# Patient Record
Sex: FEMALE | Race: WHITE | NOT HISPANIC OR LATINO | Employment: OTHER | URBAN - METROPOLITAN AREA
[De-identification: names, ages, dates, MRNs, and addresses within clinical notes are randomized per-mention and may not be internally consistent; named-entity substitution may affect disease eponyms.]

---

## 2021-05-11 ENCOUNTER — OFFICE VISIT (OUTPATIENT)
Dept: INTERNAL MEDICINE CLINIC | Facility: CLINIC | Age: 76
End: 2021-05-11
Payer: MEDICARE

## 2021-05-11 VITALS
DIASTOLIC BLOOD PRESSURE: 84 MMHG | SYSTOLIC BLOOD PRESSURE: 134 MMHG | HEIGHT: 64 IN | WEIGHT: 175 LBS | HEART RATE: 92 BPM | OXYGEN SATURATION: 93 % | BODY MASS INDEX: 29.88 KG/M2 | TEMPERATURE: 97.3 F

## 2021-05-11 DIAGNOSIS — M79.89 LEG SWELLING: Primary | ICD-10-CM

## 2021-05-11 DIAGNOSIS — E11.65 UNCONTROLLED TYPE 2 DIABETES MELLITUS WITH HYPERGLYCEMIA (HCC): ICD-10-CM

## 2021-05-11 DIAGNOSIS — R06.02 SOB (SHORTNESS OF BREATH): ICD-10-CM

## 2021-05-11 DIAGNOSIS — R19.07 ABDOMINAL SWELLING, GENERALIZED: ICD-10-CM

## 2021-05-11 DIAGNOSIS — F41.9 ANXIETY: ICD-10-CM

## 2021-05-11 DIAGNOSIS — R35.0 URINARY FREQUENCY: ICD-10-CM

## 2021-05-11 PROCEDURE — 93000 ELECTROCARDIOGRAM COMPLETE: CPT | Performed by: INTERNAL MEDICINE

## 2021-05-11 PROCEDURE — 99214 OFFICE O/P EST MOD 30 MIN: CPT | Performed by: INTERNAL MEDICINE

## 2021-05-11 RX ORDER — LANOLIN ALCOHOL/MO/W.PET/CERES
1 CREAM (GRAM) TOPICAL 2 TIMES DAILY
COMMUNITY
End: 2021-07-07 | Stop reason: ALTCHOICE

## 2021-05-11 RX ORDER — FUROSEMIDE 20 MG/1
20 TABLET ORAL DAILY
Qty: 30 TABLET | Refills: 0 | Status: SHIPPED | OUTPATIENT
Start: 2021-05-11 | End: 2021-05-17

## 2021-05-11 NOTE — PROGRESS NOTES
Assessment/Plan:  EKG-sinus tachycardia, sugar in the office 135  BMI Counseling: Body mass index is 30 04 kg/m²  The BMI is above normal  Nutrition recommendations include decreasing portion sizes, encouraging healthy choices of fruits and vegetables and decreasing fast food intake  Exercise recommendations include moderate physical activity 150 minutes/week  Tobacco Cessation Counseling: Tobacco cessation counseling was provided  The patient is sincerely urged to quit consumption of tobacco  She is ready to quit tobacco            1  Leg swelling  -     Urinalysis with microscopic  -     Urine culture; Future  -     US abdomen and pelvis with transvaginal; Future; Expected date: 05/11/2021  -     furosemide (LASIX) 20 mg tablet; Take 1 tablet (20 mg total) by mouth daily  -     NT-BNP PRO; Future  -     POCT ECG    2  SOB (shortness of breath)  -     XR chest pa & lateral; Future; Expected date: 05/11/2021  -     US abdomen and pelvis with transvaginal; Future; Expected date: 05/11/2021  -     furosemide (LASIX) 20 mg tablet; Take 1 tablet (20 mg total) by mouth daily  -     NT-BNP PRO; Future  -     POCT ECG    3  Anxiety    4  Abdominal swelling, generalized  -     US abdomen and pelvis with transvaginal; Future; Expected date: 05/11/2021    5  Uncontrolled type 2 diabetes mellitus with hyperglycemia (HCC)  -     CBC and differential; Future  -     Comprehensive metabolic panel; Future  -     Hemoglobin A1C; Future  -     Lipid Panel with Direct LDL reflex; Future  -     Urinalysis with microscopic  -     Urine culture; Future  -     TSH, 3rd generation; Future  -     Microalbumin / creatinine urine ratio    6  Urinary frequency  -     Urine culture; Future           Subjective:      Patient ID: Julio Gregorio is a 66 y o  female      Bilateral leg swelling for 1 week, as per her she feels short of breath when she gets anxious, no chest pain no chest tightness, she feels her abdomen is bloated, swelling up, denies any change in the bowel movements, denies any urinary frequency or change in the color of the urine, denies any burning in the urine, no fever no chills      The following portions of the patient's history were reviewed and updated as appropriate: She  has a past medical history of Allergic rhinitis, Anxiety, and Diabetes mellitus ([facility] )  She   Patient Active Problem List    Diagnosis Date Noted    Leg swelling 05/11/2021    SOB (shortness of breath) 05/11/2021    Abdominal swelling, generalized 05/11/2021    Uncontrolled type 2 diabetes mellitus with hyperglycemia ([facility] ) 05/11/2021    Urinary frequency 05/11/2021    Allergic rhinitis     Diabetes mellitus ([facility] )     Anxiety      She  has a past surgical history that includes Breast surgery (Right); Other surgical history; and Other surgical history  Her family history includes Diabetes in her brother  She  reports that she has been smoking  She has been smoking about 0 25 packs per day  She has never used smokeless tobacco  She reports current alcohol use of about 1 0 standard drinks of alcohol per week  No history on file for drug  Current Outpatient Medications   Medication Sig Dispense Refill    calcium citrate-vitamin D (CITRACAL+D) 315-200 MG-UNIT per tablet Take 1 tablet by mouth 2 (two) times a day      Fexofenadine-Pseudoephedrine (ALLEGRA-D 24 HOUR PO) Take by mouth      furosemide (LASIX) 20 mg tablet Take 1 tablet (20 mg total) by mouth daily 30 tablet 0    IBUPROFEN PO Take by mouth       No current facility-administered medications for this visit  Current Outpatient Medications on File Prior to Visit   Medication Sig    calcium citrate-vitamin D (CITRACAL+D) 315-200 MG-UNIT per tablet Take 1 tablet by mouth 2 (two) times a day    Fexofenadine-Pseudoephedrine (ALLEGRA-D 24 HOUR PO) Take by mouth    IBUPROFEN PO Take by mouth     No current facility-administered medications on file prior to visit        She has No Known Allergies       Review of Systems   Constitutional: Negative for chills and fever  HENT: Negative for congestion, ear pain and sore throat  Eyes: Negative for pain  Respiratory: Positive for shortness of breath  Negative for cough  Cardiovascular: Positive for leg swelling  Negative for chest pain  Gastrointestinal: Positive for abdominal distention  Negative for abdominal pain, nausea and vomiting  Endocrine: Negative for polyuria  Genitourinary: Negative for difficulty urinating, frequency and urgency  Musculoskeletal: Negative for arthralgias and back pain  Skin: Negative for rash  Neurological: Negative for weakness and headaches  Psychiatric/Behavioral: Negative for sleep disturbance  The patient is not nervous/anxious  Objective:      /84 (BP Location: Left arm, Patient Position: Sitting, Cuff Size: Standard)   Pulse 92   Temp (!) 97 3 °F (36 3 °C) (Temporal)   Ht 5' 4" (1 626 m)   Wt 79 4 kg (175 lb)   SpO2 93%   BMI 30 04 kg/m²     No results found for this or any previous visit (from the past 1344 hour(s))  Physical Exam  Constitutional:       Appearance: Normal appearance  HENT:      Head: Normocephalic  Right Ear: Tympanic membrane, ear canal and external ear normal       Left Ear: Tympanic membrane, ear canal and external ear normal       Nose: Nose normal  No congestion  Mouth/Throat:      Mouth: Mucous membranes are moist       Pharynx: Oropharynx is clear  No oropharyngeal exudate or posterior oropharyngeal erythema  Eyes:      Extraocular Movements: Extraocular movements intact  Conjunctiva/sclera: Conjunctivae normal       Pupils: Pupils are equal, round, and reactive to light  Neck:      Musculoskeletal: Normal range of motion and neck supple  Cardiovascular:      Rate and Rhythm: Normal rate and regular rhythm  Heart sounds: Normal heart sounds  No murmur     Pulmonary:      Effort: Pulmonary effort is normal       Breath sounds: Normal breath sounds  No wheezing or rales  Abdominal:      General: Bowel sounds are normal  There is distension  Tenderness: There is no abdominal tenderness  There is no right CVA tenderness or left CVA tenderness  Comments: Abdominal wall swelling present, skin looks normal color   Musculoskeletal: Normal range of motion  Right lower leg: Edema present  Left lower leg: Edema present  Lymphadenopathy:      Cervical: No cervical adenopathy  Skin:     General: Skin is warm  Neurological:      General: No focal deficit present  Mental Status: She is alert and oriented to person, place, and time

## 2021-05-12 ENCOUNTER — TELEPHONE (OUTPATIENT)
Dept: INTERNAL MEDICINE CLINIC | Facility: CLINIC | Age: 76
End: 2021-05-12

## 2021-05-12 ENCOUNTER — HOSPITAL ENCOUNTER (OUTPATIENT)
Dept: RADIOLOGY | Age: 76
Discharge: HOME | End: 2021-05-12
Payer: MEDICARE

## 2021-05-12 ENCOUNTER — APPOINTMENT (OUTPATIENT)
Dept: LAB | Age: 76
End: 2021-05-12
Payer: MEDICARE

## 2021-05-12 ENCOUNTER — APPOINTMENT (OUTPATIENT)
Dept: RADIOLOGY | Age: 76
End: 2021-05-12
Payer: MEDICARE

## 2021-05-12 DIAGNOSIS — E11.65 UNCONTROLLED TYPE 2 DIABETES MELLITUS WITH HYPERGLYCEMIA (HCC): ICD-10-CM

## 2021-05-12 DIAGNOSIS — R06.02 SOB (SHORTNESS OF BREATH): ICD-10-CM

## 2021-05-12 DIAGNOSIS — R19.07 ABDOMINAL SWELLING, GENERALIZED: ICD-10-CM

## 2021-05-12 DIAGNOSIS — M79.89 LEG SWELLING: ICD-10-CM

## 2021-05-12 DIAGNOSIS — R35.0 URINARY FREQUENCY: ICD-10-CM

## 2021-05-12 LAB
ALBUMIN SERPL BCP-MCNC: 3.7 G/DL (ref 3.5–5)
ALP SERPL-CCNC: 124 U/L (ref 46–116)
ALT SERPL W P-5'-P-CCNC: 34 U/L (ref 12–78)
ANION GAP SERPL CALCULATED.3IONS-SCNC: 0 MMOL/L (ref 4–13)
AST SERPL W P-5'-P-CCNC: 24 U/L (ref 5–45)
BACTERIA UR QL AUTO: ABNORMAL /HPF
BASOPHILS # BLD AUTO: 0.05 THOUSANDS/ΜL (ref 0–0.1)
BASOPHILS NFR BLD AUTO: 1 % (ref 0–1)
BILIRUB SERPL-MCNC: 1.95 MG/DL (ref 0.2–1)
BILIRUB UR QL STRIP: NEGATIVE
BUN SERPL-MCNC: 10 MG/DL (ref 5–25)
CALCIUM SERPL-MCNC: 9.1 MG/DL (ref 8.3–10.1)
CHLORIDE SERPL-SCNC: 101 MMOL/L (ref 100–108)
CHOLEST SERPL-MCNC: 136 MG/DL (ref 50–200)
CLARITY UR: CLEAR
CO2 SERPL-SCNC: 36 MMOL/L (ref 21–32)
COLOR UR: ABNORMAL
CREAT SERPL-MCNC: 0.63 MG/DL (ref 0.6–1.3)
CREAT UR-MCNC: 101 MG/DL
EOSINOPHIL # BLD AUTO: 0.04 THOUSAND/ΜL (ref 0–0.61)
EOSINOPHIL NFR BLD AUTO: 1 % (ref 0–6)
ERYTHROCYTE [DISTWIDTH] IN BLOOD BY AUTOMATED COUNT: 14.6 % (ref 11.6–15.1)
EST. AVERAGE GLUCOSE BLD GHB EST-MCNC: 143 MG/DL
GFR SERPL CREATININE-BSD FRML MDRD: 87 ML/MIN/1.73SQ M
GLUCOSE P FAST SERPL-MCNC: 152 MG/DL (ref 65–99)
GLUCOSE UR STRIP-MCNC: NEGATIVE MG/DL
HBA1C MFR BLD: 6.6 %
HCT VFR BLD AUTO: 56.1 % (ref 34.8–46.1)
HDLC SERPL-MCNC: 71 MG/DL
HGB BLD-MCNC: 18 G/DL (ref 11.5–15.4)
HGB UR QL STRIP.AUTO: ABNORMAL
HYALINE CASTS #/AREA URNS LPF: ABNORMAL /LPF
IMM GRANULOCYTES # BLD AUTO: 0.02 THOUSAND/UL (ref 0–0.2)
IMM GRANULOCYTES NFR BLD AUTO: 0 % (ref 0–2)
KETONES UR STRIP-MCNC: ABNORMAL MG/DL
LDLC SERPL CALC-MCNC: 55 MG/DL (ref 0–100)
LEUKOCYTE ESTERASE UR QL STRIP: NEGATIVE
LYMPHOCYTES # BLD AUTO: 0.79 THOUSANDS/ΜL (ref 0.6–4.47)
LYMPHOCYTES NFR BLD AUTO: 11 % (ref 14–44)
MCH RBC QN AUTO: 32.4 PG (ref 26.8–34.3)
MCHC RBC AUTO-ENTMCNC: 32.1 G/DL (ref 31.4–37.4)
MCV RBC AUTO: 101 FL (ref 82–98)
MICROALBUMIN UR-MCNC: 2500 MG/L (ref 0–20)
MICROALBUMIN/CREAT 24H UR: 2475 MG/G CREATININE (ref 0–30)
MONOCYTES # BLD AUTO: 0.46 THOUSAND/ΜL (ref 0.17–1.22)
MONOCYTES NFR BLD AUTO: 7 % (ref 4–12)
NEUTROPHILS # BLD AUTO: 5.61 THOUSANDS/ΜL (ref 1.85–7.62)
NEUTS SEG NFR BLD AUTO: 80 % (ref 43–75)
NITRITE UR QL STRIP: NEGATIVE
NON-SQ EPI CELLS URNS QL MICRO: ABNORMAL /HPF
NRBC BLD AUTO-RTO: 0 /100 WBCS
NT-PROBNP SERPL-MCNC: 2138 PG/ML
PH UR STRIP.AUTO: 6 [PH]
PLATELET # BLD AUTO: 177 THOUSANDS/UL (ref 149–390)
PMV BLD AUTO: 12.2 FL (ref 8.9–12.7)
POTASSIUM SERPL-SCNC: 4.5 MMOL/L (ref 3.5–5.3)
PROT SERPL-MCNC: 7 G/DL (ref 6.4–8.2)
PROT UR STRIP-MCNC: ABNORMAL MG/DL
RBC # BLD AUTO: 5.56 MILLION/UL (ref 3.81–5.12)
RBC #/AREA URNS AUTO: ABNORMAL /HPF
SODIUM SERPL-SCNC: 137 MMOL/L (ref 136–145)
SP GR UR STRIP.AUTO: 1.02 (ref 1–1.03)
TRIGL SERPL-MCNC: 49 MG/DL
TSH SERPL DL<=0.05 MIU/L-ACNC: 1.8 UIU/ML (ref 0.36–3.74)
UROBILINOGEN UR QL STRIP.AUTO: 1 E.U./DL
WBC # BLD AUTO: 6.97 THOUSAND/UL (ref 4.31–10.16)
WBC #/AREA URNS AUTO: ABNORMAL /HPF

## 2021-05-12 PROCEDURE — 76856 US EXAM PELVIC COMPLETE: CPT

## 2021-05-12 PROCEDURE — 83880 ASSAY OF NATRIURETIC PEPTIDE: CPT

## 2021-05-12 PROCEDURE — 76830 TRANSVAGINAL US NON-OB: CPT

## 2021-05-12 PROCEDURE — 85025 COMPLETE CBC W/AUTO DIFF WBC: CPT

## 2021-05-12 PROCEDURE — 82570 ASSAY OF URINE CREATININE: CPT | Performed by: INTERNAL MEDICINE

## 2021-05-12 PROCEDURE — 83036 HEMOGLOBIN GLYCOSYLATED A1C: CPT

## 2021-05-12 PROCEDURE — 82043 UR ALBUMIN QUANTITATIVE: CPT | Performed by: INTERNAL MEDICINE

## 2021-05-12 PROCEDURE — 76700 US EXAM ABDOM COMPLETE: CPT

## 2021-05-12 PROCEDURE — 36415 COLL VENOUS BLD VENIPUNCTURE: CPT

## 2021-05-12 PROCEDURE — 80061 LIPID PANEL: CPT

## 2021-05-12 PROCEDURE — 87086 URINE CULTURE/COLONY COUNT: CPT

## 2021-05-12 PROCEDURE — 81001 URINALYSIS AUTO W/SCOPE: CPT | Performed by: INTERNAL MEDICINE

## 2021-05-12 PROCEDURE — 71046 X-RAY EXAM CHEST 2 VIEWS: CPT

## 2021-05-12 PROCEDURE — 80053 COMPREHEN METABOLIC PANEL: CPT

## 2021-05-12 PROCEDURE — 84443 ASSAY THYROID STIM HORMONE: CPT

## 2021-05-12 NOTE — TELEPHONE ENCOUNTER
Rojas Lux called from [facility] radiology also to let you know there was significant findings on the [address] of abdomen as well as the pelvis

## 2021-05-13 ENCOUNTER — TELEPHONE (OUTPATIENT)
Dept: INTERNAL MEDICINE CLINIC | Facility: CLINIC | Age: 76
End: 2021-05-13

## 2021-05-13 DIAGNOSIS — R18.0 MALIGNANT ASCITES: ICD-10-CM

## 2021-05-13 DIAGNOSIS — F41.9 ANXIETY: Primary | ICD-10-CM

## 2021-05-13 DIAGNOSIS — R91.8 ABNORMAL RADIOLOGIC FINDING OF LUNG FIELD: ICD-10-CM

## 2021-05-13 DIAGNOSIS — R10.84 GENERALIZED ABDOMINAL PAIN: ICD-10-CM

## 2021-05-13 DIAGNOSIS — R06.02 SOB (SHORTNESS OF BREATH): Primary | ICD-10-CM

## 2021-05-13 DIAGNOSIS — R18.0 MALIGNANT ASCITES: Primary | ICD-10-CM

## 2021-05-13 LAB — BACTERIA UR CULT: NORMAL

## 2021-05-13 RX ORDER — LORAZEPAM 0.5 MG/1
0.5 TABLET ORAL AS NEEDED
Qty: 2 TABLET | Refills: 0 | Status: SHIPPED | OUTPATIENT
Start: 2021-05-13 | End: 2021-07-07 | Stop reason: ALTCHOICE

## 2021-05-13 NOTE — TELEPHONE ENCOUNTER
I scheduled patient for her CT chest and CT abd/pelvis for tomorrow at [facility] and she is asking if you can prescirbe something ot help keep her cqalm during the test  She uses [facility] on [address]

## 2021-05-13 NOTE — TELEPHONE ENCOUNTER
Thank you, lorazepam 0 5 mg, have her take 1 hour before the CT scan tomorrow, I faxed script to the pharmacy

## 2021-05-14 ENCOUNTER — HOSPITAL ENCOUNTER (OUTPATIENT)
Dept: RADIOLOGY | Age: 76
Discharge: HOME | End: 2021-05-14
Payer: MEDICARE

## 2021-05-14 DIAGNOSIS — R10.84 GENERALIZED ABDOMINAL PAIN: ICD-10-CM

## 2021-05-14 DIAGNOSIS — R06.02 SOB (SHORTNESS OF BREATH): ICD-10-CM

## 2021-05-14 DIAGNOSIS — R91.8 ABNORMAL RADIOLOGIC FINDING OF LUNG FIELD: ICD-10-CM

## 2021-05-14 DIAGNOSIS — R18.0 MALIGNANT ASCITES: ICD-10-CM

## 2021-05-14 PROCEDURE — 74177 CT ABD & PELVIS W/CONTRAST: CPT

## 2021-05-14 PROCEDURE — G1004 CDSM NDSC: HCPCS

## 2021-05-14 PROCEDURE — 71260 CT THORAX DX C+: CPT

## 2021-05-14 RX ADMIN — IOHEXOL 100 ML: 350 INJECTION, SOLUTION INTRAVENOUS at 12:30

## 2021-05-17 ENCOUNTER — OFFICE VISIT (OUTPATIENT)
Dept: INTERNAL MEDICINE CLINIC | Facility: CLINIC | Age: 76
End: 2021-05-17

## 2021-05-17 VITALS
BODY MASS INDEX: 29.88 KG/M2 | HEART RATE: 92 BPM | TEMPERATURE: 98.1 F | WEIGHT: 175 LBS | DIASTOLIC BLOOD PRESSURE: 84 MMHG | OXYGEN SATURATION: 92 % | SYSTOLIC BLOOD PRESSURE: 128 MMHG | HEIGHT: 64 IN

## 2021-05-17 DIAGNOSIS — M79.89 LEG SWELLING: Primary | ICD-10-CM

## 2021-05-17 DIAGNOSIS — R19.07 ABDOMINAL SWELLING, GENERALIZED: ICD-10-CM

## 2021-05-17 DIAGNOSIS — E11.65 UNCONTROLLED TYPE 2 DIABETES MELLITUS WITH HYPERGLYCEMIA (HCC): ICD-10-CM

## 2021-05-17 DIAGNOSIS — R80.8 OTHER PROTEINURIA: ICD-10-CM

## 2021-05-17 DIAGNOSIS — F41.9 ANXIETY: ICD-10-CM

## 2021-05-17 DIAGNOSIS — R06.02 SOB (SHORTNESS OF BREATH): ICD-10-CM

## 2021-05-17 PROCEDURE — RECHECK: Performed by: INTERNAL MEDICINE

## 2021-05-17 RX ORDER — FUROSEMIDE 40 MG/1
40 TABLET ORAL DAILY
Qty: 30 TABLET | Refills: 0 | Status: SHIPPED | OUTPATIENT
Start: 2021-05-17 | End: 2021-05-26 | Stop reason: HOSPADM

## 2021-05-17 RX ORDER — HYDROXYZINE HYDROCHLORIDE 10 MG/1
10 TABLET, FILM COATED ORAL 3 TIMES DAILY PRN
Qty: 60 TABLET | Refills: 0 | Status: SHIPPED | OUTPATIENT
Start: 2021-05-17 | End: 2021-06-02

## 2021-05-17 RX ORDER — FUROSEMIDE 40 MG/1
40 TABLET ORAL DAILY
Qty: 30 TABLET | Refills: 0 | Status: SHIPPED | OUTPATIENT
Start: 2021-05-17 | End: 2021-05-17

## 2021-05-17 RX ORDER — HYDROXYZINE HYDROCHLORIDE 10 MG/1
10 TABLET, FILM COATED ORAL EVERY 6 HOURS PRN
Qty: 30 TABLET | Refills: 0 | Status: SHIPPED | OUTPATIENT
Start: 2021-05-17 | End: 2021-05-17

## 2021-05-17 NOTE — PROGRESS NOTES
Assessment/Plan:      advised her to go to ER if she gets worse  1  Leg swelling  -     Basic metabolic panel; Future  -     furosemide (LASIX) 40 mg tablet; Take 1 tablet (40 mg total) by mouth daily    2  SOB (shortness of breath)  -     Echo complete with contrast if indicated; Future; Expected date: 05/17/2021  -     Ambulatory referral to Cardiology; Future    3  Abdominal swelling, generalized    4  Other proteinuria  -     Ambulatory referral to Nephrology; Future    5  Anxiety  -     hydrOXYzine HCL (ATARAX) 10 mg tablet; Take 1 tablet (10 mg total) by mouth 3 (three) times a day as needed for itching or anxiety    6  Uncontrolled type 2 diabetes mellitus with hyperglycemia ([facility])           Subjective:      Patient ID: Mercy Scott is a 66 y o  female  Follow-up on blood tests, urine tests ultrasound and CT scan of the abdomen pelvis and chest tests discussed with her      The following portions of the patient's history were reviewed and updated as appropriate: She  has a past medical history of Allergic rhinitis, Anxiety, and Diabetes mellitus ([facility] )  She   Patient Active Problem List    Diagnosis Date Noted    Other proteinuria 05/17/2021    Leg swelling 05/11/2021    SOB (shortness of breath) 05/11/2021    Abdominal swelling, generalized 05/11/2021    Uncontrolled type 2 diabetes mellitus with hyperglycemia ([facility] ) 05/11/2021    Urinary frequency 05/11/2021    Allergic rhinitis     Diabetes mellitus ([facility] )     Anxiety      She  has a past surgical history that includes Breast surgery (Right); Other surgical history; and Other surgical history  Her family history includes Diabetes in her brother  She  reports that she has been smoking  She has been smoking about 0 25 packs per day  She has never used smokeless tobacco  She reports current alcohol use of about 1 0 standard drinks of alcohol per week  No history on file for drug    Current Outpatient Medications   Medication Sig Dispense Refill    calcium citrate-vitamin D (CITRACAL+D) 315-200 MG-UNIT per tablet Take 1 tablet by mouth 2 (two) times a day      Fexofenadine-Pseudoephedrine (ALLEGRA-D 24 HOUR PO) Take by mouth      IBUPROFEN PO Take by mouth      LORazepam (ATIVAN) 0 5 mg tablet Take 1 tablet (0 5 mg total) by mouth as needed for anxiety 1 hour before CT scan 2 tablet 0    furosemide (LASIX) 40 mg tablet Take 1 tablet (40 mg total) by mouth daily 30 tablet 0    hydrOXYzine HCL (ATARAX) 10 mg tablet Take 1 tablet (10 mg total) by mouth 3 (three) times a day as needed for itching or anxiety 60 tablet 0     No current facility-administered medications for this visit  Current Outpatient Medications on File Prior to Visit   Medication Sig    calcium citrate-vitamin D (CITRACAL+D) 315-200 MG-UNIT per tablet Take 1 tablet by mouth 2 (two) times a day    Fexofenadine-Pseudoephedrine (ALLEGRA-D 24 HOUR PO) Take by mouth    IBUPROFEN PO Take by mouth    LORazepam (ATIVAN) 0 5 mg tablet Take 1 tablet (0 5 mg total) by mouth as needed for anxiety 1 hour before CT scan    [DISCONTINUED] furosemide (LASIX) 20 mg tablet Take 1 tablet (20 mg total) by mouth daily     No current facility-administered medications on file prior to visit  She has No Known Allergies       Review of Systems   Constitutional: Negative for chills and fever  HENT: Negative for congestion, ear pain and sore throat  Eyes: Negative for pain  Respiratory: Positive for shortness of breath  Negative for cough  Cardiovascular: Positive for leg swelling  Negative for chest pain  Gastrointestinal: Positive for abdominal distention  Negative for abdominal pain, nausea and vomiting  Endocrine: Negative for polyuria  Genitourinary: Negative for difficulty urinating, frequency and urgency  Musculoskeletal: Negative for arthralgias and back pain  Skin: Negative for rash  Neurological: Negative for weakness and headaches     Psychiatric/Behavioral: Negative for sleep disturbance  The patient is not nervous/anxious  Objective:      /84 (BP Location: Left arm, Patient Position: Sitting, Cuff Size: Standard)   Pulse 92   Temp 98 1 °F (36 7 °C) (Temporal)   Ht 5' 4" (1 626 m)   Wt 79 4 kg (175 lb)   SpO2 92%   BMI 30 04 kg/m²     Recent Results (from the past 1344 hour(s))   Urinalysis with microscopic    Collection Time: 05/12/21 11:58 AM   Result Value Ref Range    Clarity, UA Clear     Color, UA Dk Yellow     [place], UA 1 016 1 003 - 1 030    pH, UA 6 0 4 5, 5 0, 5 5, 6 0, 6 5, 7 0, 7 5, 8 0    Glucose, UA Negative Negative mg/dl    Ketones, UA Trace (A) Negative mg/dl    Blood, UA Trace (A) Negative    Protein,  (3+) (A) Negative mg/dl    Nitrite, UA Negative Negative    Bilirubin, UA Negative Negative    Urobilinogen, UA 1 0 0 2, 1 0 E U /dl E U /dl    Leukocytes, UA Negative Negative    WBC, UA 2-4 None Seen, 2-4 /hpf    RBC, UA 2-4 None Seen, 2-4 /hpf    Hyaline Casts, UA None Seen None Seen /lpf    Bacteria, UA Occasional None Seen, Occasional /hpf    Epithelial Cells None Seen None Seen, Occasional /hpf   Microalbumin / creatinine urine ratio    Collection Time: 05/12/21 11:58 AM   Result Value Ref Range    Creatinine, Ur 101 0 mg/dL    Microalbum  ,U,Random 2,500 0 (H) 0 0 - 20 0 mg/L    Microalb Creat Ratio 2,475 (H) 0 - 30 mg/g creatinine   CBC and differential    Collection Time: 05/12/21 11:58 AM   Result Value Ref Range    WBC 6 97 4 31 - 10 16 Thousand/uL    RBC 5 56 (H) 3 81 - 5 12 Million/uL    Hemoglobin 18 0 (H) 11 5 - 15 4 g/dL    Hematocrit 56 1 (H) 34 8 - 46 1 %     (H) 82 - 98 fL    MCH 32 4 26 8 - 34 3 pg    MCHC 32 1 31 4 - 37 4 g/dL    RDW 14 6 11 6 - 15 1 %    MPV 12 2 8 9 - 12 7 fL    Platelets 616 906 - 024 Thousands/uL    nRBC 0 /100 WBCs    Neutrophils Relative 80 (H) 43 - 75 %    Immat GRANS % 0 0 - 2 %    Lymphocytes Relative 11 (L) 14 - 44 %    Monocytes Relative 7 4 - 12 % Eosinophils Relative 1 0 - 6 %    Basophils Relative 1 0 - 1 %    Neutrophils Absolute 5 61 1 85 - 7 62 Thousands/µL    Immature Grans Absolute 0 02 0 00 - 0 20 Thousand/uL    Lymphocytes Absolute 0 79 0 60 - 4 47 Thousands/µL    Monocytes Absolute 0 46 0 17 - 1 22 Thousand/µL    Eosinophils Absolute 0 04 0 00 - 0 61 Thousand/µL    Basophils Absolute 0 05 0 00 - 0 10 Thousands/µL   Comprehensive metabolic panel    Collection Time: 05/12/21 11:58 AM   Result Value Ref Range    Sodium 137 136 - 145 mmol/L    Potassium 4 5 3 5 - 5 3 mmol/L    Chloride 101 100 - 108 mmol/L    CO2 36 (H) 21 - 32 mmol/L    ANION GAP 0 (L) 4 - 13 mmol/L    BUN 10 5 - 25 mg/dL    Creatinine 0 63 0 60 - 1 30 mg/dL    Glucose, Fasting 152 (H) 65 - 99 mg/dL    Calcium 9 1 8 3 - 10 1 mg/dL    AST 24 5 - 45 U/L    ALT 34 12 - 78 U/L    Alkaline Phosphatase 124 (H) 46 - 116 U/L    Total Protein 7 0 6 4 - 8 2 g/dL    Albumin 3 7 3 5 - 5 0 g/dL    Total Bilirubin 1 95 (H) 0 20 - 1 00 mg/dL    eGFR 87 ml/min/1 73sq m   Hemoglobin A1C    Collection Time: 05/12/21 11:58 AM   Result Value Ref Range    Hemoglobin A1C 6 6 (H) Normal 3 8-5 6%; PreDiabetic 5 7-6 4%; Diabetic >=6 5%; Glycemic control for adults with diabetes <7 0% %     mg/dl   Lipid Panel with Direct LDL reflex    Collection Time: 05/12/21 11:58 AM   Result Value Ref Range    Cholesterol 136 50 - 200 mg/dL    Triglycerides 49 <=150 mg/dL    HDL, Direct 71 >=40 mg/dL    LDL Calculated 55 0 - 100 mg/dL   Urine culture    Collection Time: 05/12/21 11:58 AM    Specimen: Urine, Clean Catch   Result Value Ref Range    Urine Culture No Growth <1000 cfu/mL    TSH, 3rd generation    Collection Time: 05/12/21 11:58 AM   Result Value Ref Range    TSH 3RD GENERATON 1 800 0 358 - 3 740 uIU/mL   NT-BNP PRO    Collection Time: 05/12/21 11:58 AM   Result Value Ref Range    NT-proBNP 2,138 (H) <450 pg/mL        Physical Exam  Constitutional:       Appearance: Normal appearance     HENT:      Head: Normocephalic  Right Ear: Tympanic membrane, ear canal and external ear normal       Left Ear: Tympanic membrane, ear canal and external ear normal       Nose: Nose normal  No congestion  Mouth/Throat:      Mouth: Mucous membranes are moist       Pharynx: Oropharynx is clear  No oropharyngeal exudate or posterior oropharyngeal erythema  Eyes:      Extraocular Movements: Extraocular movements intact  Conjunctiva/sclera: Conjunctivae normal       Pupils: Pupils are equal, round, and reactive to light  Neck:      Musculoskeletal: Normal range of motion and neck supple  Cardiovascular:      Rate and Rhythm: Normal rate and regular rhythm  Heart sounds: Normal heart sounds  No murmur  Pulmonary:      Effort: Pulmonary effort is normal       Breath sounds: Rales present  No wheezing  Comments: Left-sided basal crackles present  Abdominal:      General: Bowel sounds are normal  There is distension  Tenderness: There is no abdominal tenderness  There is no right CVA tenderness or left CVA tenderness  Comments: Abdominal wall swelling present, skin looks normal color   Musculoskeletal: Normal range of motion  Right lower leg: Edema present  Left lower leg: Edema present  Lymphadenopathy:      Cervical: No cervical adenopathy  Skin:     General: Skin is warm  Neurological:      General: No focal deficit present  Mental Status: She is alert and oriented to person, place, and time

## 2021-05-18 ENCOUNTER — APPOINTMENT (EMERGENCY)
Dept: RADIOLOGY | Facility: HOSPITAL | Age: 76
DRG: 291 | End: 2021-05-18
Payer: MEDICARE

## 2021-05-18 ENCOUNTER — TELEPHONE (OUTPATIENT)
Dept: INTERNAL MEDICINE CLINIC | Facility: CLINIC | Age: 76
End: 2021-05-18

## 2021-05-18 ENCOUNTER — HOSPITAL ENCOUNTER (INPATIENT)
Facility: HOSPITAL | Age: 76
LOS: 8 days | Discharge: HOME HEALTH | DRG: 291 | End: 2021-05-26
Attending: EMERGENCY MEDICINE | Admitting: INTERNAL MEDICINE
Payer: MEDICARE

## 2021-05-18 DIAGNOSIS — J44.9 COPD (CHRONIC OBSTRUCTIVE PULMONARY DISEASE) (HCC): ICD-10-CM

## 2021-05-18 DIAGNOSIS — R80.8 OTHER PROTEINURIA: ICD-10-CM

## 2021-05-18 DIAGNOSIS — R76.8 RHEUMATOID FACTOR POSITIVE: ICD-10-CM

## 2021-05-18 DIAGNOSIS — M79.89 LEG SWELLING: Primary | ICD-10-CM

## 2021-05-18 DIAGNOSIS — R80.9 MICROALBUMINURIA: ICD-10-CM

## 2021-05-18 DIAGNOSIS — I50.9 CHF EXACERBATION (HCC): Primary | ICD-10-CM

## 2021-05-18 DIAGNOSIS — I50.9 ACUTE DECOMPENSATED HEART FAILURE (HCC): ICD-10-CM

## 2021-05-18 DIAGNOSIS — R19.07 ABDOMINAL SWELLING, GENERALIZED: ICD-10-CM

## 2021-05-18 DIAGNOSIS — R80.9 PROTEINURIA: ICD-10-CM

## 2021-05-18 DIAGNOSIS — E11.9 DIABETES MELLITUS (HCC): ICD-10-CM

## 2021-05-18 DIAGNOSIS — R09.02 HYPOXIA: ICD-10-CM

## 2021-05-18 DIAGNOSIS — R06.02 SOB (SHORTNESS OF BREATH): ICD-10-CM

## 2021-05-18 DIAGNOSIS — E87.70 VOLUME OVERLOAD: ICD-10-CM

## 2021-05-18 DIAGNOSIS — J81.1 PULMONARY EDEMA: ICD-10-CM

## 2021-05-18 DIAGNOSIS — R10.84 GENERALIZED ABDOMINAL PAIN: ICD-10-CM

## 2021-05-18 PROBLEM — I10 HYPERTENSION: Status: ACTIVE | Noted: 2021-05-18

## 2021-05-18 PROBLEM — F17.200 NICOTINE DEPENDENCE: Status: ACTIVE | Noted: 2021-05-18

## 2021-05-18 PROBLEM — R91.8 LUNG NODULES: Status: ACTIVE | Noted: 2021-05-18

## 2021-05-18 PROBLEM — J96.01 ACUTE RESPIRATORY FAILURE WITH HYPOXIA (HCC): Status: ACTIVE | Noted: 2021-05-18

## 2021-05-18 LAB
ANION GAP SERPL CALCULATED.3IONS-SCNC: 6 MMOL/L (ref 4–13)
BASOPHILS # BLD AUTO: 0.07 THOUSANDS/ΜL (ref 0–0.1)
BASOPHILS NFR BLD AUTO: 1 % (ref 0–1)
BUN SERPL-MCNC: 16 MG/DL (ref 5–25)
CALCIUM SERPL-MCNC: 9.2 MG/DL (ref 8.3–10.1)
CHLORIDE SERPL-SCNC: 99 MMOL/L (ref 100–108)
CO2 SERPL-SCNC: 32 MMOL/L (ref 21–32)
CREAT SERPL-MCNC: 0.8 MG/DL (ref 0.6–1.3)
EOSINOPHIL # BLD AUTO: 0.06 THOUSAND/ΜL (ref 0–0.61)
EOSINOPHIL NFR BLD AUTO: 1 % (ref 0–6)
ERYTHROCYTE [DISTWIDTH] IN BLOOD BY AUTOMATED COUNT: 14.3 % (ref 11.6–15.1)
GFR SERPL CREATININE-BSD FRML MDRD: 71 ML/MIN/1.73SQ M
GLUCOSE SERPL-MCNC: 108 MG/DL (ref 65–140)
GLUCOSE SERPL-MCNC: 141 MG/DL (ref 65–140)
HCT VFR BLD AUTO: 56.1 % (ref 34.8–46.1)
HGB BLD-MCNC: 18.2 G/DL (ref 11.5–15.4)
IMM GRANULOCYTES # BLD AUTO: 0.03 THOUSAND/UL (ref 0–0.2)
IMM GRANULOCYTES NFR BLD AUTO: 0 % (ref 0–2)
LYMPHOCYTES # BLD AUTO: 0.94 THOUSANDS/ΜL (ref 0.6–4.47)
LYMPHOCYTES NFR BLD AUTO: 10 % (ref 14–44)
MCH RBC QN AUTO: 32.1 PG (ref 26.8–34.3)
MCHC RBC AUTO-ENTMCNC: 32.4 G/DL (ref 31.4–37.4)
MCV RBC AUTO: 99 FL (ref 82–98)
MONOCYTES # BLD AUTO: 0.82 THOUSAND/ΜL (ref 0.17–1.22)
MONOCYTES NFR BLD AUTO: 9 % (ref 4–12)
NEUTROPHILS # BLD AUTO: 7.21 THOUSANDS/ΜL (ref 1.85–7.62)
NEUTS SEG NFR BLD AUTO: 79 % (ref 43–75)
NRBC BLD AUTO-RTO: 0 /100 WBCS
NT-PROBNP SERPL-MCNC: 3223 PG/ML
PLATELET # BLD AUTO: 221 THOUSANDS/UL (ref 149–390)
PMV BLD AUTO: 11.7 FL (ref 8.9–12.7)
POTASSIUM SERPL-SCNC: 3.6 MMOL/L (ref 3.5–5.3)
RBC # BLD AUTO: 5.67 MILLION/UL (ref 3.81–5.12)
SARS-COV-2 RNA RESP QL NAA+PROBE: NEGATIVE
SODIUM SERPL-SCNC: 137 MMOL/L (ref 136–145)
TROPONIN I SERPL-MCNC: 0.04 NG/ML
WBC # BLD AUTO: 9.13 THOUSAND/UL (ref 4.31–10.16)

## 2021-05-18 PROCEDURE — U0005 INFEC AGEN DETEC AMPLI PROBE: HCPCS | Performed by: EMERGENCY MEDICINE

## 2021-05-18 PROCEDURE — 93005 ELECTROCARDIOGRAM TRACING: CPT

## 2021-05-18 PROCEDURE — 83880 ASSAY OF NATRIURETIC PEPTIDE: CPT | Performed by: EMERGENCY MEDICINE

## 2021-05-18 PROCEDURE — 1124F ACP DISCUSS-NO DSCNMKR DOCD: CPT | Performed by: EMERGENCY MEDICINE

## 2021-05-18 PROCEDURE — 94762 N-INVAS EAR/PLS OXIMTRY CONT: CPT

## 2021-05-18 PROCEDURE — 84484 ASSAY OF TROPONIN QUANT: CPT | Performed by: EMERGENCY MEDICINE

## 2021-05-18 PROCEDURE — 99285 EMERGENCY DEPT VISIT HI MDM: CPT

## 2021-05-18 PROCEDURE — 96374 THER/PROPH/DIAG INJ IV PUSH: CPT

## 2021-05-18 PROCEDURE — 82948 REAGENT STRIP/BLOOD GLUCOSE: CPT

## 2021-05-18 PROCEDURE — 71045 X-RAY EXAM CHEST 1 VIEW: CPT

## 2021-05-18 PROCEDURE — 36415 COLL VENOUS BLD VENIPUNCTURE: CPT | Performed by: EMERGENCY MEDICINE

## 2021-05-18 PROCEDURE — U0003 INFECTIOUS AGENT DETECTION BY NUCLEIC ACID (DNA OR RNA); SEVERE ACUTE RESPIRATORY SYNDROME CORONAVIRUS 2 (SARS-COV-2) (CORONAVIRUS DISEASE [COVID-19]), AMPLIFIED PROBE TECHNIQUE, MAKING USE OF HIGH THROUGHPUT TECHNOLOGIES AS DESCRIBED BY CMS-2020-01-R: HCPCS | Performed by: EMERGENCY MEDICINE

## 2021-05-18 PROCEDURE — 99291 CRITICAL CARE FIRST HOUR: CPT | Performed by: EMERGENCY MEDICINE

## 2021-05-18 PROCEDURE — 99223 1ST HOSP IP/OBS HIGH 75: CPT | Performed by: INTERNAL MEDICINE

## 2021-05-18 PROCEDURE — 85025 COMPLETE CBC W/AUTO DIFF WBC: CPT | Performed by: EMERGENCY MEDICINE

## 2021-05-18 PROCEDURE — 80048 BASIC METABOLIC PNL TOTAL CA: CPT | Performed by: EMERGENCY MEDICINE

## 2021-05-18 RX ORDER — LISINOPRIL 10 MG/1
10 TABLET ORAL DAILY
Status: DISCONTINUED | OUTPATIENT
Start: 2021-05-19 | End: 2021-05-22

## 2021-05-18 RX ORDER — SODIUM CHLORIDE 9 MG/ML
3 INJECTION INTRAVENOUS
Status: DISCONTINUED | OUTPATIENT
Start: 2021-05-18 | End: 2021-05-26 | Stop reason: HOSPADM

## 2021-05-18 RX ORDER — FUROSEMIDE 10 MG/ML
40 INJECTION INTRAMUSCULAR; INTRAVENOUS 2 TIMES DAILY
Status: DISCONTINUED | OUTPATIENT
Start: 2021-05-19 | End: 2021-05-19

## 2021-05-18 RX ORDER — FUROSEMIDE 10 MG/ML
40 INJECTION INTRAMUSCULAR; INTRAVENOUS ONCE
Status: COMPLETED | OUTPATIENT
Start: 2021-05-18 | End: 2021-05-18

## 2021-05-18 RX ORDER — CALCIUM CARBONATE 500(1250)
1 TABLET ORAL
Status: DISCONTINUED | OUTPATIENT
Start: 2021-05-19 | End: 2021-05-26 | Stop reason: HOSPADM

## 2021-05-18 RX ADMIN — FUROSEMIDE 40 MG: 10 INJECTION, SOLUTION INTRAVENOUS at 17:03

## 2021-05-18 NOTE — TELEPHONE ENCOUNTER
Patient is not any better, legs and stomach are very swollen today  Feels like she is getting worse every day  Please Call her

## 2021-05-18 NOTE — H&P
INTERNAL MEDICINE RESIDENCY ADMISSION H&P     Name: Stephy Seth   Age & Sex: 66 y o  female   MRN: [id number]  Unit/Bed#: [facility] 525-01   Encounter: [id number]  Primary Care Provider: Alexander Aiken MD    Code Status: Level 1 - Full Code  Admission Status: INPATIENT   Disposition: Patient requires Med/Surg with Telemetry    Admit to team: SOD Team B     ASSESSMENT/PLAN     Principal Problem:    Volume overload likely 2/2 CHF   Active Problems:    Diabetes mellitus ([facility] [address] )    Lung nodules    COPD (chronic obstructive pulmonary disease) ([facility] [address] )    Acute respiratory failure with hypoxia (HCC)    Hypertension    Nicotine dependence      * Volume overload likely 2/2 CHF   Assessment & Plan  Patient presenting with worsening b/l LE edema and abdominal distension that has been worsening over the last two weeks despite initiation or oral lasix  Swelling associated with decreased functional status 2/2 SOB with exertion and orthopnea  On arrival, patient hypoxic to 81% requiring 6L nasal canala  Patient significantly volume overloaded with evidence of right heart failure given +3 pitting b/l LE edema extending up to thighs, significant abdominal distension, and JVD  Only fine bibasilar crackles  Labs significant for proNT-BNP of 3,223 and imaging findings demonstrating small b/l pleural effusions and abdominopelvic ascites  Patient symptoms and clinical exam findings likely secondary to new onset decompensated CHF  Will admit for HF workup  Plan:  · Lasix 40 mg IV BID  · Strict standing weights; monitor intake and output  · 2g Na restricted diet and 1500 mL fluid restricted diet  · Echo cardiogram ordered and pending  · Monitor and maintain SpO2>88%; wean O2 as patient tolerates  · Consider cardiology consultation; will require outpatient cardiology workup    Nicotine dependence  Assessment & Plan  Longstanding history of tobacco abuse for over 40 years  Currently half to full pack per day smoker   Denies nicotine patch at this time  Plan:  · Continue to encourage tobacco cessation    Hypertension  Assessment & Plan  BP on arrival 193/104 which improved with lasix administration  No history of HTN and on no anti-hypertensives at home  Given evidence of proteinuria in setting of DMII, will begin on Lisinopril 10 mg      Plan:  · Lisinopril 10 mg daily  · Monitor with routine vital signs    Acute respiratory failure with hypoxia [facility])  Assessment & Plan  Patient hypoxic on day of admission with SpO2 of 81% on room air requiring 6L nasal cannula on arrival  SOB likely secondary to underlying COPD and significant volume overload  Plan:  · Maintain SpO2>88%; wean O2 as patient tolerates  · Diuresis as above   · Out of bed with assistance   · Out patient follow up with PCP; will require PFTs for likely underlying COPD    COPD (chronic obstructive pulmonary disease) ([facility] [address] )  Assessment & Plan  No prior documented history of COPD  High likelihood of undiagnosed COPD given recent evidence of COPD on CT C/A/P and long-standing smoking history  Patient does have diffuse end-expiratory wheezing on exam with prolonged expiratory phase  Requiring 6L NC but major contributor being volume overload  No concern for COPD exacerbation at this time  Plan:  · Maintain SpO2>88%; wean O2 as patient tolerates  · Continue to encourage smoking cessation  · Outpatient follow up; will require PFTs    Lung nodules  Assessment & Plan  CT Chest/Abdomen completed on 5/14/2021 revealed: "Few pulmonary nodules in the left lower lobe, the largest of which measures 6 mm with unknown long-term stability   Based on current Fleischner Society 2017 Guidelines on incidental pulmonary nodule, followup non-contrast CT is recommended at 6-12 months from the initial examination and, if stable at that time, an additional followup is recommended for 18-24 months from the initial examination "    Plan:  · Repeat outpatient non-contrast CT in 6-12 months    Diabetes mellitus [facility])  Assessment & Plan  Lab Results   Component Value Date    HGBA1C 6 6 (H) 05/12/2021     History of DMII with most recent A1c of 6 6  Not currently on any outpatient diabetic regimen  Plan:  · Continue POC glucose checks; 4x daily with meals and at bedtime  · Insulin regimen:  · Mealtime: SSI Algorithm 3  · Bedtime: SSI Algorithm 2      VTE Pharmacologic Prophylaxis: Enoxaparin (Lovenox)  VTE Mechanical Prophylaxis: sequential compression device    CHIEF COMPLAINT     Chief Complaint   Patient presents with    Leg Swelling     pt was told to comw to the ED by her Family doctor due to increased swelling in her legs and abd       Melanie Silva     Ms Hesham Morgan is a 77-year-old female with past medical history of diabetes mellitus type 2 (hemoglobin A1c 6 6), tobacco abuse, and anxiety who presents [facility] ED on 05/18/2020 after being sent in by her PCP due to increased swelling in her lower extremities and abdomen  Patient states that approx 2 weeks ago she noticed isolated left sided ankle swelling  10 days ago, swelling then began to increase to involve her bilateral LE and then she also noted increasing abdominal distension  Swelling associated with SOB and lightheadedness/dizzines  Patient denies CP or palpitation  Patient was seen an evaluated by Dr Modesta Arriaza on 5/11/2021 and at that time imaging was ordered (completed 5/12/2021 and 5/14/2021) and patient was started on Lasix 20 mg for which she admits compliance to  CXR showed bandlike opacity in the RML possible 2/2 atelectasis and vs PNA  Pelvis and abdominal US revealed moderate pelvic and mild abdominal ascites  CT C/A/P pulmonary nodules, small volume abdominopelvis ascites and moderate diffuse body wall edema  Patient was once again evaluated by Dr Modesta Arriaza on 5/17/2021 and was increased to Lasix 40 mg daily  Due to worsening b/l LE edema and abdominal distension the next day, patient was advised to go to the ED for further evaluation  Patient denies past medical history of cardiac disease including HTN or CHF  She has never been seen by a cardiologist herself and denies significant family history  She does admit that at night she sleeps with 2-4 pillows  At her baseline prior to swelling onset, patient unable to walk up a flight of stairs without becoming SOB  She tries to avoid salt and processed meals but does drink large amounts of water daily  No recent URI symptoms or celebratory events/increased salt intake  She is a current half to one pack per day smoker and has been for over 40 years  She does admit to daily alcohol use of 1-3 glasses of wine while cooking/eating dinner  On arrival in the emergency department, patient was hypoxic at 81% requiring 6 L nasal cannula  Additional vital signs as follows:  Temperature 97 9°   with /107 which later improved to 156/96  Labs largely unremarkable was electrolytes within normal limits  However, NT proBNP 3,223 and troponin elevated 0 04  EKG demonstrated sinus tachycardia with premature PVCs which is also reflected on telemetry monitoring  On chest x-ray imaging revealed b/l bibasilar opacifications  While in the ED and prior to admission, patient received Lasix 40 mg IV and remained on 6L NC  Patient will be admitted to the general medicine service for volume overload likely secondary to new onset CHF  Per discussion with the patient, patient will remain a LEVEL 1 - FULL CODE  REVIEW OF SYSTEMSB     Review of Systems   Constitutional: Negative for activity change, chills, diaphoresis, fatigue and fever  HENT: Negative for congestion, rhinorrhea, sneezing and sore throat  Eyes: Negative for visual disturbance  Respiratory: Positive for shortness of breath  Negative for cough, chest tightness and wheezing  Cardiovascular: Positive for leg swelling  Negative for chest pain and palpitations  Gastrointestinal: Positive for abdominal distention   Negative for abdominal pain, constipation, diarrhea, nausea and vomiting  Endocrine: Negative for polydipsia, polyphagia and polyuria  Genitourinary: Negative for difficulty urinating  Musculoskeletal: Negative for back pain and neck pain  Skin: Negative for color change and pallor  Neurological: Positive for light-headedness  Negative for dizziness and weakness  Psychiatric/Behavioral: Negative for agitation and behavioral problems  OBJECTIVE     Vitals:    21 1930 21 2058 21 2100 21 2105   BP: 147/88 143/94 143/94    BP Location:   Right arm    Pulse: (!) 112  (!) 112    Resp: 21      Temp:  98 8 °F (37 1 °C) 98 8 °F (37 1 °C) 98 8 °F (37 1 °C)   TempSrc:    Oral   SpO2: 95%  96%       Temperature:   Temp (24hrs), Av 6 °F (37 °C), Min:97 9 °F (36 6 °C), Max:98 8 °F (37 1 °C)    Temperature: 98 8 °F (37 1 °C)  Intake & Output:  I/O     None        Weights: There is no height or weight on file to calculate BMI  Weight (last 2 days)     None        Physical Exam  Constitutional:       General: She is not in acute distress  Appearance: Normal appearance  She is normal weight  She is not ill-appearing, toxic-appearing or diaphoretic  HENT:      Head: Normocephalic and atraumatic  Nose: Nose normal  No congestion  Mouth/Throat:      Mouth: Mucous membranes are moist       Pharynx: Oropharynx is clear  No oropharyngeal exudate  Eyes:      General: No scleral icterus  Conjunctiva/sclera: Conjunctivae normal    Neck:      Musculoskeletal: Normal range of motion  No neck rigidity  Comments: Positive JVD  Cardiovascular:      Rate and Rhythm: Regular rhythm  Tachycardia present  Pulses: Normal pulses  Heart sounds: Normal heart sounds  Comments: Unable to appreciate murmur 2/2 tachycardia  Pulmonary:      Effort: Pulmonary effort is normal  No respiratory distress  Breath sounds: Wheezing present        Comments: Saturating appropriately on 6L NC  No conversational dysnea noted  Fine bibasilar crackles  End-expiratory wheezing noted diffusely with prolonged expiratory phase  Abdominal:      General: Bowel sounds are normal  There is distension  Palpations: There is no mass  Tenderness: There is no abdominal tenderness  There is no guarding or rebound  Comments: Abdomen hard and moderately distended  Dull to percussion  Normoactive bowel sounds with no tenderness to palpation  Musculoskeletal:      Right lower leg: Edema present  Left lower leg: Edema present  Comments: +3 b/l pitting edema extending from the b/l ankles to the b/l thighs   Lymphadenopathy:      Cervical: No cervical adenopathy  Skin:     General: Skin is dry  Capillary Refill: Capillary refill takes less than 2 seconds  Coloration: Skin is not pale  Neurological:      Mental Status: She is alert and oriented to person, place, and time  Motor: No weakness     Psychiatric:         Mood and Affect: Mood normal          Behavior: Behavior normal        PAST MEDICAL HISTORY     Past Medical History:   Diagnosis Date    Allergic rhinitis     Anxiety     Diabetes mellitus ([address] )      PAST SURGICAL HISTORY     Past Surgical History:   Procedure Laterality Date    BREAST SURGERY Right     Cyst    OTHER SURGICAL HISTORY      Cataract implant    OTHER SURGICAL HISTORY      Fertilization     SOCIAL & FAMILY HISTORY     Social History     Substance and Sexual Activity   Alcohol Use Yes    Alcohol/week: 1 0 standard drinks    Types: 1 Glasses of wine per week    Frequency: Monthly or less    Drinks per session: 1 or 2    Binge frequency: Never     Substance and Sexual Activity   Alcohol Use Yes    Alcohol/week: 1 0 standard drinks    Types: 1 Glasses of wine per week    Frequency: Monthly or less    Drinks per session: 1 or 2    Binge frequency: Never        Substance and Sexual Activity   Drug Use Not on file     Social History Tobacco Use   Smoking Status Former Smoker    Packs/day: 0 25   Smokeless Tobacco Never Used   Tobacco Comment    Yes - as per [employer]     Family History   Problem Relation Age of Onset    Diabetes Brother      LABORATORY DATA     Labs: I have personally reviewed pertinent reports  Results from last 7 days   Lab Units 05/18/21  1704 05/12/21  1158   WBC Thousand/uL 9 13 6 97   HEMOGLOBIN g/dL 18 2* 18 0*   HEMATOCRIT % 56 1* 56 1*   PLATELETS Thousands/uL 221 177   NEUTROS PCT % 79* 80*   MONOS PCT % 9 7      Results from last 7 days   Lab Units 05/18/21  1704 05/12/21  1158   POTASSIUM mmol/L 3 6 4 5   CHLORIDE mmol/L 99* 101   CO2 mmol/L 32 36*   BUN mg/dL 16 10   CREATININE mg/dL 0 80 0 63   CALCIUM mg/dL 9 2 9 1   ALK PHOS U/L  --  124*   ALT U/L  --  34   AST U/L  --  24                      Results from last 7 days   Lab Units 05/18/21  1704   TROPONIN I ng/mL 0 04     Micro:  Lab Results   Component Value Date    URINECX No Growth <1000 cfu/mL 05/12/2021     IMAGING & DIAGNOSTIC TESTS     Imaging: I have personally reviewed pertinent reports  No results found  EKG, Pathology, and Other Studies: I have personally reviewed pertinent reports  ALLERGIES   No Known Allergies  MEDICATIONS PRIOR TO ARRIVAL     Prior to Admission medications    Medication Sig Start Date End Date Taking?  Authorizing Provider   calcium citrate-vitamin D (CITRACAL+D) 315-200 MG-UNIT per tablet Take 1 tablet by mouth 2 (two) times a day   Yes Historical Provider, MD   Fexofenadine-Pseudoephedrine (ALLEGRA-D 24 HOUR PO) Take by mouth   Yes Historical Provider, MD   furosemide (LASIX) 40 mg tablet Take 1 tablet (40 mg total) by mouth daily 5/17/21  Yes Mundo Hubbard MD   hydrOXYzine HCL (ATARAX) 10 mg tablet Take 1 tablet (10 mg total) by mouth 3 (three) times a day as needed for itching or anxiety 5/17/21  Yes Delta Bartholomew MD   LORazepam (ATIVAN) 0 5 mg tablet Take 1 tablet (0 5 mg total) by mouth as needed for anxiety 1 hour before CT scan 5/13/21  Yes Mundo Hubbard MD   IBUPROFEN PO Take by mouth    Historical Provider, MD     MEDICATIONS ADMINISTERED IN LAST 24 HOURS     Medication Administration - last 24 hours from 05/17/2021 2146 to 05/18/2021 2146       Date/Time Order Dose Route Action Action by     05/18/2021 1703 furosemide (LASIX) injection 40 mg 40 mg Intravenous Given Anthony Devlin RN     05/18/2021 1838 insulin lispro (HumaLOG) 100 units/mL subcutaneous injection 1-6 Units 1 Units Subcutaneous Not Given Anthony Devlin RN     05/18/2021 2119 insulin lispro (HumaLOG) 100 units/mL subcutaneous injection 1-5 Units 1 Units Subcutaneous Not Given Ole Del Real RN        CURRENT MEDICATIONS     Current Facility-Administered Medications   Medication Dose Route Frequency Provider Last Rate    [START ON 5/19/2021] enoxaparin  40 mg Subcutaneous Daily Chrissy Shupp, DO      [START ON 5/19/2021] furosemide  40 mg Intravenous BID Chrissy Flanaganupp, DO      insulin lispro  1-5 Units Subcutaneous HS Chrissy Shupp, DO      insulin lispro  1-6 Units Subcutaneous TID AC Chrissy Shupp, DO      [START ON 5/19/2021] lisinopril  10 mg Oral Daily Chrissy Shupp, DO      sodium chloride (PF)  3 mL Intravenous Q1H PRN Chrissy Shupp, DO          sodium chloride (PF), 3 mL, Q1H PRN        Admission Time  I spent 30 minutes admitting the patient  This involved direct patient contact where I performed a full history and physical, reviewing previous records, and reviewing laboratory and other diagnostic studies  Portions of the record may have been created with voice recognition software  Occasional wrong word or "sound a like" substitutions may have occurred due to the inherent limitations of voice recognition software    Read the chart carefully and recognize, using context, where substitutions have occurred     ==  Chrissy Carcamo, [address] [place] [address]  Internal Medicine Residency PGY-1

## 2021-05-18 NOTE — PROGRESS NOTES
INTERNAL MEDICINE RESIDENCY SENIOR ADMISSION NOTE     Name: Irving Glynn   Age & Sex: 66 y o  female   MRN: [id number]  Unit/Bed#: ED 25   Encounter: [id number]  Primary Care Provider: Jazmine Smith MD    Admit to team: SOD Team B     Patient seen and examined  Reviewed H&P per Dr Paddy Nelson  Agree with the assessment and plan with any exception/addition as noted below:    Principal Problem:    SOB (shortness of breath)  Active Problems:    Diabetes mellitus (HCC)    Leg swelling    Lung nodules    COPD (chronic obstructive pulmonary disease) ([facility])    Acute respiratory failure with hypoxia ([facility] [address] )      66years old patient with past medical history of type 2 diabetes mellitus, current tobacco abuse and likely undiagnosed COPD was sent to the ED by PCP due to increased shortness of breath, abdominal distention and lower extremity swelling  Seen recently by PCP and was initiated on 40 mg p o  Lasix which did not help  On arrival to the ED was hypoxic at 81% on room air requiring 6 L via nasal cannula  Other vitals significant for tachypnea and elevated blood pressure  Examines volume overloaded  Labs significant for elevated NT proBNP and minimal troponin leak  COVID negative  Likely new diagnosis of CHF  Formal echocardiogram ordered  Patient status post 40 mg IV Lasix in the ED which she is responding to  Will continue with 40 IV b i d  Daily standing weights, accurate ins and outs, fluid and salt restriction    Further workup and recommendations pending echocardiogram       Code Status: Level 1 - Full Code  Admission Status: INPATIENT   Disposition: Patient requires Med/Surg  Expected Length of Stay: > 2 midnights

## 2021-05-18 NOTE — ED PROVIDER NOTES
History  Chief Complaint   Patient presents with    Leg Swelling     pt was told to comw to the ED by her Family doctor due to increased swelling in her legs and abd      Patient is a 23-year-old female with history of diabetes mellitus who presents for evaluation of dyspnea and leg swelling  Patient was in her normal state health about a week ago when she began to have progressive lower extremity swelling  She has also had increasing his original dyspnea orthopnea  She denies chest pain, nausea vomiting, diaphoresis  No known history of CHF and patient has never had an echo in the past   Patient denies abdominal pain, urinary or bowel symptoms  She does report weight gain of unknown amount  Patient was seen by PCP yesterday and started on Lasix  She does not report increased urination  She denies recent infectious symptoms including cough rhinorrhea, congestion, fevers, chills, rigors  Patient denies PE or DVT risk factors  Prior to Admission Medications   Prescriptions Last Dose Informant Patient Reported? Taking?    Fexofenadine-Pseudoephedrine (ALLEGRA-D 24 HOUR PO) Past Week at Unknown time Self Yes Yes   Sig: Take by mouth   IBUPROFEN PO Unknown at Unknown time Self Yes No   Sig: Take by mouth   LORazepam (ATIVAN) 0 5 mg tablet Past Week at Unknown time  No Yes   Sig: Take 1 tablet (0 5 mg total) by mouth as needed for anxiety 1 hour before CT scan   calcium citrate-vitamin D (CITRACAL+D) 315-200 MG-UNIT per tablet 5/18/2021 at Unknown time  Yes Yes   Sig: Take 1 tablet by mouth 2 (two) times a day   furosemide (LASIX) 40 mg tablet 5/18/2021 at Unknown time  No Yes   Sig: Take 1 tablet (40 mg total) by mouth daily   hydrOXYzine HCL (ATARAX) 10 mg tablet 5/17/2021 at Unknown time  No Yes   Sig: Take 1 tablet (10 mg total) by mouth 3 (three) times a day as needed for itching or anxiety      Facility-Administered Medications: None       Past Medical History:   Diagnosis Date    Allergic rhinitis     Anxiety     Diabetes mellitus ([facility] [address] )        Past Surgical History:   Procedure Laterality Date    BREAST SURGERY Right     Cyst    OTHER SURGICAL HISTORY      Cataract implant    OTHER SURGICAL HISTORY      Fertilization       Family History   Problem Relation Age of Onset    Diabetes Brother      I have reviewed and agree with the history as documented  E-Cigarette/Vaping     E-Cigarette/Vaping Substances     Social History     Tobacco Use    Smoking status: Former Smoker     Packs/day: 0 25    Smokeless tobacco: Never Used    Tobacco comment: Yes - as per [employer]   Substance Use Topics    Alcohol use: Yes     Alcohol/week: 1 0 standard drinks     Types: 1 Glasses of wine per week     Frequency: Monthly or less     Drinks per session: 1 or 2     Binge frequency: Never    Drug use: Not on file        Review of Systems   Constitutional: Negative for fever  HENT: Negative for sore throat  Respiratory: Positive for shortness of breath  Cardiovascular: Positive for leg swelling  Negative for chest pain  Gastrointestinal: Negative for abdominal pain  Genitourinary: Negative for dysuria  Musculoskeletal: Negative for back pain  Skin: Negative for rash  Neurological: Negative for light-headedness  Psychiatric/Behavioral: Negative for agitation  All other systems reviewed and are negative        Physical Exam  ED Triage Vitals   Temperature Pulse Respirations Blood Pressure SpO2   05/18/21 1647 05/18/21 1647 05/18/21 1702 05/18/21 1647 05/18/21 1647   97 9 °F (36 6 °C) (!) 113 (!) 24 (!) 193/104 (!) 81 %      Temp Source Heart Rate Source Patient Position - Orthostatic VS BP Location FiO2 (%)   05/18/21 1647 05/18/21 1647 05/18/21 1647 05/18/21 1647 --   Oral Monitor Lying Right arm       Pain Score       05/18/21 1647       8             Orthostatic Vital Signs  Vitals:    05/18/21 1930 05/18/21 2058 05/18/21 2100 05/18/21 2242   BP: 147/88 143/94 143/94 122/84   Pulse: (!) 112  (!) 112    Patient Position - Orthostatic VS:   Lying        Physical Exam  Vitals signs reviewed  Constitutional:       General: She is not in acute distress  Appearance: She is well-developed  HENT:      Head: Normocephalic  Eyes:      Pupils: Pupils are equal, round, and reactive to light  Neck:      Musculoskeletal: Normal range of motion and neck supple  Cardiovascular:      Rate and Rhythm: Normal rate and regular rhythm  Heart sounds: Normal heart sounds  Pulmonary:      Comments: Tachypnea, slightly increased work of breathing  Crackles heard throughout  Abdominal:      General: Bowel sounds are normal  There is no distension  Palpations: Abdomen is soft  Tenderness: There is no abdominal tenderness  There is no guarding  Musculoskeletal: Normal range of motion  General: No tenderness or deformity  Right lower leg: Edema present  Left lower leg: Edema present  Comments: 2+ pitting edema to the mid knee bilaterally   Skin:     General: Skin is warm and dry  Capillary Refill: Capillary refill takes less than 2 seconds  Neurological:      Mental Status: She is alert and oriented to person, place, and time  Cranial Nerves: No cranial nerve deficit  Sensory: No sensory deficit  Psychiatric:         Behavior: Behavior normal          Thought Content:  Thought content normal          Judgment: Judgment normal          ED Medications  Medications   sodium chloride (PF) 0 9 % injection 3 mL (has no administration in time range)   furosemide (LASIX) injection 40 mg (has no administration in time range)   enoxaparin (LOVENOX) subcutaneous injection 40 mg (has no administration in time range)   insulin lispro (HumaLOG) 100 units/mL subcutaneous injection 1-6 Units (1 Units Subcutaneous Not Given 5/18/21 1838)   insulin lispro (HumaLOG) 100 units/mL subcutaneous injection 1-5 Units (1 Units Subcutaneous Not Given 5/18/21 2119) lisinopril (ZESTRIL) tablet 10 mg (has no administration in time range)   furosemide (LASIX) injection 40 mg (40 mg Intravenous Given 5/18/21 1703)       Diagnostic Studies  Results Reviewed     Procedure Component Value Units Date/Time    Comprehensive metabolic panel [[id number]]     Lab Status: No result Specimen: Blood     Magnesium [[id number]]     Lab Status: No result Specimen: Blood     Novel Coronavirus (Covid-19),PCR [facility] [[id number]]  (Normal) Collected: 05/18/21 1704    Lab Status: Final result Specimen: Nares from Nasopharyngeal Swab Updated: 05/18/21 1903     SARS-CoV-2 Negative    Narrative:           Basic metabolic panel [[id number]]  (Abnormal) Collected: 05/18/21 1704    Lab Status: Final result Specimen: Blood from Arm, Left Updated: 05/18/21 1738     Sodium 137 mmol/L      Potassium 3 6 mmol/L      Chloride 99 mmol/L      CO2 32 mmol/L      ANION GAP 6 mmol/L      BUN 16 mg/dL      Creatinine 0 80 mg/dL      Glucose 141 mg/dL      Calcium 9 2 mg/dL      eGFR 71 ml/min/1 73sq m     Narrative:      [place] guidelines for Chronic Kidney Disease (CKD):     Stage 1 with normal or high GFR (GFR > 90 mL/min/1 73 square meters)    Stage 2 Mild CKD (GFR = 60-89 mL/min/1 73 square meters)    Stage 3A Moderate CKD (GFR = 45-59 mL/min/1 73 square meters)    Stage 3B Moderate CKD (GFR = 30-44 mL/min/1 73 square meters)    Stage 4 Severe CKD (GFR = 15-29 mL/min/1 73 square meters)    Stage 5 End Stage CKD (GFR <15 mL/min/1 73 square meters)  Note: GFR calculation is accurate only with a steady state creatinine    NT-BNP PRO [[id number]]  (Abnormal) Collected: 05/18/21 1704    Lab Status: Final result Specimen: Blood from Arm, Left Updated: 05/18/21 1738     NT-proBNP 3,223 pg/mL     Troponin I [[id number]]  (Normal) Collected: 05/18/21 1704    Lab Status: Final result Specimen: Blood from Arm, Left Updated: 05/18/21 1737     Troponin I 0 04 ng/mL     CBC and differential [[id number]]  (Abnormal) Collected: 05/18/21 1704    Lab Status: Final result Specimen: Blood from Arm, Left Updated: 05/18/21 1725     WBC 9 13 Thousand/uL      RBC 5 67 Million/uL      Hemoglobin 18 2 g/dL      Hematocrit 56 1 %      MCV 99 fL      MCH 32 1 pg      MCHC 32 4 g/dL      RDW 14 3 %      MPV 11 7 fL      Platelets 798 Thousands/uL      nRBC 0 /100 WBCs      Neutrophils Relative 79 %      Immat GRANS % 0 %      Lymphocytes Relative 10 %      Monocytes Relative 9 %      Eosinophils Relative 1 %      Basophils Relative 1 %      Neutrophils Absolute 7 21 Thousands/µL      Immature Grans Absolute 0 03 Thousand/uL      Lymphocytes Absolute 0 94 Thousands/µL      Monocytes Absolute 0 82 Thousand/µL      Eosinophils Absolute 0 06 Thousand/µL      Basophils Absolute 0 07 Thousands/µL                  X-ray chest 1 view portable    (Results Pending)         Procedures  Procedures      ED Course               Identification of Seniors at Risk      Most Recent Value   (ISAR) Identification of Seniors at Risk   Before the illness or injury that brought you to the Emergency, did you need someone to help you on a regular basis? 0 Filed at: 05/18/2021 1700   In the last 24 hours, have you needed more help than usual?  0 Filed at: 05/18/2021 1700   Have you been hospitalized for one or more nights during the past 6 months? 0 Filed at: 05/18/2021 1700   In general, do you see well?  0 Filed at: 05/18/2021 1700   In general, do you have serious problems with your memory?   0 Filed at: 05/18/2021 1700   Do you take more than three different medications every day?  0 Filed at: 05/18/2021 1700   ISAR Score  0 Filed at: 05/18/2021 1700                              MDM  Number of Diagnoses or Management Options  CHF exacerbation ([facility] [address] ): new and requires workup  Hypoxia: new and requires workup  Pulmonary edema: new and requires workup  Diagnosis management comments: Patient is a 24-year-old female who presents for evaluation of lower extremity swelling and dyspnea likely secondary to new onset exacerbation of CHF  Patient was treated with Lasix  Patient was initially hypoxic and improved with 6 L nasal cannula  Patient will be admitted to the hospital for further workup and management  Disposition  Final diagnoses:   CHF exacerbation ([facility] )   Pulmonary edema   Hypoxia     Time reflects when diagnosis was documented in both MDM as applicable and the Disposition within this note     Time User Action Codes Description Comment    5/18/2021  6:04 PM [place] Fix Add [I50 9] [facility] exacerbation ([facility] )     5/18/2021  6:04 PM [place] Fix Add [J81 1] Pulmonary edema     5/18/2021  6:04 PM [place] Fix Add [R09 02] Hypoxia       ED Disposition     ED Disposition Condition Date/Time Comment    Admit Stable Tue May 18, 2021  6:04 PM Case was discussed with SOD and the patient's admission status was agreed to be Admission Status: inpatient status to the service of Dr Melanie Elias   Follow-up Information    None         Current Discharge Medication List      CONTINUE these medications which have NOT CHANGED    Details   calcium citrate-vitamin D (CITRACAL+D) 315-200 MG-UNIT per tablet Take 1 tablet by mouth 2 (two) times a day      Fexofenadine-Pseudoephedrine (ALLEGRA-D 24 HOUR PO) Take by mouth      furosemide (LASIX) 40 mg tablet Take 1 tablet (40 mg total) by mouth daily  Qty: 30 tablet, Refills: 0    Associated Diagnoses: Leg swelling      hydrOXYzine HCL (ATARAX) 10 mg tablet Take 1 tablet (10 mg total) by mouth 3 (three) times a day as needed for itching or anxiety  Qty: 60 tablet, Refills: 0    Associated Diagnoses: Anxiety      LORazepam (ATIVAN) 0 5 mg tablet Take 1 tablet (0 5 mg total) by mouth as needed for anxiety 1 hour before CT scan  Qty: 2 tablet, Refills: 0    Associated Diagnoses: Anxiety      IBUPROFEN PO Take by mouth           No discharge procedures on file      PDMP Review       Value Time User    PDMP Reviewed  Yes 5/13/2021 10:20 AM Ketan Barnes MD           ED Provider  Attending physically available and evaluated Jones Mcmanus I managed the patient along with the ED Attending      Electronically Signed by         Red Muhammad MD  05/18/21 2586

## 2021-05-18 NOTE — ED ATTENDING ATTESTATION
Final Diagnosis:  1  CHF exacerbation (HCC)    2  Pulmonary edema    3  Hypoxia    4  Volume overload likely 2/2 CHF     5  Proteinuria           I, Surinder Vanessa MD, saw and evaluated the patient  All available labs and X-rays were ordered by me or the resident and have been reviewed by myself  I discussed the patient with the resident / non-physician and agree with the resident's / non-physician practitioner's findings and plan as documented in the resident's / non-physician practicitioner's note, except where noted  At this point, I agree with the current assessment done in the ED  I was present during key portions of all procedures performed unless otherwise stated  Chief Complaint   Patient presents with    Leg Swelling     pt was told to comw to the ED by her Family doctor due to increased swelling in her legs and abd      This is a 66 y o  female presenting for evaluation of dyspnea  The patient has been having dyspnea for a few days with increased lower extremity edema getting worse  PCP wrote for lasix but it didn't help  She feels her face and legs are very puffy  No f/ch/n/v/cp  +orthopnea  No falls/trauma  Sent in for further evaluation    Seen immediately on arrival due to dyspnea + hypoxia + orthopnea    A: intact  B: wet sounds bilaterally  C: 2+ pulses, tachycardic  D: awake, alert, providing hx, moving all 4 extremities  E: afebrile  2/2: hypoxic, O2 81% on RA; 94% on 2L  2-3+ pitting LE edema  A/P:  - CHF ? lasix after K+; cardiac workup  - COVID test due to pandemic  - admit    PMH:   has a past medical history of Allergic rhinitis, Anxiety, and Diabetes mellitus ([facility] [address] )  PSH:   has a past surgical history that includes Breast surgery (Right); Other surgical history; and Other surgical history      Social:  Social History     Substance and Sexual Activity   Alcohol Use Yes    Alcohol/week: 1 0 - 2 0 standard drinks    Types: 1 - 2 Glasses of wine per week    Frequency: 4 or more times a week    Drinks per session: 1 or 2    Binge frequency: Never    Comment: On average, will drink 1-2 glasses of white wine daily with dinner  (Updated 05/21/2021),      Social History     Tobacco Use   Smoking Status Former Smoker    Packs/day: 0 50    Types: Cigarettes   Smokeless Tobacco Never Used   Tobacco Comment    Began smoking in her late teens  States she quit smoking a few weeks ago (Updated 05/21/2021)  Social History     Substance and Sexual Activity   Drug Use Never    Comment: Last assessed 05/21/2021  PE:  Vitals:    05/22/21 1505 05/22/21 2317 05/23/21 0531 05/23/21 0659   BP: 114/59 116/62  117/64   Pulse: 96 95     Resp: 18 18  17   Temp: 97 9 °F (36 6 °C) 97 6 °F (36 4 °C)  97 8 °F (36 6 °C)   TempSrc: Oral Oral     SpO2: 97% 96%  (!) 88%   Weight:   70 3 kg (154 lb 15 7 oz)      - 13 point ROS was performed and all are normal unless stated in the history above  - Nursing note reviewed  Vitals reviewed  - Orders placed by myself and/or advanced practitioner / resident     - Previous chart was reviewed  - No language barrier    - History obtained from patient  - There are no limitations to the history obtained  - Critical care time: 38 minutes  - Critical care time was exclusive of seperately bilable procedures and treating other patients as well as teaching time     - Critical care was necessary to treat or prevent imminent or life-threatening deterioration of the following condition: hypoxia  - Critical care time was spent personally by me on the following activities as well as the above as per the ED course and rest of chart: blood draw for specimens, obtaining history from patient / surrogate, developement of a treatment plan, discussions with consultants, evaluation of patient's response to the treatment, examination of the patient, ordering/performing treatements and interventions, re-evaluation of the patient's condition, review of old charts, ordering/reviewing laboratory studies, ordering/reviewing of radiographic studies    Code Status: Level 1 - Full Code  Advance Directive and Living Will:      Power of :    POLST:      Medications   sodium chloride (PF) 0 9 % injection 3 mL (has no administration in time range)   enoxaparin (LOVENOX) subcutaneous injection 40 mg (40 mg Subcutaneous Given 5/22/21 1057)   insulin lispro (HumaLOG) 100 units/mL subcutaneous injection 1-6 Units (1 Units Subcutaneous Not Given 5/22/21 1728)   insulin lispro (HumaLOG) 100 units/mL subcutaneous injection 1-5 Units (1 Units Subcutaneous Given 5/22/21 2126)   calcium carbonate (OYSTER SHELL,OSCAL) 500 mg tablet 1 tablet (1 tablet Oral Given 5/22/21 1056)   furosemide (LASIX) injection 80 mg (80 mg Intravenous Given 5/22/21 1830)   nicotine (NICODERM CQ) 14 mg/24hr TD 24 hr patch 14 mg (14 mg Transdermal Medication Applied 5/22/21 1948)   acetaminophen (TYLENOL) tablet 650 mg (650 mg Oral Given 5/22/21 2006)   melatonin tablet 3 mg (has no administration in time range)   lisinopril (ZESTRIL) tablet 10 mg (has no administration in time range)   furosemide (LASIX) injection 40 mg (40 mg Intravenous Given 5/18/21 1703)   potassium chloride (K-DUR,KLOR-CON) CR tablet 40 mEq (40 mEq Oral Given 5/19/21 0743)   furosemide (LASIX) injection 40 mg (40 mg Intravenous Given 5/19/21 1154)   acetaZOLAMIDE (DIAMOX) injection 500 mg (500 mg Intravenous Given 5/20/21 1440)   LORazepam (ATIVAN) injection 0 5 mg (0 5 mg Intravenous Given 5/20/21 2344)   Gadobutrol injection (SINGLE-DOSE) SOLN 14 mL (14 mL Intravenous Given 5/21/21 0134)     X-ray chest 1 view portable   Final Result      No acute cardiopulmonary disease                 Workstation performed: [id number]         MRI cardiac  w wo contrast    (Results Pending)     Orders Placed This Encounter   Procedures    Novel Coronavirus (Covid-19),PCR [facility]    X-ray chest 1 view portable    MRI cardiac  w wo contrast    CBC and differential    Basic metabolic panel    Troponin I    NT-BNP PRO    CBC and differential    Comprehensive metabolic panel    Magnesium    Basic metabolic panel    Basic metabolic panel    FARIDEH Screen w/ Reflex to Titer/Pattern    C3 complement    C4 complement    Anti-neutrophilic cytoplasmic antibody    Protein electrophoresis, serum    Protein electrophoresis, urine    Immunoglobulin free LT chains blood    Chronic Hepatitis Panel    Protein / creatinine ratio, urine    Immunofixation, Serum(Reflex Only-Do Not Order)    Basic metabolic panel    RF Screen w/ Reflex to Titer    Comprehensive metabolic panel    Diet Cardiovascular; Cardiac; Fluid Restriction 1500 ML, Sodium 2 GM    Continuous cardiac monitoring    Continuous pulse oximetry    Insert peripheral IV    Nursing communication Continue IV as ordered  Genny Pavon Notify admitting physician    Notify admitting physician on arrival    Vital Signs per unit routine    Daily weights- Use standing scale to weigh patient    I/O    Provide Patient with Heart Failure Education    Nursing communication ECG 12 lead with chest pain or ST segment change and notify MD  Place an ECG order if performed      Up with assistance    Insert peripheral IV    Maintain IV access    Nasal cannula oxygen    Apply SCD or Foot pumps    Insulin Subcutaneous Instruction    Fingerstick Glucose (POCT)    Continuous Pulse Oximetry    Level 1-Full Code: all life saving measures are indicated    Inpatient Consult to Nutrition Services    Inpatient consult to Cardiology    Inpatient consult to Nephrology    Inpatient consult to Heart Failure Service    OT eval and treat    PT eval and treat    EKG RESULTS    ECG 12 lead    ECG 12 lead    Echo complete with contrast if indicated    Inpatient Admission     Labs Reviewed   CBC AND DIFFERENTIAL - Abnormal       Result Value Ref Range Status    WBC 9 13  4 31 - 10 16 Thousand/uL Final    RBC 5 67 (*) 3 81 - 5 12 Million/uL Final    Hemoglobin 18 2 (*) 11 5 - 15 4 g/dL Final    Hematocrit 56 1 (*) 34 8 - 46 1 % Final    MCV 99 (*) 82 - 98 fL Final    MCH 32 1  26 8 - 34 3 pg Final    MCHC 32 4  31 4 - 37 4 g/dL Final    RDW 14 3  11 6 - 15 1 % Final    MPV 11 7  8 9 - 12 7 fL Final    Platelets 400  504 - 390 Thousands/uL Final    nRBC 0  /100 WBCs Final    Neutrophils Relative 79 (*) 43 - 75 % Final    Immat GRANS % 0  0 - 2 % Final    Lymphocytes Relative 10 (*) 14 - 44 % Final    Monocytes Relative 9  4 - 12 % Final    Eosinophils Relative 1  0 - 6 % Final    Basophils Relative 1  0 - 1 % Final    Neutrophils Absolute 7 21  1 85 - 7 62 Thousands/µL Final    Immature Grans Absolute 0 03  0 00 - 0 20 Thousand/uL Final    Lymphocytes Absolute 0 94  0 60 - 4 47 Thousands/µL Final    Monocytes Absolute 0 82  0 17 - 1 22 Thousand/µL Final    Eosinophils Absolute 0 06  0 00 - 0 61 Thousand/µL Final    Basophils Absolute 0 07  0 00 - 0 10 Thousands/µL Final   BASIC METABOLIC PANEL - Abnormal    Sodium 137  136 - 145 mmol/L Final    Potassium 3 6  3 5 - 5 3 mmol/L Final    Comment: Slightly Hemolyzed; Results May be Affected    Chloride 99 (*) 100 - 108 mmol/L Final    CO2 32  21 - 32 mmol/L Final    ANION GAP 6  4 - 13 mmol/L Final    BUN 16  5 - 25 mg/dL Final    Creatinine 0 80  0 60 - 1 30 mg/dL Final    Comment: Standardized to IDMS reference method    Glucose 141 (*) 65 - 140 mg/dL Final    Comment: If the patient is fasting, the ADA then defines impaired fasting glucose as > 100 mg/dL and diabetes as > or equal to 123 mg/dL  Specimen collection should occur prior to Sulfasalazine administration due to the potential for falsely depressed results  Specimen collection should occur prior to Sulfapyridine administration due to the potential for falsely elevated results      Calcium 9 2  8 3 - 10 1 mg/dL Final    eGFR 71  ml/min/1 73sq m Final    Narrative:     [place] guidelines for Chronic Kidney Disease (CKD):     Stage 1 with normal or high GFR (GFR > 90 mL/min/1 73 square meters)    Stage 2 Mild CKD (GFR = 60-89 mL/min/1 73 square meters)    Stage 3A Moderate CKD (GFR = 45-59 mL/min/1 73 square meters)    Stage 3B Moderate CKD (GFR = 30-44 mL/min/1 73 square meters)    Stage 4 Severe CKD (GFR = 15-29 mL/min/1 73 square meters)    Stage 5 End Stage CKD (GFR <15 mL/min/1 73 square meters)  Note: GFR calculation is accurate only with a steady state creatinine   NT-BNP PRO (BRAIN NATRIURETIC PEPTIDE) - Abnormal    NT-proBNP 3,223 (*) <450 pg/mL Final   NOVEL CORONAVIRUS (COVID-19), PCR [facility] - Normal    SARS-CoV-2 Negative  Negative Final    Comment:      Narrative:         TROPONIN I - Normal    Troponin I 0 04  <=0 04 ng/mL Final    Comment: Siemens Chemistry analyzer 99% cutoff is > 0 04 ng/mL in network labs     o cTnI 99% cutoff is useful only when applied to patients in the clinical setting of myocardial ischemia   o cTnI 99% cutoff should be interpreted in the context of clinical history, ECG findings and possibly cardiac imaging to establish correct diagnosis  o cTnI 99% cutoff may be suggestive but clearly not indicative of a coronary event without the clinical setting of myocardial ischemia         Time reflects when diagnosis was documented in both MDM as applicable and the Disposition within this note     Time User Action Codes Description Comment    5/18/2021  6:04 PM Leata Home Add [I50 9] CHF exacerbation ([facility] [address] )     5/18/2021  6:04 PM Leata Home Add [J81 1] Pulmonary edema     5/18/2021  6:04 PM Wilman Garcia Add [R09 02] Hypoxia     5/19/2021  7:22 AM Kelley Cameron, Israaly Add [E87 70] Volume overload likely 2/2 CHF      5/20/2021 12:17 PM Wally Erm Add [R80 9] Proteinuria       ED Disposition     ED Disposition Condition Date/Time Comment    Admit Stable Tue May 18, 2021  6:04 PM Case was discussed with SOD and the patient's admission status was agreed to be Admission Status: inpatient status to the service of Dr Christiano Gonzalez   Follow-up Information    None       Current Discharge Medication List      CONTINUE these medications which have NOT CHANGED    Details   calcium citrate-vitamin D (CITRACAL+D) 315-200 MG-UNIT per tablet Take 1 tablet by mouth 2 (two) times a day      Fexofenadine-Pseudoephedrine (ALLEGRA-D 24 HOUR PO) Take by mouth      furosemide (LASIX) 40 mg tablet Take 1 tablet (40 mg total) by mouth daily  Qty: 30 tablet, Refills: 0    Associated Diagnoses: Leg swelling      hydrOXYzine HCL (ATARAX) 10 mg tablet Take 1 tablet (10 mg total) by mouth 3 (three) times a day as needed for itching or anxiety  Qty: 60 tablet, Refills: 0    Associated Diagnoses: Anxiety      LORazepam (ATIVAN) 0 5 mg tablet Take 1 tablet (0 5 mg total) by mouth as needed for anxiety 1 hour before CT scan  Qty: 2 tablet, Refills: 0    Associated Diagnoses: Anxiety      IBUPROFEN PO Take by mouth           No discharge procedures on file  Prior to Admission Medications   Prescriptions Last Dose Informant Patient Reported? Taking?    Fexofenadine-Pseudoephedrine (ALLEGRA-D 24 HOUR PO) Past Week at Unknown time Self Yes Yes   Sig: Take by mouth   IBUPROFEN PO Unknown at Unknown time Self Yes No   Sig: Take by mouth   LORazepam (ATIVAN) 0 5 mg tablet Past Week at Unknown time  No Yes   Sig: Take 1 tablet (0 5 mg total) by mouth as needed for anxiety 1 hour before CT scan   calcium citrate-vitamin D (CITRACAL+D) 315-200 MG-UNIT per tablet 5/18/2021 at Unknown time  Yes Yes   Sig: Take 1 tablet by mouth 2 (two) times a day   furosemide (LASIX) 40 mg tablet 5/18/2021 at Unknown time  No Yes   Sig: Take 1 tablet (40 mg total) by mouth daily   hydrOXYzine HCL (ATARAX) 10 mg tablet 5/17/2021 at Unknown time  No Yes   Sig: Take 1 tablet (10 mg total) by mouth 3 (three) times a day as needed for itching or anxiety      Facility-Administered Medications: None       Portions of the record may have been created with voice recognition software  Occasional wrong word or "sound a like" substitutions may have occurred due to the inherent limitations of voice recognition software  Read the chart carefully and recognize, using context, where substitutions have occurred      Electronically signed by:  Jonatan Garcia

## 2021-05-19 ENCOUNTER — APPOINTMENT (INPATIENT)
Dept: NON INVASIVE DIAGNOSTICS | Facility: HOSPITAL | Age: 76
DRG: 291 | End: 2021-05-19
Payer: MEDICARE

## 2021-05-19 PROBLEM — I50.9 ACUTE DECOMPENSATED HEART FAILURE (HCC): Status: ACTIVE | Noted: 2021-05-11

## 2021-05-19 LAB
ALBUMIN SERPL BCP-MCNC: 2.9 G/DL (ref 3.5–5)
ALP SERPL-CCNC: 135 U/L (ref 46–116)
ALT SERPL W P-5'-P-CCNC: 30 U/L (ref 12–78)
ANION GAP SERPL CALCULATED.3IONS-SCNC: 6 MMOL/L (ref 4–13)
AST SERPL W P-5'-P-CCNC: 25 U/L (ref 5–45)
BASOPHILS # BLD AUTO: 0.05 THOUSANDS/ΜL (ref 0–0.1)
BASOPHILS NFR BLD AUTO: 1 % (ref 0–1)
BILIRUB SERPL-MCNC: 1.7 MG/DL (ref 0.2–1)
BUN SERPL-MCNC: 16 MG/DL (ref 5–25)
CALCIUM ALBUM COR SERPL-MCNC: 9.7 MG/DL (ref 8.3–10.1)
CALCIUM SERPL-MCNC: 8.8 MG/DL (ref 8.3–10.1)
CHLORIDE SERPL-SCNC: 98 MMOL/L (ref 100–108)
CO2 SERPL-SCNC: 34 MMOL/L (ref 21–32)
CREAT SERPL-MCNC: 0.81 MG/DL (ref 0.6–1.3)
EOSINOPHIL # BLD AUTO: 0.12 THOUSAND/ΜL (ref 0–0.61)
EOSINOPHIL NFR BLD AUTO: 2 % (ref 0–6)
ERYTHROCYTE [DISTWIDTH] IN BLOOD BY AUTOMATED COUNT: 14.1 % (ref 11.6–15.1)
GFR SERPL CREATININE-BSD FRML MDRD: 70 ML/MIN/1.73SQ M
GLUCOSE SERPL-MCNC: 103 MG/DL (ref 65–140)
GLUCOSE SERPL-MCNC: 121 MG/DL (ref 65–140)
GLUCOSE SERPL-MCNC: 156 MG/DL (ref 65–140)
GLUCOSE SERPL-MCNC: 172 MG/DL (ref 65–140)
GLUCOSE SERPL-MCNC: 186 MG/DL (ref 65–140)
HCT VFR BLD AUTO: 57.7 % (ref 34.8–46.1)
HGB BLD-MCNC: 17.8 G/DL (ref 11.5–15.4)
IMM GRANULOCYTES # BLD AUTO: 0.03 THOUSAND/UL (ref 0–0.2)
IMM GRANULOCYTES NFR BLD AUTO: 0 % (ref 0–2)
LYMPHOCYTES # BLD AUTO: 1.06 THOUSANDS/ΜL (ref 0.6–4.47)
LYMPHOCYTES NFR BLD AUTO: 15 % (ref 14–44)
MAGNESIUM SERPL-MCNC: 2.1 MG/DL (ref 1.6–2.6)
MCH RBC QN AUTO: 31.8 PG (ref 26.8–34.3)
MCHC RBC AUTO-ENTMCNC: 30.8 G/DL (ref 31.4–37.4)
MCV RBC AUTO: 103 FL (ref 82–98)
MONOCYTES # BLD AUTO: 0.74 THOUSAND/ΜL (ref 0.17–1.22)
MONOCYTES NFR BLD AUTO: 10 % (ref 4–12)
NEUTROPHILS # BLD AUTO: 5.24 THOUSANDS/ΜL (ref 1.85–7.62)
NEUTS SEG NFR BLD AUTO: 72 % (ref 43–75)
NRBC BLD AUTO-RTO: 0 /100 WBCS
PLATELET # BLD AUTO: 197 THOUSANDS/UL (ref 149–390)
PMV BLD AUTO: 11.6 FL (ref 8.9–12.7)
POTASSIUM SERPL-SCNC: 3.4 MMOL/L (ref 3.5–5.3)
PROT SERPL-MCNC: 6.9 G/DL (ref 6.4–8.2)
RBC # BLD AUTO: 5.6 MILLION/UL (ref 3.81–5.12)
SODIUM SERPL-SCNC: 138 MMOL/L (ref 136–145)
WBC # BLD AUTO: 7.24 THOUSAND/UL (ref 4.31–10.16)

## 2021-05-19 PROCEDURE — 80053 COMPREHEN METABOLIC PANEL: CPT | Performed by: INTERNAL MEDICINE

## 2021-05-19 PROCEDURE — 93306 TTE W/DOPPLER COMPLETE: CPT | Performed by: INTERNAL MEDICINE

## 2021-05-19 PROCEDURE — 97163 PT EVAL HIGH COMPLEX 45 MIN: CPT

## 2021-05-19 PROCEDURE — 97166 OT EVAL MOD COMPLEX 45 MIN: CPT

## 2021-05-19 PROCEDURE — 83735 ASSAY OF MAGNESIUM: CPT | Performed by: INTERNAL MEDICINE

## 2021-05-19 PROCEDURE — 99222 1ST HOSP IP/OBS MODERATE 55: CPT | Performed by: INTERNAL MEDICINE

## 2021-05-19 PROCEDURE — 85025 COMPLETE CBC W/AUTO DIFF WBC: CPT | Performed by: INTERNAL MEDICINE

## 2021-05-19 PROCEDURE — 82948 REAGENT STRIP/BLOOD GLUCOSE: CPT

## 2021-05-19 PROCEDURE — 93306 TTE W/DOPPLER COMPLETE: CPT

## 2021-05-19 RX ORDER — FUROSEMIDE 10 MG/ML
40 INJECTION INTRAMUSCULAR; INTRAVENOUS ONCE
Status: COMPLETED | OUTPATIENT
Start: 2021-05-19 | End: 2021-05-19

## 2021-05-19 RX ORDER — FUROSEMIDE 10 MG/ML
80 INJECTION INTRAMUSCULAR; INTRAVENOUS 2 TIMES DAILY
Status: DISCONTINUED | OUTPATIENT
Start: 2021-05-19 | End: 2021-05-23

## 2021-05-19 RX ORDER — POTASSIUM CHLORIDE 20 MEQ/1
40 TABLET, EXTENDED RELEASE ORAL ONCE
Status: COMPLETED | OUTPATIENT
Start: 2021-05-19 | End: 2021-05-19

## 2021-05-19 RX ORDER — NICOTINE 21 MG/24HR
14 PATCH, TRANSDERMAL 24 HOURS TRANSDERMAL DAILY
Status: DISCONTINUED | OUTPATIENT
Start: 2021-05-19 | End: 2021-05-26 | Stop reason: HOSPADM

## 2021-05-19 RX ADMIN — INSULIN LISPRO 1 UNITS: 100 INJECTION, SOLUTION INTRAVENOUS; SUBCUTANEOUS at 21:27

## 2021-05-19 RX ADMIN — LISINOPRIL 10 MG: 10 TABLET ORAL at 10:01

## 2021-05-19 RX ADMIN — POTASSIUM CHLORIDE 40 MEQ: 1500 TABLET, EXTENDED RELEASE ORAL at 07:43

## 2021-05-19 RX ADMIN — CALCIUM 1 TABLET: 500 TABLET ORAL at 07:43

## 2021-05-19 RX ADMIN — FUROSEMIDE 40 MG: 10 INJECTION, SOLUTION INTRAVENOUS at 11:54

## 2021-05-19 RX ADMIN — ENOXAPARIN SODIUM 40 MG: 40 INJECTION SUBCUTANEOUS at 10:01

## 2021-05-19 RX ADMIN — INSULIN LISPRO 1 UNITS: 100 INJECTION, SOLUTION INTRAVENOUS; SUBCUTANEOUS at 11:54

## 2021-05-19 RX ADMIN — FUROSEMIDE 80 MG: 10 INJECTION, SOLUTION INTRAMUSCULAR; INTRAVENOUS at 18:00

## 2021-05-19 RX ADMIN — FUROSEMIDE 40 MG: 10 INJECTION, SOLUTION INTRAVENOUS at 10:01

## 2021-05-19 RX ADMIN — Medication 14 MG: at 20:59

## 2021-05-19 NOTE — CONSULTS
Consultation - Cardiology Team One  Ival Salvage 66 y o  female MRN: [id number]  Unit/Bed#: [facility] 525-01 Encounter: [id number]    Inpatient consult to Cardiology  Consult performed by: Ang Platt PA-C  Consult ordered by: Abdullahi Pinto MD          Physician Requesting Consult: Dennis Ruiz MD  Reason for Consult / Principal Problem:  Volume overload    Assessment:    1  Acute decompensated heart failure:  Presented with progressive lower extremity edema, abdominal bloating, weight gain, orthopnea  NT proBNP  3223 (was 2138 on 05/12)  CXR no acute cardiopulmonary disease  Given 40 mg IV Lasix and subsequently started on 40 IV BID  · Echocardiogram pending  · Dry weight:  143-145 lb  · Weight on admission:  162 lb per bed scale  · Net output -131 mL  · Patient continues to appear volume overloaded with lower extremity edema, abdominal bloating, JVD bibasilar rales  2  Acute hypoxic respiratory failure: In the setting of volume overload and its underlying COPD  Currently on 6 L NC   3  Essential hypertension:  New diagnosis this admission with /104 on arrival   Ordered for lisinopril 10 mg daily to begin today  Average /91 on IV Lasix  Last /83  4  Type 2 DM:  Hemoglobin A1c 6 6  Not on any medications at home  Management per primary team   5  COPD:  Noted on recent CT 05/14/2021   6  Tobacco abuse:  Complete smoking cessation advised   7  Hypokalemia:  Potassium 3 4    Repletion ordered by primary team     Plan/Recommendations:  · Follow-up on echocardiogram to evaluate heart function and valvular status  · Increase IV Lasix to 80 mg BID  · Monitor I&Os, renal function, electrolytes and standing weight  · Maintain potassium >4 and magnesium >2  · Low-sodium (<2 g), fluid-restricted (<1800 mL) diet  · Continue lisinopril    ____________________________________________________________    CC:  Leg swelling      History of Present Illness   HPI: Ival Salvage is a 66y o  year old female who has diabetes mellitus (not on any medications), anxiety, tobacco abuse presented to the emergency room at [facility] on 05/18/2021 due to increased lower extremity and abdominal edema  Patient reported she was in her normal state of health up until about 1 week ago when she started having progressive lower extremity edema, weight gain with associated dyspnea and orthopnea  She was seen by her PCP 1 day prior and started on Lasix  On arrival to the ED her blood pressure was 193/104 with oxygen saturation 81% on room air with heart rate of 113 bpm   5 L NC was applied  In the ED EKG revealed sinus tachycardia with PACs  CXR revealed no acute cardiopulmonary disease  Labs revealed stable BMP, hemoglobin 18, hematocrit 56 which was centrally unchanged from 05/12, negative troponin x1, NT proBNP 3223 (was 2138 on 05/12)  Patient was given 40 mg IV Lasix in the ED and subsequently started on 40 mg IV Lasix BID  She was also ordered to start lisinopril 10 mg daily for new diagnosis of hypertension  An echocardiogram was ordered and cardiology has been consulted further evaluation management of CHF  Her weight this morning per bed scale is 162 lb  Net output -131 mL  Patient resting in bed during consultation and states that a few days after mother's Day she noted that she has some left foot swelling that slowly improved on its own  Three days later she developed bilateral lower extremity edema that progressively worsened to abdominal bloating and dyspnea as described above  She denies any chest pain, palpitations, lightheadedness or dizziness  She currently feels that her breathing is mildly improved  She also has noted mild improvement in her edema and abdominal bloating  Patient does walk for exercise as was ambulating stairs and denies any chest pain without level of exertion  Patient tries to eat very healthy with a low-sodium diet    She does cook with salt but does not had any to her food     Social history:  30 pack year history  Drinks 1-2 glasses of wine daily  Denies any illicit drug use  Family history:  Brother has a history of heart disease (details unknown) in his late 62s  Family history of premature CAD or SCD  EKG reviewed personally: 5/18/2021-sinus tachycardia at 117 beats per minute with PACs, left axis deviation  No prior EKG available for comparison  Telemetry reviewed personally:  Sinus tachycardia with PACs      Review of Systems   Constitution: Negative  Negative for chills  Cardiovascular: Positive for dyspnea on exertion, leg swelling and orthopnea  Negative for chest pain, near-syncope, palpitations, paroxysmal nocturnal dyspnea and syncope  Respiratory: Negative  Negative for cough, shortness of breath and wheezing  Endocrine: Negative  Hematologic/Lymphatic: Negative  Skin: Negative  Musculoskeletal: Negative  Gastrointestinal: Positive for bloating  Negative for diarrhea, nausea and vomiting  Neurological: Negative for dizziness, light-headedness and weakness  Psychiatric/Behavioral: Negative  Negative for altered mental status  All other systems reviewed and are negative      Historical Information   Past Medical History:   Diagnosis Date    Allergic rhinitis     Anxiety     Diabetes mellitus ([facility] [address] )      Past Surgical History:   Procedure Laterality Date    BREAST SURGERY Right     Cyst    OTHER SURGICAL HISTORY      Cataract implant    OTHER SURGICAL HISTORY      Fertilization     Social History     Substance and Sexual Activity   Alcohol Use Yes    Alcohol/week: 1 0 standard drinks    Types: 1 Glasses of wine per week    Frequency: Monthly or less    Drinks per session: 1 or 2    Binge frequency: Never     Social History     Substance and Sexual Activity   Drug Use Not on file     Social History     Tobacco Use   Smoking Status Former Smoker    Packs/day: 0 25   Smokeless Tobacco Never Used   Tobacco Comment    Yes - as per [employer]     Family History:   Family History   Problem Relation Age of Onset    Diabetes Brother        Meds/Allergies   all current active meds have been reviewed, current meds:   Current Facility-Administered Medications   Medication Dose Route Frequency    calcium carbonate (OYSTER SHELL,OSCAL) 500 mg tablet 1 tablet  1 tablet Oral Daily With Breakfast    enoxaparin (LOVENOX) subcutaneous injection 40 mg  40 mg Subcutaneous Daily    furosemide (LASIX) injection 40 mg  40 mg Intravenous BID    insulin lispro (HumaLOG) 100 units/mL subcutaneous injection 1-5 Units  1-5 Units Subcutaneous HS    insulin lispro (HumaLOG) 100 units/mL subcutaneous injection 1-6 Units  1-6 Units Subcutaneous TID AC    lisinopril (ZESTRIL) tablet 10 mg  10 mg Oral Daily    sodium chloride (PF) 0 9 % injection 3 mL  3 mL Intravenous Q1H PRN    and PTA meds:   Prior to Admission Medications   Prescriptions Last Dose Informant Patient Reported? Taking? Fexofenadine-Pseudoephedrine (ALLEGRA-D 24 HOUR PO) Past Week at Unknown time Self Yes Yes   Sig: Take by mouth   IBUPROFEN PO Unknown at Unknown time Self Yes No   Sig: Take by mouth   LORazepam (ATIVAN) 0 5 mg tablet Past Week at Unknown time  No Yes   Sig: Take 1 tablet (0 5 mg total) by mouth as needed for anxiety 1 hour before CT scan   calcium citrate-vitamin D (CITRACAL+D) 315-200 MG-UNIT per tablet 5/18/2021 at Unknown time  Yes Yes   Sig: Take 1 tablet by mouth 2 (two) times a day   furosemide (LASIX) 40 mg tablet 5/18/2021 at Unknown time  No Yes   Sig: Take 1 tablet (40 mg total) by mouth daily   hydrOXYzine HCL (ATARAX) 10 mg tablet 5/17/2021 at Unknown time  No Yes   Sig: Take 1 tablet (10 mg total) by mouth 3 (three) times a day as needed for itching or anxiety      Facility-Administered Medications: None          No Known Allergies    Objective   Vitals: Blood pressure 132/83, pulse (!) 110, temperature 98 7 °F (37 1 °C), temperature source Oral, resp  rate 16, weight 73 5 kg (162 lb 0 6 oz), SpO2 95 %  ,     Body mass index is 27 81 kg/m²  ,     Systolic (96ABY), CKT:357 , Min:122 , PQZ:861     Diastolic (37ZVY), SEJ:10, Min:83, Max:104    Wt Readings from Last 3 Encounters:   05/19/21 73 5 kg (162 lb 0 6 oz)   05/17/21 79 4 kg (175 lb)   05/11/21 79 4 kg (175 lb)      Lab Results   Component Value Date    CREATININE 0 81 05/19/2021    CREATININE 0 80 05/18/2021    CREATININE 0 63 05/12/2021         Intake/Output Summary (Last 24 hours) at 5/19/2021 0900  Last data filed at 5/19/2021 0800  Gross per 24 hour   Intake 118 ml   Output 249 ml   Net -131 ml     Weight (last 2 days)     Date/Time   Weight    05/19/21 0244   73 5 (162 04)            Invasive Devices     Peripheral Intravenous Line            Peripheral IV 05/18/21 Left Forearm less than 1 day          Drain            External Urinary Catheter less than 1 day                  Physical Exam  Vitals signs and nursing note reviewed  Constitutional:       General: She is not in acute distress  Appearance: She is well-developed  Comments: On 6 L NC in NAD   HENT:      Head: Normocephalic and atraumatic  Neck:      Musculoskeletal: Normal range of motion and neck supple  Vascular: JVD present  Cardiovascular:      Rate and Rhythm: Normal rate and regular rhythm  Heart sounds: Normal heart sounds  No murmur  No friction rub  Pulmonary:      Effort: Pulmonary effort is normal  No respiratory distress  Breath sounds: Rales present  No wheezing  Abdominal:      General: Bowel sounds are normal  There is distension  Palpations: Abdomen is soft  Tenderness: There is no abdominal tenderness  Musculoskeletal: Normal range of motion  General: No tenderness  Right lower leg: Edema present  Left lower leg: Edema present  Skin:     General: Skin is warm and dry  Findings: No erythema     Neurological:      Mental Status: She is alert and oriented to person, place, and time  Psychiatric:         Mood and Affect: Mood normal          Behavior: Behavior normal          Thought Content:  Thought content normal          Judgment: Judgment normal            LABORATORY RESULTS:  Results from last 7 days   Lab Units 05/18/21  1704   TROPONIN I ng/mL 0 04     CBC with diff:   Results from last 7 days   Lab Units 05/19/21  0242 05/18/21  1704 05/12/21  1158   WBC Thousand/uL 7 24 9 13 6 97   HEMOGLOBIN g/dL 17 8* 18 2* 18 0*   HEMATOCRIT % 57 7* 56 1* 56 1*   MCV fL 103* 99* 101*   PLATELETS Thousands/uL 197 221 177   MCH pg 31 8 32 1 32 4   MCHC g/dL 30 8* 32 4 32 1   RDW % 14 1 14 3 14 6   MPV fL 11 6 11 7 12 2   NRBC AUTO /100 WBCs 0 0 0       CMP:  Results from last 7 days   Lab Units 05/19/21  0243 05/18/21  1704 05/12/21  1158   POTASSIUM mmol/L 3 4* 3 6 4 5   CHLORIDE mmol/L 98* 99* 101   CO2 mmol/L 34* 32 36*   BUN mg/dL 16 16 10   CREATININE mg/dL 0 81 0 80 0 63   CALCIUM mg/dL 8 8 9 2 9 1   AST U/L 25  --  24   ALT U/L 30  --  34   ALK PHOS U/L 135*  --  124*   EGFR ml/min/1 73sq m 70 71 87       BMP:  Results from last 7 days   Lab Units 05/19/21  0243 05/18/21  1704 05/12/21  1158   POTASSIUM mmol/L 3 4* 3 6 4 5   CHLORIDE mmol/L 98* 99* 101   CO2 mmol/L 34* 32 36*   BUN mg/dL 16 16 10   CREATININE mg/dL 0 81 0 80 0 63   CALCIUM mg/dL 8 8 9 2 9 1          Lab Results   Component Value Date    NTBNP 3,223 (H) 05/18/2021    NTBNP 2,138 (H) 05/12/2021            Results from last 7 days   Lab Units 05/19/21  0243   MAGNESIUM mg/dL 2 1          Results from last 7 days   Lab Units 05/12/21  1158   HEMOGLOBIN A1C % 6 6*          Results from last 7 days   Lab Units 05/12/21  1158   TSH 3RD GENERATON uIU/mL 1 800           Lipid Profile:   No results found for: CHOL  Lab Results   Component Value Date    HDL 71 05/12/2021     Lab Results   Component Value Date    LDLCALC 55 05/12/2021     Lab Results   Component Value Date    TRIG 49 05/12/2021         Cardiac testing:   No results found for this or any previous visit  No results found for this or any previous visit  No procedure found  No results found for this or any previous visit  Imaging: I have personally reviewed pertinent reports  X-ray Chest 1 View Portable    Result Date: 5/19/2021  Narrative: CHEST INDICATION:   chest pain  Increased swelling in lower legs  COMPARISON:  Chest radiograph from 5/12/2021 and chest CT from 5/14/2021  EXAM PERFORMED/VIEWS:  XR CHEST PORTABLE  FINDINGS: Mild cardiomegaly  No acute disease  Benign atelectasis or scar in the right midlung  No effusion or pneumothorax  Osseous structures normal for age  Impression: No acute cardiopulmonary disease  Workstation performed: [id number]     Xr Chest Pa & Lateral    Result Date: 5/12/2021  Narrative: CHEST INDICATION:   R06 02: Shortness of breath  COMPARISON:  None EXAM PERFORMED/VIEWS:  XR CHEST PA & LATERAL FINDINGS: Cardiomediastinal silhouette appears unremarkable  Bandlike opacity is noted in the right middle lobe probably related to atelectasis or scar  This is somewhat pronounced  No infiltrate is seen elsewhere  No pneumothorax or pleural effusion  Osseous structures appear within normal limits for patient age  Impression: Bandlike opacity right middle lobe may be related atelectasis  Developing or resolving pneumonia is not excluded  Follow-up is suggested in 4-6 weeks time to ensure resolution and to exclude an obstructing endobronchial process     Alternatively CT chest could be utilized  The study was marked in EPIC for significant notification  Workstation performed: [id number]     Us Abdomen Complete    Result Date: 5/12/2021  Narrative: ABDOMEN ULTRASOUND, COMPLETE INDICATION:   M79 89: Other specified soft tissue disorders R06 02: Shortness of breath R19 07: Generalized intra-abdominal and pelvic swelling, mass and lump   COMPARISON: None TECHNIQUE:   Real-time ultrasound of the abdomen was performed with a curvilinear transducer with both volumetric sweeps and still imaging techniques  FINDINGS: PANCREAS:  Visualized portions of the pancreas are within normal limits  AORTA AND IVC:  Visualized portions are normal for patient age  LIVER: Size:  Within normal range  The liver measures 14 9 cm in the midclavicular line  Contour:  Surface contour is smooth  Parenchyma:  Echogenicity and echotexture are within normal limits  No evidence of suspicious mass  Limited imaging of the main portal vein shows it to be patent and hepatopetal  BILIARY: The gallbladder is normal in caliber  No wall thickening or pericholecystic fluid  No stones or sludge identified  No sonographic Oshea's sign  No intrahepatic biliary dilatation  CBD measures 4 mm  No choledocholithiasis  KIDNEY: Right kidney measures 11 4 cm  Within normal limits  Left kidney measures 11 2 cm  Within normal limits  SPLEEN: Measures 9 6 cm  Within normal limits  ASCITES:  Mild perihepatic and perisplenic ascites  Right lower quadrant mild ascites  Impression: 1  Mild abdominal ascites of uncertain etiology  The study was marked in EPIC for significant notification  Workstation performed: [id number]     Ct Chest Abdomen Pelvis W Contrast    Result Date: 5/14/2021  Narrative: CT CHEST, ABDOMEN AND PELVIS WITH IV CONTRAST INDICATION:   R06 02: Shortness of breath R18 0: Malignant ascites R91 8: Other nonspecific abnormal finding of lung field R10 84: Generalized abdominal pain  COMPARISON:  Abdomen and pelvic ultrasound 5/12/2021 and 2 view chest 5/12/2021 TECHNIQUE: CT examination of the chest, abdomen and pelvis was performed  Axial, sagittal, and coronal 2D reformatted images were created from the source data and submitted for interpretation  Radiation dose length product (DLP) for this visit:  877 mGy-cm     This examination, like all CT scans performed in the [facility], was performed utilizing techniques to minimize radiation dose exposure, including the use of iterative reconstruction and automated exposure control  IV Contrast:  100 mL of iohexol (OMNIPAQUE)   350 Enteric Contrast: Enteric contrast was administered  FINDINGS: CHEST LUNGS:  Few pulmonary nodules will be measured on series 3: Image 61, left lower lobe, 3 mm  Image 69, left lower lobe, 3 mm  Image 77, left lower lobe, 6 mm  Linear scar or subsegmental atelectasis in the right upper lobe along the minor fissure, lingula, and lung bases  No infiltrate  COPD  Minimal biapical pleural smooth septal thickening  Central airways are clear  PLEURA:  Trace right pleural effusion  HEART/GREAT VESSELS:  Heart is enlarged  No pericardial effusion  Aortic and coronary artery calcification  MEDIASTINUM AND JULIAN:  1 3 cm short axis precarinal lymph node image 22 series 2  Enteric contrast in the esophagus suggestive of gastroesophageal reflux  CHEST WALL AND LOWER NECK:   Punctate right breast calcification or clip  Patient has a history of right breast surgery  Incidental discovery of one or more thyroid nodule(s) measuring less than 1 5 cm and without suspicious features is noted in this patient who is above 28years old; according to guidelines published in the February 2015 white paper on incidental thyroid nodules in the Journal of the [employer] of radiology Luz Maria Jimenez), no further evaluation is recommended  ABDOMEN LIVER/BILIARY TREE:  Unremarkable  GALLBLADDER:  No calcified gallstones  No pericholecystic inflammatory change  SPLEEN:  Unremarkable  PANCREAS:  Unremarkable  ADRENAL GLANDS:  Unremarkable  KIDNEYS/URETERS: One or more sharply circumscribed subcentimeter renal hypodensities are noted  These lesions are too small to accurately characterize, but are statistically most likely to represent benign cortical renal cyst(s)    According to the guidelines published in the [facility] Paper of the ACR Incidental Findings Committee (Radiology 2010), no further workup of these lesions is recommended    Kidneys otherwise unremarkable  No perinephric collection  STOMACH AND BOWEL:  Colonic diverticulosis without immediate adjacent fat stranding  No bowel obstruction  APPENDIX:  A normal appendix was visualized  ABDOMINOPELVIC CAVITY:  Small-volume abdominopelvic ascites  No enlarged lymph nodes or free gas  VESSELS:  Aortoiliac calcification  No aneurysm  Prominent bilateral adnexal and ovarian veins may be sequela of chronic pelvic vascular congestion syndrome in the appropriate clinical context  PELVIS REPRODUCTIVE ORGANS:  Unremarkable for patient's age  URINARY BLADDER:  Unremarkable  ABDOMINAL WALL/INGUINAL REGIONS:  Mild to moderate diffuse body wall edema  Subcentimeter ventral umbilical abdominal wall diastases containing fat  No bowel herniation  No inguinal hernia or mass  OSSEOUS STRUCTURES:  No acute fracture or osseous destructive lesion identified  Degenerative changes of the spine, pubic symphysis, and multiple joints  Minimal grade 1 degenerative anterolisthesis of L4 on L5  Impression: Few pulmonary nodules in the left lower lobe, the largest of which measures 6 mm with unknown long-term stability  Based on current Fleischner Society 2017 Guidelines on incidental pulmonary nodule, followup non-contrast CT is recommended at 6-12 months from the initial examination and, if stable at that time, an additional followup is recommended for 18-24 months from the initial examination  COPD  Cardiomegaly  No pericardial effusion  Trace right pleural effusion  Minimal multilobar subsegmental atelectasis or scar  Mildly enlarged 1 3 cm short axis precarinal lymph node  This could be followed up with noncontrast chest CT or CT PET if clinically warranted  CT abdomen and pelvis: No evidence of significant abnormal abdominopelvic mass  Small volume abdominopelvic ascites and mild to moderate diffuse body wall edema   Findings may be sequela of fluid overload, third spacing, vascular congestion, or sepsis in the appropriate clinical context  Colonic diverticulosis  This study demonstrates a significant  finding and was documented as such in [facility] for liaison and referring practitioner notification  This study demonstrates a finding requiring imaging follow-up and was documented as such in Epic  Workstation performed: [id number]     Us Pelvis Complete W Transvaginal    Result Date: 5/12/2021  Narrative: PELVIC ULTRASOUND, COMPLETE INDICATION:  66years old  M79 89: Other specified soft tissue disorders R06 02: Shortness of breath R19 07: Generalized intra-abdominal and pelvic swelling, mass and lump  COMPARISON: None TECHNIQUE:   Transabdominal pelvic ultrasound was performed in sagittal and transverse planes with a curvilinear transducer  Additional transvaginal imaging was performed to better evaluate the endometrium and ovaries  Imaging included volumetric sweeps as well as traditional still imaging technique  FINDINGS: Exam is somewhat suboptimal due to patient body habitus  UTERUS: The uterus is anteverted in position, measuring 4 9 x 2 1 x 3 2 cm  Contour and echotexture appear normal  The cervix demonstrates nabothian cysts  ENDOMETRIUM:  Normal caliber of 3 6 mm  Trace fluid in the endometrial cavity  OVARIES/ADNEXA: Ovaries not visualized  No suspicious adnexal mass or loculated collections  Moderate pelvic ascites  Impression: 1  Trace fluid in the endometrial cavity, nonspecific  Uterus is otherwise unremarkable  2   Ovaries not visualized  3   Moderate pelvic ascites of uncertain etiology  Mild ascites noted in the abdomen on ultrasound of the same day as well  The study was marked in EPIC for significant notification  Workstation performed: [id number]       Counseling / Coordination of Care  Total floor / unit time spent today 45 minutes  Greater than 50% of total time was spent with the patient and / or family counseling and / or coordination of care    A description of the counseling / coordination of care: Review of history, current assessment, development of a plan  Code Status: Level 1 - Full Code    ** Please Note: Dragon 360 Dictation voice to text software may have been used in the creation of this document   **

## 2021-05-19 NOTE — ASSESSMENT & PLAN NOTE
Lab Results   Component Value Date    HGBA1C 6 6 (H) 05/12/2021     History of DM II with most recent A1c of 6 6  Not currently on any outpatient diabetic regimen  Plan:  · Continue POC glucose checks; 4x daily with meals and at bedtime  · Insulin regimen:  · Mealtime: SSI Algorithm 3  · Bedtime: SSI Algorithm 2    Will discharge on SGLT-2 inhibitor

## 2021-05-19 NOTE — ASSESSMENT & PLAN NOTE
No prior documented history of COPD  High likelihood of undiagnosed COPD given recent evidence of COPD on CT C/A/P and long-standing smoking history  Patient does have diffuse end-expiratory wheezing on exam with prolonged expiratory phase  Requiring 6L NC but major contributor being volume overload  No concern for COPD exacerbation at this time      Plan:  · Maintain SpO2>88%; wean O2 as patient tolerates  · Continue to encourage smoking cessation  · Outpatient follow up; will require PFTs

## 2021-05-19 NOTE — ASSESSMENT & PLAN NOTE
BP on arrival 193/104 which improved with lasix administration  No history of HTN and on no anti-hypertensives at home       Plan:  · With evidence of proteinuria in the setting of type 2 diabetes mellitus started on Lisinopril 10 mg daily  · Monitor with routine vital signs

## 2021-05-19 NOTE — ASSESSMENT & PLAN NOTE
CT Chest/Abdomen completed on 5/14/2021 revealed: "Few pulmonary nodules in the left lower lobe, the largest of which measures 6 mm with unknown long-term stability   Based on current Fleischner Society 2017 Guidelines on incidental pulmonary nodule, followup non-contrast CT is recommended at 6-12 months from the initial examination and, if stable at that time, an additional followup is recommended for 18-24 months from the initial examination "    Plan:  · Findings discussed with patient, repeat outpatient non-contrast CT in 6-12 months

## 2021-05-19 NOTE — PHYSICAL THERAPY NOTE
Physical Therapy Evaluation     Patient's Name: Ival Salvage    Admitting Diagnosis  Pulmonary edema [J81 1]  Leg swelling [M79 89]  Hypoxia [R09 02]  CHF exacerbation (HCC) [I50 9]    Problem List  Patient Active Problem List   Diagnosis    Allergic rhinitis    [facility] mellitus ([facility]    Anxiety    Acute decompensated heart failure (HCC)    SOB (shortness of breath)    Abdominal swelling, generalized    Uncontrolled type 2 diabetes mellitus with hyperglycemia (HCC)    Urinary frequency    Other proteinuria    Lung nodules    COPD (chronic obstructive pulmonary disease) ([facility] )    Acute respiratory failure with hypoxia ([facility] )    Hypertension    Nicotine dependence       Past Medical History  Past Medical History:   Diagnosis Date    Allergic rhinitis     Anxiety     [facility]        Past Surgical History  Past Surgical History:   Procedure Laterality Date    BREAST SURGERY Right     Cyst    OTHER SURGICAL HISTORY      Cataract implant    OTHER SURGICAL HISTORY      Fertilization        05/19/21 0856   PT Last Visit   PT Visit Date 05/19/21   Note Type   Note type Evaluation   Pain Assessment   Pain Assessment Tool 0-10   Pain Score No Pain   Home Living   Type of Home Apartment   Home Layout One level;Performs ADLs on one level   Bathroom Shower/Tub Walk-in shower   Bathroom Toilet Raised   Bathroom Equipment Grab bars in shower   Bathroom Accessibility Accessible   Additional Comments PRIOR TO THIS ADMISSION PATIENT RESIDED WITH HER SPOUSE (WORKS PART TIME) AT [facility] (ONE LEVEL)  AT HER BASELINE SHE IS I WITH MOBILITY (NO AD), ADLS, AND IADLS  DENIES FALLS  +   REPORTS THAT SPOUSE CAN ASSIST WITH ADLS AND IADLS PRN      Prior Function   Level of [place] Independent with ADLs and functional mobility   Lives With Spouse   Receives Help From Family   ADL Assistance Needs assistance   IADLs Needs assistance   Falls in the last 6 months 0   Vocational Retired Restrictions/Precautions   Weight Bearing Precautions Per Order No   Braces or Orthoses   (NONE)   Other Precautions Chair Alarm;O2;Fall Risk;Multiple lines  (6-8 L O2 )   General   Family/Caregiver Present No   Cognition   Overall Cognitive Status WFL   Attention Within functional limits   Orientation Level Oriented X4   Memory Decreased recall of precautions   Following Commands Follows one step commands without difficulty   RUE Assessment   RUE Assessment WFL   LUE Assessment   LUE Assessment WFL   RLE Assessment   RLE Assessment WFL   LLE Assessment   LLE Assessment WFL   Bed Mobility   Supine to Sit 5  Supervision   Additional items HOB elevated   Additional Comments IN CHAIR POST PT EVAL    Transfers   Sit to Stand 5  Supervision   Additional items Increased time required   Stand to Sit 5  Supervision   Additional items Increased time required;Armrests   Additional Comments  DURING PT EVALUATION PATIENT REQUIRED S (FOR LINE MANAGEMENT) FOR ALL ASPECTS OF MOBILITY  SHE PERFORMED SUPINE-->SIT TRANSFER, SIT<-->STAND TRANSFER, AND AMBULATION S LEVEL  SHE AMBULATED 80 FEET X2 W/O USE OF AD  1 LOB TOWARD L SIDE CORRECTED WITH LATERAL SIDE STEP  OVERALL REDUCED GAIT SPEED  Ambulation/Elevation   Gait pattern Excessively slow   Gait Assistance 5  Supervision   Assistive Device None   Distance 80 FEET X 2   Balance   Static Sitting Good   Static Standing Fair   Ambulatory Fair   Endurance Deficit   Endurance Deficit Yes   Endurance Deficit Description RECEIVED ON 6 L O2 AND AT REST 90-91%  SEATED EOB DESATURATED TO 86-89%  PLACED ON 8 L O2  WHILE AMBULATING 94% THEN O2 L DECREASED TO 6 AND SATS 90-91%  Activity Tolerance   Activity Tolerance Patient limited by fatigue  (SOB)   Medical Staff Made Aware THIS HIGH COMPLEXITY EVALUATION WAS PERFORMED WITH AN OCCUPATIONAL THERAPIST DUE TO THE PATIENT'S CO-MORBIDITIES, CLINICALLY UNSTABLE PRESENTATION, AND PRESENT IMPAIRMENTS     Nurse Made Aware WU TO SEE PER  RN FELICITY    Assessment   Prognosis Good   Problem List Decreased endurance   Assessment PT COMPLETED EVALUATION OF 66YEAR OLD FEMALE ADMITTED TO -B ON 5/19/2021 WITH ABDOMINAL DISTENSION, LE EDEMA, AND SOB  DIAGNOSIS THIS ADMISSION PER PHYSICIAN ARE "HIGH LIKELIHOOD OF COPD"  CURRENT STATUS INSTABILITIES INCLUDE USE OF SUPPLEMENTAL O2 (6-8 L) AND CONTINUOUS O2/HR MONITORING  PMH IS SIGNIFICANT FOR NICOTINE DEPENDENCE, HTN, AND DM  PRIOR TO THIS ADMISSION PATIENT RESIDED WITH HER SPOUSE (WORKS PART TIME) AT [facility] (ONE LEVEL)  AT HER BASELINE SHE IS I WITH MOBILITY (NO AD), ADLS, AND IADLS  DENIES FALLS  +   REPORTS THAT SPOUSE CAN ASSIST WITH ADLS AND IADLS PRN  CURRENT IMPAIRMENTS INCLUDE SOB, DECREASED ACTIVITY AND BALANCE AND GAIT DEVIATION  DURING PT EVALUATION PATIENT REQUIRED S (FOR LINE MANAGEMENT) FOR ALL ASPECTS OF MOBILITY  SHE PERFORMED SUPINE-->SIT TRANSFER, SIT<-->STAND TRANSFER, AND AMBULATION S LEVEL  SHE AMBULATED 80 FEET X2 W/O USE OF AD  1 LOB TOWARD L SIDE CORRECTED WITH LATERAL SIDE STEP  OVERALL REDUCED GAIT SPEED  THIS PATIENT PRESENTS WITHOUT ACUTE SKILLED INPT PT NEEDS  [facility]  PT WILL SIGN OFF  RECOMMEND CONTINUED MOBILITY WITH RN STAFF  Goals   Patient Goals TO GO HOME    PT Treatment Day 0   Plan   PT Frequency Other (Comment)  (D/C INPT PT )   Recommendation   PT Discharge Recommendation No rehabilitation needs   Equipment Recommended   (NONE)   PT - OK to Discharge Yes  (HOME WHEN MED WU )   Jennifer Sams 435   Turning in Bed Without Bedrails 4   Lying on Back to Sitting on Edge of Flat Bed 4   Moving Bed to Chair 4   Standing Up From Chair 4   Walk in Room 4   Climb 3-5 Stairs 4   Basic Mobility Inpatient Raw Score 24   Basic Mobility Standardized Score 57 68     The patient's AM-PAC Basic Mobility Inpatient Short Form Raw Score is 24, Standardized Score is 57 68   A standardized score greater than 42 9 suggests the patient may benefit from discharge to home  Please also refer to the recommendation of the Physical Therapist for safe discharge planning          Lola Estrella, PT, DPT

## 2021-05-19 NOTE — OCCUPATIONAL THERAPY NOTE
Occupational Therapy Evaluation     Patient Name: Dayday Fairbanks  HJPUS'Q Date: 5/19/2021  Problem List  Principal Problem:    Acute decompensated heart failure ([facility] )  Active Problems:    Diabetes mellitus ([facility] )    Lung nodules    COPD (chronic obstructive pulmonary disease) ([facility] )    Acute respiratory failure with hypoxia (HCC)    Hypertension    Nicotine dependence    Past Medical History  Past Medical History:   Diagnosis Date    Allergic rhinitis     Anxiety     [facility]      Past Surgical History  Past Surgical History:   Procedure Laterality Date    BREAST SURGERY Right     Cyst    OTHER SURGICAL HISTORY      Cataract implant    OTHER SURGICAL HISTORY      Fertilization           05/19/21 0855   OT Last Visit   OT Visit Date 05/19/21   Note Type   Note type Evaluation   Restrictions/Precautions   Weight Bearing Precautions Per Order No   Other Precautions Chair Alarm;O2;Fall Risk;Multiple lines  (6-8L NC O2;)   Pain Assessment   Pain Assessment Tool 0-10   Pain Score No Pain   Home Living   Type of Home Apartment  ([facility]   Home Layout One level;Performs ADLs on one level  (0 [place]OY;)   Bathroom Shower/Tub Walk-in shower   Bathroom Toilet Raised   Bathroom Equipment Grab bars in shower;Built-in shower seat;Grab bars around toilet   [address]   (pt denies)   Additional Comments Pt resides in a nursing home community in a [facility] at [facility]  Pt reports use of bathroom equipment and no AD PTA  Prior Function   Level of [place] Independent with ADLs and functional mobility   Lives With Spouse   Receives Help From Family   ADL Assistance Needs assistance   IADLs Needs assistance   Falls in the last 6 months 0   Vocational Retired   Lifestyle   Autonomy Pt reports recieving assistance from spouse w/ LB dressing/bathing PTA as LE swelling increased  Pt completes other ADL's w/ IND   Pt completes IADL's in collaboration w/ spouse  Pt manages own medication  Reciprocal Relationships Pt resides w/ spouse who is home w/ her and able/willing to help as needed  Pt reports having children and grandchildren that they are with frequently and check in daily  Questionable if spouse is currently working, will confirm w/ CM  Service to Others Retired  Intrinsic Gratification Pt enjoys being at home w/ spouse   Psychosocial   Psychosocial (WDL) WDL   Subjective   Subjective "I feel weak from laying in bed"   ADL   Where Assessed Chair   Eating Assistance 5  Supervision/Setup   Grooming Assistance 5  Supervision/Setup   UB Bathing Assistance 5  Supervision/Setup   LB Bathing Assistance 3  Moderate Assistance   [address] 5  Supervision/Setup   LB Dressing Assistance 3  Moderate [address]  5  Supervision/Setup   Functional Assistance 5  Supervision/Setup   Bed Mobility   Supine to Sit 5  Supervision   Additional items HOB elevated; Increased time required   Additional Comments Pt supine in bed upon OT arrival  Pt O2 sat while laying in bed on 6L NC O2 was 91%  While seated at EOB, pt received S and O2 sat was 86-89%  Pt was left in bedside chair w/ alarm activated and all needed items within reach  Transfers   Sit to Stand 5  Supervision   Additional items Increased time required   Stand to Sit 5  Supervision   Additional items Armrests; Increased time required   Additional Comments Pt completes functional transfers w/o AD w/ 6L NC O2  Pt recieved S and benefits from increased time to complete tasks and education was provided on breathing techniques  Pt demonstrated good carryover of education provided  Functional Mobility   Functional Mobility 5  Supervision   Additional Comments Pt completed short distance functional mobility w/o AD w/ S  Pt NC O2 increased to 8L and O2sat increased to 94% during functional mobility   Pt presented w/ a LOB that she IND was able to recover and demonstrated good insight indicating she felt weak from lying in bed  Balance   Static Sitting Good   Dynamic Sitting Fair +   Static Standing Fair   Dynamic Standing Fair   Ambulatory Fair   Activity Tolerance   Activity Tolerance Patient limited by fatigue; Other (Comment)  (SOB;)   Medical Staff Made Aware OT Sahra; PT Marianna   Nurse Made Aware RN cleared pt for OT evaluation   RUE Assessment   RUE Assessment WFL   LUE Assessment   LUE Assessment WFL   Hand Function   Gross Motor Coordination Functional   Fine Motor Coordination Functional   Cognition   Overall Cognitive Status WFL   Arousal/Participation Cooperative   Attention Within functional limits   Orientation Level Oriented X4   Memory Decreased recall of precautions   Following Commands Follows one step commands without difficulty   Comments Pt cooperative and agreeable to OT evaluation  Pt demonstrated good safety awareness and insight into current functional deficits  Pt followed one step directions w/o difficulty and attends to tasks [facility]  Pt pleasant and conversed w/ therapists appropriately  Assessment   Limitation Decreased ADL status; Decreased endurance;Decreased self-care trans;Decreased high-level ADLs   Prognosis Fair   Assessment Pt is a 66 y o  female who participated in an OT evaluation on 5/19/2021 after being admitted to [facility] on 5/18/2021 w/ dyspnea and leg swelling  Pt has a past medical history of Allergic rhinitis, Anxiety, and Diabetes mellitus ([facility] )  Pt with active OT orders and activity as tolerated orders  Pt resides in a single story cottage at [facility] w/ spouse  Pt reports children and grandchildren live nearby and visit frequently t/o the week  As of recently when LE swelling increased, pt receives assistance w/ LB ADL's, completes all other ADL's IND, completes IADL's in collaboration w/ spouse, drives, & required no use of DME PTA   Currently pt completes UB ADL's w/ S, LB ADL's w/ Mod A, and functional transfers and mobility w/ S for line management  Pt demonstrated good safety awareness and insight into current functional deficits  Pt is limited at this time 2' endurance  The patient's raw score on the AM-PAC Daily Activity inpatient short form is 20, standardized score is 42 03, greater than 39 4  Patients at this level are likely to benefit from discharge to home w/ increased social supports as needed  Please refer to the recommendation of the Occupational Therapist for safe discharge planning ) Based on the aforementioned OT evaluation, functional performance deficits, and assessments; pt has been identified as moderate complexity evaluation  From OT standpoint, anticipate d/c home w/ increased social supports as needed  Pt functioning close to baseline and no further OT services are indicated at this time, d/c OT  Pt will benefit from continued participation in meaningful daily occupations t/o hospital stay w/ staff     Goals   Patient Goals "To go home"   Recommendation   OT Discharge Recommendation No rehabilitation needs  (home w/ increased social supports)   OT - OK to Discharge Yes  (home w/ increased social support when cleared)   AM-PAC Daily Activity Inpatient   Lower Body Dressing 3   Bathing 3   Toileting 3   Upper Body Dressing 3   Grooming 4   Eating 4   Daily Activity Raw Score 20   Daily Activity Standardized Score (Calc for Raw Score >=11) 42 03   AM-PAC Applied Cognition Inpatient   Following a Speech/Presentation 4   Understanding Ordinary Conversation 4   Taking Medications 4   Remembering Where Things Are Placed or Put Away 3   Remembering List of 4-5 Errands 2   Taking Care of Complicated Tasks 2   Applied Cognition Raw Score 19   Applied Cognition Standardized Score 39 77   Modified Yael Scale   Modified [place] Scale 3          ARIANA Blel

## 2021-05-19 NOTE — PLAN OF CARE
Problem: PAIN - ADULT  Goal: Verbalizes/displays adequate comfort level or baseline comfort level  Description: Interventions:  - Encourage patient to monitor pain and request assistance  - Assess pain using appropriate pain scale  - Administer analgesics based on type and severity of pain and evaluate response  - Implement non-pharmacological measures as appropriate and evaluate response  - Consider cultural and social influences on pain and pain management  - Notify physician/advanced practitioner if interventions unsuccessful or patient reports new pain  5/19/2021 0312 by Richa Haider RN  Outcome: Progressing  5/18/2021 2047 by Richa Haider RN  Outcome: Progressing     Problem: INFECTION - ADULT  Goal: Absence or prevention of progression during hospitalization  Description: INTERVENTIONS:  - Assess and monitor for signs and symptoms of infection  - Monitor lab/diagnostic results  - Monitor all insertion sites, i e  indwelling lines, tubes, and drains  - Monitor endotracheal if appropriate and nasal secretions for changes in amount and color  - [place] appropriate cooling/warming therapies per order  - Administer medications as ordered  - Instruct and encourage patient and family to use good hand hygiene technique  - Identify and instruct in appropriate isolation precautions for identified infection/condition  5/19/2021 0312 by Richa Haider RN  Outcome: Progressing  5/18/2021 2047 by Richa Haider RN  Outcome: Progressing  Goal: Absence of fever/infection during neutropenic period  Description: INTERVENTIONS:  - Monitor WBC    5/19/2021 0312 by Richa Haider RN  Outcome: Progressing  5/18/2021 2047 by Richa Haider RN  Outcome: Progressing     Problem: SAFETY ADULT  Goal: Patient will remain free of falls  Description: INTERVENTIONS:  - Assess patient frequently for physical needs  -  Identify cognitive and physical deficits and behaviors that affect risk of falls    - [place] fall precautions as indicated by assessment   - Educate patient/family on patient safety including physical limitations  - Instruct patient to call for assistance with activity based on assessment  - Modify environment to reduce risk of injury  - Consider OT/PT consult to assist with strengthening/mobility  5/19/2021 0312 by Elizabeth Soni RN  Outcome: Progressing  5/18/2021 2047 by Elizabeth Soni RN  Outcome: Progressing  Goal: Maintain or return to baseline ADL function  Description: INTERVENTIONS:  -  Assess patient's ability to carry out ADLs; assess patient's baseline for ADL function and identify physical deficits which impact ability to perform ADLs (bathing, care of mouth/teeth, toileting, grooming, dressing, etc )  - Assess/evaluate cause of self-care deficits   - Assess range of motion  - Assess patient's mobility; develop plan if impaired  - Assess patient's need for assistive devices and provide as appropriate  - Encourage maximum independence but intervene and supervise when necessary  - Involve family in performance of ADLs  - Assess for home care needs following discharge   - Consider OT consult to assist with ADL evaluation and planning for discharge  - Provide patient education as appropriate  5/19/2021 0312 by Elizabeth Soni RN  Outcome: Progressing  5/18/2021 2047 by Elizabeth Soni RN  Outcome: Progressing  Goal: Maintain or return mobility status to optimal level  Description: INTERVENTIONS:  - Assess patient's baseline mobility status (ambulation, transfers, stairs, etc )    - Identify cognitive and physical deficits and behaviors that affect mobility  - Identify mobility aids required to assist with transfers and/or ambulation (gait belt, sit-to-stand, lift, walker, cane, etc )  - [place] fall precautions as indicated by assessment  - Record patient progress and toleration of activity level on Mobility SBAR; progress patient to next Phase/Stage  - Instruct patient to call for assistance with activity based on assessment  - Consider rehabilitation consult to assist with strengthening/weightbearing, etc   5/19/2021 0312 by Claryce Jeans, RN  Outcome: Progressing  5/18/2021 2047 by Claryce Jeans, RN  Outcome: Progressing     Problem: DISCHARGE PLANNING  Goal: Discharge to home or other facility with appropriate resources  Description: INTERVENTIONS:  - Identify barriers to discharge w/patient and caregiver  - Arrange for needed discharge resources and transportation as appropriate  - Identify discharge learning needs (meds, wound care, etc )  - Arrange for interpretive services to assist at discharge as needed  - Refer to Case Management Department for coordinating discharge planning if the patient needs post-hospital services based on physician/advanced practitioner order or complex needs related to functional status, cognitive ability, or social support system  5/19/2021 0312 by Claryce Jeans, RN  Outcome: Progressing  5/18/2021 2047 by Claryce Jeans, RN  Outcome: Progressing     Problem: Knowledge Deficit  Goal: Patient/family/caregiver demonstrates understanding of disease process, treatment plan, medications, and discharge instructions  Description: Complete learning assessment and assess knowledge base    Interventions:  - Provide teaching at level of understanding  - Provide teaching via preferred learning methods  5/19/2021 0312 by Claryce Jeans, RN  Outcome: Progressing  5/18/2021 2047 by Claryce Jeans, RN  Outcome: Progressing

## 2021-05-19 NOTE — ASSESSMENT & PLAN NOTE
Patient presenting with worsening b/l LE edema and abdominal distension, worsening over the last two weeks despite initiation or oral lasix  On arrival, patient hypoxic to 81% requiring 6L nasal canala  Patient significantly volume overloaded with evidence of right heart failure given +3 pitting b/l LE edema extending up to thighs, significant abdominal distension, and JVD  Only fine bibasilar crackles  Labs significant for proNT-BNP of 3,223 and imaging findings demonstrating small b/l pleural effusions and abdominopelvic ascites  Patient symptoms and clinical exam findings likely secondary to new onset decompensated CHF  Echocardiogram: Systolic function was normal  Ejection fraction was estimated in the range of 50 % to 55 %  Mild concentric hypertrophy  RV moderately dilated, RV function moderately reduced  Mild pulmonary hypertension  Cardiac MRI: Normal left ventricle size  Mildly reduced left ventricle systolic function with ejection fraction measures 45%  No evidence of regional wall motion abnormality  No evidence of delayed myocardial enhancement  Normal right ventricle size  Moderately reduced right ventricle systolic function with ejection fraction measuring 39%  Mild left atrial enlargement  No valvular abnormality     No evidence of PE on CTA  Was started on Lasix 80 mg IV bid that was subsequently reduced to 40 IV b i d  Responding well, net negative and weight decreasing  Creatinine stable  Intake/Output Summary (Last 24 hours) at 5/25/2021 1210  Last data filed at 5/25/2021 0900  Gross per 24 hour   Intake 940 ml   Output 800 ml   Net 140 ml         Weight (last 2 days)     Date/Time   Weight    05/25/21 0600   66 (145 5)    05/24/21 0531   67 7 (149 25)    05/23/21 0531   70 3 (154 98)              Plan:  · Lasix 80 mg po BID  · Heart failure consulted  No need for RHC at this time, will need outpatient PSG, PFT      · Strict standing weights; monitor intake and output  · 2g Na restricted diet and 1500 mL fluid restricted diet  · Monitor and maintain SpO2>88%; wean O2 as patient tolerates

## 2021-05-19 NOTE — ASSESSMENT & PLAN NOTE
Patient hypoxic on day of admission with SpO2 of 81% on room air requiring 6L nasal cannula on arrival  SOB likely secondary to underlying COPD and significant volume overload       Plan:  · Maintain SpO2>88%; wean O2 as patient tolerates  · Diuresis as above   · Out of bed with assistance   · Out patient follow up with PCP; will require PFTs for likely underlying COPD

## 2021-05-19 NOTE — PROGRESS NOTES
INTERNAL MEDICINE RESIDENCY PROGRESS NOTE     Name: Randy Henning   Age & Sex: 66 y o  female   MRN: [id number]  Unit/Bed#: [facility] 525-01   Encounter: [id number]  Team: SOD Team B     PATIENT INFORMATION     Name: Randy Henning   Age & Sex: 66 y o  female   MRN: [id number]  Hospital Stay Days: 1    ASSESSMENT/PLAN     Principal Problem:    Acute decompensated heart failure ([facility] )  Active Problems:    Acute respiratory failure with hypoxia ([place] [address] )    Hypertension    Diabetes mellitus ([facility] )    Lung nodules    COPD (chronic obstructive pulmonary disease) ([place] [address] )    Nicotine dependence      * Acute decompensated heart failure ([facility] )  Assessment & Plan  Patient presenting with worsening b/l LE edema and abdominal distension that has been worsening over the last two weeks despite initiation or oral lasix  Swelling associated with decreased functional status 2/2 SOB with exertion and orthopnea  On arrival, patient hypoxic to 81% requiring 6L nasal canala  Patient significantly volume overloaded with evidence of right heart failure given +3 pitting b/l LE edema extending up to thighs, significant abdominal distension, and JVD  Only fine bibasilar crackles  Labs significant for proNT-BNP of 3,223 and imaging findings demonstrating small b/l pleural effusions and abdominopelvic ascites  Patient symptoms and clinical exam findings likely secondary to new onset decompensated CHF  Will admit for HF workup  Plan:  · Lasix 40 mg IV BID  · Strict standing weights; monitor intake and output  · 2g Na restricted diet and 1500 mL fluid restricted diet  · Echo cardiogram ordered and pending  · Monitor and maintain SpO2>88%; wean O2 as patient tolerates  · Cardiology consulted  Acute respiratory failure with hypoxia [facility])  Assessment & Plan  Patient hypoxic on day of admission with SpO2 of 81% on room air requiring 6L nasal cannula on arrival  SOB likely secondary to underlying COPD and significant volume overload       Plan:  · Maintain SpO2>88%; wean O2 as patient tolerates  · Diuresis as above   · Out of bed with assistance   · Out patient follow up with PCP; will require PFTs for likely underlying COPD    Hypertension  Assessment & Plan  BP on arrival 193/104 which improved with lasix administration  No history of HTN and on no anti-hypertensives at home  Plan:  · With evidence of proteinuria in the setting of type 2 diabetes mellitus will start Lisinopril 10 mg daily  · Monitor with routine vital signs    Nicotine dependence  Assessment & Plan  Longstanding history of tobacco abuse for over 40 years  Currently half to full pack per day smoker  Denies nicotine patch at this time  Plan:  · Continue to encourage tobacco cessation    COPD (chronic obstructive pulmonary disease) ([facility] )  Assessment & Plan  No prior documented history of COPD  High likelihood of undiagnosed COPD given recent evidence of COPD on CT C/A/P and long-standing smoking history  Patient does have diffuse end-expiratory wheezing on exam with prolonged expiratory phase  Requiring 6L NC but major contributor being volume overload  No concern for COPD exacerbation at this time  Plan:  · Maintain SpO2>88%; wean O2 as patient tolerates  · Continue to encourage smoking cessation  · Outpatient follow up; will require PFTs    Lung nodules  Assessment & Plan  CT Chest/Abdomen completed on 5/14/2021 revealed: "Few pulmonary nodules in the left lower lobe, the largest of which measures 6 mm with unknown long-term stability   Based on current Fleischner Society 2017 Guidelines on incidental pulmonary nodule, followup non-contrast CT is recommended at 6-12 months from the initial examination and, if stable at that time, an additional followup is recommended for 18-24 months from the initial examination "    Plan:  · Findings discussed with patient, repeat outpatient non-contrast CT in 6-12 months    Diabetes mellitus [facility])  Assessment & Plan  Lab Results   Component Value Date HGBA1C 6 6 (H) 2021     History of DM II with most recent A1c of 6 6  Not currently on any outpatient diabetic regimen  Plan:  · Continue POC glucose checks; 4x daily with meals and at bedtime  · Insulin regimen:  · Mealtime: SSI Algorithm 3  · Bedtime: SSI Algorithm 2        Disposition:  Continue inpatient care  Diuresing with IV Lasix  SUBJECTIVE     Patient seen and examined  No acute events overnight  Feels that breathing is mildly improved as well as mild improvement in lower extremity swelling  Good urine out put  Denies any chest pain, dizziness, cough, hemoptysis, fevers or chills  OBJECTIVE     Vitals:    21 2157 21 2242 21 0233 21 0244   BP:  122/84 132/83    BP Location:  Right arm Right arm    Pulse:  102 (!) 110    Resp:  16 16    Temp:  98 9 °F (37 2 °C) 98 7 °F (37 1 °C)    TempSrc:  Oral Oral    SpO2: 94% 95% 95%    Weight:    73 5 kg (162 lb 0 6 oz)      Temperature:   Temp (24hrs), Av 7 °F (37 1 °C), Min:97 9 °F (36 6 °C), Max:98 9 °F (37 2 °C)    Temperature: 98 7 °F (37 1 °C)  Intake & Output:  I/O        07 -  0700  07 -  0700  07 -  0700    P  O   0     Total Intake(mL/kg)  0 (0)     Urine (mL/kg/hr)  249     Total Output  249     Net  -249            Unmeasured Urine Occurrence  1 x         Weights:        Body mass index is 27 81 kg/m²  Weight (last 2 days)     Date/Time   Weight    21 0244   73 5 (162 04)            Physical Exam  Vitals signs and nursing note reviewed  Constitutional:       General: She is in acute distress (Mild respiratory distress)  Appearance: She is well-developed  HENT:      Head: Normocephalic and atraumatic  Eyes:      Conjunctiva/sclera: Conjunctivae normal    Neck:      Musculoskeletal: Neck supple  Cardiovascular:      Rate and Rhythm: Regular rhythm  Tachycardia present  Heart sounds: Normal heart sounds  No murmur        Comments: JVD noted  Pulmonary: Effort: Pulmonary effort is normal  No respiratory distress  Breath sounds: Rales (Bibasilar rales) present  Comments: 6 L nasal cannula saturating low to mid 90s  Abdominal:      Palpations: Abdomen is soft  Tenderness: There is no abdominal tenderness  Musculoskeletal:      Right lower leg: Edema present  Left lower leg: Edema present  Skin:     General: Skin is warm and dry  Neurological:      General: No focal deficit present  Mental Status: She is alert  LABORATORY DATA     Labs: I have personally reviewed pertinent reports  Results from last 7 days   Lab Units 05/19/21  0242 05/18/21  1704 05/12/21  1158   WBC Thousand/uL 7 24 9 13 6 97   HEMOGLOBIN g/dL 17 8* 18 2* 18 0*   HEMATOCRIT % 57 7* 56 1* 56 1*   PLATELETS Thousands/uL 197 221 177   NEUTROS PCT % 72 79* 80*   MONOS PCT % 10 9 7      Results from last 7 days   Lab Units 05/19/21  0243 05/18/21  1704 05/12/21  1158   POTASSIUM mmol/L 3 4* 3 6 4 5   CHLORIDE mmol/L 98* 99* 101   CO2 mmol/L 34* 32 36*   BUN mg/dL 16 16 10   CREATININE mg/dL 0 81 0 80 0 63   CALCIUM mg/dL 8 8 9 2 9 1   ALK PHOS U/L 135*  --  124*   ALT U/L 30  --  34   AST U/L 25  --  24     Results from last 7 days   Lab Units 05/19/21  0243   MAGNESIUM mg/dL 2 1                  Results from last 7 days   Lab Units 05/18/21  1704   TROPONIN I ng/mL 0 04       IMAGING & DIAGNOSTIC TESTING     Radiology Results: I have personally reviewed pertinent reports  No results found  Other Diagnostic Testing: I have personally reviewed pertinent reports      ACTIVE MEDICATIONS     Current Facility-Administered Medications   Medication Dose Route Frequency    calcium carbonate (OYSTER SHELL,OSCAL) 500 mg tablet 1 tablet  1 tablet Oral Daily With Breakfast    enoxaparin (LOVENOX) subcutaneous injection 40 mg  40 mg Subcutaneous Daily    furosemide (LASIX) injection 40 mg  40 mg Intravenous Once    furosemide (LASIX) injection 80 mg  80 mg Intravenous BID  insulin lispro (HumaLOG) 100 units/mL subcutaneous injection 1-5 Units  1-5 Units Subcutaneous HS    insulin lispro (HumaLOG) 100 units/mL subcutaneous injection 1-6 Units  1-6 Units Subcutaneous TID AC    lisinopril (ZESTRIL) tablet 10 mg  10 mg Oral Daily    sodium chloride (PF) 0 9 % injection 3 mL  3 mL Intravenous Q1H PRN       VTE Pharmacologic Prophylaxis: Enoxaparin (Lovenox)  VTE Mechanical Prophylaxis: sequential compression device    Portions of the record may have been created with voice recognition software  Occasional wrong word or "sound a like" substitutions may have occurred due to the inherent limitations of voice recognition software    Read the chart carefully and recognize, using context, where substitutions have occurred   ==  Joshua Waite MD  [address] [facility]  Internal Medicine Residency PGY-2

## 2021-05-19 NOTE — INCIDENTAL FINDINGS
The following findings require follow up:  Radiographic finding   Finding: Few pulmonary nodules in the left lower lobe, the largest of which measures 6 mm with unknown long-term stability  Follow up required: yes    Follow up should be done within 6-12 month(s) from the initial examination and, if stable at that time, an additional followup is recommended for 18-24 months from the initial examination      Please notify the following clinician to assist with the follow up: your PCP   Dr Evans Michel

## 2021-05-19 NOTE — ASSESSMENT & PLAN NOTE
Longstanding history of tobacco abuse for over 40 years  Currently half to full pack per day smoker  Denies nicotine patch at this time      Plan:  · Continue to encourage tobacco cessation

## 2021-05-20 PROBLEM — E87.6 HYPOKALEMIA: Status: ACTIVE | Noted: 2021-05-20

## 2021-05-20 PROBLEM — R80.9 PROTEINURIA: Status: ACTIVE | Noted: 2021-05-17

## 2021-05-20 LAB
ANION GAP SERPL CALCULATED.3IONS-SCNC: 7 MMOL/L (ref 4–13)
ATRIAL RATE: 117 BPM
BUN SERPL-MCNC: 18 MG/DL (ref 5–25)
C3 SERPL-MCNC: 94.3 MG/DL (ref 90–180)
C4 SERPL-MCNC: 15 MG/DL (ref 10–40)
CALCIUM SERPL-MCNC: 8.7 MG/DL (ref 8.3–10.1)
CHLORIDE SERPL-SCNC: 94 MMOL/L (ref 100–108)
CO2 SERPL-SCNC: 41 MMOL/L (ref 21–32)
CREAT SERPL-MCNC: 0.72 MG/DL (ref 0.6–1.3)
CREAT UR-MCNC: 17.2 MG/DL
GFR SERPL CREATININE-BSD FRML MDRD: 81 ML/MIN/1.73SQ M
GLUCOSE SERPL-MCNC: 149 MG/DL (ref 65–140)
GLUCOSE SERPL-MCNC: 155 MG/DL (ref 65–140)
GLUCOSE SERPL-MCNC: 159 MG/DL (ref 65–140)
GLUCOSE SERPL-MCNC: 162 MG/DL (ref 65–140)
GLUCOSE SERPL-MCNC: 201 MG/DL (ref 65–140)
P AXIS: 83 DEGREES
POTASSIUM SERPL-SCNC: 3.3 MMOL/L (ref 3.5–5.3)
PR INTERVAL: 208 MS
PROT UR-MCNC: 16 MG/DL
PROT/CREAT UR: 0.93 MG/G{CREAT} (ref 0–0.1)
QRS AXIS: -69 DEGREES
QRSD INTERVAL: 64 MS
QT INTERVAL: 292 MS
QTC INTERVAL: 407 MS
SODIUM SERPL-SCNC: 142 MMOL/L (ref 136–145)
T WAVE AXIS: 94 DEGREES
VENTRICULAR RATE: 117 BPM

## 2021-05-20 PROCEDURE — 86704 HEP B CORE ANTIBODY TOTAL: CPT | Performed by: PHYSICIAN ASSISTANT

## 2021-05-20 PROCEDURE — 83883 ASSAY NEPHELOMETRY NOT SPEC: CPT | Performed by: PHYSICIAN ASSISTANT

## 2021-05-20 PROCEDURE — 86431 RHEUMATOID FACTOR QUANT: CPT | Performed by: PHYSICIAN ASSISTANT

## 2021-05-20 PROCEDURE — 86334 IMMUNOFIX E-PHORESIS SERUM: CPT | Performed by: PHYSICIAN ASSISTANT

## 2021-05-20 PROCEDURE — 93010 ELECTROCARDIOGRAM REPORT: CPT | Performed by: INTERNAL MEDICINE

## 2021-05-20 PROCEDURE — 86803 HEPATITIS C AB TEST: CPT | Performed by: PHYSICIAN ASSISTANT

## 2021-05-20 PROCEDURE — 84165 PROTEIN E-PHORESIS SERUM: CPT | Performed by: PATHOLOGY

## 2021-05-20 PROCEDURE — 86705 HEP B CORE ANTIBODY IGM: CPT | Performed by: PHYSICIAN ASSISTANT

## 2021-05-20 PROCEDURE — 86334 IMMUNOFIX E-PHORESIS SERUM: CPT | Performed by: PATHOLOGY

## 2021-05-20 PROCEDURE — 82948 REAGENT STRIP/BLOOD GLUCOSE: CPT

## 2021-05-20 PROCEDURE — 86255 FLUORESCENT ANTIBODY SCREEN: CPT | Performed by: PHYSICIAN ASSISTANT

## 2021-05-20 PROCEDURE — 99232 SBSQ HOSP IP/OBS MODERATE 35: CPT | Performed by: INTERNAL MEDICINE

## 2021-05-20 PROCEDURE — 84165 PROTEIN E-PHORESIS SERUM: CPT | Performed by: PHYSICIAN ASSISTANT

## 2021-05-20 PROCEDURE — 80048 BASIC METABOLIC PNL TOTAL CA: CPT | Performed by: STUDENT IN AN ORGANIZED HEALTH CARE EDUCATION/TRAINING PROGRAM

## 2021-05-20 PROCEDURE — 86038 ANTINUCLEAR ANTIBODIES: CPT | Performed by: PHYSICIAN ASSISTANT

## 2021-05-20 PROCEDURE — 84166 PROTEIN E-PHORESIS/URINE/CSF: CPT | Performed by: PATHOLOGY

## 2021-05-20 PROCEDURE — 84156 ASSAY OF PROTEIN URINE: CPT | Performed by: PHYSICIAN ASSISTANT

## 2021-05-20 PROCEDURE — 86430 RHEUMATOID FACTOR TEST QUAL: CPT | Performed by: PHYSICIAN ASSISTANT

## 2021-05-20 PROCEDURE — 99223 1ST HOSP IP/OBS HIGH 75: CPT | Performed by: INTERNAL MEDICINE

## 2021-05-20 PROCEDURE — 87340 HEPATITIS B SURFACE AG IA: CPT | Performed by: PHYSICIAN ASSISTANT

## 2021-05-20 PROCEDURE — 86160 COMPLEMENT ANTIGEN: CPT | Performed by: PHYSICIAN ASSISTANT

## 2021-05-20 PROCEDURE — 82570 ASSAY OF URINE CREATININE: CPT | Performed by: PHYSICIAN ASSISTANT

## 2021-05-20 PROCEDURE — 84166 PROTEIN E-PHORESIS/URINE/CSF: CPT | Performed by: PHYSICIAN ASSISTANT

## 2021-05-20 RX ORDER — POTASSIUM CHLORIDE 20 MEQ/1
40 TABLET, EXTENDED RELEASE ORAL 2 TIMES DAILY
Status: DISCONTINUED | OUTPATIENT
Start: 2021-05-20 | End: 2021-05-22

## 2021-05-20 RX ORDER — LORAZEPAM 2 MG/ML
0.5 INJECTION INTRAMUSCULAR ONCE
Status: COMPLETED | OUTPATIENT
Start: 2021-05-20 | End: 2021-05-20

## 2021-05-20 RX ORDER — ACETAMINOPHEN 325 MG/1
650 TABLET ORAL EVERY 6 HOURS PRN
Status: DISCONTINUED | OUTPATIENT
Start: 2021-05-20 | End: 2021-05-26 | Stop reason: HOSPADM

## 2021-05-20 RX ORDER — LANOLIN ALCOHOL/MO/W.PET/CERES
3 CREAM (GRAM) TOPICAL
Status: DISCONTINUED | OUTPATIENT
Start: 2021-05-20 | End: 2021-05-26 | Stop reason: HOSPADM

## 2021-05-20 RX ORDER — POTASSIUM CHLORIDE 20 MEQ/1
40 TABLET, EXTENDED RELEASE ORAL DAILY
Status: DISCONTINUED | OUTPATIENT
Start: 2021-05-20 | End: 2021-05-20

## 2021-05-20 RX ORDER — ACETAZOLAMIDE 500 MG/5ML
500 INJECTION, POWDER, LYOPHILIZED, FOR SOLUTION INTRAVENOUS ONCE
Status: COMPLETED | OUTPATIENT
Start: 2021-05-20 | End: 2021-05-20

## 2021-05-20 RX ADMIN — POTASSIUM CHLORIDE 40 MEQ: 1500 TABLET, EXTENDED RELEASE ORAL at 17:43

## 2021-05-20 RX ADMIN — FUROSEMIDE 80 MG: 10 INJECTION, SOLUTION INTRAMUSCULAR; INTRAVENOUS at 17:43

## 2021-05-20 RX ADMIN — LISINOPRIL 10 MG: 10 TABLET ORAL at 09:59

## 2021-05-20 RX ADMIN — ACETAZOLAMIDE 500 MG: 500 INJECTION, POWDER, LYOPHILIZED, FOR SOLUTION INTRAVENOUS at 14:40

## 2021-05-20 RX ADMIN — INSULIN LISPRO 1 UNITS: 100 INJECTION, SOLUTION INTRAVENOUS; SUBCUTANEOUS at 21:24

## 2021-05-20 RX ADMIN — CALCIUM 1 TABLET: 500 TABLET ORAL at 09:59

## 2021-05-20 RX ADMIN — Medication 14 MG: at 21:18

## 2021-05-20 RX ADMIN — INSULIN LISPRO 1 UNITS: 100 INJECTION, SOLUTION INTRAVENOUS; SUBCUTANEOUS at 10:55

## 2021-05-20 RX ADMIN — FUROSEMIDE 80 MG: 10 INJECTION, SOLUTION INTRAMUSCULAR; INTRAVENOUS at 09:58

## 2021-05-20 RX ADMIN — ACETAMINOPHEN 650 MG: 325 TABLET, FILM COATED ORAL at 01:59

## 2021-05-20 RX ADMIN — INSULIN LISPRO 1 UNITS: 100 INJECTION, SOLUTION INTRAVENOUS; SUBCUTANEOUS at 06:12

## 2021-05-20 RX ADMIN — ENOXAPARIN SODIUM 40 MG: 40 INJECTION SUBCUTANEOUS at 09:58

## 2021-05-20 RX ADMIN — LORAZEPAM 0.5 MG: 2 INJECTION INTRAMUSCULAR; INTRAVENOUS at 23:44

## 2021-05-20 RX ADMIN — POTASSIUM CHLORIDE 40 MEQ: 1500 TABLET, EXTENDED RELEASE ORAL at 09:59

## 2021-05-20 NOTE — CONSULTS
Consultation - Nephrology   Elenita Lunsford 66 y o  female MRN: [id number]  Unit/Bed#: [facility] 525-01 Encounter: [id number]    ASSESSMENT/PLAN:   1  Proteinuria: recent outpatient labs with microalbumin to creatinine ratio 2 5 g  · UA:  Trace ketones, trace blood, 3+ protein, 2-4 rbc's, 2-4 wbc's, occasional bacteria  · Creatinine remains at baseline at less than 1  · Per patient she has "borderline" diet controlled DM for about 10 years   · Started on lisinopril 10mg daily   · Will d/w attending for serologies   2  Acute right sided CHF: on lasix 80mg IV bid   3  Elevated BP: normal in the past including at her PCP recently it was 120-130s/80s  /104 on admission and now down to 115/67 today  4  Hypokalemia: due to diuretics  Continue k-dur 40meq bid and lisinopril   5  Elevated bicarb: in the setting of COPD and diuretics/hypokalemia  · K replacement was increased today per primary team   · S/p diamox x 1 per cardiology   6  Tobacco abuse with COPD  7  DM II: diet controlled  A1c 6 6%     HISTORY OF PRESENT ILLNESS:  Requesting Physician: Maty Rodriguez MD  Reason for Consult:  Microalbuminuria    Elenita Lunsford is a 66y o  year old female who was admitted to [facility] with edema and shortness of breath  Patient states she was feeling very well until about a week or 2 ago when she developed swelling in her legs, weight gain, abdominal bloating and worsening shortness of breath  She was seen by her PCP and started on oral Lasix  Her symptoms continued to worsen and she came to the emergency room was found have a blood pressure of 193/104, saturating 81% on room air and tachycardic with heart rate 113  She was started on IV Lasix and symptoms started to improve  Recently her outpatient labs showed 2 5 g microalbuminuria so Nephrology was consulted  Patient denies any known history of proteinuria or other renal diseases  She denies seeing any foaming of her urine    She reports a history of borderline diabetes for about 10 years but has never been on any medications  She was very surprised her blood pressure was elevated in the hospital as she states that her blood pressure was typically low but had not had it checked in about 2-3 years  Currently feels that her edema, shortness of breath and abdominal bloating are improving with diuretics but definitely not back to baseline yet  She does use NSAIDs at home at night when her arthritis is bothering her which can range from 0-3 pills a week  Drinks about 2 glasses of wine a day and +smoking history  No recent antibiotic use and has not been sick recently         PAST MEDICAL HISTORY:  Past Medical History:   Diagnosis Date    Allergic rhinitis     Anxiety     Diabetes mellitus ([facility] )        PAST SURGICAL HISTORY:  Past Surgical History:   Procedure Laterality Date    BREAST SURGERY Right     Cyst    OTHER SURGICAL HISTORY      Cataract implant    OTHER SURGICAL HISTORY      Fertilization       ALLERGIES:  No Known Allergies    SOCIAL HISTORY:  Social History     Substance and Sexual Activity   Alcohol Use Yes    Alcohol/week: 1 0 standard drinks    Types: 1 Glasses of wine per week    Frequency: Monthly or less    Drinks per session: 1 or 2    Binge frequency: Never     Social History     Substance and Sexual Activity   Drug Use Not on file     Social History     Tobacco Use   Smoking Status Former Smoker    Packs/day: 0 25   Smokeless Tobacco Never Used   Tobacco Comment    Yes - as per [employer]       FAMILY HISTORY:  Family History   Problem Relation Age of Onset    Diabetes Brother        MEDICATIONS:  Scheduled Meds:  Current Facility-Administered Medications   Medication Dose Route Frequency Provider Last Rate    acetaminophen  650 mg Oral Q6H PRN Shahnaz Lama MD      acetaZOLAMIDE  500 mg Intravenous Once [employer], PA-C      calcium carbonate  1 tablet Oral Daily With Breakfast Chrissy Carcamo DO      enoxaparin  40 mg Subcutaneous Daily Chrissy Shupp, DO      furosemide  80 mg Intravenous BID Vincent Camarillo PA-C      insulin lispro  1-5 Units Subcutaneous HS Chrissy Shupp, DO      insulin lispro  1-6 Units Subcutaneous TID AC Chrissy Shupp, DO      lisinopril  10 mg Oral Daily Chrissy Shupp, DO      melatonin  3 mg Oral HS PRN Armani Claire MD      nicotine  14 mg Transdermal Daily Mariano Jackson, DO      potassium chloride  40 mEq Oral BID Denny Phan MD      sodium chloride (PF)  3 mL Intravenous Q1H PRN Chrissy Shupp, DO         PRN Meds:   acetaminophen    melatonin    Insert peripheral IV **AND** sodium chloride (PF)    Continuous Infusions:     REVIEW OF SYSTEMS:  A complete review of systems was done  Pertinent positives and negatives noted in the HPI but otherwise the review of systems is negative      PHYSICAL EXAM:  Current Weight: Weight - Scale: 75 4 kg (166 lb 3 6 oz)  First Weight: Weight - Scale: 73 5 kg (162 lb 0 6 oz)  Vitals:    05/20/21 1036   BP: 115/67   Pulse:    Resp:    Temp:    SpO2:        Intake/Output Summary (Last 24 hours) at 5/20/2021 1325  Last data filed at 5/20/2021 1319  Gross per 24 hour   Intake 360 ml   Output 3809 ml   Net -3449 ml     General:  No acute distress  Skin:  No rash  Eyes:  Sclerae anicteric  ENT:  Moist mucous membranes  Neck:  Trachea midline   Chest:  Few crackles  bilaterally  CVS:  Regular rate and rhythm  Abdomen:  Soft, nontender, nondistended  Extremities:  Bilateral LE edema  Neuro:  Awake and alert  Psych:  Appropriate affect    Lab Results:   Results from last 7 days   Lab Units 05/20/21  0515 05/19/21  0243 05/19/21  0242 05/18/21  1704   WBC Thousand/uL  --   --  7 24 9 13   HEMOGLOBIN g/dL  --   --  17 8* 18 2*   HEMATOCRIT %  --   --  57 7* 56 1*   PLATELETS Thousands/uL  --   --  197 221   SODIUM mmol/L 142 138  --  137   POTASSIUM mmol/L 3 3* 3 4*  --  3 6   CHLORIDE mmol/L 94* 98*  --  99*   CO2 mmol/L 41* 34*  --  32   BUN mg/dL 18 16  -- 16   CREATININE mg/dL 0 72 0 81  --  0 80   CALCIUM mg/dL 8 7 8 8  --  9 2   MAGNESIUM mg/dL  --  2 1  --   --        Radiology Results:   X-ray chest 1 view portable   Final Result by Luis Enrique Feliciano MD (05/19 5436)      No acute cardiopulmonary disease                 Workstation performed: [id number]

## 2021-05-20 NOTE — ASSESSMENT & PLAN NOTE
Microlbumin creatinine ratio 2,475  Seen by nephrology, differential include idiopathic nodular glomerular sclerosis in the setting of chronic smoking, possible analgesic nephropathy with chronic NSAID use, diabetic kidney disease  · Initiated on ACE-I    · Outpatient nephrology follow up

## 2021-05-20 NOTE — CASE MANAGEMENT
LOS:  1day  Bundle: no  Readmission: no    Explained role of CM to pt  CM obtained the following information from pt  Home: lives at [facility] in [address] [place] [address] with her   The house has 2 levels and a flight of stairs in between and 1 TR[address]     DME: denies  Ambulation: independent  Drives: yes  Pharmacy: [facility] [place]  PCP: perry  Mental Health History: denies and denies inpatient psychiatric stay ever  Anxiety on medical record  Substance Use/Abuse History: denies  [facility] History: denies  Work/Retired: retired  Rehab History: denies  POA/LW: no, information on LW and POA given to pt at her request    Transport at [employer]:      CM asked pt if they were unable to make decisions for themselves who would they want to make decisions for them, she said her  Erin Malin and her daughter Anatoly Romero  CM reviewed d/c planning process including the following: identifying help at home, patient preference for d/c planning needs,  [facility] Meds to Bed program      CM asked pt if they would like CM to call anyone for them, they declined  CM team will continue to follow through discharge

## 2021-05-20 NOTE — PROGRESS NOTES
General Cardiology   Progress Note -  Team One   Milton Carcamo 66 y o  female MRN: [id number]    Unit/Bed#: [facility] 525-01 Encounter: [id number]    Assessment:    1  Acute biventricular CHF:  Presented with progressive lower extremity edema, abdominal bloating, weight gain, orthopnea  NT proBNP  3223 (was 2138 on 05/12)  CXR no acute cardiopulmonary disease  Currently on 80 mg IV Lasix BID  · Echocardiogram:  EF 50-55%, no WMA, mild concentric LVH  Dilated RV with moderately reduced function  Mild MR, mild TR  · Dry weight:  143-145 lb  · Weight on admission:  162 lb per bed scale  · Output 24 hours -2 6 L  Net output - 2 8 L  · Weight today:  166 lb per standing scale  · Patient continues to appear volume overloaded with lower extremity edema, abdominal bloating, JVD bibasilar rales  2  Acute hypoxic respiratory failure: In the setting of volume overload and its underlying COPD  Currently on 6 L NC   3  Essential hypertension:  New diagnosis this admission with /104 on arrival   Ordered for lisinopril 10 mg daily to begin today  Average /77 on IV Lasix  4  Type 2 DM:  Hemoglobin A1c 6 6  Not on any medications at home  Management per primary team   5  COPD:  Noted on recent CT 05/14/2021   6  Tobacco abuse:  Complete smoking cessation advised   7  Hypokalemia:  Potassium 3 3  Repletion ordered by primary team      Plan/Recommendations:  · Continue IV Lasix to 80 mg BID  · Will give a dose of IV Diamox for contraction alkalosis  · Monitor I&Os, renal function, electrolytes and standing weight  · Maintain potassium >4 and magnesium >2  · Low-sodium (<2 g), fluid-restricted (<1800 mL) diet  · Continue lisinopril    ____________________________________________________________    Subjective    Patient seen and examined  No acute events overnight  Review of Systems   Constitution: Negative  Negative for chills  Cardiovascular: Positive for leg swelling   Negative for chest pain, dyspnea on exertion, near-syncope, orthopnea, palpitations, paroxysmal nocturnal dyspnea and syncope  Respiratory: Positive for shortness of breath  Negative for cough and wheezing  Endocrine: Negative  Hematologic/Lymphatic: Negative  Skin: Negative  Musculoskeletal: Negative  Gastrointestinal: Positive for bloating  Negative for diarrhea, nausea and vomiting  Neurological: Negative for dizziness, light-headedness and weakness  Psychiatric/Behavioral: Negative  Negative for altered mental status  All other systems reviewed and are negative  Objective:   Vitals: Blood pressure 114/62, pulse (!) 108, temperature 97 6 °F (36 4 °C), temperature source Oral, resp  rate 18, weight 75 4 kg (166 lb 3 6 oz), SpO2 94 %  ,     Wt Readings from Last 3 Encounters:   05/20/21 75 4 kg (166 lb 3 6 oz)   05/17/21 79 4 kg (175 lb)   05/11/21 79 4 kg (175 lb)        Lab Results   Component Value Date    CREATININE 0 72 05/20/2021    CREATININE 0 81 05/19/2021    CREATININE 0 80 05/18/2021         Body mass index is 28 53 kg/m²  ,     Systolic (28MSR), SAW:732 , Min:114 , KEE:736     Diastolic (40FAA), FDV:56, Min:62, Max:91          Intake/Output Summary (Last 24 hours) at 5/20/2021 1017  Last data filed at 5/20/2021 0630  Gross per 24 hour   Intake 580 ml   Output 2784 ml   Net -2204 ml     Weight (last 2 days)     Date/Time   Weight    05/20/21 0600   75 4 (166 23)    05/19/21 0244   73 5 (162 04)            Telemetry Review:  Sinus tachycardia with multiple brief atrial runs and frequent PACs      Physical Exam  Vitals signs and nursing note reviewed  Constitutional:       General: She is not in acute distress  Appearance: She is well-developed  Comments: On 5 L NC in NAD   HENT:      Head: Normocephalic and atraumatic  Neck:      Musculoskeletal: Normal range of motion and neck supple  Vascular: JVD present  Cardiovascular:      Rate and Rhythm: Normal rate and regular rhythm        Heart sounds: Normal heart sounds  No murmur  No friction rub  Pulmonary:      Effort: Pulmonary effort is normal  No respiratory distress  Breath sounds: Rales present  No wheezing  Comments: Fine bibasilar rales  Abdominal:      General: Bowel sounds are normal  There is no distension  Palpations: Abdomen is soft  Tenderness: There is no abdominal tenderness  Musculoskeletal: Normal range of motion  General: No tenderness  Right lower leg: No edema  Left lower leg: No edema  Skin:     General: Skin is warm and dry  Findings: No erythema  Neurological:      Mental Status: She is alert and oriented to person, place, and time  Psychiatric:         Mood and Affect: Mood normal          Behavior: Behavior normal          Thought Content:  Thought content normal          Judgment: Judgment normal          LABORATORY RESULTS  Results from last 7 days   Lab Units 05/18/21  1704   TROPONIN I ng/mL 0 04     CBC with diff:   Results from last 7 days   Lab Units 05/19/21  0242 05/18/21  1704   WBC Thousand/uL 7 24 9 13   HEMOGLOBIN g/dL 17 8* 18 2*   HEMATOCRIT % 57 7* 56 1*   MCV fL 103* 99*   PLATELETS Thousands/uL 197 221   MCH pg 31 8 32 1   MCHC g/dL 30 8* 32 4   RDW % 14 1 14 3   MPV fL 11 6 11 7   NRBC AUTO /100 WBCs 0 0       CMP:  Results from last 7 days   Lab Units 05/20/21  0515 05/19/21  0243 05/18/21  1704   POTASSIUM mmol/L 3 3* 3 4* 3 6   CHLORIDE mmol/L 94* 98* 99*   CO2 mmol/L 41* 34* 32   BUN mg/dL 18 16 16   CREATININE mg/dL 0 72 0 81 0 80   CALCIUM mg/dL 8 7 8 8 9 2   AST U/L  --  25  --    ALT U/L  --  30  --    ALK PHOS U/L  --  135*  --    EGFR ml/min/1 73sq m 81 70 71       BMP:  Results from last 7 days   Lab Units 05/20/21  0515 05/19/21  0243 05/18/21  1704   POTASSIUM mmol/L 3 3* 3 4* 3 6   CHLORIDE mmol/L 94* 98* 99*   CO2 mmol/L 41* 34* 32   BUN mg/dL 18 16 16   CREATININE mg/dL 0 72 0 81 0 80   CALCIUM mg/dL 8 7 8 8 9 2       Lab Results   Component Value Date    NTBNP 3,223 (H) 2021    NTBNP 2,138 (H) 2021             Results from last 7 days   Lab Units 21  0243   MAGNESIUM mg/dL 2 1                           Lipid Profile:   No results found for: CHOL  Lab Results   Component Value Date    HDL 71 2021     Lab Results   Component Value Date    LDLCALC 55 2021     Lab Results   Component Value Date    TRIG 49 2021       Cardiac testing:   Results for orders placed during the hospital encounter of 21   Echo complete with contrast if indicated    Narrative [address]  [address] [place] [address]  [phone]    Transthoracic Echocardiogram  2D, M-mode, Doppler, and Color Doppler    Study date:  19-May-2021    Patient: Radha Weiss  MR number: [id number]  Account number: [de-identified]  : 1945  Age: 66 years  Gender: Female  Status: Inpatient  Location: Bedside  Height: 64 in  Weight: 161 7 lb  BP: 126/ 75 mmHg    Indications: heart failure  Diagnoses: I50 9 - Heart failure, unspecified    Sonographer:  Everton Goss RDCS  Referring Physician:  Rosanna Stoll DO  Group:  [id number]  Cardiology Fellow:  Lisa Parkinson MD  Interpreting Physician:  Angela Ortega MD    SUMMARY    LEFT VENTRICLE:  Systolic function was normal  Ejection fraction was estimated in the range of 50 % to 55 %  There were no regional wall motion abnormalities  Wall thickness was mildly increased  There was mild concentric hypertrophy  RIGHT VENTRICLE:  The ventricle was moderately dilated  Systolic function was moderately reduced  RIGHT ATRIUM:  The atrium was mildly dilated  MITRAL VALVE:  There was mild regurgitation  TRICUSPID VALVE:  There was mild regurgitation  The findings suggest mild pulmonary hypertension  IVC, HEPATIC VEINS:  The inferior vena cava was mildly dilated    Respirophasic changes were blunted (less than 50% variation)  HISTORY: PRIOR HISTORY: diabetes mellitus, heart failure, COPD, acute respiratory failure, hypertension, nicotine dependence, shortness of breath, anxiety  PROCEDURE: The procedure was performed at the bedside  This was a routine study  The transthoracic approach was used  The study included complete 2D imaging, M-mode, complete spectral Doppler, and color Doppler  The heart rate was 101 bpm,  at the start of the study  Images were obtained from the parasternal, apical, subcostal, and suprasternal notch acoustic windows  Echocardiographic views were limited due to restricted patient mobility  Image quality was adequate  LEFT VENTRICLE: Size was normal  Systolic function was normal  Ejection fraction was estimated in the range of 50 % to 55 %  There were no regional wall motion abnormalities  Wall thickness was mildly increased  There was mild concentric  hypertrophy  DOPPLER: There was fusion of early and atrial contributions to ventricular filling  VENTRICULAR SEPTUM: There was systolic and diastolic flattening  These changes are consistent with RV volume and pressure overload  RIGHT VENTRICLE: The ventricle was moderately dilated  Systolic function was moderately reduced  Wall thickness was increased  LEFT ATRIUM: Size was normal     RIGHT ATRIUM: The atrium was mildly dilated  MITRAL VALVE: Valve structure was normal  There was normal leaflet separation  DOPPLER: The transmitral velocity was within the normal range  There was no evidence for stenosis  There was mild regurgitation  AORTIC VALVE: The valve was trileaflet  Leaflets exhibited mildly increased thickness, calcification, and sclerosis  DOPPLER: Transaortic velocity was within the normal range  There was no evidence for stenosis  There was no significant  regurgitation  TRICUSPID VALVE: The valve structure was normal  There was normal leaflet separation   DOPPLER: The transtricuspid velocity was within the normal range  There was no evidence for stenosis  There was mild regurgitation  Estimated peak PA  pressure was 48 mmHg  The findings suggest mild pulmonary hypertension  PULMONIC VALVE: DOPPLER: The transpulmonic velocity was within the normal range  There was mild regurgitation  PERICARDIUM: There was no pericardial effusion  AORTA: The root exhibited normal size  SYSTEMIC VEINS: IVC: The inferior vena cava was mildly dilated  Respirophasic changes were blunted (less than 50% variation)  SYSTEM MEASUREMENT TABLES    2D  %FS: 15 28 %  Ao Diam: 3 01 cm  EDV(Teich): 31 52 ml  EF(Teich): 33 66 %  ESV(Teich): 20 91 ml  HR_2Ch_Q: 97 83 BPM  HR_4Ch_Q: 100 23 BPM  IVSd: 1 27 cm  LA Diam: 2 96 cm  LAAs A2C: 20 42 cm2  LAAs A4C: 18 86 cm2  LAESV A-L A2C: 61 82 ml  LAESV A-L A4C: 48 35 ml  LAESV Index (A-L): 31 79 ml/m2  LAESV MOD A2C: 58 07 ml  LAESV MOD A4C: 44 72 ml  LAESV(A-L): 56 91 ml  LAESV(MOD BP): 58 77 ml  LALs A2C: 5 74 cm  LALs A4C: 6 24 cm  LVCO_2Ch_Q: 3 99 L/min  LVCO_4Ch_Q: 2 25 L/min  LVEF_2Ch_Q: 63 98 %  LVEF_4Ch_Q: 44 62 %  LVIDd: 2 87 cm  LVIDs: 2 44 cm  LVLd_2Ch_Q: 7 35 cm  LVLd_4Ch_Q: 7 06 cm  LVLs_2Ch_Q: 6 2 cm  LVLs_4Ch_Q: 6 36 cm  LVPWd: 1 27 cm  LVSV_2Ch_Q: 40 8 ml  LVSV_4Ch_Q: 22 46 ml  LVVED_2Ch_Q: 63 77 ml  LVVED_4Ch_Q: 50 33 ml  LVVES_2Ch_Q: 22 97 ml  LVVES_4Ch_Q: 27 87 ml  RVIDd: 3 14 cm  SV(Teich): 10 61 ml    CW  TR Vmax: 2 9 m/s  TR maxP 56 mmHg    PW  E' Av 09 m/s  E' Lat: 0 1 m/s  E' Sept: 0 08 m/s  E/E' Av 06  E/E' Lat: 8 14  E/E' Sept: 10 21  MV A Paul: 0 58 m/s  MV Dec [place]: 5 32 m/s2  MV DecT: 150 79 ms  MV E Paul: 0 8 m/s  MV E/A Ratio: 1 38  MV PHT: 43 73 ms  MVA By PHT: 5 03 cm2    [facility] Accredited Echocardiography Laboratory    Prepared and electronically signed by    Conner Polo MD  Signed [id number] 17:26:04       No results found for this or any previous visit  No results found for this or any previous visit  No procedure found    No results found for this or any previous visit  Meds/Allergies   all current active meds have been reviewed, current meds:   Current Facility-Administered Medications   Medication Dose Route Frequency    acetaminophen (TYLENOL) tablet 650 mg  650 mg Oral Q6H PRN    calcium carbonate (OYSTER SHELL,OSCAL) 500 mg tablet 1 tablet  1 tablet Oral Daily With Breakfast    enoxaparin (LOVENOX) subcutaneous injection 40 mg  40 mg Subcutaneous Daily    furosemide (LASIX) injection 80 mg  80 mg Intravenous BID    insulin lispro (HumaLOG) 100 units/mL subcutaneous injection 1-5 Units  1-5 Units Subcutaneous HS    insulin lispro (HumaLOG) 100 units/mL subcutaneous injection 1-6 Units  1-6 Units Subcutaneous TID AC    lisinopril (ZESTRIL) tablet 10 mg  10 mg Oral Daily    nicotine (NICODERM CQ) 14 mg/24hr TD 24 hr patch 14 mg  14 mg Transdermal Daily    potassium chloride (K-DUR,KLOR-CON) CR tablet 40 mEq  40 mEq Oral Daily    sodium chloride (PF) 0 9 % injection 3 mL  3 mL Intravenous Q1H PRN    and PTA meds:   Prior to Admission Medications   Prescriptions Last Dose Informant Patient Reported? Taking?    Fexofenadine-Pseudoephedrine (ALLEGRA-D 24 HOUR PO) Past Week at Unknown time Self Yes Yes   Sig: Take by mouth   IBUPROFEN PO Unknown at Unknown time Self Yes No   Sig: Take by mouth   LORazepam (ATIVAN) 0 5 mg tablet Past Week at Unknown time  No Yes   Sig: Take 1 tablet (0 5 mg total) by mouth as needed for anxiety 1 hour before CT scan   calcium citrate-vitamin D (CITRACAL+D) 315-200 MG-UNIT per tablet 5/18/2021 at Unknown time  Yes Yes   Sig: Take 1 tablet by mouth 2 (two) times a day   furosemide (LASIX) 40 mg tablet 5/18/2021 at Unknown time  No Yes   Sig: Take 1 tablet (40 mg total) by mouth daily   hydrOXYzine HCL (ATARAX) 10 mg tablet 5/17/2021 at Unknown time  No Yes   Sig: Take 1 tablet (10 mg total) by mouth 3 (three) times a day as needed for itching or anxiety      Facility-Administered Medications: None     Medications Prior to Admission   Medication    calcium citrate-vitamin D (CITRACAL+D) 315-200 MG-UNIT per tablet    Fexofenadine-Pseudoephedrine (ALLEGRA-D 24 HOUR PO)    furosemide (LASIX) 40 mg tablet    hydrOXYzine HCL (ATARAX) 10 mg tablet    LORazepam (ATIVAN) 0 5 mg tablet    IBUPROFEN PO            Counseling / Coordination of Care  Total floor / unit time spent today 20 minutes  Greater than 50% of total time was spent with the patient and / or family counseling and / or coordination of care  ** Please Note: Dragon 360 Dictation voice to text software may have been used in the creation of this document   **

## 2021-05-20 NOTE — PROGRESS NOTES
INTERNAL MEDICINE RESIDENCY PROGRESS NOTE     Name: Rolando Perez   Age & Sex: 66 y o  female   MRN: [id number]  Unit/Bed#: [facility] 525-01   Encounter: [id number]  Team: SOD Team B     PATIENT INFORMATION     Name: Rolando Perez   Age & Sex: 66 y o  female   MRN: [id number]  Hospital Stay Days: 2    ASSESSMENT/PLAN     Principal Problem:    Acute decompensated heart failure ([facility] )  Active Problems:    Acute respiratory failure with hypoxia ([facility] )    Hypertension    Diabetes mellitus ([facility] )    Proteinuria    Lung nodules    COPD (chronic obstructive pulmonary disease) ([facility] )    Nicotine dependence    Hypokalemia      * Acute decompensated heart failure ([facility] )  Assessment & Plan  Patient presenting with worsening b/l LE edema and abdominal distension that has been worsening over the last two weeks despite initiation or oral lasix  On arrival, patient hypoxic to 81% requiring 6L nasal canala  Patient significantly volume overloaded with evidence of right heart failure given +3 pitting b/l LE edema extending up to thighs, significant abdominal distension, and JVD  Only fine bibasilar crackles  Labs significant for proNT-BNP of 3,223 and imaging findings demonstrating small b/l pleural effusions and abdominopelvic ascites  Patient symptoms and clinical exam findings likely secondary to new onset decompensated CHF  Echocardiogram: Systolic function was normal  Ejection fraction was estimated in the range of 50 % to 55 %  Mild concentric hypertrophy  RV moderately dilated, RV function moderately reduced  Mild pulmonary hypertension  Initiated on IV Lasix 80 mg IV b i d  responding well, net negative  Creatinine stable however increasing bicarb developing contraction alkalosis        Intake/Output Summary (Last 24 hours) at 5/20/2021 0852  Last data filed at 5/20/2021 0630  Gross per 24 hour   Intake 580 ml   Output 3334 ml   Net -2754 ml     Weight (last 2 days)     Date/Time   Weight    05/20/21 0600   75 4 (010 63) 05/19/21 0244   73 5 (321 04)              Plan:  · Lasix 80 mg IV BID  May need Diamox  Will await Cardiology recommendations  · Strict standing weights; monitor intake and output  · 2g Na restricted diet and 1500 mL fluid restricted diet  · Echo cardiogram ordered and pending  · Monitor and maintain SpO2>88%; wean O2 as patient tolerates  · Cardiology consulted  Acute respiratory failure with hypoxia [facility])  Assessment & Plan  Patient hypoxic on day of admission with SpO2 of 81% on room air requiring 6L nasal cannula on arrival  SOB likely secondary to underlying COPD and significant volume overload  Plan:  · Maintain SpO2>88%; wean O2 as patient tolerates  · Diuresis as above   · Out of bed with assistance   · Out patient follow up with PCP; will require PFTs for likely underlying COPD    Hypertension  Assessment & Plan  BP on arrival 193/104 which improved with lasix administration  No history of HTN and on no anti-hypertensives at home  Plan:  · With evidence of proteinuria in the setting of type 2 diabetes mellitus will start Lisinopril 10 mg daily  · Monitor with routine vital signs    Hypokalemia  Assessment & Plan  · In the setting of diuresis  · Monitor and replete as needed  Nicotine dependence  Assessment & Plan  Longstanding history of tobacco abuse for over 40 years  Currently half to full pack per day smoker  Denies nicotine patch at this time  Plan:  · Continue to encourage tobacco cessation    COPD (chronic obstructive pulmonary disease) ([facility] )  Assessment & Plan  No prior documented history of COPD  High likelihood of undiagnosed COPD given recent evidence of COPD on CT C/A/P and long-standing smoking history  Patient does have diffuse end-expiratory wheezing on exam with prolonged expiratory phase  Requiring 6L NC but major contributor being volume overload  No concern for COPD exacerbation at this time      Plan:  · Maintain SpO2>88%; wean O2 as patient tolerates  · Continue to encourage smoking cessation  · Outpatient follow up; will require PFTs    Lung nodules  Assessment & Plan  CT Chest/Abdomen completed on 2021 revealed: "Few pulmonary nodules in the left lower lobe, the largest of which measures 6 mm with unknown long-term stability  Based on current Fleischner Society 2017 Guidelines on incidental pulmonary nodule, followup non-contrast CT is recommended at 6-12 months from the initial examination and, if stable at that time, an additional followup is recommended for 18-24 months from the initial examination "    Plan:  · Findings discussed with patient, repeat outpatient non-contrast CT in 6-12 months    Proteinuria  Assessment & Plan  · Microlbumin creatinine ratio 2,475  Serum Cr 2 9, Normal Cr and normal ormal TGs/cholesterol  · Initiated on ACE-I    · Nephrology consult  Diabetes mellitus ([facility] )  Assessment & Plan  Lab Results   Component Value Date    HGBA1C 6 6 (H) 2021     History of DM II with most recent A1c of 6 6  Not currently on any outpatient diabetic regimen  Plan:  · Continue POC glucose checks; 4x daily with meals and at bedtime  · Insulin regimen:  · Mealtime: SSI Algorithm 3  · Bedtime: SSI Algorithm 2        Disposition:  Continue inpatient care  Requiring supplemental oxygen and on IV Lasix  SUBJECTIVE     Patient seen and examined  No acute events overnight  Did not sleep well  Breathing is better and lower extremities swelling is improving    No other acute complaints     OBJECTIVE     Vitals:    21 0220 21 0600 21 0719 21 1036   BP: 145/88  114/62 115/67   BP Location:       Pulse:   (!) 108    Resp: 20  18    Temp: 98 4 °F (36 9 °C)  97 6 °F (36 4 °C)    TempSrc:   Oral    SpO2:   94%    Weight:  75 4 kg (166 lb 3 6 oz)        Temperature:   Temp (24hrs), Av °F (36 7 °C), Min:97 6 °F (36 4 °C), Max:98 4 °F (36 9 °C)    Temperature: 97 6 °F (36 4 °C)  Intake & Output:  I/O        07 -  0700 05/19 0701 - 05/20 0700 05/20 0701 - 05/21 0700    P  O  0 698     Total Intake(mL/kg) 0 (0) 698 (9 3)     Urine (mL/kg/hr) 249 3334 (1 8)     Total Output 249 3334     Net -249 -2636            Unmeasured Urine Occurrence 1 x          Weights:        Body mass index is 28 53 kg/m²  Weight (last 2 days)     Date/Time   Weight    05/20/21 0600   75 4 (166 23)    05/19/21 0244   73 5 (162 04)            Physical Exam  Vitals signs and nursing note reviewed  Constitutional:       General: She is not in acute distress  Appearance: She is well-developed  Comments: Conversational dyspnea   HENT:      Head: Normocephalic and atraumatic  Eyes:      Conjunctiva/sclera: Conjunctivae normal    Neck:      Musculoskeletal: Neck supple  Comments: JVD present  Cardiovascular:      Rate and Rhythm: Normal rate and regular rhythm  Heart sounds: No murmur  Pulmonary:      Effort: Pulmonary effort is normal  No respiratory distress  Breath sounds: Normal breath sounds  Abdominal:      Palpations: Abdomen is soft  Tenderness: There is no abdominal tenderness  Musculoskeletal:      Right lower leg: Edema present  Left lower leg: Edema present  Skin:     General: Skin is warm and dry  Neurological:      Mental Status: She is alert  LABORATORY DATA     Labs: I have personally reviewed pertinent reports    Results from last 7 days   Lab Units 05/19/21  0242 05/18/21  1704   WBC Thousand/uL 7 24 9 13   HEMOGLOBIN g/dL 17 8* 18 2*   HEMATOCRIT % 57 7* 56 1*   PLATELETS Thousands/uL 197 221   NEUTROS PCT % 72 79*   MONOS PCT % 10 9      Results from last 7 days   Lab Units 05/20/21  0515 05/19/21  0243 05/18/21  1704   POTASSIUM mmol/L 3 3* 3 4* 3 6   CHLORIDE mmol/L 94* 98* 99*   CO2 mmol/L 41* 34* 32   BUN mg/dL 18 16 16   CREATININE mg/dL 0 72 0 81 0 80   CALCIUM mg/dL 8 7 8 8 9 2   ALK PHOS U/L  --  135*  --    ALT U/L  --  30  --    AST U/L  --  25  --      Results from last 7 days   Lab Units 05/19/21  0243   MAGNESIUM mg/dL 2 1                  Results from last 7 days   Lab Units 05/18/21  1704   TROPONIN I ng/mL 0 04       IMAGING & DIAGNOSTIC TESTING     Radiology Results: I have personally reviewed pertinent reports  X-ray Chest 1 View Portable    Result Date: 5/19/2021  Impression: No acute cardiopulmonary disease  Workstation performed: [id number]     Other Diagnostic Testing: I have personally reviewed pertinent reports  ACTIVE MEDICATIONS     Current Facility-Administered Medications   Medication Dose Route Frequency    acetaminophen (TYLENOL) tablet 650 mg  650 mg Oral Q6H PRN    acetaZOLAMIDE (DIAMOX) injection 500 mg  500 mg Intravenous Once    calcium carbonate (OYSTER SHELL,OSCAL) 500 mg tablet 1 tablet  1 tablet Oral Daily With Breakfast    enoxaparin (LOVENOX) subcutaneous injection 40 mg  40 mg Subcutaneous Daily    furosemide (LASIX) injection 80 mg  80 mg Intravenous BID    insulin lispro (HumaLOG) 100 units/mL subcutaneous injection 1-5 Units  1-5 Units Subcutaneous HS    insulin lispro (HumaLOG) 100 units/mL subcutaneous injection 1-6 Units  1-6 Units Subcutaneous TID AC    lisinopril (ZESTRIL) tablet 10 mg  10 mg Oral Daily    melatonin tablet 3 mg  3 mg Oral HS PRN    nicotine (NICODERM CQ) 14 mg/24hr TD 24 hr patch 14 mg  14 mg Transdermal Daily    potassium chloride (K-DUR,KLOR-CON) CR tablet 40 mEq  40 mEq Oral BID    sodium chloride (PF) 0 9 % injection 3 mL  3 mL Intravenous Q1H PRN       VTE Pharmacologic Prophylaxis: Enoxaparin (Lovenox)  VTE Mechanical Prophylaxis: sequential compression device    Portions of the record may have been created with voice recognition software  Occasional wrong word or "sound a like" substitutions may have occurred due to the inherent limitations of voice recognition software    Read the chart carefully and recognize, using context, where substitutions have occurred   ==  MD Fer [facility]  Internal Medicine Residency PGY-2

## 2021-05-20 NOTE — PROGRESS NOTES
Pastoral Care Progress Note    2021  Patient: Tres Infante : 1945  Admission Date & Time: 2021 1641  MRN: [id number] CSN: [id number]                     Chaplaincy Interventions Utilized:   Empowerment: Encouraged self-care and Provided chaplaincy education    Exploration: Explored emotional needs & resources, Explored relational needs & resources and Explored spiritual needs & resources    Collaboration: Facilitated respect for spiritual/cultural practice during hospitalization    Relationship Building: Cultivated a relationship of care and support    Chaplaincy Outcomes Achieved:  Expressed peace, Identified meaningful connections and Improved communication    Spiritual Coping Strategies Utilized:   No spiritual coping       21 1100   Clinical Encounter Type   Visited With Patient   Routine Visit Introduction

## 2021-05-21 ENCOUNTER — APPOINTMENT (INPATIENT)
Dept: RADIOLOGY | Facility: HOSPITAL | Age: 76
DRG: 291 | End: 2021-05-21
Payer: MEDICARE

## 2021-05-21 LAB
ANION GAP SERPL CALCULATED.3IONS-SCNC: 2 MMOL/L (ref 4–13)
BUN SERPL-MCNC: 22 MG/DL (ref 5–25)
CALCIUM SERPL-MCNC: 9 MG/DL (ref 8.3–10.1)
CHLORIDE SERPL-SCNC: 97 MMOL/L (ref 100–108)
CO2 SERPL-SCNC: 40 MMOL/L (ref 21–32)
CREAT SERPL-MCNC: 0.7 MG/DL (ref 0.6–1.3)
GFR SERPL CREATININE-BSD FRML MDRD: 84 ML/MIN/1.73SQ M
GLUCOSE SERPL-MCNC: 160 MG/DL (ref 65–140)
GLUCOSE SERPL-MCNC: 166 MG/DL (ref 65–140)
GLUCOSE SERPL-MCNC: 178 MG/DL (ref 65–140)
GLUCOSE SERPL-MCNC: 190 MG/DL (ref 65–140)
GLUCOSE SERPL-MCNC: 190 MG/DL (ref 65–140)
HBV CORE AB SER QL: REACTIVE
HBV CORE IGM SER QL: ABNORMAL
HBV SURFACE AG SER QL: ABNORMAL
HCV AB SER QL: ABNORMAL
KAPPA LC FREE SER-MCNC: 26.4 MG/L (ref 3.3–19.4)
KAPPA LC FREE/LAMBDA FREE SER: 1.66 {RATIO} (ref 0.26–1.65)
LAMBDA LC FREE SERPL-MCNC: 15.9 MG/L (ref 5.7–26.3)
POTASSIUM SERPL-SCNC: 3.6 MMOL/L (ref 3.5–5.3)
RYE IGE QN: NEGATIVE
SODIUM SERPL-SCNC: 139 MMOL/L (ref 136–145)

## 2021-05-21 PROCEDURE — 82948 REAGENT STRIP/BLOOD GLUCOSE: CPT

## 2021-05-21 PROCEDURE — 99232 SBSQ HOSP IP/OBS MODERATE 35: CPT | Performed by: INTERNAL MEDICINE

## 2021-05-21 PROCEDURE — 80048 BASIC METABOLIC PNL TOTAL CA: CPT | Performed by: STUDENT IN AN ORGANIZED HEALTH CARE EDUCATION/TRAINING PROGRAM

## 2021-05-21 PROCEDURE — A9585 GADOBUTROL INJECTION: HCPCS | Performed by: INTERNAL MEDICINE

## 2021-05-21 PROCEDURE — G1004 CDSM NDSC: HCPCS

## 2021-05-21 PROCEDURE — 99222 1ST HOSP IP/OBS MODERATE 55: CPT | Performed by: INTERNAL MEDICINE

## 2021-05-21 PROCEDURE — 75561 CARDIAC MRI FOR MORPH W/DYE: CPT

## 2021-05-21 RX ADMIN — CALCIUM 1 TABLET: 500 TABLET ORAL at 08:44

## 2021-05-21 RX ADMIN — INSULIN LISPRO 1 UNITS: 100 INJECTION, SOLUTION INTRAVENOUS; SUBCUTANEOUS at 17:19

## 2021-05-21 RX ADMIN — INSULIN LISPRO 1 UNITS: 100 INJECTION, SOLUTION INTRAVENOUS; SUBCUTANEOUS at 08:45

## 2021-05-21 RX ADMIN — Medication 14 MG: at 19:31

## 2021-05-21 RX ADMIN — ENOXAPARIN SODIUM 40 MG: 40 INJECTION SUBCUTANEOUS at 08:44

## 2021-05-21 RX ADMIN — INSULIN LISPRO 1 UNITS: 100 INJECTION, SOLUTION INTRAVENOUS; SUBCUTANEOUS at 22:21

## 2021-05-21 RX ADMIN — LISINOPRIL 10 MG: 10 TABLET ORAL at 08:44

## 2021-05-21 RX ADMIN — ACETAMINOPHEN 650 MG: 325 TABLET, FILM COATED ORAL at 02:10

## 2021-05-21 RX ADMIN — FUROSEMIDE 80 MG: 10 INJECTION, SOLUTION INTRAMUSCULAR; INTRAVENOUS at 08:44

## 2021-05-21 RX ADMIN — INSULIN LISPRO 1 UNITS: 100 INJECTION, SOLUTION INTRAVENOUS; SUBCUTANEOUS at 11:36

## 2021-05-21 RX ADMIN — GADOBUTROL 14 ML: 604.72 INJECTION INTRAVENOUS at 01:34

## 2021-05-21 RX ADMIN — FUROSEMIDE 80 MG: 10 INJECTION, SOLUTION INTRAMUSCULAR; INTRAVENOUS at 17:16

## 2021-05-21 RX ADMIN — POTASSIUM CHLORIDE 40 MEQ: 1500 TABLET, EXTENDED RELEASE ORAL at 17:19

## 2021-05-21 RX ADMIN — POTASSIUM CHLORIDE 40 MEQ: 1500 TABLET, EXTENDED RELEASE ORAL at 08:44

## 2021-05-21 RX ADMIN — ACETAMINOPHEN 650 MG: 325 TABLET, FILM COATED ORAL at 19:31

## 2021-05-21 NOTE — PROGRESS NOTES
INTERNAL MEDICINE RESIDENCY PROGRESS NOTE     Name: William Chnag   Age & Sex: 66 y o  female   MRN: [id number]  Unit/Bed#: [facility] 525-01   Encounter: [id number]  Team: SOD Team B     PATIENT INFORMATION     Name: William Chang   Age & Sex: 66 y o  female   MRN: [id number]  Hospital Stay Days: 3    ASSESSMENT/PLAN     Principal Problem:    Acute decompensated heart failure ([facility] )  Active Problems:    Acute respiratory failure with hypoxia ([facility] )    Hypertension    Diabetes mellitus ([facility] )    Proteinuria    Lung nodules    COPD (chronic obstructive pulmonary disease) ([facility] )    Nicotine dependence    Hypokalemia      * Acute decompensated heart failure ([facility] )  Assessment & Plan  Patient presenting with worsening b/l LE edema and abdominal distension that has been worsening over the last two weeks despite initiation or oral lasix  On arrival, patient hypoxic to 81% requiring 6L nasal canala  Patient significantly volume overloaded with evidence of right heart failure given +3 pitting b/l LE edema extending up to thighs, significant abdominal distension, and JVD  Only fine bibasilar crackles  Labs significant for proNT-BNP of 3,223 and imaging findings demonstrating small b/l pleural effusions and abdominopelvic ascites  Patient symptoms and clinical exam findings likely secondary to new onset decompensated CHF  Echocardiogram: Systolic function was normal  Ejection fraction was estimated in the range of 50 % to 55 %  Mild concentric hypertrophy  RV moderately dilated, RV function moderately reduced  Mild pulmonary hypertension  Cardiac MRI to rule out infiltrative disease completed and pending official read  Initiated on IV Lasix 80 mg IV b i d  responding well, net negative  Creatinine stable however increasing bicarb developing contraction alkalosis        Intake/Output Summary (Last 24 hours) at 5/21/2021 1222  Last data filed at 5/21/2021 0149  Gross per 24 hour   Intake 480 ml   Output 2175 ml   Net -1695 ml Weight (last 2 days)     Date/Time   Weight    05/21/21 0600   70 3 (155)    05/20/21 0600   75 4 (166 23)    05/19/21 0244   73 5 (162 04)              Plan:  · Lasix 80 mg IV BID  May need additional Diamox  Will await Cardiology recommendations  · Cardiac MRI completed, pending facial read  · Heart failure consulted  · Strict standing weights; monitor intake and output  · 2g Na restricted diet and 1500 mL fluid restricted diet  · Echo cardiogram ordered and pending  · Monitor and maintain SpO2>88%; wean O2 as patient tolerates    Acute respiratory failure with hypoxia ([facility] )  Assessment & Plan  Patient hypoxic on day of admission with SpO2 of 81% on room air requiring 6L nasal cannula on arrival  SOB likely secondary to underlying COPD and significant volume overload  Plan:  · Maintain SpO2>88%; wean O2 as patient tolerates  · Diuresis as above   · Out of bed with assistance   · Out patient follow up with PCP; will require PFTs for likely underlying COPD    Hypertension  Assessment & Plan  BP on arrival 193/104 which improved with lasix administration  No history of HTN and on no anti-hypertensives at home  Plan:  · With evidence of proteinuria in the setting of type 2 diabetes mellitus started on Lisinopril 10 mg daily  · Monitor with routine vital signs    Hypokalemia  Assessment & Plan  · In the setting of diuresis  · Monitor and replete as needed  Nicotine dependence  Assessment & Plan  Longstanding history of tobacco abuse for over 40 years  Currently half to full pack per day smoker  Denies nicotine patch at this time  Plan:  · Continue to encourage tobacco cessation    COPD (chronic obstructive pulmonary disease) ([facility] )  Assessment & Plan  No prior documented history of COPD  High likelihood of undiagnosed COPD given recent evidence of COPD on CT C/A/P and long-standing smoking history  Patient does have diffuse end-expiratory wheezing on exam with prolonged expiratory phase  Requiring 6L NC but major contributor being volume overload  No concern for COPD exacerbation at this time  Plan:  · Maintain SpO2>88%; wean O2 as patient tolerates  · Continue to encourage smoking cessation  · Outpatient follow up; will require PFTs    Lung nodules  Assessment & Plan  CT Chest/Abdomen completed on 2021 revealed: "Few pulmonary nodules in the left lower lobe, the largest of which measures 6 mm with unknown long-term stability  Based on current Fleischner Society 2017 Guidelines on incidental pulmonary nodule, followup non-contrast CT is recommended at 6-12 months from the initial examination and, if stable at that time, an additional followup is recommended for 18-24 months from the initial examination "    Plan:  · Findings discussed with patient, repeat outpatient non-contrast CT in 6-12 months    Proteinuria  Assessment & Plan  Microlbumin creatinine ratio 2,475  · Initiated on ACE-I    · Nephrology following, awaiting serologies  Diabetes mellitus ([facility] )  Assessment & Plan  Lab Results   Component Value Date    HGBA1C 6 6 (H) 2021     History of DM II with most recent A1c of 6 6  Not currently on any outpatient diabetic regimen  Plan:  · Continue POC glucose checks; 4x daily with meals and at bedtime  · Insulin regimen:  · Mealtime: SSI Algorithm 3  · Bedtime: SSI Algorithm 2        Disposition:  Continue inpatient care  SUBJECTIVE     Patient seen and examined  No acute events overnight  Received cardiac MRI overnight requiring Ativan for sedation  Sleepy this a m   No acute complaints  Breathing improved so did lower extremity swelling      OBJECTIVE     Vitals:    21 0600 21 0730 21 0900 21 1000   BP:  113/62     Pulse:    98   Resp:  16     Temp:  97 5 °F (36 4 °C)     TempSrc:       SpO2:  96% 91%    Weight: 70 3 kg (155 lb)         Temperature:   Temp (24hrs), Av 9 °F (36 6 °C), Min:97 5 °F (36 4 °C), Max:98 6 °F (37 °C)    Temperature: 97 5 °F (36 4 °C)  Intake & Output:  I/O       05/19 0701 - 05/20 0700 05/20 0701 - 05/21 0700 05/21 0701 - 05/22 0700    P  O  698 480     Total Intake(mL/kg) 698 (9 3) 480 (6 8)     Urine (mL/kg/hr) 3334 (1 8) 2900 (1 7)     Total Output 3334 2900     Net -2636 -2420            Unmeasured Urine Occurrence  1 x         Weights:        Body mass index is 26 61 kg/m²  Weight (last 2 days)     Date/Time   Weight    05/21/21 0600   70 3 (155)    05/20/21 0600   75 4 (166 23)    05/19/21 0244   73 5 (162 04)            Physical Exam  Vitals signs and nursing note reviewed  Constitutional:       General: She is not in acute distress  Appearance: She is well-developed  HENT:      Head: Normocephalic and atraumatic  Eyes:      Conjunctiva/sclera: Conjunctivae normal    Neck:      Musculoskeletal: Neck supple  Cardiovascular:      Rate and Rhythm: Normal rate and regular rhythm  Heart sounds: No murmur  Pulmonary:      Effort: Pulmonary effort is normal  No respiratory distress  Breath sounds: Normal breath sounds  Abdominal:      Palpations: Abdomen is soft  Tenderness: There is no abdominal tenderness  Musculoskeletal:      Right lower leg: Edema present  Left lower leg: Edema present  Skin:     General: Skin is warm and dry  Neurological:      General: No focal deficit present  Mental Status: She is alert and oriented to person, place, and time  LABORATORY DATA     Labs: I have personally reviewed pertinent reports    Results from last 7 days   Lab Units 05/19/21  0242 05/18/21  1704   WBC Thousand/uL 7 24 9 13   HEMOGLOBIN g/dL 17 8* 18 2*   HEMATOCRIT % 57 7* 56 1*   PLATELETS Thousands/uL 197 221   NEUTROS PCT % 72 79*   MONOS PCT % 10 9      Results from last 7 days   Lab Units 05/21/21  0533 05/20/21  0515 05/19/21  0243   POTASSIUM mmol/L 3 6 3 3* 3 4*   CHLORIDE mmol/L 97* 94* 98*   CO2 mmol/L 40* 41* 34*   BUN mg/dL 22 18 16   CREATININE mg/dL 0 70 0 72 0 81   CALCIUM mg/dL 9 0 8 7 8 8   ALK PHOS U/L  --   --  135*   ALT U/L  --   --  30   AST U/L  --   --  25     Results from last 7 days   Lab Units 05/19/21  0243   MAGNESIUM mg/dL 2 1                  Results from last 7 days   Lab Units 05/18/21  1704   TROPONIN I ng/mL 0 04       IMAGING & DIAGNOSTIC TESTING     Radiology Results: I have personally reviewed pertinent reports  X-ray Chest 1 View Portable    Result Date: 5/19/2021  Impression: No acute cardiopulmonary disease  Workstation performed: [id number]     Other Diagnostic Testing: I have personally reviewed pertinent reports  ACTIVE MEDICATIONS     Current Facility-Administered Medications   Medication Dose Route Frequency    acetaminophen (TYLENOL) tablet 650 mg  650 mg Oral Q6H PRN    calcium carbonate (OYSTER SHELL,OSCAL) 500 mg tablet 1 tablet  1 tablet Oral Daily With Breakfast    enoxaparin (LOVENOX) subcutaneous injection 40 mg  40 mg Subcutaneous Daily    furosemide (LASIX) injection 80 mg  80 mg Intravenous BID    insulin lispro (HumaLOG) 100 units/mL subcutaneous injection 1-5 Units  1-5 Units Subcutaneous HS    insulin lispro (HumaLOG) 100 units/mL subcutaneous injection 1-6 Units  1-6 Units Subcutaneous TID AC    lisinopril (ZESTRIL) tablet 10 mg  10 mg Oral Daily    melatonin tablet 3 mg  3 mg Oral HS PRN    nicotine (NICODERM CQ) 14 mg/24hr TD 24 hr patch 14 mg  14 mg Transdermal Daily    potassium chloride (K-DUR,KLOR-CON) CR tablet 40 mEq  40 mEq Oral BID    sodium chloride (PF) 0 9 % injection 3 mL  3 mL Intravenous Q1H PRN       VTE Pharmacologic Prophylaxis: Enoxaparin (Lovenox)  VTE Mechanical Prophylaxis: sequential compression device    Portions of the record may have been created with voice recognition software  Occasional wrong word or "sound a like" substitutions may have occurred due to the inherent limitations of voice recognition software    Read the chart carefully and recognize, using context, where substitutions have occurred   ==  Stephanie Mckeon MD  [address]  Internal Medicine Residency PGY-2

## 2021-05-21 NOTE — PROGRESS NOTES
NEPHROLOGY PROGRESS NOTE   Priyank Colindres 66 y o  female MRN: [id number]  Unit/Bed#: [facility] 525-01 Encounter: [id number]  Reason for Consult: Proteinuria      SUMMARY:    17-year-old female with a history of diabetes, chronic smoker who presented for shortness of breath and lower extremity edema  Currently being managed for acute right-sided congestive heart failure  Nephrology consulted for proteinuria  ASSESSMENT and PLAN:    Macroalbuminuria  --microalbumin/creatinine ratio 2 5 on May 12  --urine protein creatinine ratio done on May 20th was 0 9  --urinalysis shows 2-4 RBC with 2-4 WBC   --FARIDEH pending, ANCA pending, complement C3 and C4 within normal limits, hepatitis-B surface antigen, hepatitis-C antibody and hepatitis-B core IgM antibody were nonreactive, hepatitis-B core total antibody reactive, follow-up serum protein electrophoresis and kappa lambda ratio  --urine protein creatinine ratio is not very impressive and suggestive an underlying nephrotic syndrome  --cardiac MRI plan to rule out amyloidosis  --no urgent indication for renal biopsy at this time  --differential includes possible idiopathic nodular glomerular sclerosis in the setting of chronic smoking, possible analgesic nephropathy with chronic NSAID use, diabetic kidney disease  --stable renal function with a creatinine of less than 1 mg/dL  --albumin 2 9, and was 3 7 a week ago  --CT scan with contrast from a few weeks ago reviewed  No evidence of hydronephrosis  --continue IV diuresis     Blood pressure/volume status  --evidence of right-sided congestive heart failure  --continue IV diuresis  --maintain net negative fluid balance  --continue lisinopril     Hypokalemia  --resolved    Acute congestive heart failure  --RV dysfunction  --preserved ejection fraction  --history of smoking  --recommended a cardiac MRI which has been ordered    SUBJECTIVE / INTERVAL HISTORY:    No overnight events    Clinically feeling better    OBJECTIVE:  Current Weight: Weight - Scale: 70 3 kg (155 lb)  Vitals:    05/20/21 2209 05/21/21 0600 05/21/21 0730 05/21/21 0900   BP: 128/72  113/62    Pulse:       Resp: 18  16    Temp: 98 6 °F (37 °C)  97 5 °F (36 4 °C)    TempSrc:       SpO2:   96% 91%   Weight:  70 3 kg (155 lb)         Intake/Output Summary (Last 24 hours) at 5/21/2021 0950  Last data filed at 5/21/2021 0149  Gross per 24 hour   Intake 480 ml   Output 2900 ml   Net -2420 ml       Review of Systems:    12 point ROS has been reviewed  Physical Exam  Vitals signs and nursing note reviewed  Exam conducted with a chaperone present  Constitutional:       General: She is not in acute distress  Appearance: She is well-developed  HENT:      Head: Normocephalic and atraumatic  Eyes:      General: No scleral icterus  Conjunctiva/sclera: Conjunctivae normal       Pupils: Pupils are equal, round, and reactive to light  Neck:      Musculoskeletal: Normal range of motion and neck supple  Cardiovascular:      Rate and Rhythm: Normal rate and regular rhythm  Heart sounds: S1 normal and S2 normal  No murmur  No friction rub  No gallop  Pulmonary:      Effort: Pulmonary effort is normal  No respiratory distress  Breath sounds: Normal breath sounds  No wheezing or rales  Abdominal:      General: Bowel sounds are normal       Palpations: Abdomen is soft  Tenderness: There is no abdominal tenderness  There is no rebound  Musculoskeletal: Normal range of motion  Right lower leg: Edema present  Left lower leg: Edema present  Skin:     Findings: No rash  Neurological:      Mental Status: She is alert and oriented to person, place, and time     Psychiatric:         Behavior: Behavior normal          Medications:    Current Facility-Administered Medications:     acetaminophen (TYLENOL) tablet 650 mg, 650 mg, Oral, Q6H PRN, Naomi Lama MD, 650 mg at 05/21/21 0210    calcium carbonate (OYSTER SHELL,OSCAL) 500 mg tablet 1 tablet, 1 tablet, Oral, Daily With Breakfast, Chrissy Shupp, DO, 1 tablet at 05/21/21 0844    enoxaparin (LOVENOX) subcutaneous injection 40 mg, 40 mg, Subcutaneous, Daily, Chrissy Shupp, DO, 40 mg at 05/21/21 0844    furosemide (LASIX) injection 80 mg, 80 mg, Intravenous, BID, Leslie Done, PA-C, 80 mg at 05/21/21 0844    insulin lispro (HumaLOG) 100 units/mL subcutaneous injection 1-5 Units, 1-5 Units, Subcutaneous, HS, Chrissy Shupp, DO, 1 Units at 05/20/21 2124    insulin lispro (HumaLOG) 100 units/mL subcutaneous injection 1-6 Units, 1-6 Units, Subcutaneous, TID AC, 1 Units at 05/21/21 0845 **AND** Fingerstick Glucose (POCT), , , 4x Daily AC and at bedtime, Chrissy Shupp, DO    lisinopril (ZESTRIL) tablet 10 mg, 10 mg, Oral, Daily, Chrissy Shupp, DO, 10 mg at 05/21/21 0844    melatonin tablet 3 mg, 3 mg, Oral, HS PRN, Gil Madden MD    nicotine (NICODERM CQ) 14 mg/24hr TD 24 hr patch 14 mg, 14 mg, Transdermal, Daily, Mariano Jackson, DO, 14 mg at 05/20/21 2118    potassium chloride (K-DUR,KLOR-CON) CR tablet 40 mEq, 40 mEq, Oral, BID, Esperanza Huynh MD, 40 mEq at 05/21/21 0844    Insert peripheral IV, , , Once **AND** sodium chloride (PF) 0 9 % injection 3 mL, 3 mL, Intravenous, Q1H PRN, Chrissy Shupp, DO    Laboratory Results:  Results from last 7 days   Lab Units 05/21/21  0533 05/20/21  0515 05/19/21  0243 05/19/21  0242 05/18/21  1704   WBC Thousand/uL  --   --   --  7 24 9 13   HEMOGLOBIN g/dL  --   --   --  17 8* 18 2*   HEMATOCRIT %  --   --   --  57 7* 56 1*   PLATELETS Thousands/uL  --   --   --  197 221   POTASSIUM mmol/L 3 6 3 3* 3 4*  --  3 6   CHLORIDE mmol/L 97* 94* 98*  --  99*   CO2 mmol/L 40* 41* 34*  --  32   BUN mg/dL 22 18 16  --  16   CREATININE mg/dL 0 70 0 72 0 81  --  0 80   CALCIUM mg/dL 9 0 8 7 8 8  --  9 2   MAGNESIUM mg/dL  --   --  2 1  --   --

## 2021-05-21 NOTE — CONSULTS
Advanced Heart Failure / Pulmonary Hypertension Service Consultation    Teri Putnam 66 y o  female  MRN: [id number]  Unit/Bed#: [facility] 525-01; Encounter: [id number]    Assessment:  Principal Problem:    Acute decompensated heart failure ([facility] [address] )  Active Problems:    Diabetes mellitus ([facility] [address] )    Proteinuria    Lung nodules    COPD (chronic obstructive pulmonary disease) (HCC)    Acute respiratory failure with hypoxia (HCC)    Hypertension    Nicotine dependence    Hypokalemia      HPI:   Teri Putnam is a 24-year-old woman with PMH as below who presented to [facility] on 05/18/2021 with new onset lower extremity edema with associated SOB, weight gain, and orthopnea  Seen by PCP on 05/11 for new onset lower extremity swelling for 1 week with associated SOB  Patient was then started on PO Lasix 20 mg daily and CXR, blood work and urine test were ordered  Return to PCP office on 05/17 to review these results; Lasix was then increased to 40 mg daily  Patient advised to proceed to ED if symptoms worsen  Presented to ED on 05/18  Workup revealed new onset hypertension with elevated NT proBNP of 3223  (Note: was normotensive during both PCP visits in mid May)  Was started on IV Lasix 40 mg BID as well as lisinopril 10 mg daily  TTE was ordered, and cardiology was consulted  Following cardiology assessment, IV Lasix was increased 80 mg BID  TTE revealed preserved LVF dilated RV and moderately reduced RV systolic function  Due to proteinuria as well as LVH on TTE, cardiac MRI was ordered to rule out cardiac amyloidosis  Patient seen and examined  Patient resting in bed and difficult to arouse, frequently dozing off during conversation  States that she has been experiencing new onset LLE swelling for the past few weeks  Does feel that her breathing status has somewhat improved since arriving to hospital  Reviewed patient's medical, family, and social history; EMR updated as appropriate   Denies any family history of cardiac disease or autoimmune diseases  States she quit smoking cigarettes a few weeks ago  On average will drink 1 glass of white wine nightly at dinner  Denies any history of substance abuse  Heart failure service was consulted for "CHF exacerbation and proteinuria"  Prior to admission, patient did not follow with outpatient cardiologist      Today's Plan:   Continue IV Lasix 80 mg BID   Can consider giving additional dose of Diamox due to elevated CO2 with associated lethargy   Will consider V/Q scan (vs  CTA chest) to assess for acute and/or chronic PE once diuresed and more alert   Inpatient cardiac MRI completed, awaiting final read   SPEP, UPEP, and serum free light chains in process  Will add on RF screen   Wean oxygen as appropriate   Will also consider RHC once appropriately diuresed   Plan for outpatient PSG and PFTs +/- 6MWT  Plan:  Acute on chronic HFpEF with RV dysfunction; LVEF 50/55%; LVIDd 2 87 cm; NYHA III; ACC/AHA Stage C   Etiology: Unclear  Negative FARIDEH  Reactive Hepatitis B core total antibody (indicating past or current infection)  SPEP, UPEP, and serum free light chains in process  No prior medical history of PE/DVT, autoimmune diseases, or connective tissue diseases  Amyloid-associated symptoms: denies carpal tunnel syndrome or spinal stenosis diagnosis  Does endorse 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  Denies any history of glossal changes   EKG to mass mismatch: borderline  NT-proBNP 05/18/2021: 3223  TTE 05/19/2021: LVEF 50-55%  LVIDd 2 87 cm  IVSd 1 27 cm  Moderately dilated RV with moderately reduced RVSF  RVIDd 3 14 cm  Mild MR and TR  PASP 48 mmHg  Mildly dilated IVC  Reviewed importance of low sodium diet and fluid restriction  Strict I&Os with daily weights  CV diet with fluid restriction  Neurohormonal Blockade:  --Beta Blocker: No    --ARNi / ACEi / ARB: lisinopril 10 mg daily     --Aldosterone Antagonist: No  --SGLT2 Inhibitor: No    --Home Diuretic: Lasix 40 mg daily  --Inpatient Diuretic: IV Lasix 80 mg BID with potassium 40 mEq BID  Sudden Cardiac Death Risk Reduction:  --LVEF >35%  Electrical Resynchronization:  --Candidacy for BiV device: narrow QRS  Advanced Therapies (if appropriate): Will continue to monitor  Acute respiratory failure with hypoxia    Etiology: underlying COPD with acute volume overload  Wean oxygen as appropriate  Further management as above  Proteinuria   Microalbumin to creatinine ratio of 2 5 (05/12/2021)   Urine protein to creatinine ratio of 0 9 (05/20/2021)  SPEP, UPEP, and serum free light chains in process  Nephrology on board  Chronic obstructive pulmonary disease   CT chest/abd/pelvis 05/14/2021: 3 pulmonary nodules in LLL (measuring 3-6 mm)  "Linear scar or subsegmental atelectasis in the right upper lobe along the minor fissure, lingula, and lung bases  No infiltrate  COPD  Minimal biapical pleural smooth septal thickening  Central airways are clear "    Hypertension: diagnosed this admission  Diabetes mellitus, type II  Alcohol use: drinks on average, 1 glass of white wine daily with dinner  Tobacco abuse: began smoking in her late teens; states that she quit smoking a few weeks ago  Anxiety    Past Medical History:   Diagnosis Date    Allergic rhinitis     Anxiety     Diabetes mellitus ([facility] [address] )        Review of Systems   Constitutional: Positive for fatigue  Negative for activity change, appetite change, fever and unexpected weight change  HENT: Negative for congestion, postnasal drip, rhinorrhea, sneezing, sore throat and trouble swallowing  Eyes: Negative  Respiratory: Positive for shortness of breath  Negative for cough and chest tightness  Cardiovascular: Positive for leg swelling  Negative for chest pain and palpitations  Gastrointestinal: Negative for abdominal distention, abdominal pain, diarrhea, nausea and vomiting  Endocrine: Negative  Genitourinary: Negative for decreased urine volume, difficulty urinating, dysuria, frequency, hematuria and urgency  Musculoskeletal: Positive for arthralgias (bilateral wrists)  Skin: Negative  Allergic/Immunologic: Negative  Neurological: Negative for dizziness, tremors, syncope, weakness, light-headedness and headaches  Hematological: Negative  Psychiatric/Behavioral: Negative for agitation, confusion and sleep disturbance  The patient is not nervous/anxious  14-point ROS completed and negative except as stated above and/or in the HPI      Current Facility-Administered Medications:     acetaminophen (TYLENOL) tablet 650 mg, 650 mg, Oral, Q6H PRN, Johny Nicole MD, 650 mg at 05/21/21 0210    calcium carbonate (OYSTER SHELL,OSCAL) 500 mg tablet 1 tablet, 1 tablet, Oral, Daily With Breakfast, Chrissy Shupp, DO, 1 tablet at 05/21/21 0844    enoxaparin (LOVENOX) subcutaneous injection 40 mg, 40 mg, Subcutaneous, Daily, Chrissy Shupp, DO, 40 mg at 05/21/21 0844    furosemide (LASIX) injection 80 mg, 80 mg, Intravenous, BID, Jean Carlos Jennings PA-C, 80 mg at 05/21/21 0844    insulin lispro (HumaLOG) 100 units/mL subcutaneous injection 1-5 Units, 1-5 Units, Subcutaneous, HS, Chrissy Shupp, DO, 1 Units at 05/20/21 2124    insulin lispro (HumaLOG) 100 units/mL subcutaneous injection 1-6 Units, 1-6 Units, Subcutaneous, TID AC, 1 Units at 05/21/21 1136 **AND** Fingerstick Glucose (POCT), , , 4x Daily AC and at bedtime, Chrissy Shupp, DO    lisinopril (ZESTRIL) tablet 10 mg, 10 mg, Oral, Daily, Chrissy Shupp, DO, 10 mg at 05/21/21 0844    melatonin tablet 3 mg, 3 mg, Oral, HS PRN, Chaya Littlejohn MD    nicotine (NICODERM CQ) 14 mg/24hr TD 24 hr patch 14 mg, 14 mg, Transdermal, Daily, Mariano Jackson DO, 14 mg at 05/20/21 2118    potassium chloride (K-DUR,KLOR-CON) CR tablet 40 mEq, 40 mEq, Oral, BID, Leticia Macias MD, 40 mEq at 05/21/21 0844    Insert peripheral IV, , , Once **AND** sodium chloride (PF) 0 9 % injection 3 mL, 3 mL, Intravenous, Q1H PRN, Chrissy Shupp, DO    No Known Allergies  Social History     Socioeconomic History    Marital status: /Civil Union     Spouse name: Not on file    Number of children: Not on file    Years of education: Not on file    Highest education level: Not on file   Occupational History    Not on file   Social Needs    Financial resource strain: Not on file    Food insecurity     Worry: Not on file     Inability: Not on file   [employer] needs     Medical: Not on file     Non-medical: Not on file   Tobacco Use    Smoking status: Former Smoker     Packs/day: 0 50     Types: Cigarettes    Smokeless tobacco: Never Used    Tobacco comment: Began smoking in her late teens  States she quit smoking a few weeks ago (Updated 05/21/2021)  Substance and Sexual Activity    Alcohol use: Yes     Alcohol/week: 1 0 - 2 0 standard drinks     Types: 1 - 2 Glasses of wine per week     Frequency: 4 or more times a week     Drinks per session: 1 or 2     Binge frequency: Never     Comment: On average, will drink 1-2 glasses of white wine daily with dinner  (Updated 05/21/2021),     Drug use: Never     Comment: Last assessed 05/21/2021      Sexual activity: Not on file   Lifestyle    Physical activity     Days per week: Not on file     Minutes per session: Not on file    Stress: Not on file   Relationships    Social connections     Talks on phone: Not on file     Gets together: Not on file     Attends Hindu service: Not on file     Active member of club or organization: Not on file     Attends meetings of clubs or organizations: Not on file     Relationship status: Not on file    Intimate partner violence     Fear of current or ex partner: Not on file     Emotionally abused: Not on file     Physically abused: Not on file     Forced sexual activity: Not on file   Other Topics Concern    Not on file   Social History Narrative    Not on file     Family History   Problem Relation Age of Onset    Diabetes Brother     Heart disease Brother        Vitals:  Blood pressure 113/62, pulse 98, temperature 97 5 °F (36 4 °C), resp  rate 16, weight 70 3 kg (155 lb), SpO2 91 %  Body mass index is 26 61 kg/m²  I/O last 3 completed shifts: In: 480 [P O :480]  Out: 4425 [Urine:4425]    Wt Readings from Last 3 Encounters:   05/21/21 70 3 kg (155 lb)   05/20/21 75 4 kg (166 lb 3 6 oz)   05/17/21 79 4 kg (175 lb)       Vitals:    05/21/21 0600 05/21/21 0730 05/21/21 0900 05/21/21 1000   BP:  113/62     Pulse:    98   Resp:  16     Temp:  97 5 °F (36 4 °C)     TempSrc:       SpO2:  96% 91%    Weight: 70 3 kg (155 lb)          Physical Exam  Vitals signs reviewed  Constitutional:       General: She is not in acute distress  Appearance: Normal appearance  She is well-developed and normal weight  Interventions: Nasal cannula in place  HENT:      Head: Normocephalic  Nose: Nose normal       Mouth/Throat:      Mouth: Mucous membranes are moist    Eyes:      General: No scleral icterus  Conjunctiva/sclera: Conjunctivae normal    Neck:      Musculoskeletal: Neck supple  Vascular: JVD present  Trachea: No tracheal deviation  Cardiovascular:      Rate and Rhythm: Normal rate and regular rhythm  No extrasystoles are present  Pulses: Normal pulses  Heart sounds: Murmur present  Comments: Trace sacral edema  Pulmonary:      Effort: Pulmonary effort is normal  No tachypnea, bradypnea, accessory muscle usage or respiratory distress  Breath sounds: Normal air entry  No decreased air movement  Examination of the right-lower field reveals decreased breath sounds  Examination of the left-lower field reveals decreased breath sounds  Decreased breath sounds present  No wheezing or rhonchi  Abdominal:      General: Bowel sounds are normal       Palpations: Abdomen is soft  Tenderness:  There is no abdominal tenderness  Musculoskeletal:      Right lower leg: Edema (trace) present  Left lower leg: Edema (trace) present  Skin:     General: Skin is warm and dry  Coloration: Skin is not jaundiced or pale  Neurological:      General: No focal deficit present  Mental Status: She is oriented to person, place, and time  She is lethargic  Psychiatric:         Attention and Perception: She is inattentive (at times due to dozing off)  Speech: Speech is delayed  Behavior: Behavior is not aggressive or hyperactive  Behavior is cooperative  Judgment: Judgment is not impulsive       Central Line (day, reason): No   Rivas Catheter (day, reason): No     Labs & Results:  Results from last 7 days   Lab Units 05/18/21  1704   TROPONIN I ng/mL 0 04     Results from last 7 days   Lab Units 05/19/21  0242 05/18/21  1704   WBC Thousand/uL 7 24 9 13   HEMOGLOBIN g/dL 17 8* 18 2*   HEMATOCRIT % 57 7* 56 1*   PLATELETS Thousands/uL 197 221         Results from last 7 days   Lab Units 05/21/21  0533 05/20/21  0515 05/19/21  0243   POTASSIUM mmol/L 3 6 3 3* 3 4*   CHLORIDE mmol/L 97* 94* 98*   CO2 mmol/L 40* 41* 34*   BUN mg/dL 22 18 16   CREATININE mg/dL 0 70 0 72 0 81   CALCIUM mg/dL 9 0 8 7 8 8   ALK PHOS U/L  --   --  135*   ALT U/L  --   --  30   AST U/L  --   --  25         Dinah Sierra PA-C

## 2021-05-22 PROBLEM — R03.0 ELEVATED BP WITHOUT DIAGNOSIS OF HYPERTENSION: Status: ACTIVE | Noted: 2021-05-22

## 2021-05-22 PROBLEM — E87.3 ALKALOSIS: Status: ACTIVE | Noted: 2021-05-22

## 2021-05-22 LAB
ALBUMIN SERPL ELPH-MCNC: 3.67 G/DL (ref 3.5–5)
ALBUMIN SERPL ELPH-MCNC: 56.4 % (ref 52–65)
ALBUMIN UR ELPH-MCNC: 100 %
ALPHA1 GLOB MFR UR ELPH: 0 %
ALPHA1 GLOB SERPL ELPH-MCNC: 0.41 G/DL (ref 0.1–0.4)
ALPHA1 GLOB SERPL ELPH-MCNC: 6.3 % (ref 2.5–5)
ALPHA2 GLOB MFR UR ELPH: 0 %
ALPHA2 GLOB SERPL ELPH-MCNC: 0.66 G/DL (ref 0.4–1.2)
ALPHA2 GLOB SERPL ELPH-MCNC: 10.2 % (ref 7–13)
ANION GAP SERPL CALCULATED.3IONS-SCNC: 1 MMOL/L (ref 4–13)
B-GLOBULIN MFR UR ELPH: 0 %
BETA GLOB ABNORMAL SERPL ELPH-MCNC: 0.43 G/DL (ref 0.4–0.8)
BETA1 GLOB SERPL ELPH-MCNC: 6.6 % (ref 5–13)
BETA2 GLOB SERPL ELPH-MCNC: 4.8 % (ref 2–8)
BETA2+GAMMA GLOB SERPL ELPH-MCNC: 0.31 G/DL (ref 0.2–0.5)
BUN SERPL-MCNC: 20 MG/DL (ref 5–25)
CALCIUM SERPL-MCNC: 9.6 MG/DL (ref 8.3–10.1)
CHLORIDE SERPL-SCNC: 99 MMOL/L (ref 100–108)
CO2 SERPL-SCNC: 40 MMOL/L (ref 21–32)
CREAT SERPL-MCNC: 0.7 MG/DL (ref 0.6–1.3)
GAMMA GLOB ABNORMAL SERPL ELPH-MCNC: 1.02 G/DL (ref 0.5–1.6)
GAMMA GLOB MFR UR ELPH: 0 %
GAMMA GLOB SERPL ELPH-MCNC: 15.7 % (ref 12–22)
GFR SERPL CREATININE-BSD FRML MDRD: 84 ML/MIN/1.73SQ M
GLUCOSE SERPL-MCNC: 134 MG/DL (ref 65–140)
GLUCOSE SERPL-MCNC: 135 MG/DL (ref 65–140)
GLUCOSE SERPL-MCNC: 142 MG/DL (ref 65–140)
GLUCOSE SERPL-MCNC: 152 MG/DL (ref 65–140)
GLUCOSE SERPL-MCNC: 203 MG/DL (ref 65–140)
IGG/ALB SER: 1.29 {RATIO} (ref 1.1–1.8)
INTERPRETATION UR IFE-IMP: NORMAL
POTASSIUM SERPL-SCNC: 5 MMOL/L (ref 3.5–5.3)
PROT PATTERN SERPL ELPH-IMP: ABNORMAL
PROT PATTERN UR ELPH-IMP: NORMAL
PROT SERPL-MCNC: 6.5 G/DL (ref 6.4–8.2)
PROT UR-MCNC: 16 MG/DL
SODIUM SERPL-SCNC: 140 MMOL/L (ref 136–145)

## 2021-05-22 PROCEDURE — 80048 BASIC METABOLIC PNL TOTAL CA: CPT | Performed by: STUDENT IN AN ORGANIZED HEALTH CARE EDUCATION/TRAINING PROGRAM

## 2021-05-22 PROCEDURE — 99232 SBSQ HOSP IP/OBS MODERATE 35: CPT | Performed by: INTERNAL MEDICINE

## 2021-05-22 PROCEDURE — 82948 REAGENT STRIP/BLOOD GLUCOSE: CPT

## 2021-05-22 RX ORDER — LISINOPRIL 10 MG/1
10 TABLET ORAL DAILY
Status: DISCONTINUED | OUTPATIENT
Start: 2021-05-23 | End: 2021-05-26 | Stop reason: HOSPADM

## 2021-05-22 RX ADMIN — ACETAMINOPHEN 650 MG: 325 TABLET, FILM COATED ORAL at 01:55

## 2021-05-22 RX ADMIN — INSULIN LISPRO 2 UNITS: 100 INJECTION, SOLUTION INTRAVENOUS; SUBCUTANEOUS at 12:17

## 2021-05-22 RX ADMIN — ACETAMINOPHEN 650 MG: 325 TABLET, FILM COATED ORAL at 20:06

## 2021-05-22 RX ADMIN — INSULIN LISPRO 1 UNITS: 100 INJECTION, SOLUTION INTRAVENOUS; SUBCUTANEOUS at 21:26

## 2021-05-22 RX ADMIN — Medication 14 MG: at 19:48

## 2021-05-22 RX ADMIN — ENOXAPARIN SODIUM 40 MG: 40 INJECTION SUBCUTANEOUS at 10:57

## 2021-05-22 RX ADMIN — FUROSEMIDE 80 MG: 10 INJECTION, SOLUTION INTRAMUSCULAR; INTRAVENOUS at 10:57

## 2021-05-22 RX ADMIN — LISINOPRIL 10 MG: 10 TABLET ORAL at 10:58

## 2021-05-22 RX ADMIN — CALCIUM 1 TABLET: 500 TABLET ORAL at 10:56

## 2021-05-22 RX ADMIN — FUROSEMIDE 80 MG: 10 INJECTION, SOLUTION INTRAMUSCULAR; INTRAVENOUS at 18:30

## 2021-05-22 NOTE — PROGRESS NOTES
Progress Note - Nephrology   Trino Buckner 66 y o  female MRN: [id number]  Unit/Bed#: [facility] 525-01 Encounter: [id number]      Assessment / Plan:  1  Microalbuminuria-microalbumin to creatinine ratio 2 5, U PCR 0 93 g  -normal renal function noted  -low anion gap of 1 present  -SPEP/UPEP negative for monoclonality  -hep B core total antibody reactive with negative hep B core IgM or hep B surface antigen  -complements normal, FARIDEH normal  -Anca, RF pending  -UA with microscopy shows 2-4 RBCs in 2-4 WBCs  -do not believe patient has nephrotic syndrome  -follow-up cardiac MRI to rule out amyloidosis  -no urgency to renal biopsy at this time  -differential includes idiopathic nodular glomerulosclerosis in the setting of chronic smoking plus or minus diabetic kidney disease plus or minus analgesic nephropathy  -baseline serum creatinine less than 1, at baseline  -f/u am BMP  -consider repeat UpCr as an outpatient  -continue ACEi    2  Elevated total CO2 -likely due to contraction alkalosis in the setting of Lasix 80mg IV BID per Cardiology, monitor, if pH rising consider SCDs all my use    3  Low serum anion gap-workup as above    4  Elevated hemoglobin-possibly due to COPD    5  Elevated BP without diagnosis of HTN - blood pressure elevated on admission but improved with Lasix, no history of antihypertensives or hypertension in the past, on lisinopril 10 mg daily for proteinuria type 2 diabetes  -hold parameters adjusted on ACEi    Subjective:   She is on 5 L oxygen via nasal cannula  She did get the shakes at night  She denies shortness of breath or chest pain now she  She is urinating well  Denies nausea, vomiting or diarrhea  Objective:     Vitals: Blood pressure 114/59, pulse 96, temperature 97 9 °F (36 6 °C), temperature source Oral, resp  rate 18, weight 72 5 kg (159 lb 13 3 oz), SpO2 97 %  ,Body mass index is 27 44 kg/m²  Temp (24hrs), Av °F (36 7 °C), Min:97 6 °F (36 4 °C), Max:98 5 °F (36 9 °C)      Weight (last 2 days)     Date/Time   Weight    05/22/21 0541   72 5 (159 83)    05/21/21 0600   70 3 (155)    05/20/21 0600   75 4 (166 23)                Intake/Output Summary (Last 24 hours) at 5/22/2021 1544  Last data filed at 5/22/2021 1300  Gross per 24 hour   Intake 596 ml   Output 700 ml   Net -104 ml     I/O last 24 hours: In: 18 [P O :836]  Out: 1550 [Urine:1550]        Physical Exam:   Physical Exam  Vitals signs and nursing note reviewed  Constitutional:       General: She is not in acute distress  Appearance: Normal appearance  She is well-developed  She is not diaphoretic  Comments: Sitting up in chair   HENT:      Head: Normocephalic and atraumatic  Nose: Nose normal       Mouth/Throat:      Mouth: Mucous membranes are moist       Pharynx: No oropharyngeal exudate  Eyes:      General: No scleral icterus  Right eye: No discharge  Left eye: No discharge  Neck:      Musculoskeletal: Normal range of motion and neck supple  Thyroid: No thyromegaly  Cardiovascular:      Rate and Rhythm: Normal rate and regular rhythm  Heart sounds: No murmur  Pulmonary:      Effort: Pulmonary effort is normal  No respiratory distress  Breath sounds: Normal breath sounds  No wheezing  Abdominal:      General: Bowel sounds are normal  There is no distension  Palpations: Abdomen is soft  Musculoskeletal:         General: Swelling (LLE > RLE) present  Skin:     General: Skin is warm and dry  Findings: No rash  Neurological:      General: No focal deficit present  Mental Status: She is alert  Motor: No abnormal muscle tone        Comments: awake   Psychiatric:         Mood and Affect: Mood normal          Behavior: Behavior normal          Invasive Devices     Peripheral Intravenous Line            Peripheral IV 05/22/21 Proximal;Right;Ventral (anterior) Forearm less than 1 day                Medications:    Scheduled Meds:  Current Facility-Administered Medications   Medication Dose Route Frequency Provider Last Rate    acetaminophen  650 mg Oral Q6H PRN Kris Nolen MD      calcium carbonate  1 tablet Oral Daily With Breakfast Chrissy Shessiep, DO      enoxaparin  40 mg Subcutaneous Daily Chrissy Shupp, DO      furosemide  80 mg Intravenous BID Benedetto Litten, PA-C      insulin lispro  1-5 Units Subcutaneous HS Chrissy Shupp, DO      insulin lispro  1-6 Units Subcutaneous TID AC Chrissy Shupp, DO      [START ON 5/23/2021] lisinopril  10 mg Oral Daily Tammy Trevino, DO      melatonin  3 mg Oral HS PRN Roosevelt Naidu MD      nicotine  14 mg Transdermal Daily Mariano Jackson, DO      sodium chloride (PF)  3 mL Intravenous Q1H PRN Chrissy Shupp, DO         PRN Meds:   acetaminophen    melatonin    Insert peripheral IV **AND** sodium chloride (PF)    Continuous Infusions:         LAB RESULTS:      Results from last 7 days   Lab Units 05/22/21  0525 05/21/21  0533 05/20/21  0515 05/19/21  0243 05/19/21  0242 05/18/21  1704   WBC Thousand/uL  --   --   --   --  7 24 9 13   HEMOGLOBIN g/dL  --   --   --   --  17 8* 18 2*   HEMATOCRIT %  --   --   --   --  57 7* 56 1*   PLATELETS Thousands/uL  --   --   --   --  197 221   NEUTROS PCT %  --   --   --   --  72 79*   LYMPHS PCT %  --   --   --   --  15 10*   MONOS PCT %  --   --   --   --  10 9   EOS PCT %  --   --   --   --  2 1   POTASSIUM mmol/L 5 0 3 6 3 3* 3 4*  --  3 6   CHLORIDE mmol/L 99* 97* 94* 98*  --  99*   CO2 mmol/L 40* 40* 41* 34*  --  32   BUN mg/dL 20 22 18 16  --  16   CREATININE mg/dL 0 70 0 70 0 72 0 81  --  0 80   CALCIUM mg/dL 9 6 9 0 8 7 8 8  --  9 2   ALK PHOS U/L  --   --   --  135*  --   --    ALT U/L  --   --   --  30  --   --    AST U/L  --   --   --  25  --   --    MAGNESIUM mg/dL  --   --   --  2 1  --   --        CUTURES:  Lab Results   Component Value Date    URINECX No Growth <1000 cfu/mL 05/12/2021                 Portions of the record may have been created with voice recognition software  Occasional wrong word or "sound a like" substitutions may have occurred due to the inherent limitations of voice recognition software  Read the chart carefully and recognize, using context, where substitutions have occurred  If you have any questions, please contact the dictating provider

## 2021-05-22 NOTE — PLAN OF CARE
Problem: PAIN - ADULT  Goal: Verbalizes/displays adequate comfort level or baseline comfort level  Description: Interventions:  - Encourage patient to monitor pain and request assistance  - Assess pain using appropriate pain scale  - Administer analgesics based on type and severity of pain and evaluate response  - Implement non-pharmacological measures as appropriate and evaluate response  - Consider cultural and social influences on pain and pain management  - Notify physician/advanced practitioner if interventions unsuccessful or patient reports new pain  Outcome: Progressing     Problem: INFECTION - ADULT  Goal: Absence or prevention of progression during hospitalization  Description: INTERVENTIONS:  - Assess and monitor for signs and symptoms of infection  - Monitor lab/diagnostic results  - Monitor all insertion sites, i e  indwelling lines, tubes, and drains  - Monitor endotracheal if appropriate and nasal secretions for changes in amount and color  - [place] appropriate cooling/warming therapies per order  - Administer medications as ordered  - Instruct and encourage patient and family to use good hand hygiene technique  - Identify and instruct in appropriate isolation precautions for identified infection/condition  Outcome: Progressing  Goal: Absence of fever/infection during neutropenic period  Description: INTERVENTIONS:  - Monitor WBC    Outcome: Progressing     Problem: SAFETY ADULT  Goal: Patient will remain free of falls  Description: INTERVENTIONS:  - Assess patient frequently for physical needs  -  Identify cognitive and physical deficits and behaviors that affect risk of falls    -  [place] fall precautions as indicated by assessment   - Educate patient/family on patient safety including physical limitations  - Instruct patient to call for assistance with activity based on assessment  - Modify environment to reduce risk of injury  - Consider OT/PT consult to assist with strengthening/mobility  Outcome: Progressing  Goal: Maintain or return to baseline ADL function  Description: INTERVENTIONS:  -  Assess patient's ability to carry out ADLs; assess patient's baseline for ADL function and identify physical deficits which impact ability to perform ADLs (bathing, care of mouth/teeth, toileting, grooming, dressing, etc )  - Assess/evaluate cause of self-care deficits   - Assess range of motion  - Assess patient's mobility; develop plan if impaired  - Assess patient's need for assistive devices and provide as appropriate  - Encourage maximum independence but intervene and supervise when necessary  - Involve family in performance of ADLs  - Assess for home care needs following discharge   - Consider OT consult to assist with ADL evaluation and planning for discharge  - Provide patient education as appropriate  Outcome: Progressing  Goal: Maintain or return mobility status to optimal level  Description: INTERVENTIONS:  - Assess patient's baseline mobility status (ambulation, transfers, stairs, etc )    - Identify cognitive and physical deficits and behaviors that affect mobility  - Identify mobility aids required to assist with transfers and/or ambulation (gait belt, sit-to-stand, lift, walker, cane, etc )  - [place] fall precautions as indicated by assessment  - Record patient progress and toleration of activity level on Mobility SBAR; progress patient to next Phase/Stage  - Instruct patient to call for assistance with activity based on assessment  - Consider rehabilitation consult to assist with strengthening/weightbearing, etc   Outcome: Progressing     Problem: DISCHARGE PLANNING  Goal: Discharge to home or other facility with appropriate resources  Description: INTERVENTIONS:  - Identify barriers to discharge w/patient and caregiver  - Arrange for needed discharge resources and transportation as appropriate  - Identify discharge learning needs (meds, wound care, etc )  - Arrange for interpretive services to assist at discharge as needed  - Refer to Case Management Department for coordinating discharge planning if the patient needs post-hospital services based on physician/advanced practitioner order or complex needs related to functional status, cognitive ability, or social support system  Outcome: Progressing     Problem: Knowledge Deficit  Goal: Patient/family/caregiver demonstrates understanding of disease process, treatment plan, medications, and discharge instructions  Description: Complete learning assessment and assess knowledge base    Interventions:  - Provide teaching at level of understanding  - Provide teaching via preferred learning methods  Outcome: Progressing

## 2021-05-22 NOTE — PROGRESS NOTES
INTERNAL MEDICINE RESIDENCY PROGRESS NOTE     Name: Randy Henning   Age & Sex: 66 y o  female   MRN: [id number]  Unit/Bed#: [facility] 525-01   Encounter: [id number]  Team: SOD Team B     PATIENT INFORMATION     Name: Randy Henning   Age & Sex: 66 y o  female   MRN: [id number]  Hospital Stay Days: 4    ASSESSMENT/PLAN     Principal Problem:    Acute decompensated heart failure ([place] [address] )  Active Problems:    Diabetes mellitus ([place] [address] )    Proteinuria    Lung nodules    COPD (chronic obstructive pulmonary disease) ([place] [address] )    Acute respiratory failure with hypoxia ([facility])    Hypertension    Nicotine dependence    Hypokalemia      Hypokalemia  Assessment & Plan  · In the setting of diuresis  · Monitor and replete as needed  Nicotine dependence  Assessment & Plan  Longstanding history of tobacco abuse for over 40 years  Currently half to full pack per day smoker  Denies nicotine patch at this time  Plan:  · Continue to encourage tobacco cessation    Hypertension  Assessment & Plan  BP on arrival 193/104 which improved with lasix administration  No history of HTN and on no anti-hypertensives at home  Plan:  · With evidence of proteinuria in the setting of type 2 diabetes mellitus started on Lisinopril 10 mg daily  · Monitor with routine vital signs    Acute respiratory failure with hypoxia [facility])  Assessment & Plan  Patient hypoxic on day of admission with SpO2 of 81% on room air requiring 6L nasal cannula on arrival  SOB likely secondary to underlying COPD and significant volume overload  Plan:  · Maintain SpO2>88%; wean O2 as patient tolerates  · Diuresis as above   · Out of bed with assistance   · Out patient follow up with PCP; will require PFTs for likely underlying COPD    COPD (chronic obstructive pulmonary disease) ([place] [address] )  Assessment & Plan  No prior documented history of COPD  High likelihood of undiagnosed COPD given recent evidence of COPD on CT C/A/P and long-standing smoking history   Patient does have diffuse end-expiratory wheezing on exam with prolonged expiratory phase  Requiring 6L NC but major contributor being volume overload  No concern for COPD exacerbation at this time  Plan:  · Maintain SpO2>88%; wean O2 as patient tolerates  · Continue to encourage smoking cessation  · Outpatient follow up; will require PFTs    Lung nodules  Assessment & Plan  CT Chest/Abdomen completed on 5/14/2021 revealed: "Few pulmonary nodules in the left lower lobe, the largest of which measures 6 mm with unknown long-term stability  Based on current Fleischner Society 2017 Guidelines on incidental pulmonary nodule, followup non-contrast CT is recommended at 6-12 months from the initial examination and, if stable at that time, an additional followup is recommended for 18-24 months from the initial examination "    Plan:  · Findings discussed with patient, repeat outpatient non-contrast CT in 6-12 months    Proteinuria  Assessment & Plan  Microlbumin creatinine ratio 2,475  · Initiated on ACE-I    · Nephrology following, awaiting serologies  Diabetes mellitus ([facility] )  Assessment & Plan  Lab Results   Component Value Date    HGBA1C 6 6 (H) 05/12/2021     History of DM II with most recent A1c of 6 6  Not currently on any outpatient diabetic regimen  Plan:  · Continue POC glucose checks; 4x daily with meals and at bedtime  · Insulin regimen:  · Mealtime: SSI Algorithm 3  · Bedtime: SSI Algorithm 2      * Acute decompensated heart failure ([facility] )  Assessment & Plan  Patient presenting with worsening b/l LE edema and abdominal distension that has been worsening over the last two weeks despite initiation or oral lasix  On arrival, patient hypoxic to 81% requiring 6L nasal canala  Patient significantly volume overloaded with evidence of right heart failure given +3 pitting b/l LE edema extending up to thighs, significant abdominal distension, and JVD  Only fine bibasilar crackles   Labs significant for proNT-BNP of 3,223 and imaging findings demonstrating small b/l pleural effusions and abdominopelvic ascites  Patient symptoms and clinical exam findings likely secondary to new onset decompensated CHF  Echocardiogram: Systolic function was normal  Ejection fraction was estimated in the range of 50 % to 55 %  Mild concentric hypertrophy  RV moderately dilated, RV function moderately reduced  Mild pulmonary hypertension  Cardiac MRI to rule out infiltrative disease completed and pending official read  Initiated on IV Lasix 80 mg IV b i d  responding well, net negative  Creatinine stable however increasing bicarb developing contraction alkalosis  Intake/Output Summary (Last 24 hours) at 2021 1222  Last data filed at 2021 0149  Gross per 24 hour   Intake 480 ml   Output 2175 ml   Net -1695 ml     Weight (last 2 days)     Date/Time   Weight    21 0600   70 3 (155)    21 0600   75 4 (166 23)    21 0244   73 5 (162 04)              Plan:  · Lasix 80 mg IV BID  May need additional Diamox  Will await Cardiology recommendations  · Cardiac MRI completed, pending facial read  · Heart failure consulted  · Strict standing weights; monitor intake and output  · 2g Na restricted diet and 1500 mL fluid restricted diet  · Echo cardiogram ordered and pending  · Monitor and maintain SpO2>88%; wean O2 as patient tolerates        Disposition:  Continue inpatient treatment     SUBJECTIVE     Patient seen and examined  No acute events overnight      OBJECTIVE     Vitals:    21 1513 21 1700 21 2213 21 0541   BP: 111/62 126/80 101/53    Pulse:  103 104    Resp: 18 20 20    Temp: 98 9 °F (37 2 °C)  98 5 °F (36 9 °C)    TempSrc:   Oral    SpO2:  90% 92%    Weight:    72 5 kg (159 lb 13 3 oz)      Temperature:   Temp (24hrs), Av 3 °F (36 8 °C), Min:97 5 °F (36 4 °C), Max:98 9 °F (37 2 °C)    Temperature: 98 5 °F (36 9 °C)  Intake & Output:  I/O        07 -  0700  07 -  0700 P O  480 600    Total Intake(mL/kg) 480 (6 8) 600 (8 3)    Urine (mL/kg/hr) 2900 (1 7) 1550 (0 9)    Stool  0    Total Output 2900 1550    Net -2420 -950          Unmeasured Urine Occurrence 1 x 2 x    Unmeasured Stool Occurrence  0 x        Weights:        Body mass index is 27 44 kg/m²  Weight (last 2 days)     Date/Time   Weight    05/22/21 0541   72 5 (159 83)    05/21/21 0600   70 3 (155)    05/20/21 0600   75 4 (166 23)            Physical Exam  Vitals signs and nursing note reviewed  Constitutional:       General: She is not in acute distress  Appearance: She is well-developed  HENT:      Head: Normocephalic and atraumatic  Eyes:      Conjunctiva/sclera: Conjunctivae normal    Neck:      Musculoskeletal: Neck supple  Cardiovascular:      Rate and Rhythm: Normal rate and regular rhythm  Heart sounds: No murmur  Pulmonary:      Effort: Pulmonary effort is normal  No respiratory distress  Breath sounds: Normal breath sounds  Abdominal:      Palpations: Abdomen is soft  Tenderness: There is no abdominal tenderness  Musculoskeletal: Normal range of motion  General: Swelling present  Skin:     General: Skin is warm and dry  Capillary Refill: Capillary refill takes less than 2 seconds  Neurological:      General: No focal deficit present  Mental Status: She is alert  Mental status is at baseline  Psychiatric:         Mood and Affect: Mood normal          Behavior: Behavior normal        LABORATORY DATA     Labs: I have personally reviewed pertinent reports    Results from last 7 days   Lab Units 05/19/21  0242 05/18/21  1704   WBC Thousand/uL 7 24 9 13   HEMOGLOBIN g/dL 17 8* 18 2*   HEMATOCRIT % 57 7* 56 1*   PLATELETS Thousands/uL 197 221   NEUTROS PCT % 72 79*   MONOS PCT % 10 9      Results from last 7 days   Lab Units 05/22/21  0525 05/21/21  0533 05/20/21  0515 05/19/21  0243   POTASSIUM mmol/L 5 0 3 6 3 3* 3 4*   CHLORIDE mmol/L 99* 97* 94* 98*   CO2 mmol/L 40* 40* 41* 34*   BUN mg/dL 20 22 18 16   CREATININE mg/dL 0 70 0 70 0 72 0 81   CALCIUM mg/dL 9 6 9 0 8 7 8 8   ALK PHOS U/L  --   --   --  135*   ALT U/L  --   --   --  30   AST U/L  --   --   --  25     Results from last 7 days   Lab Units 05/19/21  0243   MAGNESIUM mg/dL 2 1                  Results from last 7 days   Lab Units 05/18/21  1704   TROPONIN I ng/mL 0 04       IMAGING & DIAGNOSTIC TESTING     Radiology Results: I have personally reviewed pertinent reports  X-ray Chest 1 View Portable    Result Date: 5/19/2021  Impression: No acute cardiopulmonary disease  Workstation performed: [id number]     Other Diagnostic Testing: I have personally reviewed pertinent reports  ACTIVE MEDICATIONS     Current Facility-Administered Medications   Medication Dose Route Frequency    acetaminophen (TYLENOL) tablet 650 mg  650 mg Oral Q6H PRN    calcium carbonate (OYSTER SHELL,OSCAL) 500 mg tablet 1 tablet  1 tablet Oral Daily With Breakfast    enoxaparin (LOVENOX) subcutaneous injection 40 mg  40 mg Subcutaneous Daily    furosemide (LASIX) injection 80 mg  80 mg Intravenous BID    insulin lispro (HumaLOG) 100 units/mL subcutaneous injection 1-5 Units  1-5 Units Subcutaneous HS    insulin lispro (HumaLOG) 100 units/mL subcutaneous injection 1-6 Units  1-6 Units Subcutaneous TID AC    lisinopril (ZESTRIL) tablet 10 mg  10 mg Oral Daily    melatonin tablet 3 mg  3 mg Oral HS PRN    nicotine (NICODERM CQ) 14 mg/24hr TD 24 hr patch 14 mg  14 mg Transdermal Daily    potassium chloride (K-DUR,KLOR-CON) CR tablet 40 mEq  40 mEq Oral BID    sodium chloride (PF) 0 9 % injection 3 mL  3 mL Intravenous Q1H PRN       VTE Pharmacologic Prophylaxis: Enoxaparin (Lovenox)  VTE Mechanical Prophylaxis: sequential compression device    Portions of the record may have been created with voice recognition software    Occasional wrong word or "sound a like" substitutions may have occurred due to the inherent limitations of voice recognition software    Read the chart carefully and recognize, using context, where substitutions have occurred   ==  Makenzie Cevallos, [address] [facility]  Internal Medicine Residency PGY-2

## 2021-05-22 NOTE — PROGRESS NOTES
Progress Note - Cardiology   Milton Carcamo 66 y o  female MRN: [id number]  Unit/Bed#: [facility] 525-01 Encounter: [id number]  05/22/21  1:09 PM    Impression and Plan:    75-year-old with diabetes-A1c of 6 6, chronic and ongoing tobacco use, admitted with acute right more than left-biventricular congestive heart failure  , symptoms of 1 week in duration  Right ventricular dysfunction-more likely to be from COPD and diastolic dysfunction but eventual right heart catheterization to assess pulmonary vascular resistance    Remains volume overloaded, slowly improving  Negative by a L since yesterday     Plan:     Acute congestive heart failure:  Echo with RV dysfunction, RV dilatation, preserved  ejection fraction  Still has biventricular failure-right more than left  -right is more rapidly improving  Continue Lasix at the same dose-so far diuresing well, potassium of 5 0 this morning  Renal function is stable   Replace electrolytes as necessary  No notching on RV OT outflow Doppler to suggest increased pulmonary vascular resistance  Likely contribution from diastolic dysfunction and COPD-group 2 and group 3 pulmonary hypertension  Eventual right heart catheterization-hopefully Tuesday     Elevated blood pressures:  Controlled at her recent visit to her primary care physician, elevated in the ER   Continue to watch  "LVH on echo":   In the absence of hypertension, completed cardiac MRI-awaiting results     Diabetes:  A1c of 6 6, defer to the primary team      Tobacco cessation would be helpful       ===================================================================    Chief Complaint:   Chief Complaint   Patient presents with    Leg Swelling     pt was told to comw to the ED by her Family doctor due to increased swelling in her legs and abd          Subjective/Objective     Subjective:  Denies any complaints, improving    Objective:  No distress at the time of exam    Patient Active Problem List   Diagnosis    Allergic rhinitis    Diabetes mellitus ([facility] )    Anxiety    Acute decompensated heart failure (HCC)    SOB (shortness of breath)    Abdominal swelling, generalized    Uncontrolled type 2 diabetes mellitus with hyperglycemia (HCC)    Urinary frequency    Proteinuria    Lung nodules    COPD (chronic obstructive pulmonary disease) (HCC)    Acute respiratory failure with hypoxia (HCC)    Hypertension    Nicotine dependence    Hypokalemia       Vitals: /58   Pulse 94   Temp 97 6 °F (36 4 °C)   Resp 18   Wt 72 5 kg (159 lb 13 3 oz)   SpO2 96%   BMI 27 44 kg/m²     I/O this shift:  In: 236 [P O :236]  Out: -   Wt Readings from Last 3 Encounters:   05/22/21 72 5 kg (159 lb 13 3 oz)   05/20/21 75 4 kg (166 lb 3 6 oz)   05/17/21 79 4 kg (175 lb)       Intake/Output Summary (Last 24 hours) at 5/22/2021 1309  Last data filed at 5/22/2021 1300  Gross per 24 hour   Intake 596 ml   Output 700 ml   Net -104 ml     I/O last 3 completed shifts:   In: 600 [P O :600]  Out: 2850 [Urine:2850]    Invasive Devices     Peripheral Intravenous Line            Peripheral IV 05/22/21 Proximal;Right;Ventral (anterior) Forearm less than 1 day                  Physical Exam:  GEN: Trino Buckner appears well, alert and oriented x 3, pleasant and cooperative   HEENT: pupils equal, round, and reactive to light; extraocular muscles intact  NECK: supple, no carotid bruits or JVD  HEART: regular rhythm, normal S1 and S2, no murmur, no clicks, gallops or rubs   LUNGS: clear to auscultation bilaterally; no wheezes or rhonchi, no rales  ABDOMEN/GI: normal bowel sounds, soft, no tenderness, mild abdominal distension  EXTREMITIES/Musculoskeltal: peripheral pulses normal; no clubbing, cyanosis, mild lower extremity edema-improved  NEURO: no focal motor findings   SKIN: normal without suspicious lesions on exposed skin              Lab Results:   Results from last 7 days   Lab Units 05/18/21  1704   TROPONIN I ng/mL 0 04     Results from last 7 days   Lab Units 05/19/21  0242 05/18/21  1704   WBC Thousand/uL 7 24 9 13   HEMOGLOBIN g/dL 17 8* 18 2*   HEMATOCRIT % 57 7* 56 1*   PLATELETS Thousands/uL 197 221         Results from last 7 days   Lab Units 05/22/21  0525 05/21/21  0533 05/20/21  0515 05/19/21  0243   POTASSIUM mmol/L 5 0 3 6 3 3* 3 4*   CHLORIDE mmol/L 99* 97* 94* 98*   CO2 mmol/L 40* 40* 41* 34*   BUN mg/dL 20 22 18 16   CREATININE mg/dL 0 70 0 70 0 72 0 81   CALCIUM mg/dL 9 6 9 0 8 7 8 8   ALK PHOS U/L  --   --   --  135*   ALT U/L  --   --   --  30   AST U/L  --   --   --  25         Imaging: I have personally reviewed pertinent reports  EKG/Telemtry:  No events    Scheduled Meds:  Current Facility-Administered Medications   Medication Dose Route Frequency Provider Last Rate    acetaminophen  650 mg Oral Q6H PRN Eleni Mendez MD      calcium carbonate  1 tablet Oral Daily With Breakfast Chrissy Shupp, DO      enoxaparin  40 mg Subcutaneous Daily Chrissy Shupp, DO      furosemide  80 mg Intravenous BID Karla Murguia PA-C      insulin lispro  1-5 Units Subcutaneous HS Chrissy Shupp, DO      insulin lispro  1-6 Units Subcutaneous TID AC Chrissy Shupp, DO      lisinopril  10 mg Oral Daily Chrissy Shupp, DO      melatonin  3 mg Oral HS PRN Tonya Fong MD      nicotine  14 mg Transdermal Daily Mariano Jackson, DO      sodium chloride (PF)  3 mL Intravenous Q1H PRN Chrissy Shupp, DO       Continuous Infusions:       VTE Pharmacologic Prophylaxis: Enoxaparin (Lovenox)  VTE Mechanical Prophylaxis: sequential compression device    This note was completed in part utilizing [employer] fluency direct voice recognition software  Grammatical errors, random word insertion, spelling mistakes, occasional wrong word or "sound-alike" substitutions and incomplete sentences may be an occasional consequence of the system secondary to software limitations, ambient noise and hardware issues   At the time of dictation, efforts were made to edit, clarify and /or correct errors  Please read the chart carefully and recognize, using context, where substitutions have occurred  If you have any questions or concerns about the context, text or information contained within the body of this dictation, please contact myself, the provider, for further clarification

## 2021-05-23 LAB
ALBUMIN SERPL BCP-MCNC: 3.2 G/DL (ref 3.5–5)
ALP SERPL-CCNC: 120 U/L (ref 46–116)
ALT SERPL W P-5'-P-CCNC: 25 U/L (ref 12–78)
ANION GAP SERPL CALCULATED.3IONS-SCNC: 1 MMOL/L (ref 4–13)
AST SERPL W P-5'-P-CCNC: 15 U/L (ref 5–45)
BILIRUB SERPL-MCNC: 1.11 MG/DL (ref 0.2–1)
BUN SERPL-MCNC: 23 MG/DL (ref 5–25)
CALCIUM ALBUM COR SERPL-MCNC: 10 MG/DL (ref 8.3–10.1)
CALCIUM SERPL-MCNC: 9.4 MG/DL (ref 8.3–10.1)
CHLORIDE SERPL-SCNC: 96 MMOL/L (ref 100–108)
CO2 SERPL-SCNC: 42 MMOL/L (ref 21–32)
CREAT SERPL-MCNC: 0.66 MG/DL (ref 0.6–1.3)
GFR SERPL CREATININE-BSD FRML MDRD: 85 ML/MIN/1.73SQ M
GLUCOSE SERPL-MCNC: 125 MG/DL (ref 65–140)
GLUCOSE SERPL-MCNC: 136 MG/DL (ref 65–140)
GLUCOSE SERPL-MCNC: 153 MG/DL (ref 65–140)
GLUCOSE SERPL-MCNC: 161 MG/DL (ref 65–140)
GLUCOSE SERPL-MCNC: 204 MG/DL (ref 65–140)
POTASSIUM SERPL-SCNC: 3.7 MMOL/L (ref 3.5–5.3)
PROT SERPL-MCNC: 6.9 G/DL (ref 6.4–8.2)
SODIUM SERPL-SCNC: 139 MMOL/L (ref 136–145)

## 2021-05-23 PROCEDURE — 99232 SBSQ HOSP IP/OBS MODERATE 35: CPT | Performed by: INTERNAL MEDICINE

## 2021-05-23 PROCEDURE — 82948 REAGENT STRIP/BLOOD GLUCOSE: CPT

## 2021-05-23 PROCEDURE — 80053 COMPREHEN METABOLIC PANEL: CPT | Performed by: STUDENT IN AN ORGANIZED HEALTH CARE EDUCATION/TRAINING PROGRAM

## 2021-05-23 RX ORDER — FLUTICASONE PROPIONATE 50 MCG
1 SPRAY, SUSPENSION (ML) NASAL DAILY
Status: DISCONTINUED | OUTPATIENT
Start: 2021-05-23 | End: 2021-05-26 | Stop reason: HOSPADM

## 2021-05-23 RX ORDER — ECHINACEA PURPUREA EXTRACT 125 MG
1 TABLET ORAL 3 TIMES DAILY PRN
Status: DISCONTINUED | OUTPATIENT
Start: 2021-05-23 | End: 2021-05-26 | Stop reason: HOSPADM

## 2021-05-23 RX ORDER — FUROSEMIDE 10 MG/ML
40 INJECTION INTRAMUSCULAR; INTRAVENOUS 2 TIMES DAILY
Status: DISCONTINUED | OUTPATIENT
Start: 2021-05-23 | End: 2021-05-24

## 2021-05-23 RX ORDER — LANOLIN ALCOHOL/MO/W.PET/CERES
CREAM (GRAM) TOPICAL 3 TIMES DAILY PRN
Status: DISCONTINUED | OUTPATIENT
Start: 2021-05-23 | End: 2021-05-26 | Stop reason: HOSPADM

## 2021-05-23 RX ADMIN — INSULIN LISPRO 1 UNITS: 100 INJECTION, SOLUTION INTRAVENOUS; SUBCUTANEOUS at 12:33

## 2021-05-23 RX ADMIN — ENOXAPARIN SODIUM 40 MG: 40 INJECTION SUBCUTANEOUS at 08:09

## 2021-05-23 RX ADMIN — FUROSEMIDE 80 MG: 10 INJECTION, SOLUTION INTRAMUSCULAR; INTRAVENOUS at 08:10

## 2021-05-23 RX ADMIN — Medication 1 SPRAY: at 12:33

## 2021-05-23 RX ADMIN — Medication 14 MG: at 19:31

## 2021-05-23 RX ADMIN — INSULIN LISPRO 1 UNITS: 100 INJECTION, SOLUTION INTRAVENOUS; SUBCUTANEOUS at 21:15

## 2021-05-23 RX ADMIN — FLUTICASONE PROPIONATE 1 SPRAY: 50 SPRAY, METERED NASAL at 12:33

## 2021-05-23 RX ADMIN — FUROSEMIDE 40 MG: 10 INJECTION, SOLUTION INTRAMUSCULAR; INTRAVENOUS at 18:38

## 2021-05-23 RX ADMIN — CALCIUM 1 TABLET: 500 TABLET ORAL at 08:10

## 2021-05-23 RX ADMIN — INSULIN LISPRO 2 UNITS: 100 INJECTION, SOLUTION INTRAVENOUS; SUBCUTANEOUS at 08:09

## 2021-05-23 NOTE — PROGRESS NOTES
Progress Note - Cardiology   Radha Lino 66 y o  female MRN: [id number]  Unit/Bed#: [facility] 525-01 Encounter: [id number]  05/23/21  11:23 AM    Impression and Plan:    15-year-old with diabetes-A1c of 6 6, chronic and ongoing tobacco use, admitted with acute right more than left-biventricular congestive heart failure  , symptoms of 1 week in duration  Right ventricular dysfunction-more likely to be from COPD and diastolic dysfunction but eventual right heart catheterization to assess pulmonary vascular resistance    Remains volume overloaded, slowly improving  Negative by 0 5 L by input output charting, 2 L weight since yesterday     Plan:     Acute congestive heart failure:  Echo with RV dysfunction, RV dilatation, preserved  ejection fraction  Still has biventricular failure-right more than left-both improving  Decrease Lasix to 40 IV b i d  Renal function is stable   Replace electrolytes as necessary  No notching on RV OT outflow Doppler to suggest increased pulmonary vascular resistance  Likely contribution from diastolic dysfunction and COPD-group 2 and group 3 pulmonary hypertension  Eventual right heart catheterization-hopefully Tuesday     Elevated blood pressures:  Controlled at her recent visit to her primary care physician, elevated in the ER   Continue to watch  Also with significant proteinuria  "LVH on echo":   In the absence of hypertension, completed cardiac MRI-awaiting results     Diabetes:  A1c of 6 6, defer to the primary team      Tobacco cessation would be helpful       ===================================================================    Chief Complaint:   Chief Complaint   Patient presents with    Leg Swelling     pt was told to comw to the ED by her Family doctor due to increased swelling in her legs and abd          Subjective/Objective     Subjective:  Denies any complaints, improving    Objective:  No distress at the time of exam    Patient Active Problem List   Diagnosis    Allergic rhinitis    Diabetes mellitus (HCC)    Anxiety    Acute decompensated heart failure (HCC)    SOB (shortness of breath)    Abdominal swelling, generalized    Uncontrolled type 2 diabetes mellitus with hyperglycemia (HCC)    Urinary frequency    Proteinuria    Lung nodules    COPD (chronic obstructive pulmonary disease) (HCC)    Acute respiratory failure with hypoxia (HCC)    Nicotine dependence    Hypokalemia    Elevated BP without diagnosis of hypertension    Alkalosis       Vitals: /64   Pulse 97   Temp 97 8 °F (36 6 °C)   Resp 17   Wt 70 3 kg (154 lb 15 7 oz)   SpO2 96%   BMI 26 60 kg/m²     I/O this shift:  In: 240 [P O :240]  Out: 1950 [Urine:1950]  Wt Readings from Last 3 Encounters:   05/23/21 70 3 kg (154 lb 15 7 oz)   05/20/21 75 4 kg (166 lb 3 6 oz)   05/17/21 79 4 kg (175 lb)       Intake/Output Summary (Last 24 hours) at 5/23/2021 1123  Last data filed at 5/23/2021 1046  Gross per 24 hour   Intake 716 ml   Output 2800 ml   Net -2084 ml     I/O last 3 completed shifts:   In: 292 [P O :716]  Out: 1350 [Urine:1350]    Invasive Devices     Peripheral Intravenous Line            Peripheral IV 05/22/21 Proximal;Right;Ventral (anterior) Forearm 1 day                  Physical Exam:  GEN: Radha Arriaga appears well, alert and oriented x 3, pleasant and cooperative   HEENT: pupils equal, round, and reactive to light; extraocular muscles intact  NECK: supple, no carotid bruits , JVD about 5 cm above the clavicle  HEART: regular rhythm, normal S1 and S2, no murmur, no clicks, gallops or rubs   LUNGS: clear to auscultation bilaterally; no wheezes or rhonchi, no rales, decreased breath sounds at the infrascapular and bases bilaterally  ABDOMEN/GI: normal bowel sounds, soft, no tenderness, mild abdominal distension  EXTREMITIES/Musculoskeltal: peripheral pulses normal; no clubbing, cyanosis, mild lower extremity edema-improved  NEURO: no focal motor findings   SKIN: normal without suspicious lesions on exposed skin              Lab Results:   Results from last 7 days   Lab Units 05/18/21  1704   TROPONIN I ng/mL 0 04     Results from last 7 days   Lab Units 05/19/21  0242 05/18/21  1704   WBC Thousand/uL 7 24 9 13   HEMOGLOBIN g/dL 17 8* 18 2*   HEMATOCRIT % 57 7* 56 1*   PLATELETS Thousands/uL 197 221         Results from last 7 days   Lab Units 05/23/21  0509 05/22/21  0525 05/21/21  0533  05/19/21  0243   POTASSIUM mmol/L 3 7 5 0 3 6   < > 3 4*   CHLORIDE mmol/L 96* 99* 97*   < > 98*   CO2 mmol/L 42* 40* 40*   < > 34*   BUN mg/dL 23 20 22   < > 16   CREATININE mg/dL 0 66 0 70 0 70   < > 0 81   CALCIUM mg/dL 9 4 9 6 9 0   < > 8 8   ALK PHOS U/L 120*  --   --   --  135*   ALT U/L 25  --   --   --  30   AST U/L 15  --   --   --  25    < > = values in this interval not displayed  Imaging: I have personally reviewed pertinent reports      EKG/Telemtry:  No events    Scheduled Meds:  Current Facility-Administered Medications   Medication Dose Route Frequency Provider Last Rate    acetaminophen  650 mg Oral Q6H PRN Wendi Eason MD      calcium carbonate  1 tablet Oral Daily With Breakfast Chrissy Shupp, DO      enoxaparin  40 mg Subcutaneous Daily Chrissy Shupp, DO      fluticasone  1 spray Each Nare Daily Dannie Hi MD      furosemide  80 mg Intravenous BID Jessica Collazo PA-C      insulin lispro  1-5 Units Subcutaneous HS Chrissy Shupp, DO      insulin lispro  1-6 Units Subcutaneous TID AC Chrissy Shupp, DO      lisinopril  10 mg Oral Daily Tammy K Uriel, DO      melatonin  3 mg Oral HS PRN Dannie Hi MD      nicotine  14 mg Transdermal Daily Mariano Jackson, DO      sodium chloride  1 spray Each Nare TID PRN Dannie Hi MD      sodium chloride (PF)  3 mL Intravenous Q1H PRN Chrissy Flanaganupp, DO      white petrolatum-mineral oil   Topical TID PRN Dannie Hi MD       Continuous Infusions:       VTE Pharmacologic Prophylaxis: Enoxaparin (Lovenox)  VTE Mechanical Prophylaxis: sequential compression device    This note was completed in part utilizing m-modal fluency direct voice recognition software  Grammatical errors, random word insertion, spelling mistakes, occasional wrong word or "sound-alike" substitutions and incomplete sentences may be an occasional consequence of the system secondary to software limitations, ambient noise and hardware issues  At the time of dictation, efforts were made to edit, clarify and /or correct errors  Please read the chart carefully and recognize, using context, where substitutions have occurred  If you have any questions or concerns about the context, text or information contained within the body of this dictation, please contact myself, the provider, for further clarification

## 2021-05-23 NOTE — PROGRESS NOTES
INTERNAL MEDICINE RESIDENCY PROGRESS NOTE     Name: Rancho Torrez   Age & Sex: 66 y o  female   MRN: [id number]  Unit/Bed#: [facility] 525-01   Encounter: [id number]  Team: SOD Team B     PATIENT INFORMATION     Name: Rancho Torrez   Age & Sex: 66 y o  female   MRN: [id number]  Hospital Stay Days: 5    ASSESSMENT/PLAN     Principal Problem:    Acute decompensated heart failure ([facility] [address] )  Active Problems:    Acute respiratory failure with hypoxia ([facility] [address] )    Diabetes mellitus ([facility] [address] )    Proteinuria    Lung nodules    COPD (chronic obstructive pulmonary disease) ([facility])    Nicotine dependence    Hypokalemia    Elevated BP without diagnosis of hypertension    Alkalosis      * Acute decompensated heart failure ([facility] [address] )  Assessment & Plan  Patient presenting with worsening b/l LE edema and abdominal distension that has been worsening over the last two weeks despite initiation or oral lasix  On arrival, patient hypoxic to 81% requiring 6L nasal canala  Patient significantly volume overloaded with evidence of right heart failure given +3 pitting b/l LE edema extending up to thighs, significant abdominal distension, and JVD  Only fine bibasilar crackles  Labs significant for proNT-BNP of 3,223 and imaging findings demonstrating small b/l pleural effusions and abdominopelvic ascites  Patient symptoms and clinical exam findings likely secondary to new onset decompensated CHF  Echocardiogram: Systolic function was normal  Ejection fraction was estimated in the range of 50 % to 55 %  Mild concentric hypertrophy  RV moderately dilated, RV function moderately reduced  Mild pulmonary hypertension  Cardiac MRI to rule out infiltrative disease completed and pending official read  Initiated on IV Lasix 80 mg IV b i d  responding well, net negative and weight decreasing  Creatinine stable however increasing bicarb and developing contraction alkalosis        Intake/Output Summary (Last 24 hours) at 5/23/2021 0265  Last data filed at 5/23/2021 0831  Gross per 24 hour   Intake 476 ml   Output 1100 ml   Net -624 ml         Weight (last 2 days)     Date/Time   Weight    05/23/21 0531   70 3 (154 98)    05/22/21 0541   72 5 (159 83)    05/21/21 0600   70 3 (155)              Plan:  · Lasix 80 mg IV BID  May need additional Diamox  Will await Cardiology recommendations  · Cardiac MRI completed, pending offacial read  · Heart failure consulted  Workup for pulmonary hypertension  Planned for right heart catheterization when volume status is more optimized, tentatively on Tuesday  · Strict standing weights; monitor intake and output  · 2g Na restricted diet and 1500 mL fluid restricted diet  · Monitor and maintain SpO2>88%; wean O2 as patient tolerates  Acute respiratory failure with hypoxia [facility])  Assessment & Plan  Patient hypoxic on day of admission with SpO2 of 81% on room air requiring 6L nasal cannula on arrival  SOB likely secondary to underlying COPD and significant volume overload  Plan:  · Maintain SpO2>88%; wean O2 as patient tolerates  · Diuresis as above   · Out of bed with assistance   · Out patient follow up with PCP; will require PFTs for likely underlying COPD    Hypokalemia  Assessment & Plan  · In the setting of diuresis  · Monitor and replete as needed  Nicotine dependence  Assessment & Plan  Longstanding history of tobacco abuse for over 40 years  Currently half to full pack per day smoker  Denies nicotine patch at this time  Plan:  · Continue to encourage tobacco cessation    COPD (chronic obstructive pulmonary disease) ([facility] [address] )  Assessment & Plan  No prior documented history of COPD  High likelihood of undiagnosed COPD given recent evidence of COPD on CT C/A/P and long-standing smoking history  Patient does have diffuse end-expiratory wheezing on exam with prolonged expiratory phase  Requiring 6L NC but major contributor being volume overload  No concern for COPD exacerbation at this time      Plan:  · Maintain SpO2>88%; wean O2 as patient tolerates  · Continue to encourage smoking cessation  · Outpatient follow up; will require PFTs    Lung nodules  Assessment & Plan  CT Chest/Abdomen completed on 2021 revealed: "Few pulmonary nodules in the left lower lobe, the largest of which measures 6 mm with unknown long-term stability  Based on current Fleischner Society 2017 Guidelines on incidental pulmonary nodule, followup non-contrast CT is recommended at 6-12 months from the initial examination and, if stable at that time, an additional followup is recommended for 18-24 months from the initial examination "    Plan:  · Findings discussed with patient, repeat outpatient non-contrast CT in 6-12 months    Proteinuria  Assessment & Plan  Microlbumin creatinine ratio 2,475  · Initiated on ACE-I    · Nephrology following ongoing workup  Diabetes mellitus ([facility] [address] )  Assessment & Plan  Lab Results   Component Value Date    HGBA1C 6 6 (H) 2021     History of DM II with most recent A1c of 6 6  Not currently on any outpatient diabetic regimen  Plan:  · Continue POC glucose checks; 4x daily with meals and at bedtime  · Insulin regimen:  · Mealtime: SSI Algorithm 3  · Bedtime: SSI Algorithm 2        Disposition:  Continue inpatient care  On IV diuretics, RHC tentatively Tuesday  Discussed with hemoglobin RN  Questions and concerns addressed  SUBJECTIVE     Patient seen and examined  No acute events overnight  Breathing improved  Lower extremity edema significantly better  Complains of abdominal itching and nasal congestion  No other acute complaints      OBJECTIVE     Vitals:    21 1505 21 2317 21 0531 21 0659   BP: 114/59 116/62  117/64   Pulse: 96 95  97   Resp: 18 18  17   Temp: 97 9 °F (36 6 °C) 97 6 °F (36 4 °C)  97 8 °F (36 6 °C)   TempSrc: Oral Oral     SpO2: 97% 96%  (!) 88%   Weight:   70 3 kg (154 lb 15 7 oz)       Temperature:   Temp (24hrs), Av 8 °F (36 6 °C), Min:97 6 °F (36 4 °C), Max:97 9 °F (36 6 °C)    Temperature: 97 8 °F (36 6 °C)  Intake & Output:  I/O       05/21 0701 - 05/22 0700 05/22 0701 - 05/23 0700    P  O  600 476    Total Intake(mL/kg) 600 (8 3) 476 (6 8)    Urine (mL/kg/hr) 1550 (0 9) 950 (0 6)    Stool 0     Total Output 1550 950    Net -950 -474          Unmeasured Urine Occurrence 2 x 1 x    Unmeasured Stool Occurrence 0 x         Weights:        Body mass index is 26 6 kg/m²  Weight (last 2 days)     Date/Time   Weight    05/23/21 0531   70 3 (154 98)    05/22/21 0541   72 5 (159 83)    05/21/21 0600   70 3 (155)            Physical Exam  Vitals signs and nursing note reviewed  Constitutional:       General: She is not in acute distress  Appearance: She is well-developed  HENT:      Head: Normocephalic and atraumatic  Eyes:      Conjunctiva/sclera: Conjunctivae normal    Neck:      Musculoskeletal: Neck supple  Cardiovascular:      Rate and Rhythm: Normal rate and regular rhythm  Heart sounds: No murmur  Pulmonary:      Effort: Pulmonary effort is normal  No respiratory distress  Breath sounds: Normal breath sounds  Comments: 4 L nasal  Abdominal:      Palpations: Abdomen is soft  Tenderness: There is no abdominal tenderness  Musculoskeletal:      Right lower leg: Edema present  Left lower leg: Edema present  Skin:     General: Skin is warm and dry  Neurological:      Mental Status: She is alert  LABORATORY DATA     Labs: I have personally reviewed pertinent reports    Results from last 7 days   Lab Units 05/19/21  0242 05/18/21  1704   WBC Thousand/uL 7 24 9 13   HEMOGLOBIN g/dL 17 8* 18 2*   HEMATOCRIT % 57 7* 56 1*   PLATELETS Thousands/uL 197 221   NEUTROS PCT % 72 79*   MONOS PCT % 10 9      Results from last 7 days   Lab Units 05/23/21  0509 05/22/21  0525 05/21/21  0533  05/19/21  0243   POTASSIUM mmol/L 3 7 5 0 3 6   < > 3 4*   CHLORIDE mmol/L 96* 99* 97*   < > 98*   CO2 mmol/L 42* 40* 40*   < > 34*   BUN mg/dL 23 20 22   < > 16   CREATININE mg/dL 0 66 0 70 0 70   < > 0 81   CALCIUM mg/dL 9 4 9 6 9 0   < > 8 8   ALK PHOS U/L 120*  --   --   --  135*   ALT U/L 25  --   --   --  30   AST U/L 15  --   --   --  25    < > = values in this interval not displayed  Results from last 7 days   Lab Units 05/19/21  0243   MAGNESIUM mg/dL 2 1                  Results from last 7 days   Lab Units 05/18/21  1704   TROPONIN I ng/mL 0 04       IMAGING & DIAGNOSTIC TESTING     Radiology Results: I have personally reviewed pertinent reports  X-ray Chest 1 View Portable    Result Date: 5/19/2021  Impression: No acute cardiopulmonary disease  Workstation performed: [id number]     Other Diagnostic Testing: I have personally reviewed pertinent reports      ACTIVE MEDICATIONS     Current Facility-Administered Medications   Medication Dose Route Frequency    acetaminophen (TYLENOL) tablet 650 mg  650 mg Oral Q6H PRN    calcium carbonate (OYSTER SHELL,OSCAL) 500 mg tablet 1 tablet  1 tablet Oral Daily With Breakfast    enoxaparin (LOVENOX) subcutaneous injection 40 mg  40 mg Subcutaneous Daily    fluticasone (FLONASE) 50 mcg/act nasal spray 1 spray  1 spray Each Nare Daily    furosemide (LASIX) injection 80 mg  80 mg Intravenous BID    insulin lispro (HumaLOG) 100 units/mL subcutaneous injection 1-5 Units  1-5 Units Subcutaneous HS    insulin lispro (HumaLOG) 100 units/mL subcutaneous injection 1-6 Units  1-6 Units Subcutaneous TID AC    lisinopril (ZESTRIL) tablet 10 mg  10 mg Oral Daily    melatonin tablet 3 mg  3 mg Oral HS PRN    nicotine (NICODERM CQ) 14 mg/24hr TD 24 hr patch 14 mg  14 mg Transdermal Daily    sodium chloride (OCEAN) 0 65 % nasal spray 1 spray  1 spray Each Nare TID PRN    sodium chloride (PF) 0 9 % injection 3 mL  3 mL Intravenous Q1H PRN    white petrolatum-mineral oil (EUCERIN,HYDROCERIN) cream   Topical TID PRN       VTE Pharmacologic Prophylaxis: Enoxaparin (Lovenox)  VTE Mechanical Prophylaxis: sequential compression device    Portions of the record may have been created with voice recognition software  Occasional wrong word or "sound a like" substitutions may have occurred due to the inherent limitations of voice recognition software    Read the chart carefully and recognize, using context, where substitutions have occurred   ==  Armani Claire MD  [address]  Internal Medicine Residency PGY-2

## 2021-05-23 NOTE — PROGRESS NOTES
Progress Note - Nephrology   Kenneth Spring 66 y o  female MRN: [id number]  Unit/Bed#: [facility] 525-01 Encounter: [id number]      Assessment / Plan:  1  Microalbuminuria-microalbumin to creatinine ratio 2 5, U PCR 0 93 g  -normal renal function noted  -low anion gap of 1 present  -SPEP/UPEP negative for monoclonality  -hep B core total antibody reactive with negative hep B core IgM or hep B surface antigen  -complements normal, FARIDEH normal  -Anca, RF pending  -UA with microscopy shows 2-4 RBCs in 2-4 WBCs  -do not believe patient has nephrotic syndrome  -follow-up cardiac MRI to rule out amyloidosis  -no urgency to renal biopsy at this time  -differential includes idiopathic nodular glomerulosclerosis in the setting of chronic smoking plus or minus diabetic kidney disease plus or minus analgesic nephropathy  -baseline serum creatinine less than 1, at baseline  -f/u am BMP  -consider repeat UpCr as an outpatient  -continue ACEi     2  Elevated total CO2 -likely due to contraction alkalosis in the setting of Lasix 80mg IV BID per Cardiology, monitor, if pH rising consider acetazolamide use     3  Low serum anion gap-workup as above     4  Elevated hemoglobin-likely due to COPD     5  Elevated BP without diagnosis of HTN - blood pressure elevated on admission but improved with Lasix 40mg IV BID, no history of antihypertensives or hypertension in the past, on lisinopril 10 mg daily for proteinuria type 2 diabetes  -hold parameters adjusted on ACEi    Nephrology will sign off  Should have outpatient f/u with renal upon d/c  Message sent to office  Subjective:   She is on 5 L oxygen via nasal cannula  She denies chest pain or shortness breath or leg edema  No complaints  Objective:     Vitals: Blood pressure 142/87, pulse 93, temperature 97 8 °F (36 6 °C), temperature source Oral, resp  rate 16, weight 70 3 kg (154 lb 15 7 oz), SpO2 93 %  ,Body mass index is 26 6 kg/m²  Temp (24hrs), Av 7 °F (36 5 °C), Min:97 6 °F (36 4 °C), Max:97 8 °F (36 6 °C)      Weight (last 2 days)     Date/Time   Weight    05/23/21 0531   70 3 (154 98)    05/22/21 0541   72 5 (159 83)    05/21/21 0600   70 3 (155)                Intake/Output Summary (Last 24 hours) at 5/23/2021 1644  Last data filed at 5/23/2021 1300  Gross per 24 hour   Intake 720 ml   Output 3100 ml   Net -2380 ml     I/O last 24 hours: In: 65 [P O :956]  Out: 3500 [Urine:3500]        Physical Exam:   Physical Exam  Vitals signs and nursing note reviewed  Constitutional:       General: She is not in acute distress  Appearance: Normal appearance  She is well-developed  She is not diaphoretic  Comments: disheveled   HENT:      Head: Normocephalic and atraumatic  Mouth/Throat:      Pharynx: No oropharyngeal exudate  Eyes:      General: No scleral icterus  Right eye: No discharge  Left eye: No discharge  Neck:      Musculoskeletal: Normal range of motion and neck supple  Thyroid: No thyromegaly  Cardiovascular:      Rate and Rhythm: Normal rate and regular rhythm  Heart sounds: No murmur  Pulmonary:      Effort: Pulmonary effort is normal  No respiratory distress  Breath sounds: Normal breath sounds  No wheezing  Abdominal:      General: Bowel sounds are normal  There is no distension  Palpations: Abdomen is soft  Musculoskeletal: Normal range of motion  General: No swelling  Skin:     General: Skin is warm and dry  Findings: No rash  Neurological:      General: No focal deficit present  Mental Status: She is alert  Motor: No abnormal muscle tone        Comments: awake   Psychiatric:         Mood and Affect: Mood normal          Behavior: Behavior normal          Invasive Devices     Peripheral Intravenous Line            Peripheral IV 05/22/21 Proximal;Right;Ventral (anterior) Forearm 1 day                Medications:    Scheduled Meds:  Current Facility-Administered Medications   Medication Dose Route Frequency Provider Last Rate    acetaminophen  650 mg Oral Q6H PRN Palma Lantigua MD      calcium carbonate  1 tablet Oral Daily With Breakfast Chrissy Carcamo, DO      enoxaparin  40 mg Subcutaneous Daily Chrissy Carcamo, DO      fluticasone  1 spray Each Nare Daily Madeline Cobb MD      furosemide  40 mg Intravenous BID Félix Barbosa MD      insulin lispro  1-5 Units Subcutaneous HS Chrissy Carcamo, DO      insulin lispro  1-6 Units Subcutaneous TID AC Chrissy Carcamo, DO      lisinopril  10 mg Oral Daily Tammy Trevino, DO      melatonin  3 mg Oral HS PRN Madeline Cobb MD      nicotine  14 mg Transdermal Daily Mariano Jackson, DO      sodium chloride  1 spray Each Nare TID PRN Madeline Cobb MD      sodium chloride (PF)  3 mL Intravenous Q1H PRN Chrissy Carcamo, DO      white petrolatum-mineral oil   Topical TID PRN Madeline Cobb MD         PRN Meds:   acetaminophen    melatonin    sodium chloride    Insert peripheral IV **AND** sodium chloride (PF)    white petrolatum-mineral oil    Continuous Infusions:         LAB RESULTS:      Results from last 7 days   Lab Units 05/23/21  0509 05/22/21  0525 05/21/21  0533 05/20/21  0515 05/19/21  0243 05/19/21  0242 05/18/21  1704   WBC Thousand/uL  --   --   --   --   --  7 24 9 13   HEMOGLOBIN g/dL  --   --   --   --   --  17 8* 18 2*   HEMATOCRIT %  --   --   --   --   --  57 7* 56 1*   PLATELETS Thousands/uL  --   --   --   --   --  197 221   NEUTROS PCT %  --   --   --   --   --  72 79*   LYMPHS PCT %  --   --   --   --   --  15 10*   MONOS PCT %  --   --   --   --   --  10 9   EOS PCT %  --   --   --   --   --  2 1   POTASSIUM mmol/L 3 7 5 0 3 6 3 3* 3 4*  --  3 6   CHLORIDE mmol/L 96* 99* 97* 94* 98*  --  99*   CO2 mmol/L 42* 40* 40* 41* 34*  --  32   BUN mg/dL 23 20 22 18 16  --  16   CREATININE mg/dL 0 66 0 70 0 70 0 72 0 81  --  0 80   CALCIUM mg/dL 9 4 9 6 9 0 8 7 8 8  --  9 2   ALK PHOS U/L 120*  --   --   --  135*  --   --    ALT U/L 25  -- --   --  30  --   --    AST U/L 15  --   --   --  25  --   --    MAGNESIUM mg/dL  --   --   --   --  2 1  --   --        CUTURES:  Lab Results   Component Value Date    URINECX No Growth <1000 cfu/mL 05/12/2021                 Portions of the record may have been created with voice recognition software  Occasional wrong word or "sound a like" substitutions may have occurred due to the inherent limitations of voice recognition software  Read the chart carefully and recognize, using context, where substitutions have occurred  If you have any questions, please contact the dictating provider

## 2021-05-24 ENCOUNTER — APPOINTMENT (INPATIENT)
Dept: RADIOLOGY | Facility: HOSPITAL | Age: 76
DRG: 291 | End: 2021-05-24
Payer: MEDICARE

## 2021-05-24 LAB
ALBUMIN SERPL BCP-MCNC: 3.1 G/DL (ref 3.5–5)
ALP SERPL-CCNC: 119 U/L (ref 46–116)
ALT SERPL W P-5'-P-CCNC: 28 U/L (ref 12–78)
ANION GAP SERPL CALCULATED.3IONS-SCNC: 2 MMOL/L (ref 4–13)
AST SERPL W P-5'-P-CCNC: 18 U/L (ref 5–45)
BILIRUB SERPL-MCNC: 1.99 MG/DL (ref 0.2–1)
BUN SERPL-MCNC: 19 MG/DL (ref 5–25)
C-ANCA TITR SER IF: NORMAL TITER
CALCIUM ALBUM COR SERPL-MCNC: 10.1 MG/DL (ref 8.3–10.1)
CALCIUM SERPL-MCNC: 9.4 MG/DL (ref 8.3–10.1)
CHLORIDE SERPL-SCNC: 95 MMOL/L (ref 100–108)
CO2 SERPL-SCNC: 42 MMOL/L (ref 21–32)
CREAT SERPL-MCNC: 0.55 MG/DL (ref 0.6–1.3)
CRYOGLOB RF SER-ACNC: ABNORMAL [IU]/ML
D DIMER PPP FEU-MCNC: 1.23 UG/ML FEU
GFR SERPL CREATININE-BSD FRML MDRD: 91 ML/MIN/1.73SQ M
GLUCOSE SERPL-MCNC: 100 MG/DL (ref 65–140)
GLUCOSE SERPL-MCNC: 134 MG/DL (ref 65–140)
GLUCOSE SERPL-MCNC: 135 MG/DL (ref 65–140)
GLUCOSE SERPL-MCNC: 152 MG/DL (ref 65–140)
GLUCOSE SERPL-MCNC: 194 MG/DL (ref 65–140)
MAGNESIUM SERPL-MCNC: 2.1 MG/DL (ref 1.6–2.6)
MYELOPEROXIDASE AB SER IA-ACNC: <9 U/ML (ref 0–9)
P-ANCA ATYPICAL TITR SER IF: NORMAL TITER
P-ANCA TITR SER IF: NORMAL TITER
POTASSIUM SERPL-SCNC: 3.2 MMOL/L (ref 3.5–5.3)
PROT SERPL-MCNC: 6.7 G/DL (ref 6.4–8.2)
PROTEINASE3 AB SER IA-ACNC: <3.5 U/ML (ref 0–3.5)
RHEUMATOID FACT SER QL LA: POSITIVE
SODIUM SERPL-SCNC: 139 MMOL/L (ref 136–145)

## 2021-05-24 PROCEDURE — 99232 SBSQ HOSP IP/OBS MODERATE 35: CPT | Performed by: INTERNAL MEDICINE

## 2021-05-24 PROCEDURE — 83735 ASSAY OF MAGNESIUM: CPT | Performed by: STUDENT IN AN ORGANIZED HEALTH CARE EDUCATION/TRAINING PROGRAM

## 2021-05-24 PROCEDURE — 85379 FIBRIN DEGRADATION QUANT: CPT | Performed by: PHYSICIAN ASSISTANT

## 2021-05-24 PROCEDURE — G1004 CDSM NDSC: HCPCS

## 2021-05-24 PROCEDURE — 82948 REAGENT STRIP/BLOOD GLUCOSE: CPT

## 2021-05-24 PROCEDURE — 71275 CT ANGIOGRAPHY CHEST: CPT

## 2021-05-24 PROCEDURE — 80053 COMPREHEN METABOLIC PANEL: CPT | Performed by: STUDENT IN AN ORGANIZED HEALTH CARE EDUCATION/TRAINING PROGRAM

## 2021-05-24 RX ORDER — POTASSIUM CHLORIDE 20 MEQ/1
20 TABLET, EXTENDED RELEASE ORAL 2 TIMES DAILY
Status: DISCONTINUED | OUTPATIENT
Start: 2021-05-25 | End: 2021-05-26 | Stop reason: HOSPADM

## 2021-05-24 RX ORDER — ACETAZOLAMIDE 500 MG/5ML
500 INJECTION, POWDER, LYOPHILIZED, FOR SOLUTION INTRAVENOUS ONCE
Status: COMPLETED | OUTPATIENT
Start: 2021-05-24 | End: 2021-05-24

## 2021-05-24 RX ORDER — POTASSIUM CHLORIDE 20 MEQ/1
40 TABLET, EXTENDED RELEASE ORAL 2 TIMES DAILY
Status: COMPLETED | OUTPATIENT
Start: 2021-05-24 | End: 2021-05-24

## 2021-05-24 RX ORDER — FUROSEMIDE 80 MG
80 TABLET ORAL
Status: DISCONTINUED | OUTPATIENT
Start: 2021-05-24 | End: 2021-05-26 | Stop reason: HOSPADM

## 2021-05-24 RX ADMIN — CALCIUM 1 TABLET: 500 TABLET ORAL at 08:41

## 2021-05-24 RX ADMIN — Medication 14 MG: at 21:00

## 2021-05-24 RX ADMIN — ACETAZOLAMIDE SODIUM 500 MG: 500 INJECTION, POWDER, LYOPHILIZED, FOR SOLUTION INTRAVENOUS at 13:25

## 2021-05-24 RX ADMIN — POTASSIUM CHLORIDE 40 MEQ: 1500 TABLET, EXTENDED RELEASE ORAL at 08:41

## 2021-05-24 RX ADMIN — LISINOPRIL 10 MG: 10 TABLET ORAL at 08:41

## 2021-05-24 RX ADMIN — IOHEXOL 85 ML: 350 INJECTION, SOLUTION INTRAVENOUS at 18:42

## 2021-05-24 RX ADMIN — FLUTICASONE PROPIONATE 1 SPRAY: 50 SPRAY, METERED NASAL at 08:41

## 2021-05-24 RX ADMIN — FUROSEMIDE 80 MG: 80 TABLET ORAL at 17:43

## 2021-05-24 RX ADMIN — ENOXAPARIN SODIUM 40 MG: 40 INJECTION SUBCUTANEOUS at 08:41

## 2021-05-24 RX ADMIN — INSULIN LISPRO 2 UNITS: 100 INJECTION, SOLUTION INTRAVENOUS; SUBCUTANEOUS at 11:39

## 2021-05-24 RX ADMIN — POTASSIUM CHLORIDE 40 MEQ: 1500 TABLET, EXTENDED RELEASE ORAL at 17:43

## 2021-05-24 RX ADMIN — ACETAMINOPHEN 650 MG: 325 TABLET, FILM COATED ORAL at 01:45

## 2021-05-24 RX ADMIN — INSULIN LISPRO 1 UNITS: 100 INJECTION, SOLUTION INTRAVENOUS; SUBCUTANEOUS at 22:10

## 2021-05-24 RX ADMIN — FUROSEMIDE 40 MG: 10 INJECTION, SOLUTION INTRAMUSCULAR; INTRAVENOUS at 08:41

## 2021-05-24 NOTE — PROGRESS NOTES
INTERNAL MEDICINE RESIDENCY PROGRESS NOTE     Name: Rolando Perez   Age & Sex: 66 y o  female   MRN: [id number]  Unit/Bed#: [facility] 525-01   Encounter: [id number]  Team: SOD Team B     PATIENT INFORMATION     Name: Rolando Perez   Age & Sex: 66 y o  female   MRN: [id number]  Hospital Stay Days: 6    ASSESSMENT/PLAN     Principal Problem:    Acute decompensated heart failure ([facility] [address] )  Active Problems:    Acute respiratory failure with hypoxia ([facility] [address] )    Diabetes mellitus ([facility] [address] )    Microalbuminuria    Lung nodules    COPD (chronic obstructive pulmonary disease) ([facility])    Nicotine dependence    Hypokalemia    Elevated BP without diagnosis of hypertension    Alkalosis      * Acute decompensated heart failure ([facility] [address] )  Assessment & Plan  Patient presenting with worsening b/l LE edema and abdominal distension, worsening over the last two weeks despite initiation or oral lasix  On arrival, patient hypoxic to 81% requiring 6L nasal canala  Patient significantly volume overloaded with evidence of right heart failure given +3 pitting b/l LE edema extending up to thighs, significant abdominal distension, and JVD  Only fine bibasilar crackles  Labs significant for proNT-BNP of 3,223 and imaging findings demonstrating small b/l pleural effusions and abdominopelvic ascites  Patient symptoms and clinical exam findings likely secondary to new onset decompensated CHF  Echocardiogram: Systolic function was normal  Ejection fraction was estimated in the range of 50 % to 55 %  Mild concentric hypertrophy  RV moderately dilated, RV function moderately reduced  Mild pulmonary hypertension  Cardiac MRI: Normal left ventricle size  Mildly reduced left ventricle systolic function with ejection fraction measures 45%  No evidence of regional wall motion abnormality  No evidence of delayed myocardial enhancement  Normal right ventricle size  Moderately reduced right ventricle systolic function with ejection fraction measuring 39%   Mild left atrial enlargement  No valvular abnormality     Was started on Lasix 80 mg IV bid that was subsequently reduced to 40 IV b i d  Responding well, net negative and weight decreasing  Creatinine stable however increasing bicarb and developing contraction alkalosis  Intake/Output Summary (Last 24 hours) at 5/24/2021 0904  Last data filed at 5/23/2021 2101  Gross per 24 hour   Intake 480 ml   Output 2125 ml   Net -1645 ml         Weight (last 2 days)     Date/Time   Weight    05/24/21 0531   67 7 (149 25)    05/23/21 0531   70 3 (154 98)    05/22/21 0541   72 5 (159 83)              Plan:  · Lasix 40 mg IV BID  · Heart failure consulted  Workup for pulmonary hypertension  Planned for right heart catheterization when volume status is more optimized, tentatively on Tuesday  · Strict standing weights; monitor intake and output  · 2g Na restricted diet and 1500 mL fluid restricted diet  · Monitor and maintain SpO2>88%; wean O2 as patient tolerates  Acute respiratory failure with hypoxia [facility])  Assessment & Plan  Patient hypoxic on day of admission with SpO2 of 81% on room air requiring 6L nasal cannula on arrival  SOB likely secondary to underlying COPD and significant volume overload  Plan:  · Maintain SpO2>88%; wean O2 as patient tolerates  · Diuresis as above   · Out of bed with assistance   · Out patient follow up with PCP; will require PFTs for likely underlying COPD    Hypokalemia  Assessment & Plan  · In the setting of diuresis  · Monitor and replete as needed  Nicotine dependence  Assessment & Plan  Longstanding history of tobacco abuse for over 40 years  Currently half to full pack per day smoker  Denies nicotine patch at this time  Plan:  · Continue to encourage tobacco cessation    COPD (chronic obstructive pulmonary disease) ([facility] [address] )  Assessment & Plan  No prior documented history of COPD   High likelihood of undiagnosed COPD given recent evidence of COPD on CT C/A/P and long-standing smoking history  Patient does have diffuse end-expiratory wheezing on exam with prolonged expiratory phase  Requiring 6L NC but major contributor being volume overload  No concern for COPD exacerbation at this time  Plan:  · Maintain SpO2>88%; wean O2 as patient tolerates  · Continue to encourage smoking cessation  · Outpatient follow up; will require PFTs    Lung nodules  Assessment & Plan  CT Chest/Abdomen completed on 5/14/2021 revealed: "Few pulmonary nodules in the left lower lobe, the largest of which measures 6 mm with unknown long-term stability  Based on current Fleischner Society 2017 Guidelines on incidental pulmonary nodule, followup non-contrast CT is recommended at 6-12 months from the initial examination and, if stable at that time, an additional followup is recommended for 18-24 months from the initial examination "    Plan:  · Findings discussed with patient, repeat outpatient non-contrast CT in 6-12 months    Microalbuminuria  Assessment & Plan  Microlbumin creatinine ratio 2,475  Seen by nephrology, differential include idiopathic nodular glomerular sclerosis in the setting of chronic smoking, possible analgesic nephropathy with chronic NSAID use, diabetic kidney disease  · Initiated on ACE-I    · Outpatient nephrology follow up  Diabetes mellitus ([facility] [address] )  Assessment & Plan  Lab Results   Component Value Date    HGBA1C 6 6 (H) 05/12/2021     History of DM II with most recent A1c of 6 6  Not currently on any outpatient diabetic regimen  Plan:  · Continue POC glucose checks; 4x daily with meals and at bedtime  · Insulin regimen:  · Mealtime: SSI Algorithm 3  · Bedtime: SSI Algorithm 2        Disposition:  Continue inpatient care  RHC when volume status is more optimized tentatively tomorrow  Discussed with running RN, all questions and concerns addressed  SUBJECTIVE     Patient seen and examined  No acute events overnight  Developed abdominal itching that is improved with emollients    Did also complain of itching on b/l soles of feet, resolved with emollients and ice packs  Breathing improved and lower extremity swell on most resolved    OBJECTIVE     Vitals:    21 1549 21 2226 21 0531 21 0659   BP: 142/87 141/84  134/77   Pulse: 93   98   Resp: 16 20     Temp: 97 8 °F (36 6 °C) 98 1 °F (36 7 °C)  97 5 °F (36 4 °C)   TempSrc: Oral      SpO2: 93%   95%   Weight:   67 7 kg (149 lb 4 oz)       Temperature:   Temp (24hrs), Av 8 °F (36 6 °C), Min:97 5 °F (36 4 °C), Max:98 1 °F (36 7 °C)    Temperature: 97 5 °F (36 4 °C)  Intake & Output:  I/O        07 -  0700  07 -  0700  07 -  0700    P  O  476 720     Total Intake(mL/kg) 476 (6 8) 720 (10 6)     Urine (mL/kg/hr) 950 (0 6) 3175 (2)     Stool  0     Total Output 950 3175     Net -474 -2455            Unmeasured Urine Occurrence 1 x 1 x     Unmeasured Stool Occurrence  1 x         Weights:        Body mass index is 25 62 kg/m²  Weight (last 2 days)     Date/Time   Weight    21 0531   67 7 (149 25)    21 0531   70 3 (154 98)    21 0541   72 5 (159 83)            Physical Exam  Vitals signs and nursing note reviewed  Constitutional:       General: She is not in acute distress  Appearance: She is well-developed  HENT:      Head: Normocephalic and atraumatic  Eyes:      Conjunctiva/sclera: Conjunctivae normal    Neck:      Musculoskeletal: Neck supple  Cardiovascular:      Rate and Rhythm: Normal rate and regular rhythm  Heart sounds: No murmur  Pulmonary:      Effort: Pulmonary effort is normal  No respiratory distress  Breath sounds: Normal breath sounds  Comments: 2 L NC  Abdominal:      Palpations: Abdomen is soft  Tenderness: There is no abdominal tenderness  Skin:     General: Skin is warm and dry  Neurological:      General: No focal deficit present  Mental Status: She is alert and oriented to person, place, and time  LABORATORY DATA     Labs: I have personally reviewed pertinent reports  Results from last 7 days   Lab Units 05/19/21  0242 05/18/21  1704   WBC Thousand/uL 7 24 9 13   HEMOGLOBIN g/dL 17 8* 18 2*   HEMATOCRIT % 57 7* 56 1*   PLATELETS Thousands/uL 197 221   NEUTROS PCT % 72 79*   MONOS PCT % 10 9      Results from last 7 days   Lab Units 05/24/21  0459 05/23/21  0509 05/22/21  0525  05/19/21  0243   POTASSIUM mmol/L 3 2* 3 7 5 0   < > 3 4*   CHLORIDE mmol/L 95* 96* 99*   < > 98*   CO2 mmol/L 42* 42* 40*   < > 34*   BUN mg/dL 19 23 20   < > 16   CREATININE mg/dL 0 55* 0 66 0 70   < > 0 81   CALCIUM mg/dL 9 4 9 4 9 6   < > 8 8   ALK PHOS U/L 119* 120*  --   --  135*   ALT U/L 28 25  --   --  30   AST U/L 18 15  --   --  25    < > = values in this interval not displayed  Results from last 7 days   Lab Units 05/24/21  0459 05/19/21  0243   MAGNESIUM mg/dL 2 1 2 1                  Results from last 7 days   Lab Units 05/18/21  1704   TROPONIN I ng/mL 0 04       IMAGING & DIAGNOSTIC TESTING     Radiology Results: I have personally reviewed pertinent reports  X-ray Chest 1 View Portable    Result Date: 5/19/2021  Impression: No acute cardiopulmonary disease  Workstation performed: [id number]     Mri Cardiac  W Wo Contrast    Result Date: 5/24/2021  Impression: Impression: 1  Normal left ventricle size  Mildly reduced left ventricle systolic function with ejection fraction measures 45%  No evidence of regional wall motion abnormality  No evidence of delayed myocardial enhancement  2  Normal right ventricle size  Moderately reduced right ventricle systolic function with ejection fraction measuring 39%  3  Mild left atrial enlargement 4  No valvular abnormality Workstation performed: [id number]     Other Diagnostic Testing: I have personally reviewed pertinent reports      ACTIVE MEDICATIONS     Current Facility-Administered Medications   Medication Dose Route Frequency    acetaminophen (TYLENOL) tablet 650 mg  650 mg Oral Q6H PRN    calcium carbonate (OYSTER SHELL,OSCAL) 500 mg tablet 1 tablet  1 tablet Oral Daily With Breakfast    enoxaparin (LOVENOX) subcutaneous injection 40 mg  40 mg Subcutaneous Daily    fluticasone (FLONASE) 50 mcg/act nasal spray 1 spray  1 spray Each Nare Daily    furosemide (LASIX) injection 40 mg  40 mg Intravenous BID    insulin lispro (HumaLOG) 100 units/mL subcutaneous injection 1-5 Units  1-5 Units Subcutaneous HS    insulin lispro (HumaLOG) 100 units/mL subcutaneous injection 1-6 Units  1-6 Units Subcutaneous TID AC    lisinopril (ZESTRIL) tablet 10 mg  10 mg Oral Daily    melatonin tablet 3 mg  3 mg Oral HS PRN    nicotine (NICODERM CQ) 14 mg/24hr TD 24 hr patch 14 mg  14 mg Transdermal Daily    potassium chloride (K-DUR,KLOR-CON) CR tablet 40 mEq  40 mEq Oral BID    sodium chloride (OCEAN) 0 65 % nasal spray 1 spray  1 spray Each Nare TID PRN    sodium chloride (PF) 0 9 % injection 3 mL  3 mL Intravenous Q1H PRN    white petrolatum-mineral oil (EUCERIN,HYDROCERIN) cream   Topical TID PRN       VTE Pharmacologic Prophylaxis: Enoxaparin (Lovenox)  VTE Mechanical Prophylaxis: sequential compression device    Portions of the record may have been created with voice recognition software  Occasional wrong word or "sound a like" substitutions may have occurred due to the inherent limitations of voice recognition software    Read the chart carefully and recognize, using context, where substitutions have occurred   ==  Chiquita Parada MD  [address], [place]  Internal Medicine Residency PGY-2

## 2021-05-24 NOTE — PROGRESS NOTES
Advanced Heart Failure / Pulmonary Hypertension Service Progress Note    Jennifer Flores 66 y o  female   MRN: [id number]  [facility]; Encounter: [id number]    Assessment:  Principal Problem:    Acute decompensated heart failure ([facility] [address] )  Active Problems:    Diabetes mellitus ([facility] [address] )    Microalbuminuria    Lung nodules    COPD (chronic obstructive pulmonary disease) ([facility])    Acute respiratory failure with hypoxia ([facility])    Nicotine dependence    Hypokalemia    Elevated BP without diagnosis of hypertension    Alkalosis      Subjective:   Jennifer Flores is a 64-year-old woman with PMH as below who presented to [facility] on 05/18/2021 with new onset lower extremity edema with associated SOB, weight gain, and orthopnea  Seen by PCP on 05/11 for new onset lower extremity swelling for 1 week with associated SOB  Patient was then started on PO Lasix 20 mg daily and CXR, blood work and urine test were ordered  Return to PCP office on 05/17 to review these results; Lasix was then increased to 40 mg daily  Patient advised to proceed to ED if symptoms worsen  Presented to ED on 05/18  Workup revealed new onset hypertension with elevated NT proBNP of 3223  (Note: was normotensive during both PCP visits in mid May)  Was started on IV Lasix 40 mg BID as well as lisinopril 10 mg daily  TTE was ordered, and cardiology was consulted  Following cardiology assessment, IV Lasix was increased 80 mg BID  TTE revealed preserved LVEF dilated RV and moderately reduced RV systolic function  Due to proteinuria as well as LVH on TTE, cardiac MRI was ordered to rule out cardiac amyloidosis  Patient seen and examined  No significant events overnight  Over the weekend, SBP hold parameter of ACEi increased to 130 mmHg, and IV Lasix dose was decreased to 40 mg BID  Reviewed patient's medical, family, and social history again (now that patient less lethargic); EMR updated as appropriate   Feels that her legs are now back to her baseline  Does continue to feel winded with mild exertion; has yet to ambulate in halls  Objective: Intake/ Output: 720 mL / 3175 mL (net negative 2455 mL)  Weight: 149 lbs (down from 154 lbs on 05/23)  Telemetry: Not on telemetry  Today's Plan:   Switch from IV to PO Lasix 80 mg BID   Will give additional dose of IV Diamox today given persistently elevated CO2   Check D-dimer; if positive will likely proceed with CTA chest/PE study   Recommend checking overnight pulse oximetry as well as home oxygen evaluation if unable to wean oxygen   Potassium 3 2 this AM; PO supplementation ordered   No indication for RHC at this time   Negative SPEP and UPEP, Kappa/lambda ratio 1 66     Cardiac MRI with LVEF 45%; RVEF 39%  No infiltrative cardiac disease   Can consider addition of SGLT2 inhibitor to regimen   Plan for outpatient PSG and PFTs +/- 6MWT  Plan:  Acute on chronic HFpEF with RV dysfunction; LVEF 50-55%; LVIDd 2 87 cm; NYHA III; ACC/AHA Stage C              Etiology: unclear  Reactive Hepatitis B core total antibody (indicating past or current infection)  No prior medical history of PE/DVT, autoimmune diseases, or connective tissue diseases  FARIDEH negative, RF positive (20 IU/mL)  Known COPD diagnosis  No history of stimulant or weight loss medication use  NT-proBNP 05/18/2021: 3223  TTE 05/19/2021: LVEF 50-55%  LVIDd 2 87 cm  IVSd 1 27 cm  Moderately dilated RV with moderately reduced RVSF  RVIDd 3 14 cm  Mild MR and TR  PASP 48 mmHg  Mildly dilated IVC  cMRI 05/21/2021: LVEF 45%  LV CO/CI 5 2 / 2 8  RVEF 39%  RV CO/CI 7 8 / 4 2  "Delayed post-gadolinium imaging demonstrates no region of hyperenhancement "              Reviewed importance of low sodium diet and fluid restriction  Strict I&Os with daily weights                 CV diet with fluid restriction       Neurohormonal Blockade:  --Beta Blocker: No    --ARNi / ACEi / ARB: lisinopril 10 mg daily  --Aldosterone Antagonist: No    --SGLT2 Inhibitor: No    --Home Diuretic: Lasix 40 mg daily  --Inpatient Diuretic: PO Lasix 80 mg BID with potassium 20 mEq BID        Sudden Cardiac Death Risk Reduction:  --LVEF >35%     Electrical Resynchronization:  --Candidacy for BiV device: narrow QRS     Advanced Therapies (if appropriate): Will continue to monitor      Acute respiratory failure with hypoxia               Etiology: underlying COPD with acute volume overload  Wean oxygen as appropriate  Further management as above       Proteinuria              Microalbumin to creatinine ratio of 2 5 (05/12/2021)              Urine protein to creatinine ratio of 0 9 (05/20/2021)  Negative SPEP and UPEP, Kappa/lambda ratio 1 66  Nephrology on board       Chronic obstructive pulmonary disease              CT chest/abd/pelvis 05/14/2021: 3 pulmonary nodules in LLL (measuring 3-6 mm)  "Linear scar or subsegmental atelectasis in the right upper lobe along the minor fissure, lingula, and lung bases  No infiltrate  COPD  Minimal biapical pleural smooth septal thickening  Central airways are clear "     Hypertension: diagnosed this admission  Diabetes mellitus, type II  Alcohol use: drinks on average, 1 glass of white wine daily with dinner  Tobacco abuse: began smoking in her late teens; states that she quit smoking a few weeks ago  Anxiety    Vitals:   Blood pressure 134/77, pulse 98, temperature 97 5 °F (36 4 °C), resp  rate 20, weight 67 7 kg (149 lb 4 oz), SpO2 95 %  Body mass index is 25 62 kg/m²  I/O last 3 completed shifts:   In: 720 [P O :720]  Out: 3725 [[id number]]    Wt Readings from Last 3 Encounters:   05/24/21 67 7 kg (149 lb 4 oz)   05/20/21 75 4 kg (166 lb 3 6 oz)   05/17/21 79 4 kg (175 lb)     Vitals:    05/23/21 1549 05/23/21 2226 05/24/21 0531 05/24/21 0659   BP: 142/87 141/84  134/77   Pulse: 93   98   Resp: 16 20 Temp: 97 8 °F (36 6 °C) 98 1 °F (36 7 °C)  97 5 °F (36 4 °C)   TempSrc: Oral      SpO2: 93%   95%   Weight:   67 7 kg (149 lb 4 oz)        Physical Exam  Vitals signs reviewed  Constitutional:       General: She is awake  She is not in acute distress  Appearance: Normal appearance  She is well-developed and normal weight  Interventions: Nasal cannula in place  HENT:      Head: Normocephalic  Nose: Nose normal       Mouth/Throat:      Mouth: Mucous membranes are moist    Eyes:      General: No scleral icterus  Conjunctiva/sclera: Conjunctivae normal    Neck:      Musculoskeletal: Neck supple  Vascular: JVD present  Trachea: No tracheal deviation  Cardiovascular:      Rate and Rhythm: Regular rhythm  Tachycardia present  No extrasystoles are present  Pulses: Normal pulses  Heart sounds: Murmur present  Pulmonary:      Effort: Pulmonary effort is normal  No tachypnea, bradypnea, accessory muscle usage or respiratory distress  Breath sounds: Normal air entry  Examination of the right-lower field reveals decreased breath sounds  Decreased breath sounds present  No wheezing or rales  Abdominal:      General: Bowel sounds are normal  There is distension (mild)  Palpations: Abdomen is soft  Tenderness: There is no abdominal tenderness  Musculoskeletal:      Right lower leg: No edema  Left lower leg: No edema  Skin:     General: Skin is warm and dry  Coloration: Skin is not jaundiced or pale  Neurological:      General: No focal deficit present  Mental Status: She is alert and oriented to person, place, and time  Psychiatric:         Attention and Perception: Attention normal          Mood and Affect: Mood and affect normal          Speech: Speech normal          Behavior: Behavior normal  Behavior is cooperative  Thought Content:  Thought content normal      Central Line (day, reason): No   Rivas Catheter (day, reason): No     Current Facility-Administered Medications:     acetaminophen (TYLENOL) tablet 650 mg, 650 mg, Oral, Q6H PRN, Kailash Weiss MD, 650 mg at 05/24/21 0145    acetaZOLAMIDE (DIAMOX) injection 500 mg, 500 mg, Intravenous, Once, Maylin Villegas PA-C    calcium carbonate (OYSTER SHELL,OSCAL) 500 mg tablet 1 tablet, 1 tablet, Oral, Daily With Breakfast, Chrissy Carcamo DO, 1 tablet at 05/24/21 0841    enoxaparin (LOVENOX) subcutaneous injection 40 mg, 40 mg, Subcutaneous, Daily, Chirssy Carcamo DO, 40 mg at 05/24/21 0841    fluticasone (FLONASE) 50 mcg/act nasal spray 1 spray, 1 spray, Each Nare, Daily, Purvi Chiang MD, 1 spray at 05/24/21 0841    furosemide (LASIX) tablet 80 mg, 80 mg, Oral, BID (diuretic), Maylin Villegas PA-C    insulin lispro (HumaLOG) 100 units/mL subcutaneous injection 1-5 Units, 1-5 Units, Subcutaneous, HS, Chrissy Carcamo, DO, 1 Units at 05/23/21 2115    insulin lispro (HumaLOG) 100 units/mL subcutaneous injection 1-6 Units, 1-6 Units, Subcutaneous, TID AC, 2 Units at 05/24/21 1139 **AND** Fingerstick Glucose (POCT), , , 4x Daily AC and at bedtime, Chrissy Carcamo DO    lisinopril (ZESTRIL) tablet 10 mg, 10 mg, Oral, Daily, Tammy Wanvedi, DO, 10 mg at 05/24/21 0841    melatonin tablet 3 mg, 3 mg, Oral, HS PRN, Purvi Chiang MD    nicotine (NICODERM CQ) 14 mg/24hr TD 24 hr patch 14 mg, 14 mg, Transdermal, Daily, Mariano Jackson DO, 14 mg at 05/23/21 1931    [START ON 5/25/2021] potassium chloride (K-DUR,KLOR-CON) CR tablet 20 mEq, 20 mEq, Oral, BID, Maylin Villegas PA-C    potassium chloride (K-DUR,KLOR-CON) CR tablet 40 mEq, 40 mEq, Oral, BID, Purvi Chiang MD, 40 mEq at 05/24/21 0841    sodium chloride (OCEAN) 0 65 % nasal spray 1 spray, 1 spray, Each Nare, TID PRN, Purvi Chiang MD, 1 spray at 05/23/21 1233    Insert peripheral IV, , , Once **AND** sodium chloride (PF) 0 9 % injection 3 mL, 3 mL, Intravenous, Q1H PRN, Chrissy Carcamo DO    white petrolatum-mineral oil (EUCERIN,HYDROCERIN) cream, , Topical, TID PRN, Madeline Cobb MD    Labs & Results:  Results from last 7 days   Lab Units 05/18/21  1704   TROPONIN I ng/mL 0 04     Results from last 7 days   Lab Units 05/19/21  0242 05/18/21  1704   WBC Thousand/uL 7 24 9 13   HEMOGLOBIN g/dL 17 8* 18 2*   HEMATOCRIT % 57 7* 56 1*   PLATELETS Thousands/uL 197 221         Results from last 7 days   Lab Units 05/24/21  0459 05/23/21  0509 05/22/21  0525  05/19/21  0243   POTASSIUM mmol/L 3 2* 3 7 5 0   < > 3 4*   CHLORIDE mmol/L 95* 96* 99*   < > 98*   CO2 mmol/L 42* 42* 40*   < > 34*   BUN mg/dL 19 23 20   < > 16   CREATININE mg/dL 0 55* 0 66 0 70   < > 0 81   CALCIUM mg/dL 9 4 9 4 9 6   < > 8 8   ALK PHOS U/L 119* 120*  --   --  135*   ALT U/L 28 25  --   --  30   AST U/L 18 15  --   --  25    < > = values in this interval not displayed           Alycia Marks PA-C

## 2021-05-25 ENCOUNTER — TELEPHONE (OUTPATIENT)
Dept: NEPHROLOGY | Facility: HOSPITAL | Age: 76
End: 2021-05-25

## 2021-05-25 PROBLEM — E87.6 HYPOKALEMIA: Status: RESOLVED | Noted: 2021-05-20 | Resolved: 2021-05-25

## 2021-05-25 LAB
ANION GAP SERPL CALCULATED.3IONS-SCNC: 1 MMOL/L (ref 4–13)
BUN SERPL-MCNC: 22 MG/DL (ref 5–25)
CALCIUM SERPL-MCNC: 9.7 MG/DL (ref 8.3–10.1)
CHLORIDE SERPL-SCNC: 100 MMOL/L (ref 100–108)
CO2 SERPL-SCNC: 38 MMOL/L (ref 21–32)
CREAT SERPL-MCNC: 0.65 MG/DL (ref 0.6–1.3)
GFR SERPL CREATININE-BSD FRML MDRD: 86 ML/MIN/1.73SQ M
GLUCOSE SERPL-MCNC: 123 MG/DL (ref 65–140)
GLUCOSE SERPL-MCNC: 131 MG/DL (ref 65–140)
GLUCOSE SERPL-MCNC: 146 MG/DL (ref 65–140)
GLUCOSE SERPL-MCNC: 150 MG/DL (ref 65–140)
GLUCOSE SERPL-MCNC: 157 MG/DL (ref 65–140)
POTASSIUM SERPL-SCNC: 3.8 MMOL/L (ref 3.5–5.3)
SODIUM SERPL-SCNC: 139 MMOL/L (ref 136–145)

## 2021-05-25 PROCEDURE — 82948 REAGENT STRIP/BLOOD GLUCOSE: CPT

## 2021-05-25 PROCEDURE — 80048 BASIC METABOLIC PNL TOTAL CA: CPT | Performed by: STUDENT IN AN ORGANIZED HEALTH CARE EDUCATION/TRAINING PROGRAM

## 2021-05-25 PROCEDURE — 99232 SBSQ HOSP IP/OBS MODERATE 35: CPT | Performed by: INTERNAL MEDICINE

## 2021-05-25 PROCEDURE — 94762 N-INVAS EAR/PLS OXIMTRY CONT: CPT

## 2021-05-25 RX ORDER — ACETAZOLAMIDE 500 MG/5ML
500 INJECTION, POWDER, LYOPHILIZED, FOR SOLUTION INTRAVENOUS ONCE
Status: COMPLETED | OUTPATIENT
Start: 2021-05-25 | End: 2021-05-25

## 2021-05-25 RX ADMIN — ENOXAPARIN SODIUM 40 MG: 40 INJECTION SUBCUTANEOUS at 08:11

## 2021-05-25 RX ADMIN — POTASSIUM CHLORIDE 20 MEQ: 1500 TABLET, EXTENDED RELEASE ORAL at 08:11

## 2021-05-25 RX ADMIN — FLUTICASONE PROPIONATE 1 SPRAY: 50 SPRAY, METERED NASAL at 08:11

## 2021-05-25 RX ADMIN — ACETAZOLAMIDE SODIUM 500 MG: 500 INJECTION, POWDER, LYOPHILIZED, FOR SOLUTION INTRAVENOUS at 16:22

## 2021-05-25 RX ADMIN — FUROSEMIDE 80 MG: 80 TABLET ORAL at 18:03

## 2021-05-25 RX ADMIN — FUROSEMIDE 80 MG: 80 TABLET ORAL at 08:10

## 2021-05-25 RX ADMIN — POTASSIUM CHLORIDE 20 MEQ: 1500 TABLET, EXTENDED RELEASE ORAL at 18:03

## 2021-05-25 RX ADMIN — Medication 14 MG: at 19:40

## 2021-05-25 RX ADMIN — CALCIUM 1 TABLET: 500 TABLET ORAL at 08:11

## 2021-05-25 RX ADMIN — INSULIN LISPRO 1 UNITS: 100 INJECTION, SOLUTION INTRAVENOUS; SUBCUTANEOUS at 22:27

## 2021-05-25 RX ADMIN — WATER: 1 INJECTION INTRAMUSCULAR; INTRAVENOUS; SUBCUTANEOUS at 18:07

## 2021-05-25 RX ADMIN — INSULIN LISPRO 1 UNITS: 100 INJECTION, SOLUTION INTRAVENOUS; SUBCUTANEOUS at 12:13

## 2021-05-25 NOTE — PROGRESS NOTES
Patient visited and anointed by Shayla Armstrong       05/25/21 1300   Clinical Encounter Type   Visited With Patient   Restoration Encounters   Restoration Needs Prayer   Sacramental Encounters   Sacrament of Sick-Anointing Anointed

## 2021-05-25 NOTE — PROGRESS NOTES
Advanced Heart Failure / Pulmonary Hypertension Service Progress Note    Maximiliano Johnson 66 y o  female   MRN: [id number]  Unit/Bed#: [facility] 525-01; Encounter: [id number]    Assessment:  Principal Problem:    Acute decompensated heart failure ([facility] 75 )  Active Problems:    Diabetes mellitus ([facility] )    Microalbuminuria    Lung nodules    COPD (chronic obstructive pulmonary disease) (HCC)    Acute respiratory failure with hypoxia ([facility])    Nicotine dependence    Hypokalemia    Elevated BP without diagnosis of hypertension    Alkalosis      Subjective:   Maximiliano Johnson is a 33-year-old woman with PMH as below who presented to [facility] on 05/18/2021 with new onset lower extremity edema with associated SOB, weight gain, and orthopnea  Seen by PCP on 05/11 for new onset lower extremity swelling for 1 week with associated SOB  Patient was then started on PO Lasix 20 mg daily and CXR, blood work and urine test were ordered  Return to PCP office on 05/17 to review these results; Lasix was then increased to 40 mg daily  Patient advised to proceed to ED if symptoms worsen  Presented to ED on 05/18  Workup revealed new onset hypertension with elevated NT proBNP of 3223  (Note: was normotensive during both PCP visits in mid May)  Was started on IV Lasix 40 mg BID as well as lisinopril 10 mg daily  TTE was ordered, and cardiology was consulted  Following cardiology assessment, IV Lasix was increased 80 mg BID  TTE revealed preserved LVEF dilated RV and moderately reduced RV systolic function  Due to proteinuria as well as LVH on TTE, cardiac MRI was ordered to rule out cardiac amyloidosis  Patient seen and examined  No significant events overnight; pulse oximetry completed overnight  Primary complaint this AM is of new wet, non-productive cough  Denies any associated CP or SOB with cough  Does endorse mild nasal congestion and post nasal drip  Has yet to ambulate halls; encouraged patient to do this today       Objective: Intake/ Output: 520 mL / 1100 mL (net negative 580 mL)  Weight: 145 lbs (down from 149 lbs on 05/24)  Telemetry: Not on telemetry  Today's Plan:   Continue PO Lasix 80 mg BID   Tachycardic on exam; consider addition of low dose carvedilol to regimen   Did not receive lisinopril this AM due to SBP parameters   Overnight pulse oximetry 05/25/2021: 6 desaturation events with total duration time of >43 minutes   CTA chest completed yesterday due to elevated D-dimer; no PE noted   Can consider addition of SGLT2 inhibitor to regimen   Plan for outpatient PSG and PFTs +/- 6MWT  Plan:  Acute on chronic HFpEF with RV dysfunction; LVEF 50-55%; LVIDd 2 87 cm; NYHA III; ACC/AHA Stage C              Etiology: unclear  Reactive Hepatitis B core total antibody (indicating past or current infection)  No prior medical history of PE/DVT, autoimmune diseases, or connective tissue diseases  FARIDEH negative, RF positive (20 IU/mL)  Known COPD diagnosis  No history of stimulant or weight loss medication use  NT-proBNP 05/18/2021: 3223  TTE 05/19/2021: LVEF 50-55%  LVIDd 2 87 cm  IVSd 1 27 cm  Moderately dilated RV with moderately reduced RVSF  RVIDd 3 14 cm  Mild MR and TR  PASP 48 mmHg  Mildly dilated IVC  cMRI 05/21/2021: LVEF 45%  LV CO/CI 5 2 / 2 8  RVEF 39%  RV CO/CI 7 8 / 4 2  "Delayed post-gadolinium imaging demonstrates no region of hyperenhancement "   CTA chest/PE study 05/24/2021: "No evidence of pulmonary embolus  Stable subcentimeter pulmonary nodules  "              Reviewed importance of low sodium diet and fluid restriction  Strict I&Os with daily weights  CV diet with fluid restriction       Neurohormonal Blockade:  --Beta Blocker: No    --ARNi / ACEi / ARB: lisinopril 10 mg daily  --Aldosterone Antagonist: No    --SGLT2 Inhibitor: No    --Home Diuretic: Lasix 40 mg daily     --Inpatient Diuretic: PO Lasix 80 mg BID with potassium 20 mEq BID        Sudden Cardiac Death Risk Reduction:  --LVEF >35%     Electrical Resynchronization:  --Candidacy for BiV device: narrow QRS     Advanced Therapies (if appropriate): Will continue to monitor      Acute respiratory failure with hypoxia, improving              Etiology: underlying COPD with acute volume overload  Wean oxygen as appropriate  Overnight pulse oximetry 05/25/2021: 6 desaturation events with total duration time of >43 minutes  Further management as above       Proteinuria              Microalbumin to creatinine ratio of 2 5 (05/12/2021)              Urine protein to creatinine ratio of 0 9 (05/20/2021)  Negative SPEP and UPEP, Kappa/lambda ratio 1 66       Chronic obstructive pulmonary disease              CT chest/abd/pelvis 05/14/2021: 3 pulmonary nodules in LLL (measuring 3-6 mm)  "Linear scar or subsegmental atelectasis in the right upper lobe along the minor fissure, lingula, and lung bases  No infiltrate  COPD  Minimal biapical pleural smooth septal thickening  Central airways are clear "   CTA chest/PE study 05/24/2021: "No evidence of pulmonary embolus  Stable subcentimeter pulmonary nodules  "     Hypertension: diagnosed this admission  Diabetes mellitus, type II  Alcohol use: drinks on average, 1 glass of white wine daily with dinner  Tobacco abuse: began smoking in her late teens; states that she quit smoking a few weeks ago  Anxiety    Vitals:   Blood pressure 125/73, pulse 93, temperature 97 7 °F (36 5 °C), temperature source Oral, resp  rate 20, weight 66 kg (145 lb 8 1 oz), SpO2 91 %  Body mass index is 24 98 kg/m²  I/O last 3 completed shifts:   In: 520 [P O :520]  Out: 1725 [Urine:1725]    Wt Readings from Last 3 Encounters:   05/25/21 66 kg (145 lb 8 1 oz)   05/20/21 75 4 kg (166 lb 3 6 oz)   05/17/21 79 4 kg (175 lb)     Vitals:    05/24/21 2212 05/25/21 0542 05/25/21 0600 05/25/21 0725   BP: 127/73   125/73   BP Location:    Right arm   Pulse:    93   Resp:    20   Temp: 97 6 °F (36 4 °C)   97 7 °F (36 5 °C)   TempSrc:    Oral   SpO2:  94%  91%   Weight:   66 kg (145 lb 8 1 oz)        Physical Exam  Vitals signs reviewed  Constitutional:       General: She is awake  She is not in acute distress  Appearance: Normal appearance  She is well-developed and overweight  Interventions: Nasal cannula in place  HENT:      Head: Normocephalic  Nose: Nose normal       Mouth/Throat:      Mouth: Mucous membranes are moist    Eyes:      General: No scleral icterus  Conjunctiva/sclera: Conjunctivae normal    Neck:      Musculoskeletal: Neck supple  Trachea: No tracheal deviation  Cardiovascular:      Rate and Rhythm: Regular rhythm  Tachycardia present  No extrasystoles are present  Pulses: Normal pulses  Comments: Trace sacral edema  Pulmonary:      Effort: Pulmonary effort is normal  No tachypnea, bradypnea, accessory muscle usage or respiratory distress  Breath sounds: Normal air entry  No decreased air movement  Wheezing (faint expiratory wheeze with cough) present  No decreased breath sounds or rales  Abdominal:      General: Bowel sounds are normal  There is no distension  Palpations: Abdomen is soft  Tenderness: There is no abdominal tenderness  Musculoskeletal:      Right lower leg: No edema  Left lower leg: No edema  Skin:     General: Skin is warm and dry  Coloration: Skin is not jaundiced or pale  Neurological:      General: No focal deficit present  Mental Status: She is alert and oriented to person, place, and time  Psychiatric:         Attention and Perception: Attention normal          Mood and Affect: Mood and affect normal          Speech: Speech normal          Behavior: Behavior normal  Behavior is cooperative  Thought Content:  Thought content normal      Central Line (day, reason): No   Rivas Catheter (day, reason): No     Current Facility-Administered Medications:     acetaminophen (TYLENOL) tablet 650 mg, 650 mg, Oral, Q6H PRN, Kailash Weiss MD, 650 mg at 05/24/21 0145    calcium carbonate (OYSTER SHELL,OSCAL) 500 mg tablet 1 tablet, 1 tablet, Oral, Daily With Breakfast, Chrissy Carcamo, DO, 1 tablet at 05/25/21 0811    enoxaparin (LOVENOX) subcutaneous injection 40 mg, 40 mg, Subcutaneous, Daily, Chrissy Tariqp, DO, 40 mg at 05/25/21 0811    fluticasone (FLONASE) 50 mcg/act nasal spray 1 spray, 1 spray, Each Nare, Daily, Purvi Chiang MD, 1 spray at 05/25/21 0811    furosemide (LASIX) tablet 80 mg, 80 mg, Oral, BID (diuretic), Maylin Villegas PA-C, 80 mg at 05/25/21 0810    insulin lispro (HumaLOG) 100 units/mL subcutaneous injection 1-5 Units, 1-5 Units, Subcutaneous, HS, Chrissy Carcamo, DO, 1 Units at 05/24/21 2210    insulin lispro (HumaLOG) 100 units/mL subcutaneous injection 1-6 Units, 1-6 Units, Subcutaneous, TID AC, 2 Units at 05/24/21 1139 **AND** Fingerstick Glucose (POCT), , , 4x Daily AC and at bedtime, Chrissy Tariqp, DO    lisinopril (ZESTRIL) tablet 10 mg, 10 mg, Oral, Daily, Tammy Trevino DO, Stopped at 05/25/21 0811    melatonin tablet 3 mg, 3 mg, Oral, HS PRN, Purvi Chiang MD    nicotine (NICODERM CQ) 14 mg/24hr TD 24 hr patch 14 mg, 14 mg, Transdermal, Daily, Mraiano Jackson DO, 14 mg at 05/24/21 2100    potassium chloride (K-DUR,KLOR-CON) CR tablet 20 mEq, 20 mEq, Oral, BID, Maylin Villegas PA-C, 20 mEq at 05/25/21 3401    sodium chloride (OCEAN) 0 65 % nasal spray 1 spray, 1 spray, Each Nare, TID PRN, Purvi Chiang MD, 1 spray at 05/23/21 1233    Insert peripheral IV, , , Once **AND** sodium chloride (PF) 0 9 % injection 3 mL, 3 mL, Intravenous, Q1H PRN, DO Jimi Sethi petrolatum-mineral oil (EUCERIN,HYDROCERIN) cream, , Topical, TID PRN, Purvi Chiang MD    Labs & Results:  Results from last 7 days   Lab Units 05/18/21  1704   TROPONIN I ng/mL 0 04     Results from last 7 days   Lab Units 05/19/21  0242 05/18/21  1704   WBC Thousand/uL 7 24 9 13   HEMOGLOBIN g/dL 17 8* 18 2*   HEMATOCRIT % 57 7* 56 1*   PLATELETS Thousands/uL 197 221         Results from last 7 days   Lab Units 05/25/21  0459 05/24/21  0459 05/23/21  0509  05/19/21  0243   POTASSIUM mmol/L 3 8 3 2* 3 7   < > 3 4*   CHLORIDE mmol/L 100 95* 96*   < > 98*   CO2 mmol/L 38* 42* 42*   < > 34*   BUN mg/dL 22 19 23   < > 16   CREATININE mg/dL 0 65 0 55* 0 66   < > 0 81   CALCIUM mg/dL 9 7 9 4 9 4   < > 8 8   ALK PHOS U/L  --  119* 120*  --  135*   ALT U/L  --  28 25  --  30   AST U/L  --  18 15  --  25    < > = values in this interval not displayed           Sepideh Camarillo PA-C

## 2021-05-25 NOTE — TELEPHONE ENCOUNTER
----- Message from Alysia Stanley DO sent at 5/23/2021  4:47 PM EDT -----  Regarding: proteinuria hospital f/u appt  [place] [facility]  Please schedule hospital follow up for proteinuria in 1 month with first available provider  Thanks

## 2021-05-25 NOTE — PROGRESS NOTES
INTERNAL MEDICINE RESIDENCY PROGRESS NOTE     Name: Jones Mcmanus   Age & Sex: 66 y o  female   MRN: [id number]  Unit/Bed#: -01   Encounter: [id number]  Team: SOD Team B     PATIENT INFORMATION     Name: Jones Mcmanus   Age & Sex: 66 y o  female   MRN: [id number]  Hospital Stay Days: 7    ASSESSMENT/PLAN     Principal Problem:    Acute decompensated heart failure ([facility] )  Active Problems:    Acute respiratory failure with hypoxia ([facility] )    Diabetes mellitus ([facility] )    Microalbuminuria    Lung nodules    COPD (chronic obstructive pulmonary disease) ([place] [address] )    Nicotine dependence    Elevated BP without diagnosis of hypertension    Alkalosis      * Acute decompensated heart failure ([facility] )  Assessment & Plan  Patient presenting with worsening b/l LE edema and abdominal distension, worsening over the last two weeks despite initiation or oral lasix  On arrival, patient hypoxic to 81% requiring 6L nasal canala  Patient significantly volume overloaded with evidence of right heart failure given +3 pitting b/l LE edema extending up to thighs, significant abdominal distension, and JVD  Only fine bibasilar crackles  Labs significant for proNT-BNP of 3,223 and imaging findings demonstrating small b/l pleural effusions and abdominopelvic ascites  Patient symptoms and clinical exam findings likely secondary to new onset decompensated CHF  Echocardiogram: Systolic function was normal  Ejection fraction was estimated in the range of 50 % to 55 %  Mild concentric hypertrophy  RV moderately dilated, RV function moderately reduced  Mild pulmonary hypertension  Cardiac MRI: Normal left ventricle size  Mildly reduced left ventricle systolic function with ejection fraction measures 45%  No evidence of regional wall motion abnormality  No evidence of delayed myocardial enhancement  Normal right ventricle size  Moderately reduced right ventricle systolic function with ejection fraction measuring 39%  Mild left atrial enlargement   No valvular abnormality     No evidence of PE on CTA  Was started on Lasix 80 mg IV bid that was subsequently reduced to 40 IV b i d  Responding well, net negative and weight decreasing  Creatinine stable  Intake/Output Summary (Last 24 hours) at 5/25/2021 1210  Last data filed at 5/25/2021 0900  Gross per 24 hour   Intake 940 ml   Output 800 ml   Net 140 ml         Weight (last 2 days)     Date/Time   Weight    05/25/21 0600   66 (145 5)    05/24/21 0531   67 7 (149 25)    05/23/21 0531   70 3 (154 98)              Plan:  · Lasix 80 mg po BID  · Heart failure consulted  No need for RHC at this time, will need outpatient PSG, PFT  · Strict standing weights; monitor intake and output  · 2g Na restricted diet and 1500 mL fluid restricted diet  · Monitor and maintain SpO2>88%; wean O2 as patient tolerates  Acute respiratory failure with hypoxia [facility])  Assessment & Plan  Patient hypoxic on day of admission with SpO2 of 81% on room air requiring 6L nasal cannula on arrival  SOB likely secondary to underlying COPD and significant volume overload  Plan:  · Maintain SpO2>88%; wean O2 as patient tolerates  · Diuresis as above   · Out of bed with assistance   · Out patient follow up with PCP; will require PFTs for likely underlying COPD    Nicotine dependence  Assessment & Plan  Longstanding history of tobacco abuse for over 40 years  Currently half to full pack per day smoker  Denies nicotine patch at this time  Plan:  · Continue to encourage tobacco cessation    COPD (chronic obstructive pulmonary disease) ([facility] )  Assessment & Plan  No prior documented history of COPD  High likelihood of undiagnosed COPD given recent evidence of COPD on CT C/A/P and long-standing smoking history  Patient does have diffuse end-expiratory wheezing on exam with prolonged expiratory phase  Requiring 6L NC but major contributor being volume overload  No concern for COPD exacerbation at this time      Plan:  · Maintain SpO2>88%; wean O2 as patient tolerates  · Continue to encourage smoking cessation  · Outpatient follow up; will require PFTs    Lung nodules  Assessment & Plan  CT Chest/Abdomen completed on 5/14/2021 revealed: "Few pulmonary nodules in the left lower lobe, the largest of which measures 6 mm with unknown long-term stability  Based on current Fleischner Society 2017 Guidelines on incidental pulmonary nodule, followup non-contrast CT is recommended at 6-12 months from the initial examination and, if stable at that time, an additional followup is recommended for 18-24 months from the initial examination "    Plan:  · Findings discussed with patient, repeat outpatient non-contrast CT in 6-12 months    Microalbuminuria  Assessment & Plan  Microlbumin creatinine ratio 2,475  Seen by nephrology, differential include idiopathic nodular glomerular sclerosis in the setting of chronic smoking, possible analgesic nephropathy with chronic NSAID use, diabetic kidney disease  · Initiated on ACE-I    · Outpatient nephrology follow up  Diabetes mellitus ([facility] )  Assessment & Plan  Lab Results   Component Value Date    HGBA1C 6 6 (H) 05/12/2021     History of DM II with most recent A1c of 6 6  Not currently on any outpatient diabetic regimen  Plan:  · Continue POC glucose checks; 4x daily with meals and at bedtime  · Insulin regimen:  · Mealtime: SSI Algorithm 3  · Bedtime: SSI Algorithm 2    Will discharge on SGLT-2 inhibitor  Hypokalemia-resolved as of 5/25/2021  Assessment & Plan  · In the setting of diuresis  · Monitor and replete as needed  Disposition:  On p o  Diuretics now, anticipate discharge in the next 24 hours  Discussed with rounding RN, all questions and concerns addressed  SUBJECTIVE     Patient seen and examined  Overnight pulse ox with 6 desaturation events total duration of greater than 43 minutes  No acute complaints this morning      OBJECTIVE     Vitals:    05/24/21 2212 05/25/21 0542 21 0600 21 0725   BP: 127/73   125/73   BP Location:    Right arm   Pulse:    93   Resp:    20   Temp: 97 6 °F (36 4 °C)   97 7 °F (36 5 °C)   TempSrc:    Oral   SpO2:  94%  91%   Weight:   66 kg (145 lb 8 1 oz)       Temperature:   Temp (24hrs), Av 7 °F (36 5 °C), Min:97 6 °F (36 4 °C), Max:97 8 °F (36 6 °C)    Temperature: 97 7 °F (36 5 °C)  Intake & Output:  I/O        07 -  0700  07 -  0700  07 -  0700    P  O  720 520     Total Intake(mL/kg) 720 (10 6) 520 (7 9)     Urine (mL/kg/hr) 3175 (2) 1100 (0 7)     Stool 0      Total Output 3175 1100     Net -2455 -580            Unmeasured Urine Occurrence 1 x      Unmeasured Stool Occurrence 1 x          Weights:        Body mass index is 24 98 kg/m²  Weight (last 2 days)     Date/Time   Weight    21 0600   66 (145 5)    21 0531   67 7 (149 25)    21 0531   70 3 (154 98)            Physical Exam  Vitals signs and nursing note reviewed  Constitutional:       General: She is not in acute distress  Appearance: She is well-developed  HENT:      Head: Normocephalic and atraumatic  Eyes:      Conjunctiva/sclera: Conjunctivae normal    Neck:      Musculoskeletal: Neck supple  Cardiovascular:      Rate and Rhythm: Normal rate and regular rhythm  Heart sounds: No murmur  Pulmonary:      Effort: Pulmonary effort is normal  No respiratory distress  Breath sounds: Normal breath sounds  Abdominal:      Palpations: Abdomen is soft  Tenderness: There is no abdominal tenderness  Skin:     General: Skin is warm and dry  Neurological:      General: No focal deficit present  Mental Status: She is alert and oriented to person, place, and time  LABORATORY DATA     Labs: I have personally reviewed pertinent reports    Results from last 7 days   Lab Units 21  0242 21  1704   WBC Thousand/uL 7 24 9 13   HEMOGLOBIN g/dL 17 8* 18 2*   HEMATOCRIT % 57 7* 56 1* PLATELETS Thousands/uL 197 221   NEUTROS PCT % 72 79*   MONOS PCT % 10 9      Results from last 7 days   Lab Units 05/25/21  0459 05/24/21  0459 05/23/21  0509  05/19/21  0243   POTASSIUM mmol/L 3 8 3 2* 3 7   < > 3 4*   CHLORIDE mmol/L 100 95* 96*   < > 98*   CO2 mmol/L 38* 42* 42*   < > 34*   BUN mg/dL 22 19 23   < > 16   CREATININE mg/dL 0 65 0 55* 0 66   < > 0 81   CALCIUM mg/dL 9 7 9 4 9 4   < > 8 8   ALK PHOS U/L  --  119* 120*  --  135*   ALT U/L  --  28 25  --  30   AST U/L  --  18 15  --  25    < > = values in this interval not displayed  Results from last 7 days   Lab Units 05/24/21  0459 05/19/21  0243   MAGNESIUM mg/dL 2 1 2 1                  Results from last 7 days   Lab Units 05/18/21  1704   TROPONIN I ng/mL 0 04       IMAGING & DIAGNOSTIC TESTING     Radiology Results: I have personally reviewed pertinent reports  X-ray Chest 1 View Portable    Result Date: 5/19/2021  Impression: No acute cardiopulmonary disease  Workstation performed: [id number]     Mri Cardiac  W Wo Contrast    Result Date: 5/24/2021  Impression: Impression: 1  Normal left ventricle size  Mildly reduced left ventricle systolic function with ejection fraction measures 45%  No evidence of regional wall motion abnormality  No evidence of delayed myocardial enhancement  2  Normal right ventricle size  Moderately reduced right ventricle systolic function with ejection fraction measuring 39%  3  Mild left atrial enlargement 4  No valvular abnormality Workstation performed: [id number]     Other Diagnostic Testing: I have personally reviewed pertinent reports      ACTIVE MEDICATIONS     Current Facility-Administered Medications   Medication Dose Route Frequency    acetaminophen (TYLENOL) tablet 650 mg  650 mg Oral Q6H PRN    acetaZOLAMIDE (DIAMOX) injection 500 mg  500 mg Intravenous Once    calcium carbonate (OYSTER SHELL,OSCAL) 500 mg tablet 1 tablet  1 tablet Oral Daily With Breakfast    enoxaparin (LOVENOX) subcutaneous injection 40 mg  40 mg Subcutaneous Daily    fluticasone (FLONASE) 50 mcg/act nasal spray 1 spray  1 spray Each Nare Daily    furosemide (LASIX) tablet 80 mg  80 mg Oral BID (diuretic)    insulin lispro (HumaLOG) 100 units/mL subcutaneous injection 1-5 Units  1-5 Units Subcutaneous HS    insulin lispro (HumaLOG) 100 units/mL subcutaneous injection 1-6 Units  1-6 Units Subcutaneous TID AC    lisinopril (ZESTRIL) tablet 10 mg  10 mg Oral Daily    melatonin tablet 3 mg  3 mg Oral HS PRN    nicotine (NICODERM CQ) 14 mg/24hr TD 24 hr patch 14 mg  14 mg Transdermal Daily    potassium chloride (K-DUR,KLOR-CON) CR tablet 20 mEq  20 mEq Oral BID    sodium chloride (OCEAN) 0 65 % nasal spray 1 spray  1 spray Each Nare TID PRN    sodium chloride (PF) 0 9 % injection 3 mL  3 mL Intravenous Q1H PRN    white petrolatum-mineral oil (EUCERIN,HYDROCERIN) cream   Topical TID PRN       VTE Pharmacologic Prophylaxis: Enoxaparin (Lovenox)  VTE Mechanical Prophylaxis: sequential compression device    Portions of the record may have been created with voice recognition software  Occasional wrong word or "sound a like" substitutions may have occurred due to the inherent limitations of voice recognition software    Read the chart carefully and recognize, using context, where substitutions have occurred   ==  Purvi Chiang MD  [facility]  Internal Medicine Residency PGY-2

## 2021-05-26 VITALS
HEIGHT: 64 IN | HEART RATE: 107 BPM | OXYGEN SATURATION: 86 % | TEMPERATURE: 97.5 F | RESPIRATION RATE: 18 BRPM | SYSTOLIC BLOOD PRESSURE: 122 MMHG | DIASTOLIC BLOOD PRESSURE: 71 MMHG | BODY MASS INDEX: 25.03 KG/M2 | WEIGHT: 146.6 LBS

## 2021-05-26 LAB
ANION GAP SERPL CALCULATED.3IONS-SCNC: 2 MMOL/L (ref 4–13)
BUN SERPL-MCNC: 28 MG/DL (ref 5–25)
CALCIUM SERPL-MCNC: 9.5 MG/DL (ref 8.3–10.1)
CHLORIDE SERPL-SCNC: 103 MMOL/L (ref 100–108)
CO2 SERPL-SCNC: 34 MMOL/L (ref 21–32)
CREAT SERPL-MCNC: 0.68 MG/DL (ref 0.6–1.3)
DME PARACHUTE DELIVERY DATE ACTUAL: NORMAL
DME PARACHUTE DELIVERY DATE EXPECTED: NORMAL
DME PARACHUTE DELIVERY DATE REQUESTED: NORMAL
DME PARACHUTE ITEM DESCRIPTION: NORMAL
DME PARACHUTE ORDER STATUS: NORMAL
DME PARACHUTE SUPPLIER NAME: NORMAL
DME PARACHUTE SUPPLIER PHONE: NORMAL
GFR SERPL CREATININE-BSD FRML MDRD: 85 ML/MIN/1.73SQ M
GLUCOSE SERPL-MCNC: 128 MG/DL (ref 65–140)
GLUCOSE SERPL-MCNC: 146 MG/DL (ref 65–140)
GLUCOSE SERPL-MCNC: 200 MG/DL (ref 65–140)
POTASSIUM SERPL-SCNC: 3.9 MMOL/L (ref 3.5–5.3)
SODIUM SERPL-SCNC: 139 MMOL/L (ref 136–145)

## 2021-05-26 PROCEDURE — 80048 BASIC METABOLIC PNL TOTAL CA: CPT | Performed by: STUDENT IN AN ORGANIZED HEALTH CARE EDUCATION/TRAINING PROGRAM

## 2021-05-26 PROCEDURE — 99238 HOSP IP/OBS DSCHRG MGMT 30/<: CPT | Performed by: INTERNAL MEDICINE

## 2021-05-26 PROCEDURE — 99232 SBSQ HOSP IP/OBS MODERATE 35: CPT | Performed by: INTERNAL MEDICINE

## 2021-05-26 PROCEDURE — 94761 N-INVAS EAR/PLS OXIMETRY MLT: CPT

## 2021-05-26 PROCEDURE — 82948 REAGENT STRIP/BLOOD GLUCOSE: CPT

## 2021-05-26 RX ORDER — LISINOPRIL 10 MG/1
10 TABLET ORAL DAILY
Qty: 30 TABLET | Refills: 0 | Status: SHIPPED | OUTPATIENT
Start: 2021-05-27 | End: 2021-06-02

## 2021-05-26 RX ORDER — POTASSIUM CHLORIDE 20 MEQ/1
20 TABLET, EXTENDED RELEASE ORAL 2 TIMES DAILY
Qty: 60 TABLET | Refills: 0 | Status: SHIPPED | OUTPATIENT
Start: 2021-05-26 | End: 2021-06-07 | Stop reason: SDUPTHER

## 2021-05-26 RX ORDER — FUROSEMIDE 80 MG
80 TABLET ORAL 2 TIMES DAILY
Qty: 60 TABLET | Refills: 0 | Status: SHIPPED | OUTPATIENT
Start: 2021-05-26 | End: 2021-06-07

## 2021-05-26 RX ADMIN — CALCIUM 1 TABLET: 500 TABLET ORAL at 08:27

## 2021-05-26 RX ADMIN — INSULIN LISPRO 2 UNITS: 100 INJECTION, SOLUTION INTRAVENOUS; SUBCUTANEOUS at 12:00

## 2021-05-26 RX ADMIN — ENOXAPARIN SODIUM 40 MG: 40 INJECTION SUBCUTANEOUS at 08:27

## 2021-05-26 RX ADMIN — POTASSIUM CHLORIDE 20 MEQ: 1500 TABLET, EXTENDED RELEASE ORAL at 08:27

## 2021-05-26 RX ADMIN — LISINOPRIL 10 MG: 10 TABLET ORAL at 08:27

## 2021-05-26 RX ADMIN — FUROSEMIDE 80 MG: 80 TABLET ORAL at 08:27

## 2021-05-26 RX ADMIN — FLUTICASONE PROPIONATE 1 SPRAY: 50 SPRAY, METERED NASAL at 10:05

## 2021-05-26 NOTE — PLAN OF CARE
Problem: PAIN - ADULT  Goal: Verbalizes/displays adequate comfort level or baseline comfort level  Description: Interventions:  - Encourage patient to monitor pain and request assistance  - Assess pain using appropriate pain scale  - Administer analgesics based on type and severity of pain and evaluate response  - Implement non-pharmacological measures as appropriate and evaluate response  - Consider cultural and social influences on pain and pain management  - Notify physician/advanced practitioner if interventions unsuccessful or patient reports new pain  5/26/2021 0041 by Tejas Calderón RN  Outcome: Progressing  5/26/2021 0040 by Tejas Calderón RN  Outcome: Progressing     Problem: INFECTION - ADULT  Goal: Absence or prevention of progression during hospitalization  Description: INTERVENTIONS:  - Assess and monitor for signs and symptoms of infection  - Monitor lab/diagnostic results  - Monitor all insertion sites, i e  indwelling lines, tubes, and drains  - Monitor endotracheal if appropriate and nasal secretions for changes in amount and color  - [place] appropriate cooling/warming therapies per order  - Administer medications as ordered  - Instruct and encourage patient and family to use good hand hygiene technique  - Identify and instruct in appropriate isolation precautions for identified infection/condition  5/26/2021 0041 by Tejas Calderón RN  Outcome: Progressing  5/26/2021 0040 by Tejas Calderón RN  Outcome: Progressing  Goal: Absence of fever/infection during neutropenic period  Description: INTERVENTIONS:  - Monitor WBC    5/26/2021 0041 by Tejas Calderón RN  Outcome: Progressing  5/26/2021 0040 by Tejas Calderón RN  Outcome: Progressing     Problem: SAFETY ADULT  Goal: Patient will remain free of falls  Description: INTERVENTIONS:  - Assess patient frequently for physical needs  -  Identify cognitive and physical deficits and behaviors that affect risk of falls    - Call fall precautions as indicated by assessment   - Educate patient/family on patient safety including physical limitations  - Instruct patient to call for assistance with activity based on assessment  - Modify environment to reduce risk of injury  - Consider OT/PT consult to assist with strengthening/mobility  5/26/2021 0041 by Marilin Ariza RN  Outcome: Progressing  5/26/2021 0040 by Marilin Ariza RN  Outcome: Progressing  Goal: Maintain or return to baseline ADL function  Description: INTERVENTIONS:  -  Assess patient's ability to carry out ADLs; assess patient's baseline for ADL function and identify physical deficits which impact ability to perform ADLs (bathing, care of mouth/teeth, toileting, grooming, dressing, etc )  - Assess/evaluate cause of self-care deficits   - Assess range of motion  - Assess patient's mobility; develop plan if impaired  - Assess patient's need for assistive devices and provide as appropriate  - Encourage maximum independence but intervene and supervise when necessary  - Involve family in performance of ADLs  - Assess for home care needs following discharge   - Consider OT consult to assist with ADL evaluation and planning for discharge  - Provide patient education as appropriate  5/26/2021 0041 by Marilin Ariza RN  Outcome: Progressing  5/26/2021 0040 by Marilin Ariza RN  Outcome: Progressing  Goal: Maintain or return mobility status to optimal level  Description: INTERVENTIONS:  - Assess patient's baseline mobility status (ambulation, transfers, stairs, etc )    - Identify cognitive and physical deficits and behaviors that affect mobility  - Identify mobility aids required to assist with transfers and/or ambulation (gait belt, sit-to-stand, lift, walker, cane, etc )  - Call fall precautions as indicated by assessment  - Record patient progress and toleration of activity level on Mobility SBAR; progress patient to next Phase/Stage  - Instruct patient to call for assistance with activity based on assessment  - Consider rehabilitation consult to assist with strengthening/weightbearing, etc   5/26/2021 0041 by Doroteo Torres RN  Outcome: Progressing  5/26/2021 0040 by Doroteo Torres RN  Outcome: Progressing     Problem: DISCHARGE PLANNING  Goal: Discharge to home or other facility with appropriate resources  Description: INTERVENTIONS:  - Identify barriers to discharge w/patient and caregiver  - Arrange for needed discharge resources and transportation as appropriate  - Identify discharge learning needs (meds, wound care, etc )  - Arrange for interpretive services to assist at discharge as needed  - Refer to Case Management Department for coordinating discharge planning if the patient needs post-hospital services based on physician/advanced practitioner order or complex needs related to functional status, cognitive ability, or social support system  5/26/2021 0041 by Doroteo Torres RN  Outcome: Progressing  5/26/2021 0040 by Doroteo Torres RN  Outcome: Progressing     Problem: Knowledge Deficit  Goal: Patient/family/caregiver demonstrates understanding of disease process, treatment plan, medications, and discharge instructions  Description: Complete learning assessment and assess knowledge base  Interventions:  - Provide teaching at level of understanding  - Provide teaching via preferred learning methods  5/26/2021 0041 by Doroteo Torres RN  Outcome: Progressing  5/26/2021 0040 by Doroteo Torres RN  Outcome: Progressing     Problem: Potential for Falls  Goal: Patient will remain free of falls  Description: INTERVENTIONS:  - Assess patient frequently for physical needs  -  Identify cognitive and physical deficits and behaviors that affect risk of falls    -  [place] fall precautions as indicated by assessment   - Educate patient/family on patient safety including physical limitations  - Instruct patient to call for assistance with activity based on assessment  - Modify environment to reduce risk of injury  - Consider OT/PT consult to assist with strengthening/mobility  5/26/2021 0041 by Coy Martin RN  Outcome: Progressing  5/26/2021 0040 by Coy Martin, RN  Outcome: Progressing

## 2021-05-26 NOTE — NURSING NOTE
Patient and  reviewed instructions with RN, no questions at this time  [facility]  Oxygen at bedside, and delivery scheduled for patient at home later this evening

## 2021-05-26 NOTE — DISCHARGE INSTRUCTIONS
Please weigh yourself every day, and contact the Heart Failure program at [phone] if you gain 3 lbs overnight or 5 lbs in 5-7 days  Limit daily sodium/salt intake to 1244-8783 mg daily to prevent fluid retention  Avoid canned foods, [place] food, and processed meat (hot dogs, lunch meat, and sausage etc )  Limit fluid intake to 2000 mL or 2L (about 60-65 ounces) daily  Bring complete list of medications and daily weights to your follow-up appointments  Heart Failure   WHAT YOU NEED TO KNOW:   Heart failure is a condition that does not allow your heart to fill or pump properly  Not enough oxygen in your blood gets to your organs and tissues  Fluid may not move through your body properly  Fluid builds up and causes swelling and trouble breathing  This is known as congestive heart failure  Heart failure may start in the left or right ventricle  Heart failure is often caused by damage or injury to your heart  The damage may be caused by other heart problems, diabetes, or high blood pressure  The damage may have also been caused by an infection  Heart failure is a long-term condition that tends to get worse over time  It is important to manage your health to improve your quality of life  DISCHARGE INSTRUCTIONS:   Call your local emergency number (911 in the [address] [facility]) if:   · You have any of the following signs of a heart attack:      ? Squeezing, pressure, or pain in your chest    ? You may  also have any of the following:     § Discomfort or pain in your back, neck, jaw, stomach, or arm    § Shortness of breath    § Nausea or vomiting    § Lightheadedness or a sudden cold sweat      Call your doctor if:   · Your heartbeat is fast, slow, or uneven all the time  · You have symptoms of worsening heart failure:      ? Shortness of breath at rest, at night, or that is getting worse in any way    ? Weight gain of 3 or more pounds (1 4 kg) in a day, or more than your healthcare provider says is okay    ?  More swelling in your legs or ankles    ? Abdominal pain or swelling    ? More coughing    ? Loss of appetite    ? Feeling tired all the time    · You feel hopeless or depressed, or you have lost interest in things you used to enjoy  · You often feel worried or afraid  · You have questions or concerns about your condition or care  Medicines:   · Medicines  may be needed to help regulate your heart rhythm  You may also need medicine to lower your blood pressure, and to decrease extra fluids  · Take your medicine as directed  Contact your healthcare provider if you think your medicine is not helping or if you have side effects  Tell him of her if you are allergic to any medicine  Keep a list of the medicines, vitamins, and herbs you take  Include the amounts, and when and why you take them  Bring the list or the pill bottles to follow-up visits  Carry your medicine list with you in case of an emergency  Go to cardiac rehab if directed:  Cardiac rehab is a program run by specialists who will help you safely strengthen your heart  The program includes exercise, relaxation, stress management, and heart-healthy nutrition  Manage swelling from extra fluid:   · Elevate (raise) your legs above the level of your heart  This will help with fluid that builds up in your legs or ankles  Elevate your legs as often as possible during the day  Prop your legs on pillows or blankets to keep them elevated comfortably  Try not to stand for long periods of time during the day  Move around to keep your blood circulating  · Limit sodium (salt)  Ask how much sodium you can have each day  Your healthcare provider may give you a limit, such as 2,300 milligrams (mg) a day  Your provider or a dietitian can teach you how to read food labels for the number of mg in a food  He or she can also help you find ways to have less salt  For example, if you add salt to food as you cook, do not add more at the table           · Drink liquids as directed  You may need to limit the amount of liquid you drink within 24 hours  Your healthcare provider will tell you how much liquid to have and which liquids are best for you  He or she may tell you to limit liquid to 1 5 to 2 liters in a day  He or she will also tell you how often to drink liquid throughout the day  · Weigh yourself every morning  Use the same scale, in the same spot  Do this after you use the bathroom, but before you eat or drink  Wear the same type of clothing each time  Write down your weight and call your healthcare provider if you have a sudden weight gain  Swelling and weight gain are signs of fluid buildup  Manage heart failure: Your quality of life may improve with treatment and the following:  · Do not smoke  Nicotine and other chemicals in cigarettes and cigars can cause lung and heart damage  Ask your healthcare provider for information if you currently smoke and need help to quit  E-cigarettes or smokeless tobacco still contain nicotine  Talk to your healthcare provider before you use these products  · Do not drink alcohol or use illegal drugs  Alcohol and drugs can increase your risk for high blood pressure, diabetes, and coronary artery disease  · Eat heart-healthy foods  Heart-healthy foods include fruits, vegetables, lean meat (such as beef, chicken, or pork), and low-fat dairy products  Fatty fish such as salmon and tuna are also heart healthy  Other heart-healthy foods include walnuts, whole-grain breads, beans, and cooked beans  Replace butter and margarine with heart-healthy oils such as olive oil or canola oil  Your provider or a dietitian can help you create heart-healthy meal plans  · Manage any chronic health conditions you have  These include high blood pressure, diabetes, obesity, high cholesterol, metabolic syndrome, and COPD  You will have fewer symptoms if you manage these health conditions   Follow your healthcare provider's recommendations and follow up with him or her regularly  · Maintain a healthy weight  Being overweight can increase your risk for high blood pressure, diabetes, and coronary artery disease  These conditions can make your symptoms worse  Ask your healthcare provider how much you should weigh  Ask him or her to help you create a weight loss plan if you are overweight  · Stay active  Activity can help keep your symptoms from getting worse  Walking is a type of physical activity that helps maintain your strength and improve your mood  Physical activity also helps you manage your weight  Work with your healthcare provider to create an exercise plan that is right for you  · Get vaccines as directed  The flu and pneumonia can be severe for a person who has heart failure  Vaccines protect you from these infections  Get a flu shot every year as soon as it is recommended, usually in September or October  You may also need the pneumonia vaccine  Your healthcare provider can tell you if you need other vaccines, and when to get them  Follow up with your doctor within 7 days or as directed: You may need to return for other tests  You may need home health care  A healthcare provider will monitor your vital signs, weight, and make sure your medicines are working  Write down your questions so you remember to ask them during your visits  Join a support group:  Heart failure can be difficult to manage  It may be helpful to talk with others who have heart failure  You may learn how to better manage your condition or get emotional support  For more information:  · [address]  [place] , [place]   Phone: [phone]  Web Address: https://Biz[employer].vn strong [employer] [facility]/  [facility] 2021 Information is for End User's use only and may not be sold, redistributed or otherwise used for commercial purposes   All illustrations and images included in CareNotes® are the copyrighted property of A D A M , Inc  or [facility] Jazmín Izaguirre   The above information is an  only  It is not intended as medical advice for individual conditions or treatments  Talk to your doctor, nurse or pharmacist before following any medical regimen to see if it is safe and effective for you

## 2021-05-26 NOTE — DISCHARGE SUMMARY
INTERNAL MEDICINE RESIDENCY DISCHARGE SUMMARY     Penelope Minaya   66 y o  female  MRN: [id number]  Room/Bed: MS 36/-01     [place] [address]   Encounter: [id number]    Principal Problem:    Acute decompensated heart failure [facility])  Active Problems:    Acute respiratory failure with hypoxia (HCC)    Diabetes mellitus ([facility] [address] )    Microalbuminuria    Lung nodules    COPD (chronic obstructive pulmonary disease) ([facility])    Nicotine dependence    Elevated BP without diagnosis of hypertension    Alkalosis      [address]     Patient is a 68years old female with past medical history of type 2 diabetes mellitus controlled with diet, tobacco abuse, anxiety presented to the ED on 05/18/2021 after being sent by her PCP due to increased lower extremity swelling  Patient has being seeing her PCP for increasing weight, bilateral lower extremity edema, shortness of breath, dizziness and lightheadedness  She was initiated on Lasix 20 mg daily which was subsequently increased to 40 mg  Outpatient workup including ultrasound and CT chest abdomen and pelvis revealed pulmonary nodules, small volume abdominal pelvic ascites and diffuse body wall edema  Due to worsening symptoms that was not responding to oral Lasix she was sent to the ED for further evaluation  Upon arrival patient was hypoxic at 81% requiring 6 L by nasal cannula  Initially blood pressure was elevated up to 190/107 which subsequently improved  Labs were significant for elevated NT proBNP to 3 K, mild troponin elevation  Patient was admitted for management of volume overload  Echocardiogram completed showed preserved EF, RV dysfunction  She was seen in consultation by Cardiology and heart failure service  Cardiac MRI completed showing LV EF 45%, RV EF 49%, delayed post gadolinium imaging demonstrates no region of hyperenhancement  CTA completed, no evidence of pulmonary embolism  Etiology of heart failure likely to tobacco history +/- questionable EDUAR +/-chronic hypoxia  She was also seen in consultation by Nephrology for microalbuminuria  Etiology 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 in the setting of chronic smoking, diabetic kidney disease, possible and adjusted nephropathy with chronic NSAID use  She was initiated on ACE inhibitor  Nephrology recommended outpatient follow-up  Serology including FARIDEH negative, SPEP and UPEP negative in a kappa/lambda ratio 1 66   RF positive at 20  C-ANCA, p-ANCA negative, C3 and C4 within normal limits  She was diuresed with IV Lasix which she responded to  Weight decreased from 75 4 kg to 66 5 kg  She was negative almost 9 L on discharge  She was successfully weaned from 6 L to 2 L with rest and 3 L with ambulation  She was deemed medically stable for discharge on 05/26/2021  She will require outpatient PSG, PFT and 6MWT  She will follow with Cardiology post discharge, post hospitalization follow-up scheduled on 06/07/2021  She will also follow-up with Nephrology on 7/7/2021  Referral to rheumatology will also be provided due to positive RF  Oxygen was delivered to the patient prior to discharge, importance of tobacco cessation discussed with patient  Fluid, salt restriction and daily weights were also also discussed  She verbalized understanding  On the day of discharge, she was seen and examined at bedside  She had no complaints  She was eager to go home  /71   Pulse (!) 107   Temp 97 5 °F (36 4 °C) (Oral)   Resp 18   Ht 5' 4" (1 626 m)   Wt 66 5 kg (146 lb 9 6 oz)   SpO2 (!) 86%   BMI 25 16 kg/m²     Physical Exam  Vitals signs and nursing note reviewed  Constitutional:       General: She is not in acute distress  Appearance: She is well-developed  HENT:      Head: Normocephalic and atraumatic  Eyes:      Conjunctiva/sclera: Conjunctivae normal    Neck:      Musculoskeletal: Neck supple  Cardiovascular:      Rate and Rhythm: Regular rhythm  Tachycardia present  Heart sounds: Normal heart sounds  No murmur  Pulmonary:      Effort: Pulmonary effort is normal  No respiratory distress  Breath sounds: Normal breath sounds  Abdominal:      Palpations: Abdomen is soft  Tenderness: There is no abdominal tenderness  Skin:     General: Skin is warm and dry  Neurological:      General: No focal deficit present  Mental Status: She is alert and oriented to person, place, and time  DISCHARGE INFORMATION     PCP at Discharge: Lyubov Jin MD    Admitting Provider: Lizeth Wu MD  Admission Date: 5/18/2021    Discharge Provider: Lizeth Wu MD  Discharge Date: 05/26/2021    Discharge Disposition: Final discharge disposition not confirmed  Discharge Condition: good  Discharge with Lines: no    Discharge Diet: cardiac diet  Activity Restrictions: as tolerated   Test Results Pending at Discharge: none     Discharge Diagnoses:  Principal Problem:    Acute decompensated heart failure ([facility] [address] )  Active Problems:    Acute respiratory failure with hypoxia ([facility] )    Diabetes mellitus ([facility] [address] )    Microalbuminuria    Lung nodules    COPD (chronic obstructive pulmonary disease) ([facility] )    Nicotine dependence    Elevated BP without diagnosis of hypertension    Alkalosis  Resolved Problems:    Hypokalemia      Consulting Providers:  Cardiology   Heart failure   Nephrology     Diagnostic & Therapeutic Procedures Performed:  X-ray Chest 1 View Portable    Result Date: 5/19/2021  Impression: No acute cardiopulmonary disease  Workstation performed: [id number]     Mri Cardiac  W Wo Contrast    Result Date: 5/24/2021  Impression: Impression: 1  Normal left ventricle size  Mildly reduced left ventricle systolic function with ejection fraction measures 45%  No evidence of regional wall motion abnormality  No evidence of delayed myocardial enhancement  2  Normal right ventricle size   Moderately reduced right ventricle systolic function with ejection fraction measuring 39%  3  Mild left atrial enlargement 4  No valvular abnormality Workstation performed: [id number]       Code Status: Level 1 - Full Code  Advance Directive & Living Will: <no information>  Power of :    POLST:      Medications:  Current Discharge Medication List        Current Discharge Medication List        Current Discharge Medication List      CONTINUE these medications which have NOT CHANGED    Details   calcium citrate-vitamin D (CITRACAL+D) 315-200 MG-UNIT per tablet Take 1 tablet by mouth 2 (two) times a day      Fexofenadine-Pseudoephedrine (ALLEGRA-D 24 HOUR PO) Take by mouth      furosemide (LASIX) 40 mg tablet Take 1 tablet (40 mg total) by mouth daily  Qty: 30 tablet, Refills: 0    Associated Diagnoses: Leg swelling      hydrOXYzine HCL (ATARAX) 10 mg tablet Take 1 tablet (10 mg total) by mouth 3 (three) times a day as needed for itching or anxiety  Qty: 60 tablet, Refills: 0    Associated Diagnoses: Anxiety      LORazepam (ATIVAN) 0 5 mg tablet Take 1 tablet (0 5 mg total) by mouth as needed for anxiety 1 hour before CT scan  Qty: 2 tablet, Refills: 0    Associated Diagnoses: Anxiety      IBUPROFEN PO Take by mouth             Allergies:  No Known Allergies    FOLLOW-UP     PCP Outpatient Follow-up:   f/u with pcp in 1 week     Consulting Providers Follow-up:  Cardiology on 6/7/2021  Nephrology on 7/7/2021    Active Issues Requiring Follow-up:   HFpEF, microalbuminuria     Discharge Statement:   I spent 30 minutes minutes discharging the patient  This time was spent on the day of discharge  I had direct contact with the patient on the day of discharge  Additional documentation is required if more than 30 minutes were spent on discharge  Portions of the record may have been created with voice recognition software    Occasional wrong word or "sound a like" substitutions may have occurred due to the inherent limitations of voice recognition software    Read the chart carefully and recognize, using context, where substitutions have occurred     ==  Re Jaramillo MD  [address]  Internal Medicine Resident PGY-2

## 2021-05-26 NOTE — RESPIRATORY THERAPY NOTE
Home Oxygen Qualifying Test       Patient name: Renetta Worthy        : 1945   Date of Test:  May 26, 2021  Diagnosis:      Home Oxygen Test:    **Medicare Guidelines require item(s) 1-5 on all ambulatory patients or 1 and 2 on non-ambulatory patients  1   Baseline SPO2 on Room Air at rest  85%  2   SPO2 during exercise on Room Air  84%  During exercise monitor SpO2  If SPO2 increases >=89% with ambulation do not add supplemental             oxygen  If <= 88% on room air add O2 via NC and titrate patient  Patient must be ambulated with O2 and titrated to > 88% with exertion  3   SPO2 on Oxygen at rest 91 % 2 lpm     4   SPO2 during exercise on Oxygen  90% a liter flow of 3 lpm     5   Exercise performed:          walking, duration 6 (min)          [x]  Supplemental Home Oxygen is indicated  []  Client does not qualify for home oxygen        Respiratory Additional Notes-   Jolene Murray, RT

## 2021-05-26 NOTE — PROGRESS NOTES
Advanced Heart Failure / Pulmonary Hypertension Service Progress Note    Preeti Packer 66 y o  female   MRN: [id number]  Unit/Bed#: -01; Encounter: [id number]    Assessment:  Principal Problem:    Acute decompensated heart failure ([facility] [address] )  Active Problems:    Diabetes mellitus ([facility] [address] )    Microalbuminuria    Lung nodules    COPD (chronic obstructive pulmonary disease) (HCC)    Acute respiratory failure with hypoxia (HCC)    Nicotine dependence    Elevated BP without diagnosis of hypertension    Alkalosis      Subjective:   Preeti Packer is a 49-year-old woman with PMH as below who presented to [facility] on 05/18/2021 with new onset lower extremity edema with associated SOB, weight gain, and orthopnea  Seen by PCP on 05/11 for new onset lower extremity swelling for 1 week with associated SOB  Patient was then started on PO Lasix 20 mg daily and CXR, blood work and urine test were ordered  Return to PCP office on 05/17 to review these results; Lasix was then increased to 40 mg daily  Patient advised to proceed to ED if symptoms worsen  Presented to ED on 05/18  Workup revealed new onset hypertension with elevated NT proBNP of 3223  (Note: was normotensive during both PCP visits in mid May)  Was started on IV Lasix 40 mg BID as well as lisinopril 10 mg daily  TTE was ordered, and cardiology was consulted  Following cardiology assessment, IV Lasix was increased 80 mg BID  TTE revealed preserved LVEF, dilated RV, and moderately reduced RV systolic function  Due to proteinuria as well as LVH on TTE, cardiac MRI was ordered to rule out cardiac amyloidosis  Patient seen and examined  No significant events overnight  No complaints this AM  Eating and sleeping well  [facility]  Reviewed importance of low sodium diet,  fluid restriction, and daily weights  Objective: Intake/ Output: 900 mL / 300 mL (net positive 600 mL; 1 missed occurence)     Weight: 146 lbs, standing scale (145 lbs on 05/25, bed scale)  Telemetry: Not on telemetry  Today's Plan:   Continue PO Lasix 80 mg BID   Plan for outpatient PSG and PFTs +/- 6MWT   Will cancel outpatient echocardiogram as test was completed this admission   Stable for discharge today from cardiology standpoint   Recommended post hospital BMP be completed in 5-7 days  [facility] follow-up appointment scheduled for 06/07/2021 at 4 PM      Plan:  Acute on chronic HFpEF with RV dysfunction; LVEF 50-55%; LVIDd 2 87 cm; NYHA III; ACC/AHA Stage C              Etiology: unclear  Reactive Hepatitis B core total antibody (indicating past or current infection)  No prior medical history of PE/DVT, autoimmune diseases, or connective tissue diseases  FARIDEH negative, RF positive (20 IU/mL)  Known COPD diagnosis  No history of stimulant or weight loss medication use  NT-proBNP 05/18/2021: 3223  TTE 05/19/2021: LVEF 50-55%  LVIDd 2 87 cm  IVSd 1 27 cm  Moderately dilated RV with moderately reduced RVSF  RVIDd 3 14 cm  Mild MR and TR  PASP 48 mmHg  Mildly dilated IVC  cMRI 05/21/2021: LVEF 45%  LV CO/CI 5 2 / 2 8  RVEF 39%  RV CO/CI 7 8 / 4 2  "Delayed post-gadolinium imaging demonstrates no region of hyperenhancement "   CTA chest/PE study 05/24/2021: "No evidence of pulmonary embolus  Stable subcentimeter pulmonary nodules  "              Reviewed importance of low sodium diet and fluid restriction  Strict I&Os with daily weights  CV diet with fluid restriction       Neurohormonal Blockade:  --Beta Blocker: No    --ARNi / ACEi / ARB: lisinopril 10 mg daily  --Aldosterone Antagonist: No    --SGLT2 Inhibitor: No    --Home Diuretic: Lasix 40 mg daily  --Inpatient Diuretic: PO Lasix 80 mg BID with potassium 20 mEq BID        Sudden Cardiac Death Risk Reduction:  --LVEF >35%     Electrical Resynchronization:  --Candidacy for BiV device: narrow QRS     Advanced Therapies (if appropriate):  Will continue to monitor      Acute respiratory failure with hypoxia, improving              Etiology: underlying COPD with acute volume overload  Wean oxygen as appropriate  Overnight pulse oximetry 05/25/2021: 6 desaturation events with total duration time of >43 minutes  Further management as above       Proteinuria              Microalbumin to creatinine ratio of 2 5 (05/12/2021)              Urine protein to creatinine ratio of 0 9 (05/20/2021)  Negative SPEP and UPEP, Kappa/lambda ratio 1 66       Chronic obstructive pulmonary disease              CT chest/abd/pelvis 05/14/2021: 3 pulmonary nodules in LLL (measuring 3-6 mm)  "Linear scar or subsegmental atelectasis in the right upper lobe along the minor fissure, lingula, and lung bases  No infiltrate  COPD  Minimal biapical pleural smooth septal thickening  Central airways are clear "   CTA chest/PE study 05/24/2021: "No evidence of pulmonary embolus  Stable subcentimeter pulmonary nodules  "     Hypertension: diagnosed this admission  Diabetes mellitus, type II  Alcohol use: drinks on average, 1 glass of white wine daily with dinner  Tobacco abuse: began smoking in her late teens; states that she quit smoking a few weeks ago  Anxiety    Vitals:   Blood pressure 122/71, pulse (!) 107, temperature 97 5 °F (36 4 °C), temperature source Oral, resp  rate 18, height 5' 4" (1 626 m), weight 66 5 kg (146 lb 9 6 oz), SpO2 (!) 86 %  Body mass index is 25 16 kg/m²  I/O last 3 completed shifts:   In: 900 [P O :900]  Out: 1100 [Urine:1100]    Wt Readings from Last 3 Encounters:   05/26/21 66 5 kg (146 lb 9 6 oz)   05/20/21 75 4 kg (166 lb 3 6 oz)   05/17/21 79 4 kg (175 lb)     Vitals:    05/26/21 0501 05/26/21 0805 05/26/21 1000 05/26/21 1017   BP:  122/71     BP Location:       Pulse:  98 92 (!) 107   Resp:  18     Temp:  97 5 °F (36 4 °C)     TempSrc:  Oral     SpO2:  (!) 88% 90% (!) 86%   Weight: 66 5 kg (146 lb 9 6 oz) Height:  5' 4" (1 626 m)         Physical Exam  Vitals signs reviewed  Constitutional:       General: She is awake  She is not in acute distress  Appearance: Normal appearance  She is well-developed and overweight  Interventions: Nasal cannula in place  HENT:      Head: Normocephalic  Nose: Nose normal       Mouth/Throat:      Mouth: Mucous membranes are moist    Eyes:      General: No scleral icterus  Conjunctiva/sclera: Conjunctivae normal    Neck:      Musculoskeletal: Neck supple  Vascular: JVD (mild) present  Trachea: No tracheal deviation  Cardiovascular:      Rate and Rhythm: Normal rate and regular rhythm  No extrasystoles are present  Pulses: Normal pulses  Heart sounds: Murmur present  Pulmonary:      Effort: Pulmonary effort is normal  No tachypnea, bradypnea, accessory muscle usage or respiratory distress  Breath sounds: Normal air entry  No decreased breath sounds, rhonchi or rales  Abdominal:      General: Bowel sounds are normal  There is no distension  Palpations: Abdomen is soft  Tenderness: There is no abdominal tenderness  Musculoskeletal:      Right lower leg: No edema  Left lower leg: No edema  Skin:     General: Skin is warm and dry  Coloration: Skin is not jaundiced or pale  Neurological:      General: No focal deficit present  Mental Status: She is alert and oriented to person, place, and time  Psychiatric:         Attention and Perception: Attention normal          Mood and Affect: Mood and affect normal          Speech: Speech normal          Behavior: Behavior normal  Behavior is cooperative  Thought Content:  Thought content normal      Central Line (day, reason): No   Rivas Catheter (day, reason): No     Current Facility-Administered Medications:     acetaminophen (TYLENOL) tablet 650 mg, 650 mg, Oral, Q6H PRN, Josef Kiser MD, 650 mg at 05/24/21 0145    calcium carbonate (OYSTER SHELL,OSCAL) 500 mg tablet 1 tablet, 1 tablet, Oral, Daily With Breakfast, Chrissy Flanaganupp, DO, 1 tablet at 05/26/21 0827    enoxaparin (LOVENOX) subcutaneous injection 40 mg, 40 mg, Subcutaneous, Daily, Chrissy Shupp, DO, 40 mg at 05/26/21 0827    fluticasone (FLONASE) 50 mcg/act nasal spray 1 spray, 1 spray, Each Nare, Daily, Zayra Moses MD, 1 spray at 05/26/21 1005    furosemide (LASIX) tablet 80 mg, 80 mg, Oral, BID (diuretic), Hallie Fung PA-C, 80 mg at 05/26/21 0827    insulin lispro (HumaLOG) 100 units/mL subcutaneous injection 1-5 Units, 1-5 Units, Subcutaneous, HS, Chrissy Shupp, DO, 1 Units at 05/25/21 2227    insulin lispro (HumaLOG) 100 units/mL subcutaneous injection 1-6 Units, 1-6 Units, Subcutaneous, TID AC, 1 Units at 05/25/21 1213 **AND** Fingerstick Glucose (POCT), , , 4x Daily AC and at bedtime, Chrissy Flanaganupp, DO    lisinopril (ZESTRIL) tablet 10 mg, 10 mg, Oral, Daily, Tammy Wanvedi, DO, 10 mg at 05/26/21 0827    melatonin tablet 3 mg, 3 mg, Oral, HS PRN, Zayra Moses MD    nicotine (NICODERM CQ) 14 mg/24hr TD 24 hr patch 14 mg, 14 mg, Transdermal, Daily, Mariano Jackson, DO, 14 mg at 05/25/21 1940    potassium chloride (K-DUR,KLOR-CON) CR tablet 20 mEq, 20 mEq, Oral, BID, Hallie Fung PA-C, 20 mEq at 05/26/21 0827    sodium chloride (OCEAN) 0 65 % nasal spray 1 spray, 1 spray, Each Nare, TID PRN, aZyra Moses MD, 1 spray at 05/23/21 1233    Insert peripheral IV, , , Once **AND** sodium chloride (PF) 0 9 % injection 3 mL, 3 mL, Intravenous, Q1H PRN, Chrissy Carcamo DO    white petrolatum-mineral oil (EUCERIN,HYDROCERIN) cream, , Topical, TID PRN, Zayra Moses MD    Labs & Results:              Results from last 7 days   Lab Units 05/26/21  0458 05/25/21  0459 05/24/21  0459 05/23/21  0509   POTASSIUM mmol/L 3 9 3 8 3 2* 3 7   CHLORIDE mmol/L 103 100 95* 96*   CO2 mmol/L 34* 38* 42* 42*   BUN mg/dL 28* 22 19 23   CREATININE mg/dL 0 68 0 65 0 55* 0 66   CALCIUM mg/dL 9 5 9 7 9 4 9 4   ALK PHOS U/L  --   --  119* 120*   ALT U/L  --   --  28 25   AST U/L  --   --  18 100 Noris Stein PA-C

## 2021-05-26 NOTE — CASE MANAGEMENT
Pt recommended for new home 02, 2L 02 at rest and 3L 02 during exercise  Order placed via [place] and sent to Dr Jasmin Paredes for signature  CM spoke with pt who requested they contact her , Jimmy Bruno ([phone]) to schedule delivery, CM requested same via [facility] communication  Pt requested a referral to  [facility] for SN, referral placed via [place]  CM department to follow  1500    CM spoke with pt and pt's  who informed that they were told by [facility] that delivery time would be provided them in about an hour  Per [facility],  [facility] is able to accept for [facility]

## 2021-05-27 ENCOUNTER — TELEPHONE (OUTPATIENT)
Dept: INTERNAL MEDICINE CLINIC | Facility: CLINIC | Age: 76
End: 2021-05-27

## 2021-05-27 ENCOUNTER — TRANSITIONAL CARE MANAGEMENT (OUTPATIENT)
Dept: INTERNAL MEDICINE CLINIC | Facility: CLINIC | Age: 76
End: 2021-05-27

## 2021-05-27 DIAGNOSIS — Z72.0 TOBACCO USE: Primary | ICD-10-CM

## 2021-05-27 NOTE — TELEPHONE ENCOUNTER
Spoke to patient and [facility]

## 2021-05-27 NOTE — TELEPHONE ENCOUNTER
Pt was recently in the hospital and was prescribed a nicotine patch she has had it on for 3 days now she has an appt with yo on 6/2/21 but the  is asking for more patches to hold her over until she sees you    [facility]

## 2021-05-28 NOTE — TELEPHONE ENCOUNTER
Patient originally scheduled for consult with Dr Joseph Beth one day prior to hospitalization; referral from pcp  Patient now scheduled as hospital follow up with Dr Joseph Beth

## 2021-06-01 ENCOUNTER — APPOINTMENT (OUTPATIENT)
Dept: LAB | Facility: CLINIC | Age: 76
End: 2021-06-01
Payer: MEDICARE

## 2021-06-01 ENCOUNTER — TELEPHONE (OUTPATIENT)
Dept: INTERNAL MEDICINE CLINIC | Facility: CLINIC | Age: 76
End: 2021-06-01

## 2021-06-01 DIAGNOSIS — M79.89 LEG SWELLING: ICD-10-CM

## 2021-06-01 DIAGNOSIS — I50.9 ACUTE DECOMPENSATED HEART FAILURE (HCC): ICD-10-CM

## 2021-06-01 LAB
ANION GAP SERPL CALCULATED.3IONS-SCNC: 3 MMOL/L (ref 4–13)
BUN SERPL-MCNC: 40 MG/DL (ref 5–25)
CALCIUM SERPL-MCNC: 10 MG/DL (ref 8.3–10.1)
CHLORIDE SERPL-SCNC: 98 MMOL/L (ref 100–108)
CO2 SERPL-SCNC: 34 MMOL/L (ref 21–32)
CREAT SERPL-MCNC: 0.97 MG/DL (ref 0.6–1.3)
GFR SERPL CREATININE-BSD FRML MDRD: 57 ML/MIN/1.73SQ M
GLUCOSE P FAST SERPL-MCNC: 163 MG/DL (ref 65–99)
POTASSIUM SERPL-SCNC: 4.9 MMOL/L (ref 3.5–5.3)
SODIUM SERPL-SCNC: 135 MMOL/L (ref 136–145)

## 2021-06-01 PROCEDURE — 80048 BASIC METABOLIC PNL TOTAL CA: CPT

## 2021-06-01 PROCEDURE — 36415 COLL VENOUS BLD VENIPUNCTURE: CPT

## 2021-06-01 NOTE — TELEPHONE ENCOUNTER
[facility] Nurse is just calling to let you know that she felt dizzy after her labs this am  This happened 1 hour before the nurse came  She checked vitals and her BP at rest was on lower side 98/56 pulse was 93, respirations were 14, temp was 98 0  She is new to oxygen and is on 2 liters  She is also new to lisinopril and [place] and [place] since hospital visit   Pt has follow up on wed 6/2/21

## 2021-06-02 ENCOUNTER — OFFICE VISIT (OUTPATIENT)
Dept: INTERNAL MEDICINE CLINIC | Facility: CLINIC | Age: 76
End: 2021-06-02
Payer: MEDICARE

## 2021-06-02 VITALS
HEIGHT: 64 IN | BODY MASS INDEX: 24.59 KG/M2 | HEART RATE: 96 BPM | TEMPERATURE: 97.5 F | DIASTOLIC BLOOD PRESSURE: 72 MMHG | WEIGHT: 144 LBS | OXYGEN SATURATION: 95 % | SYSTOLIC BLOOD PRESSURE: 108 MMHG

## 2021-06-02 DIAGNOSIS — R80.9 MICROALBUMINURIA: ICD-10-CM

## 2021-06-02 DIAGNOSIS — I27.20 PULMONARY HYPERTENSION (HCC): ICD-10-CM

## 2021-06-02 DIAGNOSIS — I50.32 CHRONIC DIASTOLIC CONGESTIVE HEART FAILURE (HCC): ICD-10-CM

## 2021-06-02 DIAGNOSIS — I10 ESSENTIAL HYPERTENSION, BENIGN: ICD-10-CM

## 2021-06-02 DIAGNOSIS — I50.31 ACUTE DIASTOLIC CHF (CONGESTIVE HEART FAILURE) (HCC): Primary | ICD-10-CM

## 2021-06-02 DIAGNOSIS — F41.9 ANXIETY: ICD-10-CM

## 2021-06-02 DIAGNOSIS — E11.65 UNCONTROLLED TYPE 2 DIABETES MELLITUS WITH HYPERGLYCEMIA (HCC): ICD-10-CM

## 2021-06-02 DIAGNOSIS — J44.9 CHRONIC OBSTRUCTIVE PULMONARY DISEASE, UNSPECIFIED COPD TYPE (HCC): ICD-10-CM

## 2021-06-02 PROCEDURE — 99495 TRANSJ CARE MGMT MOD F2F 14D: CPT | Performed by: INTERNAL MEDICINE

## 2021-06-02 RX ORDER — LISINOPRIL 5 MG/1
5 TABLET ORAL DAILY
Qty: 90 TABLET | Refills: 1 | Status: SHIPPED | OUTPATIENT
Start: 2021-06-02 | End: 2021-06-30

## 2021-06-02 NOTE — PROGRESS NOTES
Transitional Care Management Review:  Mercy Scott is a 66 y o  female here for TCM follow up  During the TCM phone call patient stated:    TCM Call (since 5/2/2021)     Date and time call was made  5/27/2021  1:20 PM    Hospital care reviewed  Records reviewed    Patient was hospitialized at  [facility]        Date of Admission  05/18/21    Date of discharge  05/26/21    Diagnosis  acute decompensated heart failure    Disposition  Home    Current Symptoms  None      TCM Call (since 5/2/2021)     Post hospital issues  None; Reduced activity    Scheduled for follow up? Yes    Did you obtain your prescribed medications  Yes    Do you need help managing your prescriptions or medications  Yes    Why type of assitance do you need      Is transportation to your appointment needed  Yes    Specify why      I have advised the patient to call PCP with any new or worsening symptoms  Remberto Ye MD      Assessment/Plan:             1  Acute diastolic CHF (congestive heart failure) ([facility] [address] )    2  Uncontrolled type 2 diabetes mellitus with hyperglycemia (HCC)  -     Continuous Glucose Monitor Sup KIT; Use 1 application every 14 (fourteen) days  -     Continuous Glucose Monitor Sup MISC; Use every 14 (fourteen) days    3  Essential hypertension, benign  -     lisinopril (ZESTRIL) 5 mg tablet; Take 1 tablet (5 mg total) by mouth daily    4  Chronic diastolic congestive heart failure ([address] )    5  Anxiety    6  Pulmonary hypertension ([address] )    7  Microalbuminuria    8  Chronic obstructive pulmonary disease, unspecified COPD type (HCC)           Subjective:      Patient ID: Mercy Scott is a 66 y o  female  [address] [facility], SHORTNESS OF BREATH BETTER, SWELLING IS ALSO BETTER    Dizziness  Pertinent negatives include no abdominal pain, arthralgias, chest pain, chills, congestion, coughing, fever, headaches, nausea, rash, sore throat, vomiting or weakness         The following portions of the patient's history were reviewed and updated as appropriate: She  has a past medical history of Allergic rhinitis, Anxiety, Arthritis, Diabetes mellitus ([place] [address] ), and Tobacco abuse  She   Patient Active Problem List    Diagnosis Date Noted    Essential hypertension, benign 06/02/2021    Chronic diastolic congestive heart failure ([place] [address] ) 06/02/2021    Pulmonary hypertension ([place] [address] ) 06/02/2021    Tobacco abuse     Elevated BP without diagnosis of hypertension 05/22/2021    Alkalosis 05/22/2021    Lung nodules 05/18/2021    COPD (chronic obstructive pulmonary disease) ([place] [address] ) 05/18/2021    Acute respiratory failure with hypoxia (HCC) 05/18/2021    Nicotine dependence 05/18/2021    Microalbuminuria 05/17/2021    Acute diastolic CHF (congestive heart failure) ([place] [address] ) 05/11/2021    SOB (shortness of breath) 05/11/2021    Abdominal swelling, generalized 05/11/2021    Uncontrolled type 2 diabetes mellitus with hyperglycemia ([place] [address] ) 05/11/2021    Urinary frequency 05/11/2021    Allergic rhinitis     Diabetes mellitus ([place] [address] )     Anxiety      She  has a past surgical history that includes Breast surgery (Right); Other surgical history; and Other surgical history  Her family history includes Diabetes in her brother; Heart disease in her brother; Hypotension in her daughter; Lymphoma in her brother; No Known Problems in her sister; Parkinsonism in her sister; Prostate cancer in her father; Proteinuria in her daughter  She  reports that she quit smoking about 2 weeks ago  Her smoking use included cigarettes  She smoked 0 50 packs per day  She has never used smokeless tobacco  She reports current alcohol use of about 1 0 - 2 0 standard drinks of alcohol per week  She reports that she does not use drugs    Current Outpatient Medications   Medication Sig Dispense Refill    calcium citrate-vitamin D (CITRACAL+D) 315-200 MG-UNIT per tablet Take 1 tablet by mouth 2 (two) times a day      Empagliflozin 10 MG TABS Take 1 tablet (10 mg total) by mouth every morning 30 tablet 0    furosemide (LASIX) 80 mg tablet Take 1 tablet (80 mg total) by mouth 2 (two) times a day 60 tablet 0    LORazepam (ATIVAN) 0 5 mg tablet Take 1 tablet (0 5 mg total) by mouth as needed for anxiety 1 hour before CT scan 2 tablet 0    nicotine (NICODERM CQ) 7 mg/24hr TD 24 hr patch Place 1 patch on the skin every 24 hours 28 patch 0    potassium chloride (K-DUR,KLOR-CON) 20 mEq tablet Take 1 tablet (20 mEq total) by mouth 2 (two) times a day 60 tablet 0    Continuous Glucose Monitor Sup KIT Use 1 application every 14 (fourteen) days 1 kit 0    Continuous Glucose Monitor Sup MISC Use every 14 (fourteen) days 2 each 3    lisinopril (ZESTRIL) 5 mg tablet Take 1 tablet (5 mg total) by mouth daily 90 tablet 1     No current facility-administered medications for this visit        Current Outpatient Medications on File Prior to Visit   Medication Sig    calcium citrate-vitamin D (CITRACAL+D) 315-200 MG-UNIT per tablet Take 1 tablet by mouth 2 (two) times a day    Empagliflozin 10 MG TABS Take 1 tablet (10 mg total) by mouth every morning    furosemide (LASIX) 80 mg tablet Take 1 tablet (80 mg total) by mouth 2 (two) times a day    LORazepam (ATIVAN) 0 5 mg tablet Take 1 tablet (0 5 mg total) by mouth as needed for anxiety 1 hour before CT scan    nicotine (NICODERM CQ) 7 mg/24hr TD 24 hr patch Place 1 patch on the skin every 24 hours    potassium chloride (K-DUR,KLOR-CON) 20 mEq tablet Take 1 tablet (20 mEq total) by mouth 2 (two) times a day    [DISCONTINUED] lisinopril (ZESTRIL) 10 mg tablet Take 1 tablet (10 mg total) by mouth daily    [DISCONTINUED] Fexofenadine-Pseudoephedrine (ALLEGRA-D 24 HOUR PO) Take by mouth    [DISCONTINUED] hydrOXYzine HCL (ATARAX) 10 mg tablet Take 1 tablet (10 mg total) by mouth 3 (three) times a day as needed for itching or anxiety (Patient not taking: Reported on 6/2/2021)     No current facility-administered medications on file prior to visit  She has No Known Allergies       Review of Systems   Constitutional: Negative for chills and fever  HENT: Negative for congestion, ear pain and sore throat  Eyes: Negative for pain  Respiratory: Positive for shortness of breath  Negative for cough  Cardiovascular: Negative for chest pain and leg swelling  Gastrointestinal: Negative for abdominal pain, nausea and vomiting  Endocrine: Negative for polyuria  Genitourinary: Negative for difficulty urinating, frequency and urgency  Musculoskeletal: Negative for arthralgias and back pain  Skin: Negative for rash  Neurological: Positive for dizziness  Negative for weakness and headaches  Psychiatric/Behavioral: Negative for sleep disturbance  The patient is not nervous/anxious            Objective:      /72 (BP Location: Left arm, Patient Position: Sitting, Cuff Size: Standard)   Pulse 96   Temp 97 5 °F (36 4 °C) (Temporal)   Ht 5' 4" (1 626 m)   Wt 65 3 kg (144 lb)   SpO2 95%   BMI 24 72 kg/m²     Recent Results (from the past 1344 hour(s))   Urinalysis with microscopic    Collection Time: 05/12/21 11:58 AM   Result Value Ref Range    Clarity, UA Clear     Color, UA Dk Yellow     Specific [place], UA 1 016 1 003 - 1 030    pH, UA 6 0 4 5, 5 0, 5 5, 6 0, 6 5, 7 0, 7 5, 8 0    Glucose, UA Negative Negative mg/dl    Ketones, UA Trace (A) Negative mg/dl    Blood, UA Trace (A) Negative    Protein,  (3+) (A) Negative mg/dl    Nitrite, UA Negative Negative    Bilirubin, UA Negative Negative    Urobilinogen, UA 1 0 0 2, 1 0 E U /dl E U /dl    Leukocytes, UA Negative Negative    WBC, UA 2-4 None Seen, 2-4 /hpf    RBC, UA 2-4 None Seen, 2-4 /hpf    Hyaline Casts, UA None Seen None Seen /lpf    Bacteria, UA Occasional None Seen, Occasional /hpf    Epithelial Cells None Seen None Seen, Occasional /hpf   Microalbumin / creatinine urine ratio    Collection Time: 05/12/21 11:58 AM Result Value Ref Range    Creatinine, Ur 101 0 mg/dL    Microalbum  ,U,Random 2,500 0 (H) 0 0 - 20 0 mg/L    Microalb Creat Ratio 2,475 (H) 0 - 30 mg/g creatinine   CBC and differential    Collection Time: 05/12/21 11:58 AM   Result Value Ref Range    WBC 6 97 4 31 - 10 16 Thousand/uL    RBC 5 56 (H) 3 81 - 5 12 Million/uL    Hemoglobin 18 0 (H) 11 5 - 15 4 g/dL    Hematocrit 56 1 (H) 34 8 - 46 1 %     (H) 82 - 98 fL    MCH 32 4 26 8 - 34 3 pg    MCHC 32 1 31 4 - 37 4 g/dL    RDW 14 6 11 6 - 15 1 %    MPV 12 2 8 9 - 12 7 fL    Platelets 754 291 - 463 Thousands/uL    nRBC 0 /100 WBCs    Neutrophils Relative 80 (H) 43 - 75 %    Immat GRANS % 0 0 - 2 %    Lymphocytes Relative 11 (L) 14 - 44 %    Monocytes Relative 7 4 - 12 %    Eosinophils Relative 1 0 - 6 %    Basophils Relative 1 0 - 1 %    Neutrophils Absolute 5 61 1 85 - 7 62 Thousands/µL    Immature Grans Absolute 0 02 0 00 - 0 20 Thousand/uL    Lymphocytes Absolute 0 79 0 60 - 4 47 Thousands/µL    Monocytes Absolute 0 46 0 17 - 1 22 Thousand/µL    Eosinophils Absolute 0 04 0 00 - 0 61 Thousand/µL    Basophils Absolute 0 05 0 00 - 0 10 Thousands/µL   Comprehensive metabolic panel    Collection Time: 05/12/21 11:58 AM   Result Value Ref Range    Sodium 137 136 - 145 mmol/L    Potassium 4 5 3 5 - 5 3 mmol/L    Chloride 101 100 - 108 mmol/L    CO2 36 (H) 21 - 32 mmol/L    ANION GAP 0 (L) 4 - 13 mmol/L    BUN 10 5 - 25 mg/dL    Creatinine 0 63 0 60 - 1 30 mg/dL    Glucose, Fasting 152 (H) 65 - 99 mg/dL    Calcium 9 1 8 3 - 10 1 mg/dL    AST 24 5 - 45 U/L    ALT 34 12 - 78 U/L    Alkaline Phosphatase 124 (H) 46 - 116 U/L    Total Protein 7 0 6 4 - 8 2 g/dL    Albumin 3 7 3 5 - 5 0 g/dL    Total Bilirubin 1 95 (H) 0 20 - 1 00 mg/dL    eGFR 87 ml/min/1 73sq m   Hemoglobin A1C    Collection Time: 05/12/21 11:58 AM   Result Value Ref Range    Hemoglobin A1C 6 6 (H) Normal 3 8-5 6%; PreDiabetic 5 7-6 4%;  Diabetic >=6 5%; Glycemic control for adults with diabetes <7 0% %     mg/dl   Lipid Panel with Direct LDL reflex    Collection Time: 05/12/21 11:58 AM   Result Value Ref Range    Cholesterol 136 50 - 200 mg/dL    Triglycerides 49 <=150 mg/dL    HDL, Direct 71 >=40 mg/dL    LDL Calculated 55 0 - 100 mg/dL   Urine culture    Collection Time: 05/12/21 11:58 AM    Specimen: Urine, Clean Catch   Result Value Ref Range    Urine Culture No Growth <1000 cfu/mL    TSH, 3rd generation    Collection Time: 05/12/21 11:58 AM   Result Value Ref Range    TSH 3RD GENERATON 1 800 0 358 - 3 740 uIU/mL   NT-BNP PRO    Collection Time: 05/12/21 11:58 AM   Result Value Ref Range    NT-proBNP 2,138 (H) <450 pg/mL   CBC and differential    Collection Time: 05/18/21  5:04 PM   Result Value Ref Range    WBC 9 13 4 31 - 10 16 Thousand/uL    RBC 5 67 (H) 3 81 - 5 12 Million/uL    Hemoglobin 18 2 (H) 11 5 - 15 4 g/dL    Hematocrit 56 1 (H) 34 8 - 46 1 %    MCV 99 (H) 82 - 98 fL    MCH 32 1 26 8 - 34 3 pg    MCHC 32 4 31 4 - 37 4 g/dL    RDW 14 3 11 6 - 15 1 %    MPV 11 7 8 9 - 12 7 fL    Platelets 926 208 - 193 Thousands/uL    nRBC 0 /100 WBCs    Neutrophils Relative 79 (H) 43 - 75 %    Immat GRANS % 0 0 - 2 %    Lymphocytes Relative 10 (L) 14 - 44 %    Monocytes Relative 9 4 - 12 %    Eosinophils Relative 1 0 - 6 %    Basophils Relative 1 0 - 1 %    Neutrophils Absolute 7 21 1 85 - 7 62 Thousands/µL    Immature Grans Absolute 0 03 0 00 - 0 20 Thousand/uL    Lymphocytes Absolute 0 94 0 60 - 4 47 Thousands/µL    Monocytes Absolute 0 82 0 17 - 1 22 Thousand/µL    Eosinophils Absolute 0 06 0 00 - 0 61 Thousand/µL    Basophils Absolute 0 07 0 00 - 0 10 Thousands/µL   Basic metabolic panel    Collection Time: 05/18/21  5:04 PM   Result Value Ref Range    Sodium 137 136 - 145 mmol/L    Potassium 3 6 3 5 - 5 3 mmol/L    Chloride 99 (L) 100 - 108 mmol/L    CO2 32 21 - 32 mmol/L    ANION GAP 6 4 - 13 mmol/L    BUN 16 5 - 25 mg/dL    Creatinine 0 80 0 60 - 1 30 mg/dL    Glucose 141 (H) 65 - 140 mg/dL Calcium 9 2 8 3 - 10 1 mg/dL    eGFR 71 ml/min/1 73sq m   Troponin I    Collection Time: 05/18/21  5:04 PM   Result Value Ref Range    Troponin I 0 04 <=0 04 ng/mL   NT-BNP PRO    Collection Time: 05/18/21  5:04 PM   Result Value Ref Range    NT-proBNP 3,223 (H) <450 pg/mL   Novel Coronavirus (Covid-19),PCR [facility]    Collection Time: 05/18/21  5:04 PM    Specimen: Nasopharyngeal Swab; Nares   Result Value Ref Range    SARS-CoV-2 Negative Negative   ECG 12 lead    Collection Time: 05/18/21  5:16 PM   Result Value Ref Range    Ventricular Rate 117 BPM    Atrial Rate 117 BPM    W[place] Interval 208 ms    QRSD Interval 64 ms    QT Interval 292 ms    QTC Interval 407 ms    P Axis 83 degrees    QRS Axis -69 degrees    T Wave Axis 94 degrees   Fingerstick Glucose (POCT)    Collection Time: 05/18/21  9:17 PM   Result Value Ref Range    POC Glucose 108 65 - 140 mg/dl   CBC and differential    Collection Time: 05/19/21  2:42 AM   Result Value Ref Range    WBC 7 24 4 31 - 10 16 Thousand/uL    RBC 5 60 (H) 3 81 - 5 12 Million/uL    Hemoglobin 17 8 (H) 11 5 - 15 4 g/dL    Hematocrit 57 7 (H) 34 8 - 46 1 %     (H) 82 - 98 fL    MCH 31 8 26 8 - 34 3 pg    MCHC 30 8 (L) 31 4 - 37 4 g/dL    RDW 14 1 11 6 - 15 1 %    MPV 11 6 8 9 - 12 7 fL    Platelets 191 047 - 718 Thousands/uL    nRBC 0 /100 WBCs    Neutrophils Relative 72 43 - 75 %    Immat GRANS % 0 0 - 2 %    Lymphocytes Relative 15 14 - 44 %    Monocytes Relative 10 4 - 12 %    Eosinophils Relative 2 0 - 6 %    Basophils Relative 1 0 - 1 %    Neutrophils Absolute 5 24 1 85 - 7 62 Thousands/µL    Immature Grans Absolute 0 03 0 00 - 0 20 Thousand/uL    Lymphocytes Absolute 1 06 0 60 - 4 47 Thousands/µL    Monocytes Absolute 0 74 0 17 - 1 22 Thousand/µL    Eosinophils Absolute 0 12 0 00 - 0 61 Thousand/µL    Basophils Absolute 0 05 0 00 - 0 10 Thousands/µL   Comprehensive metabolic panel    Collection Time: 05/19/21  2:43 AM   Result Value Ref Range    Sodium 138 136 - 145 mmol/L Potassium 3 4 (L) 3 5 - 5 3 mmol/L    Chloride 98 (L) 100 - 108 mmol/L    CO2 34 (H) 21 - 32 mmol/L    ANION GAP 6 4 - 13 mmol/L    BUN 16 5 - 25 mg/dL    Creatinine 0 81 0 60 - 1 30 mg/dL    Glucose 172 (H) 65 - 140 mg/dL    Calcium 8 8 8 3 - 10 1 mg/dL    Corrected Calcium 9 7 8 3 - 10 1 mg/dL    AST 25 5 - 45 U/L    ALT 30 12 - 78 U/L    Alkaline Phosphatase 135 (H) 46 - 116 U/L    Total Protein 6 9 6 4 - 8 2 g/dL    Albumin 2 9 (L) 3 5 - 5 0 g/dL    Total Bilirubin 1 70 (H) 0 20 - 1 00 mg/dL    eGFR 70 ml/min/1 73sq m   Magnesium    Collection Time: 05/19/21  2:43 AM   Result Value Ref Range    Magnesium 2 1 1 6 - 2 6 mg/dL   Fingerstick Glucose (POCT)    Collection Time: 05/19/21  6:37 AM   Result Value Ref Range    POC Glucose 121 65 - 140 mg/dl   Fingerstick Glucose (POCT)    Collection Time: 05/19/21 10:16 AM   Result Value Ref Range    POC Glucose 186 (H) 65 - 140 mg/dl   Fingerstick Glucose (POCT)    Collection Time: 05/19/21  4:43 PM   Result Value Ref Range    POC Glucose 103 65 - 140 mg/dl   Fingerstick Glucose (POCT)    Collection Time: 05/19/21  8:58 PM   Result Value Ref Range    POC Glucose 156 (H) 65 - 140 mg/dl   Basic metabolic panel    Collection Time: 05/20/21  5:15 AM   Result Value Ref Range    Sodium 142 136 - 145 mmol/L    Potassium 3 3 (L) 3 5 - 5 3 mmol/L    Chloride 94 (L) 100 - 108 mmol/L    CO2 41 (H) 21 - 32 mmol/L    ANION GAP 7 4 - 13 mmol/L    BUN 18 5 - 25 mg/dL    Creatinine 0 72 0 60 - 1 30 mg/dL    Glucose 149 (H) 65 - 140 mg/dL    Calcium 8 7 8 3 - 10 1 mg/dL    eGFR 81 ml/min/1 73sq m   Fingerstick Glucose (POCT)    Collection Time: 05/20/21  6:00 AM   Result Value Ref Range    POC Glucose 155 (H) 65 - 140 mg/dl   Fingerstick Glucose (POCT)    Collection Time: 05/20/21 10:41 AM   Result Value Ref Range    POC Glucose 162 (H) 65 - 140 mg/dl   FARIDEH Screen w/ Reflex to Titer/Pattern    Collection Time: 05/20/21  2:35 PM   Result Value Ref Range    FARIDEH Negative Negative   C3 complement    Collection Time: 05/20/21  2:35 PM   Result Value Ref Range    C3 Complement 94 3 90 0 - 180 0 mg/dL   C4 complement    Collection Time: 05/20/21  2:35 PM   Result Value Ref Range    C4, COMPLEMENT 15 0 10 0 - 40 0 mg/dL   Anti-neutrophilic cytoplasmic antibody    Collection Time: 05/20/21  2:35 PM   Result Value Ref Range    C-ANCA <1:20 Neg:<1:20 titer    Atypical pANCA <1:20 Neg:<1:20 titer    MPO AB <9 0 0 0 - 9 0 U/mL    NV-3 AB <3 5 0 0 - 3 5 U/mL    P-ANCA <1:20 Neg:<1:20 titer   Protein electrophoresis, serum    Collection Time: 05/20/21  2:35 PM   Result Value Ref Range    A/G Ratio 1 29 1 10 - 1 80    Albumin Electrophoresis 56 4 52 0 - 65 0 %    Albumin CONC 3 67 3 50 - 5 00 g/dl    Alpha 1 6 3 (H) 2 5 - 5 0 %    ALPHA 1 CONC 0 41 (H) 0 10 - 0 40 g/dL    Alpha 2 10 2 7 0 - 13 0 %    ALPHA 2 CONC 0 66 0 40 - 1 20 g/dL    Beta-1 6 6 5 0 - 13 0 %    BETA 1 CONC 0 43 0 40 - 0 80 g/dL    Beta-2 4 8 2 0 - 8 0 %    BETA 2 CONC 0 31 0 20 - 0 50 g/dL    Gamma Globulin 15 7 12 0 - 22 0 %    GAMMA CONC 1 02 0 50 - 1 60 g/dL    Total Protein 6 5 6 4 - 8 2 g/dL    SPEP Interpretation See Comment    Immunoglobulin free LT chains blood    Collection Time: 05/20/21  2:35 PM   Result Value Ref Range    Ig Kappa Free Light Chain 26 4 (H) 3 3 - 19 4 mg/L    Ig Lambda Free Light Chain 15 9 5 7 - 26 3 mg/L    Kappa/Lambda FluidC Ratio 1 66 (H) 0 26 - 1 65   Chronic Hepatitis Panel    Collection Time: 05/20/21  2:35 PM   Result Value Ref Range    Hepatitis B Surface Ag Non-reactive Non-reactive, NonReactive - Confirmed    Hepatitis C Ab Non-reactive Non-reactive    Hep B C IgM Non-reactive Non-reactive    Hep B Core Total Ab Reactive (A) Non-reactive   Immunofixation, Serum(Reflex Only-Do Not Order)    Collection Time: 05/20/21  2:35 PM   Result Value Ref Range    Immunofixation Interpretation See Comment    RF Screen w/ Reflex to Titer    Collection Time: 05/20/21  2:35 PM   Result Value Ref Range    Rheumatoid Factor Positive (A) Negative   Rheumatoid factor quant (Reflex Only- Do Not Order)    Collection Time: 05/20/21  2:35 PM   Result Value Ref Range    RF Quantitation 20 IU/mL (A) (none)   Protein electrophoresis, urine    Collection Time: 05/20/21  3:22 PM   Result Value Ref Range    Urine Protein 16 0 2 0 - 17 5 mg/dL    Albumin ELP, Urine 100 0 %    Alpha 1, Urine 0 0 %    Alpha 2, Urine 0 0 %    Beta, Urine 0 0 %    Gamma Globulin, Urine 0 0 %    UPEP Interp See Comment    Protein / creatinine ratio, urine    Collection Time: 05/20/21  3:22 PM   Result Value Ref Range    Creatinine, Ur 17 2 mg/dL    Protein Urine Random 16 mg/dL    Prot/Creat Ratio, Ur 0 93 (H) 0 00 - 0 10   Fingerstick Glucose (POCT)    Collection Time: 05/20/21  3:57 PM   Result Value Ref Range    POC Glucose 159 (H) 65 - 140 mg/dl   Fingerstick Glucose (POCT)    Collection Time: 05/20/21  9:03 PM   Result Value Ref Range    POC Glucose 201 (H) 65 - 140 mg/dl   Basic metabolic panel    Collection Time: 05/21/21  5:33 AM   Result Value Ref Range    Sodium 139 136 - 145 mmol/L    Potassium 3 6 3 5 - 5 3 mmol/L    Chloride 97 (L) 100 - 108 mmol/L    CO2 40 (H) 21 - 32 mmol/L    ANION GAP 2 (L) 4 - 13 mmol/L    BUN 22 5 - 25 mg/dL    Creatinine 0 70 0 60 - 1 30 mg/dL    Glucose 190 (H) 65 - 140 mg/dL    Calcium 9 0 8 3 - 10 1 mg/dL    eGFR 84 ml/min/1 73sq m   Fingerstick Glucose (POCT)    Collection Time: 05/21/21  6:04 AM   Result Value Ref Range    POC Glucose 160 (H) 65 - 140 mg/dl   Fingerstick Glucose (POCT)    Collection Time: 05/21/21 11:20 AM   Result Value Ref Range    POC Glucose 178 (H) 65 - 140 mg/dl   Fingerstick Glucose (POCT)    Collection Time: 05/21/21  4:04 PM   Result Value Ref Range    POC Glucose 166 (H) 65 - 140 mg/dl   Fingerstick Glucose (POCT)    Collection Time: 05/21/21  8:57 PM   Result Value Ref Range    POC Glucose 190 (H) 65 - 140 mg/dl   Basic metabolic panel    Collection Time: 05/22/21  5:25 AM   Result Value Ref Range    Sodium 140 136 - 145 mmol/L    Potassium 5 0 3 5 - 5 3 mmol/L    Chloride 99 (L) 100 - 108 mmol/L    CO2 40 (H) 21 - 32 mmol/L    ANION GAP 1 (L) 4 - 13 mmol/L    BUN 20 5 - 25 mg/dL    Creatinine 0 70 0 60 - 1 30 mg/dL    Glucose 134 65 - 140 mg/dL    Calcium 9 6 8 3 - 10 1 mg/dL    eGFR 84 ml/min/1 73sq m   Fingerstick Glucose (POCT)    Collection Time: 05/22/21  6:24 AM   Result Value Ref Range    POC Glucose 142 (H) 65 - 140 mg/dl   Fingerstick Glucose (POCT)    Collection Time: 05/22/21 11:00 AM   Result Value Ref Range    POC Glucose 203 (H) 65 - 140 mg/dl   Fingerstick Glucose (POCT)    Collection Time: 05/22/21  4:11 PM   Result Value Ref Range    POC Glucose 135 65 - 140 mg/dl   Fingerstick Glucose (POCT)    Collection Time: 05/22/21  8:47 PM   Result Value Ref Range    POC Glucose 152 (H) 65 - 140 mg/dl   Comprehensive metabolic panel    Collection Time: 05/23/21  5:09 AM   Result Value Ref Range    Sodium 139 136 - 145 mmol/L    Potassium 3 7 3 5 - 5 3 mmol/L    Chloride 96 (L) 100 - 108 mmol/L    CO2 42 (H) 21 - 32 mmol/L    ANION GAP 1 (L) 4 - 13 mmol/L    BUN 23 5 - 25 mg/dL    Creatinine 0 66 0 60 - 1 30 mg/dL    Glucose 136 65 - 140 mg/dL    Calcium 9 4 8 3 - 10 1 mg/dL    Corrected Calcium 10 0 8 3 - 10 1 mg/dL    AST 15 5 - 45 U/L    ALT 25 12 - 78 U/L    Alkaline Phosphatase 120 (H) 46 - 116 U/L    Total Protein 6 9 6 4 - 8 2 g/dL    Albumin 3 2 (L) 3 5 - 5 0 g/dL    Total Bilirubin 1 11 (H) 0 20 - 1 00 mg/dL    eGFR 85 ml/min/1 73sq m   Fingerstick Glucose (POCT)    Collection Time: 05/23/21  6:25 AM   Result Value Ref Range    POC Glucose 204 (H) 65 - 140 mg/dl   Fingerstick Glucose (POCT)    Collection Time: 05/23/21 10:57 AM   Result Value Ref Range    POC Glucose 153 (H) 65 - 140 mg/dl   Fingerstick Glucose (POCT)    Collection Time: 05/23/21  4:23 PM   Result Value Ref Range    POC Glucose 125 65 - 140 mg/dl   Fingerstick Glucose (POCT)    Collection Time: 05/23/21  8:51 PM   Result Value Ref Range    POC Glucose 161 (H) 65 - 140 mg/dl   Comprehensive metabolic panel    Collection Time: 05/24/21  4:59 AM   Result Value Ref Range    Sodium 139 136 - 145 mmol/L    Potassium 3 2 (L) 3 5 - 5 3 mmol/L    Chloride 95 (L) 100 - 108 mmol/L    CO2 42 (H) 21 - 32 mmol/L    ANION GAP 2 (L) 4 - 13 mmol/L    BUN 19 5 - 25 mg/dL    Creatinine 0 55 (L) 0 60 - 1 30 mg/dL    Glucose 134 65 - 140 mg/dL    Calcium 9 4 8 3 - 10 1 mg/dL    Corrected Calcium 10 1 8 3 - 10 1 mg/dL    AST 18 5 - 45 U/L    ALT 28 12 - 78 U/L    Alkaline Phosphatase 119 (H) 46 - 116 U/L    Total Protein 6 7 6 4 - 8 2 g/dL    Albumin 3 1 (L) 3 5 - 5 0 g/dL    Total Bilirubin 1 99 (H) 0 20 - 1 00 mg/dL    eGFR 91 ml/min/1 73sq m   Magnesium    Collection Time: 05/24/21  4:59 AM   Result Value Ref Range    Magnesium 2 1 1 6 - 2 6 mg/dL   Fingerstick Glucose (POCT)    Collection Time: 05/24/21  6:00 AM   Result Value Ref Range    POC Glucose 135 65 - 140 mg/dl   Fingerstick Glucose (POCT)    Collection Time: 05/24/21 10:55 AM   Result Value Ref Range    POC Glucose 194 (H) 65 - 140 mg/dl   D-dimer, quantitative    Collection Time: 05/24/21  1:09 PM   Result Value Ref Range    D-Dimer, Quant 1 23 (H) <0 50 ug/ml FEU   Fingerstick Glucose (POCT)    Collection Time: 05/24/21  4:25 PM   Result Value Ref Range    POC Glucose 100 65 - 140 mg/dl   Fingerstick Glucose (POCT)    Collection Time: 05/24/21  9:06 PM   Result Value Ref Range    POC Glucose 152 (H) 65 - 140 mg/dl   Basic metabolic panel    Collection Time: 05/25/21  4:59 AM   Result Value Ref Range    Sodium 139 136 - 145 mmol/L    Potassium 3 8 3 5 - 5 3 mmol/L    Chloride 100 100 - 108 mmol/L    CO2 38 (H) 21 - 32 mmol/L    ANION GAP 1 (L) 4 - 13 mmol/L    BUN 22 5 - 25 mg/dL    Creatinine 0 65 0 60 - 1 30 mg/dL    Glucose 131 65 - 140 mg/dL    Calcium 9 7 8 3 - 10 1 mg/dL    eGFR 86 ml/min/1 73sq m   Fingerstick Glucose (POCT)    Collection Time: 05/25/21  6:17 AM   Result Value Ref Range    POC Glucose 146 (H) 65 - 140 mg/dl   Fingerstick Glucose (POCT)    Collection Time: 05/25/21 11:53 AM   Result Value Ref Range    POC Glucose 150 (H) 65 - 140 mg/dl   Fingerstick Glucose (POCT)    Collection Time: 05/25/21  4:08 PM   Result Value Ref Range    POC Glucose 123 65 - 140 mg/dl   Fingerstick Glucose (POCT)    Collection Time: 05/25/21  8:45 PM   Result Value Ref Range    POC Glucose 157 (H) 65 - 140 mg/dl   Basic metabolic panel    Collection Time: 05/26/21  4:58 AM   Result Value Ref Range    Sodium 139 136 - 145 mmol/L    Potassium 3 9 3 5 - 5 3 mmol/L    Chloride 103 100 - 108 mmol/L    CO2 34 (H) 21 - 32 mmol/L    ANION GAP 2 (L) 4 - 13 mmol/L    BUN 28 (H) 5 - 25 mg/dL    Creatinine 0 68 0 60 - 1 30 mg/dL    Glucose 128 65 - 140 mg/dL    Calcium 9 5 8 3 - 10 1 mg/dL    eGFR 85 ml/min/1 73sq m   Fingerstick Glucose (POCT)    Collection Time: 05/26/21  6:23 AM   Result Value Ref Range    POC Glucose 146 (H) 65 - 140 mg/dl   Fingerstick Glucose (POCT)    Collection Time: 05/26/21 11:36 AM   Result Value Ref Range    POC Glucose 200 (H) 65 - 140 mg/dl   Home O2 Setup    Collection Time: 05/26/21  1:00 PM   Result Value Ref Range    Supplier Name [employer] Phone Number [phone]     Order Status Delivery Successful     Delivery Note      Delivery Request Date 05/26/2021     Date Delivered  05/26/2021     Supplier Name 05/26/2021     Item Description       Home Oxygen Concentrator with Portability, Adult, Standard Liter Flow    Item Description Portable Gaseous Oxygen System     Item Description Portable O2 Contents, Gas     Item Description Portable Tank with Conserving Device     Item Description O2 Humidifier Bottle, Standard Liter Flow    Basic metabolic panel    Collection Time: 06/01/21  9:33 AM   Result Value Ref Range    Sodium 135 (L) 136 - 145 mmol/L    Potassium 4 9 3 5 - 5 3 mmol/L    Chloride 98 (L) 100 - 108 mmol/L    CO2 34 (H) 21 - 32 mmol/L ANION GAP 3 (L) 4 - 13 mmol/L    BUN 40 (H) 5 - 25 mg/dL    Creatinine 0 97 0 60 - 1 30 mg/dL    Glucose, Fasting 163 (H) 65 - 99 mg/dL    Calcium 10 0 8 3 - 10 1 mg/dL    eGFR 57 ml/min/1 73sq m        Physical Exam  Constitutional:       Appearance: Normal appearance  HENT:      Head: Normocephalic  Right Ear: Tympanic membrane, ear canal and external ear normal       Left Ear: Tympanic membrane, ear canal and external ear normal       Nose: Nose normal  No congestion  Mouth/Throat:      Mouth: Mucous membranes are moist       Pharynx: Oropharynx is clear  No oropharyngeal exudate or posterior oropharyngeal erythema  Eyes:      Extraocular Movements: Extraocular movements intact  Conjunctiva/sclera: Conjunctivae normal       Pupils: Pupils are equal, round, and reactive to light  Neck:      Musculoskeletal: Normal range of motion and neck supple  Cardiovascular:      Rate and Rhythm: Normal rate and regular rhythm  Heart sounds: Normal heart sounds  No murmur  Pulmonary:      Effort: Pulmonary effort is normal       Breath sounds: Normal breath sounds  No wheezing or rales  Abdominal:      General: Bowel sounds are normal  There is no distension  Palpations: Abdomen is soft  Tenderness: There is no abdominal tenderness  Musculoskeletal: Normal range of motion  Right lower leg: No edema  Left lower leg: No edema  Lymphadenopathy:      Cervical: No cervical adenopathy  Skin:     General: Skin is warm  Neurological:      General: No focal deficit present  Mental Status: She is alert and oriented to person, place, and time

## 2021-06-03 ENCOUNTER — TELEPHONE (OUTPATIENT)
Dept: OTHER | Facility: OTHER | Age: 76
End: 2021-06-03

## 2021-06-03 ENCOUNTER — PATIENT OUTREACH (OUTPATIENT)
Dept: INTERNAL MEDICINE CLINIC | Facility: CLINIC | Age: 76
End: 2021-06-03

## 2021-06-07 ENCOUNTER — OFFICE VISIT (OUTPATIENT)
Dept: CARDIOLOGY CLINIC | Facility: CLINIC | Age: 76
End: 2021-06-07
Payer: MEDICARE

## 2021-06-07 VITALS
HEART RATE: 89 BPM | BODY MASS INDEX: 24.28 KG/M2 | SYSTOLIC BLOOD PRESSURE: 96 MMHG | HEIGHT: 64 IN | DIASTOLIC BLOOD PRESSURE: 62 MMHG | WEIGHT: 142.2 LBS

## 2021-06-07 DIAGNOSIS — I50.42 CHRONIC COMBINED SYSTOLIC AND DIASTOLIC CONGESTIVE HEART FAILURE (HCC): Primary | ICD-10-CM

## 2021-06-07 DIAGNOSIS — I50.9 ACUTE DECOMPENSATED HEART FAILURE (HCC): ICD-10-CM

## 2021-06-07 DIAGNOSIS — R06.02 SOB (SHORTNESS OF BREATH): ICD-10-CM

## 2021-06-07 PROCEDURE — 99215 OFFICE O/P EST HI 40 MIN: CPT | Performed by: NURSE PRACTITIONER

## 2021-06-07 RX ORDER — FUROSEMIDE 20 MG/1
20 TABLET ORAL 2 TIMES DAILY
Qty: 60 TABLET | Refills: 3 | Status: SHIPPED | OUTPATIENT
Start: 2021-06-07 | End: 2021-07-30 | Stop reason: SDUPTHER

## 2021-06-07 RX ORDER — POTASSIUM CHLORIDE 20 MEQ/1
20 TABLET, EXTENDED RELEASE ORAL DAILY
Qty: 60 TABLET | Refills: 0 | Status: SHIPPED | OUTPATIENT
Start: 2021-06-07 | End: 2021-09-13

## 2021-06-07 NOTE — PROGRESS NOTES
Cardiology Follow Up    Aleksandra Shell  1945  [id number]  [address]  [facility]  [place] [address]   [phone]    1  SOB (shortness of breath)  Ambulatory referral to Cardiology       Interval History:   Ms Juan Pablo Terrell was admitted to Counts include 234 beds at the [facility] on 5/18 - 5/26/21 with acute decompensated combined RV systolic/LV diastolic heart failure   Ms Juan Pablo Terrell   Presented to [facility] include 234 beds at the [facility] Emergency Room with worsening shortness of breath orthopnea lower extremity edema and weight gain  She has been seen by her primary care physician with a one-week history of shortness of breath  He she was placed on Lasix 20 mg daily  She returns to your PCP who increased Lasix to 40 mg daily  He advised if symptoms worsen presents to the emergency room  On admission to the emergency room  NT proBNP 3 223  IV Lasix 40 mg b i d  started  Oxygen saturation 81% on RA, /104, She was placed on 5 LNC  5/19/21 TTE showed LV systolic function normal, LVEF 50-55%, NRWMA,   Mild concentric hypertrophy, right ventricular systolic function moderately reduced, RA mildly dilated, mild MR, mild TR, mild PHTN  CT of the chest abdomen pelvis revealed pulmonary nodules small volume abdominal pelvic ascites  Nephrology was consulted  Discharge weight 146 pounds  Ms Woodward Spectike to our office for a recent hospitalization follow up visit  She is  Accompanied by her  who assist her with care  Addis Stark denies chest pain of palpitations  She admits to occasional lightheaded with walking and dyspnea with exertion  She is using oxygen 2LNC around the clock  Her weight is stable 141 pounds at home and 142 in the office        HPI:  DM2  Controlled with diet  Tobacco abuse smoked one pack a day  Patient Active Problem List   Diagnosis    Allergic rhinitis    Diabetes mellitus (HCC)    Anxiety    Acute diastolic CHF (congestive heart failure) (HCC)    SOB (shortness of breath)    Abdominal swelling, generalized    Uncontrolled type 2 diabetes mellitus with hyperglycemia (HCC)    Urinary frequency    Microalbuminuria    Lung nodules    COPD (chronic obstructive pulmonary disease) (HCC)    Acute respiratory failure with hypoxia (HCC)    Nicotine dependence    Elevated BP without diagnosis of hypertension    Alkalosis    Essential hypertension, benign    Chronic diastolic congestive heart failure (HCC)    Pulmonary hypertension (HCC)    Tobacco abuse     Past Medical History:   Diagnosis Date    Allergic rhinitis     Anxiety     Arthritis     Diabetes mellitus ([facility] [address] )     Tobacco abuse      Social History     Socioeconomic History    Marital status: /Civil Union     Spouse name: Not on file    Number of children: 1    Years of education: Not on file    Highest education level: Some college, no degree   Occupational History    Not on file   Social Needs    Financial resource strain: Not on file    Food insecurity     Worry: Not on file     Inability: Not on file   [employer] needs     Medical: Not on file     Non-medical: Not on file   Tobacco Use    Smoking status: Former Smoker     Packs/day: 0 50     Types: Cigarettes     Quit date: 2021     Years since quittin 0    Smokeless tobacco: Never Used    Tobacco comment: Began smoking in her late teens, averaging 1/2 pack daily  States she quit smoking a few weeks ago (Updated 2021)  Substance and Sexual Activity    Alcohol use: Yes     Alcohol/week: 1 0 - 2 0 standard drinks     Types: 1 - 2 Glasses of wine per week     Frequency: Monthly or less     Drinks per session: 1 or 2     Binge frequency: Never     Comment: On average, will drink 1-2 glasses of white wine daily with dinner  (Updated 2021),     Drug use: Never     Comment: Last assessed 2021      Sexual activity: Not on file   Lifestyle    Physical activity     Days per week: Not on file     Minutes per session: Not on file    Stress: Not on file   Relationships    Social connections     Talks on phone: Not on file     Gets together: Not on file     Attends Adventism service: Not on file     Active member of club or organization: Not on file     Attends meetings of clubs or organizations: Not on file     Relationship status: Not on file    Intimate partner violence     Fear of current or ex partner: Not on file     Emotionally abused: Not on file     Physically abused: Not on file     Forced sexual activity: Not on file   Other Topics Concern    Not on file   Social History Narrative    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 ; 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  Has 1 biological daughter Bria Rizzo) who lives locally         Family History   Problem Relation Age of Onset    Prostate cancer Father     Diabetes Brother     Heart disease Brother     Lymphoma Brother     No Known Problems Sister     Parkinsonism Sister     Hypotension Daughter         Takes "salt pills"    Proteinuria Daughter         Occuring during her 3 pregnancies; follows with nephrology     Past Surgical History:   Procedure Laterality Date    BREAST SURGERY Right     Cyst    OTHER SURGICAL HISTORY      Cataract implant    OTHER SURGICAL HISTORY      Fertilization       Current Outpatient Medications:     calcium citrate-vitamin D (CITRACAL+D) 315-200 MG-UNIT per tablet, Take 1 tablet by mouth 2 (two) times a day, Disp: , Rfl:     Continuous Glucose Monitor Sup KIT, Use 1 application every 14 (fourteen) days, Disp: 1 kit, Rfl: 0    Continuous Glucose Monitor Sup MISC, Use every 14 (fourteen) days, Disp: 2 each, Rfl: 3    Empagliflozin 10 MG TABS, Take 1 tablet (10 mg total) by mouth every morning, Disp: 30 tablet, Rfl: 0    furosemide (LASIX) 80 mg tablet, Take 1 tablet (80 mg total) by mouth 2 (two) times a day, Disp: 60 tablet, Rfl: 0    lisinopril (ZESTRIL) 5 mg tablet, Take 1 tablet (5 mg total) by mouth daily, Disp: 90 tablet, Rfl: 1    LORazepam (ATIVAN) 0 5 mg tablet, Take 1 tablet (0 5 mg total) by mouth as needed for anxiety 1 hour before CT scan, Disp: 2 tablet, Rfl: 0    nicotine (NICODERM CQ) 7 mg/24hr TD 24 hr patch, Place 1 patch on the skin every 24 hours, Disp: 28 patch, Rfl: 0    potassium chloride (K-DUR,KLOR-CON) 20 mEq tablet, Take 1 tablet (20 mEq total) by mouth 2 (two) times a day, Disp: 60 tablet, Rfl: 0  No Known Allergies    Labs:  Appointment on 06/01/2021   Component Date Value    Sodium 06/01/2021 135*    Potassium 06/01/2021 4 9     Chloride 06/01/2021 98*    CO2 06/01/2021 34*    ANION GAP 06/01/2021 3*    BUN 06/01/2021 40*    Creatinine 06/01/2021 0 97     Glucose, Fasting 06/01/2021 163*    Calcium 06/01/2021 10 0     eGFR 06/01/2021 57    No results displayed because visit has over 200 results        Appointment on 05/12/2021   Component Date Value    WBC 05/12/2021 6 97     RBC 05/12/2021 5 56*    Hemoglobin 05/12/2021 18 0*    Hematocrit 05/12/2021 56 1*    MCV 05/12/2021 101*    MCH 05/12/2021 32 4     MCHC 05/12/2021 32 1     RDW 05/12/2021 14 6     MPV 05/12/2021 12 2     Platelets 31/81/2984 177     nRBC 05/12/2021 0     Neutrophils Relative 05/12/2021 80*    Immat GRANS % 05/12/2021 0     Lymphocytes Relative 05/12/2021 11*    Monocytes Relative 05/12/2021 7     Eosinophils Relative 05/12/2021 1     Basophils Relative 05/12/2021 1     Neutrophils Absolute 05/12/2021 5 61     Immature Grans Absolute 05/12/2021 0 02     Lymphocytes Absolute 05/12/2021 0 79     Monocytes Absolute 05/12/2021 0 46     Eosinophils Absolute 05/12/2021 0 04     Basophils Absolute 05/12/2021 0 05     Sodium 05/12/2021 137     Potassium 05/12/2021 4 5     Chloride 05/12/2021 101     CO2 05/12/2021 36*    ANION GAP 05/12/2021 0*    BUN 05/12/2021 10     Creatinine 05/12/2021 0 63     Glucose, Fasting 05/12/2021 152*    Calcium 05/12/2021 9 1     AST 05/12/2021 24     ALT 05/12/2021 34     Alkaline Phosphatase 05/12/2021 124*    Total Protein 05/12/2021 7 0     Albumin 05/12/2021 3 7     Total Bilirubin 05/12/2021 1 95*    eGFR 05/12/2021 87     Hemoglobin A1C 05/12/2021 6 6*    EAG 05/12/2021 143     Cholesterol 05/12/2021 136     Triglycerides 05/12/2021 49     HDL, Direct 05/12/2021 71     LDL Calculated 05/12/2021 55     Urine Culture 05/12/2021 No Growth <1000 cfu/mL     TSH 3RD GENERATON 05/12/2021 1 800     NT-proBNP 05/12/2021 2,138*   Office Visit on 05/11/2021   Component Date Value    Clarity, UA 05/12/2021 Clear     Color, UA 05/12/2021 Dk Yellow     Specific Gravity, UA 05/12/2021 1 016     pH, UA 05/12/2021 6 0     Glucose, UA 05/12/2021 Negative     Ketones, UA 05/12/2021 Trace*    Blood, UA 05/12/2021 Trace*    Protein, UA 05/12/2021 300 (3+)*    Nitrite, UA 05/12/2021 Negative     Bilirubin, UA 05/12/2021 Negative     Urobilinogen, UA 05/12/2021 1 0     Leukocytes, UA 05/12/2021 Negative     WBC, UA 05/12/2021 2-4     RBC, UA 05/12/2021 2-4     Hyaline Casts, UA 05/12/2021 None Seen     Bacteria, UA 05/12/2021 Occasional     Epithelial Cells 05/12/2021 None Seen     Creatinine, Ur 05/12/2021 101 0     Microalbum  ,U,Random 05/12/2021 2,500 0*    Microalb Creat Ratio 05/12/2021 2,475*     Imaging: X-ray Chest 1 View Portable    Result Date: 5/19/2021  Narrative: CHEST INDICATION:   chest pain  Increased swelling in lower legs  COMPARISON:  Chest radiograph from 5/12/2021 and chest CT from 5/14/2021  EXAM PERFORMED/VIEWS:  XR CHEST PORTABLE  FINDINGS: Mild cardiomegaly  No acute disease  Benign atelectasis or scar in the right midlung  No effusion or pneumothorax  Osseous structures normal for age  Impression: No acute cardiopulmonary disease   Workstation performed: [id number]     Xr Chest Pa & Lateral    Result Date: 5/12/2021  Narrative: CHEST INDICATION:   R06 02: Shortness of breath  COMPARISON:  None EXAM PERFORMED/VIEWS:  XR CHEST PA & LATERAL FINDINGS: Cardiomediastinal silhouette appears unremarkable  Bandlike opacity is noted in the right middle lobe probably related to atelectasis or scar  This is somewhat pronounced  No infiltrate is seen elsewhere  No pneumothorax or pleural effusion  Osseous structures appear within normal limits for patient age  Impression: Bandlike opacity right middle lobe may be related atelectasis  Developing or resolving pneumonia is not excluded  Follow-up is suggested in 4-6 weeks time to ensure resolution and to exclude an obstructing endobronchial process     Alternatively CT chest could be utilized  The study was marked in EPIC for significant notification  Workstation performed: [id number]     Us Abdomen Complete    Result Date: 5/12/2021  Narrative: ABDOMEN ULTRASOUND, COMPLETE INDICATION:   M79 89: Other specified soft tissue disorders R06 02: Shortness of breath R19 07: Generalized intra-abdominal and pelvic swelling, mass and lump  COMPARISON: None TECHNIQUE:   Real-time ultrasound of the abdomen was performed with a curvilinear transducer with both volumetric sweeps and still imaging techniques  FINDINGS: PANCREAS:  Visualized portions of the pancreas are within normal limits  AORTA AND IVC:  Visualized portions are normal for patient age  LIVER: Size:  Within normal range  The liver measures 14 9 cm in the midclavicular line  Contour:  Surface contour is smooth  Parenchyma:  Echogenicity and echotexture are within normal limits  No evidence of suspicious mass  Limited imaging of the main portal vein shows it to be patent and hepatopetal  BILIARY: The gallbladder is normal in caliber  No wall thickening or pericholecystic fluid  No stones or sludge identified  No sonographic Oshea's sign  No intrahepatic biliary dilatation  CBD measures 4 mm  No choledocholithiasis  KIDNEY: Right kidney measures 11 4 cm  Within normal limits   Left kidney measures 11 2 cm  Within normal limits  SPLEEN: Measures 9 6 cm  Within normal limits  ASCITES:  Mild perihepatic and perisplenic ascites  Right lower quadrant mild ascites  Impression: 1  Mild abdominal ascites of uncertain etiology  The study was marked in EPIC for significant notification  Workstation performed: [id number]     Ct Chest Abdomen Pelvis W Contrast    Result Date: 5/14/2021  Narrative: CT CHEST, ABDOMEN AND PELVIS WITH IV CONTRAST INDICATION:   R06 02: Shortness of breath R18 0: Malignant ascites R91 8: Other nonspecific abnormal finding of lung field R10 84: Generalized abdominal pain  COMPARISON:  Abdomen and pelvic ultrasound 5/12/2021 and 2 view chest 5/12/2021 TECHNIQUE: CT examination of the chest, abdomen and pelvis was performed  Axial, sagittal, and coronal 2D reformatted images were created from the source data and submitted for interpretation  Radiation dose length product (DLP) for this visit:  877 mGy-cm   This examination, like all CT scans performed in the [facility], was performed utilizing techniques to minimize radiation dose exposure, including the use of iterative reconstruction and automated exposure control  IV Contrast:  100 mL of iohexol (OMNIPAQUE)   350 Enteric Contrast: Enteric contrast was administered  FINDINGS: CHEST LUNGS:  Few pulmonary nodules will be measured on series 3: Image 61, left lower lobe, 3 mm  Image 69, left lower lobe, 3 mm  Image 77, left lower lobe, 6 mm  Linear scar or subsegmental atelectasis in the right upper lobe along the minor fissure, lingula, and lung bases  No infiltrate  COPD  Minimal biapical pleural smooth septal thickening  Central airways are clear  PLEURA:  Trace right pleural effusion  HEART/GREAT VESSELS:  Heart is enlarged  No pericardial effusion  Aortic and coronary artery calcification  MEDIASTINUM AND JULIAN:  1 3 cm short axis precarinal lymph node image 22 series 2   Enteric contrast in the esophagus suggestive of gastroesophageal reflux  CHEST WALL AND LOWER NECK:   Punctate right breast calcification or clip  Patient has a history of right breast surgery  Incidental discovery of one or more thyroid nodule(s) measuring less than 1 5 cm and without suspicious features is noted in this patient who is above 28years old; according to guidelines published in the February 2015 white paper on incidental thyroid nodules in the Journal of the [employer] of radiology Floyd Lopez), no further evaluation is recommended  ABDOMEN LIVER/BILIARY TREE:  Unremarkable  GALLBLADDER:  No calcified gallstones  No pericholecystic inflammatory change  SPLEEN:  Unremarkable  PANCREAS:  Unremarkable  ADRENAL GLANDS:  Unremarkable  KIDNEYS/URETERS: One or more sharply circumscribed subcentimeter renal hypodensities are noted  These lesions are too small to accurately characterize, but are statistically most likely to represent benign cortical renal cyst(s)  According to the guidelines published in the [facility] Paper of the ACR Incidental Findings Committee (Radiology 2010), no further workup of these lesions is recommended    Kidneys otherwise unremarkable  No perinephric collection  STOMACH AND BOWEL:  Colonic diverticulosis without immediate adjacent fat stranding  No bowel obstruction  APPENDIX:  A normal appendix was visualized  ABDOMINOPELVIC CAVITY:  Small-volume abdominopelvic ascites  No enlarged lymph nodes or free gas  VESSELS:  Aortoiliac calcification  No aneurysm  Prominent bilateral adnexal and ovarian veins may be sequela of chronic pelvic vascular congestion syndrome in the appropriate clinical context  PELVIS REPRODUCTIVE ORGANS:  Unremarkable for patient's age  URINARY BLADDER:  Unremarkable  ABDOMINAL WALL/INGUINAL REGIONS:  Mild to moderate diffuse body wall edema  Subcentimeter ventral umbilical abdominal wall diastases containing fat  No bowel herniation  No inguinal hernia or mass   OSSEOUS STRUCTURES: No acute fracture or osseous destructive lesion identified  Degenerative changes of the spine, pubic symphysis, and multiple joints  Minimal grade 1 degenerative anterolisthesis of L4 on L5  Impression: Few pulmonary nodules in the left lower lobe, the largest of which measures 6 mm with unknown long-term stability  Based on current Fleischner Society 2017 Guidelines on incidental pulmonary nodule, followup non-contrast CT is recommended at 6-12 months from the initial examination and, if stable at that time, an additional followup is recommended for 18-24 months from the initial examination  COPD  Cardiomegaly  No pericardial effusion  Trace right pleural effusion  Minimal multilobar subsegmental atelectasis or scar  Mildly enlarged 1 3 cm short axis precarinal lymph node  This could be followed up with noncontrast chest CT or CT PET if clinically warranted  CT abdomen and pelvis: No evidence of significant abnormal abdominopelvic mass  Small volume abdominopelvic ascites and mild to moderate diffuse body wall edema  Findings may be sequela of fluid overload, third spacing, vascular congestion, or sepsis in the appropriate clinical context  Colonic diverticulosis  This study demonstrates a significant  finding and was documented as such in [facility] for liaison and referring practitioner notification  This study demonstrates a finding requiring imaging follow-up and was documented as such in Epic  Workstation performed: [id number]     Mri Cardiac  W Wo Contrast    Result Date: 5/24/2021  Narrative: MRI CARDIAC  W WO CONTRAST Technique: 1  3 plane SSFP localizers  2  SSFP cine imaging in long and short axis planes  3  T2 weighted DIR FSE in short axis plane  4  14 ml gadolinium DTPA power injected  5  2D inversion recovery FGRE for delayed myocardial enhancement  6  The patient tolerated the procedure well without complication   Measurements: Pj-septal wall 9 mm Postero-lateral wall 7 mm Global LV Assessment ------------------------------------------------------------------------------------------------------------------------                                Value                                                                                    ------------------------------------------------------------------------------------------------------------------------ LVEDV                          103 ml (normal)                                                                                                         [82 - 174]                                                                               LVESV                          56 ml (normal)                                                                                                          [16 - 64]                                                                                LVSV                           47 ml (reduced)                                                                                                         [57 - 121]                                                                               LVEF                           45 % (reduced)                                                                                                          [57 - 81]                                                                                LVCO                           5 2 l/min                                                                                                                                                                                                        LVCI                           2 8 l/min/m^2  (Normal)                                                                                                   [>= 2 50]                                                                                LV Mass                        85 g (normal) [54 - 130]                                                                               Heart rate                     111                                                                                      Global Peak Wall Thickness     13 3 mm                                                                                  Method                         SAX3D Stack                                                                                                                                                                                                      Global RV Assessment ------------------------------------------------------------------------------------------------------------------------                                Value                                                                                    ------------------------------------------------------------------------------------------------------------------------ RVEDV                          180 ml (normal)                                                                                                         [66 - 214]                                                                               RVESV                          109 ml (increased)                                                                                                      [8 - 96]                                                                                 RVSV                           71 ml (normal)                                                                                                          [59 - 127]                                                                               RVEF                           39 % (reduced)                                                                                                          [50 - 78]                                                                                RVCO 7 8 l/min                                                                                                                                                                                                        RVCI                           4 2 l/min/m^2                                                                                                                                                                                                      Heart rate                     111                                                                                      Left atrium 22 sq cm Aortic Root 25 mm Findings:  1  Normal left ventricle end-diastolic volume  Mildly reduced left ventricle systolic with ejection fraction measuring 45%  No regional wall motion abnormality  Normal myocardial wall thickness  2  Normal right ventricle end-diastolic volume  Moderately reduced right ventricle systolic function ejection fraction measuring 39%  3  The aortic, mitral, and tricuspid valves open without restriction  There is no significant valvular regurgitation, however cine MRI is inaccurate in the qualitative assessment of valvular regurgitation  4  The left atrium is mildly enlarged  The aortic root is normal in size  5  There is no evidence of myocardial edema  6  Delayed post-gadolinium imaging demonstrates no region of hyperenhancement suggestive of myocardial scarring, inflammation, or infiltrative disease  Impression: Impression: 1  Normal left ventricle size  Mildly reduced left ventricle systolic function with ejection fraction measures 45%  No evidence of regional wall motion abnormality  No evidence of delayed myocardial enhancement  2  Normal right ventricle size  Moderately reduced right ventricle systolic function with ejection fraction measuring 39%  3  Mild left atrial enlargement 4   No valvular abnormality Workstation performed: [id number]     Cta Chest Pe Study    Result Date: 5/24/2021  Narrative: CTA - CHEST WITH IV CONTRAST - PULMONARY ANGIOGRAM INDICATION:   PE suspected, intermediate prob, positive D-dimer Elevated D-dimer with increased O2 requirement  COMPARISON: 5/14/2021 TECHNIQUE: CTA examination of the chest was performed using angiographic technique according to a protocol specifically tailored to evaluate for pulmonary embolism  Axial, sagittal, and coronal 2D reformatted images were created from the source data and  submitted for interpretation  In addition, coronal 3D MIP postprocessing was performed on the acquisition scanner  Radiation dose length product (DLP) for this visit:  526 67 mGy-cm   This examination, like all CT scans performed in the [facility] [facility] was performed utilizing techniques to minimize radiation dose exposure, including the use of iterative  reconstruction and automated exposure control  IV Contrast:  85 mL of iohexol (OMNIPAQUE)  FINDINGS: PULMONARY ARTERIAL TREE:  No pulmonary embolus is seen  LUNGS:  Stable 3 mm left lower lobe pulmonary nodules noted series 3 image 65  Stable 2 mm subpleural nodule is noted series 3 image 74  Stable 6 mm left lower lobe pulmonary nodules noted series 3 image 81  Linear atelectasis is present within the right lower lobe  There is no tracheal or endobronchial lesion  PLEURA:  Unremarkable  HEART/GREAT VESSELS:  Cardiomegaly is noted  MEDIASTINUM AND JULIAN:  Unremarkable  CHEST WALL AND LOWER NECK:   Unremarkable  VISUALIZED STRUCTURES IN THE UPPER ABDOMEN:  Unremarkable  OSSEOUS STRUCTURES:  No acute fracture or destructive osseous lesion  Impression: No evidence of pulmonary embolus  Stable subcentimeter pulmonary nodules  Workstation performed: [id number]     Us Pelvis Complete W Transvaginal    Result Date: 5/12/2021  Narrative: PELVIC ULTRASOUND, COMPLETE INDICATION:  66years old  M79 [id number]:  Other specified soft tissue disorders R06 02: Shortness of breath R19 07: Generalized intra-abdominal and pelvic swelling, mass and lump  COMPARISON: None TECHNIQUE:   Transabdominal pelvic ultrasound was performed in sagittal and transverse planes with a curvilinear transducer  Additional transvaginal imaging was performed to better evaluate the endometrium and ovaries  Imaging included volumetric sweeps as well as traditional still imaging technique  FINDINGS: Exam is somewhat suboptimal due to patient body habitus  UTERUS: The uterus is anteverted in position, measuring 4 9 x 2 1 x 3 2 cm  Contour and echotexture appear normal  The cervix demonstrates nabothian cysts  ENDOMETRIUM:  Normal caliber of 3 6 mm  Trace fluid in the endometrial cavity  OVARIES/ADNEXA: Ovaries not visualized  No suspicious adnexal mass or loculated collections  Moderate pelvic ascites  Impression: 1  Trace fluid in the endometrial cavity, nonspecific  Uterus is otherwise unremarkable  2   Ovaries not visualized  3   Moderate pelvic ascites of uncertain etiology  Mild ascites noted in the abdomen on ultrasound of the same day as well  The study was marked in EPIC for significant notification  Workstation performed: [id number]       Review of Systems:  Review of Systems   Constitutional: Positive for fatigue  Musculoskeletal: Positive for arthralgias and myalgias  All other systems reviewed and are negative  Physical Exam:  Physical Exam  Vitals signs reviewed  Constitutional:       Appearance: Normal appearance  HENT:      Head: Normocephalic  Eyes:      Pupils: Pupils are equal, round, and reactive to light  Neck:      Musculoskeletal: Normal range of motion  Cardiovascular:      Rate and Rhythm: Normal rate and regular rhythm  Pulmonary:      Effort: Pulmonary effort is normal       Breath sounds: Normal breath sounds  Abdominal:      General: Bowel sounds are normal       Palpations: Abdomen is soft  Musculoskeletal: Normal range of motion  Right lower leg: No edema  Left lower leg: No edema     Skin: General: Skin is warm and dry  Capillary Refill: Capillary refill takes less than 2 seconds  Neurological:      General: No focal deficit present  Mental Status: She is alert and oriented to person, place, and time  Psychiatric:         Mood and Affect: Mood normal          Behavior: Behavior normal          Discussion/Summary:  1  Chronic diastolic heart failure/RV systolic dysfunction NYHA class III stage C- weight at home 141 pounds  Decreasing weight, BUN 40, decrease Lasix from 80mg BID to 60mg BID, decrease K dur to 20meq daily,  Continue on lisinopril 5 mg daily at bedtime  Ms Solo Bernardo was referred to sleep medicine for a sleep study, BMP in 2 weeks,  Continue with HF self care management  2  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

## 2021-06-07 NOTE — PATIENT INSTRUCTIONS
Maintain a 2 gram daily sodium diet and 1500 ml daily fluid restriction  Check daily weights  If you gained 3 pounds in one day, 5 pounds in one week, or experience worsening shortness of breath or increasing lower leg swelling  Please call the heart failure office at [phone]    Please bring a  list of your current medications and daily weights to the office visit      Decrease lasix to 60mg BID, hold Lasix this afternoon  K dur 20meq daily  Call sleep medicine for an appointment   BMP in 2 weeks

## 2021-06-11 ENCOUNTER — TELEPHONE (OUTPATIENT)
Dept: CARDIOLOGY CLINIC | Facility: CLINIC | Age: 76
End: 2021-06-11

## 2021-06-11 NOTE — TELEPHONE ENCOUNTER
Pt was seen by Aide Wyatt on 6/7 as a hospital follow up, CHF, will be seen again on 7/1  [facility] nurse called with an update  Pt is c/o feeling lightheaded at times, which is not new  Has felt this way since hosp d/c  Her systolic BP has only ever gotten up to 100  I spoke with pt's , who said pt's BP right now is 86 systolic, and she feels lightheaded and has to lie down   and regular  Not sob  Her weight is stable @ 141 lbs  Has not gained or lost      Takes:  Lasix 60 mg BID  Lisinopril 5 mg daily      Please advise

## 2021-06-15 NOTE — PROGRESS NOTES
Pulmonary Consultation   Fredis Hameed 66 y o  female MRN: [id number]  6/16/2021      Reason for Consultation:  COPD     Requested by:  Dr Georges Mckay and Plan:     Acute hypoxic respiratory failure secondary to combined heart failure and probable COPD   - baseline PFTs   - 6MWT POC showed desaturation in the office requiring 2lpm oxygen  - will reassess in 1 month and determine O2 needs     New onset diastolic heart failure  5/18/51 2D echo normal LVEF 50-55%, NRWMA,   Mild concentric hypertrophy, right ventricular systolic function moderately reduced, RA mildly dilated, mild MR, mild TR, mild PHTN  - CT of the chest abdomen pelvis revealed pulmonary nodules small volume abdominal pelvic ascites  - CTA showed no PE     Multiple Pulmonary nodules   - stable 3mm LLL, stable 2mm subpleural nodule, stable 6mm LLL nodule  - LDCT in 1 year     Polcythemia likely secondary to chronic hypoxia   - monitor closely - check PFTs as above for obstruction     Tobacco Abuse   - quit 1 month ago   - uses nicotine patches - on 7mg now   - qualifies for LDCT - will schedule in 1 year from 5/2021     Macrocytosis likely secondary to chronic alcohol use   - advised she cut down given her recent diastolic heart failure diagnosis    History of Present Illness   HPI:  Fredis Hameed is a 66 y o  female who has a history of DM, Tobacco abuse, anxiety admitted recently from 5/18-5/26 for acute decompensated heart failure  She had apparently diuresed up to 40 lbs and was discharged on oxygen  She feels well now and is at her baseline weight with no peripheral edema  She has quit smoking since her hospitalization and continues with nicotine patches  She has no exposure to secondhand smoke  She currently has no SOB or limitations by her breathing  She deoes report some fatigue and that's her barrier to performing exertional activities  She has no chest pain, wheezing  Review of Systems   Constitutional: Positive for fatigue  All other systems reviewed and are negative  Historical Information   Past Medical History:   Diagnosis Date    Allergic rhinitis     Anxiety     Arthritis     Diabetes mellitus ([address] )     Tobacco abuse      Past Surgical History:   Procedure Laterality Date    BREAST SURGERY Right     Cyst    OTHER SURGICAL HISTORY      Cataract implant    OTHER SURGICAL HISTORY      Fertilization     Family History   Problem Relation Age of Onset    Prostate cancer Father     Diabetes Brother     Heart disease Brother     Lymphoma Brother     No Known Problems Sister     Parkinsonism Sister     Hypotension Daughter         Takes "salt pills"    Proteinuria Daughter         Occuring during her 3 pregnancies; follows with nephrology       Occupational History: Used to work in [employer]     Social History: 0 5 x 30 yrs, no vaping, drinks wine socially   Social History     Tobacco Use   Smoking Status Former Smoker    Packs/day: 0 50    Types: Cigarettes    Quit date: 2021    Years since quittin 0   Smokeless Tobacco Never Used   Tobacco Comment    Began smoking in her late teens, averaging 1/2 pack daily  States she quit smoking a few weeks ago (Updated 2021)         Meds/Allergies     Current Outpatient Medications:     calcium citrate-vitamin D (CITRACAL+D) 315-200 MG-UNIT per tablet, Take 1 tablet by mouth 2 (two) times a day, Disp: , Rfl:     Empagliflozin 10 MG TABS, Take 1 tablet (10 mg total) by mouth every morning, Disp: 30 tablet, Rfl: 0    furosemide (LASIX) 20 mg tablet, Take 1 tablet (20 mg total) by mouth 2 (two) times a day Lasix 60mg BID, Disp: 60 tablet, Rfl: 3    lisinopril (ZESTRIL) 5 mg tablet, Take 1 tablet (5 mg total) by mouth daily, Disp: 90 tablet, Rfl: 1    nicotine (NICODERM CQ) 7 mg/24hr TD 24 hr patch, Place 1 patch on the skin every 24 hours, Disp: 28 patch, Rfl: 0    potassium chloride (K-DUR,KLOR-CON) 20 mEq tablet, Take 1 tablet (20 mEq total) by mouth daily, Disp: 60 tablet, Rfl: 0    Continuous Glucose Monitor Sup KIT, Use 1 application every 14 (fourteen) days (Patient not taking: Reported on 6/7/2021), Disp: 1 kit, Rfl: 0    Continuous Glucose Monitor Sup MISC, Use every 14 (fourteen) days (Patient not taking: Reported on 6/7/2021), Disp: 2 each, Rfl: 3    LORazepam (ATIVAN) 0 5 mg tablet, Take 1 tablet (0 5 mg total) by mouth as needed for anxiety 1 hour before CT scan (Patient not taking: Reported on 6/7/2021), Disp: 2 tablet, Rfl: 0  No Known Allergies    Vitals: Blood pressure 126/80, pulse 101, temperature 98 1 °F (36 7 °C), resp  rate 18, height 5' 4" (1 626 m), weight 64 kg (141 lb), SpO2 97 %  , Body mass index is 24 2 kg/m²  Oxygen Therapy  SpO2: 97 %  Oxygen Therapy: Supplemental oxygen  O2 Delivery Method: Nasal cannula  O2 Flow Rate (L/min): 2 L/min    Physical Exam  Physical Exam  Vitals reviewed  Constitutional:       Appearance: Normal appearance  HENT:      Head: Normocephalic  Nose: Nose normal       Mouth/Throat:      Mouth: Mucous membranes are moist    Eyes:      Pupils: Pupils are equal, round, and reactive to light  Cardiovascular:      Rate and Rhythm: Normal rate and regular rhythm  Pulses: Normal pulses  Heart sounds: Normal heart sounds  Pulmonary:      Effort: Pulmonary effort is normal       Breath sounds: Normal breath sounds  Abdominal:      General: Abdomen is flat  Palpations: Abdomen is soft  Musculoskeletal:         General: Normal range of motion  Cervical back: Normal range of motion  Skin:     General: Skin is warm and dry  Neurological:      General: No focal deficit present  Mental Status: She is alert and oriented to person, place, and time  Labs: I have personally reviewed pertinent lab results    Lab Results   Component Value Date    WBC 7 24 05/19/2021    HGB 17 8 (H) 05/19/2021    HCT 57 7 (H) 05/19/2021     (H) 05/19/2021     05/19/2021     Lab Results   Component Value Date    CALCIUM 10 0 06/01/2021    K 4 9 06/01/2021    CO2 34 (H) 06/01/2021    CL 98 (L) 06/01/2021    BUN 40 (H) 06/01/2021    CREATININE 0 97 06/01/2021     No results found for: IGE  Lab Results   Component Value Date    ALT 28 05/24/2021    AST 18 05/24/2021    ALKPHOS 119 (H) 05/24/2021       Imaging and other studies: I have personally reviewed pertinent reports  and I have personally reviewed pertinent films in PACS    Pulmonary function testing: none     EKG, Pathology, and Other Studies: I have personally reviewed pertinent reports     and I have personally reviewed pertinent films in PACS      Cynthia Jimenez MD  Pulmonary and Critical Care Fellow   [facility]

## 2021-06-16 ENCOUNTER — OFFICE VISIT (OUTPATIENT)
Dept: PULMONOLOGY | Facility: CLINIC | Age: 76
End: 2021-06-16
Payer: MEDICARE

## 2021-06-16 VITALS
WEIGHT: 141 LBS | HEIGHT: 64 IN | BODY MASS INDEX: 24.07 KG/M2 | RESPIRATION RATE: 18 BRPM | TEMPERATURE: 98.1 F | OXYGEN SATURATION: 97 % | HEART RATE: 101 BPM | SYSTOLIC BLOOD PRESSURE: 126 MMHG | DIASTOLIC BLOOD PRESSURE: 80 MMHG

## 2021-06-16 DIAGNOSIS — F17.210 CIGARETTE NICOTINE DEPENDENCE WITHOUT COMPLICATION: ICD-10-CM

## 2021-06-16 DIAGNOSIS — I50.32 CHRONIC DIASTOLIC CONGESTIVE HEART FAILURE (HCC): ICD-10-CM

## 2021-06-16 DIAGNOSIS — I27.20 PULMONARY HYPERTENSION (HCC): ICD-10-CM

## 2021-06-16 DIAGNOSIS — R91.8 PULMONARY NODULES: ICD-10-CM

## 2021-06-16 DIAGNOSIS — J44.9 CHRONIC OBSTRUCTIVE PULMONARY DISEASE, UNSPECIFIED COPD TYPE (HCC): ICD-10-CM

## 2021-06-16 DIAGNOSIS — J96.11 CHRONIC HYPOXEMIC RESPIRATORY FAILURE (HCC): Primary | ICD-10-CM

## 2021-06-16 PROBLEM — I50.31 ACUTE DIASTOLIC CHF (CONGESTIVE HEART FAILURE) (HCC): Status: RESOLVED | Noted: 2021-05-11 | Resolved: 2021-06-16

## 2021-06-16 PROBLEM — R09.02 HYPOXIA: Status: ACTIVE | Noted: 2021-06-16

## 2021-06-16 PROCEDURE — 94618 PULMONARY STRESS TESTING: CPT | Performed by: INTERNAL MEDICINE

## 2021-06-16 PROCEDURE — 99204 OFFICE O/P NEW MOD 45 MIN: CPT | Performed by: INTERNAL MEDICINE

## 2021-06-17 ENCOUNTER — PATIENT OUTREACH (OUTPATIENT)
Dept: INTERNAL MEDICINE CLINIC | Facility: CLINIC | Age: 76
End: 2021-06-17

## 2021-06-17 NOTE — PROGRESS NOTES
Chart review- patient continues with [facility] nursing care   Will continue to monitor chart and check when their services are completed

## 2021-06-22 ENCOUNTER — LAB REQUISITION (OUTPATIENT)
Dept: LAB | Facility: HOSPITAL | Age: 76
End: 2021-06-22
Payer: MEDICARE

## 2021-06-22 DIAGNOSIS — I50.42 CHRONIC COMBINED SYSTOLIC (CONGESTIVE) AND DIASTOLIC (CONGESTIVE) HEART FAILURE (HCC): ICD-10-CM

## 2021-06-22 LAB
ANION GAP SERPL CALCULATED.3IONS-SCNC: 7 MMOL/L (ref 4–13)
BUN SERPL-MCNC: 21 MG/DL (ref 5–25)
CALCIUM SERPL-MCNC: 9.6 MG/DL (ref 8.3–10.1)
CHLORIDE SERPL-SCNC: 102 MMOL/L (ref 100–108)
CO2 SERPL-SCNC: 30 MMOL/L (ref 21–32)
CREAT SERPL-MCNC: 0.75 MG/DL (ref 0.6–1.3)
GFR SERPL CREATININE-BSD FRML MDRD: 77 ML/MIN/1.73SQ M
GLUCOSE SERPL-MCNC: 124 MG/DL (ref 65–140)
POTASSIUM SERPL-SCNC: 4.1 MMOL/L (ref 3.5–5.3)
SODIUM SERPL-SCNC: 139 MMOL/L (ref 136–145)

## 2021-06-22 PROCEDURE — 80048 BASIC METABOLIC PNL TOTAL CA: CPT | Performed by: NURSE PRACTITIONER

## 2021-06-24 ENCOUNTER — TELEPHONE (OUTPATIENT)
Dept: INTERNAL MEDICINE CLINIC | Facility: CLINIC | Age: 76
End: 2021-06-24

## 2021-06-30 ENCOUNTER — OFFICE VISIT (OUTPATIENT)
Dept: INTERNAL MEDICINE CLINIC | Facility: CLINIC | Age: 76
End: 2021-06-30
Payer: MEDICARE

## 2021-06-30 VITALS
TEMPERATURE: 97.5 F | OXYGEN SATURATION: 93 % | WEIGHT: 140 LBS | HEIGHT: 64 IN | BODY MASS INDEX: 23.9 KG/M2 | DIASTOLIC BLOOD PRESSURE: 64 MMHG | HEART RATE: 100 BPM | SYSTOLIC BLOOD PRESSURE: 118 MMHG

## 2021-06-30 DIAGNOSIS — E11.65 UNCONTROLLED TYPE 2 DIABETES MELLITUS WITH HYPERGLYCEMIA (HCC): ICD-10-CM

## 2021-06-30 DIAGNOSIS — I50.32 CHRONIC DIASTOLIC CONGESTIVE HEART FAILURE (HCC): Primary | ICD-10-CM

## 2021-06-30 DIAGNOSIS — Z00.00 MEDICARE ANNUAL WELLNESS VISIT, SUBSEQUENT: ICD-10-CM

## 2021-06-30 DIAGNOSIS — I27.20 PULMONARY HYPERTENSION (HCC): ICD-10-CM

## 2021-06-30 DIAGNOSIS — J44.9 CHRONIC OBSTRUCTIVE PULMONARY DISEASE, UNSPECIFIED COPD TYPE (HCC): ICD-10-CM

## 2021-06-30 DIAGNOSIS — I10 ESSENTIAL HYPERTENSION, BENIGN: ICD-10-CM

## 2021-06-30 PROCEDURE — G0438 PPPS, INITIAL VISIT: HCPCS | Performed by: INTERNAL MEDICINE

## 2021-06-30 PROCEDURE — 99214 OFFICE O/P EST MOD 30 MIN: CPT | Performed by: INTERNAL MEDICINE

## 2021-06-30 NOTE — PROGRESS NOTES
Diabetic Foot Exam    Patient's shoes and socks removed  Right Foot/Ankle   Right Foot Inspection  Skin Exam: skin normal, skin intact and dry skin no warmth, no callus, no erythema, no maceration, no abnormal color, no pre-ulcer, no ulcer and no callus                          Toe Exam: ROM and strength within normal limits  Sensory       Monofilament testing: intact  Vascular  Capillary refills: < 3 seconds  The right DP pulse is 2+  The right PT pulse is 2+  Left Foot/Ankle  Left Foot Inspection  Skin Exam: skin normal, skin intact and dry skinno warmth, no erythema, no maceration, normal color, no pre-ulcer, no ulcer and no callus                         Toe Exam: ROM and strength within normal limits                   Sensory       Monofilament: intact  Vascular  Capillary refills: < 3 seconds  The left DP pulse is 2+  The left PT pulse is 2+  Assign Risk Category:  No deformity present; No loss of protective sensation;  No weak pulses       Risk: 0

## 2021-06-30 NOTE — PROGRESS NOTES
Assessment/Plan:             1  Chronic diastolic congestive heart failure ([facility] )    2  Medicare annual wellness visit, subsequent    3  Uncontrolled type 2 diabetes mellitus with hyperglycemia (HCC)  -     CBC and differential; Future  -     Comprehensive metabolic panel; Future  -     Hemoglobin A1C; Future  -     Lipid Panel with Direct LDL reflex; Future  -     Microalbumin / creatinine urine ratio  -     TSH, 3rd generation; Future    4  Essential hypertension, benign    5  Pulmonary hypertension ([facility] )    6  Chronic obstructive pulmonary disease, unspecified COPD type (HCC)           Subjective:      Patient ID: Brittani Reed is a 66 y o  female  Follow-up on multiple medical problems to ensure the stable on current medications      The following portions of the patient's history were reviewed and updated as appropriate: She  has a past medical history of Allergic rhinitis, Anxiety, Arthritis, Diabetes mellitus ([facility] ), and Tobacco abuse    She   Patient Active Problem List    Diagnosis Date Noted    Medicare annual wellness visit, subsequent 06/30/2021    Hypoxia 06/16/2021    Essential hypertension, benign 06/02/2021    Chronic diastolic congestive heart failure ([facility] ) 06/02/2021    Pulmonary hypertension ([facility] ) 06/02/2021    Cigarette nicotine dependence without complication     Elevated BP without diagnosis of hypertension 05/22/2021    Alkalosis 05/22/2021    Lung nodules 05/18/2021    COPD (chronic obstructive pulmonary disease) ([facility] ) 05/18/2021    Chronic respiratory failure with hypoxia (HCC) 05/18/2021    Nicotine dependence 05/18/2021    Microalbuminuria 05/17/2021    SOB (shortness of breath) 05/11/2021    Abdominal swelling, generalized 05/11/2021    Uncontrolled type 2 diabetes mellitus with hyperglycemia ([facility] ) 05/11/2021    Urinary frequency 05/11/2021    Allergic rhinitis     Diabetes mellitus ([place] [address] )     Anxiety      She  has a past surgical history that includes Breast surgery (Right); Other surgical history; and Other surgical history  Her family history includes Diabetes in her brother; Heart disease in her brother; Hypotension in her daughter; Lymphoma in her brother; No Known Problems in her sister; Parkinsonism in her sister; Prostate cancer in her father; Proteinuria in her daughter  She  reports that she quit smoking about 6 weeks ago  Her smoking use included cigarettes  She smoked 0 50 packs per day  She has never used smokeless tobacco  She reports current alcohol use of about 1 0 - 2 0 standard drinks of alcohol per week  She reports that she does not use drugs  Current Outpatient Medications   Medication Sig Dispense Refill    calcium citrate-vitamin D (CITRACAL+D) 315-200 MG-UNIT per tablet Take 1 tablet by mouth 2 (two) times a day      furosemide (LASIX) 20 mg tablet Take 1 tablet (20 mg total) by mouth 2 (two) times a day Lasix 60mg BID (Patient taking differently: Take 60 mg by mouth 2 (two) times a day Lasix 60mg BID) 60 tablet 3    potassium chloride (K-DUR,KLOR-CON) 20 mEq tablet Take 1 tablet (20 mEq total) by mouth daily 60 tablet 0    Continuous Glucose Monitor Sup KIT Use 1 application every 14 (fourteen) days (Patient not taking: Reported on 6/7/2021) 1 kit 0    Continuous Glucose Monitor Sup MISC Use every 14 (fourteen) days (Patient not taking: Reported on 6/7/2021) 2 each 3    Empagliflozin 10 MG TABS Take 1 tablet (10 mg total) by mouth every morning (Patient not taking: Reported on 6/30/2021) 30 tablet 0    LORazepam (ATIVAN) 0 5 mg tablet Take 1 tablet (0 5 mg total) by mouth as needed for anxiety 1 hour before CT scan (Patient not taking: Reported on 6/7/2021) 2 tablet 0     No current facility-administered medications for this visit       Current Outpatient Medications on File Prior to Visit   Medication Sig    calcium citrate-vitamin D (CITRACAL+D) 315-200 MG-UNIT per tablet Take 1 tablet by mouth 2 (two) times a day    furosemide (LASIX) 20 mg tablet Take 1 tablet (20 mg total) by mouth 2 (two) times a day Lasix 60mg BID (Patient taking differently: Take 60 mg by mouth 2 (two) times a day Lasix 60mg BID)    potassium chloride (K-DUR,KLOR-CON) 20 mEq tablet Take 1 tablet (20 mEq total) by mouth daily    Continuous Glucose Monitor Sup KIT Use 1 application every 14 (fourteen) days (Patient not taking: Reported on 6/7/2021)    Continuous Glucose Monitor Sup MISC Use every 14 (fourteen) days (Patient not taking: Reported on 6/7/2021)    Empagliflozin 10 MG TABS Take 1 tablet (10 mg total) by mouth every morning (Patient not taking: Reported on 6/30/2021)    LORazepam (ATIVAN) 0 5 mg tablet Take 1 tablet (0 5 mg total) by mouth as needed for anxiety 1 hour before CT scan (Patient not taking: Reported on 6/7/2021)    [DISCONTINUED] lisinopril (ZESTRIL) 5 mg tablet Take 1 tablet (5 mg total) by mouth daily (Patient not taking: Reported on 6/30/2021)    [DISCONTINUED] nicotine (NICODERM CQ) 7 mg/24hr TD 24 hr patch Place 1 patch on the skin every 24 hours (Patient not taking: Reported on 6/30/2021)     No current facility-administered medications on file prior to visit  She has No Known Allergies       Review of Systems   Constitutional: Negative for chills and fever  HENT: Negative for congestion, ear pain and sore throat  Eyes: Negative for pain  Respiratory: Positive for shortness of breath  Negative for cough  Cardiovascular: Negative for chest pain and leg swelling  Gastrointestinal: Negative for abdominal pain, nausea and vomiting  Endocrine: Negative for polyuria  Genitourinary: Negative for difficulty urinating, frequency and urgency  Musculoskeletal: Negative for arthralgias and back pain  Skin: Negative for rash  Neurological: Negative for weakness and headaches  Psychiatric/Behavioral: Negative for sleep disturbance  The patient is not nervous/anxious            Objective:      /64 (BP Location: Left arm, Patient Position: Sitting, Cuff Size: Standard)   Pulse 100   Temp 97 5 °F (36 4 °C) (Temporal)   Ht 5' 4" (1 626 m)   Wt 63 5 kg (140 lb)   SpO2 93%   BMI 24 03 kg/m²     Recent Results (from the past 1344 hour(s))   Urinalysis with microscopic    Collection Time: 05/12/21 11:58 AM   Result Value Ref Range    Clarity, UA Clear     Color, UA Dk Yellow     Specific [place], UA 1 016 1 003 - 1 030    pH, UA 6 0 4 5, 5 0, 5 5, 6 0, 6 5, 7 0, 7 5, 8 0    Glucose, UA Negative Negative mg/dl    Ketones, UA Trace (A) Negative mg/dl    Blood, UA Trace (A) Negative    Protein,  (3+) (A) Negative mg/dl    Nitrite, UA Negative Negative    Bilirubin, UA Negative Negative    Urobilinogen, UA 1 0 0 2, 1 0 E U /dl E U /dl    Leukocytes, UA Negative Negative    WBC, UA 2-4 None Seen, 2-4 /hpf    RBC, UA 2-4 None Seen, 2-4 /hpf    Hyaline Casts, UA None Seen None Seen /lpf    Bacteria, UA Occasional None Seen, Occasional /hpf    Epithelial Cells None Seen None Seen, Occasional /hpf   Microalbumin / creatinine urine ratio    Collection Time: 05/12/21 11:58 AM   Result Value Ref Range    Creatinine, Ur 101 0 mg/dL    Microalbum  ,U,Random 2,500 0 (H) 0 0 - 20 0 mg/L    Microalb Creat Ratio 2,475 (H) 0 - 30 mg/g creatinine   CBC and differential    Collection Time: 05/12/21 11:58 AM   Result Value Ref Range    WBC 6 97 4 31 - 10 16 Thousand/uL    RBC 5 56 (H) 3 81 - 5 12 Million/uL    Hemoglobin 18 0 (H) 11 5 - 15 4 g/dL    Hematocrit 56 1 (H) 34 8 - 46 1 %     (H) 82 - 98 fL    MCH 32 4 26 8 - 34 3 pg    MCHC 32 1 31 4 - 37 4 g/dL    RDW 14 6 11 6 - 15 1 %    MPV 12 2 8 9 - 12 7 fL    Platelets 341 934 - 981 Thousands/uL    nRBC 0 /100 WBCs    Neutrophils Relative 80 (H) 43 - 75 %    Immat GRANS % 0 0 - 2 %    Lymphocytes Relative 11 (L) 14 - 44 %    Monocytes Relative 7 4 - 12 %    Eosinophils Relative 1 0 - 6 %    Basophils Relative 1 0 - 1 %    Neutrophils Absolute 5 61 1 85 - 7 62 Thousands/µL    Immature Grans Absolute 0  02 0 00 - 0 20 Thousand/uL    Lymphocytes Absolute 0 79 0 60 - 4 47 Thousands/µL    Monocytes Absolute 0 46 0 17 - 1 22 Thousand/µL    Eosinophils Absolute 0 04 0 00 - 0 61 Thousand/µL    Basophils Absolute 0 05 0 00 - 0 10 Thousands/µL   Comprehensive metabolic panel    Collection Time: 05/12/21 11:58 AM   Result Value Ref Range    Sodium 137 136 - 145 mmol/L    Potassium 4 5 3 5 - 5 3 mmol/L    Chloride 101 100 - 108 mmol/L    CO2 36 (H) 21 - 32 mmol/L    ANION GAP 0 (L) 4 - 13 mmol/L    BUN 10 5 - 25 mg/dL    Creatinine 0 63 0 60 - 1 30 mg/dL    Glucose, Fasting 152 (H) 65 - 99 mg/dL    Calcium 9 1 8 3 - 10 1 mg/dL    AST 24 5 - 45 U/L    ALT 34 12 - 78 U/L    Alkaline Phosphatase 124 (H) 46 - 116 U/L    Total Protein 7 0 6 4 - 8 2 g/dL    Albumin 3 7 3 5 - 5 0 g/dL    Total Bilirubin 1 95 (H) 0 20 - 1 00 mg/dL    eGFR 87 ml/min/1 73sq m   Hemoglobin A1C    Collection Time: 05/12/21 11:58 AM   Result Value Ref Range    Hemoglobin A1C 6 6 (H) Normal 3 8-5 6%; PreDiabetic 5 7-6 4%;  Diabetic >=6 5%; Glycemic control for adults with diabetes <7 0% %     mg/dl   Lipid Panel with Direct LDL reflex    Collection Time: 05/12/21 11:58 AM   Result Value Ref Range    Cholesterol 136 50 - 200 mg/dL    Triglycerides 49 <=150 mg/dL    HDL, Direct 71 >=40 mg/dL    LDL Calculated 55 0 - 100 mg/dL   Urine culture    Collection Time: 05/12/21 11:58 AM    Specimen: Urine, Clean Catch   Result Value Ref Range    Urine Culture No Growth <1000 cfu/mL    TSH, 3rd generation    Collection Time: 05/12/21 11:58 AM   Result Value Ref Range    TSH 3RD GENERATON 1 800 0 358 - 3 740 uIU/mL   NT-BNP PRO    Collection Time: 05/12/21 11:58 AM   Result Value Ref Range    NT-proBNP 2,138 (H) <450 pg/mL   CBC and differential    Collection Time: 05/18/21  5:04 PM   Result Value Ref Range    WBC 9 13 4 31 - 10 16 Thousand/uL    RBC 5 67 (H) 3 81 - 5 12 Million/uL    Hemoglobin 18 2 (H) 11 5 - 15 4 g/dL    Hematocrit 56 1 (H) 34 8 - 46 1 % MCV 99 (H) 82 - 98 fL    MCH 32 1 26 8 - 34 3 pg    MCHC 32 4 31 4 - 37 4 g/dL    RDW 14 3 11 6 - 15 1 %    MPV 11 7 8 9 - 12 7 fL    Platelets 975 198 - 324 Thousands/uL    nRBC 0 /100 WBCs    Neutrophils Relative 79 (H) 43 - 75 %    Immat GRANS % 0 0 - 2 %    Lymphocytes Relative 10 (L) 14 - 44 %    Monocytes Relative 9 4 - 12 %    Eosinophils Relative 1 0 - 6 %    Basophils Relative 1 0 - 1 %    Neutrophils Absolute 7 21 1 85 - 7 62 Thousands/µL    Immature Grans Absolute 0 03 0 00 - 0 20 Thousand/uL    Lymphocytes Absolute 0 94 0 60 - 4 47 Thousands/µL    Monocytes Absolute 0 82 0 17 - 1 22 Thousand/µL    Eosinophils Absolute 0 06 0 00 - 0 61 Thousand/µL    Basophils Absolute 0 07 0 00 - 0 10 Thousands/µL   Basic metabolic panel    Collection Time: 05/18/21  5:04 PM   Result Value Ref Range    Sodium 137 136 - 145 mmol/L    Potassium 3 6 3 5 - 5 3 mmol/L    Chloride 99 (L) 100 - 108 mmol/L    CO2 32 21 - 32 mmol/L    ANION GAP 6 4 - 13 mmol/L    BUN 16 5 - 25 mg/dL    Creatinine 0 80 0 60 - 1 30 mg/dL    Glucose 141 (H) 65 - 140 mg/dL    Calcium 9 2 8 3 - 10 1 mg/dL    eGFR 71 ml/min/1 73sq m   Troponin I    Collection Time: 05/18/21  5:04 PM   Result Value Ref Range    Troponin I 0 04 <=0 04 ng/mL   NT-BNP PRO    Collection Time: 05/18/21  5:04 PM   Result Value Ref Range    NT-proBNP 3,223 (H) <450 pg/mL   Novel Coronavirus (Covid-19),PCR [facility]    Collection Time: 05/18/21  5:04 PM    Specimen: Nasopharyngeal Swab; Nares   Result Value Ref Range    SARS-CoV-2 Negative Negative   ECG 12 lead    Collection Time: 05/18/21  5:16 PM   Result Value Ref Range    Ventricular Rate 117 BPM    Atrial Rate 117 BPM    RI Interval 208 ms    QRSD Interval 64 ms    QT Interval 292 ms    QTC Interval 407 ms    P Axis 83 degrees    QRS Axis -69 degrees    T Wave Axis 94 degrees   Fingerstick Glucose (POCT)    Collection Time: 05/18/21  9:17 PM   Result Value Ref Range    POC Glucose 108 65 - 140 mg/dl   CBC and differential Collection Time: 05/19/21  2:42 AM   Result Value Ref Range    WBC 7 24 4 31 - 10 16 Thousand/uL    RBC 5 60 (H) 3 81 - 5 12 Million/uL    Hemoglobin 17 8 (H) 11 5 - 15 4 g/dL    Hematocrit 57 7 (H) 34 8 - 46 1 %     (H) 82 - 98 fL    MCH 31 8 26 8 - 34 3 pg    MCHC 30 8 (L) 31 4 - 37 4 g/dL    RDW 14 1 11 6 - 15 1 %    MPV 11 6 8 9 - 12 7 fL    Platelets 137 660 - 188 Thousands/uL    nRBC 0 /100 WBCs    Neutrophils Relative 72 43 - 75 %    Immat GRANS % 0 0 - 2 %    Lymphocytes Relative 15 14 - 44 %    Monocytes Relative 10 4 - 12 %    Eosinophils Relative 2 0 - 6 %    Basophils Relative 1 0 - 1 %    Neutrophils Absolute 5 24 1 85 - 7 62 Thousands/µL    Immature Grans Absolute 0 03 0 00 - 0 20 Thousand/uL    Lymphocytes Absolute 1 06 0 60 - 4 47 Thousands/µL    Monocytes Absolute 0 74 0 17 - 1 22 Thousand/µL    Eosinophils Absolute 0 12 0 00 - 0 61 Thousand/µL    Basophils Absolute 0 05 0 00 - 0 10 Thousands/µL   Comprehensive metabolic panel    Collection Time: 05/19/21  2:43 AM   Result Value Ref Range    Sodium 138 136 - 145 mmol/L    Potassium 3 4 (L) 3 5 - 5 3 mmol/L    Chloride 98 (L) 100 - 108 mmol/L    CO2 34 (H) 21 - 32 mmol/L    ANION GAP 6 4 - 13 mmol/L    BUN 16 5 - 25 mg/dL    Creatinine 0 81 0 60 - 1 30 mg/dL    Glucose 172 (H) 65 - 140 mg/dL    Calcium 8 8 8 3 - 10 1 mg/dL    Corrected Calcium 9 7 8 3 - 10 1 mg/dL    AST 25 5 - 45 U/L    ALT 30 12 - 78 U/L    Alkaline Phosphatase 135 (H) 46 - 116 U/L    Total Protein 6 9 6 4 - 8 2 g/dL    Albumin 2 9 (L) 3 5 - 5 0 g/dL    Total Bilirubin 1 70 (H) 0 20 - 1 00 mg/dL    eGFR 70 ml/min/1 73sq m   Magnesium    Collection Time: 05/19/21  2:43 AM   Result Value Ref Range    Magnesium 2 1 1 6 - 2 6 mg/dL   Fingerstick Glucose (POCT)    Collection Time: 05/19/21  6:37 AM   Result Value Ref Range    POC Glucose 121 65 - 140 mg/dl   Fingerstick Glucose (POCT)    Collection Time: 05/19/21 10:16 AM   Result Value Ref Range    POC Glucose 186 (H) 65 - 140 mg/dl   Fingerstick Glucose (POCT)    Collection Time: 05/19/21  4:43 PM   Result Value Ref Range    POC Glucose 103 65 - 140 mg/dl   Fingerstick Glucose (POCT)    Collection Time: 05/19/21  8:58 PM   Result Value Ref Range    POC Glucose 156 (H) 65 - 140 mg/dl   Basic metabolic panel    Collection Time: 05/20/21  5:15 AM   Result Value Ref Range    Sodium 142 136 - 145 mmol/L    Potassium 3 3 (L) 3 5 - 5 3 mmol/L    Chloride 94 (L) 100 - 108 mmol/L    CO2 41 (H) 21 - 32 mmol/L    ANION GAP 7 4 - 13 mmol/L    BUN 18 5 - 25 mg/dL    Creatinine 0 72 0 60 - 1 30 mg/dL    Glucose 149 (H) 65 - 140 mg/dL    Calcium 8 7 8 3 - 10 1 mg/dL    eGFR 81 ml/min/1 73sq m   Fingerstick Glucose (POCT)    Collection Time: 05/20/21  6:00 AM   Result Value Ref Range    POC Glucose 155 (H) 65 - 140 mg/dl   Fingerstick Glucose (POCT)    Collection Time: 05/20/21 10:41 AM   Result Value Ref Range    POC Glucose 162 (H) 65 - 140 mg/dl   FARIDEH Screen w/ Reflex to Titer/Pattern    Collection Time: 05/20/21  2:35 PM   Result Value Ref Range    FARIDEH Negative Negative   C3 complement    Collection Time: 05/20/21  2:35 PM   Result Value Ref Range    C3 Complement 94 3 90 0 - 180 0 mg/dL   C4 complement    Collection Time: 05/20/21  2:35 PM   Result Value Ref Range    C4, COMPLEMENT 15 0 10 0 - 40 0 mg/dL   Anti-neutrophilic cytoplasmic antibody    Collection Time: 05/20/21  2:35 PM   Result Value Ref Range    C-ANCA <1:20 Neg:<1:20 titer    Atypical pANCA <1:20 Neg:<1:20 titer    MPO AB <9 0 0 0 - 9 0 U/mL    [place]-3 AB <3 5 0 0 - 3 5 U/mL    P-ANCA <1:20 Neg:<1:20 titer   Protein electrophoresis, serum    Collection Time: 05/20/21  2:35 PM   Result Value Ref Range    A/G Ratio 1 29 1 10 - 1 80    Albumin Electrophoresis 56 4 52 0 - 65 0 %    Albumin CONC 3 67 3 50 - 5 00 g/dl    Alpha 1 6 3 (H) 2 5 - 5 0 %    ALPHA 1 CONC 0 41 (H) 0 10 - 0 40 g/dL    Alpha 2 10 2 7 0 - 13 0 %    ALPHA 2 CONC 0 66 0 40 - 1 20 g/dL    Beta-1 6 6 5 0 - 13 0 % BETA 1 CONC 0 43 0 40 - 0 80 g/dL    Beta-2 4 8 2 0 - 8 0 %    BETA 2 CONC 0 31 0 20 - 0 50 g/dL    Gamma Globulin 15 7 12 0 - 22 0 %    GAMMA CONC 1 02 0 50 - 1 60 g/dL    Total Protein 6 5 6 4 - 8 2 g/dL    SPEP Interpretation See Comment    Immunoglobulin free LT chains blood    Collection Time: 05/20/21  2:35 PM   Result Value Ref Range    Ig Kappa Free Light Chain 26 4 (H) 3 3 - 19 4 mg/L    Ig Lambda Free Light Chain 15 9 5 7 - 26 3 mg/L    Kappa/Lambda FluidC Ratio 1 66 (H) 0 26 - 1 65   Chronic Hepatitis Panel    Collection Time: 05/20/21  2:35 PM   Result Value Ref Range    Hepatitis B Surface Ag Non-reactive Non-reactive, NonReactive - Confirmed    Hepatitis C Ab Non-reactive Non-reactive    Hep B C IgM Non-reactive Non-reactive    Hep B Core Total Ab Reactive (A) Non-reactive   Immunofixation, Serum(Reflex Only-Do Not Order)    Collection Time: 05/20/21  2:35 PM   Result Value Ref Range    Immunofixation Interpretation See Comment    RF Screen w/ Reflex to Titer    Collection Time: 05/20/21  2:35 PM   Result Value Ref Range    Rheumatoid Factor Positive (A) Negative   Rheumatoid factor quant (Reflex Only- Do Not Order)    Collection Time: 05/20/21  2:35 PM   Result Value Ref Range    RF Quantitation 20 IU/mL (A) (none)   Protein electrophoresis, urine    Collection Time: 05/20/21  3:22 PM   Result Value Ref Range    Urine Protein 16 0 2 0 - 17 5 mg/dL    Albumin ELP, Urine 100 0 %    Alpha 1, Urine 0 0 %    Alpha 2, Urine 0 0 %    Beta, Urine 0 0 %    Gamma Globulin, Urine 0 0 %    UPEP Interp See Comment    Protein / creatinine ratio, urine    Collection Time: 05/20/21  3:22 PM   Result Value Ref Range    Creatinine, Ur 17 2 mg/dL    Protein Urine Random 16 mg/dL    Prot/Creat Ratio, Ur 0 93 (H) 0 00 - 0 10   Fingerstick Glucose (POCT)    Collection Time: 05/20/21  3:57 PM   Result Value Ref Range    POC Glucose 159 (H) 65 - 140 mg/dl   Fingerstick Glucose (POCT)    Collection Time: 05/20/21  9:03 PM Result Value Ref Range    POC Glucose 201 (H) 65 - 140 mg/dl   Basic metabolic panel    Collection Time: 05/21/21  5:33 AM   Result Value Ref Range    Sodium 139 136 - 145 mmol/L    Potassium 3 6 3 5 - 5 3 mmol/L    Chloride 97 (L) 100 - 108 mmol/L    CO2 40 (H) 21 - 32 mmol/L    ANION GAP 2 (L) 4 - 13 mmol/L    BUN 22 5 - 25 mg/dL    Creatinine 0 70 0 60 - 1 30 mg/dL    Glucose 190 (H) 65 - 140 mg/dL    Calcium 9 0 8 3 - 10 1 mg/dL    eGFR 84 ml/min/1 73sq m   Fingerstick Glucose (POCT)    Collection Time: 05/21/21  6:04 AM   Result Value Ref Range    POC Glucose 160 (H) 65 - 140 mg/dl   Fingerstick Glucose (POCT)    Collection Time: 05/21/21 11:20 AM   Result Value Ref Range    POC Glucose 178 (H) 65 - 140 mg/dl   Fingerstick Glucose (POCT)    Collection Time: 05/21/21  4:04 PM   Result Value Ref Range    POC Glucose 166 (H) 65 - 140 mg/dl   Fingerstick Glucose (POCT)    Collection Time: 05/21/21  8:57 PM   Result Value Ref Range    POC Glucose 190 (H) 65 - 140 mg/dl   Basic metabolic panel    Collection Time: 05/22/21  5:25 AM   Result Value Ref Range    Sodium 140 136 - 145 mmol/L    Potassium 5 0 3 5 - 5 3 mmol/L    Chloride 99 (L) 100 - 108 mmol/L    CO2 40 (H) 21 - 32 mmol/L    ANION GAP 1 (L) 4 - 13 mmol/L    BUN 20 5 - 25 mg/dL    Creatinine 0 70 0 60 - 1 30 mg/dL    Glucose 134 65 - 140 mg/dL    Calcium 9 6 8 3 - 10 1 mg/dL    eGFR 84 ml/min/1 73sq m   Fingerstick Glucose (POCT)    Collection Time: 05/22/21  6:24 AM   Result Value Ref Range    POC Glucose 142 (H) 65 - 140 mg/dl   Fingerstick Glucose (POCT)    Collection Time: 05/22/21 11:00 AM   Result Value Ref Range    POC Glucose 203 (H) 65 - 140 mg/dl   Fingerstick Glucose (POCT)    Collection Time: 05/22/21  4:11 PM   Result Value Ref Range    POC Glucose 135 65 - 140 mg/dl   Fingerstick Glucose (POCT)    Collection Time: 05/22/21  8:47 PM   Result Value Ref Range    POC Glucose 152 (H) 65 - 140 mg/dl   Comprehensive metabolic panel    Collection Time: 05/23/21  5:09 AM   Result Value Ref Range    Sodium 139 136 - 145 mmol/L    Potassium 3 7 3 5 - 5 3 mmol/L    Chloride 96 (L) 100 - 108 mmol/L    CO2 42 (H) 21 - 32 mmol/L    ANION GAP 1 (L) 4 - 13 mmol/L    BUN 23 5 - 25 mg/dL    Creatinine 0 66 0 60 - 1 30 mg/dL    Glucose 136 65 - 140 mg/dL    Calcium 9 4 8 3 - 10 1 mg/dL    Corrected Calcium 10 0 8 3 - 10 1 mg/dL    AST 15 5 - 45 U/L    ALT 25 12 - 78 U/L    Alkaline Phosphatase 120 (H) 46 - 116 U/L    Total Protein 6 9 6 4 - 8 2 g/dL    Albumin 3 2 (L) 3 5 - 5 0 g/dL    Total Bilirubin 1 11 (H) 0 20 - 1 00 mg/dL    eGFR 85 ml/min/1 73sq m   Fingerstick Glucose (POCT)    Collection Time: 05/23/21  6:25 AM   Result Value Ref Range    POC Glucose 204 (H) 65 - 140 mg/dl   Fingerstick Glucose (POCT)    Collection Time: 05/23/21 10:57 AM   Result Value Ref Range    POC Glucose 153 (H) 65 - 140 mg/dl   Fingerstick Glucose (POCT)    Collection Time: 05/23/21  4:23 PM   Result Value Ref Range    POC Glucose 125 65 - 140 mg/dl   Fingerstick Glucose (POCT)    Collection Time: 05/23/21  8:51 PM   Result Value Ref Range    POC Glucose 161 (H) 65 - 140 mg/dl   Comprehensive metabolic panel    Collection Time: 05/24/21  4:59 AM   Result Value Ref Range    Sodium 139 136 - 145 mmol/L    Potassium 3 2 (L) 3 5 - 5 3 mmol/L    Chloride 95 (L) 100 - 108 mmol/L    CO2 42 (H) 21 - 32 mmol/L    ANION GAP 2 (L) 4 - 13 mmol/L    BUN 19 5 - 25 mg/dL    Creatinine 0 55 (L) 0 60 - 1 30 mg/dL    Glucose 134 65 - 140 mg/dL    Calcium 9 4 8 3 - 10 1 mg/dL    Corrected Calcium 10 1 8 3 - 10 1 mg/dL    AST 18 5 - 45 U/L    ALT 28 12 - 78 U/L    Alkaline Phosphatase 119 (H) 46 - 116 U/L    Total Protein 6 7 6 4 - 8 2 g/dL    Albumin 3 1 (L) 3 5 - 5 0 g/dL    Total Bilirubin 1 99 (H) 0 20 - 1 00 mg/dL    eGFR 91 ml/min/1 73sq m   Magnesium    Collection Time: 05/24/21  4:59 AM   Result Value Ref Range    Magnesium 2 1 1 6 - 2 6 mg/dL   Fingerstick Glucose (POCT)    Collection Time: 05/24/21  6:00 AM   Result Value Ref Range    POC Glucose 135 65 - 140 mg/dl   Fingerstick Glucose (POCT)    Collection Time: 05/24/21 10:55 AM   Result Value Ref Range    POC Glucose 194 (H) 65 - 140 mg/dl   D-dimer, quantitative    Collection Time: 05/24/21  1:09 PM   Result Value Ref Range    D-Dimer, Quant 1 23 (H) <0 50 ug/ml FEU   Fingerstick Glucose (POCT)    Collection Time: 05/24/21  4:25 PM   Result Value Ref Range    POC Glucose 100 65 - 140 mg/dl   Fingerstick Glucose (POCT)    Collection Time: 05/24/21  9:06 PM   Result Value Ref Range    POC Glucose 152 (H) 65 - 140 mg/dl   Basic metabolic panel    Collection Time: 05/25/21  4:59 AM   Result Value Ref Range    Sodium 139 136 - 145 mmol/L    Potassium 3 8 3 5 - 5 3 mmol/L    Chloride 100 100 - 108 mmol/L    CO2 38 (H) 21 - 32 mmol/L    ANION GAP 1 (L) 4 - 13 mmol/L    BUN 22 5 - 25 mg/dL    Creatinine 0 65 0 60 - 1 30 mg/dL    Glucose 131 65 - 140 mg/dL    Calcium 9 7 8 3 - 10 1 mg/dL    eGFR 86 ml/min/1 73sq m   Fingerstick Glucose (POCT)    Collection Time: 05/25/21  6:17 AM   Result Value Ref Range    POC Glucose 146 (H) 65 - 140 mg/dl   Fingerstick Glucose (POCT)    Collection Time: 05/25/21 11:53 AM   Result Value Ref Range    POC Glucose 150 (H) 65 - 140 mg/dl   Fingerstick Glucose (POCT)    Collection Time: 05/25/21  4:08 PM   Result Value Ref Range    POC Glucose 123 65 - 140 mg/dl   Fingerstick Glucose (POCT)    Collection Time: 05/25/21  8:45 PM   Result Value Ref Range    POC Glucose 157 (H) 65 - 140 mg/dl   Basic metabolic panel    Collection Time: 05/26/21  4:58 AM   Result Value Ref Range    Sodium 139 136 - 145 mmol/L    Potassium 3 9 3 5 - 5 3 mmol/L    Chloride 103 100 - 108 mmol/L    CO2 34 (H) 21 - 32 mmol/L    ANION GAP 2 (L) 4 - 13 mmol/L    BUN 28 (H) 5 - 25 mg/dL    Creatinine 0 68 0 60 - 1 30 mg/dL    Glucose 128 65 - 140 mg/dL    Calcium 9 5 8 3 - 10 1 mg/dL    eGFR 85 ml/min/1 73sq m   Fingerstick Glucose (POCT)    Collection Time: 05/26/21  6:23 AM   Result Value Ref Range    POC Glucose 146 (H) 65 - 140 mg/dl   Fingerstick Glucose (POCT)    Collection Time: 05/26/21 11:36 AM   Result Value Ref Range    POC Glucose 200 (H) 65 - 140 mg/dl   Home O2 Setup    Collection Time: 05/26/21  1:00 PM   Result Value Ref Range    Supplier Name [employer] Phone Number [phone]     Order Status Delivery Successful     Delivery Note      Delivery Request Date 05/26/2021     Date Delivered  05/26/2021     Supplier Name 05/26/2021     Item Description       Home Oxygen Concentrator with Portability, Adult, Standard Liter Flow    Item Description Portable Gaseous Oxygen System     Item Description Portable O2 Contents, Gas     Item Description Portable Tank with Conserving Device     Item Description O2 Humidifier Bottle, Standard Liter Flow    Basic metabolic panel    Collection Time: 06/01/21  9:33 AM   Result Value Ref Range    Sodium 135 (L) 136 - 145 mmol/L    Potassium 4 9 3 5 - 5 3 mmol/L    Chloride 98 (L) 100 - 108 mmol/L    CO2 34 (H) 21 - 32 mmol/L    ANION GAP 3 (L) 4 - 13 mmol/L    BUN 40 (H) 5 - 25 mg/dL    Creatinine 0 97 0 60 - 1 30 mg/dL    Glucose, Fasting 163 (H) 65 - 99 mg/dL    Calcium 10 0 8 3 - 10 1 mg/dL    eGFR 57 ml/min/1 73sq m   Basic metabolic panel    Collection Time: 06/22/21 11:09 AM   Result Value Ref Range    Sodium 139 136 - 145 mmol/L    Potassium 4 1 3 5 - 5 3 mmol/L    Chloride 102 100 - 108 mmol/L    CO2 30 21 - 32 mmol/L    ANION GAP 7 4 - 13 mmol/L    BUN 21 5 - 25 mg/dL    Creatinine 0 75 0 60 - 1 30 mg/dL    Glucose 124 65 - 140 mg/dL    Calcium 9 6 8 3 - 10 1 mg/dL    eGFR 77 ml/min/1 73sq m        Physical Exam  Constitutional:       Appearance: Normal appearance  HENT:      Head: Normocephalic  Right Ear: Tympanic membrane, ear canal and external ear normal       Left Ear: Tympanic membrane, ear canal and external ear normal       Nose: Nose normal  No congestion  Mouth/Throat:      Mouth: Mucous membranes are moist       Pharynx: Oropharynx is clear  No oropharyngeal exudate or posterior oropharyngeal erythema  Eyes:      Extraocular Movements: Extraocular movements intact  Conjunctiva/sclera: Conjunctivae normal       Pupils: Pupils are equal, round, and reactive to light  Cardiovascular:      Rate and Rhythm: Normal rate and regular rhythm  Heart sounds: Normal heart sounds  No murmur heard  Pulmonary:      Effort: Pulmonary effort is normal       Breath sounds: Normal breath sounds  No wheezing or rales  Abdominal:      General: Bowel sounds are normal  There is no distension  Palpations: Abdomen is soft  Tenderness: There is no abdominal tenderness  Musculoskeletal:         General: Normal range of motion  Cervical back: Normal range of motion and neck supple  Right lower leg: No edema  Left lower leg: No edema  Lymphadenopathy:      Cervical: No cervical adenopathy  Skin:     General: Skin is warm  Neurological:      General: No focal deficit present  Mental Status: She is alert and oriented to person, place, and time

## 2021-06-30 NOTE — PROGRESS NOTES
Assessment and Plan:     Problem List Items Addressed This Visit     None           Preventive health issues were discussed with patient, and age appropriate screening tests were ordered as noted in patient's After Visit Summary  Personalized health advice and appropriate referrals for health education or preventive services given if needed, as noted in patient's After Visit Summary       History of Present Illness:     Patient presents for Medicare Annual Wellness visit    Patient Care Team:  Odessa Huang MD as PCP - General (Internal Medicine)  Nick Figueroa RN as RN Care Manager     Problem List:     Patient Active Problem List   Diagnosis    Allergic rhinitis    Diabetes mellitus ([address] )    Anxiety    SOB (shortness of breath)    Abdominal swelling, generalized    Uncontrolled type 2 diabetes mellitus with hyperglycemia ([address] )    Urinary frequency    Microalbuminuria    Lung nodules    COPD (chronic obstructive pulmonary disease) (HCC)    Chronic respiratory failure with hypoxia (HCC)    Nicotine dependence    Elevated BP without diagnosis of hypertension    Alkalosis    Essential hypertension, benign    Chronic diastolic congestive heart failure (HCC)    Pulmonary hypertension (HCC)    Cigarette nicotine dependence without complication    Hypoxia      Past Medical and Surgical History:     Past Medical History:   Diagnosis Date    Allergic rhinitis     Anxiety     Arthritis     Diabetes mellitus ([address] )     Tobacco abuse      Past Surgical History:   Procedure Laterality Date    BREAST SURGERY Right     Cyst    OTHER SURGICAL HISTORY      Cataract implant    OTHER SURGICAL HISTORY      Fertilization      Family History:     Family History   Problem Relation Age of Onset    Prostate cancer Father     Diabetes Brother     Heart disease Brother     Lymphoma Brother     No Known Problems Sister     Parkinsonism Sister     Hypotension Daughter         Takes "salt pills"    Proteinuria Daughter         Occuring during her 3 pregnancies; follows with nephrology      Social History:     Social History     Socioeconomic History    Marital status: /Civil Union     Spouse name: None    Number of children: 1    Years of education: None    Highest education level: Some college, no degree   Occupational History    None   Tobacco Use    Smoking status: Former Smoker     Packs/day: 0 50     Types: Cigarettes     Quit date: 2021     Years since quittin 1    Smokeless tobacco: Never Used    Tobacco comment: Began smoking in her late teens, averaging 1/2 pack daily  States she quit smoking a few weeks ago (Updated 2021)  Vaping Use    Vaping Use: Never used   Substance and Sexual Activity    Alcohol use: Yes     Alcohol/week: 1 0 - 2 0 standard drinks     Types: 1 - 2 Glasses of wine per week     Comment: On average, will drink 1-2 glasses of white wine daily with dinner  (Updated 2021),     Drug use: Never     Comment: Last assessed 2021   Sexual activity: None   Other Topics Concern    None   Social History Narrative    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 ; 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  Has 1 biological daughter Som Mcgowan who lives locally  Social Determinants of Health     Financial Resource Strain:     Difficulty of Paying Living Expenses:    Food Insecurity:     Worried About Running Out of Food in the Last Year:     [address] in the Last Year:    Transportation Needs:     Lack of Transportation (Medical):      Lack of Transportation (Non-Medical):    Physical Activity:     Days of Exercise per Week:     Minutes of Exercise per Session:    Stress:     Feeling of Stress :    Social Connections:     Frequency of Communication with Friends and Family:     Frequency of Social Gatherings with Friends and Family:     Attends Sikhism Services:     Active Member of Clubs or Organizations:     Attends Club or Organization Meetings:     Marital Status:    Intimate Partner Violence:     Fear of Current or Ex-Partner:     Emotionally Abused:     Physically Abused:     Sexually Abused:       Medications and Allergies:     Current Outpatient Medications   Medication Sig Dispense Refill    calcium citrate-vitamin D (CITRACAL+D) 315-200 MG-UNIT per tablet Take 1 tablet by mouth 2 (two) times a day      Empagliflozin 10 MG TABS Take 1 tablet (10 mg total) by mouth every morning 30 tablet 0    furosemide (LASIX) 20 mg tablet Take 1 tablet (20 mg total) by mouth 2 (two) times a day Lasix 60mg BID 60 tablet 3    lisinopril (ZESTRIL) 5 mg tablet Take 1 tablet (5 mg total) by mouth daily 90 tablet 1    nicotine (NICODERM CQ) 7 mg/24hr TD 24 hr patch Place 1 patch on the skin every 24 hours 28 patch 0    potassium chloride (K-DUR,KLOR-CON) 20 mEq tablet Take 1 tablet (20 mEq total) by mouth daily 60 tablet 0    Continuous Glucose Monitor Sup KIT Use 1 application every 14 (fourteen) days (Patient not taking: Reported on 6/7/2021) 1 kit 0    Continuous Glucose Monitor Sup MISC Use every 14 (fourteen) days (Patient not taking: Reported on 6/7/2021) 2 each 3    LORazepam (ATIVAN) 0 5 mg tablet Take 1 tablet (0 5 mg total) by mouth as needed for anxiety 1 hour before CT scan (Patient not taking: Reported on 6/7/2021) 2 tablet 0     No current facility-administered medications for this visit       No Known Allergies   Immunizations:     Immunization History   Administered Date(s) Administered    H1N1, All Formulations 01/09/2010    INFLUENZA 11/11/2005, 11/07/2006, 10/01/2014, 10/01/2015, 10/12/2016, 09/21/2017, 09/18/2018, 08/25/2020    Pneumococcal Conjugate 13-Valent 09/21/2017    Pneumococcal Polysaccharide PPV23 10/17/2018    SARS-CoV-2 / COVID-19 mRNA IM (Alyssa Sida) 01/21/2021, 02/23/2021      Health Maintenance:         Topic Date Due    Hepatitis C Screening  Completed         Topic Date Due    DTaP,Tdap,and Td Vaccines (1 - Tdap) Never done      Medicare Health Risk Assessment:     /64 (BP Location: Left arm, Patient Position: Sitting, Cuff Size: Standard)   Pulse 100   Temp 97 5 °F (36 4 °C) (Temporal)   Ht 5' 4" (1 626 m)   Wt 63 5 kg (140 lb)   SpO2 93%   BMI 24 03 kg/m²          Health Risk Assessment:   Patient rates overall health as fair  Patient feels that their physical health rating is same  Patient is very satisfied with their life  Eyesight was rated as same  Hearing was rated as same  Patient feels that their emotional and mental health rating is same  Patients states they are never, rarely angry  Patient states they are never, rarely unusually tired/fatigued  Pain experienced in the last 7 days has been none  Patient states that she has experienced no weight loss or gain in last 6 months  Fall Risk Screening: In the past year, patient has experienced: no history of falling in past year      Urinary Incontinence Screening:   Patient has not leaked urine accidently in the last six months  Home Safety:  Patient does not have trouble with stairs inside or outside of their home  Patient has working smoke alarms and has working carbon monoxide detector  Home safety hazards include: none  Nutrition:   Current diet is Regular  Medications:   Patient is currently taking over-the-counter supplements  OTC medications include: see medication list  Patient is able to manage medications  Activities of Daily Living (ADLs)/Instrumental Activities of Daily Living (IADLs):   Walk and transfer into and out of bed and chair?: Yes  Dress and groom yourself?: Yes    Bathe or shower yourself?: Yes    Feed yourself?  Yes  Do your laundry/housekeeping?: Yes  Manage your money, pay your bills and track your expenses?: Yes  Make your own meals?: Yes    Do your own shopping?: Yes    Previous Hospitalizations:   Any hospitalizations or ED visits within the last 12 months?: No      Advance Care Planning: Living will: Yes    Durable POA for healthcare: Yes    Advanced directive: Yes      PREVENTIVE SCREENINGS      Cardiovascular Screening:    General: Screening Current      Diabetes Screening:     General: Screening Not Indicated and History Diabetes      Cervical Cancer Screening:    General: Screening Not Indicated      Lung Cancer Screening:     General: Screening Not Indicated      Hepatitis C Screening:    General: Screening Current    Screening, Brief Intervention, and Referral to Treatment (SBIRT)    Screening  Typical number of drinks in a day: 0  Typical number of drinks in a week: 0  Interpretation: Low risk drinking behavior      Single Item Drug Screening:  How often have you used an illegal drug (including marijuana) or a prescription medication for non-medical reasons in the past year? never    Single Item Drug Screen Score: 0  Interpretation: Negative screen for possible drug use disorder      Loyd Berg MD

## 2021-06-30 NOTE — PATIENT INSTRUCTIONS
Medicare Preventive Visit Patient Instructions  Thank you for completing your Welcome to Medicare Visit or Medicare Annual Wellness Visit today  Your next wellness visit will be due in one year (7/1/2022)  The screening/preventive services that you may require over the next 5-10 years are detailed below  Some tests may not apply to you based off risk factors and/or age  Screening tests ordered at today's visit but not completed yet may show as past due  Also, please note that scanned in results may not display below  Preventive Screenings:  Service Recommendations Previous Testing/Comments   Colorectal Cancer Screening  * Colonoscopy    * Fecal Occult Blood Test (FOBT)/Fecal Immunochemical Test (FIT)  * Fecal DNA/Cologuard Test  * Flexible Sigmoidoscopy Age: 54-65 years old   Colonoscopy: every 10 years (may be performed more frequently if at higher risk)  OR  FOBT/FIT: every 1 year  OR  Cologuard: every 3 years  OR  Sigmoidoscopy: every 5 years  Screening may be recommended earlier than age 48 if at higher risk for colorectal cancer  Also, an individualized decision between you and your healthcare provider will decide whether screening between the ages of 74-80 would be appropriate  Colonoscopy: Not on file  FOBT/FIT: Not on file  Cologuard: Not on file  Sigmoidoscopy: Not on file          Breast Cancer Screening Age: 36 years old  Frequency: every 1-2 years  Not required if history of left and right mastectomy Mammogram: Not on file        Cervical Cancer Screening Between the ages of 21-29, pap smear recommended once every 3 years  Between the ages of 33-67, can perform pap smear with HPV co-testing every 5 years     Recommendations may differ for women with a history of total hysterectomy, cervical cancer, or abnormal pap smears in past  Pap Smear: Not on file    Screening Not Indicated   Hepatitis C Screening Once for adults born between [facility]  More frequently in patients at high risk for Hepatitis C Hep C Antibody: Not on file    Screening Current   Diabetes Screening 1-2 times per year if you're at risk for diabetes or have pre-diabetes Fasting glucose: 163 mg/dL   A1C: 6 6 %    Screening Not Indicated  History Diabetes   Cholesterol Screening Once every 5 years if you don't have a lipid disorder  May order more often based on risk factors  Lipid panel: 05/12/2021    Screening Current     Other Preventive Screenings Covered by Medicare:  1  Abdominal Aortic Aneurysm (AAA) Screening: covered once if your at risk  You're considered to be at risk if you have a family history of AAA  2  Lung Cancer Screening: covers low dose CT scan once per year if you meet all of the following conditions: (1) Age 50-69; (2) No signs or symptoms of lung cancer; (3) Current smoker or have quit smoking within the last 15 years; (4) You have a tobacco smoking history of at least 30 pack years (packs per day multiplied by number of years you smoked); (5) You get a written order from a healthcare provider  3  Glaucoma Screening: covered annually if you're considered high risk: (1) You have diabetes OR (2) Family history of glaucoma OR (3)  aged 48 and older OR (3)  American aged 72 and older  3  Osteoporosis Screening: covered every 2 years if you meet one of the following conditions: (1) You're estrogen deficient and at risk for osteoporosis based off medical history and other findings; (2) Have a vertebral abnormality; (3) On glucocorticoid therapy for more than 3 months; (4) Have primary hyperparathyroidism; (5) On osteoporosis medications and need to assess response to drug therapy  · Last bone density test (DXA Scan): Not on file  5  HIV Screening: covered annually if you're between the age of 12-76  Also covered annually if you are younger than 13 and older than 72 with risk factors for HIV infection   For pregnant patients, it is covered up to 3 times per pregnancy  Immunizations:  Immunization Recommendations   Influenza Vaccine Annual influenza vaccination during flu season is recommended for all persons aged >= 6 months who do not have contraindications   Pneumococcal Vaccine (Prevnar and Pneumovax)  * Prevnar = PCV13  * Pneumovax = PPSV23   Adults 25-60 years old: 1-3 doses may be recommended based on certain risk factors  Adults 72 years old: Prevnar (PCV13) vaccine recommended followed by Pneumovax (PPSV23) vaccine  If already received PPSV23 since turning 65, then PCV13 recommended at least one year after PPSV23 dose  Hepatitis B Vaccine 3 dose series if at intermediate or high risk (ex: diabetes, end stage renal disease, liver disease)   Tetanus (Td) Vaccine - COST NOT COVERED BY MEDICARE PART B Following completion of primary series, a booster dose should be given every 10 years to maintain immunity against tetanus  Td may also be given as tetanus wound prophylaxis  Tdap Vaccine - COST NOT COVERED BY MEDICARE PART B Recommended at least once for all adults  For pregnant patients, recommended with each pregnancy  Shingles Vaccine (Shingrix) - COST NOT COVERED BY MEDICARE PART B  2 shot series recommended in those aged 48 and above     Health Maintenance Due:      Topic Date Due    Hepatitis C Screening  Completed     Immunizations Due:      Topic Date Due    DTaP,Tdap,and Td Vaccines (1 - Tdap) Never done     Advance Directives   What are advance directives? Advance directives are legal documents that state your wishes and plans for medical care  These plans are made ahead of time in case you lose your ability to make decisions for yourself  Advance directives can apply to any medical decision, such as the treatments you want, and if you want to donate organs  What are the types of advance directives? There are many types of advance directives, and each state has rules about how to use them   You may choose a combination of any of the following:  · Living will: This is a written record of the treatment you want  You can also choose which treatments you do not want, which to limit, and which to stop at a certain time  This includes surgery, medicine, IV fluid, and tube feedings  · Durable power of  for healthcare [facility]): This is a written record that states who you want to make healthcare choices for you when you are unable to make them for yourself  This person, called a proxy, is usually a family member or a friend  You may choose more than 1 proxy  · Do not resuscitate (DNR) order:  A DNR order is used in case your heart stops beating or you stop breathing  It is a request not to have certain forms of treatment, such as CPR  A DNR order may be included in other types of advance directives  · Medical directive: This covers the care that you want if you are in a coma, near death, or unable to make decisions for yourself  You can list the treatments you want for each condition  Treatment may include pain medicine, surgery, blood transfusions, dialysis, IV or tube feedings, and a ventilator (breathing machine)  · Values history: This document has questions about your views, beliefs, and how you feel and think about life  This information can help others choose the care that you would choose  Why are advance directives important? An advance directive helps you control your care  Although spoken wishes may be used, it is better to have your wishes written down  Spoken wishes can be misunderstood, or not followed  Treatments may be given even if you do not want them  An advance directive may make it easier for your family to make difficult choices about your care  © Copyright [address] 2018 Information is for End User's use only and may not be sold, redistributed or otherwise used for commercial purposes   All illustrations and images included in CareNotes® are the copyrighted property of A D [employer] M [employer]  or [facility]

## 2021-07-01 ENCOUNTER — OFFICE VISIT (OUTPATIENT)
Dept: CARDIOLOGY CLINIC | Facility: CLINIC | Age: 76
End: 2021-07-01
Payer: MEDICARE

## 2021-07-01 VITALS
WEIGHT: 142.5 LBS | HEART RATE: 82 BPM | DIASTOLIC BLOOD PRESSURE: 60 MMHG | HEIGHT: 64 IN | SYSTOLIC BLOOD PRESSURE: 110 MMHG | OXYGEN SATURATION: 95 % | BODY MASS INDEX: 24.33 KG/M2

## 2021-07-01 DIAGNOSIS — I27.20 PULMONARY HYPERTENSION (HCC): ICD-10-CM

## 2021-07-01 DIAGNOSIS — E11.65 UNCONTROLLED TYPE 2 DIABETES MELLITUS WITH HYPERGLYCEMIA (HCC): ICD-10-CM

## 2021-07-01 DIAGNOSIS — J44.9 CHRONIC OBSTRUCTIVE PULMONARY DISEASE, UNSPECIFIED COPD TYPE (HCC): ICD-10-CM

## 2021-07-01 DIAGNOSIS — I50.32 CHRONIC DIASTOLIC CONGESTIVE HEART FAILURE (HCC): Primary | ICD-10-CM

## 2021-07-01 PROCEDURE — 99214 OFFICE O/P EST MOD 30 MIN: CPT | Performed by: NURSE PRACTITIONER

## 2021-07-01 NOTE — PATIENT INSTRUCTIONS
Maintain a 2 gram daily sodium diet and 1500 ml daily fluid restriction  Check daily weights  If you gain 3 pounds in one day, 5 pounds in one week, or experience worsening shortness of breath or increasing lower leg swelling  Please call the heart failure office at [phone]    Please bring a  list of your current medications and daily weights to the office visit

## 2021-07-01 NOTE — PROGRESS NOTES
Cardiology Follow Up    Ursula Peraza  1945  [id number]  [address]  [facility]  [place] [address]   [phone]    1  Chronic diastolic congestive heart failure ([facility] [address] )     2  Pulmonary hypertension ([facility] [address] )     3  Chronic obstructive pulmonary disease, unspecified COPD type ([facility] [address] )     4  Uncontrolled type 2 diabetes mellitus with hyperglycemia [facility])         Interval History:     Ms Luis Edwards was admitted to [facility] on 5/18 - 5/26/21 with acute decompensated combined RV systolic/LV diastolic heart failure   Ms Luis Edwards   Presented to [facility] Emergency Room with worsening shortness of breath orthopnea lower extremity edema and weight gain  She has been seen by her primary care physician with a one-week history of shortness of breath  He she was placed on Lasix 20 mg daily  She returns to your PCP who increased Lasix to 40 mg daily  He advised if symptoms worsen presents to the emergency room  On admission to the emergency room  NT proBNP 3 223  IV Lasix 40 mg b i d  started  Oxygen saturation 81% on RA, /104, She was placed on 5 LNC  5/19/21 TTE showed LV systolic function normal, LVEF 50-55%, NRWMA,   Mild concentric hypertrophy, right ventricular systolic function moderately reduced, RA mildly dilated, mild MR, mild TR, mild PHTN  CT of the chest abdomen pelvis revealed pulmonary nodules small volume abdominal pelvic ascites  Nephrology was consulted  Discharge weight 146 pounds       On 6/07/32 Ms Luis Edwards  was seen in  our office for a recent hospitalization follow up visit  She was  Accompanied by her  who assist her with care  Celester Claude denies chest pain of palpitations  She admits to occasional lightheaded with walking and dyspnea with exertion  She is using oxygen 2LNC around the clock  Her weight is stable 141 pounds at home and 142 in the office    She was referred to sleep medicine  BMP to be done in 2 weeks  On 6/11/21 our office was called with complaints of low  and down to 86 with symptoms of lightheadedness  Weight was stable 141 pounds  Lisinopril was stopped  Follow up in our office was scheduled  6/22/21  Sodium 139 potassium 4 1 BUN 21,  Creatinine 0 75 GFR 77    Ms Anuja Swan presents to our office for a follow up visit  She is using oxygen 2LNC  Adelita  Denies dyspnea with minimal moderate exertion chest pain palpitations lightheadedness or dizziness  Blood pressure is improved at home  Her chief complaint is itching on the back of her scalp and shoulder    Weight at home 141 pounds          HPI:  DM2  Controlled with diet  Tobacco abuse smoked one pack a day  Patient Active Problem List   Diagnosis    Allergic rhinitis    Diabetes mellitus ([address] )    Anxiety    SOB (shortness of breath)    Abdominal swelling, generalized    Uncontrolled type 2 diabetes mellitus with hyperglycemia (HCC)    Urinary frequency    Microalbuminuria    Lung nodules    COPD (chronic obstructive pulmonary disease) (HCC)    Chronic respiratory failure with hypoxia (HCC)    Nicotine dependence    Elevated BP without diagnosis of hypertension    Alkalosis    Essential hypertension, benign    Chronic diastolic congestive heart failure (HCC)    Pulmonary hypertension (HCC)    Cigarette nicotine dependence without complication    Hypoxia    Medicare annual wellness visit, subsequent     Past Medical History:   Diagnosis Date    Allergic rhinitis     Anxiety     Arthritis     Diabetes mellitus ([address] )     Tobacco abuse      Social History     Socioeconomic History    Marital status: /Civil Union     Spouse name: Not on file    Number of children: 1    Years of education: Not on file    Highest education level: Some college, no degree   Occupational History    Not on file   Tobacco Use    Smoking status: Former Smoker     Packs/day: 0 50 Types: Cigarettes     Quit date: 2021     Years since quittin 1    Smokeless tobacco: Never Used    Tobacco comment: Began smoking in her late teens, averaging 1/2 pack daily  States she quit smoking a few weeks ago (Updated 2021)  Vaping Use    Vaping Use: Never used   Substance and Sexual Activity    Alcohol use: Yes     Alcohol/week: 1 0 - 2 0 standard drinks     Types: 1 - 2 Glasses of wine per week     Comment: On average, will drink 1-2 glasses of white wine daily with dinner  (Updated 2021),     Drug use: Never     Comment: Last assessed 2021   Sexual activity: Not on file   Other Topics Concern    Not on file   Social History Narrative    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 ; 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  Has 1 biological daughter Jess Kaur) who lives locally  Social Determinants of Health     Financial Resource Strain:     Difficulty of Paying Living Expenses:    Food Insecurity:     Worried About Running Out of Food in the Last Year:     [address] in the Last Year:    Transportation Needs:     Lack of Transportation (Medical):      Lack of Transportation (Non-Medical):    Physical Activity:     Days of Exercise per Week:     Minutes of Exercise per Session:    Stress:     Feeling of Stress :    Social Connections:     Frequency of Communication with Friends and Family:     Frequency of Social Gatherings with Friends and Family:     Attends [employer] Services:     Active Member of Clubs or Organizations:     Attends Club or Organization Meetings:     Marital Status:    Intimate Partner Violence:     Fear of Current or Ex-Partner:     Emotionally Abused:     Physically Abused:     Sexually Abused:       Family History   Problem Relation Age of Onset    Prostate cancer Father     Diabetes Brother     Heart disease Brother     Lymphoma Brother     No Known Problems Sister     Parkinsonism Sister     Hypotension Daughter Takes "salt pills"    Proteinuria Daughter         Occuring during her 3 pregnancies; follows with nephrology     Past Surgical History:   Procedure Laterality Date    BREAST SURGERY Right     Cyst    OTHER SURGICAL HISTORY      Cataract implant    OTHER SURGICAL HISTORY      Fertilization       Current Outpatient Medications:     calcium citrate-vitamin D (CITRACAL+D) 315-200 MG-UNIT per tablet, Take 1 tablet by mouth 2 (two) times a day, Disp: , Rfl:     Continuous Glucose Monitor Sup KIT, Use 1 application every 14 (fourteen) days (Patient not taking: Reported on 6/7/2021), Disp: 1 kit, Rfl: 0    Continuous Glucose Monitor Sup MISC, Use every 14 (fourteen) days (Patient not taking: Reported on 6/7/2021), Disp: 2 each, Rfl: 3    Empagliflozin 10 MG TABS, Take 1 tablet (10 mg total) by mouth every morning (Patient not taking: Reported on 6/30/2021), Disp: 30 tablet, Rfl: 0    furosemide (LASIX) 20 mg tablet, Take 1 tablet (20 mg total) by mouth 2 (two) times a day Lasix 60mg BID (Patient taking differently: Take 60 mg by mouth 2 (two) times a day Lasix 60mg BID), Disp: 60 tablet, Rfl: 3    LORazepam (ATIVAN) 0 5 mg tablet, Take 1 tablet (0 5 mg total) by mouth as needed for anxiety 1 hour before CT scan (Patient not taking: Reported on 6/7/2021), Disp: 2 tablet, Rfl: 0    potassium chloride (K-DUR,KLOR-CON) 20 mEq tablet, Take 1 tablet (20 mEq total) by mouth daily, Disp: 60 tablet, Rfl: 0  No Known Allergies    Labs:  Lab Requisition on 06/22/2021   Component Date Value    Sodium 06/22/2021 139     Potassium 06/22/2021 4 1     Chloride 06/22/2021 102     CO2 06/22/2021 30     ANION GAP 06/22/2021 7     BUN 06/22/2021 21     Creatinine 06/22/2021 0 75     Glucose 06/22/2021 124     Calcium 06/22/2021 9 6     eGFR 06/22/2021 77    Appointment on 06/01/2021   Component Date Value    Sodium 06/01/2021 135*    Potassium 06/01/2021 4 9     Chloride 06/01/2021 98*    CO2 06/01/2021 34*    ANION GAP 06/01/2021 3*    BUN 06/01/2021 40*    Creatinine 06/01/2021 0 97     Glucose, Fasting 06/01/2021 163*    Calcium 06/01/2021 10 0     eGFR 06/01/2021 57    No results displayed because visit has over 200 results        Appointment on 05/12/2021   Component Date Value    WBC 05/12/2021 6 97     RBC 05/12/2021 5 56*    Hemoglobin 05/12/2021 18 0*    Hematocrit 05/12/2021 56 1*    MCV 05/12/2021 101*    MCH 05/12/2021 32 4     MCHC 05/12/2021 32 1     RDW 05/12/2021 14 6     MPV 05/12/2021 12 2     Platelets 22/38/7599 177     nRBC 05/12/2021 0     Neutrophils Relative 05/12/2021 80*    Immat GRANS % 05/12/2021 0     Lymphocytes Relative 05/12/2021 11*    Monocytes Relative 05/12/2021 7     Eosinophils Relative 05/12/2021 1     Basophils Relative 05/12/2021 1     Neutrophils Absolute 05/12/2021 5 61     Immature Grans Absolute 05/12/2021 0 02     Lymphocytes Absolute 05/12/2021 0 79     Monocytes Absolute 05/12/2021 0 46     Eosinophils Absolute 05/12/2021 0 04     Basophils Absolute 05/12/2021 0 05     Sodium 05/12/2021 137     Potassium 05/12/2021 4 5     Chloride 05/12/2021 101     CO2 05/12/2021 36*    ANION GAP 05/12/2021 0*    BUN 05/12/2021 10     Creatinine 05/12/2021 0 63     Glucose, Fasting 05/12/2021 152*    Calcium 05/12/2021 9 1     AST 05/12/2021 24     ALT 05/12/2021 34     Alkaline Phosphatase 05/12/2021 124*    Total Protein 05/12/2021 7 0     Albumin 05/12/2021 3 7     Total Bilirubin 05/12/2021 1 95*    eGFR 05/12/2021 87     Hemoglobin A1C 05/12/2021 6 6*    EAG 05/12/2021 143     Cholesterol 05/12/2021 136     Triglycerides 05/12/2021 49     HDL, Direct 05/12/2021 71     LDL Calculated 05/12/2021 55     Urine Culture 05/12/2021 No Growth <1000 cfu/mL     TSH 3RD GENERATON 05/12/2021 1 800     NT-proBNP 05/12/2021 2,138*   Office Visit on 05/11/2021   Component Date Value    Clarity, UA 05/12/2021 Clear     Color, UA 05/12/2021 Dk Yellow  Specific Gravity, UA 05/12/2021 1 016     pH, UA 05/12/2021 6 0     Glucose, UA 05/12/2021 Negative     Ketones, UA 05/12/2021 Trace*    Blood, UA 05/12/2021 Trace*    Protein, UA 05/12/2021 300 (3+)*    Nitrite, UA 05/12/2021 Negative     Bilirubin, UA 05/12/2021 Negative     Urobilinogen, UA 05/12/2021 1 0     Leukocytes, UA 05/12/2021 Negative     WBC, UA 05/12/2021 2-4     RBC, UA 05/12/2021 2-4     Hyaline Casts, UA 05/12/2021 None Seen     Bacteria, UA 05/12/2021 Occasional     Epithelial Cells 05/12/2021 None Seen     Creatinine, Ur 05/12/2021 101 0     Microalbum  ,U,Random 05/12/2021 2,500 0*    Microalb Creat Ratio 05/12/2021 2,475*     Imaging: No results found  Review of Systems:  Review of Systems   Skin:        Itching back of neck and right shoulder   All other systems reviewed and are negative  Physical Exam:  Physical Exam  Vitals reviewed  Constitutional:       Appearance: Normal appearance  HENT:      Head: Normocephalic  Eyes:      Pupils: Pupils are equal, round, and reactive to light  Cardiovascular:      Rate and Rhythm: Normal rate and regular rhythm  Pulses: Normal pulses  Pulmonary:      Effort: Pulmonary effort is normal       Breath sounds: Rales present  Comments: Fine bibasilar rales   Abdominal:      General: Bowel sounds are normal       Palpations: Abdomen is soft  Musculoskeletal:         General: Normal range of motion  Cervical back: Normal range of motion  Skin:     General: Skin is warm and dry  Capillary Refill: Capillary refill takes less than 2 seconds  Neurological:      General: No focal deficit present  Mental Status: She is alert and oriented to person, place, and time  Psychiatric:         Mood and Affect: Mood normal          Behavior: Behavior normal          Discussion/Summary:  1  Chronic RV systolic/LV diastolic heart failure NYHA class III stage C- On PE eu volemic   BP low on Lisinopril 5mg daily with symptoms of lightheadedness  Lisinopril stopped, symptoms improved, Continue on  Lasix 60 mg K-Dur 20 mEq daily  Heart and blood pressure control  Continue on 2 g sodium diet and daily weights  2  PTHN WHO Group 3 COPD   3  Chronic respiratory failure with hypoxia using oxygen 2LNC  4  COPD  5   DM2 taken off Jardiance by her PCP , diet control, follow up with PCP

## 2021-07-06 ENCOUNTER — PATIENT OUTREACH (OUTPATIENT)
Dept: INTERNAL MEDICINE CLINIC | Facility: CLINIC | Age: 76
End: 2021-07-06

## 2021-07-06 NOTE — PROGRESS NOTES
spoke with [facility] and her , she is doing good   Weighing daily 140 pounds today  Denies lower extremity swelling  Only complaint is itching at her hair line neck shoulders arms ears and eyes  She has taken some of the hydroxyzine she was prescribed but it doesn't give much relief only for a short time  Wondering what else could be ordered I will check with PCP office

## 2021-07-07 ENCOUNTER — OFFICE VISIT (OUTPATIENT)
Dept: NEPHROLOGY | Facility: CLINIC | Age: 76
End: 2021-07-07
Payer: MEDICARE

## 2021-07-07 VITALS
BODY MASS INDEX: 24.48 KG/M2 | SYSTOLIC BLOOD PRESSURE: 118 MMHG | HEIGHT: 64 IN | WEIGHT: 143.4 LBS | HEART RATE: 85 BPM | DIASTOLIC BLOOD PRESSURE: 62 MMHG

## 2021-07-07 DIAGNOSIS — R80.8 OTHER PROTEINURIA: ICD-10-CM

## 2021-07-07 PROCEDURE — 99213 OFFICE O/P EST LOW 20 MIN: CPT | Performed by: INTERNAL MEDICINE

## 2021-07-07 NOTE — PROGRESS NOTES
NEPHROLOGY OUTPATIENT PROGRESS NOTE   Araceli Paulson 66 y o  female MRN: [id number]  DATE: 7/7/2021  Reason for visit:   Chief Complaint   Patient presents with    Follow-up    Proteinuria        Patient Instructions   1  Microalbuminuria-microalbumin to creatinine ratio 2 5, U PCR 0 93 g  -normal renal function noted  -low anion gap resolved  -SPEP/UPEP negative for monoclonality  -hep B core total antibody reactive with negative hep B core IgM or hep B surface antigen  -complements normal, FARIDEH normal  -Anca negative, RF +  -UA with microscopy shows 2-4 RBCs in 2-4 WBCs, 3+ protein  -do not believe patient has nephrotic syndrome  -cardiac MRI ahowed EF 39% in right ventricle and 45% in left ventricle  -no urgency to renal biopsy at this time  -differential includes idiopathic nodular glomerulosclerosis in the setting of chronic smoking plus or minus diabetic kidney disease plus or minus analgesic nephropathy  -baseline serum creatinine less than 1, at baseline, last sCr 0 75  -f/u am CMP, microalb:Cr and UpCr  -consider repeat UpCr as an outpatient  -no longer ACEi due to low blood pressure     2  Elevated total CO2 -likely due to contraction alkalosis in the setting of Lasix 60mg twice daily per Cardiology, monitor, if pH rising consider acetazolamide use     3  Low serum anion gap-resolved     4  Elevated hemoglobin-likely due to COPD     5  Elevated BP without diagnosis of HTN - blood pressure elevated on recent admission but improved with Lasix 40mg IV BID, no history of antihypertensives or hypertension in the past, on lisinopril 10 mg daily for proteinuria type 2 diabetes  -hold parameters adjusted on ACEi    6  + RF - to see rheumatology Sept 22, 2021    7  CHF - on diuretics as above, monitor daily weight, follow low sodium diet  Avoid canned foods, packaged foods, [employer] food, restaurant food, fast food    8   DM2, diet controlled - A1C 6 6, per primary care, no longer on glipizide    Will monitor proteinuria  Will f/u in 6 months  Obtain blood and urine testing soon  Denise Junior was seen today for follow-up and proteinuria  Diagnoses and all orders for this visit:    Other proteinuria  -     Ambulatory referral to Nephrology  -     Protein / creatinine ratio, urine; Future  -     Microalbumin / creatinine urine ratio; Future  -     Cancel: Basic metabolic panel; Future  -     Comprehensive metabolic panel; Future        Assessment/Plan:  1  Microalbuminuria-microalbumin to creatinine ratio 2 5, U PCR 0 93 g  -normal renal function noted  -low anion gap resolved  -SPEP/UPEP negative for monoclonality  -hep B core total antibody reactive with negative hep B core IgM or hep B surface antigen  -complements normal, FARIDEH normal  -Anca negative, RF +  -UA with microscopy shows 2-4 RBCs in 2-4 WBCs, 3+ protein  -do not believe patient has nephrotic syndrome  -cardiac MRI ahowed EF 39% in right ventricle and 45% in left ventricle  -no urgency to renal biopsy at this time  -differential includes idiopathic nodular glomerulosclerosis in the setting of chronic smoking plus or minus diabetic kidney disease plus or minus analgesic nephropathy  -baseline serum creatinine less than 1, at baseline, last sCr 0 75  -f/u am CMP, microalb:Cr and UpCr  -consider repeat UpCr as an outpatient  -no longer ACEi due to low blood pressure     2  Elevated total CO2 -likely due to contraction alkalosis in the setting of Lasix 60mg twice daily per Cardiology, monitor, if pH rising consider acetazolamide use     3  Low serum anion gap-resolved     4  Elevated hemoglobin-likely due to COPD     5  Elevated BP without diagnosis of HTN - blood pressure elevated on recent admission but improved with Lasix 40mg IV BID, no history of antihypertensives or hypertension in the past, on lisinopril 10 mg daily for proteinuria type 2 diabetes  -hold parameters adjusted on ACEi    6  + RF - to see rheumatology Sept 22, 2021    7   CHF - on diuretics as above, monitor daily weight, follow low sodium diet  Avoid canned foods, packaged foods, [place] food, restaurant food, fast food  weight 141lbs    8  DM2, diet controlled - A1C 6 6, per primary care, no longer on glipizide    Will monitor proteinuria  Will f/u in 6 months  Obtain blood and urine testing soon  SUBJECTIVE / INTERVAL HISTORY:  66 y o  female presents in hospital follow up of microalbuminuria  Araceli Paulson  Was admitted for acute decompensated CHF in March 2021  She was having increased LE edema  Had body wall edema and pelvic ascites  Denies NSAID use  On 2-3L oxygen via NC since hospital d/c  Weight stable  Denies leg edema  Has itching on her legs  Ha quit smoking 5/18/21  Has shoulder itching  Takes hydroxyzine  She has bruised her left arm from scratching  Review of Systems   Constitutional: Negative for chills and fever  HENT: Negative for sore throat and trouble swallowing  Eyes: Negative for visual disturbance  Respiratory: Negative for cough and shortness of breath  Cardiovascular: Negative for chest pain and leg swelling  Gastrointestinal: Negative for abdominal pain, constipation, diarrhea, nausea and vomiting  Endocrine: Negative for polyuria  Genitourinary: Negative for difficulty urinating, dysuria and hematuria  Musculoskeletal: Negative for back pain and neck pain  Skin: Negative for rash  Itching, as per HPI   Neurological: Negative for dizziness, light-headedness and numbness  Hematological: Does not bruise/bleed easily  Psychiatric/Behavioral: The patient is not nervous/anxious  OBJECTIVE:  /62 (BP Location: Left arm, Patient Position: Sitting, Cuff Size: Standard)   Pulse 85   Ht 5' 4" (1 626 m)   Wt 65 kg (143 lb 6 4 oz)   BMI 24 61 kg/m²  Body mass index is 24 61 kg/m²  Physical exam:  Physical Exam  Vitals and nursing note reviewed  Constitutional:       General: She is not in acute distress  Appearance: Normal appearance  She is well-developed  She is not diaphoretic  HENT:      Head: Normocephalic and atraumatic  Nose: Nose normal       Mouth/Throat:      Mouth: Mucous membranes are dry  Pharynx: No oropharyngeal exudate  Eyes:      General: No scleral icterus  Right eye: No discharge  Left eye: No discharge  Neck:      Thyroid: No thyromegaly  Cardiovascular:      Rate and Rhythm: Normal rate and regular rhythm  Heart sounds: No murmur heard  Pulmonary:      Effort: Pulmonary effort is normal  No respiratory distress  Breath sounds: Normal breath sounds  No wheezing  Abdominal:      General: Bowel sounds are normal  There is no distension  Palpations: Abdomen is soft  Musculoskeletal:         General: No swelling  Cervical back: Normal range of motion and neck supple  Skin:     General: Skin is warm and dry  Findings: No rash  Neurological:      General: No focal deficit present  Mental Status: She is alert  Motor: No abnormal muscle tone  Comments: awake   Psychiatric:         Mood and Affect: Mood normal          Behavior: Behavior normal          Medications:    Current Outpatient Medications:     furosemide (LASIX) 20 mg tablet, Take 1 tablet (20 mg total) by mouth 2 (two) times a day Lasix 60mg BID (Patient taking differently: Take 60 mg by mouth 2 (two) times a day Lasix 60mg BID), Disp: 60 tablet, Rfl: 3    potassium chloride (K-DUR,KLOR-CON) 20 mEq tablet, Take 1 tablet (20 mEq total) by mouth daily, Disp: 60 tablet, Rfl: 0    Allergies:   Allergies as of 07/07/2021    (No Known Allergies)       The following portions of the patient's history were reviewed and updated as appropriate: past family history, past surgical history and problem list     Laboratory Results:  Lab Results   Component Value Date    SODIUM 139 06/22/2021    K 4 1 06/22/2021     06/22/2021    CO2 30 06/22/2021    BUN 21 06/22/2021 CREATININE 0 75 06/22/2021    GLUC 124 06/22/2021    CALCIUM 9 6 06/22/2021        Lab Results   Component Value Date    CALCIUM 9 6 06/22/2021       Portions of the record may have been created with voice recognition software   Occasional wrong word or "sound a like" substitutions may have occurred due to the inherent limitations of voice recognition software   Read the chart carefully and recognize, using context, where substitutions have occurred

## 2021-07-07 NOTE — PROGRESS NOTES
Spoke with Norma Armijo and let her know about using cream after she showers and report if not better  She will do so  No other care management needs at this time

## 2021-07-07 NOTE — PATIENT INSTRUCTIONS
1  Microalbuminuria-microalbumin to creatinine ratio 2 5, U PCR 0 93 g  -normal renal function noted  -low anion gap resolved  -SPEP/UPEP negative for monoclonality  -hep B core total antibody reactive with negative hep B core IgM or hep B surface antigen  -complements normal, FARIDEH normal  -Anca negative, RF +  -UA with microscopy shows 2-4 RBCs in 2-4 WBCs, 3+ protein  -do not believe patient has nephrotic syndrome  -cardiac MRI ahowed EF 39% in right ventricle and 45% in left ventricle  -no urgency to renal biopsy at this time  -differential includes idiopathic nodular glomerulosclerosis in the setting of chronic smoking plus or minus diabetic kidney disease plus or minus analgesic nephropathy  -baseline serum creatinine less than 1, at baseline, last sCr 0 75  -f/u am CMP, microalb:Cr and UpCr  -consider repeat UpCr as an outpatient  -no longer ACEi due to low blood pressure     2  Elevated total CO2 -likely due to contraction alkalosis in the setting of Lasix 60mg twice daily per Cardiology, monitor, if pH rising consider acetazolamide use     3  Low serum anion gap-resolved     4  Elevated hemoglobin-likely due to COPD     5  Elevated BP without diagnosis of HTN - blood pressure elevated on recent admission but improved with Lasix 40mg IV BID, no history of antihypertensives or hypertension in the past, on lisinopril 10 mg daily for proteinuria type 2 diabetes  -hold parameters adjusted on ACEi    6  + RF - to see rheumatology Sept 22, 2021    7  CHF - on diuretics as above, monitor daily weight, follow low sodium diet  Avoid canned foods, packaged foods, [place] food, restaurant food, fast food    8  DM2, diet controlled - A1C 6 6, per primary care, no longer on glipizide    Will monitor proteinuria  Will f/u in 6 months  Obtain blood and urine testing soon

## 2021-07-16 ENCOUNTER — HOSPITAL ENCOUNTER (OUTPATIENT)
Dept: PULMONOLOGY | Facility: HOSPITAL | Age: 76
Discharge: HOME | End: 2021-07-16
Payer: MEDICARE

## 2021-07-16 DIAGNOSIS — J44.9 CHRONIC OBSTRUCTIVE PULMONARY DISEASE, UNSPECIFIED COPD TYPE (HCC): ICD-10-CM

## 2021-07-16 PROCEDURE — 94760 N-INVAS EAR/PLS OXIMETRY 1: CPT

## 2021-07-16 PROCEDURE — 94726 PLETHYSMOGRAPHY LUNG VOLUMES: CPT | Performed by: INTERNAL MEDICINE

## 2021-07-16 PROCEDURE — 94726 PLETHYSMOGRAPHY LUNG VOLUMES: CPT

## 2021-07-16 PROCEDURE — 94060 EVALUATION OF WHEEZING: CPT | Performed by: INTERNAL MEDICINE

## 2021-07-16 PROCEDURE — 94729 DIFFUSING CAPACITY: CPT

## 2021-07-16 PROCEDURE — 94060 EVALUATION OF WHEEZING: CPT

## 2021-07-16 PROCEDURE — 94729 DIFFUSING CAPACITY: CPT | Performed by: INTERNAL MEDICINE

## 2021-07-16 RX ORDER — ALBUTEROL SULFATE 2.5 MG/3ML
2.5 SOLUTION RESPIRATORY (INHALATION) ONCE
Status: COMPLETED | OUTPATIENT
Start: 2021-07-16 | End: 2021-07-16

## 2021-07-16 RX ADMIN — ALBUTEROL SULFATE 2.5 MG: 2.5 SOLUTION RESPIRATORY (INHALATION) at 11:29

## 2021-07-16 NOTE — PROGRESS NOTES
Pulmonary Follow Up Note   Erika Manjarrez 66 y o  female MRN: [id number]  7/21/2021      Assessment and Plan       Acute hypoxic respiratory failure secondary to combined heart failure and severe COPD   - PFTs 7/2021 showed FEV1 47%  - has been on 2lpm since hospitalization  - reassess in 1 month and determine O2 needs   - can enroll in pulmonary rehab - she will think about it, wants to try next visit if she doesn't feel any better   - start Trelegy inhaler daily      New onset diastolic heart failure with mild pulmonary HTN likely Group 3 secondary to COPD   5/19/21 2D echo normal LVEF 50-55%, NRWMA,   Mild concentric hypertrophy, right ventricular systolic function moderately reduced, RA mildly dilated, mild MR, mild TR, mild PHTN      - CT of the chest abdomen pelvis revealed pulmonary nodules small volume abdominal pelvic ascites  - CTA showed no PE      Multiple Pulmonary nodules   - stable 3mm LLL, stable 2mm subpleural nodule, stable 6mm LLL nodule  - LDCT in 1 year (5/2022)      Polcythemia likely secondary to chronic hypoxia      Tobacco Abuse   - quit May 2021   - qualifies for LDCT       Macrocytosis likely secondary to chronic alcohol use   - she has not had any wine since hospitalization    1  Chronic obstructive pulmonary disease, unspecified COPD type ([facility] [address] )  -     POCT 6 minute walk  -     fluticasone-umeclidinium-vilanterol (Trelegy Ellipta) 100-62 5-25 MCG/INH inhaler; Inhale 1 puff daily Rinse mouth after use  2  Lung nodules    3  Chronic diastolic congestive heart failure ([facility] [address] )    4  Hypoxia          Return in about 3 months (around 10/21/2021)  History of Present Illness   HPI:  Erika Manjarrez is a 66 y o  female who has a history of DM, Tobacco abuse, anxiety admitted recently from 5/18-5/26 for acute decompensated heart failure  She had apparently diuresed up to 40 lbs and was discharged on oxygen  She feels well now and is at her baseline weight with no peripheral edema   She has quit smoking since her hospitalization and continues with nicotine patches  She has no exposure to secondhand smoke  She currently has no SOB or limitations by her breathing  She deoes report some fatigue and that's her barrier to performing exertional activities  She has no chest pain, wheezing  She is here for follow up and she reports she doesn't really move around much because she is more fearful or exerting herself  She is at her baseline weight currently  Review of Systems   All other systems reviewed and are negative  Historical Information   Past Medical History:   Diagnosis Date    Allergic rhinitis     Anxiety     Arthritis     Diabetes mellitus ([address] )     Tobacco abuse      Past Surgical History:   Procedure Laterality Date    BREAST SURGERY Right     Cyst    OTHER SURGICAL HISTORY      Cataract implant    OTHER SURGICAL HISTORY      Fertilization     Family History   Problem Relation Age of Onset    Prostate cancer Father     Diabetes Brother     Heart disease Brother     Lymphoma Brother     No Known Problems Sister     Parkinsonism Sister     Hypotension Daughter         Takes "salt pills"    Proteinuria Daughter         Occuring during her 3 pregnancies; follows with nephrology     Social History     Tobacco Use   Smoking Status Former Smoker    Packs/day: 0 50    Years: 30 00    Pack years: 15 00    Types: Cigarettes    Quit date: 2021    Years since quittin 1   Smokeless Tobacco Never Used   Tobacco Comment    Began smoking in her late teens, averaging 1/2 pack daily  States she quit smoking a few weeks ago (Updated 2021)         Meds/Allergies     Current Outpatient Medications:     furosemide (LASIX) 20 mg tablet, Take 1 tablet (20 mg total) by mouth 2 (two) times a day Lasix 60mg BID (Patient taking differently: Take 60 mg by mouth 2 (two) times a day Lasix 60mg BID), Disp: 60 tablet, Rfl: 3    potassium chloride (K-DUR,KLOR-CON) 20 mEq tablet, Take 1 tablet (20 mEq total) by mouth daily, Disp: 60 tablet, Rfl: 0    fluticasone-umeclidinium-vilanterol (Trelegy Ellipta) 100-62 5-25 MCG/INH inhaler, Inhale 1 puff daily Rinse mouth after use , Disp: 60 blister, Rfl: 0  No Known Allergies    Vitals: Blood pressure 122/80, pulse (!) 110, temperature 97 6 °F (36 4 °C), height 5' 4" (1 626 m), weight 64 9 kg (143 lb), SpO2 97 %  Body mass index is 24 55 kg/m²  Oxygen Therapy  SpO2: 97 %  Oxygen Therapy: Supplemental oxygen  O2 Delivery Method: Nasal cannula  O2 Flow Rate (L/min): 2 L/min      Physical Exam  Physical Exam  Vitals reviewed  Constitutional:       Appearance: Normal appearance  HENT:      Head: Normocephalic  Nose: Nose normal       Mouth/Throat:      Mouth: Mucous membranes are moist    Eyes:      Pupils: Pupils are equal, round, and reactive to light  Cardiovascular:      Rate and Rhythm: Normal rate and regular rhythm  Pulses: Normal pulses  Heart sounds: Normal heart sounds  Abdominal:      General: Abdomen is flat  Palpations: Abdomen is soft  Musculoskeletal:         General: Normal range of motion  Skin:     General: Skin is warm and dry  Neurological:      General: No focal deficit present  Mental Status: She is alert and oriented to person, place, and time  Labs: I have personally reviewed pertinent lab results  Lab Results   Component Value Date    WBC 7 24 05/19/2021    HGB 17 8 (H) 05/19/2021    HCT 57 7 (H) 05/19/2021     (H) 05/19/2021     05/19/2021     Lab Results   Component Value Date    CALCIUM 9 6 06/22/2021    K 4 1 06/22/2021    CO2 30 06/22/2021     06/22/2021    BUN 21 06/22/2021    CREATININE 0 75 06/22/2021     No results found for: IGE  Lab Results   Component Value Date    ALT 28 05/24/2021    AST 18 05/24/2021    ALKPHOS 119 (H) 05/24/2021       maging and other studies: I have personally reviewed pertinent reports     and I have personally reviewed pertinent films in PACS    Pulmonary function testin2021: Results:  FVC: 1 74 L       66 % predicted  FEV1: 0 94 L     47 % predicted  FEV1/FVC Ratio: 54 %     After administration of bronchodilator   FVC: 1 84 L, 70 % predicted, +5 % change  FEV1: 0 99 L, 49 % predicted, +5 % change     Lung volumes by body plethysmography:   Total Lung Capacity 102 % predicted   Residual volume 148 % predicted     DLCO corrected for patients hemoglobin level: 30 %     Interpretation:     · Severe obstructive airflow defect on spirometry     · No significant improvement in airflow or forced vital capacity in response to the administration to bronchodilator per ATS standards       · Air trapping as indicated by the lung volumes     · Severe decrease in diffusion capacity     Flow-volume loop consistent with obstruction    EKG, Pathology, and Other Studies: I have personally reviewed pertinent reports     and I have personally reviewed pertinent films in PACS      Ashley Strange MD   Pulmonary and Critical Care Fellow  [facility]

## 2021-07-21 ENCOUNTER — OFFICE VISIT (OUTPATIENT)
Dept: PULMONOLOGY | Facility: CLINIC | Age: 76
End: 2021-07-21
Payer: MEDICARE

## 2021-07-21 VITALS
HEIGHT: 64 IN | BODY MASS INDEX: 24.41 KG/M2 | SYSTOLIC BLOOD PRESSURE: 122 MMHG | HEART RATE: 110 BPM | WEIGHT: 143 LBS | OXYGEN SATURATION: 97 % | DIASTOLIC BLOOD PRESSURE: 80 MMHG | TEMPERATURE: 97.6 F

## 2021-07-21 DIAGNOSIS — J44.9 CHRONIC OBSTRUCTIVE PULMONARY DISEASE, UNSPECIFIED COPD TYPE (HCC): Primary | ICD-10-CM

## 2021-07-21 DIAGNOSIS — I50.32 CHRONIC DIASTOLIC CONGESTIVE HEART FAILURE (HCC): ICD-10-CM

## 2021-07-21 DIAGNOSIS — R91.8 LUNG NODULES: ICD-10-CM

## 2021-07-21 DIAGNOSIS — R09.02 HYPOXIA: ICD-10-CM

## 2021-07-21 PROBLEM — R35.0 URINARY FREQUENCY: Status: RESOLVED | Noted: 2021-05-11 | Resolved: 2021-07-21

## 2021-07-21 PROBLEM — E87.3 ALKALOSIS: Status: RESOLVED | Noted: 2021-05-22 | Resolved: 2021-07-21

## 2021-07-21 PROBLEM — R06.02 SOB (SHORTNESS OF BREATH): Status: RESOLVED | Noted: 2021-05-11 | Resolved: 2021-07-21

## 2021-07-21 PROBLEM — R19.07 ABDOMINAL SWELLING, GENERALIZED: Status: RESOLVED | Noted: 2021-05-11 | Resolved: 2021-07-21

## 2021-07-21 PROCEDURE — 99214 OFFICE O/P EST MOD 30 MIN: CPT | Performed by: INTERNAL MEDICINE

## 2021-07-21 PROCEDURE — 94618 PULMONARY STRESS TESTING: CPT | Performed by: INTERNAL MEDICINE

## 2021-07-21 RX ORDER — FLUTICASONE FUROATE, UMECLIDINIUM BROMIDE AND VILANTEROL TRIFENATATE 100; 62.5; 25 UG/1; UG/1; UG/1
1 POWDER RESPIRATORY (INHALATION) DAILY
Qty: 60 BLISTER | Refills: 0 | Status: SHIPPED | OUTPATIENT
Start: 2021-07-21 | End: 2021-09-13

## 2021-07-30 DIAGNOSIS — I50.42 CHRONIC COMBINED SYSTOLIC AND DIASTOLIC CONGESTIVE HEART FAILURE (HCC): ICD-10-CM

## 2021-07-30 DIAGNOSIS — I50.9 ACUTE DECOMPENSATED HEART FAILURE (HCC): ICD-10-CM

## 2021-07-30 RX ORDER — FUROSEMIDE 20 MG/1
TABLET ORAL
Qty: 180 TABLET | Refills: 1 | Status: SHIPPED | OUTPATIENT
Start: 2021-07-30 | End: 2021-08-18

## 2021-08-16 ENCOUNTER — APPOINTMENT (OUTPATIENT)
Dept: LAB | Facility: CLINIC | Age: 76
End: 2021-08-16
Payer: MEDICARE

## 2021-08-16 DIAGNOSIS — R80.8 OTHER PROTEINURIA: ICD-10-CM

## 2021-08-16 DIAGNOSIS — E11.65 UNCONTROLLED TYPE 2 DIABETES MELLITUS WITH HYPERGLYCEMIA (HCC): ICD-10-CM

## 2021-08-16 LAB
ALBUMIN SERPL BCP-MCNC: 4 G/DL (ref 3.5–5)
ALP SERPL-CCNC: 95 U/L (ref 46–116)
ALT SERPL W P-5'-P-CCNC: 31 U/L (ref 12–78)
ANION GAP SERPL CALCULATED.3IONS-SCNC: 3 MMOL/L (ref 4–13)
AST SERPL W P-5'-P-CCNC: 19 U/L (ref 5–45)
BASOPHILS # BLD AUTO: 0.05 THOUSANDS/ΜL (ref 0–0.1)
BASOPHILS NFR BLD AUTO: 1 % (ref 0–1)
BILIRUB SERPL-MCNC: 1.2 MG/DL (ref 0.2–1)
BUN SERPL-MCNC: 20 MG/DL (ref 5–25)
CALCIUM SERPL-MCNC: 9.6 MG/DL (ref 8.3–10.1)
CHLORIDE SERPL-SCNC: 101 MMOL/L (ref 100–108)
CHOLEST SERPL-MCNC: 209 MG/DL (ref 50–200)
CO2 SERPL-SCNC: 32 MMOL/L (ref 21–32)
CREAT SERPL-MCNC: 0.75 MG/DL (ref 0.6–1.3)
CREAT UR-MCNC: <13 MG/DL
CREAT UR-MCNC: <13 MG/DL
EOSINOPHIL # BLD AUTO: 0.27 THOUSAND/ΜL (ref 0–0.61)
EOSINOPHIL NFR BLD AUTO: 4 % (ref 0–6)
ERYTHROCYTE [DISTWIDTH] IN BLOOD BY AUTOMATED COUNT: 13 % (ref 11.6–15.1)
EST. AVERAGE GLUCOSE BLD GHB EST-MCNC: 146 MG/DL
GFR SERPL CREATININE-BSD FRML MDRD: 77 ML/MIN/1.73SQ M
GLUCOSE P FAST SERPL-MCNC: 137 MG/DL (ref 65–99)
HBA1C MFR BLD: 6.7 %
HCT VFR BLD AUTO: 43.5 % (ref 34.8–46.1)
HDLC SERPL-MCNC: 78 MG/DL
HGB BLD-MCNC: 14.3 G/DL (ref 11.5–15.4)
IMM GRANULOCYTES # BLD AUTO: 0.01 THOUSAND/UL (ref 0–0.2)
IMM GRANULOCYTES NFR BLD AUTO: 0 % (ref 0–2)
LDLC SERPL CALC-MCNC: 116 MG/DL (ref 0–100)
LYMPHOCYTES # BLD AUTO: 1.81 THOUSANDS/ΜL (ref 0.6–4.47)
LYMPHOCYTES NFR BLD AUTO: 29 % (ref 14–44)
MCH RBC QN AUTO: 30.4 PG (ref 26.8–34.3)
MCHC RBC AUTO-ENTMCNC: 32.9 G/DL (ref 31.4–37.4)
MCV RBC AUTO: 92 FL (ref 82–98)
MICROALBUMIN UR-MCNC: 10.1 MG/L (ref 0–20)
MICROALBUMIN/CREAT 24H UR: >78 MG/G CREATININE (ref 0–30)
MONOCYTES # BLD AUTO: 0.45 THOUSAND/ΜL (ref 0.17–1.22)
MONOCYTES NFR BLD AUTO: 7 % (ref 4–12)
NEUTROPHILS # BLD AUTO: 3.71 THOUSANDS/ΜL (ref 1.85–7.62)
NEUTS SEG NFR BLD AUTO: 59 % (ref 43–75)
NRBC BLD AUTO-RTO: 0 /100 WBCS
PLATELET # BLD AUTO: 211 THOUSANDS/UL (ref 149–390)
PMV BLD AUTO: 12.5 FL (ref 8.9–12.7)
POTASSIUM SERPL-SCNC: 3.9 MMOL/L (ref 3.5–5.3)
PROT SERPL-MCNC: 8.1 G/DL (ref 6.4–8.2)
PROT UR-MCNC: <6 MG/DL
RBC # BLD AUTO: 4.71 MILLION/UL (ref 3.81–5.12)
SODIUM SERPL-SCNC: 136 MMOL/L (ref 136–145)
TRIGL SERPL-MCNC: 74 MG/DL
TSH SERPL DL<=0.05 MIU/L-ACNC: 3.5 UIU/ML (ref 0.36–3.74)
WBC # BLD AUTO: 6.3 THOUSAND/UL (ref 4.31–10.16)

## 2021-08-16 PROCEDURE — 84156 ASSAY OF PROTEIN URINE: CPT

## 2021-08-16 PROCEDURE — 80061 LIPID PANEL: CPT

## 2021-08-16 PROCEDURE — 36415 COLL VENOUS BLD VENIPUNCTURE: CPT

## 2021-08-16 PROCEDURE — 82043 UR ALBUMIN QUANTITATIVE: CPT | Performed by: INTERNAL MEDICINE

## 2021-08-16 PROCEDURE — 80053 COMPREHEN METABOLIC PANEL: CPT

## 2021-08-16 PROCEDURE — 84443 ASSAY THYROID STIM HORMONE: CPT

## 2021-08-16 PROCEDURE — 85025 COMPLETE CBC W/AUTO DIFF WBC: CPT

## 2021-08-16 PROCEDURE — 82570 ASSAY OF URINE CREATININE: CPT | Performed by: INTERNAL MEDICINE

## 2021-08-16 PROCEDURE — 83036 HEMOGLOBIN GLYCOSYLATED A1C: CPT

## 2021-08-16 PROCEDURE — 82570 ASSAY OF URINE CREATININE: CPT

## 2021-08-18 ENCOUNTER — OFFICE VISIT (OUTPATIENT)
Dept: INTERNAL MEDICINE CLINIC | Facility: CLINIC | Age: 76
End: 2021-08-18
Payer: MEDICARE

## 2021-08-18 VITALS
WEIGHT: 142 LBS | OXYGEN SATURATION: 96 % | BODY MASS INDEX: 24.24 KG/M2 | HEART RATE: 96 BPM | DIASTOLIC BLOOD PRESSURE: 72 MMHG | HEIGHT: 64 IN | SYSTOLIC BLOOD PRESSURE: 134 MMHG | TEMPERATURE: 98.1 F

## 2021-08-18 DIAGNOSIS — I50.42 CHRONIC COMBINED SYSTOLIC AND DIASTOLIC CONGESTIVE HEART FAILURE (HCC): ICD-10-CM

## 2021-08-18 DIAGNOSIS — E11.9 TYPE 2 DIABETES, HBA1C GOAL < 7% (HCC): Primary | ICD-10-CM

## 2021-08-18 DIAGNOSIS — Z12.11 ENCOUNTER FOR SCREENING FOR MALIGNANT NEOPLASM OF COLON: ICD-10-CM

## 2021-08-18 DIAGNOSIS — E11.9 DIABETIC EYE EXAM (HCC): ICD-10-CM

## 2021-08-18 DIAGNOSIS — I27.20 PULMONARY HYPERTENSION (HCC): ICD-10-CM

## 2021-08-18 DIAGNOSIS — J44.9 CHRONIC OBSTRUCTIVE PULMONARY DISEASE, UNSPECIFIED COPD TYPE (HCC): ICD-10-CM

## 2021-08-18 DIAGNOSIS — I50.32 CHRONIC DIASTOLIC CONGESTIVE HEART FAILURE (HCC): ICD-10-CM

## 2021-08-18 DIAGNOSIS — Z01.00 DIABETIC EYE EXAM (HCC): ICD-10-CM

## 2021-08-18 PROCEDURE — 99214 OFFICE O/P EST MOD 30 MIN: CPT | Performed by: INTERNAL MEDICINE

## 2021-08-18 RX ORDER — FUROSEMIDE 20 MG/1
20 TABLET ORAL DAILY
Qty: 90 TABLET | Refills: 1 | Status: SHIPPED | OUTPATIENT
Start: 2021-08-18 | End: 2021-11-16 | Stop reason: SDUPTHER

## 2021-08-18 NOTE — PROGRESS NOTES
Assessment/Plan:             1  Type 2 diabetes, HbA1c goal < 7% ([facility])    2  Chronic diastolic congestive heart failure ([facility] [address] )    3  Chronic obstructive pulmonary disease, unspecified COPD type ([facility] [address] )    4  Diabetic eye exam [facility])  -     Ambulatory referral to Ophthalmology; Future    5  Pulmonary hypertension ([facility] [address] )    6  Chronic combined systolic and diastolic congestive heart failure (HCC)  -     furosemide (LASIX) 20 mg tablet; Take 1 tablet (20 mg total) by mouth daily    7  Encounter for screening for malignant neoplasm of colon  -     Cologuard; Future           Subjective:      Patient ID: Erika Manjarrez is a 64 y o  female  Follow-up on blood test done on 08/16/2021 test discussed with her      The following portions of the patient's history were reviewed and updated as appropriate: She  has a past medical history of Allergic rhinitis, Anxiety, Arthritis, Diabetes mellitus ([facility] ), and Tobacco abuse  She   Patient Active Problem List    Diagnosis Date Noted    Encounter for screening for malignant neoplasm of colon 06/30/2021    Hypoxia 06/16/2021    Essential hypertension, benign 06/02/2021    Chronic combined systolic and diastolic congestive heart failure ([facility] [address] ) 06/02/2021    Pulmonary hypertension ([facility] ) 06/02/2021    Cigarette nicotine dependence without complication     Elevated BP without diagnosis of hypertension 05/22/2021    Lung nodules 05/18/2021    Chronic obstructive pulmonary disease ([facility] [address] ) 05/18/2021    Chronic respiratory failure with hypoxia (HCC) 05/18/2021    Nicotine dependence 05/18/2021    Microalbuminuria 05/17/2021    Uncontrolled type 2 diabetes mellitus with hyperglycemia ([facility] [address] ) 05/11/2021    Allergic rhinitis     Type 2 diabetes, HbA1c goal < 7% ([facility])     Anxiety      She  has a past surgical history that includes Breast surgery (Right); Other surgical history; and Other surgical history    Her family history includes Diabetes in her brother; Heart disease in her brother; Hypotension in her daughter; Lymphoma in her brother; No Known Problems in her sister; Parkinsonism in her sister; Prostate cancer in her father; Proteinuria in her daughter  She  reports that she quit smoking about 3 months ago  Her smoking use included cigarettes  She has a 15 00 pack-year smoking history  She has never used smokeless tobacco  She reports current alcohol use of about 1 0 - 2 0 standard drinks of alcohol per week  She reports that she does not use drugs  Current Outpatient Medications   Medication Sig Dispense Refill    fluticasone-umeclidinium-vilanterol (Trelegy Ellipta) 100-62 5-25 MCG/INH inhaler Inhale 1 puff daily Rinse mouth after use  60 blister 0    furosemide (LASIX) 20 mg tablet Take 1 tablet (20 mg total) by mouth daily 90 tablet 1    potassium chloride (K-DUR,KLOR-CON) 20 mEq tablet Take 1 tablet (20 mEq total) by mouth daily 60 tablet 0     No current facility-administered medications for this visit  Current Outpatient Medications on File Prior to Visit   Medication Sig    fluticasone-umeclidinium-vilanterol (Trelegy Ellipta) 100-62 5-25 MCG/INH inhaler Inhale 1 puff daily Rinse mouth after use   potassium chloride (K-DUR,KLOR-CON) 20 mEq tablet Take 1 tablet (20 mEq total) by mouth daily    [DISCONTINUED] furosemide (LASIX) 20 mg tablet Lasix 60mg BID     No current facility-administered medications on file prior to visit  She has No Known Allergies       Review of Systems   Constitutional: Negative for chills and fever  HENT: Negative for congestion, ear pain and sore throat  Eyes: Negative for pain  Respiratory: Negative for cough and shortness of breath  Cardiovascular: Negative for chest pain and leg swelling  Gastrointestinal: Negative for abdominal pain, nausea and vomiting  Endocrine: Negative for polyuria  Genitourinary: Negative for difficulty urinating, frequency and urgency  Musculoskeletal: Negative for arthralgias and back pain     Skin: Negative for rash  Neurological: Negative for weakness and headaches  Psychiatric/Behavioral: Negative for sleep disturbance  The patient is not nervous/anxious  Objective:      /72 (BP Location: Right arm, Patient Position: Sitting, Cuff Size: Standard)   Pulse 96   Temp 98 1 °F (36 7 °C) (Temporal)   Ht 5' 4" (1 626 m)   Wt 64 4 kg (142 lb)   SpO2 96% Comment: on portable oxygen  BMI 24 37 kg/m²     Recent Results (from the past 1344 hour(s))   Microalbumin / creatinine urine ratio    Collection Time: 08/16/21 10:02 AM   Result Value Ref Range    Creatinine, Ur <13 0 mg/dL    Microalbum  ,U,Random 10 1 0 0 - 20 0 mg/L    Microalb Creat Ratio >78 (H) 0 - 30 mg/g creatinine   CBC and differential    Collection Time: 08/16/21 10:02 AM   Result Value Ref Range    WBC 6 30 4 31 - 10 16 Thousand/uL    RBC 4 71 3 81 - 5 12 Million/uL    Hemoglobin 14 3 11 5 - 15 4 g/dL    Hematocrit 43 5 34 8 - 46 1 %    MCV 92 82 - 98 fL    MCH 30 4 26 8 - 34 3 pg    MCHC 32 9 31 4 - 37 4 g/dL    RDW 13 0 11 6 - 15 1 %    MPV 12 5 8 9 - 12 7 fL    Platelets 276 241 - 644 Thousands/uL    nRBC 0 /100 WBCs    Neutrophils Relative 59 43 - 75 %    Immat GRANS % 0 0 - 2 %    Lymphocytes Relative 29 14 - 44 %    Monocytes Relative 7 4 - 12 %    Eosinophils Relative 4 0 - 6 %    Basophils Relative 1 0 - 1 %    Neutrophils Absolute 3 71 1 85 - 7 62 Thousands/µL    Immature Grans Absolute 0 01 0 00 - 0 20 Thousand/uL    Lymphocytes Absolute 1 81 0 60 - 4 47 Thousands/µL    Monocytes Absolute 0 45 0 17 - 1 22 Thousand/µL    Eosinophils Absolute 0 27 0 00 - 0 61 Thousand/µL    Basophils Absolute 0 05 0 00 - 0 10 Thousands/µL   Comprehensive metabolic panel    Collection Time: 08/16/21 10:02 AM   Result Value Ref Range    Sodium 136 136 - 145 mmol/L    Potassium 3 9 3 5 - 5 3 mmol/L    Chloride 101 100 - 108 mmol/L    CO2 32 21 - 32 mmol/L    ANION GAP 3 (L) 4 - 13 mmol/L    BUN 20 5 - 25 mg/dL    Creatinine 0 75 0 60 - 1 30 mg/dL    Glucose, Fasting 137 (H) 65 - 99 mg/dL    Calcium 9 6 8 3 - 10 1 mg/dL    AST 19 5 - 45 U/L    ALT 31 12 - 78 U/L    Alkaline Phosphatase 95 46 - 116 U/L    Total Protein 8 1 6 4 - 8 2 g/dL    Albumin 4 0 3 5 - 5 0 g/dL    Total Bilirubin 1 20 (H) 0 20 - 1 00 mg/dL    eGFR 77 ml/min/1 73sq m   Hemoglobin A1C    Collection Time: 08/16/21 10:02 AM   Result Value Ref Range    Hemoglobin A1C 6 7 (H) Normal 3 8-5 6%; PreDiabetic 5 7-6 4%; Diabetic >=6 5%; Glycemic control for adults with diabetes <7 0% %     mg/dl   Lipid Panel with Direct LDL reflex    Collection Time: 08/16/21 10:02 AM   Result Value Ref Range    Cholesterol 209 (H) 50 - 200 mg/dL    Triglycerides 74 <=150 mg/dL    HDL, Direct 78 >=40 mg/dL    LDL Calculated 116 (H) 0 - 100 mg/dL   TSH, 3rd generation    Collection Time: 08/16/21 10:02 AM   Result Value Ref Range    TSH 3RD GENERATON 3 500 0 358 - 3 740 uIU/mL   Protein / creatinine ratio, urine    Collection Time: 08/16/21 10:02 AM   Result Value Ref Range    Creatinine, Ur <13 0 mg/dL    Protein Urine Random <6 mg/dL    Prot/Creat Ratio, Ur          Physical Exam  Constitutional:       Appearance: Normal appearance  HENT:      Head: Normocephalic  Right Ear: Tympanic membrane, ear canal and external ear normal       Left Ear: Tympanic membrane, ear canal and external ear normal       Nose: Nose normal  No congestion  Mouth/Throat:      Mouth: Mucous membranes are moist       Pharynx: Oropharynx is clear  No oropharyngeal exudate or posterior oropharyngeal erythema  Eyes:      Extraocular Movements: Extraocular movements intact  Conjunctiva/sclera: Conjunctivae normal       Pupils: Pupils are equal, round, and reactive to light  Cardiovascular:      Rate and Rhythm: Normal rate and regular rhythm  Heart sounds: Normal heart sounds  No murmur heard  Pulmonary:      Effort: Pulmonary effort is normal       Breath sounds: Normal breath sounds   No wheezing or rales    Abdominal:      General: Bowel sounds are normal  There is no distension  Palpations: Abdomen is soft  Tenderness: There is no abdominal tenderness  Musculoskeletal:         General: Normal range of motion  Cervical back: Normal range of motion and neck supple  Right lower leg: No edema  Left lower leg: No edema  Lymphadenopathy:      Cervical: No cervical adenopathy  Skin:     General: Skin is warm  Neurological:      General: No focal deficit present  Mental Status: She is alert and oriented to person, place, and time

## 2021-08-19 NOTE — PLAN OF CARE
Problem: PAIN - ADULT  Goal: Verbalizes/displays adequate comfort level or baseline comfort level  Description: Interventions:  - Encourage patient to monitor pain and request assistance  - Assess pain using appropriate pain scale  - Administer analgesics based on type and severity of pain and evaluate response  - Implement non-pharmacological measures as appropriate and evaluate response  - Consider cultural and social influences on pain and pain management  - Notify physician/advanced practitioner if interventions unsuccessful or patient reports new pain  Outcome: Progressing     Problem: INFECTION - ADULT  Goal: Absence or prevention of progression during hospitalization  Description: INTERVENTIONS:  - Assess and monitor for signs and symptoms of infection  - Monitor lab/diagnostic results  - Monitor all insertion sites, i e  indwelling lines, tubes, and drains  - Monitor endotracheal if appropriate and nasal secretions for changes in amount and color  - [place] appropriate cooling/warming therapies per order  - Administer medications as ordered  - Instruct and encourage patient and family to use good hand hygiene technique  - Identify and instruct in appropriate isolation precautions for identified infection/condition  Outcome: Progressing  Goal: Absence of fever/infection during neutropenic period  Description: INTERVENTIONS:  - Monitor WBC    Outcome: Progressing     Problem: SAFETY ADULT  Goal: Patient will remain free of falls  Description: INTERVENTIONS:  - Assess patient frequently for physical needs  -  Identify cognitive and physical deficits and behaviors that affect risk of falls    -  [place] fall precautions as indicated by assessment   - Educate patient/family on patient safety including physical limitations  - Instruct patient to call for assistance with activity based on assessment  - Modify environment to reduce risk of injury  - Consider OT/PT consult to assist with Symone Salmon  YOB: 1987  Date of today's surgery: Thursday, August 19, 2021  Facility: [facility]    ID: The patient is a very pleasant 33-year-old primipara (para-1 with 1 previous vaginal delivery).    Chief Complaint: The patient complains of menorrhagia accompanied by dysmenorrhea.    History of Present Illness: The patient has had a previous tubal ligation. She complains of menorrhagia. She has also had complaints of dysmenorrhea. She does not wish to use oral contraceptive pills or anything similar for attempts to treat her symptoms (of menorrhagia and dysmenorrhea). She is scheduled today to have hysteroscopy, D&C, and hydrothermal endometrial ablation.    Past Medical History: The patient has a history of migraine headaches. She says that otherwise she has no other medical illnesses.    Past Surgical History: The patient has had breast augmentation and subsequent removal of breast implants. She has had tonsillectomy. She has had orthopedic surgeries.    Medications: The patient currently does not take any medications regularly.    Allergies: The patient says that she is allergic to codeine and that she is allergic to oral contraceptive pills.    Social History: The patient denies smoking. She denies consuming alcoholic beverages. She denies the use of recreational drugs.    Review of Systems  General: The patient denies any fevers, chills, sweats.  Pulmonary: The patient denies any coughing, wheezing, chest pain, shortness of breath.  Cardiovascular: The patient denies any palpitations, dyspnea, chest pain.  Gastrointestinal: The patient denies any nausea, vomiting, diarrhea, constipation, hematochezia, melena, history of hepatitis, history of jaundice.  Genitourinary: The patient complains of menorrhagia and dysmenorrhea and dyspareunia.  Musculoskeletal: The patient denies any arthralgias or myalgias.   Neurological: No headaches or syncope or seizures.      Physical Exam:   Vital Signs: The patient's vital signs are stable and she is afebrile.  General: The patient appears well developed and well nourished and relaxed and alert and comfortable and in no apparent distress.    HEENT :  Normo-cephalic, atraumatic, pupils equal, round, reactive to light and accommodation, extra ocular motions intact, pharynx clear; there is no thyromegaly. There is no cervical lymphadenopathy.  Chest: Heart regular rate and rhythm, with no murmurs or rubs or gallops; the lungs are clear to auscultation bilaterally.  Abdomen: The abdomen is soft and flat and non-tender and non-distended. There is no hepatomegaly. There is no splenomegaly.   Pelvic: Bimanual exam reveals no evidence of cervical motion tenderness and no evidence of any tenderness to palpation of the uterine corpus and no evidence of uterine enlargement and no evidence of any adnexal masses or tenderness either on the right or the left.  Extremities: No clubbing or cyanosis or edema.   Neurological: non-focal.     Assessment:   Menorrhagia.    Plan:   We will proceed today with hysteroscopy, D&C, and hydrothermal endometrial ablation. I have discussed with the patient and explained to the patient in detail and at length what hysteroscopy, D&C, and hydrothermal endometrial ablation is and what hysteroscopy, D&C, and hydrothermal endometrial ablation involves, and I have discussed with the patient and explained to the patient in detail and at length the risks and benefits and alternatives of hysteroscopy, D&C, and hydrothermal endometrial ablation. After our discussions and after answering her questions she told me that she very much wishes for us to proceed with hysteroscopy, D&C, and hydrothermal endometrial ablation.            ________________________  Amaury Lincoln M.D.     strengthening/mobility  Outcome: Progressing  Goal: Maintain or return to baseline ADL function  Description: INTERVENTIONS:  -  Assess patient's ability to carry out ADLs; assess patient's baseline for ADL function and identify physical deficits which impact ability to perform ADLs (bathing, care of mouth/teeth, toileting, grooming, dressing, etc )  - Assess/evaluate cause of self-care deficits   - Assess range of motion  - Assess patient's mobility; develop plan if impaired  - Assess patient's need for assistive devices and provide as appropriate  - Encourage maximum independence but intervene and supervise when necessary  - Involve family in performance of ADLs  - Assess for home care needs following discharge   - Consider OT consult to assist with ADL evaluation and planning for discharge  - Provide patient education as appropriate  Outcome: Progressing  Goal: Maintain or return mobility status to optimal level  Description: INTERVENTIONS:  - Assess patient's baseline mobility status (ambulation, transfers, stairs, etc )    - Identify cognitive and physical deficits and behaviors that affect mobility  - Identify mobility aids required to assist with transfers and/or ambulation (gait belt, sit-to-stand, lift, walker, cane, etc )  - [place] fall precautions as indicated by assessment  - Record patient progress and toleration of activity level on Mobility SBAR; progress patient to next Phase/Stage  - Instruct patient to call for assistance with activity based on assessment  - Consider rehabilitation consult to assist with strengthening/weightbearing, etc   Outcome: Progressing     Problem: DISCHARGE PLANNING  Goal: Discharge to home or other facility with appropriate resources  Description: INTERVENTIONS:  - Identify barriers to discharge w/patient and caregiver  - Arrange for needed discharge resources and transportation as appropriate  - Identify discharge learning needs (meds, wound care, etc )  - Arrange for interpretive services to assist at discharge as needed  - Refer to Case Management Department for coordinating discharge planning if the patient needs post-hospital services based on physician/advanced practitioner order or complex needs related to functional status, cognitive ability, or social support system  Outcome: Progressing     Problem: Knowledge Deficit  Goal: Patient/family/caregiver demonstrates understanding of disease process, treatment plan, medications, and discharge instructions  Description: Complete learning assessment and assess knowledge base    Interventions:  - Provide teaching at level of understanding  - Provide teaching via preferred learning methods  Outcome: Progressing

## 2021-08-23 ENCOUNTER — PATIENT OUTREACH (OUTPATIENT)
Dept: INTERNAL MEDICINE CLINIC | Facility: CLINIC | Age: 76
End: 2021-08-23

## 2021-09-10 DIAGNOSIS — J44.9 CHRONIC OBSTRUCTIVE PULMONARY DISEASE, UNSPECIFIED COPD TYPE (HCC): ICD-10-CM

## 2021-09-10 DIAGNOSIS — I50.9 ACUTE DECOMPENSATED HEART FAILURE (HCC): ICD-10-CM

## 2021-09-13 RX ORDER — POTASSIUM CHLORIDE 20 MEQ/1
TABLET, EXTENDED RELEASE ORAL
Qty: 60 TABLET | Refills: 0 | Status: SHIPPED | OUTPATIENT
Start: 2021-09-13 | End: 2022-01-11

## 2021-09-13 RX ORDER — FLUTICASONE FUROATE, UMECLIDINIUM BROMIDE AND VILANTEROL TRIFENATATE 100; 62.5; 25 UG/1; UG/1; UG/1
POWDER RESPIRATORY (INHALATION)
Qty: 60 BLISTER | Refills: 0 | Status: SHIPPED | OUTPATIENT
Start: 2021-09-13 | End: 2021-10-27 | Stop reason: SDUPTHER

## 2021-09-22 ENCOUNTER — OFFICE VISIT (OUTPATIENT)
Dept: RHEUMATOLOGY | Facility: CLINIC | Age: 76
End: 2021-09-22
Payer: MEDICARE

## 2021-09-22 ENCOUNTER — TELEPHONE (OUTPATIENT)
Dept: INTERNAL MEDICINE CLINIC | Facility: CLINIC | Age: 76
End: 2021-09-22

## 2021-09-22 DIAGNOSIS — M19.90 ARTHRITIS: ICD-10-CM

## 2021-09-22 DIAGNOSIS — R76.8 RHEUMATOID FACTOR POSITIVE: Primary | ICD-10-CM

## 2021-09-22 DIAGNOSIS — I27.20 PULMONARY HYPERTENSION (HCC): ICD-10-CM

## 2021-09-22 PROCEDURE — 99203 OFFICE O/P NEW LOW 30 MIN: CPT | Performed by: INTERNAL MEDICINE

## 2021-09-22 NOTE — PATIENT INSTRUCTIONS
Try Tylenol as needed for joint pain  You can also use over the counter Voltaren gel on painful joints up to 4 times daily as needed  Stay active, exercise and eat healthy  Follow up as needed

## 2021-09-22 NOTE — PROGRESS NOTES
Rheumatology Consult   Nena Rosales 64 y o  female 1945    DATE: 9/22/2021    Reason for Consult: +RF    Assessment and Plan:  Patient with hand and knee osteoarthrtitis  She also c/o some dry mouth  No other constitutional symptoms  No Raynaud's, dysphagia, telangiectasias or sclerodactyly  No evidence of RA either on PE or symptomatically  Anti-inflammatory diet,  Topical NSAIDs PRN  PT for knees  Check RNP/SSA/SSB  F/u PRN    History of Present Illness:  Nena Rosales is a 64 y o  female With some hand pain and left knee pain  Also with a history of right hand trigger finger  No history of psoriasis  No skin thickening  No anorexia or wt loss  No other complaints  No dry eye or myalgias  Review of Systems  Review of Systems   Constitutional: Negative for chills, fatigue, fever and unexpected weight change  HENT: Negative for mouth sores and trouble swallowing  Eyes: Negative for pain and visual disturbance  Respiratory: Negative for cough and shortness of breath  Cardiovascular: Negative for chest pain and leg swelling  Gastrointestinal: Negative for abdominal pain, blood in stool, constipation, diarrhea and nausea  Musculoskeletal: Positive for arthralgias  Negative for back pain, joint swelling and myalgias  Skin: Negative for color change and rash  Neurological: Negative for weakness and numbness  Hematological: Negative for adenopathy  Psychiatric/Behavioral: Negative for sleep disturbance         Allergies  No Known Allergies    Current Medications      Past Medical History  Past Medical History:   Diagnosis Date    Allergic rhinitis     Anxiety     Arthritis     Diabetes mellitus ([address] )     Tobacco abuse        Past Surgical History  Past Surgical History:   Procedure Laterality Date    BREAST SURGERY Right     Cyst    OTHER SURGICAL HISTORY      Cataract implant    OTHER SURGICAL HISTORY      Fertilization       Family History  No known autoimmune or inflammatory diseases in the family  Family History   Problem Relation Age of Onset    Prostate cancer Father     Diabetes Brother     Heart disease Brother     Lymphoma Brother     No Known Problems Sister     Parkinsonism Sister     Hypotension Daughter         Takes "salt pills"    Proteinuria Daughter         Occuring during her 3 pregnancies; follows with nephrology       Social History  Occupation: retired  Social History     Substance and Sexual Activity   Alcohol Use Yes    Alcohol/week: 1 0 - 2 0 standard drinks    Types: 1 - 2 Glasses of wine per week    Comment: On average, will drink 1-2 glasses of white wine daily with dinner  (Updated 2021),      Social History     Substance and Sexual Activity   Drug Use Never    Comment: Last assessed 2021  Social History     Tobacco Use   Smoking Status Former Smoker    Packs/day: 0 50    Years: 30 00    Pack years: 15 00    Types: Cigarettes    Quit date: 2021    Years since quittin 3   Smokeless Tobacco Never Used   Tobacco Comment    Began smoking in her late teens, averaging 1/2 pack daily  States she quit smoking a few weeks ago (Updated 2021)  Objective: There were no vitals taken for this visit  Physical Exam  Constitutional:       General: She is not in acute distress  Appearance: She is well-developed  HENT:      Head: Normocephalic and atraumatic  Eyes:      General: Lids are normal  No scleral icterus  Conjunctiva/sclera: Conjunctivae normal    Neck:      Thyroid: No thyromegaly  Cardiovascular:      Rate and Rhythm: Normal rate and regular rhythm  Heart sounds: S1 normal and S2 normal  No murmur heard  No friction rub  Pulmonary:      Effort: Pulmonary effort is normal  No tachypnea or respiratory distress  Breath sounds: Normal breath sounds  No wheezing, rhonchi or rales  Musculoskeletal:      Right hand: Deformity and bony tenderness present        Left hand: Deformity and bony tenderness present  Cervical back: Neck supple  No muscular tenderness  Right knee: Crepitus present  Left knee: Crepitus present  Lymphadenopathy:      Head:      Right side of head: No submental or submandibular adenopathy  Left side of head: No submental or submandibular adenopathy  Cervical: No cervical adenopathy  Skin:     General: Skin is warm and dry  Findings: No rash  Nails: There is no clubbing  Neurological:      Mental Status: She is alert  Sensory: No sensory deficit  Psychiatric:         Behavior: Behavior normal  Behavior is cooperative  Lab Results: I have personally reviewed pertinent reports        CBC:   , Chemistry Profile:       Invalid input(s): ALBUMIN, Coagulation Studies:   , Cardiac Studies:   , Additional Labs:   , iSTAT CHEM 8:       Invalid input(s): POTASSIUMIS, ABG:   , Toxicology:   , Last A1C/Lipid Panel/Thyroid Panel:   Lab Results   Component Value Date    HGBA1C 6 7 (H) 08/16/2021    HGBA1C 6 6 (H) 05/12/2021    TRIG 74 08/16/2021    TRIG 49 05/12/2021    HDL 78 08/16/2021    HDL 71 05/12/2021    LDLCALC 116 (H) 08/16/2021    LDLCALC 55 05/12/2021    OOZ4HSCJIXAF 3 500 08/16/2021     Lab Results   Component Value Date    FARIDEH Negative 05/20/2021    RF Positive (A) 05/20/2021    HEPBIGM Non-reactive 05/20/2021    HEPBCAB Reactive (A) 05/20/2021    HEPCAB Non-reactive 05/20/2021           Invalid input(s): URIBILINOGEN         Imaging: I have personally reviewed pertinent films in PACS

## 2021-09-23 ENCOUNTER — OFFICE VISIT (OUTPATIENT)
Dept: CARDIOLOGY CLINIC | Facility: CLINIC | Age: 76
End: 2021-09-23
Payer: MEDICARE

## 2021-09-23 VITALS
HEART RATE: 94 BPM | OXYGEN SATURATION: 98 % | BODY MASS INDEX: 25.42 KG/M2 | HEIGHT: 64 IN | WEIGHT: 148.9 LBS | SYSTOLIC BLOOD PRESSURE: 134 MMHG | DIASTOLIC BLOOD PRESSURE: 72 MMHG

## 2021-09-23 DIAGNOSIS — I27.20 PULMONARY HYPERTENSION (HCC): Primary | ICD-10-CM

## 2021-09-23 DIAGNOSIS — I50.82 CONGESTIVE HEART FAILURE WITH RIGHT VENTRICULAR SYSTOLIC DYSFUNCTION (HCC): ICD-10-CM

## 2021-09-23 DIAGNOSIS — I10 ESSENTIAL HYPERTENSION, BENIGN: ICD-10-CM

## 2021-09-23 DIAGNOSIS — I50.20 CONGESTIVE HEART FAILURE WITH RIGHT VENTRICULAR SYSTOLIC DYSFUNCTION (HCC): ICD-10-CM

## 2021-09-23 DIAGNOSIS — I50.42 CHRONIC COMBINED SYSTOLIC AND DIASTOLIC CONGESTIVE HEART FAILURE (HCC): ICD-10-CM

## 2021-09-23 PROCEDURE — 99214 OFFICE O/P EST MOD 30 MIN: CPT | Performed by: INTERNAL MEDICINE

## 2021-09-23 NOTE — PROGRESS NOTES
Cardiology Follow Up    Jovita Llanes  1945  [id number]  [facility]  [address]  [phone]    No diagnosis found  There are no diagnoses linked to this encounter  I had the pleasure of seeing Jovita Llanes for a follow up visit  INTERVAL HISTORY: none, no further hospitalizations    History of the presenting illness, Discussion/Summary and My Plan are as follows:::    Karen Woodruff is a pleasant 69-year-old lady with a history diabetes-not on any medications, last A1c of 6 6, chronic tobacco use-now quit, was admitted with acute right more than left biventricular congestive heart failure in May 2021 with a proBNP of 3K  Needed intravenous diuresis and ultimately was discharged on Lasix 80 mg twice daily, currently on 60 mg once daily and appears euvolemic with stable weight around 144 lb at home  She remains on oxygen-at 1 liter/minute  She has not had any further hospitalizations in the last 4 months  No symptoms at moderate levels of exertions  Plan:    Chronic biventricular congestive heart failure: With preserved LV systolic function and LV diastolic dysfunction, right ventricular dilatation and dysfunction  Remains on Lasix at 60 mg daily-will continue the same dose  Check a BMP in 3 months  Euvolemic at this time at 144 lb at home on Lasix 60 daily    Coronary calcifications on CT: no symptoms at this time  Will consider a stress test with any symptoms  No FH of CAD  Right ventricular dilatation and dysfunction:  Likely group 2 and group 3 from pulmonary disease  No pulmonary artery enlargement, no notching on echo, cardiac MRI without delayed gadolinium enhancement    Rheumatology panel was essentially negative but is being checked for RA through her rheumatologist   Has quit smoking and commended on that    Itching involving the neck and arms:  Started soon after the hospital stay  Only medication she is on is Lasix with potassium  Offered to switch to torsemide although this also has a sulfa moiety  , not even sure if this could be a drug rash/allergy  Follow-up in 6 months      Results for Aranza Roman (MRN [id number]) as of 9/23/2021 13:11   Ref  Range 5/12/2021 11:58 8/16/2021 10:02   Cholesterol Latest Ref Range: 50 - 200 mg/dL 136 209 (H)   Triglycerides Latest Ref Range: <=150 mg/dL 49 74   HDL Latest Ref Range: >=40 mg/dL 71 78   LDL Calculated Latest Ref Range: 0 - 100 mg/dL 55 116 (H)     Results for Aranza Roman (MRN [id number]) as of 9/23/2021 13:11   Ref  Range 8/16/2021 10:02   Sodium Latest Ref Range: 136 - 145 mmol/L 136   Potassium Latest Ref Range: 3 5 - 5 3 mmol/L 3 9   Chloride Latest Ref Range: 100 - 108 mmol/L 101   CO2 Latest Ref Range: 21 - 32 mmol/L 32   Anion Gap Latest Ref Range: 4 - 13 mmol/L 3 (L)   BUN Latest Ref Range: 5 - 25 mg/dL 20   Creatinine Latest Ref Range: 0 60 - 1 30 mg/dL 0 75       Results for Aranza Roman (MRN [id number]) as of 9/23/2021 13:11   Ref   Range 5/20/2021 14:35   ANTI-NUCLEAR ANTIBODY (FARIDEH) Latest Ref Range: Negative  Negative   Atypical pANCA Latest Ref Range: Neg:<1:20 titer <1:20   C-ANCA Latest Ref Range: Neg:<1:20 titer <1:20   C3 Complement Latest Ref Range: 90 0 - 180 0 mg/dL 94 3   C4, COMPLEMENT Latest Ref Range: 10 0 - 40 0 mg/dL 15 0   P-ANCA Latest Ref Range: Neg:<1:20 titer <1:20   EXTERNAL NJ-3 AB Latest Ref Range: 0 0 - 3 5 U/mL <3 5   RF Quantitation Latest Ref Range: (none)  20 IU/mL (A)   RHEUMATOID FACTOR Latest Ref Range: Negative  Positive (A)     Patient Active Problem List   Diagnosis    Allergic rhinitis    Type 2 diabetes, HbA1c goal < 7% (HCC)    Anxiety    Uncontrolled type 2 diabetes mellitus with hyperglycemia (HCC)    Microalbuminuria    Lung nodules    Chronic obstructive pulmonary disease (HCC)    Chronic respiratory failure with hypoxia (HCC)    Nicotine dependence    Elevated BP without diagnosis of hypertension    Essential hypertension, benign    Chronic combined systolic and diastolic congestive heart failure (HCC)    Pulmonary hypertension (HCC)    Cigarette nicotine dependence without complication    Hypoxia    Encounter for screening for malignant neoplasm of colon    Rheumatoid factor positive    Arthritis     Past Medical History:   Diagnosis Date    Allergic rhinitis     Anxiety     Arthritis     Diabetes mellitus ([address] )     Tobacco abuse      Social History     Socioeconomic History    Marital status: /Civil Union     Spouse name: Not on file    Number of children: 1    Years of education: Not on file    Highest education level: Some college, no degree   Occupational History    Not on file   Tobacco Use    Smoking status: Former Smoker     Packs/day: 0 50     Years: 30 00     Pack years: 15 00     Types: Cigarettes     Quit date: 2021     Years since quittin 3    Smokeless tobacco: Never Used    Tobacco comment: Began smoking in her late teens, averaging 1/2 pack daily  States she quit smoking a few weeks ago (Updated 2021)  Vaping Use    Vaping Use: Never used   Substance and Sexual Activity    Alcohol use: Yes     Alcohol/week: 1 0 - 2 0 standard drinks     Types: 1 - 2 Glasses of wine per week     Comment: On average, will drink 1-2 glasses of white wine daily with dinner  (Updated 2021),     Drug use: Never     Comment: Last assessed 2021   Sexual activity: Not on file   Other Topics Concern    Not on file   Social History Narrative    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 ; 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  Has 1 biological daughter Jose Álvarez) who lives locally        Social Determinants of Health     Financial Resource Strain:     Difficulty of Paying Living Expenses:    Food Insecurity: No Food Insecurity    Worried About Running Out of Food in the Last Year: Never true    Darrin of Food in the Last Year: Never true   Transportation Needs: No Transportation Needs    Lack of Transportation (Medical): No    Lack of Transportation (Non-Medical): No   Physical Activity:     Days of Exercise per Week:     Minutes of Exercise per Session:    Stress:     Feeling of Stress :    Social Connections:     Frequency of Communication with Friends and Family:     Frequency of Social Gatherings with Friends and Family:     Attends [employer] Services:     Active Member of Clubs or Organizations:     Attends Club or Organization Meetings:     Marital Status:    Intimate Partner Violence:     Fear of Current or Ex-Partner:     Emotionally Abused:     Physically Abused:     Sexually Abused:       Family History   Problem Relation Age of Onset    Prostate cancer Father     Diabetes Brother     Heart disease Brother     Lymphoma Brother     No Known Problems Sister     Parkinsonism Sister     Hypotension Daughter         Takes "salt pills"    Proteinuria Daughter         Occuring during her 3 pregnancies; follows with nephrology     Past Surgical History:   Procedure Laterality Date    BREAST SURGERY Right     Cyst    OTHER SURGICAL HISTORY      Cataract implant    OTHER SURGICAL HISTORY      Fertilization       Current Outpatient Medications:     furosemide (LASIX) 20 mg tablet, Take 1 tablet (20 mg total) by mouth daily (Patient taking differently: Take 60 mg by mouth daily ), Disp: 90 tablet, Rfl: 1    potassium chloride (K-DUR,KLOR-CON) 20 mEq tablet, take 1 tablet by mouth once daily (Patient taking differently: Take 10 mEq by mouth daily ), Disp: 60 tablet, Rfl: 0    Trelegy Ellipta 100-62 5-25 MCG/INH inhaler, inhale 1 puff by mouth and INTO THE LUNGS once daily, Disp: 60 blister, Rfl: 0  No Known Allergies    Imaging: No results found  Review of Systems:  Review of Systems   Constitutional: Negative  HENT: Negative  Eyes: Negative  Respiratory: Negative  Cardiovascular: Negative  Endocrine: Negative  Musculoskeletal: Negative  Physical Exam:  /72 (BP Location: Right arm, Patient Position: Sitting, Cuff Size: Standard)   Pulse 94   Ht 5' 4" (1 626 m)   Wt 67 5 kg (148 lb 14 4 oz)   SpO2 98% Comment: O2 1L  BMI 25 56 kg/m²   Physical Exam  Constitutional:       General: She is not in acute distress  Appearance: Normal appearance  She is not ill-appearing  HENT:      Head: Normocephalic  Nose: Nose normal  No congestion  Mouth/Throat:      Mouth: Mucous membranes are moist       Pharynx: No oropharyngeal exudate or posterior oropharyngeal erythema  Eyes:      General:         Right eye: No discharge  Left eye: No discharge  Pupils: Pupils are equal, round, and reactive to light  Neck:      Vascular: No carotid bruit  Cardiovascular:      Rate and Rhythm: Normal rate and regular rhythm  Pulses: Normal pulses  Heart sounds: No murmur heard  No friction rub  No gallop  Pulmonary:      Effort: Pulmonary effort is normal  No respiratory distress  Breath sounds: No stridor  No wheezing or rhonchi  Abdominal:      General: Abdomen is flat  There is no distension  Palpations: There is no mass  Tenderness: There is no abdominal tenderness  Hernia: No hernia is present  Musculoskeletal:         General: No swelling, tenderness, deformity or signs of injury  Normal range of motion  Cervical back: Normal range of motion  No rigidity or tenderness  Lymphadenopathy:      Cervical: No cervical adenopathy  Neurological:      Mental Status: She is alert  This note was completed in part utilizing m-modal fluency direct voice recognition software  Grammatical errors, random word insertion, spelling mistakes, occasional wrong word or "sound-alike" substitutions and incomplete sentences may be an occasional consequence of the system secondary to software limitations, ambient noise and hardware issues  At the time of dictation, efforts were made to edit, clarify and /or correct errors  Please read the chart carefully and recognize, using context, where substitutions have occurred  If you have any questions or concerns about the context, text or information contained within the body of this dictation, please contact myself, the provider, for further clarification

## 2021-10-21 ENCOUNTER — OFFICE VISIT (OUTPATIENT)
Dept: INTERNAL MEDICINE CLINIC | Facility: CLINIC | Age: 76
End: 2021-10-21
Payer: MEDICARE

## 2021-10-21 VITALS
DIASTOLIC BLOOD PRESSURE: 78 MMHG | WEIGHT: 147.8 LBS | BODY MASS INDEX: 25.23 KG/M2 | HEIGHT: 64 IN | SYSTOLIC BLOOD PRESSURE: 132 MMHG | OXYGEN SATURATION: 96 % | HEART RATE: 93 BPM | TEMPERATURE: 98.2 F

## 2021-10-21 DIAGNOSIS — L30.9 DERMATITIS: ICD-10-CM

## 2021-10-21 DIAGNOSIS — I27.20 PULMONARY HYPERTENSION (HCC): ICD-10-CM

## 2021-10-21 DIAGNOSIS — E11.9 TYPE 2 DIABETES, HBA1C GOAL < 7% (HCC): ICD-10-CM

## 2021-10-21 DIAGNOSIS — F41.9 ANXIETY: ICD-10-CM

## 2021-10-21 DIAGNOSIS — K92.2 GASTROINTESTINAL HEMORRHAGE, UNSPECIFIED GASTROINTESTINAL HEMORRHAGE TYPE: ICD-10-CM

## 2021-10-21 DIAGNOSIS — M19.042 PRIMARY OSTEOARTHRITIS OF BOTH HANDS: ICD-10-CM

## 2021-10-21 DIAGNOSIS — I50.32 CHRONIC DIASTOLIC CONGESTIVE HEART FAILURE (HCC): Primary | ICD-10-CM

## 2021-10-21 DIAGNOSIS — M19.041 PRIMARY OSTEOARTHRITIS OF BOTH HANDS: ICD-10-CM

## 2021-10-21 DIAGNOSIS — J44.9 CHRONIC OBSTRUCTIVE PULMONARY DISEASE, UNSPECIFIED COPD TYPE (HCC): ICD-10-CM

## 2021-10-21 PROCEDURE — 99214 OFFICE O/P EST MOD 30 MIN: CPT | Performed by: INTERNAL MEDICINE

## 2021-10-21 RX ORDER — TRIAMCINOLONE ACETONIDE 1 MG/G
CREAM TOPICAL 2 TIMES DAILY
Qty: 30 G | Refills: 0 | Status: SHIPPED | OUTPATIENT
Start: 2021-10-21 | End: 2022-06-06 | Stop reason: SDUPTHER

## 2021-10-21 RX ORDER — HYDROXYZINE HYDROCHLORIDE 10 MG/1
10 TABLET, FILM COATED ORAL 2 TIMES DAILY PRN
Qty: 60 TABLET | Refills: 0 | Status: SHIPPED | OUTPATIENT
Start: 2021-10-21 | End: 2022-06-06 | Stop reason: SDUPTHER

## 2021-10-27 ENCOUNTER — OFFICE VISIT (OUTPATIENT)
Dept: PULMONOLOGY | Facility: CLINIC | Age: 76
End: 2021-10-27
Payer: MEDICARE

## 2021-10-27 VITALS
HEART RATE: 116 BPM | BODY MASS INDEX: 25.47 KG/M2 | WEIGHT: 149.2 LBS | SYSTOLIC BLOOD PRESSURE: 128 MMHG | DIASTOLIC BLOOD PRESSURE: 88 MMHG | HEIGHT: 64 IN | OXYGEN SATURATION: 94 % | RESPIRATION RATE: 18 BRPM | TEMPERATURE: 98.6 F

## 2021-10-27 DIAGNOSIS — J44.9 CHRONIC OBSTRUCTIVE PULMONARY DISEASE, UNSPECIFIED COPD TYPE (HCC): ICD-10-CM

## 2021-10-27 PROCEDURE — 94618 PULMONARY STRESS TESTING: CPT | Performed by: INTERNAL MEDICINE

## 2021-10-27 PROCEDURE — 99214 OFFICE O/P EST MOD 30 MIN: CPT | Performed by: INTERNAL MEDICINE

## 2021-10-27 RX ORDER — FLUTICASONE FUROATE, UMECLIDINIUM BROMIDE AND VILANTEROL TRIFENATATE 100; 62.5; 25 UG/1; UG/1; UG/1
1 POWDER RESPIRATORY (INHALATION) DAILY
Qty: 60 BLISTER | Refills: 3 | Status: SHIPPED | OUTPATIENT
Start: 2021-10-27 | End: 2022-03-21

## 2021-11-09 ENCOUNTER — TELEPHONE (OUTPATIENT)
Dept: NEPHROLOGY | Facility: CLINIC | Age: 76
End: 2021-11-09

## 2021-11-16 DIAGNOSIS — I50.42 CHRONIC COMBINED SYSTOLIC AND DIASTOLIC CONGESTIVE HEART FAILURE (HCC): ICD-10-CM

## 2021-11-17 DIAGNOSIS — I50.42 CHRONIC COMBINED SYSTOLIC AND DIASTOLIC CONGESTIVE HEART FAILURE (HCC): ICD-10-CM

## 2021-11-17 RX ORDER — FUROSEMIDE 20 MG/1
20 TABLET ORAL DAILY
Qty: 90 TABLET | Refills: 1 | Status: SHIPPED | OUTPATIENT
Start: 2021-11-17 | End: 2021-11-17 | Stop reason: SDUPTHER

## 2021-11-17 RX ORDER — FUROSEMIDE 20 MG/1
20 TABLET ORAL DAILY
Qty: 90 TABLET | Refills: 1 | Status: SHIPPED | OUTPATIENT
Start: 2021-11-17 | End: 2021-11-18 | Stop reason: SDUPTHER

## 2021-11-18 ENCOUNTER — NURSE TRIAGE (OUTPATIENT)
Dept: OTHER | Facility: OTHER | Age: 76
End: 2021-11-18

## 2021-11-18 DIAGNOSIS — I50.42 CHRONIC COMBINED SYSTOLIC AND DIASTOLIC CONGESTIVE HEART FAILURE (HCC): ICD-10-CM

## 2021-11-18 RX ORDER — FUROSEMIDE 20 MG/1
20 TABLET ORAL DAILY
Qty: 90 TABLET | Refills: 1 | Status: SHIPPED | OUTPATIENT
Start: 2021-11-18 | End: 2021-12-20 | Stop reason: SDUPTHER

## 2021-12-02 ENCOUNTER — TELEPHONE (OUTPATIENT)
Dept: GASTROENTEROLOGY | Facility: HOSPITAL | Age: 76
End: 2021-12-02

## 2021-12-03 ENCOUNTER — ANESTHESIA EVENT (OUTPATIENT)
Dept: GASTROENTEROLOGY | Facility: HOSPITAL | Age: 76
End: 2021-12-03

## 2021-12-03 ENCOUNTER — ANESTHESIA (OUTPATIENT)
Dept: GASTROENTEROLOGY | Facility: HOSPITAL | Age: 76
End: 2021-12-03

## 2021-12-03 ENCOUNTER — HOSPITAL ENCOUNTER (OUTPATIENT)
Dept: GASTROENTEROLOGY | Facility: HOSPITAL | Age: 76
Setting detail: OUTPATIENT SURGERY
Discharge: HOME | End: 2021-12-03
Attending: INTERNAL MEDICINE
Payer: MEDICARE

## 2021-12-03 VITALS
OXYGEN SATURATION: 96 % | RESPIRATION RATE: 18 BRPM | TEMPERATURE: 99.4 F | DIASTOLIC BLOOD PRESSURE: 76 MMHG | SYSTOLIC BLOOD PRESSURE: 132 MMHG | HEART RATE: 103 BPM

## 2021-12-03 DIAGNOSIS — R19.5 OTHER FECAL ABNORMALITIES: ICD-10-CM

## 2021-12-03 DIAGNOSIS — K92.2 GASTROINTESTINAL HEMORRHAGE, UNSPECIFIED: ICD-10-CM

## 2021-12-03 RX ORDER — FENTANYL CITRATE 50 UG/ML
INJECTION, SOLUTION INTRAMUSCULAR; INTRAVENOUS AS NEEDED
Status: DISCONTINUED | OUTPATIENT
Start: 2021-12-03 | End: 2021-12-06

## 2021-12-03 RX ORDER — PROPOFOL 10 MG/ML
INJECTION, EMULSION INTRAVENOUS AS NEEDED
Status: DISCONTINUED | OUTPATIENT
Start: 2021-12-03 | End: 2021-12-06

## 2021-12-03 RX ORDER — SODIUM CHLORIDE 9 MG/ML
INJECTION, SOLUTION INTRAVENOUS CONTINUOUS PRN
Status: DISCONTINUED | OUTPATIENT
Start: 2021-12-03 | End: 2021-12-06

## 2021-12-03 RX ORDER — LIDOCAINE HYDROCHLORIDE 10 MG/ML
INJECTION, SOLUTION EPIDURAL; INFILTRATION; INTRACAUDAL; PERINEURAL AS NEEDED
Status: DISCONTINUED | OUTPATIENT
Start: 2021-12-03 | End: 2021-12-06

## 2021-12-03 RX ADMIN — SODIUM CHLORIDE: 0.9 INJECTION, SOLUTION INTRAVENOUS at 12:59

## 2021-12-03 RX ADMIN — PROPOFOL 20 MG: 10 INJECTION, EMULSION INTRAVENOUS at 13:24

## 2021-12-03 RX ADMIN — PROPOFOL 20 MG: 10 INJECTION, EMULSION INTRAVENOUS at 13:17

## 2021-12-03 RX ADMIN — FENTANYL CITRATE 25 MCG: 50 INJECTION INTRAMUSCULAR; INTRAVENOUS at 13:05

## 2021-12-03 RX ADMIN — PROPOFOL 50 MG: 10 INJECTION, EMULSION INTRAVENOUS at 13:07

## 2021-12-03 RX ADMIN — FENTANYL CITRATE 25 MCG: 50 INJECTION INTRAMUSCULAR; INTRAVENOUS at 13:15

## 2021-12-03 RX ADMIN — LIDOCAINE HYDROCHLORIDE 50 MG: 10 INJECTION, SOLUTION EPIDURAL; INFILTRATION; INTRACAUDAL; PERINEURAL at 13:03

## 2021-12-03 RX ADMIN — PROPOFOL 20 MG: 10 INJECTION, EMULSION INTRAVENOUS at 13:12

## 2021-12-03 RX ADMIN — PROPOFOL 100 MG: 10 INJECTION, EMULSION INTRAVENOUS at 13:03

## 2021-12-03 RX ADMIN — PROPOFOL 20 MG: 10 INJECTION, EMULSION INTRAVENOUS at 13:21

## 2021-12-19 ENCOUNTER — TELEPHONE (OUTPATIENT)
Dept: OTHER | Facility: OTHER | Age: 76
End: 2021-12-19

## 2021-12-20 DIAGNOSIS — I50.42 CHRONIC COMBINED SYSTOLIC AND DIASTOLIC CONGESTIVE HEART FAILURE (HCC): ICD-10-CM

## 2021-12-20 RX ORDER — FUROSEMIDE 20 MG/1
60 TABLET ORAL DAILY
Qty: 270 TABLET | Refills: 1 | Status: SHIPPED | OUTPATIENT
Start: 2021-12-20

## 2022-01-11 DIAGNOSIS — I50.9 ACUTE DECOMPENSATED HEART FAILURE (HCC): ICD-10-CM

## 2022-01-13 RX ORDER — POTASSIUM CHLORIDE 20 MEQ/1
20 TABLET, EXTENDED RELEASE ORAL DAILY
Qty: 30 TABLET | Refills: 8 | Status: SHIPPED | OUTPATIENT
Start: 2022-01-13

## 2022-01-24 ENCOUNTER — OFFICE VISIT (OUTPATIENT)
Dept: INTERNAL MEDICINE CLINIC | Facility: CLINIC | Age: 77
End: 2022-01-24
Payer: MEDICARE

## 2022-01-24 VITALS
BODY MASS INDEX: 25.95 KG/M2 | TEMPERATURE: 99.2 F | HEIGHT: 64 IN | WEIGHT: 152 LBS | SYSTOLIC BLOOD PRESSURE: 138 MMHG | HEART RATE: 94 BPM | DIASTOLIC BLOOD PRESSURE: 86 MMHG | OXYGEN SATURATION: 93 %

## 2022-01-24 DIAGNOSIS — I50.42 CHRONIC COMBINED SYSTOLIC AND DIASTOLIC CONGESTIVE HEART FAILURE (HCC): ICD-10-CM

## 2022-01-24 DIAGNOSIS — J44.9 CHRONIC OBSTRUCTIVE PULMONARY DISEASE, UNSPECIFIED COPD TYPE (HCC): ICD-10-CM

## 2022-01-24 DIAGNOSIS — F34.1 DYSTHYMIC DISORDER: Primary | ICD-10-CM

## 2022-01-24 DIAGNOSIS — R73.01 IMPAIRED FASTING BLOOD SUGAR: ICD-10-CM

## 2022-01-24 PROCEDURE — 99214 OFFICE O/P EST MOD 30 MIN: CPT | Performed by: INTERNAL MEDICINE

## 2022-01-24 RX ORDER — TIOTROPIUM BROMIDE INHALATION SPRAY 1.56 UG/1
2 SPRAY, METERED RESPIRATORY (INHALATION) DAILY
Qty: 4 G | Refills: 1 | Status: SHIPPED | OUTPATIENT
Start: 2022-01-24 | End: 2022-02-21

## 2022-01-24 RX ORDER — SERTRALINE HYDROCHLORIDE 25 MG/1
25 TABLET, FILM COATED ORAL DAILY
Qty: 30 TABLET | Refills: 1 | Status: SHIPPED | OUTPATIENT
Start: 2022-01-24 | End: 2022-06-06

## 2022-01-24 NOTE — PROGRESS NOTES
Assessment/Plan:    BMI Counseling: Body mass index is 26 09 kg/m²  The BMI is above normal  Nutrition recommendations include decreasing portion sizes, encouraging healthy choices of fruits and vegetables and decreasing fast food intake  Exercise recommendations include moderate physical activity 150 minutes/week  Rationale for BMI follow-up plan is due to patient being overweight or obese  1  Dysthymic disorder  -     sertraline (Zoloft) 25 mg tablet; Take 1 tablet (25 mg total) by mouth daily    2  Chronic combined systolic and diastolic congestive heart failure (HCC)  -     CBC and differential; Future  -     Comprehensive metabolic panel; Future  -     Lipid Panel with Direct LDL reflex; Future  -     TSH, 3rd generation; Future    3  Chronic obstructive pulmonary disease, unspecified COPD type (HCC)  -     tiotropium (Spiriva Respimat) 1 25 MCG/ACT AERS inhaler; Inhale 2 puffs daily    4  Impaired fasting blood sugar  -     Hemoglobin A1C; Future           Subjective:      Patient ID: Rancho Torrez is a 64 y o  female  Follow-up on multiple medical problems to ensure the stable on current medication, anxious, family issue      The following portions of the patient's history were reviewed and updated as appropriate: She  has a past medical history of Allergic rhinitis, Anxiety, Arthritis, Diabetes mellitus ([address] ), Oxygen decrease, and Tobacco abuse    She   Patient Active Problem List    Diagnosis Date Noted    Dysthymic disorder 01/24/2022    Impaired fasting blood sugar 01/24/2022    Gastrointestinal hemorrhage 10/21/2021    Dermatitis 10/21/2021    Primary osteoarthritis of both hands 10/21/2021    Congestive heart failure with right ventricular systolic dysfunction ([facility] ) 09/23/2021    Rheumatoid factor positive 09/22/2021    Arthritis 09/22/2021    Encounter for screening for malignant neoplasm of colon 06/30/2021    Hypoxia 06/16/2021    Essential hypertension, benign 06/02/2021    Chronic combined systolic and diastolic congestive heart failure ([facility] ) 06/02/2021    Pulmonary hypertension (HCC) 06/02/2021    Cigarette nicotine dependence without complication     Elevated BP without diagnosis of hypertension 05/22/2021    Lung nodules 05/18/2021    Chronic obstructive pulmonary disease ([facility] ) 05/18/2021    Chronic respiratory failure with hypoxia ([facility]) 05/18/2021    Nicotine dependence 05/18/2021    Microalbuminuria 05/17/2021    Uncontrolled type 2 diabetes mellitus with hyperglycemia ([facility]) 05/11/2021    Allergic rhinitis     Type 2 diabetes, HbA1c goal < 7% ([facility])     Anxiety      She  has a past surgical history that includes Breast surgery (Right); Other surgical history; and Other surgical history  Her family history includes Diabetes in her brother; Heart disease in her brother; Hypotension in her daughter; Lymphoma in her brother; No Known Problems in her sister; Parkinsonism in her sister; Prostate cancer in her father; Proteinuria in her daughter  She  reports that she quit smoking about 8 months ago  Her smoking use included cigarettes  She has a 15 00 pack-year smoking history  She has never used smokeless tobacco  She reports current alcohol use of about 1 0 - 2 0 standard drink of alcohol per week  She reports that she does not use drugs  Current Outpatient Medications   Medication Sig Dispense Refill    furosemide (LASIX) 20 mg tablet Take 3 tablets (60 mg total) by mouth daily 270 tablet 1    hydrOXYzine HCL (ATARAX) 10 mg tablet Take 1 tablet (10 mg total) by mouth 2 (two) times a day as needed for itching 60 tablet 0    potassium chloride (K-DUR,KLOR-CON) 20 mEq tablet Take 1 tablet (20 mEq total) by mouth daily 30 tablet 8    triamcinolone (KENALOG) 0 1 % cream Apply topically 2 (two) times a day 30 g 0    fluticasone-umeclidinium-vilanterol (Trelegy Ellipta) 100-62 5-25 MCG/INH inhaler Inhale 1 puff daily Rinse mouth after use   (Patient not taking: Reported on 1/24/2022 ) 60 blister 3    sertraline (Zoloft) 25 mg tablet Take 1 tablet (25 mg total) by mouth daily 30 tablet 1    tiotropium (Spiriva Respimat) 1 25 MCG/ACT AERS inhaler Inhale 2 puffs daily 4 g 1     No current facility-administered medications for this visit  Current Outpatient Medications on File Prior to Visit   Medication Sig    furosemide (LASIX) 20 mg tablet Take 3 tablets (60 mg total) by mouth daily    hydrOXYzine HCL (ATARAX) 10 mg tablet Take 1 tablet (10 mg total) by mouth 2 (two) times a day as needed for itching    potassium chloride (K-DUR,KLOR-CON) 20 mEq tablet Take 1 tablet (20 mEq total) by mouth daily    triamcinolone (KENALOG) 0 1 % cream Apply topically 2 (two) times a day    fluticasone-umeclidinium-vilanterol (Trelegy Ellipta) 100-62 5-25 MCG/INH inhaler Inhale 1 puff daily Rinse mouth after use  (Patient not taking: Reported on 1/24/2022 )     No current facility-administered medications on file prior to visit  She has No Known Allergies       Review of Systems   Constitutional: Negative for chills and fever  HENT: Negative for congestion, ear pain and sore throat  Eyes: Negative for pain  Respiratory: Negative for cough and shortness of breath  Cardiovascular: Negative for chest pain and leg swelling  Gastrointestinal: Negative for abdominal pain, nausea and vomiting  Endocrine: Negative for polyuria  Genitourinary: Negative for difficulty urinating, frequency and urgency  Musculoskeletal: Negative for arthralgias and back pain  Skin: Negative for rash  Neurological: Negative for weakness and headaches  Psychiatric/Behavioral: Negative for sleep disturbance  The patient is not nervous/anxious            Objective:      /86 (BP Location: Right arm, Patient Position: Sitting, Cuff Size: Standard)   Pulse 94   Temp 99 2 °F (37 3 °C) (Temporal)   Ht 5' 4" (1 626 m)   Wt 68 9 kg (152 lb)   SpO2 93%   BMI 26 09 kg/m²     No results found for this or any previous visit (from the past 1344 hour(s))  Physical Exam  Constitutional:       Appearance: Normal appearance  HENT:      Head: Normocephalic  Right Ear: Tympanic membrane, ear canal and external ear normal       Left Ear: Tympanic membrane, ear canal and external ear normal       Nose: Nose normal  No congestion  Mouth/Throat:      Mouth: Mucous membranes are moist       Pharynx: Oropharynx is clear  No oropharyngeal exudate or posterior oropharyngeal erythema  Eyes:      Extraocular Movements: Extraocular movements intact  Conjunctiva/sclera: Conjunctivae normal       Pupils: Pupils are equal, round, and reactive to light  Cardiovascular:      Rate and Rhythm: Normal rate and regular rhythm  Heart sounds: Normal heart sounds  No murmur heard  Pulmonary:      Effort: Pulmonary effort is normal       Breath sounds: Normal breath sounds  No wheezing or rales  Abdominal:      General: Bowel sounds are normal  There is no distension  Palpations: Abdomen is soft  Tenderness: There is no abdominal tenderness  Musculoskeletal:         General: Normal range of motion  Cervical back: Normal range of motion and neck supple  Right lower leg: No edema  Left lower leg: No edema  Lymphadenopathy:      Cervical: No cervical adenopathy  Skin:     General: Skin is warm  Neurological:      General: No focal deficit present  Mental Status: She is alert and oriented to person, place, and time

## 2022-02-08 ENCOUNTER — TELEPHONE (OUTPATIENT)
Dept: INTERNAL MEDICINE CLINIC | Facility: CLINIC | Age: 77
End: 2022-02-08

## 2022-02-09 ENCOUNTER — TELEPHONE (OUTPATIENT)
Dept: INTERNAL MEDICINE CLINIC | Facility: CLINIC | Age: 77
End: 2022-02-09

## 2022-02-15 ENCOUNTER — APPOINTMENT (OUTPATIENT)
Dept: LAB | Facility: CLINIC | Age: 77
End: 2022-02-15
Payer: MEDICARE

## 2022-02-15 DIAGNOSIS — I50.42 CHRONIC COMBINED SYSTOLIC AND DIASTOLIC CONGESTIVE HEART FAILURE (HCC): ICD-10-CM

## 2022-02-15 DIAGNOSIS — M19.90 ARTHRITIS: ICD-10-CM

## 2022-02-15 DIAGNOSIS — I27.20 PULMONARY HYPERTENSION (HCC): ICD-10-CM

## 2022-02-15 DIAGNOSIS — I50.82 CONGESTIVE HEART FAILURE WITH RIGHT VENTRICULAR SYSTOLIC DYSFUNCTION (HCC): ICD-10-CM

## 2022-02-15 DIAGNOSIS — I50.20 CONGESTIVE HEART FAILURE WITH RIGHT VENTRICULAR SYSTOLIC DYSFUNCTION (HCC): ICD-10-CM

## 2022-02-15 DIAGNOSIS — R76.8 RHEUMATOID FACTOR POSITIVE: ICD-10-CM

## 2022-02-15 DIAGNOSIS — R73.01 IMPAIRED FASTING BLOOD SUGAR: ICD-10-CM

## 2022-02-15 LAB
ALBUMIN SERPL BCP-MCNC: 4.3 G/DL (ref 3.5–5)
ALP SERPL-CCNC: 73 U/L (ref 46–116)
ALT SERPL W P-5'-P-CCNC: 23 U/L (ref 12–78)
ANION GAP SERPL CALCULATED.3IONS-SCNC: 9 MMOL/L (ref 4–13)
AST SERPL W P-5'-P-CCNC: 15 U/L (ref 5–45)
BASOPHILS # BLD AUTO: 0.06 THOUSANDS/ΜL (ref 0–0.1)
BASOPHILS NFR BLD AUTO: 1 % (ref 0–1)
BILIRUB SERPL-MCNC: 1.1 MG/DL (ref 0.2–1)
BUN SERPL-MCNC: 20 MG/DL (ref 5–25)
CALCIUM SERPL-MCNC: 9.5 MG/DL (ref 8.3–10.1)
CHLORIDE SERPL-SCNC: 99 MMOL/L (ref 100–108)
CHOLEST SERPL-MCNC: 221 MG/DL
CO2 SERPL-SCNC: 26 MMOL/L (ref 21–32)
CREAT SERPL-MCNC: 0.79 MG/DL (ref 0.6–1.3)
EOSINOPHIL # BLD AUTO: 0.12 THOUSAND/ΜL (ref 0–0.61)
EOSINOPHIL NFR BLD AUTO: 2 % (ref 0–6)
ERYTHROCYTE [DISTWIDTH] IN BLOOD BY AUTOMATED COUNT: 12.8 % (ref 11.6–15.1)
EST. AVERAGE GLUCOSE BLD GHB EST-MCNC: 134 MG/DL
GFR SERPL CREATININE-BSD FRML MDRD: 71 ML/MIN/1.73SQ M
GLUCOSE P FAST SERPL-MCNC: 183 MG/DL (ref 65–99)
HBA1C MFR BLD: 6.3 %
HCT VFR BLD AUTO: 47 % (ref 34.8–46.1)
HDLC SERPL-MCNC: 75 MG/DL
HGB BLD-MCNC: 15.5 G/DL (ref 11.5–15.4)
IMM GRANULOCYTES # BLD AUTO: 0.01 THOUSAND/UL (ref 0–0.2)
IMM GRANULOCYTES NFR BLD AUTO: 0 % (ref 0–2)
LDLC SERPL CALC-MCNC: 130 MG/DL (ref 0–100)
LYMPHOCYTES # BLD AUTO: 1.55 THOUSANDS/ΜL (ref 0.6–4.47)
LYMPHOCYTES NFR BLD AUTO: 26 % (ref 14–44)
MCH RBC QN AUTO: 31.4 PG (ref 26.8–34.3)
MCHC RBC AUTO-ENTMCNC: 33 G/DL (ref 31.4–37.4)
MCV RBC AUTO: 95 FL (ref 82–98)
MONOCYTES # BLD AUTO: 0.44 THOUSAND/ΜL (ref 0.17–1.22)
MONOCYTES NFR BLD AUTO: 7 % (ref 4–12)
NEUTROPHILS # BLD AUTO: 3.9 THOUSANDS/ΜL (ref 1.85–7.62)
NEUTS SEG NFR BLD AUTO: 64 % (ref 43–75)
NRBC BLD AUTO-RTO: 0 /100 WBCS
PLATELET # BLD AUTO: 228 THOUSANDS/UL (ref 149–390)
PMV BLD AUTO: 11.7 FL (ref 8.9–12.7)
POTASSIUM SERPL-SCNC: 3.7 MMOL/L (ref 3.5–5.3)
PROT SERPL-MCNC: 8.1 G/DL (ref 6.4–8.2)
RBC # BLD AUTO: 4.93 MILLION/UL (ref 3.81–5.12)
SODIUM SERPL-SCNC: 134 MMOL/L (ref 136–145)
TRIGL SERPL-MCNC: 79 MG/DL
TSH SERPL DL<=0.05 MIU/L-ACNC: 1.92 UIU/ML (ref 0.36–3.74)
WBC # BLD AUTO: 6.08 THOUSAND/UL (ref 4.31–10.16)

## 2022-02-15 PROCEDURE — 85025 COMPLETE CBC W/AUTO DIFF WBC: CPT

## 2022-02-15 PROCEDURE — 83036 HEMOGLOBIN GLYCOSYLATED A1C: CPT

## 2022-02-15 PROCEDURE — 84443 ASSAY THYROID STIM HORMONE: CPT

## 2022-02-15 PROCEDURE — 36415 COLL VENOUS BLD VENIPUNCTURE: CPT

## 2022-02-15 PROCEDURE — 86235 NUCLEAR ANTIGEN ANTIBODY: CPT

## 2022-02-15 PROCEDURE — 80061 LIPID PANEL: CPT

## 2022-02-15 PROCEDURE — 80053 COMPREHEN METABOLIC PANEL: CPT

## 2022-02-16 LAB
ENA SS-A AB SER-ACNC: <0.2 AI (ref 0–0.9)
ENA SS-B AB SER-ACNC: <0.2 AI (ref 0–0.9)

## 2022-02-17 ENCOUNTER — TELEPHONE (OUTPATIENT)
Dept: NEPHROLOGY | Facility: CLINIC | Age: 77
End: 2022-02-17

## 2022-02-17 NOTE — TELEPHONE ENCOUNTER
Left message with patient to please call back and reschedule appointment with Dr Harris Banegas 03/11 due to her going on maternity leave early

## 2022-02-21 ENCOUNTER — OFFICE VISIT (OUTPATIENT)
Dept: INTERNAL MEDICINE CLINIC | Facility: CLINIC | Age: 77
End: 2022-02-21
Payer: MEDICARE

## 2022-02-21 VITALS
HEIGHT: 64 IN | DIASTOLIC BLOOD PRESSURE: 82 MMHG | WEIGHT: 153 LBS | BODY MASS INDEX: 26.12 KG/M2 | OXYGEN SATURATION: 94 % | HEART RATE: 92 BPM | SYSTOLIC BLOOD PRESSURE: 142 MMHG | TEMPERATURE: 99.6 F

## 2022-02-21 DIAGNOSIS — M81.0 SENILE OSTEOPOROSIS: ICD-10-CM

## 2022-02-21 DIAGNOSIS — I50.42 CHRONIC COMBINED SYSTOLIC AND DIASTOLIC CONGESTIVE HEART FAILURE (HCC): Primary | ICD-10-CM

## 2022-02-21 DIAGNOSIS — J44.9 CHRONIC OBSTRUCTIVE PULMONARY DISEASE, UNSPECIFIED COPD TYPE (HCC): ICD-10-CM

## 2022-02-21 DIAGNOSIS — F41.9 ANXIETY: ICD-10-CM

## 2022-02-21 DIAGNOSIS — R73.01 IMPAIRED FASTING BLOOD SUGAR: ICD-10-CM

## 2022-02-21 DIAGNOSIS — Z13.820 SCREENING FOR OSTEOPOROSIS: ICD-10-CM

## 2022-02-21 PROCEDURE — 99214 OFFICE O/P EST MOD 30 MIN: CPT | Performed by: INTERNAL MEDICINE

## 2022-02-21 RX ORDER — IPRATROPIUM BROMIDE 17 UG/1
2 AEROSOL, METERED RESPIRATORY (INHALATION) 3 TIMES DAILY
Qty: 12.9 G | Refills: 2 | Status: SHIPPED | OUTPATIENT
Start: 2022-02-21

## 2022-02-21 NOTE — PROGRESS NOTES
Assessment/Plan:     advised her to use otc ear wax drop for right ear  1  Chronic combined systolic and diastolic congestive heart failure ([address] )    2  Impaired fasting blood sugar    3  Chronic obstructive pulmonary disease, unspecified COPD type (HCC)  -     ipratropium (Atrovent HFA) 17 mcg/act inhaler; Inhale 2 puffs 3 (three) times a day    4  Anxiety    5  Screening for osteoporosis    6  Senile osteoporosis  -     DXA bone density spine hip and pelvis; Future; Expected date: 02/21/2022           Subjective:      Patient ID: Priyank Colindres is a 64 y o  female  Follow up on blood test done on 2/15/2022-discussed with her, doing well emotionally      The following portions of the patient's history were reviewed and updated as appropriate: She  has a past medical history of Allergic rhinitis, Anxiety, Arthritis, Diabetes mellitus ([address] ), Oxygen decrease, and Tobacco abuse    She   Patient Active Problem List    Diagnosis Date Noted    Dysthymic disorder 01/24/2022    Impaired fasting blood sugar 01/24/2022    Gastrointestinal hemorrhage 10/21/2021    Dermatitis 10/21/2021    Primary osteoarthritis of both hands 10/21/2021    Congestive heart failure with right ventricular systolic dysfunction ([address] ) 09/23/2021    Rheumatoid factor positive 09/22/2021    Arthritis 09/22/2021    Screening for osteoporosis 06/30/2021    Hypoxia 06/16/2021    Essential hypertension, benign 06/02/2021    Chronic combined systolic and diastolic congestive heart failure ([address] ) 06/02/2021    Pulmonary hypertension ([facility] ) 06/02/2021    Cigarette nicotine dependence without complication     Elevated BP without diagnosis of hypertension 05/22/2021    Lung nodules 05/18/2021    Chronic obstructive pulmonary disease ([address] ) 05/18/2021    Chronic respiratory failure with hypoxia (HCC) 05/18/2021    Nicotine dependence 05/18/2021    Microalbuminuria 05/17/2021    Uncontrolled type 2 diabetes mellitus with hyperglycemia ([facility] ) 05/11/2021    Allergic rhinitis     Type 2 diabetes, HbA1c goal < 7% ([facility])     Anxiety      She  has a past surgical history that includes Breast surgery (Right); Other surgical history; and Other surgical history  Her family history includes Diabetes in her brother; Heart disease in her brother; Hypotension in her daughter; Lymphoma in her brother; No Known Problems in her sister; Parkinsonism in her sister; Prostate cancer in her father; Proteinuria in her daughter  She  reports that she quit smoking about 9 months ago  Her smoking use included cigarettes  She has a 15 00 pack-year smoking history  She has never used smokeless tobacco  She reports current alcohol use of about 1 0 - 2 0 standard drink of alcohol per week  She reports that she does not use drugs  Current Outpatient Medications   Medication Sig Dispense Refill    furosemide (LASIX) 20 mg tablet Take 3 tablets (60 mg total) by mouth daily 270 tablet 1    hydrOXYzine HCL (ATARAX) 10 mg tablet Take 1 tablet (10 mg total) by mouth 2 (two) times a day as needed for itching 60 tablet 0    potassium chloride (K-DUR,KLOR-CON) 20 mEq tablet Take 1 tablet (20 mEq total) by mouth daily 30 tablet 8    sertraline (Zoloft) 25 mg tablet Take 1 tablet (25 mg total) by mouth daily 30 tablet 1    triamcinolone (KENALOG) 0 1 % cream Apply topically 2 (two) times a day 30 g 0    fluticasone-umeclidinium-vilanterol (Trelegy Ellipta) 100-62 5-25 MCG/INH inhaler Inhale 1 puff daily Rinse mouth after use  (Patient not taking: Reported on 1/24/2022 ) 60 blister 3    ipratropium (Atrovent HFA) 17 mcg/act inhaler Inhale 2 puffs 3 (three) times a day 12 9 g 2     No current facility-administered medications for this visit       Current Outpatient Medications on File Prior to Visit   Medication Sig    furosemide (LASIX) 20 mg tablet Take 3 tablets (60 mg total) by mouth daily    hydrOXYzine HCL (ATARAX) 10 mg tablet Take 1 tablet (10 mg total) by mouth 2 (two) times a day as needed for itching    potassium chloride (K-DUR,KLOR-CON) 20 mEq tablet Take 1 tablet (20 mEq total) by mouth daily    sertraline (Zoloft) 25 mg tablet Take 1 tablet (25 mg total) by mouth daily    triamcinolone (KENALOG) 0 1 % cream Apply topically 2 (two) times a day    fluticasone-umeclidinium-vilanterol (Trelegy Ellipta) 100-62 5-25 MCG/INH inhaler Inhale 1 puff daily Rinse mouth after use  (Patient not taking: Reported on 1/24/2022 )    [DISCONTINUED] tiotropium (Spiriva Respimat) 1 25 MCG/ACT AERS inhaler Inhale 2 puffs daily (Patient not taking: Reported on 2/21/2022 )     No current facility-administered medications on file prior to visit  She has No Known Allergies       Review of Systems   Constitutional: Negative for chills and fever  HENT: Negative for congestion, ear pain and sore throat  Eyes: Negative for pain  Respiratory: Negative for cough and shortness of breath  Cardiovascular: Negative for chest pain and leg swelling  Gastrointestinal: Negative for abdominal pain, nausea and vomiting  Endocrine: Negative for polyuria  Genitourinary: Negative for difficulty urinating, frequency and urgency  Musculoskeletal: Negative for arthralgias and back pain  Skin: Negative for rash  Neurological: Negative for weakness and headaches  Psychiatric/Behavioral: Negative for sleep disturbance  The patient is not nervous/anxious            Objective:      /82 (BP Location: Right arm, Patient Position: Sitting, Cuff Size: Standard)   Pulse 92   Temp 99 6 °F (37 6 °C) (Temporal)   Ht 5' 4" (1 626 m)   Wt 69 4 kg (153 lb)   SpO2 94%   BMI 26 26 kg/m²     Recent Results (from the past 1344 hour(s))   Sjogren's Antibodies    Collection Time: 02/15/22  9:19 AM   Result Value Ref Range    SS-A (RO) Ab <0 2 0 0 - 0 9 AI    SS-B (LA) Ab <0 2 0 0 - 0 9 AI   CBC and differential    Collection Time: 02/15/22  9:19 AM   Result Value Ref Range    WBC 6 08 4 31 - 10 16 Thousand/uL    RBC 4 93 3 81 - 5 12 Million/uL    Hemoglobin 15 5 (H) 11 5 - 15 4 g/dL    Hematocrit 47 0 (H) 34 8 - 46 1 %    MCV 95 82 - 98 fL    MCH 31 4 26 8 - 34 3 pg    MCHC 33 0 31 4 - 37 4 g/dL    RDW 12 8 11 6 - 15 1 %    MPV 11 7 8 9 - 12 7 fL    Platelets 388 487 - 605 Thousands/uL    nRBC 0 /100 WBCs    Neutrophils Relative 64 43 - 75 %    Immat GRANS % 0 0 - 2 %    Lymphocytes Relative 26 14 - 44 %    Monocytes Relative 7 4 - 12 %    Eosinophils Relative 2 0 - 6 %    Basophils Relative 1 0 - 1 %    Neutrophils Absolute 3 90 1 85 - 7 62 Thousands/µL    Immature Grans Absolute 0 01 0 00 - 0 20 Thousand/uL    Lymphocytes Absolute 1 55 0 60 - 4 47 Thousands/µL    Monocytes Absolute 0 44 0 17 - 1 22 Thousand/µL    Eosinophils Absolute 0 12 0 00 - 0 61 Thousand/µL    Basophils Absolute 0 06 0 00 - 0 10 Thousands/µL   Comprehensive metabolic panel    Collection Time: 02/15/22  9:19 AM   Result Value Ref Range    Sodium 134 (L) 136 - 145 mmol/L    Potassium 3 7 3 5 - 5 3 mmol/L    Chloride 99 (L) 100 - 108 mmol/L    CO2 26 21 - 32 mmol/L    ANION GAP 9 4 - 13 mmol/L    BUN 20 5 - 25 mg/dL    Creatinine 0 79 0 60 - 1 30 mg/dL    Glucose, Fasting 183 (H) 65 - 99 mg/dL    Calcium 9 5 8 3 - 10 1 mg/dL    AST 15 5 - 45 U/L    ALT 23 12 - 78 U/L    Alkaline Phosphatase 73 46 - 116 U/L    Total Protein 8 1 6 4 - 8 2 g/dL    Albumin 4 3 3 5 - 5 0 g/dL    Total Bilirubin 1 10 (H) 0 20 - 1 00 mg/dL    eGFR 71 ml/min/1 73sq m   Hemoglobin A1C    Collection Time: 02/15/22  9:19 AM   Result Value Ref Range    Hemoglobin A1C 6 3 (H) Normal 3 8-5 6%; PreDiabetic 5 7-6 4%;  Diabetic >=6 5%; Glycemic control for adults with diabetes <7 0% %     mg/dl   Lipid Panel with Direct LDL reflex    Collection Time: 02/15/22  9:19 AM   Result Value Ref Range    Cholesterol 221 (H) See Comment mg/dL    Triglycerides 79 See Comment mg/dL    HDL, Direct 75 >=50 mg/dL    LDL Calculated 130 (H) 0 - 100 mg/dL   TSH, 3rd generation Collection Time: 02/15/22  9:19 AM   Result Value Ref Range    TSH 3RD GENERATON 1 920 0 358 - 3 740 uIU/mL        Physical Exam  Constitutional:       Appearance: Normal appearance  HENT:      Head: Normocephalic  Right Ear: Tympanic membrane, ear canal and external ear normal       Left Ear: Tympanic membrane, ear canal and external ear normal       Nose: Nose normal  No congestion  Mouth/Throat:      Mouth: Mucous membranes are moist       Pharynx: Oropharynx is clear  No oropharyngeal exudate or posterior oropharyngeal erythema  Eyes:      Extraocular Movements: Extraocular movements intact  Conjunctiva/sclera: Conjunctivae normal       Pupils: Pupils are equal, round, and reactive to light  Cardiovascular:      Rate and Rhythm: Normal rate and regular rhythm  Heart sounds: Normal heart sounds  No murmur heard  Pulmonary:      Effort: Pulmonary effort is normal       Breath sounds: Normal breath sounds  No wheezing or rales  Abdominal:      General: Bowel sounds are normal  There is no distension  Palpations: Abdomen is soft  Tenderness: There is no abdominal tenderness  Musculoskeletal:         General: Normal range of motion  Cervical back: Normal range of motion and neck supple  Right lower leg: No edema  Left lower leg: No edema  Lymphadenopathy:      Cervical: No cervical adenopathy  Skin:     General: Skin is warm  Neurological:      General: No focal deficit present  Mental Status: She is alert and oriented to person, place, and time

## 2022-03-17 NOTE — TELEPHONE ENCOUNTER
Spoke with patient  She states she needs to find out what her family situation is  She may need to go back to [place]   She will call back when she finds out

## 2022-03-21 ENCOUNTER — OFFICE VISIT (OUTPATIENT)
Dept: INTERNAL MEDICINE CLINIC | Facility: CLINIC | Age: 77
End: 2022-03-21
Payer: MEDICARE

## 2022-03-21 VITALS
HEIGHT: 64 IN | WEIGHT: 153 LBS | DIASTOLIC BLOOD PRESSURE: 86 MMHG | BODY MASS INDEX: 26.12 KG/M2 | SYSTOLIC BLOOD PRESSURE: 140 MMHG | TEMPERATURE: 98.6 F | HEART RATE: 90 BPM | OXYGEN SATURATION: 92 %

## 2022-03-21 DIAGNOSIS — R73.01 IMPAIRED FASTING BLOOD SUGAR: ICD-10-CM

## 2022-03-21 DIAGNOSIS — H61.21 CERUMEN DEBRIS ON TYMPANIC MEMBRANE OF RIGHT EAR: ICD-10-CM

## 2022-03-21 DIAGNOSIS — J44.9 CHRONIC OBSTRUCTIVE PULMONARY DISEASE, UNSPECIFIED COPD TYPE (HCC): Primary | ICD-10-CM

## 2022-03-21 DIAGNOSIS — F34.1 DYSTHYMIC DISORDER: ICD-10-CM

## 2022-03-21 DIAGNOSIS — I50.42 CHRONIC COMBINED SYSTOLIC AND DIASTOLIC CONGESTIVE HEART FAILURE (HCC): ICD-10-CM

## 2022-03-21 PROCEDURE — 99214 OFFICE O/P EST MOD 30 MIN: CPT | Performed by: INTERNAL MEDICINE

## 2022-05-12 ENCOUNTER — OFFICE VISIT (OUTPATIENT)
Dept: CARDIOLOGY CLINIC | Facility: CLINIC | Age: 77
End: 2022-05-12
Payer: MEDICARE

## 2022-05-12 VITALS
HEART RATE: 106 BPM | WEIGHT: 156 LBS | HEIGHT: 64 IN | DIASTOLIC BLOOD PRESSURE: 84 MMHG | BODY MASS INDEX: 26.63 KG/M2 | SYSTOLIC BLOOD PRESSURE: 154 MMHG | OXYGEN SATURATION: 94 %

## 2022-05-12 DIAGNOSIS — I50.82 CONGESTIVE HEART FAILURE WITH RIGHT VENTRICULAR SYSTOLIC DYSFUNCTION (HCC): ICD-10-CM

## 2022-05-12 DIAGNOSIS — E78.5 DYSLIPIDEMIA, GOAL LDL BELOW 70: ICD-10-CM

## 2022-05-12 DIAGNOSIS — I50.42 CHRONIC COMBINED SYSTOLIC AND DIASTOLIC CONGESTIVE HEART FAILURE (HCC): ICD-10-CM

## 2022-05-12 DIAGNOSIS — I50.20 CONGESTIVE HEART FAILURE WITH RIGHT VENTRICULAR SYSTOLIC DYSFUNCTION (HCC): ICD-10-CM

## 2022-05-12 DIAGNOSIS — I10 ESSENTIAL HYPERTENSION, BENIGN: Primary | ICD-10-CM

## 2022-05-12 DIAGNOSIS — I27.20 PULMONARY HYPERTENSION (HCC): ICD-10-CM

## 2022-05-12 PROCEDURE — 99214 OFFICE O/P EST MOD 30 MIN: CPT | Performed by: INTERNAL MEDICINE

## 2022-05-12 PROCEDURE — 93000 ELECTROCARDIOGRAM COMPLETE: CPT | Performed by: INTERNAL MEDICINE

## 2022-05-12 NOTE — PROGRESS NOTES
Cardiology Follow Up    Jennifer Warner  1945  [id number]  [address]  [facility] [address]   [phone]    1  Essential hypertension, benign     2  Chronic combined systolic and diastolic congestive heart failure ([address] )     3  Pulmonary hypertension ([address] )     4  Congestive heart failure with right ventricular systolic dysfunction ([address] )         Diagnoses and all orders for this visit:    Essential hypertension, benign    Chronic combined systolic and diastolic congestive heart failure (HCC)    Pulmonary hypertension (HCC)    Congestive heart failure with right ventricular systolic dysfunction ([address] )      I had the pleasure of seeing Jennifer Warner for a follow up visit  INTERVAL HISTORY: none, no further hospitalizations    History of the presenting illness, Discussion/Summary and My Plan are as follows:::    Isa Reyes is a pleasant 73-year-old lady with a history diabetes-not on any medications, last A1c of 6 3, prior tobacco use-now quit, was admitted with acute right more than left biventricular congestive heart failure in May 2021 with a proBNP of 3K  Needed intravenous diuresis and ultimately was discharged on Lasix 80 mg twice daily, currently on 20 mg once daily and appears euvolemic with stable weight around 152 lb at home now  No longer on oxygen  She has not had any further hospitalizations in the last 12 months  No symptoms at moderate levels of exertions  Plan:    Chronic biventricular congestive heart failure: With preserved LV systolic function and LV diastolic dysfunction, right ventricular dilatation and dysfunction  Remains on Lasix at 20 mg daily-will continue the same dose  Normal renal function and electrolytes - Feb 2022, no edema, pulm edema  Euvolemic at this time at 152 lb at home on Lasix 20 daily     Coronary calcifications on CT: no symptoms at this time   Will consider a stress test with any symptoms  No FH of CAD  Right ventricular dilatation and dysfunction: with Mild puln HTN on echo - PASP of 48 mm, good doppler signal   Likely group 2 and group 3 from pulmonary disease  No pulmonary artery enlargement, no notching on echo, cardiac MRI without delayed gadolinium enhancement  But with IVC dilatation  Will recheck an echo to assess for any progression  Rheumatology panel was essentially negative but is being checked for RA through her rheumatologist   Has quit smoking and commended on that    Itching involving the neck and arms:  Started soon after the hospital stay  Only medication she is on is Lasix with potassium  Offered to switch to torsemide although this also has a sulfa moiety, not even sure if this could be a drug rash/allergy  Now that she is on minimal Lasix and itching continues, I told her she can try coming off of it for a week to see if her itching improves  She will think about it  Elevated BPs: excited today and stressed out, was normal at home recently, will continue to follow at home    Lipids are progressively increasing:see below, she will watch her saturated fat intake and recheck in 6mo    Follow-up in 6 months       Latest Reference Range & Units 05/12/21 11:58 08/16/21 10:02 02/15/22 09:19   Cholesterol See Comment mg/dL 136 [1] 209 (H) [2] 221 (H) [3]   Triglycerides See Comment mg/dL 49 [4] 74 [5] 79 [6]   HDL >=50 mg/dL 71 [7] 78 [8] 75 [9]   LDL Calculated 0 - 100 mg/dL 55 [10] 116 (H) [11] 130 (H) [12]   (H): Data is abnormally high     Latest Reference Range & Units 02/15/22 09:19   BUN 5 - 25 mg/dL 20   Creatinine 0 60 - 1 30 mg/dL 0 79 [1]   [1  Results for Alessia Cifuentes (MRN [id number]) as of 9/23/2021 13:11   Ref   Range 5/20/2021 14:35   ANTI-NUCLEAR ANTIBODY (FARIDEH) Latest Ref Range: Negative  Negative   Atypical pANCA Latest Ref Range: Neg:<1:20 titer <1:20   C-ANCA Latest Ref Range: Neg:<1:20 titer <1:20   C3 Complement Latest Ref Range: 90 0 - 180 0 mg/dL 94 3   C4, COMPLEMENT Latest Ref Range: 10 0 - 40 0 mg/dL 15 0   P-ANCA Latest Ref Range: Neg:<1:20 titer <1:20   EXTERNAL HI-3 AB Latest Ref Range: 0 0 - 3 5 U/mL <3 5   RF Quantitation Latest Ref Range: (none)  20 IU/mL (A)   RHEUMATOID FACTOR Latest Ref Range: Negative  Positive (A)     Patient Active Problem List   Diagnosis    Allergic rhinitis    Type 2 diabetes, HbA1c goal < 7% (HCC)    Anxiety    Uncontrolled type 2 diabetes mellitus with hyperglycemia (HCC)    Microalbuminuria    Lung nodules    Chronic obstructive pulmonary disease (HCC)    Chronic respiratory failure with hypoxia (HCC)    Nicotine dependence    Elevated BP without diagnosis of hypertension    Essential hypertension, benign    Chronic combined systolic and diastolic congestive heart failure (HCC)    Pulmonary hypertension (HCC)    Cigarette nicotine dependence without complication    Hypoxia    Screening for osteoporosis    Rheumatoid factor positive    Arthritis    Congestive heart failure with right ventricular systolic dysfunction (HCC)    Gastrointestinal hemorrhage    Dermatitis    Primary osteoarthritis of both hands    Dysthymic disorder    Impaired fasting blood sugar    Cerumen debris on tympanic membrane of right ear     Past Medical History:   Diagnosis Date    Allergic rhinitis     Anxiety     Arthritis     Diabetes mellitus ([address] )     Oxygen decrease     Tobacco abuse      Social History     Socioeconomic History    Marital status: /Civil Union     Spouse name: Not on file    Number of children: 1    Years of education: Not on file    Highest education level: Some college, no degree   Occupational History    Not on file   Tobacco Use    Smoking status: Former Smoker     Packs/day: 0 50     Years: 30 00     Pack years: 15 00     Types: Cigarettes     Quit date: 2021     Years since quittin 9    Smokeless tobacco: Never Used    Tobacco comment: Began smoking in her late teens, averaging 1/2 pack daily  States she quit smoking a few weeks ago (Updated 05/21/2021)  Vaping Use    Vaping Use: Never used   Substance and Sexual Activity    Alcohol use: Yes     Alcohol/week: 1 0 - 2 0 standard drink     Types: 1 - 2 Glasses of wine per week     Comment: On average, will drink 1-2 glasses of white wine daily with dinner  (Updated 05/21/2021),     Drug use: Never     Comment: Last assessed 05/21/2021   Sexual activity: Not on file   Other Topics Concern    Not on file   Social History Narrative    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 ; 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  Has 1 biological daughter Sandro Dutta) who lives locally  Social Determinants of Health     Financial Resource Strain: Not on file   Food Insecurity: No Food Insecurity    Worried About Running Out of Food in the Last Year: Never true    Darrin of Food in the Last Year: Never true   Transportation Needs: No Transportation Needs    Lack of Transportation (Medical): No    Lack of Transportation (Non-Medical):  No   Physical Activity: Not on file   Stress: Not on file   Social Connections: Not on file   Intimate Partner Violence: Not on file   Housing Stability: Not on file      Family History   Problem Relation Age of Onset    Prostate cancer Father     Diabetes Brother     Heart disease Brother     Lymphoma Brother     No Known Problems Sister     Parkinsonism Sister     Hypotension Daughter         Takes "salt pills"    Proteinuria Daughter         Occuring during her 3 pregnancies; follows with nephrology     Past Surgical History:   Procedure Laterality Date    BREAST SURGERY Right     Cyst    OTHER SURGICAL HISTORY      Cataract implant    OTHER SURGICAL HISTORY      Fertilization       Current Outpatient Medications:     furosemide (LASIX) 20 mg tablet, Take 3 tablets (60 mg total) by mouth daily (Patient taking differently: Take 20 mg by mouth in the morning ), Disp: 270 tablet, Rfl: 1    hydrOXYzine HCL (ATARAX) 10 mg tablet, Take 1 tablet (10 mg total) by mouth 2 (two) times a day as needed for itching, Disp: 60 tablet, Rfl: 0    potassium chloride (K-DUR,KLOR-CON) 20 mEq tablet, Take 1 tablet (20 mEq total) by mouth daily (Patient taking differently: Take 10 mEq by mouth 3 (three) times a week), Disp: 30 tablet, Rfl: 8    sertraline (Zoloft) 25 mg tablet, Take 1 tablet (25 mg total) by mouth daily, Disp: 30 tablet, Rfl: 1    triamcinolone (KENALOG) 0 1 % cream, Apply topically 2 (two) times a day, Disp: 30 g, Rfl: 0    ipratropium (Atrovent HFA) 17 mcg/act inhaler, Inhale 2 puffs 3 (three) times a day (Patient not taking: No sig reported), Disp: 12 9 g, Rfl: 2  No Known Allergies    Imaging: No results found  Review of Systems:  Review of Systems   Constitutional: Negative  Itching back - chronic   HENT: Negative  Eyes: Negative  Respiratory: Negative  Cardiovascular: Negative  Endocrine: Negative  Musculoskeletal: Negative  Physical Exam:  /84 (BP Location: Left arm, Patient Position: Sitting, Cuff Size: Standard)   Pulse (!) 106   Ht 5' 4" (1 626 m)   Wt 70 8 kg (156 lb)   SpO2 94%   BMI 26 78 kg/m²   Physical Exam  Constitutional:       General: She is not in acute distress  Appearance: Normal appearance  She is not ill-appearing  HENT:      Head: Normocephalic  Nose: Nose normal  No congestion  Mouth/Throat:      Mouth: Mucous membranes are moist       Pharynx: No oropharyngeal exudate or posterior oropharyngeal erythema  Eyes:      General:         Right eye: No discharge  Left eye: No discharge  Pupils: Pupils are equal, round, and reactive to light  Neck:      Vascular: No carotid bruit  Cardiovascular:      Rate and Rhythm: Normal rate and regular rhythm  Pulses: Normal pulses  Heart sounds: No murmur heard  No friction rub  No gallop     Pulmonary:      Effort: Pulmonary effort is normal  No respiratory distress  Breath sounds: No stridor  No wheezing or rhonchi  Abdominal:      General: Abdomen is flat  There is no distension  Palpations: There is no mass  Tenderness: There is no abdominal tenderness  Hernia: No hernia is present  Musculoskeletal:         General: No swelling, tenderness, deformity or signs of injury  Normal range of motion  Cervical back: Normal range of motion  No rigidity or tenderness  Lymphadenopathy:      Cervical: No cervical adenopathy  Neurological:      Mental Status: She is alert  This note was completed in part utilizing [employer] direct voice recognition software  Grammatical errors, random word insertion, spelling mistakes, occasional wrong word or "sound-alike" substitutions and incomplete sentences may be an occasional consequence of the system secondary to software limitations, ambient noise and hardware issues  At the time of dictation, efforts were made to edit, clarify and /or correct errors  Please read the chart carefully and recognize, using context, where substitutions have occurred  If you have any questions or concerns about the context, text or information contained within the body of this dictation, please contact myself, the provider, for further clarification

## 2022-05-12 NOTE — PATIENT INSTRUCTIONS
Therapeutic lifestyle changes were discussed with the patient- Specifically:  1  Decreasing saturated fat intake to less than 13 g per day  I showed the pt how to look at a food label  Watch intake of cheese, red meat, cream and butter containing foods  2  Increase soluble fiber in the diet-beans, pears, oatmeal  3  Weight loss of even 5-10 pounds will also help to lower cholesterol levels  Decreasing caloric intake by about 100 [place] a day-should lead to a weight loss of 1-2 pounds over one month  4  Increase aerobic physical activity - ultimate goal of 150 min per week  Start low and increase level and duration of activity slowly  5   Google 'TLC Cholesterol" = Your guide to lowering your cholesterol with TLC is probably the best online resource to lower your LDL cholesterol

## 2022-06-06 ENCOUNTER — OFFICE VISIT (OUTPATIENT)
Dept: INTERNAL MEDICINE CLINIC | Facility: CLINIC | Age: 77
End: 2022-06-06
Payer: MEDICARE

## 2022-06-06 VITALS
BODY MASS INDEX: 26.12 KG/M2 | TEMPERATURE: 98.2 F | WEIGHT: 153 LBS | HEIGHT: 64 IN | HEART RATE: 93 BPM | SYSTOLIC BLOOD PRESSURE: 134 MMHG | OXYGEN SATURATION: 96 % | DIASTOLIC BLOOD PRESSURE: 76 MMHG

## 2022-06-06 DIAGNOSIS — L30.9 DERMATITIS: ICD-10-CM

## 2022-06-06 DIAGNOSIS — M19.041 PRIMARY OSTEOARTHRITIS OF BOTH HANDS: ICD-10-CM

## 2022-06-06 DIAGNOSIS — I50.42 CHRONIC COMBINED SYSTOLIC AND DIASTOLIC CONGESTIVE HEART FAILURE (HCC): ICD-10-CM

## 2022-06-06 DIAGNOSIS — J44.9 CHRONIC OBSTRUCTIVE PULMONARY DISEASE, UNSPECIFIED COPD TYPE (HCC): ICD-10-CM

## 2022-06-06 DIAGNOSIS — E78.2 MIXED HYPERLIPIDEMIA: ICD-10-CM

## 2022-06-06 DIAGNOSIS — M19.042 PRIMARY OSTEOARTHRITIS OF BOTH HANDS: ICD-10-CM

## 2022-06-06 DIAGNOSIS — F34.1 DYSTHYMIC DISORDER: ICD-10-CM

## 2022-06-06 DIAGNOSIS — R73.01 IMPAIRED FASTING BLOOD SUGAR: Primary | ICD-10-CM

## 2022-06-06 DIAGNOSIS — F41.9 ANXIETY: ICD-10-CM

## 2022-06-06 PROCEDURE — 99214 OFFICE O/P EST MOD 30 MIN: CPT | Performed by: INTERNAL MEDICINE

## 2022-06-06 RX ORDER — HYDROXYZINE HYDROCHLORIDE 10 MG/1
10 TABLET, FILM COATED ORAL 2 TIMES DAILY PRN
Qty: 60 TABLET | Refills: 0 | Status: SHIPPED | OUTPATIENT
Start: 2022-06-06

## 2022-06-06 RX ORDER — TRIAMCINOLONE ACETONIDE 1 MG/G
CREAM TOPICAL 2 TIMES DAILY
Qty: 30 G | Refills: 0 | Status: SHIPPED | OUTPATIENT
Start: 2022-06-06

## 2022-06-06 RX ORDER — SERTRALINE HYDROCHLORIDE 25 MG/1
25 TABLET, FILM COATED ORAL DAILY
Qty: 30 TABLET | Refills: 1 | Status: CANCELLED | OUTPATIENT
Start: 2022-06-06

## 2022-06-06 NOTE — PROGRESS NOTES
Assessment/Plan:             1  Impaired fasting blood sugar  -     CBC and differential; Future  -     Hemoglobin A1C; Future  -     Microalbumin / creatinine urine ratio    2  Anxiety  -     hydrOXYzine HCL (ATARAX) 10 mg tablet; Take 1 tablet (10 mg total) by mouth 2 (two) times a day as needed for itching    3  Dysthymic disorder  -     sertraline (Zoloft) 50 mg tablet; Take 1 tablet (50 mg total) by mouth daily    4  Dermatitis  -     triamcinolone (KENALOG) 0 1 % cream; Apply topically 2 (two) times a day    5  Chronic combined systolic and diastolic congestive heart failure ([address] )    6  Chronic obstructive pulmonary disease, unspecified COPD type ([address] )    7  Primary osteoarthritis of both hands    8  Mixed hyperlipidemia  -     Comprehensive metabolic panel; Future  -     Lipid Panel with Direct LDL reflex; Future  -     TSH, 3rd generation; Future         Subjective:      Patient ID: Christopher Stewart is a 64 y o  female  Follow-up on multiple medical problems to ensure the stable on current medications      The following portions of the patient's history were reviewed and updated as appropriate: She  has a past medical history of Allergic rhinitis, Anxiety, Arthritis, Diabetes mellitus ([address] ), Oxygen decrease, and Tobacco abuse    She   Patient Active Problem List    Diagnosis Date Noted    Mixed hyperlipidemia 06/06/2022    Cerumen debris on tympanic membrane of right ear 03/21/2022    Dysthymic disorder 01/24/2022    Impaired fasting blood sugar 01/24/2022    Gastrointestinal hemorrhage 10/21/2021    Dermatitis 10/21/2021    Primary osteoarthritis of both hands 10/21/2021    Congestive heart failure with right ventricular systolic dysfunction ([address] ) 09/23/2021    Rheumatoid factor positive 09/22/2021    Arthritis 09/22/2021    Screening for osteoporosis 06/30/2021    Hypoxia 06/16/2021    Essential hypertension, benign 06/02/2021    Chronic combined systolic and diastolic congestive heart failure ([place] [address] ) 06/02/2021    Pulmonary hypertension ([place] [address] ) 06/02/2021    Cigarette nicotine dependence without complication     Elevated BP without diagnosis of hypertension 05/22/2021    Lung nodules 05/18/2021    Chronic obstructive pulmonary disease ([place] [address] ) 05/18/2021    Chronic respiratory failure with hypoxia ([facility]) 05/18/2021    Nicotine dependence 05/18/2021    Microalbuminuria 05/17/2021    Uncontrolled type 2 diabetes mellitus with hyperglycemia ([facility]) 05/11/2021    Allergic rhinitis     Type 2 diabetes, HbA1c goal < 7% ([facility])     Anxiety      She  has a past surgical history that includes Breast surgery (Right); Other surgical history; and Other surgical history  Her family history includes Diabetes in her brother; Heart disease in her brother; Hypotension in her daughter; Lymphoma in her brother; No Known Problems in her sister; Parkinsonism in her sister; Prostate cancer in her father; Proteinuria in her daughter  She  reports that she quit smoking about 12 months ago  Her smoking use included cigarettes  She has a 15 00 pack-year smoking history  She has never used smokeless tobacco  She reports current alcohol use of about 1 0 - 2 0 standard drink of alcohol per week  She reports that she does not use drugs    Current Outpatient Medications   Medication Sig Dispense Refill    furosemide (LASIX) 20 mg tablet Take 3 tablets (60 mg total) by mouth daily (Patient taking differently: Take 20 mg by mouth daily) 270 tablet 1    hydrOXYzine HCL (ATARAX) 10 mg tablet Take 1 tablet (10 mg total) by mouth 2 (two) times a day as needed for itching 60 tablet 0    potassium chloride (K-DUR,KLOR-CON) 20 mEq tablet Take 1 tablet (20 mEq total) by mouth daily (Patient taking differently: Take 10 mEq by mouth 3 (three) times a week) 30 tablet 8    sertraline (Zoloft) 50 mg tablet Take 1 tablet (50 mg total) by mouth daily 90 tablet 1    triamcinolone (KENALOG) 0 1 % cream Apply topically 2 (two) times a day 30 g 0  ipratropium (Atrovent HFA) 17 mcg/act inhaler Inhale 2 puffs 3 (three) times a day (Patient not taking: No sig reported) 12 9 g 2     No current facility-administered medications for this visit  Current Outpatient Medications on File Prior to Visit   Medication Sig    furosemide (LASIX) 20 mg tablet Take 3 tablets (60 mg total) by mouth daily (Patient taking differently: Take 20 mg by mouth daily)    potassium chloride (K-DUR,KLOR-CON) 20 mEq tablet Take 1 tablet (20 mEq total) by mouth daily (Patient taking differently: Take 10 mEq by mouth 3 (three) times a week)    [DISCONTINUED] hydrOXYzine HCL (ATARAX) 10 mg tablet Take 1 tablet (10 mg total) by mouth 2 (two) times a day as needed for itching    [DISCONTINUED] sertraline (Zoloft) 25 mg tablet Take 1 tablet (25 mg total) by mouth daily    [DISCONTINUED] triamcinolone (KENALOG) 0 1 % cream Apply topically 2 (two) times a day    ipratropium (Atrovent HFA) 17 mcg/act inhaler Inhale 2 puffs 3 (three) times a day (Patient not taking: No sig reported)     No current facility-administered medications on file prior to visit  She has No Known Allergies       Review of Systems   Constitutional: Negative for chills and fever  HENT: Negative for congestion, ear pain and sore throat  Eyes: Negative for pain  Respiratory: Negative for cough and shortness of breath  Cardiovascular: Negative for chest pain and leg swelling  Gastrointestinal: Negative for abdominal pain, nausea and vomiting  Endocrine: Negative for polyuria  Genitourinary: Negative for difficulty urinating, frequency and urgency  Musculoskeletal: Negative for arthralgias and back pain  Skin: Negative for rash  Neurological: Negative for weakness and headaches  Psychiatric/Behavioral: Negative for sleep disturbance  The patient is not nervous/anxious            Objective:      /76 (BP Location: Left arm, Patient Position: Sitting, Cuff Size: Adult)   Pulse 93   Temp 98 2 °F (36 8 °C) (Temporal)   Ht 5' 4" (1 626 m)   Wt 69 4 kg (153 lb)   SpO2 96%   BMI 26 26 kg/m²     No results found for this or any previous visit (from the past 1344 hour(s))  Physical Exam  Constitutional:       Appearance: Normal appearance  HENT:      Head: Normocephalic  Right Ear: Tympanic membrane, ear canal and external ear normal       Left Ear: Tympanic membrane, ear canal and external ear normal       Nose: Nose normal  No congestion  Mouth/Throat:      Mouth: Mucous membranes are moist       Pharynx: Oropharynx is clear  No oropharyngeal exudate or posterior oropharyngeal erythema  Eyes:      Extraocular Movements: Extraocular movements intact  Conjunctiva/sclera: Conjunctivae normal       Pupils: Pupils are equal, round, and reactive to light  Cardiovascular:      Rate and Rhythm: Normal rate and regular rhythm  Heart sounds: Normal heart sounds  No murmur heard  Pulmonary:      Effort: Pulmonary effort is normal       Breath sounds: Normal breath sounds  No wheezing or rales  Abdominal:      General: Bowel sounds are normal  There is no distension  Palpations: Abdomen is soft  Tenderness: There is no abdominal tenderness  Musculoskeletal:         General: Normal range of motion  Cervical back: Normal range of motion and neck supple  Right lower leg: No edema  Left lower leg: No edema  Lymphadenopathy:      Cervical: No cervical adenopathy  Skin:     General: Skin is warm  Neurological:      General: No focal deficit present  Mental Status: She is alert and oriented to person, place, and time

## 2022-06-06 NOTE — PROGRESS NOTES
Diabetic Foot Exam    Patient's shoes and socks removed  Right Foot/Ankle   Right Foot Inspection  Skin Exam: skin normal, skin intact and dry skin  No warmth, no callus, no erythema, no maceration, no abnormal color, no pre-ulcer, no ulcer and no callus  Toe Exam: ROM and strength within normal limits  No swelling, no tenderness, erythema and  no right toe deformity    Sensory   Monofilament testing: intact    Vascular  Capillary refills: < 3 seconds  The right DP pulse is 2+  The right PT pulse is 2+  Left Foot/Ankle  Left Foot Inspection  Skin Exam: skin normal, skin intact and dry skin  No warmth, no erythema, no maceration, normal color, no pre-ulcer, no ulcer and no callus  Toe Exam: ROM and strength within normal limits  No swelling, no tenderness, no erythema and no left toe deformity  Sensory   Monofilament testing: intact    Vascular  Capillary refills: < 3 seconds  The left DP pulse is 2+  The left PT pulse is 2+       Assign Risk Category  No deformity present  No loss of protective sensation  No weak pulses  Risk: 0

## 2022-06-08 ENCOUNTER — HOSPITAL ENCOUNTER (OUTPATIENT)
Dept: NON INVASIVE DIAGNOSTICS | Facility: CLINIC | Age: 77
Discharge: HOME | End: 2022-06-08
Payer: MEDICARE

## 2022-06-08 VITALS
WEIGHT: 156 LBS | SYSTOLIC BLOOD PRESSURE: 154 MMHG | BODY MASS INDEX: 26.63 KG/M2 | DIASTOLIC BLOOD PRESSURE: 84 MMHG | HEIGHT: 64 IN | HEART RATE: 100 BPM

## 2022-06-08 DIAGNOSIS — I27.20 PULMONARY HYPERTENSION (HCC): ICD-10-CM

## 2022-06-08 LAB
AORTIC ROOT: 2.6 CM
APICAL FOUR CHAMBER EJECTION FRACTION: 61 %
E WAVE DECELERATION TIME: 174 MS
FRACTIONAL SHORTENING: 34 (ref 28–44)
INTERVENTRICULAR SEPTUM IN DIASTOLE (PARASTERNAL SHORT AXIS VIEW): 1.2 CM
INTERVENTRICULAR SEPTUM: 1.2 CM (ref 0.6–1.1)
LEFT ATRIUM AREA SYSTOLE SINGLE PLANE A4C: 12.7 CM2
LEFT ATRIUM SIZE: 3.2 CM
LEFT INTERNAL DIMENSION IN SYSTOLE: 2.3 CM (ref 2.1–4)
LEFT VENTRICULAR INTERNAL DIMENSION IN DIASTOLE: 3.5 CM (ref 3.5–6)
LEFT VENTRICULAR POSTERIOR WALL IN END DIASTOLE: 1.2 CM
LEFT VENTRICULAR STROKE VOLUME: 31 ML
LVSV (TEICH): 31 ML
MV E'TISSUE VEL-SEP: 8 CM/S
MV PEAK A VEL: 0.93 M/S
MV PEAK E VEL: 57 CM/S
MV STENOSIS PRESSURE HALF TIME: 50 MS
MV VALVE AREA P 1/2 METHOD: 4.4
RIGHT ATRIUM AREA SYSTOLE A4C: 12.3 CM2
RIGHT VENTRICLE ID DIMENSION: 3.3 CM
SL CV LV EF: 60
SL CV PED ECHO LEFT VENTRICLE DIASTOLIC VOLUME (MOD BIPLANE) 2D: 50 ML
SL CV PED ECHO LEFT VENTRICLE SYSTOLIC VOLUME (MOD BIPLANE) 2D: 19 ML

## 2022-06-08 PROCEDURE — 93306 TTE W/DOPPLER COMPLETE: CPT | Performed by: INTERNAL MEDICINE

## 2022-06-08 PROCEDURE — 93306 TTE W/DOPPLER COMPLETE: CPT

## 2022-06-13 ENCOUNTER — TELEPHONE (OUTPATIENT)
Dept: CARDIOLOGY CLINIC | Facility: CLINIC | Age: 77
End: 2022-06-13

## 2022-06-13 NOTE — TELEPHONE ENCOUNTER
----- Message from Shad Rojas MD sent at 6/10/2022  5:44 PM EDT -----    Echocardiogram was good, please let the patient know

## 2022-09-20 ENCOUNTER — APPOINTMENT (OUTPATIENT)
Dept: LAB | Facility: CLINIC | Age: 77
End: 2022-09-20
Payer: MEDICARE

## 2022-09-20 DIAGNOSIS — R73.01 IMPAIRED FASTING BLOOD SUGAR: ICD-10-CM

## 2022-09-20 DIAGNOSIS — E78.2 MIXED HYPERLIPIDEMIA: ICD-10-CM

## 2022-09-20 DIAGNOSIS — E78.5 DYSLIPIDEMIA, GOAL LDL BELOW 70: ICD-10-CM

## 2022-09-20 LAB
ALBUMIN SERPL BCP-MCNC: 4 G/DL (ref 3.5–5)
ALP SERPL-CCNC: 77 U/L (ref 46–116)
ALT SERPL W P-5'-P-CCNC: 21 U/L (ref 12–78)
ANION GAP SERPL CALCULATED.3IONS-SCNC: 9 MMOL/L (ref 4–13)
AST SERPL W P-5'-P-CCNC: 11 U/L (ref 5–45)
BASOPHILS # BLD AUTO: 0.07 THOUSANDS/ΜL (ref 0–0.1)
BASOPHILS NFR BLD AUTO: 1 % (ref 0–1)
BILIRUB SERPL-MCNC: 1.33 MG/DL (ref 0.2–1)
BUN SERPL-MCNC: 18 MG/DL (ref 5–25)
CALCIUM SERPL-MCNC: 9.5 MG/DL (ref 8.3–10.1)
CHLORIDE SERPL-SCNC: 102 MMOL/L (ref 96–108)
CHOLEST SERPL-MCNC: 246 MG/DL
CO2 SERPL-SCNC: 29 MMOL/L (ref 21–32)
CREAT SERPL-MCNC: 0.82 MG/DL (ref 0.6–1.3)
CREAT UR-MCNC: 40 MG/DL
EOSINOPHIL # BLD AUTO: 0.28 THOUSAND/ΜL (ref 0–0.61)
EOSINOPHIL NFR BLD AUTO: 3 % (ref 0–6)
ERYTHROCYTE [DISTWIDTH] IN BLOOD BY AUTOMATED COUNT: 12.8 % (ref 11.6–15.1)
EST. AVERAGE GLUCOSE BLD GHB EST-MCNC: 131 MG/DL
GFR SERPL CREATININE-BSD FRML MDRD: 68 ML/MIN/1.73SQ M
GLUCOSE P FAST SERPL-MCNC: 151 MG/DL (ref 65–99)
HBA1C MFR BLD: 6.2 %
HCT VFR BLD AUTO: 48.7 % (ref 34.8–46.1)
HDLC SERPL-MCNC: 80 MG/DL
HGB BLD-MCNC: 15.9 G/DL (ref 11.5–15.4)
IMM GRANULOCYTES # BLD AUTO: 0.02 THOUSAND/UL (ref 0–0.2)
IMM GRANULOCYTES NFR BLD AUTO: 0 % (ref 0–2)
LDLC SERPL CALC-MCNC: 145 MG/DL (ref 0–100)
LYMPHOCYTES # BLD AUTO: 2.63 THOUSANDS/ΜL (ref 0.6–4.47)
LYMPHOCYTES NFR BLD AUTO: 31 % (ref 14–44)
MCH RBC QN AUTO: 31.2 PG (ref 26.8–34.3)
MCHC RBC AUTO-ENTMCNC: 32.6 G/DL (ref 31.4–37.4)
MCV RBC AUTO: 96 FL (ref 82–98)
MICROALBUMIN UR-MCNC: 328 MG/L (ref 0–20)
MICROALBUMIN/CREAT 24H UR: 820 MG/G CREATININE (ref 0–30)
MONOCYTES # BLD AUTO: 0.53 THOUSAND/ΜL (ref 0.17–1.22)
MONOCYTES NFR BLD AUTO: 6 % (ref 4–12)
NEUTROPHILS # BLD AUTO: 5.02 THOUSANDS/ΜL (ref 1.85–7.62)
NEUTS SEG NFR BLD AUTO: 59 % (ref 43–75)
NRBC BLD AUTO-RTO: 0 /100 WBCS
PLATELET # BLD AUTO: 255 THOUSANDS/UL (ref 149–390)
PMV BLD AUTO: 12 FL (ref 8.9–12.7)
POTASSIUM SERPL-SCNC: 3.2 MMOL/L (ref 3.5–5.3)
PROT SERPL-MCNC: 8.3 G/DL (ref 6.4–8.4)
RBC # BLD AUTO: 5.1 MILLION/UL (ref 3.81–5.12)
SODIUM SERPL-SCNC: 140 MMOL/L (ref 135–147)
TRIGL SERPL-MCNC: 105 MG/DL
TSH SERPL DL<=0.05 MIU/L-ACNC: 2.93 UIU/ML (ref 0.45–4.5)
WBC # BLD AUTO: 8.55 THOUSAND/UL (ref 4.31–10.16)

## 2022-09-20 PROCEDURE — 85025 COMPLETE CBC W/AUTO DIFF WBC: CPT

## 2022-09-20 PROCEDURE — 83036 HEMOGLOBIN GLYCOSYLATED A1C: CPT

## 2022-09-20 PROCEDURE — 80061 LIPID PANEL: CPT

## 2022-09-20 PROCEDURE — 80053 COMPREHEN METABOLIC PANEL: CPT

## 2022-09-20 PROCEDURE — 84443 ASSAY THYROID STIM HORMONE: CPT

## 2022-09-20 PROCEDURE — 36415 COLL VENOUS BLD VENIPUNCTURE: CPT

## 2022-09-21 DIAGNOSIS — I50.9 ACUTE DECOMPENSATED HEART FAILURE (HCC): ICD-10-CM

## 2022-09-21 RX ORDER — POTASSIUM CHLORIDE 20 MEQ/1
20 TABLET, EXTENDED RELEASE ORAL DAILY
Qty: 7 TABLET | Refills: 0 | Status: SHIPPED | OUTPATIENT
Start: 2022-09-21

## 2022-09-26 ENCOUNTER — OFFICE VISIT (OUTPATIENT)
Dept: INTERNAL MEDICINE CLINIC | Facility: CLINIC | Age: 77
End: 2022-09-26
Payer: MEDICARE

## 2022-09-26 VITALS
DIASTOLIC BLOOD PRESSURE: 78 MMHG | BODY MASS INDEX: 26.02 KG/M2 | OXYGEN SATURATION: 93 % | HEART RATE: 96 BPM | WEIGHT: 152.4 LBS | TEMPERATURE: 98.6 F | SYSTOLIC BLOOD PRESSURE: 132 MMHG | HEIGHT: 64 IN

## 2022-09-26 DIAGNOSIS — M19.042 PRIMARY OSTEOARTHRITIS OF BOTH HANDS: ICD-10-CM

## 2022-09-26 DIAGNOSIS — J44.9 CHRONIC OBSTRUCTIVE PULMONARY DISEASE, UNSPECIFIED COPD TYPE (HCC): ICD-10-CM

## 2022-09-26 DIAGNOSIS — M19.041 PRIMARY OSTEOARTHRITIS OF BOTH HANDS: ICD-10-CM

## 2022-09-26 DIAGNOSIS — J96.11 CHRONIC HYPOXEMIC RESPIRATORY FAILURE (HCC): ICD-10-CM

## 2022-09-26 DIAGNOSIS — F34.1 DYSTHYMIC DISORDER: ICD-10-CM

## 2022-09-26 DIAGNOSIS — E11.65 UNCONTROLLED TYPE 2 DIABETES MELLITUS WITH HYPERGLYCEMIA (HCC): ICD-10-CM

## 2022-09-26 DIAGNOSIS — E78.2 MIXED HYPERLIPIDEMIA: ICD-10-CM

## 2022-09-26 DIAGNOSIS — I50.42 CHRONIC COMBINED SYSTOLIC AND DIASTOLIC CONGESTIVE HEART FAILURE (HCC): ICD-10-CM

## 2022-09-26 DIAGNOSIS — F41.9 ANXIETY: ICD-10-CM

## 2022-09-26 DIAGNOSIS — Z00.00 MEDICARE ANNUAL WELLNESS VISIT, SUBSEQUENT: ICD-10-CM

## 2022-09-26 DIAGNOSIS — E11.9 TYPE 2 DIABETES, HBA1C GOAL < 7% (HCC): Primary | ICD-10-CM

## 2022-09-26 DIAGNOSIS — M65.9 TENOSYNOVITIS OF LEFT HAND: ICD-10-CM

## 2022-09-26 PROCEDURE — G0439 PPPS, SUBSEQ VISIT: HCPCS | Performed by: INTERNAL MEDICINE

## 2022-09-26 PROCEDURE — 99214 OFFICE O/P EST MOD 30 MIN: CPT | Performed by: INTERNAL MEDICINE

## 2022-09-26 RX ORDER — HYDROXYZINE HYDROCHLORIDE 10 MG/1
10 TABLET, FILM COATED ORAL 2 TIMES DAILY PRN
Qty: 60 TABLET | Refills: 2 | Status: SHIPPED | OUTPATIENT
Start: 2022-09-26

## 2022-09-26 RX ORDER — ROSUVASTATIN CALCIUM 5 MG/1
5 TABLET, COATED ORAL DAILY
Qty: 30 TABLET | Refills: 3 | Status: SHIPPED | OUTPATIENT
Start: 2022-09-26

## 2022-09-26 NOTE — PROGRESS NOTES
Assessment and Plan:     Problem List Items Addressed This Visit        Endocrine    Type 2 diabetes, HbA1c goal < 7% ([facility] ) - Primary    Uncontrolled type 2 diabetes mellitus with hyperglycemia (HCC)       Respiratory    Chronic obstructive pulmonary disease (HCC)       Cardiovascular and Mediastinum    Chronic combined systolic and diastolic congestive heart failure (HCC)       Musculoskeletal and Integument    Primary osteoarthritis of both hands       Other    Anxiety    Relevant Medications    hydrOXYzine HCL (ATARAX) 10 mg tablet    Dysthymic disorder    Relevant Medications    sertraline (Zoloft) 50 mg tablet    hydrOXYzine HCL (ATARAX) 10 mg tablet    Mixed hyperlipidemia    Relevant Medications    rosuvastatin (CRESTOR) 5 mg tablet      Other Visit Diagnoses     Medicare annual wellness visit, subsequent        Tenosynovitis of left hand        Relevant Orders    Ambulatory Referral to Hand Surgery    Chronic hypoxemic respiratory failure [facility])               Preventive health issues were discussed with patient, and age appropriate screening tests were ordered as noted in patient's After Visit Summary  Personalized health advice and appropriate referrals for health education or preventive services given if needed, as noted in patient's After Visit Summary  History of Present Illness:     Patient presents for a Medicare Wellness Visit    Follow-up on blood test done on 09/20/2022 test discussed with her, also Medicare wellness exam     Patient Care Team:  Danay Ridley MD as PCP - General (Internal Medicine)     Review of Systems:     Review of Systems   Constitutional: Negative for chills and fever  HENT: Negative for congestion, ear pain and sore throat  Eyes: Negative for pain  Respiratory: Positive for shortness of breath  Negative for cough  Cardiovascular: Negative for chest pain and leg swelling  Gastrointestinal: Negative for abdominal pain, nausea and vomiting     Endocrine: Negative for polyuria  Genitourinary: Negative for difficulty urinating, frequency and urgency  Musculoskeletal: Negative for arthralgias and back pain  Skin: Negative for rash  Neurological: Negative for weakness and headaches  Psychiatric/Behavioral: Negative for sleep disturbance  The patient is not nervous/anxious           Problem List:     Patient Active Problem List   Diagnosis    Allergic rhinitis    Type 2 diabetes, HbA1c goal < 7% (HCC)    Anxiety    Uncontrolled type 2 diabetes mellitus with hyperglycemia (HCC)    Microalbuminuria    Lung nodules    Chronic obstructive pulmonary disease (HCC)    Chronic respiratory failure with hypoxia (HCC)    Nicotine dependence    Elevated BP without diagnosis of hypertension    Essential hypertension, benign    Chronic combined systolic and diastolic congestive heart failure (HCC)    Pulmonary hypertension (HCC)    Cigarette nicotine dependence without complication    Hypoxia    Screening for osteoporosis    Rheumatoid factor positive    Arthritis    Congestive heart failure with right ventricular systolic dysfunction (HCC)    Gastrointestinal hemorrhage    Dermatitis    Primary osteoarthritis of both hands    Dysthymic disorder    Impaired fasting blood sugar    Cerumen debris on tympanic membrane of right ear    Mixed hyperlipidemia      Past Medical and Surgical History:     Past Medical History:   Diagnosis Date    Allergic rhinitis     Anxiety     Arthritis     Diabetes mellitus ([address] )     Oxygen decrease     Tobacco abuse      Past Surgical History:   Procedure Laterality Date    BREAST SURGERY Right     Cyst    OTHER SURGICAL HISTORY      Cataract implant    OTHER SURGICAL HISTORY      Fertilization      Family History:     Family History   Problem Relation Age of Onset    Prostate cancer Father     Diabetes Brother     Heart disease Brother     Lymphoma Brother     No Known Problems Sister     Parkinsonism Sister    Trish Pall Hypotension Daughter         Takes "salt pills"    Proteinuria Daughter         Occuring during her 3 pregnancies; follows with nephrology      Social History:     Social History     Socioeconomic History    Marital status: /Civil Union     Spouse name: None    Number of children: 1    Years of education: None    Highest education level: Some college, no degree   Occupational History    None   Tobacco Use    Smoking status: Former Smoker     Packs/day: 0 50     Years: 30 00     Pack years: 15 00     Types: Cigarettes     Quit date: 2021     Years since quittin 3    Smokeless tobacco: Never Used    Tobacco comment: Began smoking in her late teens, averaging 1/2 pack daily  States she quit smoking a few weeks ago (Updated 2021)  Vaping Use    Vaping Use: Never used   Substance and Sexual Activity    Alcohol use: Yes     Alcohol/week: 1 0 - 2 0 standard drink     Types: 1 - 2 Glasses of wine per week     Comment: On average, will drink 1-2 glasses of white wine daily with dinner  (Updated 2021),     Drug use: Never     Comment: Last assessed 2021   Sexual activity: None   Other Topics Concern    None   Social History Narrative    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 ; 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  Has 1 biological daughter Gayatri Christiansen) who lives locally  Social Determinants of Health     Financial Resource Strain: Low Risk     Difficulty of Paying Living Expenses: Not hard at all   Food Insecurity: Not on file   Transportation Needs: No Transportation Needs    Lack of Transportation (Medical): No    Lack of Transportation (Non-Medical):  No   Physical Activity: Not on file   Stress: Not on file   Social Connections: Not on file   Intimate Partner Violence: Not on file   Housing Stability: Not on file      Medications and Allergies:     Current Outpatient Medications   Medication Sig Dispense Refill    furosemide (LASIX) 20 mg tablet Take 3 tablets (60 mg total) by mouth daily (Patient taking differently: Take 20 mg by mouth daily) 270 tablet 1    hydrOXYzine HCL (ATARAX) 10 mg tablet Take 1 tablet (10 mg total) by mouth 2 (two) times a day as needed for itching 60 tablet 2    potassium chloride (K-DUR,KLOR-CON) 20 mEq tablet Take 1 tablet (20 mEq total) by mouth daily 7 tablet 0    rosuvastatin (CRESTOR) 5 mg tablet Take 1 tablet (5 mg total) by mouth daily 30 tablet 3    sertraline (Zoloft) 50 mg tablet Take 1 tablet (50 mg total) by mouth daily 90 tablet 1    triamcinolone (KENALOG) 0 1 % cream Apply topically 2 (two) times a day 30 g 0    ipratropium (Atrovent HFA) 17 mcg/act inhaler Inhale 2 puffs 3 (three) times a day (Patient not taking: No sig reported) 12 9 g 2     No current facility-administered medications for this visit  No Known Allergies   Immunizations:     Immunization History   Administered Date(s) Administered    COVID-19 MODERNA VACC 0 5 ML IM 01/21/2021, 02/23/2021, 09/20/2022    H1N1, All Formulations 01/09/2010    INFLUENZA 11/11/2005, 11/07/2006, 10/01/2014, 10/01/2015, 10/12/2016, 09/21/2017, 09/18/2018, 08/25/2020, 09/15/2021    Pneumococcal Conjugate 13-Valent 09/21/2017    Pneumococcal Polysaccharide PPV23 10/17/2018      Health Maintenance:         Topic Date Due    Hepatitis C Screening  Completed         Topic Date Due    Influenza Vaccine (1) 09/01/2022      Medicare Screening Tests and Risk Assessments:     Miky Agrawal is here for her Subsequent Wellness visit  Last Medicare Wellness visit information reviewed, patient interviewed and updates made to the record today  Health Risk Assessment:   Patient rates overall health as good  Patient feels that their physical health rating is much better  Patient is satisfied with their life  Eyesight was rated as same  Hearing was rated as same  Patient feels that their emotional and mental health rating is slightly worse  Patients states they are never, rarely angry   Patient states they are sometimes unusually tired/fatigued  Pain experienced in the last 7 days has been some  Patient's pain rating has been 5/10  Patient states that she has experienced no weight loss or gain in last 6 months  Fall Risk Screening: In the past year, patient has experienced: no history of falling in past year      Urinary Incontinence Screening:   Patient has not leaked urine accidently in the last six months  Home Safety:  Patient does not have trouble with stairs inside or outside of their home  Patient has working smoke alarms and has working carbon monoxide detector  Home safety hazards include: none  Nutrition:   Current diet is Regular  Medications:   Patient is currently taking over-the-counter supplements  OTC medications include: see medication list  Patient is able to manage medications  Activities of Daily Living (ADLs)/Instrumental Activities of Daily Living (IADLs):   Walk and transfer into and out of bed and chair?: Yes  Dress and groom yourself?: Yes    Bathe or shower yourself?: Yes    Feed yourself? Yes  Do your laundry/housekeeping?: Yes  Manage your money, pay your bills and track your expenses?: Yes  Make your own meals?: Yes    Do your own shopping?: Yes    Previous Hospitalizations:   Any hospitalizations or ED visits within the last 12 months?: Yes    How many hospitalizations have you had in the last year?: 1-2    Advance Care Planning:   Living will: Yes    Durable POA for healthcare:  Yes    Advanced directive: Yes      PREVENTIVE SCREENINGS      Cardiovascular Screening:    General: Screening Not Indicated and History Lipid Disorder      Diabetes Screening:     General: Screening Not Indicated and History Diabetes      Cervical Cancer Screening:    General: Screening Not Indicated      Osteoporosis Screening:    General: Screening Not Indicated and History Osteoporosis      Lung Cancer Screening:     General: Screening Not Indicated      Hepatitis C Screening:    General: Screening Current    Screening, Brief Intervention, and Referral to Treatment (SBIRT)    Screening  Typical number of drinks in a day: 0  Typical number of drinks in a week: 0  Interpretation: Low risk drinking behavior  Single Item Drug Screening:  How often have you used an illegal drug (including marijuana) or a prescription medication for non-medical reasons in the past year? never    Single Item Drug Screen Score: 0  Interpretation: Negative screen for possible drug use disorder    No exam data present     Physical Exam:     /78 (BP Location: Right arm, Patient Position: Sitting)   Pulse 96   Temp 98 6 °F (37 °C)   Ht 5' 4" (1 626 m)   Wt 69 1 kg (152 lb 6 4 oz)   SpO2 93%   BMI 26 16 kg/m²     Physical Exam  Vitals and nursing note reviewed  Constitutional:       General: She is not in acute distress  Appearance: She is well-developed  HENT:      Head: Normocephalic and atraumatic  Right Ear: Tympanic membrane, ear canal and external ear normal       Left Ear: Tympanic membrane, ear canal and external ear normal       Mouth/Throat:      Pharynx: Oropharynx is clear  Eyes:      Extraocular Movements: Extraocular movements intact  Conjunctiva/sclera: Conjunctivae normal    Cardiovascular:      Rate and Rhythm: Normal rate and regular rhythm  Heart sounds: Normal heart sounds  No murmur heard  Pulmonary:      Effort: Pulmonary effort is normal  No respiratory distress  Breath sounds: Normal breath sounds  Abdominal:      General: Abdomen is flat  Palpations: Abdomen is soft  Tenderness: There is no abdominal tenderness  Musculoskeletal:         General: Normal range of motion  Cervical back: Normal range of motion and neck supple  Skin:     General: Skin is warm and dry  Neurological:      General: No focal deficit present  Mental Status: She is alert and oriented to person, place, and time            Mundo Hubbard MD

## 2022-10-08 ENCOUNTER — APPOINTMENT (OUTPATIENT)
Dept: RADIOLOGY | Age: 77
End: 2022-10-08
Payer: MEDICARE

## 2022-10-08 ENCOUNTER — OFFICE VISIT (OUTPATIENT)
Dept: OBGYN CLINIC | Facility: CLINIC | Age: 77
End: 2022-10-08
Payer: MEDICARE

## 2022-10-08 VITALS
SYSTOLIC BLOOD PRESSURE: 125 MMHG | HEART RATE: 84 BPM | WEIGHT: 152 LBS | BODY MASS INDEX: 25.95 KG/M2 | DIASTOLIC BLOOD PRESSURE: 78 MMHG | HEIGHT: 64 IN

## 2022-10-08 DIAGNOSIS — M65.332 TRIGGER FINGER, LEFT MIDDLE FINGER: ICD-10-CM

## 2022-10-08 DIAGNOSIS — M79.645 FINGER PAIN, LEFT: ICD-10-CM

## 2022-10-08 DIAGNOSIS — M79.645 FINGER PAIN, LEFT: Primary | ICD-10-CM

## 2022-10-08 PROCEDURE — 20550 NJX 1 TENDON SHEATH/LIGAMENT: CPT | Performed by: PHYSICIAN ASSISTANT

## 2022-10-08 PROCEDURE — 99203 OFFICE O/P NEW LOW 30 MIN: CPT | Performed by: PHYSICIAN ASSISTANT

## 2022-10-08 PROCEDURE — 73140 X-RAY EXAM OF FINGER(S): CPT

## 2022-10-08 RX ORDER — METHYLPREDNISOLONE ACETATE 40 MG/ML
0.5 INJECTION, SUSPENSION INTRA-ARTICULAR; INTRALESIONAL; INTRAMUSCULAR; SOFT TISSUE
Status: COMPLETED | OUTPATIENT
Start: 2022-10-08 | End: 2022-10-08

## 2022-10-08 RX ORDER — LIDOCAINE HYDROCHLORIDE 10 MG/ML
0.5 INJECTION, SOLUTION INFILTRATION; PERINEURAL
Status: COMPLETED | OUTPATIENT
Start: 2022-10-08 | End: 2022-10-08

## 2022-10-08 RX ADMIN — LIDOCAINE HYDROCHLORIDE 0.5 ML: 10 INJECTION, SOLUTION INFILTRATION; PERINEURAL at 09:05

## 2022-10-08 RX ADMIN — METHYLPREDNISOLONE ACETATE 0.5 ML: 40 INJECTION, SUSPENSION INTRA-ARTICULAR; INTRALESIONAL; INTRAMUSCULAR; SOFT TISSUE at 09:05

## 2022-10-08 NOTE — PROGRESS NOTES
Orthopaedic Surgery - Office Note  Stephy Seth (15 y o  female)   : 1945   MRN: [id number]  Encounter Date: 10/8/2022    Chief Complaint   Patient presents with    Left Hand - Pain         Assessment/Plan  Diagnoses and all orders for this visit:    Middle finger pain, left  -     XR finger left third digit-middle; Future    Trigger finger, left middle finger  -     Ambulatory Referral to Hand Surgery    Other orders  -     Hand/upper extremity injection    The diagnosis as well as treatment options were reviewed with the patient in the office today  For trigger finger and initial treatment recommendation would be a cortisone injection  The risks and benefits were reviewed including the chance she may have elevated blood sugars for the next couple days however it is a very low dose of the steroid for the trigger injection  She will monitor symptoms and if needed we can repeat the injection 2nd time however if the 2nd injection fails to relieve her symptoms surgery consideration with the hand surgery department would be recommended  Return if symptoms worsen or fail to improve  History of Present Illness  This is a new patient with left middle finger pain  She has a contributing medical history of diabetes  Patient reports for the past several months she has had a pain at the base of her middle finger and has associated locking in this region  She reports that at night she will go to sleep and wake up with her fingers locked in severely painful and she will have to manually pop the middle finger open  She denies any paresthesias  She has not had any treatment  She reports her contralateral right side had a trigger finger 10 years ago that responded well to an injection  She reports her diabetes is very well controlled    Review of Systems  Pertinent items are noted in HPI  All other systems were reviewed and are negative      Physical Exam  /78   Pulse 84   Ht 5' 4" (1 626 m)   Wt 68 9 kg (152 lb)   BMI 26 09 kg/m²   Cons: Appears well  No apparent distress  Psych: Alert  Oriented x3  Mood and affect normal   Eyes: PERRLA, EOMI  Resp: Normal effort  No audible wheezing or stridor  CV: Palpable pulse  No discernable arrhythmia  Lymph:  No palpable cervical, axillary, or inguinal lymphadenopathy  Skin: Warm  No palpable masses  No visible lesions  Neuro: Normal muscle tone  Normal and symmetric DTR's  Patient's left middle finger has a palpable nodule and tenderness at the A1 pulley of the left middle finger  She has active triggering in the office today  She has full range of motion and 5/5 strength at all MCP and IP joints  There are no Dupuytren's contractures  She is neurovascularly intact with normal capillary refill  She has a negative Tinel's and Phalen's  She has a negative Finkelstein's test and no anatomical snuffbox tenderness          Studies Reviewed  X-rays three views of the left middle finger show no acute fractures or dislocations  Mild degenerative changes are noted at the IP joints  Otherwise unremarkable  This was read from an orthopedic standpoint will await official radiologist interpretation    Hand/upper extremity injection: L long A1  Universal Protocol:  Consent: Verbal consent obtained  Risks and benefits: risks, benefits and alternatives were discussed  Consent given by: patient  Patient understanding: patient states understanding of the procedure being performed  Patient consent: the patient's understanding of the procedure matches consent given  Relevant documents: relevant documents present and verified  Test results: test results available and properly labeled  Site marked: the operative site was marked  Radiology Images displayed and confirmed   If images not available, report reviewed: imaging studies available  Patient identity confirmed: verbally with patient    Supporting Documentation  Indications: pain and tendon swelling Procedure Details  Condition:trigger finger Location: long finger - L long A1   Preparation: Patient was prepped and draped in the usual sterile fashion  Needle size: 25 G  Ultrasound guidance: no  Medications administered: 0 5 mL lidocaine 1 %; 0 5 mL methylPREDNISolone acetate 40 mg/mL    Patient tolerance: patient tolerated the procedure well with no immediate complications  Dressing:  Sterile dressing applied         Medical, Surgical, Family, and Social History  The patient's medical history, family history, and social history, were reviewed and updated as appropriate  Past Medical History:   Diagnosis Date    Allergic rhinitis     Anxiety     Arthritis     Diabetes mellitus (HCC)     Oxygen decrease     Tobacco abuse        Past Surgical History:   Procedure Laterality Date    BREAST SURGERY Right     Cyst    OTHER SURGICAL HISTORY      Cataract implant    OTHER SURGICAL HISTORY      Fertilization       Family History   Problem Relation Age of Onset    Prostate cancer Father     Diabetes Brother     Heart disease Brother     Lymphoma Brother     No Known Problems Sister     Parkinsonism Sister     Hypotension Daughter         Takes "salt pills"    Proteinuria Daughter         Occuring during her 3 pregnancies; follows with nephrology       Social History     Occupational History    Not on file   Tobacco Use    Smoking status: Former Smoker     Packs/day: 0 50     Years: 30 00     Pack years: 15 00     Types: Cigarettes     Quit date: 2021     Years since quittin 3    Smokeless tobacco: Never Used    Tobacco comment: Began smoking in her late teens, averaging 1/2 pack daily  States she quit smoking a few weeks ago (Updated 2021)  Vaping Use    Vaping Use: Never used   Substance and Sexual Activity    Alcohol use:  Yes     Alcohol/week: 1 0 - 2 0 standard drink     Types: 1 - 2 Glasses of wine per week     Comment: On average, will drink 1-2 glasses of white wine daily with dinner  (Updated 05/21/2021),     Drug use: Never     Comment: Last assessed 05/21/2021      Sexual activity: Not on file       No Known Allergies      Current Outpatient Medications:     furosemide (LASIX) 20 mg tablet, Take 3 tablets (60 mg total) by mouth daily (Patient taking differently: Take 20 mg by mouth daily), Disp: 270 tablet, Rfl: 1    hydrOXYzine HCL (ATARAX) 10 mg tablet, Take 1 tablet (10 mg total) by mouth 2 (two) times a day as needed for itching, Disp: 60 tablet, Rfl: 2    potassium chloride (K-DUR,KLOR-CON) 20 mEq tablet, Take 1 tablet (20 mEq total) by mouth daily, Disp: 7 tablet, Rfl: 0    rosuvastatin (CRESTOR) 5 mg tablet, Take 1 tablet (5 mg total) by mouth daily, Disp: 30 tablet, Rfl: 3    sertraline (Zoloft) 50 mg tablet, Take 1 tablet (50 mg total) by mouth daily, Disp: 90 tablet, Rfl: 1    triamcinolone (KENALOG) 0 1 % cream, Apply topically 2 (two) times a day, Disp: 30 g, Rfl: 0    ipratropium (Atrovent HFA) 17 mcg/act inhaler, Inhale 2 puffs 3 (three) times a day (Patient not taking: No sig reported), Disp: 12 9 g, Rfl: 2      Ricardo Buchanan PA-C

## 2022-10-08 NOTE — PATIENT INSTRUCTIONS
Trigger Finger   WHAT YOU NEED TO KNOW:   What is trigger finger? Trigger finger is when your finger or thumb gets stuck in a bent position and snaps, pops, or clicks when you straighten it  What causes trigger finger? Trigger finger is caused by narrowing of the tendon sheath  The tendon sheath is the tunnel that your tendon slides through when you bend or straighten your finger  A tendon is strong tissue that attaches muscle to bone  When the tendon sheath narrows, the tendon does not slide as easily  Certain medical conditions, such as diabetes or arthritis, may increase your risk of trigger finger  What are the signs and symptoms of trigger finger? Clicking, snapping, or popping noise when you move your finger    Finger stuck in a bent position    Pain    Swelling and stiffness    A bump at the base of your finger    How is trigger finger diagnosed? Your healthcare provider will ask about your health history and examine your finger  He will ask about your signs and symptoms and have you bend and straighten your finger  How is trigger finger treated? Medicines:      NSAIDs:  These medicines decrease pain and swelling  NSAIDs can be bought without a doctor's order  Ask which medicine is right for you and how much to take  Take as directed  NSAIDs can cause stomach bleeding or kidney problems if not taken correctly  Steroid injection: This medicine helps decrease inflammation  It is given as a shot into your finger  You may need more than 1 injection  Splint:  You may need to wear a splint for up to 6 weeks to keep your finger straight  This will help your finger joints rest and prevent you from bending your finger while you sleep  Physical therapy:  A physical therapist teaches you exercises to help improve movement and strength, and to decrease pain  Tendon release: This is surgery to cut open a small piece of the tendon sheath so that your tendon can slide smoothly   Your healthcare provider may do this through an incision or with a needle  What are the risks of trigger finger? Splinting may not decrease your signs and symptoms  Steroid injections or tendon release surgery may damage the tendon or nerves in your finger  After tendon release surgery, your finger may be stiff, painful, or weak  Your finger may be bruised and you may get an infection  Your signs and symptoms may return, even after treatment  Without treatment, your symptoms can get worse  Your finger may become locked in the bent position  When should I contact my healthcare provider? Your symptoms do not go away or they return, even after treatment  The pain, swelling, or stiffness interferes with your daily activities  You have more trouble moving your finger  Your finger is tingling  You have questions or concerns about your condition or care  When should I seek immediate care? You cannot move your finger at all  Your finger is numb  CARE AGREEMENT:   You have the right to help plan your care  Learn about your health condition and how it may be treated  Discuss treatment options with your healthcare providers to decide what care you want to receive  You always have the right to refuse treatment  The above information is an  only  It is not intended as medical advice for individual conditions or treatments  Talk to your doctor, nurse or pharmacist before following any medical regimen to see if it is safe and effective for you  © Copyright [employer] 2022 Information is for End User's use only and may not be sold, redistributed or otherwise used for commercial purposes   All illustrations and images included in CareNotes® are the copyrighted property of [employer]  or [address] [place] [address]

## 2022-10-11 PROBLEM — H61.21 CERUMEN DEBRIS ON TYMPANIC MEMBRANE OF RIGHT EAR: Status: RESOLVED | Noted: 2022-03-21 | Resolved: 2022-10-11

## 2022-10-11 PROBLEM — Z13.820 SCREENING FOR OSTEOPOROSIS: Status: RESOLVED | Noted: 2021-06-30 | Resolved: 2022-10-11

## 2022-12-05 ENCOUNTER — OFFICE VISIT (OUTPATIENT)
Dept: INTERNAL MEDICINE CLINIC | Facility: CLINIC | Age: 77
End: 2022-12-05

## 2022-12-05 VITALS
TEMPERATURE: 99.2 F | SYSTOLIC BLOOD PRESSURE: 142 MMHG | HEIGHT: 64 IN | HEART RATE: 92 BPM | DIASTOLIC BLOOD PRESSURE: 80 MMHG | OXYGEN SATURATION: 95 % | WEIGHT: 155.6 LBS | BODY MASS INDEX: 26.56 KG/M2

## 2022-12-05 DIAGNOSIS — F34.1 DYSTHYMIC DISORDER: ICD-10-CM

## 2022-12-05 DIAGNOSIS — I50.42 CHRONIC COMBINED SYSTOLIC AND DIASTOLIC CONGESTIVE HEART FAILURE (HCC): ICD-10-CM

## 2022-12-05 DIAGNOSIS — E78.2 MIXED HYPERLIPIDEMIA: ICD-10-CM

## 2022-12-05 DIAGNOSIS — M19.042 PRIMARY OSTEOARTHRITIS OF BOTH HANDS: ICD-10-CM

## 2022-12-05 DIAGNOSIS — E11.9 TYPE 2 DIABETES, HBA1C GOAL < 7% (HCC): Primary | ICD-10-CM

## 2022-12-05 DIAGNOSIS — M19.041 PRIMARY OSTEOARTHRITIS OF BOTH HANDS: ICD-10-CM

## 2022-12-05 DIAGNOSIS — J44.9 CHRONIC OBSTRUCTIVE PULMONARY DISEASE, UNSPECIFIED COPD TYPE (HCC): ICD-10-CM

## 2022-12-05 NOTE — PROGRESS NOTES
Assessment/Plan:             1  Type 2 diabetes, HbA1c goal < 7% ([facility])  -     CBC and differential; Future  -     Comprehensive metabolic panel; Future  -     Hemoglobin A1C; Future  -     Lipid Panel with Direct LDL reflex; Future  -     Microalbumin / creatinine urine ratio  -     TSH, 3rd generation; Future    2  Dysthymic disorder  -     sertraline (Zoloft) 50 mg tablet; Take 1 tablet (50 mg total) by mouth daily    3  Mixed hyperlipidemia    4  Primary osteoarthritis of both hands    5  Chronic obstructive pulmonary disease, unspecified COPD type ([address] )    6  Chronic combined systolic and diastolic congestive heart failure (HCC)         Subjective:      Patient ID: Jose Ceballos is a 76 y o  female  Follow-up on multiple medical problems to ensure the stable on current medications      The following portions of the patient's history were reviewed and updated as appropriate: She  has a past medical history of Allergic rhinitis, Anxiety, Arthritis, Diabetes mellitus ([address] ), Oxygen decrease, and Tobacco abuse    She   Patient Active Problem List    Diagnosis Date Noted   • Mixed hyperlipidemia 06/06/2022   • Dysthymic disorder 01/24/2022   • Impaired fasting blood sugar 01/24/2022   • Gastrointestinal hemorrhage 10/21/2021   • Dermatitis 10/21/2021   • Primary osteoarthritis of both hands 10/21/2021   • Congestive heart failure with right ventricular systolic dysfunction ([address] ) 09/23/2021   • Rheumatoid factor positive 09/22/2021   • Arthritis 09/22/2021   • Hypoxia 06/16/2021   • Essential hypertension, benign 06/02/2021   • Chronic combined systolic and diastolic congestive heart failure ([address] ) 06/02/2021   • Pulmonary hypertension ([address] ) 06/02/2021   • Cigarette nicotine dependence without complication    • Elevated BP without diagnosis of hypertension 05/22/2021   • Lung nodules 05/18/2021   • Chronic obstructive pulmonary disease ([address] ) 05/18/2021   • Chronic respiratory failure with hypoxia ([address] ) 05/18/2021   • Nicotine dependence 05/18/2021   • Microalbuminuria 05/17/2021   • Uncontrolled type 2 diabetes mellitus with hyperglycemia ([facility] ) 05/11/2021   • Allergic rhinitis    • Type 2 diabetes, HbA1c goal < 7% ([facility])    • Anxiety      She  has a past surgical history that includes Breast surgery (Right); Other surgical history; and Other surgical history  Her family history includes Diabetes in her brother; Heart disease in her brother; Hypotension in her daughter; Lymphoma in her brother; No Known Problems in her sister; Parkinsonism in her sister; Prostate cancer in her father; Proteinuria in her daughter  She  reports that she quit smoking about 18 months ago  Her smoking use included cigarettes  She has a 15 00 pack-year smoking history  She has never used smokeless tobacco  She reports current alcohol use of about 1 0 - 2 0 standard drink per week  She reports that she does not use drugs  Current Outpatient Medications   Medication Sig Dispense Refill   • furosemide (LASIX) 20 mg tablet Take 3 tablets (60 mg total) by mouth daily (Patient taking differently: Take 20 mg by mouth daily 1 tab Monday, Wednesday, friday) 270 tablet 1   • hydrOXYzine HCL (ATARAX) 10 mg tablet Take 1 tablet (10 mg total) by mouth 2 (two) times a day as needed for itching 60 tablet 2   • rosuvastatin (CRESTOR) 5 mg tablet Take 1 tablet (5 mg total) by mouth daily 30 tablet 3   • sertraline (Zoloft) 50 mg tablet Take 1 tablet (50 mg total) by mouth daily 90 tablet 1   • triamcinolone (KENALOG) 0 1 % cream Apply topically 2 (two) times a day 30 g 0   • ipratropium (Atrovent HFA) 17 mcg/act inhaler Inhale 2 puffs 3 (three) times a day (Patient not taking: Reported on 3/21/2022) 12 9 g 2   • potassium chloride (K-DUR,KLOR-CON) 20 mEq tablet Take 1 tablet (20 mEq total) by mouth daily (Patient not taking: Reported on 12/5/2022) 7 tablet 0     No current facility-administered medications for this visit       Current Outpatient Medications on File Prior to Visit   Medication Sig   • furosemide (LASIX) 20 mg tablet Take 3 tablets (60 mg total) by mouth daily (Patient taking differently: Take 20 mg by mouth daily 1 tab Monday, Wednesday, friday)   • hydrOXYzine HCL (ATARAX) 10 mg tablet Take 1 tablet (10 mg total) by mouth 2 (two) times a day as needed for itching   • rosuvastatin (CRESTOR) 5 mg tablet Take 1 tablet (5 mg total) by mouth daily   • triamcinolone (KENALOG) 0 1 % cream Apply topically 2 (two) times a day   • [DISCONTINUED] sertraline (Zoloft) 50 mg tablet Take 1 tablet (50 mg total) by mouth daily   • ipratropium (Atrovent HFA) 17 mcg/act inhaler Inhale 2 puffs 3 (three) times a day (Patient not taking: Reported on 3/21/2022)   • potassium chloride (K-DUR,KLOR-CON) 20 mEq tablet Take 1 tablet (20 mEq total) by mouth daily (Patient not taking: Reported on 12/5/2022)     No current facility-administered medications on file prior to visit  She has No Known Allergies       Review of Systems   Constitutional: Negative for chills and fever  HENT: Negative for congestion, ear pain and sore throat  Eyes: Negative for pain  Respiratory: Negative for cough and shortness of breath  Cardiovascular: Negative for chest pain and leg swelling  Gastrointestinal: Negative for abdominal pain, nausea and vomiting  Endocrine: Negative for polyuria  Genitourinary: Negative for difficulty urinating, frequency and urgency  Musculoskeletal: Positive for arthralgias  Negative for back pain  Skin: Negative for rash  Neurological: Negative for weakness and headaches  Psychiatric/Behavioral: Negative for sleep disturbance  The patient is not nervous/anxious            Objective:      /80 (BP Location: Left arm, Patient Position: Sitting, Cuff Size: Large)   Pulse 92   Temp 99 2 °F (37 3 °C) (Temporal)   Ht 5' 4" (1 626 m)   Wt 70 6 kg (155 lb 9 6 oz)   SpO2 95%   BMI 26 71 kg/m²     No results found for this or any previous visit (from the past 1344 hour(s))  Physical Exam  Constitutional:       Appearance: Normal appearance  HENT:      Head: Normocephalic  Right Ear: Tympanic membrane, ear canal and external ear normal       Left Ear: Tympanic membrane, ear canal and external ear normal       Nose: Nose normal  No congestion  Mouth/Throat:      Mouth: Mucous membranes are moist       Pharynx: Oropharynx is clear  No oropharyngeal exudate or posterior oropharyngeal erythema  Eyes:      Extraocular Movements: Extraocular movements intact  Conjunctiva/sclera: Conjunctivae normal       Pupils: Pupils are equal, round, and reactive to light  Cardiovascular:      Rate and Rhythm: Normal rate and regular rhythm  Heart sounds: Normal heart sounds  No murmur heard  Pulmonary:      Effort: Pulmonary effort is normal       Breath sounds: Normal breath sounds  No wheezing or rales  Abdominal:      General: Bowel sounds are normal  There is no distension  Palpations: Abdomen is soft  Tenderness: There is no abdominal tenderness  Musculoskeletal:         General: Normal range of motion  Cervical back: Normal range of motion and neck supple  Right lower leg: No edema  Left lower leg: No edema  Lymphadenopathy:      Cervical: No cervical adenopathy  Skin:     General: Skin is warm  Neurological:      General: No focal deficit present  Mental Status: She is alert and oriented to person, place, and time

## 2022-12-28 ENCOUNTER — OFFICE VISIT (OUTPATIENT)
Dept: INTERNAL MEDICINE CLINIC | Facility: CLINIC | Age: 77
End: 2022-12-28

## 2022-12-28 VITALS
HEIGHT: 64 IN | OXYGEN SATURATION: 96 % | TEMPERATURE: 98.5 F | HEART RATE: 116 BPM | SYSTOLIC BLOOD PRESSURE: 130 MMHG | WEIGHT: 154 LBS | BODY MASS INDEX: 26.29 KG/M2 | DIASTOLIC BLOOD PRESSURE: 78 MMHG

## 2022-12-28 DIAGNOSIS — J04.0 LARYNGITIS: Primary | ICD-10-CM

## 2022-12-28 RX ORDER — AZITHROMYCIN 250 MG/1
TABLET, FILM COATED ORAL
Qty: 6 TABLET | Refills: 0 | Status: SHIPPED | OUTPATIENT
Start: 2022-12-28 | End: 2023-01-02

## 2022-12-28 NOTE — PROGRESS NOTES
Assessment/Plan:    Diagnoses and all orders for this visit:    Laryngitis  -     azithromycin (Zithromax) 250 mg tablet; Take 2 tablets (500 mg total) by mouth daily for 1 day, THEN 1 tablet (250 mg total) daily for 4 days  Discussed supportive management-voice rest, OTC cough medications and plenty of hydration  Follow up in 2 weeks         There are no Patient Instructions on file for this visit  Subjective:      Patient ID: Ledy Ramon is a 76 y o  female    C/o sore throat and subsequent loss of voice  No fever, SOB covid test negative         Current Outpatient Medications:   •  azithromycin (Zithromax) 250 mg tablet, Take 2 tablets (500 mg total) by mouth daily for 1 day, THEN 1 tablet (250 mg total) daily for 4 days  , Disp: 6 tablet, Rfl: 0  •  furosemide (LASIX) 20 mg tablet, Take 3 tablets (60 mg total) by mouth daily (Patient taking differently: Take 20 mg by mouth daily 1 tab Monday, Wednesday, friday), Disp: 270 tablet, Rfl: 1  •  hydrOXYzine HCL (ATARAX) 10 mg tablet, Take 1 tablet (10 mg total) by mouth 2 (two) times a day as needed for itching, Disp: 60 tablet, Rfl: 2  •  rosuvastatin (CRESTOR) 5 mg tablet, Take 1 tablet (5 mg total) by mouth daily, Disp: 30 tablet, Rfl: 3  •  sertraline (Zoloft) 50 mg tablet, Take 1 tablet (50 mg total) by mouth daily, Disp: 90 tablet, Rfl: 1  •  triamcinolone (KENALOG) 0 1 % cream, Apply topically 2 (two) times a day, Disp: 30 g, Rfl: 0  •  ipratropium (Atrovent HFA) 17 mcg/act inhaler, Inhale 2 puffs 3 (three) times a day, Disp: 12 9 g, Rfl: 2  •  potassium chloride (K-DUR,KLOR-CON) 20 mEq tablet, Take 1 tablet (20 mEq total) by mouth daily, Disp: 7 tablet, Rfl: 0     Past Medical History:   Diagnosis Date   • Allergic rhinitis    • Anxiety    • Arthritis    • Diabetes mellitus (HCC)    • Oxygen decrease    • Tobacco abuse          Past Surgical History:   Procedure Laterality Date   • BREAST SURGERY Right     Cyst   • OTHER SURGICAL HISTORY      Cataract implant   • OTHER SURGICAL HISTORY      Fertilization         No Known Allergies    No results found for this or any previous visit (from the past 1008 hour(s))  The following portions of the patient's history were reviewed and updated as appropriate: allergies, current medications, past family history, past medical history, past social history, past surgical history and problem list      Review of Systems   Constitutional: Negative for activity change, appetite change, chills, diaphoresis, fatigue, fever and unexpected weight change  HENT: Positive for congestion, sore throat and voice change  Negative for drooling, ear discharge, ear pain, facial swelling, hearing loss, postnasal drip, rhinorrhea, sinus pressure, sinus pain, sneezing, tinnitus and trouble swallowing  Eyes: Negative for discharge  Respiratory: Negative for apnea, cough, choking, chest tightness, shortness of breath, wheezing and stridor  Cardiovascular: Negative for chest pain, palpitations and leg swelling  Gastrointestinal: Negative for abdominal distention, abdominal pain, anal bleeding, blood in stool, constipation, diarrhea, nausea and vomiting  Genitourinary: Negative for decreased urine volume, difficulty urinating, frequency and urgency  Musculoskeletal: Negative for arthralgias, back pain and myalgias  Skin: Negative for color change  Neurological: Negative for dizziness, light-headedness, numbness and headaches  Objective:      Vitals:    12/28/22 1215   BP: 130/78   Pulse: (!) 116   Temp: 98 5 °F (36 9 °C)   SpO2: 96%          Physical Exam  Vitals reviewed  Constitutional:       General: She is not in acute distress  Appearance: Normal appearance  She is not ill-appearing, toxic-appearing or diaphoretic  HENT:      Mouth/Throat:      Mouth: Mucous membranes are moist       Pharynx: Posterior oropharyngeal erythema present  Cardiovascular:      Rate and Rhythm: Normal rate and regular rhythm  Pulses: Normal pulses  Heart sounds: Normal heart sounds  No murmur heard  No friction rub  No gallop  Pulmonary:      Effort: Pulmonary effort is normal  No respiratory distress  Breath sounds: Normal breath sounds  No stridor  No wheezing, rhonchi or rales  Chest:      Chest wall: No tenderness  Musculoskeletal:      Right lower leg: No edema  Left lower leg: No edema  Skin:     General: Skin is warm and dry  Findings: No lesion or rash  Neurological:      Mental Status: She is alert

## 2023-01-09 ENCOUNTER — OFFICE VISIT (OUTPATIENT)
Dept: INTERNAL MEDICINE CLINIC | Facility: CLINIC | Age: 78
End: 2023-01-09

## 2023-01-09 VITALS
WEIGHT: 154 LBS | HEIGHT: 64 IN | SYSTOLIC BLOOD PRESSURE: 142 MMHG | DIASTOLIC BLOOD PRESSURE: 82 MMHG | TEMPERATURE: 99.1 F | OXYGEN SATURATION: 98 % | BODY MASS INDEX: 26.29 KG/M2 | HEART RATE: 94 BPM

## 2023-01-09 DIAGNOSIS — J44.9 CHRONIC OBSTRUCTIVE PULMONARY DISEASE, UNSPECIFIED COPD TYPE (HCC): ICD-10-CM

## 2023-01-09 DIAGNOSIS — I50.42 CHRONIC COMBINED SYSTOLIC AND DIASTOLIC CONGESTIVE HEART FAILURE (HCC): ICD-10-CM

## 2023-01-09 DIAGNOSIS — E78.2 MIXED HYPERLIPIDEMIA: Primary | ICD-10-CM

## 2023-01-09 DIAGNOSIS — Z01.00 DIABETIC EYE EXAM (HCC): ICD-10-CM

## 2023-01-09 DIAGNOSIS — E11.9 DIABETIC EYE EXAM (HCC): ICD-10-CM

## 2023-01-09 DIAGNOSIS — M19.041 PRIMARY OSTEOARTHRITIS OF BOTH HANDS: ICD-10-CM

## 2023-01-09 DIAGNOSIS — F34.1 DYSTHYMIC DISORDER: ICD-10-CM

## 2023-01-09 DIAGNOSIS — M19.042 PRIMARY OSTEOARTHRITIS OF BOTH HANDS: ICD-10-CM

## 2023-01-09 DIAGNOSIS — E11.9 TYPE 2 DIABETES, HBA1C GOAL < 7% (HCC): ICD-10-CM

## 2023-01-09 NOTE — PROGRESS NOTES
Assessment/Plan:    BMI Counseling: Body mass index is 26 43 kg/m²  The BMI is above normal  Nutrition recommendations include decreasing portion sizes, encouraging healthy choices of fruits and vegetables and decreasing fast food intake  Exercise recommendations include moderate physical activity 150 minutes/week  Rationale for BMI follow-up plan is due to patient being overweight or obese  1  Mixed hyperlipidemia    2  Chronic obstructive pulmonary disease, unspecified COPD type ([facility] [address] )    3  Type 2 diabetes, HbA1c goal < 7% ([facility])    4  Primary osteoarthritis of both hands    5  Chronic combined systolic and diastolic congestive heart failure ([facility] )    6  Diabetic eye exam [facility])  -     Ambulatory Referral to Ophthalmology; Future    7  Dysthymic disorder         Subjective:      Patient ID: Fredis Hameed is a 76 y o  female  Follow-up on multiple medical problems to ensure they are stable on current medication, doing well, was sick, better now      The following portions of the patient's history were reviewed and updated as appropriate: She  has a past medical history of Allergic rhinitis, Anxiety, Arthritis, Diabetes mellitus ([facility] ), Oxygen decrease, and Tobacco abuse    She   Patient Active Problem List    Diagnosis Date Noted   • Mixed hyperlipidemia 06/06/2022   • Dysthymic disorder 01/24/2022   • Impaired fasting blood sugar 01/24/2022   • Gastrointestinal hemorrhage 10/21/2021   • Dermatitis 10/21/2021   • Primary osteoarthritis of both hands 10/21/2021   • Congestive heart failure with right ventricular systolic dysfunction ([facility] [address] ) 09/23/2021   • Rheumatoid factor positive 09/22/2021   • Arthritis 09/22/2021   • Diabetic eye exam ([facility] ) 06/30/2021   • Hypoxia 06/16/2021   • Essential hypertension, benign 06/02/2021   • Chronic combined systolic and diastolic congestive heart failure ([facility] [address] ) 06/02/2021   • Pulmonary hypertension ([facility] ) 06/02/2021   • Cigarette nicotine dependence without complication    • Elevated BP without diagnosis of hypertension 05/22/2021   • Lung nodules 05/18/2021   • Chronic obstructive pulmonary disease ([place] [id number] ) 05/18/2021   • Chronic respiratory failure with hypoxia ([place] [address] ) 05/18/2021   • Nicotine dependence 05/18/2021   • Microalbuminuria 05/17/2021   • Uncontrolled type 2 diabetes mellitus with hyperglycemia ([place] [address] ) 05/11/2021   • Allergic rhinitis    • Type 2 diabetes, HbA1c goal < 7% ([facility])    • Anxiety      She  has a past surgical history that includes Breast surgery (Right); Other surgical history; and Other surgical history  Her family history includes Diabetes in her brother; Heart disease in her brother; Hypotension in her daughter; Lymphoma in her brother; No Known Problems in her sister; Parkinsonism in her sister; Prostate cancer in her father; Proteinuria in her daughter  She  reports that she quit smoking about 19 months ago  Her smoking use included cigarettes  She has a 15 00 pack-year smoking history  She has never used smokeless tobacco  She reports current alcohol use of about 1 0 - 2 0 standard drink per week  She reports that she does not use drugs    Current Outpatient Medications   Medication Sig Dispense Refill   • furosemide (LASIX) 20 mg tablet Take 3 tablets (60 mg total) by mouth daily (Patient taking differently: Take 20 mg by mouth daily 1 tab Monday, Wednesday, friday) 270 tablet 1   • hydrOXYzine HCL (ATARAX) 10 mg tablet Take 1 tablet (10 mg total) by mouth 2 (two) times a day as needed for itching 60 tablet 2   • potassium chloride (K-DUR,KLOR-CON) 20 mEq tablet Take 1 tablet (20 mEq total) by mouth daily 7 tablet 0   • rosuvastatin (CRESTOR) 5 mg tablet Take 1 tablet (5 mg total) by mouth daily 30 tablet 3   • sertraline (Zoloft) 50 mg tablet Take 1 tablet (50 mg total) by mouth daily 90 tablet 1   • triamcinolone (KENALOG) 0 1 % cream Apply topically 2 (two) times a day 30 g 0   • ipratropium (Atrovent HFA) 17 mcg/act inhaler Inhale 2 puffs 3 (three) times a day (Patient not taking: Reported on 1/9/2023) 12 9 g 2     No current facility-administered medications for this visit  Current Outpatient Medications on File Prior to Visit   Medication Sig   • furosemide (LASIX) 20 mg tablet Take 3 tablets (60 mg total) by mouth daily (Patient taking differently: Take 20 mg by mouth daily 1 tab Monday, Wednesday, friday)   • hydrOXYzine HCL (ATARAX) 10 mg tablet Take 1 tablet (10 mg total) by mouth 2 (two) times a day as needed for itching   • potassium chloride (K-DUR,KLOR-CON) 20 mEq tablet Take 1 tablet (20 mEq total) by mouth daily   • rosuvastatin (CRESTOR) 5 mg tablet Take 1 tablet (5 mg total) by mouth daily   • sertraline (Zoloft) 50 mg tablet Take 1 tablet (50 mg total) by mouth daily   • triamcinolone (KENALOG) 0 1 % cream Apply topically 2 (two) times a day   • ipratropium (Atrovent HFA) 17 mcg/act inhaler Inhale 2 puffs 3 (three) times a day (Patient not taking: Reported on 1/9/2023)     No current facility-administered medications on file prior to visit  She has No Known Allergies       Review of Systems   Constitutional: Negative for chills and fever  HENT: Negative for congestion, ear pain and sore throat  Eyes: Negative for pain  Respiratory: Negative for cough and shortness of breath  Cardiovascular: Negative for chest pain and leg swelling  Gastrointestinal: Negative for abdominal pain, nausea and vomiting  Endocrine: Negative for polyuria  Genitourinary: Negative for difficulty urinating, frequency and urgency  Musculoskeletal: Negative for arthralgias and back pain  Skin: Negative for rash  Neurological: Negative for weakness and headaches  Psychiatric/Behavioral: Negative for sleep disturbance  The patient is not nervous/anxious            Objective:      /82 (BP Location: Left arm, Patient Position: Sitting, Cuff Size: Standard)   Pulse 94   Temp 99 1 °F (37 3 °C) (Temporal)   Ht 5' 4" (1 626 m)   Wt 69 9 kg (154 lb) SpO2 98%   BMI 26 43 kg/m²     No results found for this or any previous visit (from the past 1344 hour(s))  Physical Exam  Constitutional:       Appearance: Normal appearance  HENT:      Head: Normocephalic  Right Ear: Tympanic membrane, ear canal and external ear normal       Left Ear: Tympanic membrane, ear canal and external ear normal       Nose: Nose normal  No congestion  Mouth/Throat:      Mouth: Mucous membranes are moist       Pharynx: Oropharynx is clear  No oropharyngeal exudate or posterior oropharyngeal erythema  Eyes:      Extraocular Movements: Extraocular movements intact  Conjunctiva/sclera: Conjunctivae normal       Pupils: Pupils are equal, round, and reactive to light  Cardiovascular:      Rate and Rhythm: Normal rate and regular rhythm  Heart sounds: Normal heart sounds  No murmur heard  Pulmonary:      Effort: Pulmonary effort is normal       Breath sounds: Normal breath sounds  No wheezing or rales  Abdominal:      General: Bowel sounds are normal  There is no distension  Palpations: Abdomen is soft  Tenderness: There is no abdominal tenderness  Musculoskeletal:         General: Normal range of motion  Cervical back: Normal range of motion and neck supple  Right lower leg: No edema  Left lower leg: No edema  Lymphadenopathy:      Cervical: No cervical adenopathy  Skin:     General: Skin is warm  Neurological:      General: No focal deficit present  Mental Status: She is alert and oriented to person, place, and time

## 2023-01-29 ENCOUNTER — NURSE TRIAGE (OUTPATIENT)
Dept: OTHER | Facility: OTHER | Age: 78
End: 2023-01-29

## 2023-01-29 NOTE — TELEPHONE ENCOUNTER
Regarding: Covid care advise  ----- Message from Ubaldo Thomason sent at 1/29/2023  2:48 PM EST -----  "She was diagnosed with Covid a week ago    She has completed the medication, but her sore throat and breathing are not much better "

## 2023-01-29 NOTE — TELEPHONE ENCOUNTER
Reason for Disposition  • [1] COVID-19 diagnosed by positive lab test (e g , PCR, rapid self-test kit) AND [2] mild symptoms (e g , cough, fever, others) AND [6] no complications or SOB    Answer Assessment - Initial Assessment Questions  Were you within 6 feet or less, for up to 15 minutes or more with a person that has a confirmed COVID-19 test?   Yes    What was the date of your exposure? Last week     Are you experiencing any symptoms attributed to the virus?  (Assess for SOB, cough, fever, difficulty breathing)   Sore throat, cough, congestion, headache-denies SOB or chest pain     HIGH RISK: Do you have any history heart or lung conditions, weakened immune system, diabetes, Asthma, CHF, HIV, COPD, Chemo, renal failure, sickle cell, etc?   Yes, DM, COPD    PREGNANCY: Are you pregnant or did you recently give birth?    N/A    VACCINE: "Have you gotten the COVID-19 vaccine?" If Yes ask: "Which one, how many shots, when did you get it?"   Yes    Tested postiive on 1/23/23 and symptoms started on 1/23/23  Was started Molnupiravir on 1/23/23 and finished on 1/28/23    Protocols used: CORONAVIRUS (COVID-19) DIAGNOSED OR SUSPECTED-ADULT-

## 2023-01-30 ENCOUNTER — APPOINTMENT (EMERGENCY)
Dept: RADIOLOGY | Facility: HOSPITAL | Age: 78
End: 2023-01-30

## 2023-01-30 ENCOUNTER — TELEPHONE (OUTPATIENT)
Dept: OTHER | Facility: OTHER | Age: 78
End: 2023-01-30

## 2023-01-30 ENCOUNTER — HOSPITAL ENCOUNTER (INPATIENT)
Facility: HOSPITAL | Age: 78
LOS: 10 days | Discharge: HOME HEALTH | End: 2023-02-10
Attending: EMERGENCY MEDICINE | Admitting: INTERNAL MEDICINE

## 2023-01-30 DIAGNOSIS — Z86.16 HISTORY OF COVID-19: ICD-10-CM

## 2023-01-30 DIAGNOSIS — J44.1 COPD EXACERBATION (HCC): ICD-10-CM

## 2023-01-30 DIAGNOSIS — R06.00 DYSPNEA: Primary | ICD-10-CM

## 2023-01-30 DIAGNOSIS — J96.01 ACUTE HYPOXEMIC RESPIRATORY FAILURE (HCC): ICD-10-CM

## 2023-01-30 DIAGNOSIS — I50.20 CONGESTIVE HEART FAILURE WITH RIGHT VENTRICULAR SYSTOLIC DYSFUNCTION (HCC): ICD-10-CM

## 2023-01-30 DIAGNOSIS — J96.01 ACUTE RESPIRATORY FAILURE WITH HYPOXIA (HCC): ICD-10-CM

## 2023-01-30 DIAGNOSIS — M77.8 TENDINITIS OF FINGER: ICD-10-CM

## 2023-01-30 DIAGNOSIS — R07.82 INTERCOSTAL PAIN: ICD-10-CM

## 2023-01-30 DIAGNOSIS — I50.82 CONGESTIVE HEART FAILURE WITH RIGHT VENTRICULAR SYSTOLIC DYSFUNCTION (HCC): ICD-10-CM

## 2023-01-30 DIAGNOSIS — U07.1 COVID: ICD-10-CM

## 2023-01-30 PROBLEM — Z86.79 HX OF HEART FAILURE: Status: ACTIVE | Noted: 2023-01-30

## 2023-01-30 LAB
2HR DELTA HS TROPONIN: -1 NG/L
ANION GAP SERPL CALCULATED.3IONS-SCNC: 4 MMOL/L (ref 4–13)
ATRIAL RATE: 75 BPM
BASOPHILS # BLD AUTO: 0.03 THOUSANDS/ÂΜL (ref 0–0.1)
BASOPHILS NFR BLD AUTO: 0 % (ref 0–1)
BUN SERPL-MCNC: 21 MG/DL (ref 5–25)
CALCIUM SERPL-MCNC: 9.5 MG/DL (ref 8.3–10.1)
CARDIAC TROPONIN I PNL SERPL HS: 10 NG/L
CARDIAC TROPONIN I PNL SERPL HS: 9 NG/L
CHLORIDE SERPL-SCNC: 104 MMOL/L (ref 96–108)
CK SERPL-CCNC: 90 U/L (ref 26–192)
CO2 SERPL-SCNC: 31 MMOL/L (ref 21–32)
CREAT SERPL-MCNC: 0.71 MG/DL (ref 0.6–1.3)
CRP SERPL QL: 20 MG/L
D DIMER PPP FEU-MCNC: 0.74 UG/ML FEU
EOSINOPHIL # BLD AUTO: 0.22 THOUSAND/ÂΜL (ref 0–0.61)
EOSINOPHIL NFR BLD AUTO: 3 % (ref 0–6)
ERYTHROCYTE [DISTWIDTH] IN BLOOD BY AUTOMATED COUNT: 12.1 % (ref 11.6–15.1)
FLUAV RNA RESP QL NAA+PROBE: NEGATIVE
FLUBV RNA RESP QL NAA+PROBE: NEGATIVE
GFR SERPL CREATININE-BSD FRML MDRD: 81 ML/MIN/1.73SQ M
GLUCOSE SERPL-MCNC: 136 MG/DL (ref 65–140)
GLUCOSE SERPL-MCNC: 139 MG/DL (ref 65–140)
HCT VFR BLD AUTO: 44.8 % (ref 34.8–46.1)
HGB BLD-MCNC: 14.3 G/DL (ref 11.5–15.4)
IMM GRANULOCYTES # BLD AUTO: 0.01 THOUSAND/UL (ref 0–0.2)
IMM GRANULOCYTES NFR BLD AUTO: 0 % (ref 0–2)
LYMPHOCYTES # BLD AUTO: 1.8 THOUSANDS/ÂΜL (ref 0.6–4.47)
LYMPHOCYTES NFR BLD AUTO: 27 % (ref 14–44)
MCH RBC QN AUTO: 29.9 PG (ref 26.8–34.3)
MCHC RBC AUTO-ENTMCNC: 31.9 G/DL (ref 31.4–37.4)
MCV RBC AUTO: 94 FL (ref 82–98)
MONOCYTES # BLD AUTO: 0.6 THOUSAND/ÂΜL (ref 0.17–1.22)
MONOCYTES NFR BLD AUTO: 9 % (ref 4–12)
NEUTROPHILS # BLD AUTO: 4.01 THOUSANDS/ÂΜL (ref 1.85–7.62)
NEUTS SEG NFR BLD AUTO: 61 % (ref 43–75)
NRBC BLD AUTO-RTO: 0 /100 WBCS
NT-PROBNP SERPL-MCNC: 331 PG/ML
P AXIS: 91 DEGREES
PLATELET # BLD AUTO: 253 THOUSANDS/UL (ref 149–390)
PMV BLD AUTO: 10.3 FL (ref 8.9–12.7)
POTASSIUM SERPL-SCNC: 3.9 MMOL/L (ref 3.5–5.3)
PR INTERVAL: 196 MS
PROCALCITONIN SERPL-MCNC: <0.05 NG/ML
QRS AXIS: -14 DEGREES
QRSD INTERVAL: 68 MS
QT INTERVAL: 388 MS
QTC INTERVAL: 433 MS
RBC # BLD AUTO: 4.79 MILLION/UL (ref 3.81–5.12)
RSV RNA RESP QL NAA+PROBE: NEGATIVE
SARS-COV-2 RNA RESP QL NAA+PROBE: NEGATIVE
SODIUM SERPL-SCNC: 139 MMOL/L (ref 135–147)
T WAVE AXIS: 82 DEGREES
VENTRICULAR RATE: 75 BPM
WBC # BLD AUTO: 6.67 THOUSAND/UL (ref 4.31–10.16)

## 2023-01-30 RX ORDER — PRAVASTATIN SODIUM 40 MG
40 TABLET ORAL
Status: DISCONTINUED | OUTPATIENT
Start: 2023-01-30 | End: 2023-02-10 | Stop reason: HOSPADM

## 2023-01-30 RX ORDER — SODIUM CHLORIDE FOR INHALATION 0.9 %
3 VIAL, NEBULIZER (ML) INHALATION
Status: DISCONTINUED | OUTPATIENT
Start: 2023-01-30 | End: 2023-01-30

## 2023-01-30 RX ORDER — AZITHROMYCIN 250 MG/1
500 TABLET, FILM COATED ORAL EVERY 24 HOURS
Status: COMPLETED | OUTPATIENT
Start: 2023-01-30 | End: 2023-02-01

## 2023-01-30 RX ORDER — HYDROXYZINE HYDROCHLORIDE 10 MG/1
10 TABLET, FILM COATED ORAL 2 TIMES DAILY PRN
Status: DISCONTINUED | OUTPATIENT
Start: 2023-01-30 | End: 2023-02-10 | Stop reason: HOSPADM

## 2023-01-30 RX ORDER — FUROSEMIDE 20 MG/1
20 TABLET ORAL 3 TIMES WEEKLY
Status: DISCONTINUED | OUTPATIENT
Start: 2023-01-30 | End: 2023-02-06

## 2023-01-30 RX ORDER — SENNOSIDES 8.6 MG
1 TABLET ORAL DAILY
Status: DISCONTINUED | OUTPATIENT
Start: 2023-01-30 | End: 2023-02-10 | Stop reason: HOSPADM

## 2023-01-30 RX ORDER — METHYLPREDNISOLONE SODIUM SUCCINATE 40 MG/ML
40 INJECTION, POWDER, LYOPHILIZED, FOR SOLUTION INTRAMUSCULAR; INTRAVENOUS EVERY 24 HOURS
Status: DISCONTINUED | OUTPATIENT
Start: 2023-01-30 | End: 2023-02-01

## 2023-01-30 RX ORDER — HEPARIN SODIUM 5000 [USP'U]/ML
5000 INJECTION, SOLUTION INTRAVENOUS; SUBCUTANEOUS EVERY 8 HOURS SCHEDULED
Status: DISCONTINUED | OUTPATIENT
Start: 2023-01-30 | End: 2023-02-03

## 2023-01-30 RX ORDER — BENZONATATE 200 MG/1
200 CAPSULE ORAL 3 TIMES DAILY PRN
COMMUNITY
Start: 2023-01-23 | End: 2023-02-10

## 2023-01-30 RX ORDER — LEVALBUTEROL 1.25 MG/.5ML
1.25 SOLUTION, CONCENTRATE RESPIRATORY (INHALATION)
Status: DISCONTINUED | OUTPATIENT
Start: 2023-01-30 | End: 2023-01-30

## 2023-01-30 RX ORDER — BENZONATATE 100 MG/1
200 CAPSULE ORAL 3 TIMES DAILY PRN
Status: DISCONTINUED | OUTPATIENT
Start: 2023-01-30 | End: 2023-02-01

## 2023-01-30 RX ORDER — MOLNUPIRAVIR 200 MG/1
200 CAPSULE ORAL
COMMUNITY
Start: 2023-01-23 | End: 2023-02-16

## 2023-01-30 RX ORDER — TRIAMCINOLONE ACETONIDE 1 MG/G
CREAM TOPICAL 2 TIMES DAILY
Status: DISCONTINUED | OUTPATIENT
Start: 2023-01-30 | End: 2023-02-10 | Stop reason: HOSPADM

## 2023-01-30 RX ORDER — GUAIFENESIN 600 MG/1
600 TABLET, EXTENDED RELEASE ORAL 2 TIMES DAILY
Status: DISCONTINUED | OUTPATIENT
Start: 2023-01-30 | End: 2023-02-03

## 2023-01-30 RX ORDER — BENZONATATE 100 MG/1
100 CAPSULE ORAL ONCE
Status: COMPLETED | OUTPATIENT
Start: 2023-01-30 | End: 2023-01-30

## 2023-01-30 RX ADMIN — METHYLPREDNISOLONE SODIUM SUCCINATE 40 MG: 40 INJECTION, POWDER, FOR SOLUTION INTRAMUSCULAR; INTRAVENOUS at 10:39

## 2023-01-30 RX ADMIN — FUROSEMIDE 20 MG: 20 TABLET ORAL at 12:16

## 2023-01-30 RX ADMIN — SERTRALINE HYDROCHLORIDE 50 MG: 50 TABLET ORAL at 12:16

## 2023-01-30 RX ADMIN — HEPARIN SODIUM 5000 UNITS: 5000 INJECTION INTRAVENOUS; SUBCUTANEOUS at 14:32

## 2023-01-30 RX ADMIN — IOHEXOL 85 ML: 350 INJECTION, SOLUTION INTRAVENOUS at 06:31

## 2023-01-30 RX ADMIN — HEPARIN SODIUM 5000 UNITS: 5000 INJECTION INTRAVENOUS; SUBCUTANEOUS at 21:31

## 2023-01-30 RX ADMIN — PRAVASTATIN SODIUM 40 MG: 40 TABLET ORAL at 18:57

## 2023-01-30 RX ADMIN — LEVALBUTEROL HYDROCHLORIDE 1.25 MG: 1.25 SOLUTION, CONCENTRATE RESPIRATORY (INHALATION) at 10:39

## 2023-01-30 RX ADMIN — ISODIUM CHLORIDE 3 ML: 0.03 SOLUTION RESPIRATORY (INHALATION) at 10:39

## 2023-01-30 RX ADMIN — BENZONATATE 200 MG: 100 CAPSULE ORAL at 18:57

## 2023-01-30 RX ADMIN — AZITHROMYCIN MONOHYDRATE 500 MG: 250 TABLET ORAL at 10:39

## 2023-01-30 RX ADMIN — IPRATROPIUM BROMIDE 0.5 MG: 0.5 SOLUTION RESPIRATORY (INHALATION) at 10:39

## 2023-01-30 RX ADMIN — GUAIFENESIN 600 MG: 600 TABLET, EXTENDED RELEASE ORAL at 18:57

## 2023-01-30 RX ADMIN — GUAIFENESIN 600 MG: 600 TABLET, EXTENDED RELEASE ORAL at 10:39

## 2023-01-30 RX ADMIN — BENZONATATE 100 MG: 100 CAPSULE ORAL at 06:08

## 2023-01-30 NOTE — ASSESSMENT & PLAN NOTE
Recent covid 1/23/23 s/p tx w Lagrevrio (molnupiravir) x 5 days who inititally felt better but presented with 2 days of worsening cough, increased sputum production SOB, GONZALEZ and fatigue    - Has required 4-7L O2 NC  - CTA PE unremarkable for PE, & w/o pulmonary infiltrates   - Afebrile, no leukocytosis, renal function WNL  - Repeat COVID/FLU/RSV negative this admission  - Procalcitonin, CK, BNP WNL  - CRP 20 0    - Will treat for mild covid pathway / COPD exacerbation as noted above  · Continue IV steroids  · Per pharmacy - as COVID negative remdisivir no longer indicated  · resp protocol

## 2023-01-30 NOTE — TELEPHONE ENCOUNTER
Patient is calling regarding cancelling an appointment  Date/Time: 1 30 2023 Dr Garima Farah [facility]    Patient was rescheduled: YES [] NO [x]    Patient requesting call back to reschedule: YES [] NO [x]    Patient in the ER    canceled appt

## 2023-01-30 NOTE — ASSESSMENT & PLAN NOTE
2/2 IM- Acute respiratory failure with hypoxia ([facility] [address] )  Assessment & Plan    Due to recent covid infection (1/23/23) c/b COPD exacerbation   - Hypoxic on admission, requiring 5L O2  - Afebrile   No leukocytosis   - CT PE study unremarkable & w/o infilatrates, or pulm edema  - CXR no acute cardiopulmonary disease      -Currently on Duonebs / steroids / mucinex / hycodan

## 2023-01-30 NOTE — ASSESSMENT & PLAN NOTE
Hx of combined CHF 2/2 to CAD and pulmonary HTN in the setting of chronic tobacco abuse and severe COPD  With preserved LV systolic function and LV diastolic dysfunction, right ventricular dilatation and dysfunction  Patient required home O2 (3L tapered off over ~2 months), daily diuretics as well as inhalers back in [id number]  Patient had recovery of EF w/ smoking cessation, weight loss, and medication   Last ECHO June '22 w/ EF 60% w LV & LV normal systolic and diastolic dysfunction   Echo 1/31 LVEF 20%, vigorous systolic function, grade II diastolic dysfunction, mildly dilated RV with normal systolic function, RV systolic pressure [id number], mild tricuspid regurgitation    - Appears dry on exam, no JVD, no pitting edema     · Continue home lasix 20 MWF

## 2023-01-30 NOTE — PLAN OF CARE
Problem: PAIN - ADULT  Goal: Verbalizes/displays adequate comfort level or baseline comfort level  Description: Interventions:  - Encourage patient to monitor pain and request assistance  - Assess pain using appropriate pain scale  - Administer analgesics based on type and severity of pain and evaluate response  - Implement non-pharmacological measures as appropriate and evaluate response  - Consider cultural and social influences on pain and pain management  - Notify physician/advanced practitioner if interventions unsuccessful or patient reports new pain  Outcome: Progressing     Problem: INFECTION - ADULT  Goal: Absence or prevention of progression during hospitalization  Description: INTERVENTIONS:  - Assess and monitor for signs and symptoms of infection  - Monitor lab/diagnostic results  - Monitor all insertion sites, i e  indwelling lines, tubes, and drains  - Monitor endotracheal if appropriate and nasal secretions for changes in amount and color  - [place] appropriate cooling/warming therapies per order  - Administer medications as ordered  - Instruct and encourage patient and family to use good hand hygiene technique  - Identify and instruct in appropriate isolation precautions for identified infection/condition  Outcome: Progressing  Goal: Absence of fever/infection during neutropenic period  Description: INTERVENTIONS:  - Monitor WBC    Outcome: Progressing     Problem: SAFETY ADULT  Goal: Patient will remain free of falls  Description: INTERVENTIONS:  - Educate patient/family on patient safety including physical limitations  - Instruct patient to call for assistance with activity   - Consult OT/PT to assist with strengthening/mobility   - Keep Call bell within reach  - Keep bed low and locked with side rails adjusted as appropriate  - Keep care items and personal belongings within reach  - Initiate and maintain comfort rounds  - Make Fall Risk Sign visible to staff  - Offer Toileting every 2 Hours, in advance of need  - Initiate/Maintain bed alarm  - Apply yellow socks and bracelet for high fall risk patients  - Consider moving patient to room near nurses station  Outcome: Progressing  Goal: Maintain or return to baseline ADL function  Description: INTERVENTIONS:  -  Assess patient's ability to carry out ADLs; assess patient's baseline for ADL function and identify physical deficits which impact ability to perform ADLs (bathing, care of mouth/teeth, toileting, grooming, dressing, etc )  - Assess/evaluate cause of self-care deficits   - Assess range of motion  - Assess patient's mobility; develop plan if impaired  - Assess patient's need for assistive devices and provide as appropriate  - Encourage maximum independence but intervene and supervise when necessary  - Involve family in performance of ADLs  - Assess for home care needs following discharge   - Consider OT consult to assist with ADL evaluation and planning for discharge  - Provide patient education as appropriate  Outcome: Progressing  Goal: Maintains/Returns to pre admission functional level  Description: INTERVENTIONS:  - Perform BMAT or MOVE assessment daily    - Set and communicate daily mobility goal to care team and patient/family/caregiver  - Collaborate with rehabilitation services on mobility goals if consulted  - Perform Range of Motion 4 times a day  - Reposition patient every 4 hours    - Dangle patient 2 times a day  - Stand patient 4 times a day  - Ambulate patient 4 times a day  - Out of bed to chair 4 times a day   - Out of bed for meals 4 times a day  - Out of bed for toileting  - Record patient progress and toleration of activity level   Outcome: Progressing

## 2023-01-30 NOTE — ED ATTENDING ATTESTATION
1/30/2023  IBambi MD, saw and evaluated the patient  I have discussed the patient with the resident/non-physician practitioner and agree with the resident's/non-physician practitioner's findings, Plan of Care, and MDM as documented in the resident's/non-physician practitioner's note, except where noted  All available labs and Radiology studies were reviewed  I was present for key portions of any procedure(s) performed by the resident/non-physician practitioner and I was immediately available to provide assistance  At this point I agree with the current assessment done in the Emergency Department  I have conducted an independent evaluation of this patient a history and physical is as follows:    ED Course     68-year-old female, past medical history of congestive heart failure, presenting to the emergency department for evaluation of shortness of breath  Patient was diagnosed with COVID this past week and was placed on Paxil event  Patient has been having continued cough and feeling progressive shortness of breath  Additionally patient traveled from [place] 5 days ago  Patient reports compliance with diuretics  No reported hemoptysis, lower extremity pain or swelling  10 systems reviewed and negative except as noted in the history of present illness  On examination patient was noted to be hypoxic with oxygen saturation of 75% on room air  Patient was noted to be tachypneic with respiratory rate of 32  On examination elderly female sitting upright in the stretcher  Patient is in moderate respiratory distress  Head is normocephalic and atraumatic  Eyelids lashes normal   Mucous membranes are dry  Lungs are coarse bilaterally  Patient has coarse cough  Heart is regular rate and rhythm with no murmurs rubs or gallops  Abdomen is nondistended, soft and nontender  Extremities are unremarkable in appearance  There is no calf tenderness  No swelling bilaterally    Skin warm and dry without any rashes  GCS is 15  Motor is 5 out of 5 bilateral upper and lower extremities  Patient was placed on 100% nonrebreather with improvement in oxygen saturation  MEDICAL DECISION MAKING    Number and Complexity of Problems  • Differential diagnosis: COVID, CHF, pulmonary embolism, pneumothorax  Medical Decision Making Data  • External documents reviewed: Last primary care physician note from 1/9/2023  • My EKG interpretation: Normal sinus rhythm at 75 bpm   No acute ST or T wave changes  • My CT interpretation: No acute PE  Patient has right upper lobe infiltrate  • My X-ray interpretation: No acute cardiopulmonary disease  CTA ED chest PE study   Final Result      No pulmonary embolus  No evidence of acute thoracic process  COPD       3 nodules in the left lower lobe, the largest of which measures 6 mm stable since May 2021  No new pulmonary nodules  Noncontrast chest CT follow-up in 12 months could be considered to establish two-year stability  Additional chronic findings and negatives as above  Workstation performed: [id number]         XR chest pa & lateral   ED Interpretation   No acute cardiopulmonary disease  Labs Reviewed   HS TROPONIN I 0HR - Normal       Result Value Ref Range Status    hs TnI 0hr 10  "Refer to ACS Flowchart"- see link ng/L Final    Comment:                                              Initial (time 0) result  If >=50 ng/L, Myocardial injury suggested ;  Type of myocardial injury and treatment strategy  to be determined  If 5-49 ng/L, a delta result at 2 hours and or 4 hours will be needed to further evaluate  If <4 ng/L, and chest pain has been >3 hours since onset, patient may qualify for discharge based on the HEART score in the ED  If <5 ng/L and <3hours since onset of chest pain, a delta result at 2 hours will be needed to further evaluate      HS Troponin 99th Percentile URL of a Health Population=12 ng/L with a 95% Confidence Interval of 8-18 ng/L  Second Troponin (time 2 hours)  If calculated delta >= 20 ng/L,  Myocardial injury suggested ; Type of myocardial injury and treatment strategy to be determined  If 5-49 ng/L and the calculated delta is 5-19 ng/L, consult medical service for evaluation  Continue evaluation for ischemia on ecg and other possible etiology and repeat hs troponin at 4 hours  If delta is <5 ng/L at 2 hours, consider discharge based on risk stratification via the HEART score (if in ED), or SUKH risk score in IP/Observation  HS Troponin 99th Percentile URL of a Health Population=12 ng/L with a 95% Confidence Interval of 8-18 ng/L    HS TROPONIN I 2HR - Normal    hs TnI 2hr 9  "Refer to ACS Flowchart"- see link ng/L Final    Comment:                                              Initial (time 0) result  If >=50 ng/L, Myocardial injury suggested ;  Type of myocardial injury and treatment strategy  to be determined  If 5-49 ng/L, a delta result at 2 hours and or 4 hours will be needed to further evaluate  If <4 ng/L, and chest pain has been >3 hours since onset, patient may qualify for discharge based on the HEART score in the ED  If <5 ng/L and <3hours since onset of chest pain, a delta result at 2 hours will be needed to further evaluate  HS Troponin 99th Percentile URL of a Health Population=12 ng/L with a 95% Confidence Interval of 8-18 ng/L  Second Troponin (time 2 hours)  If calculated delta >= 20 ng/L,  Myocardial injury suggested ; Type of myocardial injury and treatment strategy to be determined  If 5-49 ng/L and the calculated delta is 5-19 ng/L, consult medical service for evaluation  Continue evaluation for ischemia on ecg and other possible etiology and repeat hs troponin at 4 hours  If delta is <5 ng/L at 2 hours, consider discharge based on risk stratification via the HEART score (if in ED), or SUKH risk score in IP/Observation      HS Troponin 99th Percentile URL of a Health Population=12 ng/L with a 95% Confidence Interval of 8-18 ng/L  Delta 2hr hsTnI -1  <20 ng/L Final   CBC AND DIFFERENTIAL    WBC 6 67  4 31 - 10 16 Thousand/uL Final    RBC 4 79  3 81 - 5 12 Million/uL Final    Hemoglobin 14 3  11 5 - 15 4 g/dL Final    Hematocrit 44 8  34 8 - 46 1 % Final    MCV 94  82 - 98 fL Final    MCH 29 9  26 8 - 34 3 pg Final    MCHC 31 9  31 4 - 37 4 g/dL Final    RDW 12 1  11 6 - 15 1 % Final    MPV 10 3  8 9 - 12 7 fL Final    Platelets 822  094 - 390 Thousands/uL Final    nRBC 0  /100 WBCs Final    Neutrophils Relative 61  43 - 75 % Final    Immat GRANS % 0  0 - 2 % Final    Lymphocytes Relative 27  14 - 44 % Final    Monocytes Relative 9  4 - 12 % Final    Eosinophils Relative 3  0 - 6 % Final    Basophils Relative 0  0 - 1 % Final    Neutrophils Absolute 4 01  1 85 - 7 62 Thousands/µL Final    Immature Grans Absolute 0 01  0 00 - 0 20 Thousand/uL Final    Lymphocytes Absolute 1 80  0 60 - 4 47 Thousands/µL Final    Monocytes Absolute 0 60  0 17 - 1 22 Thousand/µL Final    Eosinophils Absolute 0 22  0 00 - 0 61 Thousand/µL Final    Basophils Absolute 0 03  0 00 - 0 10 Thousands/µL Final   BASIC METABOLIC PANEL    Sodium 046  135 - 147 mmol/L Final    Potassium 3 9  3 5 - 5 3 mmol/L Final    Comment: Slightly Hemolyzed; Results May be Affected    Chloride 104  96 - 108 mmol/L Final    CO2 31  21 - 32 mmol/L Final    ANION GAP 4  4 - 13 mmol/L Final    BUN 21  5 - 25 mg/dL Final    Creatinine 0 71  0 60 - 1 30 mg/dL Final    Comment: Standardized to IDMS reference method    Glucose 136  65 - 140 mg/dL Final    Comment: If the patient is fasting, the ADA then defines impaired fasting glucose as > 100 mg/dL and diabetes as > or equal to 123 mg/dL  Specimen collection should occur prior to Sulfasalazine administration due to the potential for falsely depressed results   Specimen collection should occur prior to Sulfapyridine administration due to the potential for falsely elevated results  Calcium 9 5  8 3 - 10 1 mg/dL Final    eGFR 81  ml/min/1 73sq m Final    Narrative:     [facility] guidelines for Chronic Kidney Disease (CKD):   •  Stage 1 with normal or high GFR (GFR > 90 mL/min/1 73 square meters)  •  Stage 2 Mild CKD (GFR = 60-89 mL/min/1 73 square meters)  •  Stage 3A Moderate CKD (GFR = 45-59 mL/min/1 73 square meters)  •  Stage 3B Moderate CKD (GFR = 30-44 mL/min/1 73 square meters)  •  Stage 4 Severe CKD (GFR = 15-29 mL/min/1 73 square meters)  •  Stage 5 End Stage CKD (GFR <15 mL/min/1 73 square meters)  Note: GFR calculation is accurate only with a steady state creatinine   HS TROPONIN I 4HR   LIGHT BLUE TOP       • Labs reviewed by me are significant for: No significant abnormalities  • Clinical decision rules/scores are significant for: Patient is low pretest probability for Wells criteria  Given that the hypoxia on arrival, with relatively clear chest x-ray, plan is CTA chest for PE as well as other etiology to explain hypoxia  • Discussed case with: Admitting hospitalist  • Considered admission for: Hypoxia and respiratory distress with COVID  Treatment and Disposition  ED course: Patient was seen on arrival in the emergency department  Patient was noted to be hypoxic  She was placed on 100% nonrebreather with improvement in oxygenation as well as work of breathing  Lab work and chest x-ray was reviewed  Given the fact that there was no significant infiltrates on chest x-ray, plan was to progress with CT  Shared decision making: Patient and  are agreeable to admission  Code status: Full code                Critical Care Time  Procedures

## 2023-01-30 NOTE — ASSESSMENT & PLAN NOTE
· Continue home zoloft 50mg daily, home hydroxyzine 10mg BID PRN  · Xanax 0 5mg QHS PRN  · Ativan 0 5mg IV Q4H PRN

## 2023-01-30 NOTE — H&P
INTERNAL MEDICINE RESIDENCY ADMISSION H&P     Name: Monica Perrin   Age & Sex: 76 y o  female   MRN: [id number]  Unit/Bed#: ED 32   Encounter: [id number]  Primary Care Provider: Phil Olivo MD    Code Status: Level 3 - DNAR and DNI  Admission Status: OBSERVATION  Disposition: Patient requires Med/Surg    Admit to team: SOD Team A    ASSESSMENT/PLAN     Principal Problem:    Acute respiratory failure with hypoxia ([facility] )  Active Problems:    COPD exacerbation (HCC)    COPD (chronic obstructive pulmonary disease) ([facility] )    History of COVID-19    SOB (shortness of breath)    Type 2 diabetes, HbA1c goal < 7% (HCC)    Anxiety    Essential hypertension, benign    Dysthymic disorder    Biventricular congestive heart failure ([facility] )      * Acute respiratory failure with hypoxia ([place] [address] )  Assessment & Plan  Due to recent covid infection (1/23/23) and c/b copd exacerbation  · See a/p below     COPD exacerbation ([place] [address] )  Assessment & Plan  75 y/o F w/ hx of COPD, recent covid 1/23/23 s/p tx w Lagrevrio (molnupiravir) x 5 days who inititally felt better but presented with 2 days of worsening cough, increased sputum production SOB, GONZALEZ and fatigue    - Hypoxic on admission, requiring 5L O2  - Afebrile   No leukocytosis   - CT PE study unremarkable & w/o infilatrates, or pulm edema   · Admitted on combination of COPD/COVID pathway   · Will start methylprednisolone 40 IV   · Azithro 500 QD x3 days   · resp protocol   · Follow up cxr - pending final read  · See a/p for COVID    History of COVID-19  Assessment & Plan  Recent covid 1/23/23 s/p tx w Lagrevrio (molnupiravir) x 5 days who inititally felt better but presented with 2 days of worsening cough, increased sputum production SOB, GONZALEZ and fatigue  - Currently requiring 5L O2 NC  - CTA PE unremarkable for PE, & w/o pulmonoary infiltrates   - Afebrile, no leukocytosis, renal function WNL  - Will treat for mild covid pathway / COPD exacerbation as noted above  · Methylprednisolone 40 IV QD  · Remdisivir first dose today   · resp protocol   · Follow up FLU/RSV  · F/u covid labs   · Pro charlotte   · BNP   · CK  · CRP     SOB (shortness of breath)  Assessment & Plan  See a/p above    Biventricular congestive heart failure (HCC)  Assessment & Plan  Hx of combined CHF 2/2 to CAD and pulmonary HTN in the setting of chronic tobacco abuse and severe COPD  With preserved LV systolic function and LV diastolic dysfunction, right ventricular dilatation and dysfunction  Patient required home O2, daily diuretics as well as inhalers back in [id number]  Patient had recovery of EF w/ smoking cessation, weight loss, and medication   Last ECHO June '22 w/ EF 60% w LV & LV normal systolic and diastolic dysfunction   - Appears dry on exam, no JVD, no pitting edema   · Continue home lasix 20 MWF    Dysthymic disorder  Assessment & Plan  · Continue home zoloft 50 qd    Essential hypertension, benign  Assessment & Plan  · Continue sonia e lasix 20 MWF    Anxiety  Assessment & Plan  · Continue home atarax 10 mg BID PRN    Type 2 diabetes, HbA1c goal < 7% ([facility])  Assessment & Plan  Lab Results   Component Value Date    HGBA1C 6 2 (H) 09/20/2022     No results for input(s): POCGLU in the last 72 hours  Blood Sugar Average: Last 72 hrs:     Well-controlled with diet and exercise  Not on any medication  · Carb controlled diet  · in patient goal -1 80  · Add on sliding scale insulin if uncontrolled sugars      VTE Pharmacologic Prophylaxis: Sequential compression device (Venodyne)  and Heparin  VTE Mechanical Prophylaxis: sequential compression device    CHIEF COMPLAINT     Chief Complaint   Patient presents with   • Shortness of Breath     Pt tested positive for covid on Monday  Reports of severe cough at SOB that started yesterday   O2 75% in triage      HISTORY OF PRESENT ILLNESS     75 y/o F w/ hx of COPD, history of CHF, pulm HTN, type 2 diabetes not on insulin, recent covid 1/23/23 s/p tx w Lagrevrio (molnupiravir) x 5 days who inititally felt better but presented with 2 days of worsening cough, increased sputum production SOB, GONZALEZ and fatigue  On 1/23 patient began having subjective, fever, chills, congestion, cough and increasing sputum production  He tested positive for COVID on a home test   Later that day she was seen at an urgent care in [place] who prescribed lageverio 200 mg 4 capsules bid x 5 days, and benzonatate capsules as needed  She reports that she finished the course  She reported feeling symptomatically better initially as well  However on Saturday, about 2 days ago, patient's SOB worsened  She began to have GONZALEZ, increased sputum production, increasing cough, some chest tightness now with exertion,  increasing cough, chest tightness and loss of appetite  Patient denies fever, chest pain, abdominal pain, nausea vomiting diarrhea constipation  Weight gain, weight loss, lower extremity edema, headache, myalgias    On arrival to ED patient was hypoxic requiring 10 L O2  She was afebrile  Labs including CBC and CMP were within normal limits  Specifically no leukocytosis, kidney function stable and at baseline, high-sensitivity troponin normal at 0 and 2 hours  Chest x-ray wet read did not show pulmonary infiltrates  CTA PE study unremarkable for PE and also & w/o infilatrates, or pulm edema  Patient was able to be brought down to 5 L oxygen nasal cannula without intervention  She was admitted admitted on combination of COPD exacerbation/COVID pathway    PMHx reviewed with patient + chart reviewed  Patient with hx of combined CHF 2/2 pulmonary HTN in the setting of chronic tobacco abuse and severe COPD w/ preserved LV systolic function and LV diastolic dysfunction, right ventricular dilatation and dysfunction  Patient required home O2, daily diuretics as well as inhalers back in 2021  Patient had recovery of EF w/ smoking cessation, weight loss, and medication   Last ECHO June '22 w/ EF 60% w normal systolic and diastolic dysfunction  She has been off home O2 since 2022 and her diuretic regimen has decreased from lasix 60 mg qd to 20 mg MWF  Med rec preformed: lasix 20 mg MWF, crestor 5 qd, sertraline 50 qd, atrax 10 BID PRN, kenalog cream BID    Patient confirmed level 1 full code    Patient seen and examined at bedside  Saturating 94% on 5 L oxygen  Frequent productive cough unable to clear mucus with coughing  Patient appears dry on exam with dry mucous membranes  No pitting edema  Heart sounds normal S1-S2  Lung sounds distant however no wheezes or crackles  REVIEW OF SYSTEMS     Review of Systems   Constitutional: Positive for appetite change, chills and fatigue  Negative for fever  HENT: Positive for congestion and postnasal drip  Negative for rhinorrhea, sore throat, tinnitus and trouble swallowing  Respiratory: Positive for cough and shortness of breath  Negative for chest tightness  Cardiovascular: Negative for chest pain and leg swelling  Gastrointestinal: Negative for abdominal distention, abdominal pain, constipation, diarrhea, nausea and vomiting  Genitourinary: Negative for dysuria, frequency, hematuria and urgency  Musculoskeletal: Negative for arthralgias, gait problem and myalgias  Neurological: Negative for dizziness and light-headedness  OBJECTIVE     Vitals:    23 0407 23 0509 23 0851 23 0900   BP: 164/83  122/59 118/65   BP Location:   Left arm Right arm   Pulse:   (!) 106 96   Resp:   22 18   Temp:  98 °F (36 7 °C)     TempSrc:  Oral     SpO2:   93% 96%      Temperature:   Temp (24hrs), Av °F (36 7 °C), Min:98 °F (36 7 °C), Max:98 °F (36 7 °C)    Temperature: 98 °F (36 7 °C)  Intake & Output:  I/O     None        Weights: There is no height or weight on file to calculate BMI  Weight (last 2 days)     None        Physical Exam  Vitals and nursing note reviewed     Constitutional:       General: She is not in acute distress  Appearance: She is well-developed and well-groomed  HENT:      Head: Normocephalic and atraumatic  Nose: Congestion present  Mouth/Throat:      Mouth: Mucous membranes are dry  Pharynx: No oropharyngeal exudate or posterior oropharyngeal erythema  Eyes:      Conjunctiva/sclera: Conjunctivae normal    Cardiovascular:      Rate and Rhythm: Regular rhythm  Tachycardia present  Heart sounds: S1 normal and S2 normal  No murmur heard  Pulmonary:      Effort: Pulmonary effort is normal  No respiratory distress  Breath sounds: Decreased air movement present  Comments: Distant breath sounds  No crackles, wheezing  Abdominal:      Palpations: Abdomen is soft  Tenderness: There is no abdominal tenderness  Musculoskeletal:         General: No swelling  Cervical back: Neck supple  Right lower leg: No edema  Left lower leg: No edema  Skin:     General: Skin is dry  Capillary Refill: Capillary refill takes less than 2 seconds  Neurological:      Mental Status: She is alert and oriented to person, place, and time  Psychiatric:         Mood and Affect: Mood normal        PAST MEDICAL HISTORY       Past Medical History:   Diagnosis Date   • Allergic rhinitis    • Anxiety    • Arthritis    • Diabetes mellitus ([facility] )    • Oxygen decrease    • Tobacco abuse      PAST SURGICAL HISTORY     Past Surgical History:   Procedure Laterality Date   • BREAST SURGERY Right     Cyst   • OTHER SURGICAL HISTORY      Cataract implant   • OTHER SURGICAL HISTORY      Fertilization     SOCIAL & FAMILY HISTORY     Social History     Substance and Sexual Activity   Alcohol Use Yes   • Alcohol/week: 1 0 - 2 0 standard drink   • Types: 1 - 2 Glasses of wine per week    Comment: On average, will drink 1-2 glasses of white wine daily with dinner   (Updated 05/21/2021),      Substance and Sexual Activity   Alcohol Use Yes   • Alcohol/week: 1 0 - 2 0 standard drink   • Types: 1 - 2 Glasses of wine per week    Comment: On average, will drink 1-2 glasses of white wine daily with dinner  (Updated 2021),         Substance and Sexual Activity   Drug Use Never    Comment: Last assessed 2021  Social History     Tobacco Use   Smoking Status Former   • Packs/day: 0 50   • Years: 30 00   • Pack years: 15 00   • Types: Cigarettes   • Quit date: 2021   • Years since quittin 7   Smokeless Tobacco Never   Tobacco Comments    Began smoking in her late teens, averaging 1/2 pack daily  States she quit smoking a few weeks ago (Updated 2021)  Family History   Problem Relation Age of Onset   • Prostate cancer Father    • Diabetes Brother    • Heart disease Brother    • Lymphoma Brother    • No Known Problems Sister    • Parkinsonism Sister    • Hypotension Daughter         Takes "salt pills"   • Proteinuria Daughter         Occuring during her 3 pregnancies; follows with nephrology     LABORATORY DATA     Labs: I have personally reviewed pertinent reports  Results from last 7 days   Lab Units 23  0407   WBC Thousand/uL 6 67   HEMOGLOBIN g/dL 14 3   HEMATOCRIT % 44 8   PLATELETS Thousands/uL 253   NEUTROS PCT % 61   MONOS PCT % 9      Results from last 7 days   Lab Units 23  0407   POTASSIUM mmol/L 3 9   CHLORIDE mmol/L 104   CO2 mmol/L 31   BUN mg/dL 21   CREATININE mg/dL 0 71   CALCIUM mg/dL 9 5                          Micro:  Lab Results   Component Value Date    URINECX No Growth <1000 cfu/mL 2021     IMAGING & DIAGNOSTIC TESTS     Imaging: I have personally reviewed pertinent reports  CTA ED chest PE study    Result Date: 2023  Impression: No pulmonary embolus  No evidence of acute thoracic process  COPD  3 nodules in the left lower lobe, the largest of which measures 6 mm stable since May 2021  No new pulmonary nodules  Noncontrast chest CT follow-up in 12 months could be considered to establish two-year stability   Additional chronic findings and negatives as above  Workstation performed: [id number]     EKG, Pathology, and Other Studies: I have personally reviewed pertinent reports  ALLERGIES   No Known Allergies  MEDICATIONS PRIOR TO ARRIVAL     Prior to Admission medications    Medication Sig Start Date End Date Taking?  Authorizing Provider   furosemide (LASIX) 20 mg tablet Take 3 tablets (60 mg total) by mouth daily  Patient taking differently: Take 20 mg by mouth daily 1 tab Monday, Wednesday, friday 12/20/21  Yes Micael Epley, MD   hydrOXYzine HCL (ATARAX) 10 mg tablet Take 1 tablet (10 mg total) by mouth 2 (two) times a day as needed for itching 9/26/22  Yes Micael Epley, MD   rosuvastatin (CRESTOR) 5 mg tablet Take 1 tablet (5 mg total) by mouth daily 9/26/22  Yes Micael Epley, MD   sertraline (Zoloft) 50 mg tablet Take 1 tablet (50 mg total) by mouth daily 12/5/22  Yes Micael Epley, MD   triamcinolone (KENALOG) 0 1 % cream Apply topically 2 (two) times a day 6/6/22  Yes Micael Epley, MD   benzonatate (TESSALON) 200 MG capsule Take 200 mg by mouth 3 (three) times a day as needed 1/23/23  Yes Historical Provider, MD   Lagevrio 200 MG capsule 200 mg 1/23/23  Yes Historical Provider, MD   sertraline (Zoloft) 50 mg tablet Take 1 tablet (50 mg total) by mouth daily 12/5/22  Yes Mundo Hubbard MD   ipratropium (Atrovent HFA) 17 mcg/act inhaler Inhale 2 puffs 3 (three) times a day 2/21/22 1/30/23  Micael Epley, MD   potassium chloride (K-DUR,KLOR-CON) 20 mEq tablet Take 1 tablet (20 mEq total) by mouth daily  Patient not taking: Reported on 1/30/2023 9/21/22 1/30/23  Mundo Hubbard MD     MEDICATIONS ADMINISTERED IN LAST 24 HOURS     Medication Administration - last 24 hours from 01/29/2023 0931 to 01/30/2023 0931       Date/Time Order Dose Route Action Action by     01/30/2023 1550 EST benzonatate (TESSALON PERLES) capsule 100 mg 100 mg Oral Given Fam Westbrook RN     01/30/2023 0631 EST iohexol (OMNIPAQUE) 350 MG/ML injection (SINGLE-DOSE) 85 mL 85 mL Intravenous Given Abigail Rashid        CURRENT MEDICATIONS     Current Facility-Administered Medications   Medication Dose Route Frequency Provider Last Rate   • azithromycin  500 mg Oral Q24H Jasmit Julio, DO     • benzonatate  200 mg Oral TID PRN Jasmit Julio, DO     • furosemide  20 mg Oral Once per day on Mon Wed Fri Jasmit Julio, DO     • guaiFENesin  600 mg Oral BID Jasmit Julio, DO     • heparin (porcine)  5,000 Units Subcutaneous Q8H [address] Jasmit Julio, DO     • hydrOXYzine HCL  10 mg Oral BID PRN Jasmit Julio, DO     • ipratropium  0 5 mg Nebulization TID Jasmit Julio, DO     • levalbuterol  1 25 mg Nebulization TID Jasmit Juilo, DO      And   • sodium chloride  3 mL Nebulization TID Jasmit Julio, DO     • methylPREDNISolone sodium succinate  40 mg Intravenous Q24H Jasmit Julio, DO     • pravastatin  40 mg Oral Daily With Koibanx, DO     • remdesivir  200 mg Intravenous Q24H Jasmit Julio, DO      Followed by   • [START ON 1/31/2023] remdesivir  100 mg Intravenous Q24H Jasmit Julio, DO     • senna  1 tablet Oral Daily Jasmit Julio, DO     • sertraline  50 mg Oral Daily Jasmit Julio, DO     • triamcinolone   Topical BID Jasmit Julio, DO          benzonatate, 200 mg, TID PRN  hydrOXYzine HCL, 10 mg, BID PRN        Admission Time  I spent 30 minutes admitting the patient  This involved direct patient contact where I performed a full history and physical, reviewing previous records, and reviewing laboratory and other diagnostic studies  Portions of the record may have been created with voice recognition software  Occasional wrong word or "sound a like" substitutions may have occurred due to the inherent limitations of voice recognition software    Read the chart carefully and recognize, using context, where substitutions have occurred     ==  Pat Lopez, [facility]  Internal Medicine Residency PGY-2

## 2023-01-30 NOTE — RESPIRATORY THERAPY NOTE
01/30/23 1146   Respiratory Protocol   Protocol Initiated? Yes   Protocol Selection Respiratory   Language Barrier? No   Medical & Social History Reviewed? Yes   Diagnostic Studies Reviewed? Yes   Physical Assessment Performed? Yes   Respiratory Plan No distress/Pulmonary history; Discontinue Protocol   Respiratory Assessment   Assessment Type Assess only   Respiratory Pattern Tachypneic   Chest Assessment Chest expansion symmetrical   Bilateral Breath Sounds Clear;Diminished   Resp Comments Bilateral BS clear/diminshed  Pt states she has no known pulmonary hx  No distress ntoed  Pt is about a wk out from covid  Pt admits to ocassional coughing spells   Will D/C protocil a this time

## 2023-01-30 NOTE — ASSESSMENT & PLAN NOTE
Lab Results   Component Value Date    HGBA1C 6 2 (H) 09/20/2022     Recent Labs     01/30/23  1652 02/01/23  1155   POCGLU 139 148*       Blood Sugar Average: Last 72 hrs:  (P) 143 5   Well-controlled with diet and exercise  Not on any medication  · Carb controlled diet  · in patient goal -180  · Add on sliding scale insulin if uncontrolled sugars 2/2 IV steroids

## 2023-01-30 NOTE — ASSESSMENT & PLAN NOTE
75 y/o F w/ hx of COPD, recent covid 1/23/23 s/p tx w Andrew Ibrahim (molnupiravir) x 5 days who inititally felt better but presented with 2 days of worsening cough, increased sputum production SOB, GONZALEZ and fatigue      - Hypoxic on admission, requiring 5L O2  - Afebrile   No leukocytosis   - CT PE study unremarkable & w/o infilatrates, or pulm edema  - CXR no acute cardiopulmonary disease     · Admitted on combination of COPD/COVID pathway   · IV Solu-Medrol 40 mg daily  · 3 day course of Azithro completed 2/1  · Resp protocol   · Pulmonary consulted and recommendations appreciated:  · Started inhaled budesonide 0 5 mg Q12 and inhaled formoterol 20 mcg Q12  · IV Solu-Medrol 40 mg twice daily switched to 40 mg daily  · Maintain SPO2>88%  · Will likely require 4L home O2

## 2023-01-30 NOTE — RESPIRATORY THERAPY NOTE
RT Protocol Note  Guero Daniel 76 y o  female MRN: [id number]  Unit/Bed#: ED 26 Encounter: [id number]    Assessment    Principal Problem:    Acute respiratory failure with hypoxia ([facility] )  Active Problems:    Type 2 diabetes, HbA1c goal < 7% ([facility])    Anxiety    SOB (shortness of breath)    COPD (chronic obstructive pulmonary disease) ([facility])    Essential hypertension, benign    Dysthymic disorder    History of COVID-19    COPD exacerbation ([facility])    Biventricular congestive heart failure ([facility])      Home Pulmonary Medications:  NOne       Past Medical History:   Diagnosis Date    Allergic rhinitis     Anxiety     Arthritis     Diabetes mellitus ([facility] )     Oxygen decrease     Tobacco abuse      Social History     Socioeconomic History    Marital status: /Civil Union     Spouse name: None    Number of children: 1    Years of education: None    Highest education level: Some college, no degree   Occupational History    None   Tobacco Use    Smoking status: Former     Packs/day: 0 50     Years: 30 00     Pack years: 15 00     Types: Cigarettes     Quit date: 2021     Years since quittin 7    Smokeless tobacco: Never    Tobacco comments:     Began smoking in her late teens, averaging 1/2 pack daily  States she quit smoking a few weeks ago (Updated 2021)  Vaping Use    Vaping Use: Never used   Substance and Sexual Activity    Alcohol use: Yes     Alcohol/week: 1 0 - 2 0 standard drink     Types: 1 - 2 Glasses of wine per week     Comment: On average, will drink 1-2 glasses of white wine daily with dinner  (Updated 2021),     Drug use: Never     Comment: Last assessed 2021  Sexual activity: None   Other Topics Concern    None   Social History Narrative    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 ; 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  Has 1 biological daughter Edward Michaels) who lives locally        Social Determinants of Health     Financial Resource Strain: Low Risk     Difficulty of Paying Living Expenses: Not hard at all   Food Insecurity: No Food Insecurity    Worried About [employer] in the Last Year: Never true    Ran Out of Food in the Last Year: Never true   Transportation Needs: No Transportation Needs    Lack of Transportation (Medical): No    Lack of Transportation (Non-Medical): No   Physical Activity: Not on file   Stress: Not on file   Social Connections: Not on file   Intimate Partner Violence: Not on file   Housing Stability: Low Risk     Unable to Pay for Housing in the Last Year: No    Number of Places Lived in the Last Year: 1    Unstable Housing in the Last Year: No       Subjective         Objective    Physical Exam:   Assessment Type: Assess only  Respiratory Pattern: Tachypneic  Chest Assessment: Chest expansion symmetrical  Bilateral Breath Sounds: Clear, Diminished    Vitals:  Blood pressure 136/60, pulse 92, temperature 98 °F (36 7 °C), temperature source Oral, resp  rate (!) 26, SpO2 95 %  Imaging and other studies: I have personally reviewed pertinent films in PACS          Plan    Respiratory Plan: (P) Discontinue Protocol, Home Bronchodilator Patient pathway        Resp Comments: (P) Bilateral BS clear/diminshed  Pt states she has no known pulmonary hx; hoever in medical hx; pt has COPD  Pt is currently not taking any meds at home for COPD  No distress ntoed  Pt is about a wk out from covid  Pt admits to ocassional coughing spells   Will D/C protocil a this time

## 2023-01-30 NOTE — ED PROVIDER NOTES
History  Chief Complaint   Patient presents with   • Shortness of Breath     Pt tested positive for covid on Monday  Reports of severe cough at SOB that started yesterday  O2 75% in triage     Patient is a 18-year-old female with a  past medical history of CHF who presented with shortness of breath  The patient was diagnosed with COVID this past week and was placed on Paxlovid  She states while she was doing laundry today she felt progressively more short of breath  It worsened early this morning and she was unable to catch her breath  She recently flew from [place] on Tuesday  She is compliant with her diuretics  About 6 months ago she was switched from taking her diuretic everyday to 3 times per week and has not had any shortness of breath during that time  She denies hemoptysis, calf tenderness, increased calf swelling, recent immobilization, recent surgery, or history of malignancy  Prior to Admission Medications   Prescriptions Last Dose Informant Patient Reported? Taking?    Lagevrio 200 MG capsule Past Week  Yes Yes   Si mg   benzonatate (TESSALON) 200 MG capsule 2023  Yes Yes   Sig: Take 200 mg by mouth 3 (three) times a day as needed   furosemide (LASIX) 20 mg tablet Past Week  No Yes   Sig: Take 3 tablets (60 mg total) by mouth daily   Patient taking differently: Take 20 mg by mouth daily 1 tab Monday, Wednesday, friday   hydrOXYzine HCL (ATARAX) 10 mg tablet 2023  No Yes   Sig: Take 1 tablet (10 mg total) by mouth 2 (two) times a day as needed for itching   rosuvastatin (CRESTOR) 5 mg tablet 2023  No Yes   Sig: Take 1 tablet (5 mg total) by mouth daily   sertraline (Zoloft) 50 mg tablet 2023  No Yes   Sig: Take 1 tablet (50 mg total) by mouth daily   triamcinolone (KENALOG) 0 1 % cream Past Week  No Yes   Sig: Apply topically 2 (two) times a day      Facility-Administered Medications: None       Past Medical History:   Diagnosis Date   • Allergic rhinitis    • Anxiety    • Arthritis    • Diabetes mellitus (HCC)    • Oxygen decrease    • Tobacco abuse        Past Surgical History:   Procedure Laterality Date   • BREAST SURGERY Right     Cyst   • OTHER SURGICAL HISTORY      Cataract implant   • OTHER SURGICAL HISTORY      Fertilization       Family History   Problem Relation Age of Onset   • Prostate cancer Father    • Diabetes Brother    • Heart disease Brother    • Lymphoma Brother    • No Known Problems Sister    • Parkinsonism Sister    • Hypotension Daughter         Takes "salt pills"   • Proteinuria Daughter         Occuring during her 3 pregnancies; follows with nephrology     I have reviewed and agree with the history as documented  E-Cigarette/Vaping   • E-Cigarette Use Never User      E-Cigarette/Vaping Substances   • Nicotine No    • THC No    • CBD No    • Flavoring No    • Other No    • Unknown No      Social History     Tobacco Use   • Smoking status: Former     Packs/day: 0 50     Years: 30 00     Pack years: 15 00     Types: Cigarettes     Quit date: 2021     Years since quittin 7   • Smokeless tobacco: Never   • Tobacco comments:     Began smoking in her late teens, averaging 1/2 pack daily  States she quit smoking a few weeks ago (Updated 2021)  Vaping Use   • Vaping Use: Never used   Substance Use Topics   • Alcohol use: Yes     Alcohol/week: 1 0 - 2 0 standard drink     Types: 1 - 2 Glasses of wine per week     Comment: On average, will drink 1-2 glasses of white wine daily with dinner  (Updated 2021),    • Drug use: Never     Comment: Last assessed 2021  Review of Systems   Constitutional: Negative for chills and fever  HENT: Negative for ear pain and sore throat  Eyes: Negative for pain and visual disturbance  Respiratory: Positive for cough and shortness of breath  Cardiovascular: Negative for chest pain and palpitations  Gastrointestinal: Negative for abdominal pain and vomiting     Genitourinary: Negative for dysuria and hematuria  Musculoskeletal: Negative for arthralgias and back pain  Skin: Negative for color change and rash  Neurological: Negative for seizures and syncope  All other systems reviewed and are negative  Physical Exam  ED Triage Vitals   Temperature Pulse Respirations Blood Pressure SpO2   01/30/23 0509 01/30/23 0359 01/30/23 0359 01/30/23 0407 01/30/23 0359   98 °F (36 7 °C) 82 (!) 32 164/83 99 %      Temp Source Heart Rate Source Patient Position - Orthostatic VS BP Location FiO2 (%)   01/30/23 0509 01/30/23 0359 01/30/23 0851 01/30/23 0851 --   Oral Monitor Lying Left arm       Pain Score       01/30/23 1025       7             Orthostatic Vital Signs  Vitals:    01/30/23 0851 01/30/23 0900 01/30/23 1215 01/30/23 2210   BP: 122/59 118/65 136/60 124/76   Pulse: (!) 106 96 92 86   Patient Position - Orthostatic VS: Lying Sitting Sitting        Physical Exam  Vitals and nursing note reviewed  Constitutional:       General: She is in acute distress  Appearance: She is well-developed  HENT:      Head: Normocephalic and atraumatic  Eyes:      Extraocular Movements: Extraocular movements intact  Conjunctiva/sclera: Conjunctivae normal    Cardiovascular:      Rate and Rhythm: Normal rate and regular rhythm  Heart sounds: No murmur heard  Pulmonary:      Effort: Tachypnea and respiratory distress present  Breath sounds: Rhonchi present  No rales  Abdominal:      Palpations: Abdomen is soft  Tenderness: There is no abdominal tenderness  Musculoskeletal:         General: No swelling  Cervical back: Neck supple  Right lower leg: No tenderness  No edema  Left lower leg: No tenderness  No edema  Skin:     General: Skin is warm and dry  Capillary Refill: Capillary refill takes less than 2 seconds  Neurological:      Mental Status: She is alert and oriented to person, place, and time  Motor: No weakness     Psychiatric: Mood and Affect: Mood normal          ED Medications  Medications   heparin (porcine) subcutaneous injection 5,000 Units (5,000 Units Subcutaneous Given 1/30/23 2131)   benzonatate (TESSALON PERLES) capsule 200 mg (200 mg Oral Given 1/31/23 0239)   furosemide (LASIX) tablet 20 mg (20 mg Oral Given 1/30/23 1216)   hydrOXYzine HCL (ATARAX) tablet 10 mg (has no administration in time range)   pravastatin (PRAVACHOL) tablet 40 mg (40 mg Oral Given 1/30/23 1857)   sertraline (ZOLOFT) tablet 50 mg (50 mg Oral Given 1/30/23 1216)   triamcinolone (KENALOG) 0 1 % cream ( Topical Not Given 1/30/23 1859)   methylPREDNISolone sodium succinate (Solu-MEDROL) injection 40 mg (40 mg Intravenous Given 1/30/23 1039)   azithromycin (ZITHROMAX) tablet 500 mg (500 mg Oral Given 1/30/23 1039)   guaiFENesin (MUCINEX) 12 hr tablet 600 mg (600 mg Oral Given 1/30/23 1857)   senna (SENOKOT) tablet 8 6 mg (8 6 mg Oral Not Given 1/30/23 1217)   benzonatate (TESSALON PERLES) capsule 100 mg (100 mg Oral Given 1/30/23 0608)   iohexol (OMNIPAQUE) 350 MG/ML injection (SINGLE-DOSE) 85 mL (85 mL Intravenous Given 1/30/23 0631)       Diagnostic Studies  Results Reviewed     Procedure Component Value Units Date/Time    D-dimer, quantitative [[id number]]  (Abnormal) Collected: 01/30/23 0412    Lab Status: Final result Specimen: Blood from Arm, Right Updated: 01/30/23 1828     D-Dimer, Quant 0 74 ug/ml FEU     Narrative: In the evaluation for possible pulmonary embolism, in the appropriate (Well's Score of 4 or less) patient, the age adjusted d-dimer cutoff for this patient can be calculated as:    Age x 0 01 (in ug/mL) for Age-adjusted D-dimer exclusion threshold for a patient over 50 years      COVID/FLU/RSV [[id number]]  (Normal) Collected: 01/30/23 1038    Lab Status: Final result Specimen: Nares from Nose Updated: 01/30/23 1205     SARS-CoV-2 Negative     INFLUENZA A PCR Negative     INFLUENZA B PCR Negative     RSV PCR Negative    Narrative: FOR PEDIATRIC PATIENTS - copy/paste COVID Guidelines URL to browser: [email or web address]    SARS-CoV-2 assay is a Nucleic Acid Amplification assay intended for the  qualitative detection of nucleic acid from SARS-CoV-2 in nasopharyngeal  swabs  Results are for the presumptive identification of SARS-CoV-2 RNA  Positive results are indicative of infection with SARS-CoV-2, the virus  causing COVID-19, but do not rule out bacterial infection or co-infection  with other viruses  Laboratories within the [place] and its  territories are required to report all positive results to the appropriate  public health authorities  Negative results do not preclude SARS-CoV-2  infection and should not be used as the sole basis for treatment or other  patient management decisions  Negative results must be combined with  clinical observations, patient history, and epidemiological information  This test has not been FDA cleared or approved  This test has been authorized by FDA under an Emergency Use Authorization  (EUA)  This test is only authorized for the duration of time the  declaration that circumstances exist justifying the authorization of the  emergency use of an in vitro diagnostic tests for detection of SARS-CoV-2  virus and/or diagnosis of COVID-19 infection under section 564(b)(1) of  the Act, 21 U  S C  469UTD-0(D)(1), unless the authorization is terminated  or revoked sooner  The test has been validated but independent review by FDA  and CLIA is pending  Test performed using [employer] Gene[employer]pert: This RT-PCR assay targets N2,  a region unique to SARS-CoV-2  A conserved region in the E-gene was chosen  for pan-Sarbecovirus detection which includes SARS-CoV-2  According to CMS-2020-01-R, this platform meets the definition of high-throughput technology      Procalcitonin [[id number]]  (Normal) Collected: 01/30/23 0400    Lab Status: Final result Specimen: Blood from Arm, Right Updated: 01/30/23 0942     Procalcitonin <0 05 ng/ml     C-reactive protein [[id number]]  (Abnormal) Collected: 01/30/23 0407    Lab Status: Final result Specimen: Blood from Arm, Right Updated: 01/30/23 0915     CRP 20 0 mg/L     NT-BNP PRO-[facility],[place] campuses only [[id number]]  (Normal) Collected: 01/30/23 0407    Lab Status: Final result Specimen: Blood from Arm, Right Updated: 01/30/23 0915     NT-proBNP 331 pg/mL     CK (with reflex to MB) [[id number]]  (Normal) Collected: 01/30/23 0407    Lab Status: Final result Specimen: Blood from Arm, Right Updated: 01/30/23 0915     Total CK 90 U/L     Platelet count [[id number]]     Lab Status: No result Specimen: Blood     HS Troponin I 2hr [[id number]]  (Normal) Collected: 01/30/23 0609    Lab Status: Final result Specimen: Blood from Arm, Right Updated: 01/30/23 0704     hs TnI 2hr 9 ng/L      Delta 2hr hsTnI -1 ng/L     HS Troponin 0hr (reflex protocol) [[id number]]  (Normal) Collected: 01/30/23 0407    Lab Status: Final result Specimen: Blood from Arm, Right Updated: 01/30/23 0513     hs TnI 0hr 10 ng/L     Basic metabolic panel [[id number]] Collected: 01/30/23 0407    Lab Status: Final result Specimen: Blood from Arm, Right Updated: 01/30/23 0443     Sodium 139 mmol/L      Potassium 3 9 mmol/L      Chloride 104 mmol/L      CO2 31 mmol/L      ANION GAP 4 mmol/L      BUN 21 mg/dL      Creatinine 0 71 mg/dL      Glucose 136 mg/dL      Calcium 9 5 mg/dL      eGFR 81 ml/min/1 73sq m     Narrative:      [place] guidelines for Chronic Kidney Disease (CKD):   •  Stage 1 with normal or high GFR (GFR > 90 mL/min/1 73 square meters)  •  Stage 2 Mild CKD (GFR = 60-89 mL/min/1 73 square meters)  •  Stage 3A Moderate CKD (GFR = 45-59 mL/min/1 73 square meters)  •  Stage 3B Moderate CKD (GFR = 30-44 mL/min/1 73 square meters)  •  Stage 4 Severe CKD (GFR = 15-29 mL/min/1 73 square meters)  •  Stage 5 End Stage CKD (GFR <15 mL/min/1 73 square meters)  Note: GFR calculation is accurate only with a steady state creatinine    CBC and differential [[id number]] Collected: 01/30/23 0407    Lab Status: Final result Specimen: Blood from Arm, Right Updated: 01/30/23 0420     WBC 6 67 Thousand/uL      RBC 4 79 Million/uL      Hemoglobin 14 3 g/dL      Hematocrit 44 8 %      MCV 94 fL      MCH 29 9 pg      MCHC 31 9 g/dL      RDW 12 1 %      MPV 10 3 fL      Platelets 855 Thousands/uL      nRBC 0 /100 WBCs      Neutrophils Relative 61 %      Immat GRANS % 0 %      Lymphocytes Relative 27 %      Monocytes Relative 9 %      Eosinophils Relative 3 %      Basophils Relative 0 %      Neutrophils Absolute 4 01 Thousands/µL      Immature Grans Absolute 0 01 Thousand/uL      Lymphocytes Absolute 1 80 Thousands/µL      Monocytes Absolute 0 60 Thousand/µL      Eosinophils Absolute 0 22 Thousand/µL      Basophils Absolute 0 03 Thousands/µL                  CTA ED chest PE study   Final Result by Moises Morataya MD (01/30 0145)      No pulmonary embolus  No evidence of acute thoracic process  COPD       3 nodules in the left lower lobe, the largest of which measures 6 mm stable since May 2021  No new pulmonary nodules  Noncontrast chest CT follow-up in 12 months could be considered to establish two-year stability  Additional chronic findings and negatives as above  Workstation performed: [id number]         XR chest pa & lateral   ED Interpretation by Margie Gardner MD (01/30 0588)   No acute cardiopulmonary disease  Final Result by Alise Snider MD (01/30 6986)      No acute cardiopulmonary disease  This report is in agreement with the preliminary interpretation                 Workstation performed: [id number]               Procedures  ECG 12 Lead Documentation Only    Date/Time: 1/30/2023 5:07 AM  Performed by: Amando Coates MD  Authorized by: Amando Coates MD     Patient location:  ED  Previous ECG:     Previous ECG:  Compared to current    Similarity:  No change  Interpretation:     Interpretation: normal    Rate:     ECG rate:  75    ECG rate assessment: normal    Rhythm:     Rhythm: sinus rhythm    Ectopy:     Ectopy: none    QRS:     QRS axis:  Normal    QRS intervals:  Normal  Conduction:     Conduction: normal    ST segments:     ST segments:  Normal  T waves:     T waves: normal            ED Course  ED Course as of 01/31/23 0307   Mon Jan 30, 2023   0454 Patient's shortness of breath has improved following oxygen  Was on 10L now on 4LNC breathing comfortably   0532 hs TnI 0hr: 10   0641 77 hx of CHF and recent COVID dx p/w sob  She was 88 on 10L now 100 on 4LNC  CXR was unremarkable so CTA PE read is pending  Likely pna so abx and admission                                        Medical Decision Making  Patient is a 77-year-old female with a  past medical history of CHF who presented with shortness of breath  Differential dx: PE, ACS, COVID, Pneumonia     Patient's initial saturation was 88 on room air but was placed on 10L non-rebreather and her saturation increased to 100  She was then able to be placed on 4LNC with a saturation of 100  Her shortness of breath will be evaluated with EKG, CBC, BMP, troponin, BNP, and chest x-ray  On reevaluation, the patient is no longer in respiratory distress and is sitting comfortably in bed  She will be given her Tesslon pearls for her cough  Her CXR is similar to her previous CXR without obvious signs of pneumonia  CTA PE will be ordered to evaluate for PE      COVID: acute illness or injury  Dyspnea: acute illness or injury  Amount and/or Complexity of Data Reviewed  Labs: ordered  Decision-making details documented in ED Course  Radiology: ordered and independent interpretation performed  Risk  Prescription drug management  Decision regarding hospitalization              Disposition  Final diagnoses:   Dyspnea   COVID     Time reflects when diagnosis was documented in both MDM as applicable and the Disposition within this note     Time User Action Codes Description Comment    1/30/2023  8:03 AM [place] Yuli Add [R06 00] Dyspnea     1/30/2023  8:03 AM [place] Yuli Add [U07 1] COVID     1/30/2023  8:32 AM Issa Muse Add [J96 01] Acute hypoxemic respiratory failure [facility])       ED Disposition     ED Disposition   Admit    Condition   Stable    Date/Time   Mon Jan 30, 2023  8:03 AM    Comment              Follow-up Information    None         Current Discharge Medication List      CONTINUE these medications which have NOT CHANGED    Details   benzonatate (TESSALON) 200 MG capsule Take 200 mg by mouth 3 (three) times a day as needed      furosemide (LASIX) 20 mg tablet Take 3 tablets (60 mg total) by mouth daily  Qty: 270 tablet, Refills: 1    Associated Diagnoses: Chronic combined systolic and diastolic congestive heart failure (HCC)      hydrOXYzine HCL (ATARAX) 10 mg tablet Take 1 tablet (10 mg total) by mouth 2 (two) times a day as needed for itching  Qty: 60 tablet, Refills: 2    Associated Diagnoses: Anxiety      Lagevrio 200 MG capsule 200 mg      rosuvastatin (CRESTOR) 5 mg tablet Take 1 tablet (5 mg total) by mouth daily  Qty: 30 tablet, Refills: 3    Associated Diagnoses: Mixed hyperlipidemia      sertraline (Zoloft) 50 mg tablet Take 1 tablet (50 mg total) by mouth daily  Qty: 90 tablet, Refills: 1    Associated Diagnoses: Dysthymic disorder      triamcinolone (KENALOG) 0 1 % cream Apply topically 2 (two) times a day  Qty: 30 g, Refills: 0    Associated Diagnoses: Dermatitis           No discharge procedures on file  PDMP Review       Value Time User    PDMP Reviewed  Yes 5/13/2021 10:20 AM Yumiko Quintana MD           ED Provider  Attending physically available and evaluated Maximo Palomo  WON managed the patient along with the ED Attending      Electronically Signed by         Daryn Rueda MD  01/31/23 1979

## 2023-01-30 NOTE — ASSESSMENT & PLAN NOTE
Patient states that she has a history of anxiety and states that her coughing spells cause increased anxiety    · Continue home atarax 10 mg BID PRN

## 2023-01-30 NOTE — CASE MANAGEMENT
Case Management Assessment & Discharge Planning Note    Patient name Satish Zimmer  Location ED 26/ED 26 MRN [id number]  : 1945 Date 2023       Current Admission Date: 2023  Current Admission Diagnosis:Acute respiratory failure with hypoxia [facility])   Patient Active Problem List    Diagnosis Date Noted   • Acute respiratory failure with hypoxia ([facility] ) 2023   • History of COVID-19 2023   • COPD exacerbation ([facility] ) 2023   • Biventricular congestive heart failure ([facility] ) 2023   • Mixed hyperlipidemia 2022   • Dysthymic disorder 2022   • Impaired fasting blood sugar 2022   • Gastrointestinal hemorrhage 10/21/2021   • Dermatitis 10/21/2021   • Primary osteoarthritis of both hands 10/21/2021   • Congestive heart failure with right ventricular systolic dysfunction ([facility] ) 2021   • Rheumatoid factor positive 2021   • Arthritis 2021   • Diabetic eye exam ([place] [address] ) 2021   • Hypoxia 2021   • Essential hypertension, benign 2021   • Chronic combined systolic and diastolic congestive heart failure ([facility] ) 2021   • Pulmonary hypertension ([place] [address] ) 2021   • Cigarette nicotine dependence without complication    • Elevated BP without diagnosis of hypertension 2021   • Lung nodules 2021   • COPD (chronic obstructive pulmonary disease) ([facility] ) 2021   • Chronic respiratory failure with hypoxia ([place] [address] ) 2021   • Nicotine dependence 2021   • Microalbuminuria 2021   • SOB (shortness of breath) 2021   • Uncontrolled type 2 diabetes mellitus with hyperglycemia ([facility] ) 2021   • Allergic rhinitis    • Type 2 diabetes, HbA1c goal < 7% ([facility])    • Anxiety    • Tendinitis of finger 2015      LOS (days): 0  Geometric Mean LOS (GMLOS) (days):   Days to GMLOS:     OBJECTIVE:        Current admission status: Observation  Referral Reason: Other    Preferred Pharmacy:   [address] [place] [address] [id number] Sara [address]au[address] [place] [id number]  Phone: [phone] Fax: [phone]    Primary Care Provider: Pamela Jackson MD    Primary Insurance: MEDICARE  Secondary Insurance: AARP    ASSESSMENT:  Active Health Care Proxies    There are no active Health Care Proxies on file  Readmission Root Cause  30 Day Readmission: No    Patient Information  Admitted from[de-identified] Home  Mental Status: Alert  During Assessment patient was accompanied by: Not accompanied during assessment  Assessment information provided by[de-identified] Patient  Support Systems: Spouse/significant other, Family members  [place] of Residence: [address] [place] [address] do you live in?: apps[employer]edom entry access options   Select all that apply : No steps to enter home  Type of Current Residence: 2 story home  Upon entering residence, is there a bedroom on the main floor (no further steps)?: Yes  Upon entering residence, is there a bathroom on the main floor (no further steps)?: Yes  In the last 12 months, was there a time when you were not able to pay the mortgage or rent on time?: No  In the last 12 months, how many places have you lived?: 1  In the last 12 months, was there a time when you did not have a steady place to sleep or slept in a shelter (including now)?: No  Homeless/housing insecurity resource given?: No  Living Arrangements: Lives w/ Spouse/significant other  Is patient a ?: No    Activities of Daily Living Prior to Admission  Functional Status: Independent  Completes ADLs independently?: Yes  Ambulates independently?: Yes  Does patient use assisted devices?: No  Does patient currently own DME?: No  Does patient have a history of Outpatient Therapy (PT/OT)?: No  Does the patient have a history of Short-Term Rehab?: No  Does patient have a history of HHC?: No  Does patient currently have [address]?: No    Patient Information Continued  Income Source: Pension/longterm  Does patient have prescription coverage?: Yes  Within the past 12 months, you worried that your food would run out before you got the money to buy more : Never true  Within the past 12 months, the food you bought just didn't last and you didn't have money to get more : Never true  Food insecurity resource given?: No  Does patient receive dialysis treatments?: No  Does patient have a history of substance abuse?: No  Does patient have a history of Mental Health Diagnosis?: Yes (anxiety)  Is patient receiving treatment for mental health?: Yes  Has patient received inpatient treatment related to mental health in the last 2 years?: No    Means of Transportation  Means of Transport to Appts[de-identified] Drives Self  In the past 12 months, has lack of transportation kept you from medical appointments or from getting medications?: No  In the past 12 months, has lack of transportation kept you from meetings, work, or from getting things needed for daily living?: No  Was application for public transport provided?: No    DISCHARGE DETAILS:    Discharge planning discussed with[de-identified] Patient  Freedom of Choice: Yes     CM contacted family/caregiver?: Yes (Not answer when  was called)        Treatment Team Recommendation: Home  Discharge Destination Plan[de-identified] Home  Transport at Discharge : Family

## 2023-01-31 ENCOUNTER — APPOINTMENT (OUTPATIENT)
Dept: NON INVASIVE DIAGNOSTICS | Facility: HOSPITAL | Age: 78
End: 2023-01-31

## 2023-01-31 LAB
ALBUMIN SERPL BCP-MCNC: 3.3 G/DL (ref 3.5–5)
ALP SERPL-CCNC: 60 U/L (ref 46–116)
ALT SERPL W P-5'-P-CCNC: 16 U/L (ref 12–78)
ANION GAP SERPL CALCULATED.3IONS-SCNC: 2 MMOL/L (ref 4–13)
AORTIC ROOT: 3 CM
APICAL FOUR CHAMBER EJECTION FRACTION: 67 %
ASCENDING AORTA: 2.7 CM
AST SERPL W P-5'-P-CCNC: 13 U/L (ref 5–45)
BASOPHILS # BLD MANUAL: 0 THOUSAND/UL (ref 0–0.1)
BASOPHILS NFR MAR MANUAL: 0 % (ref 0–1)
BILIRUB SERPL-MCNC: 0.52 MG/DL (ref 0.2–1)
BUN SERPL-MCNC: 22 MG/DL (ref 5–25)
CALCIUM ALBUM COR SERPL-MCNC: 9.6 MG/DL (ref 8.3–10.1)
CALCIUM SERPL-MCNC: 9 MG/DL (ref 8.3–10.1)
CHLORIDE SERPL-SCNC: 104 MMOL/L (ref 96–108)
CO2 SERPL-SCNC: 33 MMOL/L (ref 21–32)
CREAT SERPL-MCNC: 0.54 MG/DL (ref 0.6–1.3)
E WAVE DECELERATION TIME: 177 MS
E/A RATIO: 1.13
EOSINOPHIL # BLD MANUAL: 0 THOUSAND/UL (ref 0–0.4)
EOSINOPHIL NFR BLD MANUAL: 0 % (ref 0–6)
ERYTHROCYTE [DISTWIDTH] IN BLOOD BY AUTOMATED COUNT: 12 % (ref 11.6–15.1)
FRACTIONAL SHORTENING: 38 (ref 28–44)
GFR SERPL CREATININE-BSD FRML MDRD: 90 ML/MIN/1.73SQ M
GLUCOSE SERPL-MCNC: 119 MG/DL (ref 65–140)
HCT VFR BLD AUTO: 42.8 % (ref 34.8–46.1)
HGB BLD-MCNC: 13.8 G/DL (ref 11.5–15.4)
INTERVENTRICULAR SEPTUM IN DIASTOLE (PARASTERNAL SHORT AXIS VIEW): 1 CM
INTERVENTRICULAR SEPTUM: 1 CM (ref 0.6–1.1)
LAAS-AP2: 13.8 CM2
LAAS-AP4: 11.1 CM2
LEFT ATRIUM SIZE: 2.9 CM
LEFT ATRIUM VOLUME INDEX (MOD BIPLANE): 15.3
LEFT INTERNAL DIMENSION IN SYSTOLE: 2 CM (ref 2.1–4)
LEFT VENTRICULAR INTERNAL DIMENSION IN DIASTOLE: 3.2 CM (ref 3.5–6)
LEFT VENTRICULAR POSTERIOR WALL IN END DIASTOLE: 1 CM
LEFT VENTRICULAR STROKE VOLUME: 29 ML
LVSV (TEICH): 29 ML
LYMPHOCYTES # BLD AUTO: 1.06 THOUSAND/UL (ref 0.6–4.47)
LYMPHOCYTES # BLD AUTO: 21 % (ref 14–44)
MCH RBC QN AUTO: 30.7 PG (ref 26.8–34.3)
MCHC RBC AUTO-ENTMCNC: 32.2 G/DL (ref 31.4–37.4)
MCV RBC AUTO: 95 FL (ref 82–98)
MONOCYTES # BLD AUTO: 0.46 THOUSAND/UL (ref 0–1.22)
MONOCYTES NFR BLD: 9 % (ref 4–12)
MV E'TISSUE VEL-SEP: 7 CM/S
MV PEAK A VEL: 0.7 M/S
MV PEAK E VEL: 79 CM/S
MV STENOSIS PRESSURE HALF TIME: 51 MS
MV VALVE AREA P 1/2 METHOD: 4.3
NEUTROPHILS # BLD MANUAL: 3.55 THOUSAND/UL (ref 1.85–7.62)
NEUTS BAND NFR BLD MANUAL: 5 % (ref 0–8)
NEUTS SEG NFR BLD AUTO: 65 % (ref 43–75)
PATHOLOGY REVIEW: YES
PLATELET # BLD AUTO: 242 THOUSANDS/UL (ref 149–390)
PLATELET BLD QL SMEAR: ADEQUATE
PMV BLD AUTO: 10.4 FL (ref 8.9–12.7)
POTASSIUM SERPL-SCNC: 3.9 MMOL/L (ref 3.5–5.3)
PROCALCITONIN SERPL-MCNC: <0.05 NG/ML
PROT SERPL-MCNC: 7.3 G/DL (ref 6.4–8.4)
RA PRESSURE ESTIMATED: 5 MMHG
RBC # BLD AUTO: 4.5 MILLION/UL (ref 3.81–5.12)
RBC MORPH BLD: NORMAL
RIGHT ATRIAL 2D VOLUME: 55 ML
RIGHT ATRIUM AREA SYSTOLE A4C: 20.2 CM2
RIGHT VENTRICLE ID DIMENSION: 4.3 CM
RV PSP: 51 MMHG
SL CV LEFT ATRIUM LENGTH A2C: 4.7 CM
SL CV LV EF: 65
SL CV PED ECHO LEFT VENTRICLE DIASTOLIC VOLUME (MOD BIPLANE) 2D: 41 ML
SL CV PED ECHO LEFT VENTRICLE SYSTOLIC VOLUME (MOD BIPLANE) 2D: 12 ML
SODIUM SERPL-SCNC: 139 MMOL/L (ref 135–147)
TOTAL CELLS COUNTED SPEC: 100
TR MAX PG: 46 MMHG
TR PEAK VELOCITY: 3.4 M/S
TRICUSPID ANNULAR PLANE SYSTOLIC EXCURSION: 1.7 CM
TRICUSPID VALVE PEAK REGURGITATION VELOCITY: 3.39 M/S
WBC # BLD AUTO: 5.07 THOUSAND/UL (ref 4.31–10.16)

## 2023-01-31 RX ORDER — ALBUTEROL SULFATE 90 UG/1
2 AEROSOL, METERED RESPIRATORY (INHALATION) EVERY 4 HOURS PRN
Status: DISCONTINUED | OUTPATIENT
Start: 2023-01-31 | End: 2023-02-10 | Stop reason: HOSPADM

## 2023-01-31 RX ORDER — POTASSIUM CHLORIDE 20 MEQ/1
40 TABLET, EXTENDED RELEASE ORAL ONCE
Status: COMPLETED | OUTPATIENT
Start: 2023-01-31 | End: 2023-01-31

## 2023-01-31 RX ORDER — LEVALBUTEROL 1.25 MG/.5ML
1.25 SOLUTION, CONCENTRATE RESPIRATORY (INHALATION)
Status: DISCONTINUED | OUTPATIENT
Start: 2023-01-31 | End: 2023-02-09

## 2023-01-31 RX ORDER — HYDROCODONE POLISTIREX AND CHLORPHENIRAMINE POLISTIREX 10; 8 MG/5ML; MG/5ML
5 SUSPENSION, EXTENDED RELEASE ORAL EVERY 12 HOURS PRN
Status: DISCONTINUED | OUTPATIENT
Start: 2023-01-31 | End: 2023-02-01

## 2023-01-31 RX ORDER — IPRATROPIUM BROMIDE AND ALBUTEROL SULFATE 2.5; .5 MG/3ML; MG/3ML
3 SOLUTION RESPIRATORY (INHALATION)
Status: DISCONTINUED | OUTPATIENT
Start: 2023-01-31 | End: 2023-01-31

## 2023-01-31 RX ADMIN — AZITHROMYCIN MONOHYDRATE 500 MG: 250 TABLET ORAL at 08:18

## 2023-01-31 RX ADMIN — SERTRALINE HYDROCHLORIDE 50 MG: 50 TABLET ORAL at 08:18

## 2023-01-31 RX ADMIN — IPRATROPIUM BROMIDE 0.5 MG: 0.5 SOLUTION RESPIRATORY (INHALATION) at 19:08

## 2023-01-31 RX ADMIN — IPRATROPIUM BROMIDE 0.5 MG: 0.5 SOLUTION RESPIRATORY (INHALATION) at 09:46

## 2023-01-31 RX ADMIN — PRAVASTATIN SODIUM 40 MG: 40 TABLET ORAL at 17:41

## 2023-01-31 RX ADMIN — HEPARIN SODIUM 5000 UNITS: 5000 INJECTION INTRAVENOUS; SUBCUTANEOUS at 22:08

## 2023-01-31 RX ADMIN — LEVALBUTEROL HYDROCHLORIDE 1.25 MG: 1.25 SOLUTION, CONCENTRATE RESPIRATORY (INHALATION) at 09:46

## 2023-01-31 RX ADMIN — GUAIFENESIN 600 MG: 600 TABLET, EXTENDED RELEASE ORAL at 17:41

## 2023-01-31 RX ADMIN — METHYLPREDNISOLONE SODIUM SUCCINATE 40 MG: 40 INJECTION, POWDER, FOR SOLUTION INTRAMUSCULAR; INTRAVENOUS at 08:18

## 2023-01-31 RX ADMIN — IPRATROPIUM BROMIDE 0.5 MG: 0.5 SOLUTION RESPIRATORY (INHALATION) at 14:54

## 2023-01-31 RX ADMIN — BENZONATATE 200 MG: 100 CAPSULE ORAL at 11:34

## 2023-01-31 RX ADMIN — HEPARIN SODIUM 5000 UNITS: 5000 INJECTION INTRAVENOUS; SUBCUTANEOUS at 13:30

## 2023-01-31 RX ADMIN — LEVALBUTEROL HYDROCHLORIDE 1.25 MG: 1.25 SOLUTION, CONCENTRATE RESPIRATORY (INHALATION) at 19:08

## 2023-01-31 RX ADMIN — HEPARIN SODIUM 5000 UNITS: 5000 INJECTION INTRAVENOUS; SUBCUTANEOUS at 05:29

## 2023-01-31 RX ADMIN — STANDARDIZED SENNA CONCENTRATE 8.6 MG: 8.6 TABLET ORAL at 08:18

## 2023-01-31 RX ADMIN — LEVALBUTEROL HYDROCHLORIDE 1.25 MG: 1.25 SOLUTION, CONCENTRATE RESPIRATORY (INHALATION) at 14:54

## 2023-01-31 RX ADMIN — GUAIFENESIN 600 MG: 600 TABLET, EXTENDED RELEASE ORAL at 08:18

## 2023-01-31 RX ADMIN — TRIAMCINOLONE ACETONIDE: 1 CREAM TOPICAL at 17:41

## 2023-01-31 RX ADMIN — BENZONATATE 200 MG: 100 CAPSULE ORAL at 02:39

## 2023-01-31 RX ADMIN — TRIAMCINOLONE ACETONIDE: 1 CREAM TOPICAL at 08:19

## 2023-01-31 RX ADMIN — POTASSIUM CHLORIDE 40 MEQ: 1500 TABLET, EXTENDED RELEASE ORAL at 09:14

## 2023-01-31 RX ADMIN — HYDROCODONE POLISTIREX AND CHLORPHENIRAMINE POLISTIREX 5 ML: 10; 8 SUSPENSION, EXTENDED RELEASE ORAL at 13:30

## 2023-01-31 NOTE — PROGRESS NOTES
INTERNAL MEDICINE RESIDENCY PROGRESS NOTE     Name: Sergey Marrero   Age & Sex: 76 y o  female   MRN: [id number]  [facility]   Encounter: [id number]  Team: SOD Team B     PATIENT INFORMATION     Name: Sergey Marrero   Age & Sex: 76 y o  female   MRN: [id number]  Hospital Stay Days: 0    ASSESSMENT/PLAN     Principal Problem:    Acute respiratory failure with hypoxia ([facility] )  Active Problems:    SOB (shortness of breath)    Type 2 diabetes, HbA1c goal < 7% ([facility])    Anxiety    COPD (chronic obstructive pulmonary disease) ([facility] )    Essential hypertension, benign    Dysthymic disorder    History of COVID-19    COPD exacerbation ([facility])    Biventricular congestive heart failure ([facility])      * Acute respiratory failure with hypoxia ([facility] )  Assessment & Plan  Due to recent covid infection (1/23/23) and c/b copd exacerbation    · See a/p above    SOB (shortness of breath)  Assessment & Plan  See a/p above    Biventricular congestive heart failure ([facility] )  Assessment & Plan  Hx of combined CHF 2/2 to CAD and pulmonary HTN in the setting of chronic tobacco abuse and severe COPD  With preserved LV systolic function and LV diastolic dysfunction, right ventricular dilatation and dysfunction  Patient required home O2 (3L tapered off over ~2 months), daily diuretics as well as inhalers back in [id number]  Patient had recovery of EF w/ smoking cessation, weight loss, and medication   Last ECHO June '22 w/ EF 60% w LV & LV normal systolic and diastolic dysfunction   - Appears dry on exam, no JVD, no pitting edema     · Continue home lasix 20 MWF  · F/u repeat echo    COPD exacerbation ([facility] )  Assessment & Plan  75 y/o F w/ hx of COPD, recent covid 1/23/23 s/p tx w Lagrevrio (molnupiravir) x 5 days who inititally felt better but presented with 2 days of worsening cough, increased sputum production SOB, GONAZLEZ and fatigue      - Hypoxic on admission, requiring 5L O2  - Afebrile   No leukocytosis   - CT PE study unremarkable & w/o infilatrates, or pulm edema  - CXR no acute cardiopulmonary disease     · Admitted on combination of COPD/COVID pathway   · Continue methylprednisolone 40 IV   · Continue Azithro 500 QD x3 days through 2/1  · Resp protocol     History of COVID-19  Assessment & Plan  Recent covid 1/23/23 s/p tx w Lagrevrio (molnupiravir) x 5 days who inititally felt better but presented with 2 days of worsening cough, increased sputum production SOB, GONZALEZ and fatigue    - Has required 4-7L O2 NC  - CTA PE unremarkable for PE, & w/o pulmonary infiltrates   - Afebrile, no leukocytosis, renal function WNL  - Repeat COVID/FLU/RSV negative this admission  - Procalcitonin, CK, BNP WNL  - CRP 20 0    - Will treat for mild covid pathway / COPD exacerbation as noted above  · Methylprednisolone 40 IV QD  · Per pharmacy - as COVID negative remdisivir no longer indicated  · resp protocol     Dysthymic disorder  Assessment & Plan  · Continue home zoloft 50mg daily, home hydroxyzine 10mg BID PRN    Essential hypertension, benign  Assessment & Plan  · Continue home lasix 20 MWF    COPD (chronic obstructive pulmonary disease) ([facility] )  Assessment & Plan  Patient with "many year" history of smoking, though states she quit several years ago  PFT's conducted 6/21 showed increased lung volumes, FEV1 of 47% predicted without significant bronchodilator response, and corrected DLCO of 40%, indicative of severe obstructive disease  Not on home medications  She states that this is the first time she has required hospitalization due to cough/dyspnea, though states she was hospitalized about 1 year ago for CHF exacerbation      Plan  · Duo-Neb Q6H  · Solumedrol 40mg IV BID  · Azithromycin 500mg x3 days to be completed 2/1  · Ventolin Q4h PRN  · Mucinex BID  · Tessalon perles TID PRN  · Tussionex Q12h PRN  · Resp protocol    Anxiety  Assessment & Plan  Patient states that she has a history of anxiety and states that her coughing spells cause increased anxiety  · Continue home atarax 10 mg BID PRN    Type 2 diabetes, HbA1c goal < 7% ([facility])  Assessment & Plan  Lab Results   Component Value Date    HGBA1C 6 2 (H) 2022     Recent Labs     23  1652   POCGLU 139       Blood Sugar Average: Last 72 hrs:  (P) 139   Well-controlled with diet and exercise  Not on any medication  · Carb controlled diet  · in patient goal -180  · Add on sliding scale insulin if uncontrolled sugars      Disposition: Inpatient for management of COPD exacerbation     SUBJECTIVE     Patient seen and examined  No acute events overnight, though she states that on several occasions she woke up due to coughing spells  She states that her cough has been unchanged from yesterday, though possibly less productive of mucus  She denies hemoptysis, fever, and chills, though she does endorse a decreased appetite and states that she had night sweats  She states that she has come close to vomiting during coughing spells, but has not vomited  OBJECTIVE     Vitals:    23 0600 23 0705 23 0712 23 1115   BP:   122/88 122/88   BP Location:       Pulse:   98 95   Resp:       Temp:   97 5 °F (36 4 °C)    TempSrc:       SpO2:  93% 94%    Weight: 70 3 kg (155 lb)   70 3 kg (155 lb)   Height:    5' 4" (1 626 m)      Temperature:   Temp (24hrs), Av 6 °F (36 4 °C), Min:97 5 °F (36 4 °C), Max:97 6 °F (36 4 °C)    Temperature: 97 5 °F (36 4 °C)  Intake & Output:  I/O        07 07 07 0701   0700    P  O   480     Total Intake(mL/kg)  480 (6 8)     Urine (mL/kg/hr)  1 (0)     Total Output  1     Net  +479                Weights:   IBW (Ideal Body Weight): 54 7 kg    Body mass index is 26 61 kg/m²  Weight (last 2 days)     Date/Time Weight    23 1115 70 3 (155)    23 0600 70 3 (155)    23 1700 69 9 (154)        Physical Exam  Vitals reviewed  Constitutional:       General: She is not in acute distress  Appearance: She is ill-appearing  She is not toxic-appearing or diaphoretic  Comments: In moderate distress, frequent coughing spells   HENT:      Head: Normocephalic and atraumatic  Eyes:      General: No scleral icterus  Right eye: No discharge  Left eye: No discharge  Conjunctiva/sclera: Conjunctivae normal       Pupils: Pupils are equal, round, and reactive to light  Neck:      Vascular: No hepatojugular reflux or JVD  Cardiovascular:      Rate and Rhythm: Normal rate and regular rhythm  Pulses: Normal pulses  Heart sounds: Normal heart sounds  No murmur heard  No friction rub  No gallop  Comments: Distant heart sounds  Pulmonary:      Effort: Accessory muscle usage present  Breath sounds: No stridor  Examination of the right-middle field reveals rhonchi  Examination of the left-middle field reveals rhonchi  Examination of the right-lower field reveals rhonchi  Examination of the left-lower field reveals rhonchi  Rhonchi (End expiratory) present  No wheezing or rales  Comments: Coarse breath sounds, increased effort  Abdominal:      General: Abdomen is flat  There is no distension  Palpations: Abdomen is soft  Tenderness: There is no abdominal tenderness  There is no guarding  Musculoskeletal:      Cervical back: Normal range of motion and neck supple  No rigidity  Comments: Trace edema b/l   Skin:     General: Skin is warm and dry  Capillary Refill: Capillary refill takes 2 to 3 seconds  Neurological:      General: No focal deficit present  Mental Status: She is alert and oriented to person, place, and time  Mental status is at baseline  Comments: Moving all extremities spontaneously   Psychiatric:         Mood and Affect: Mood normal          Behavior: Behavior normal        LABORATORY DATA     Labs: I have personally reviewed pertinent reports    Results from last 7 days   Lab Units 01/31/23  0504 01/30/23  0407   WBC Thousand/uL 5 07 6 67   HEMOGLOBIN g/dL 13 8 14 3   HEMATOCRIT % 42 8 44 8   PLATELETS Thousands/uL 242 253   NEUTROS PCT %  --  61   MONOS PCT %  --  9   MONO PCT % 9  --       Results from last 7 days   Lab Units 01/31/23  0504 01/30/23  0407   POTASSIUM mmol/L 3 9 3 9   CHLORIDE mmol/L 104 104   CO2 mmol/L 33* 31   BUN mg/dL 22 21   CREATININE mg/dL 0 54* 0 71   CALCIUM mg/dL 9 0 9 5   ALK PHOS U/L 60  --    ALT U/L 16  --    AST U/L 13  --                             IMAGING & DIAGNOSTIC TESTING     Radiology Results: I have personally reviewed pertinent reports  XR chest pa & lateral    Result Date: 1/30/2023  Impression: No acute cardiopulmonary disease  This report is in agreement with the preliminary interpretation  Workstation performed: [id number]     CTA ED chest PE study    Result Date: 1/30/2023  Impression: No pulmonary embolus  No evidence of acute thoracic process  COPD  3 nodules in the left lower lobe, the largest of which measures 6 mm stable since May 2021  No new pulmonary nodules  Noncontrast chest CT follow-up in 12 months could be considered to establish two-year stability  Additional chronic findings and negatives as above  Workstation performed: [id number]     Other Diagnostic Testing: I have personally reviewed pertinent reports      ACTIVE MEDICATIONS     Current Facility-Administered Medications   Medication Dose Route Frequency   • albuterol (PROVENTIL HFA,VENTOLIN HFA) inhaler 2 puff  2 puff Inhalation Q4H PRN   • azithromycin (ZITHROMAX) tablet 500 mg  500 mg Oral Q24H   • benzonatate (TESSALON PERLES) capsule 200 mg  200 mg Oral TID PRN   • furosemide (LASIX) tablet 20 mg  20 mg Oral Once per day on Mon Wed Fri   • guaiFENesin (MUCINEX) 12 hr tablet 600 mg  600 mg Oral BID   • heparin (porcine) subcutaneous injection 5,000 Units  5,000 Units Subcutaneous Q8H [address]   • Hydrocod Raffi-Chlorphe Raffi ER (TUSSIONEX) ER suspension 5 mL  5 mL Oral Q12H PRN   • hydrOXYzine HCL (ATARAX) tablet 10 mg  10 mg Oral BID PRN   • ipratropium (ATROVENT) 0 02 % inhalation solution 0 5 mg  0 5 mg Nebulization TID   • levalbuterol (XOPENEX) inhalation solution 1 25 mg  1 25 mg Nebulization TID   • methylPREDNISolone sodium succinate (Solu-MEDROL) injection 40 mg  40 mg Intravenous Q24H   • pravastatin (PRAVACHOL) tablet 40 mg  40 mg Oral Daily With Dinner   • senna (SENOKOT) tablet 8 6 mg  1 tablet Oral Daily   • sertraline (ZOLOFT) tablet 50 mg  50 mg Oral Daily   • triamcinolone (KENALOG) 0 1 % cream   Topical BID       VTE Pharmacologic Prophylaxis: Heparin  VTE Mechanical Prophylaxis: sequential compression device    Portions of the record may have been created with voice recognition software  Occasional wrong word or "sound a like" substitutions may have occurred due to the inherent limitations of voice recognition software    Read the chart carefully and recognize, using context, where substitutions have occurred   ==  Davi Goff/St Lopez MS III

## 2023-01-31 NOTE — PROGRESS NOTES
Pt having coughing spell; states "I can't breath"; o2 saturations on 4L o2 84%; pt increased to 6L o2 with saturations at 92%; dr Gigi Pritchard aware

## 2023-01-31 NOTE — PHYSICAL THERAPY NOTE
Physical Therapy Cancellation Note     01/31/23 1106   PT Last Visit   PT Visit Date 01/31/23   Note Type   Note type Evaluation; Cancelled Session   Cancel Reasons Medical status     PT orders received and chart reviewed  Hold PT at this time ,per RN pt requiring increased O2 but continues with low sats  Pt will follow and reattempt as appropriate    Michael Krishnan PT DPT

## 2023-01-31 NOTE — PLAN OF CARE
Problem: PAIN - ADULT  Goal: Verbalizes/displays adequate comfort level or baseline comfort level  Description: Interventions:  - Encourage patient to monitor pain and request assistance  - Assess pain using appropriate pain scale  - Administer analgesics based on type and severity of pain and evaluate response  - Implement non-pharmacological measures as appropriate and evaluate response  - Consider cultural and social influences on pain and pain management  - Notify physician/advanced practitioner if interventions unsuccessful or patient reports new pain  Outcome: Progressing     Problem: INFECTION - ADULT  Goal: Absence or prevention of progression during hospitalization  Description: INTERVENTIONS:  - Assess and monitor for signs and symptoms of infection  - Monitor lab/diagnostic results  - Monitor all insertion sites, i e  indwelling lines, tubes, and drains  - Monitor endotracheal if appropriate and nasal secretions for changes in amount and color  - [place] appropriate cooling/warming therapies per order  - Administer medications as ordered  - Instruct and encourage patient and family to use good hand hygiene technique  - Identify and instruct in appropriate isolation precautions for identified infection/condition  Outcome: Progressing     Problem: DISCHARGE PLANNING  Goal: Discharge to home or other facility with appropriate resources  Description: INTERVENTIONS:  - Identify barriers to discharge w/patient and caregiver  - Arrange for needed discharge resources and transportation as appropriate  - Identify discharge learning needs (meds, wound care, etc )  - Arrange for interpretive services to assist at discharge as needed  - Refer to Case Management Department for coordinating discharge planning if the patient needs post-hospital services based on physician/advanced practitioner order or complex needs related to functional status, cognitive ability, or social support system  Outcome: Progressing Problem: Knowledge Deficit  Goal: Patient/family/caregiver demonstrates understanding of disease process, treatment plan, medications, and discharge instructions  Description: Complete learning assessment and assess knowledge base    Interventions:  - Provide teaching at level of understanding  - Provide teaching via preferred learning methods  Outcome: Progressing     Problem: RESPIRATORY - ADULT  Goal: Achieves optimal ventilation and oxygenation  Description: INTERVENTIONS:  - Assess for changes in respiratory status  - Assess for changes in mentation and behavior  - Position to facilitate oxygenation and minimize respiratory effort  - Oxygen administered by appropriate delivery if ordered  - Initiate smoking cessation education as indicated  - Encourage broncho-pulmonary hygiene including cough, deep breathe, Incentive Spirometry  - Assess the need for suctioning and aspirate as needed  - Assess and instruct to report SOB or any respiratory difficulty  - Respiratory Therapy support as indicated  Outcome: Progressing     Problem: METABOLIC, FLUID AND ELECTROLYTES - ADULT  Goal: Electrolytes maintained within normal limits  Description: INTERVENTIONS:  - Monitor labs and assess patient for signs and symptoms of electrolyte imbalances  - Administer electrolyte replacement as ordered  - Monitor response to electrolyte replacements, including repeat lab results as appropriate  - Instruct patient on fluid and nutrition as appropriate  Outcome: Progressing

## 2023-01-31 NOTE — ASSESSMENT & PLAN NOTE
Patient with "many year" history of smoking, though states she quit several years ago  PFT's conducted 6/21 showed increased lung volumes, FEV1 of 47% predicted without significant bronchodilator response, and corrected DLCO of 40%, indicative of severe obstructive disease  Not on home medications  She states that this is the first time she has required hospitalization due to cough/dyspnea, though states she was hospitalized about 1 year ago for CHF exacerbation      Plan  · Duo-Neb Q6H  · IV Solu-Medrol decreased from 40 mg twice daily to 40 mg daily  · Completed Azithromycin 500mg x3 days 2/1  · Ventolin Q4h PRN  · Mucinex BID  · Tessalon perles TID   · Change Tussionex to Hycodan TID  · Resp protocol

## 2023-01-31 NOTE — PLAN OF CARE
Problem: INFECTION - ADULT  Goal: Absence or prevention of progression during hospitalization  Description: INTERVENTIONS:  - Assess and monitor for signs and symptoms of infection  - Monitor lab/diagnostic results  - Monitor all insertion sites, i e  indwelling lines, tubes, and drains  - Monitor endotracheal if appropriate and nasal secretions for changes in amount and color  - [place] appropriate cooling/warming therapies per order  - Administer medications as ordered  - Instruct and encourage patient and family to use good hand hygiene technique  - Identify and instruct in appropriate isolation precautions for identified infection/condition  Outcome: Progressing  Goal: Absence of fever/infection during neutropenic period  Description: INTERVENTIONS:  - Monitor WBC    Outcome: Progressing

## 2023-01-31 NOTE — PROGRESS NOTES
Pt having another strong coughing spell with o2 saturations on 4l at 83%; o2 increased to 6Lwith saturations at 92%; tussionex given; dr Michael Baker aware

## 2023-02-01 LAB
ANION GAP SERPL CALCULATED.3IONS-SCNC: 2 MMOL/L (ref 4–13)
BUN SERPL-MCNC: 26 MG/DL (ref 5–25)
CALCIUM SERPL-MCNC: 9.2 MG/DL (ref 8.3–10.1)
CHLORIDE SERPL-SCNC: 106 MMOL/L (ref 96–108)
CO2 SERPL-SCNC: 32 MMOL/L (ref 21–32)
CREAT SERPL-MCNC: 0.55 MG/DL (ref 0.6–1.3)
ERYTHROCYTE [DISTWIDTH] IN BLOOD BY AUTOMATED COUNT: 12.1 % (ref 11.6–15.1)
GFR SERPL CREATININE-BSD FRML MDRD: 89 ML/MIN/1.73SQ M
GLUCOSE SERPL-MCNC: 107 MG/DL (ref 65–140)
GLUCOSE SERPL-MCNC: 148 MG/DL (ref 65–140)
GLUCOSE SERPL-MCNC: 157 MG/DL (ref 65–140)
GLUCOSE SERPL-MCNC: 237 MG/DL (ref 65–140)
HCT VFR BLD AUTO: 42.2 % (ref 34.8–46.1)
HGB BLD-MCNC: 13.2 G/DL (ref 11.5–15.4)
MCH RBC QN AUTO: 29.9 PG (ref 26.8–34.3)
MCHC RBC AUTO-ENTMCNC: 31.3 G/DL (ref 31.4–37.4)
MCV RBC AUTO: 96 FL (ref 82–98)
PLATELET # BLD AUTO: 259 THOUSANDS/UL (ref 149–390)
PMV BLD AUTO: 10.4 FL (ref 8.9–12.7)
POTASSIUM SERPL-SCNC: 4.3 MMOL/L (ref 3.5–5.3)
RBC # BLD AUTO: 4.42 MILLION/UL (ref 3.81–5.12)
SODIUM SERPL-SCNC: 140 MMOL/L (ref 135–147)
WBC # BLD AUTO: 7.66 THOUSAND/UL (ref 4.31–10.16)

## 2023-02-01 RX ORDER — ALPRAZOLAM 0.5 MG/1
0.5 TABLET ORAL
Status: DISCONTINUED | OUTPATIENT
Start: 2023-02-01 | End: 2023-02-10 | Stop reason: HOSPADM

## 2023-02-01 RX ORDER — HYDROCODONE BITARTRATE AND HOMATROPINE METHYLBROMIDE ORAL SOLUTION 5; 1.5 MG/5ML; MG/5ML
5 LIQUID ORAL 3 TIMES DAILY
Status: DISCONTINUED | OUTPATIENT
Start: 2023-02-01 | End: 2023-02-06

## 2023-02-01 RX ORDER — HYDROCODONE POLISTIREX AND CHLORPHENIRAMINE POLISTIREX 10; 8 MG/5ML; MG/5ML
7.5 SUSPENSION, EXTENDED RELEASE ORAL EVERY 8 HOURS SCHEDULED
Status: DISCONTINUED | OUTPATIENT
Start: 2023-02-01 | End: 2023-02-01

## 2023-02-01 RX ORDER — HYDROCODONE POLISTIREX AND CHLORPHENIRAMINE POLISTIREX 10; 8 MG/5ML; MG/5ML
5 SUSPENSION, EXTENDED RELEASE ORAL EVERY 8 HOURS SCHEDULED
Status: DISCONTINUED | OUTPATIENT
Start: 2023-02-01 | End: 2023-02-01

## 2023-02-01 RX ORDER — LORAZEPAM 2 MG/ML
0.5 INJECTION INTRAMUSCULAR EVERY 4 HOURS PRN
Status: DISCONTINUED | OUTPATIENT
Start: 2023-02-01 | End: 2023-02-10 | Stop reason: HOSPADM

## 2023-02-01 RX ORDER — INSULIN LISPRO 100 [IU]/ML
1-6 INJECTION, SOLUTION INTRAVENOUS; SUBCUTANEOUS
Status: DISCONTINUED | OUTPATIENT
Start: 2023-02-01 | End: 2023-02-10 | Stop reason: HOSPADM

## 2023-02-01 RX ORDER — BENZONATATE 100 MG/1
200 CAPSULE ORAL 3 TIMES DAILY
Status: DISCONTINUED | OUTPATIENT
Start: 2023-02-01 | End: 2023-02-10 | Stop reason: HOSPADM

## 2023-02-01 RX ORDER — METHYLPREDNISOLONE SODIUM SUCCINATE 40 MG/ML
40 INJECTION, POWDER, LYOPHILIZED, FOR SOLUTION INTRAMUSCULAR; INTRAVENOUS EVERY 12 HOURS SCHEDULED
Status: DISCONTINUED | OUTPATIENT
Start: 2023-02-01 | End: 2023-02-05

## 2023-02-01 RX ADMIN — LEVALBUTEROL HYDROCHLORIDE 1.25 MG: 1.25 SOLUTION, CONCENTRATE RESPIRATORY (INHALATION) at 13:35

## 2023-02-01 RX ADMIN — BENZONATATE 200 MG: 100 CAPSULE ORAL at 08:33

## 2023-02-01 RX ADMIN — HYDROCODONE BITARTRATE AND HOMATROPINE METHYLBROMIDE 5 ML: 5; 1.5 SYRUP ORAL at 17:05

## 2023-02-01 RX ADMIN — METHYLPREDNISOLONE SODIUM SUCCINATE 40 MG: 40 INJECTION, POWDER, FOR SOLUTION INTRAMUSCULAR; INTRAVENOUS at 22:04

## 2023-02-01 RX ADMIN — TRIAMCINOLONE ACETONIDE: 1 CREAM TOPICAL at 22:04

## 2023-02-01 RX ADMIN — STANDARDIZED SENNA CONCENTRATE 8.6 MG: 8.6 TABLET ORAL at 08:33

## 2023-02-01 RX ADMIN — FUROSEMIDE 20 MG: 20 TABLET ORAL at 08:33

## 2023-02-01 RX ADMIN — HEPARIN SODIUM 5000 UNITS: 5000 INJECTION INTRAVENOUS; SUBCUTANEOUS at 06:19

## 2023-02-01 RX ADMIN — HEPARIN SODIUM 5000 UNITS: 5000 INJECTION INTRAVENOUS; SUBCUTANEOUS at 22:06

## 2023-02-01 RX ADMIN — BENZONATATE 200 MG: 100 CAPSULE ORAL at 22:05

## 2023-02-01 RX ADMIN — LEVALBUTEROL HYDROCHLORIDE 1.25 MG: 1.25 SOLUTION, CONCENTRATE RESPIRATORY (INHALATION) at 19:52

## 2023-02-01 RX ADMIN — IPRATROPIUM BROMIDE 0.5 MG: 0.5 SOLUTION RESPIRATORY (INHALATION) at 19:52

## 2023-02-01 RX ADMIN — IPRATROPIUM BROMIDE 0.5 MG: 0.5 SOLUTION RESPIRATORY (INHALATION) at 07:36

## 2023-02-01 RX ADMIN — HEPARIN SODIUM 5000 UNITS: 5000 INJECTION INTRAVENOUS; SUBCUTANEOUS at 13:50

## 2023-02-01 RX ADMIN — HYDROCODONE POLISTIREX AND CHLORPHENIRAMINE POLISTIREX 5 ML: 10; 8 SUSPENSION, EXTENDED RELEASE ORAL at 09:37

## 2023-02-01 RX ADMIN — IPRATROPIUM BROMIDE 0.5 MG: 0.5 SOLUTION RESPIRATORY (INHALATION) at 13:35

## 2023-02-01 RX ADMIN — HYDROCODONE BITARTRATE AND HOMATROPINE METHYLBROMIDE 5 ML: 5; 1.5 SYRUP ORAL at 22:07

## 2023-02-01 RX ADMIN — LORAZEPAM 0.5 MG: 2 INJECTION INTRAMUSCULAR; INTRAVENOUS at 09:36

## 2023-02-01 RX ADMIN — SERTRALINE HYDROCHLORIDE 50 MG: 50 TABLET ORAL at 08:33

## 2023-02-01 RX ADMIN — INSULIN LISPRO 3 UNITS: 100 INJECTION, SOLUTION INTRAVENOUS; SUBCUTANEOUS at 17:09

## 2023-02-01 RX ADMIN — METHYLPREDNISOLONE SODIUM SUCCINATE 40 MG: 40 INJECTION, POWDER, FOR SOLUTION INTRAMUSCULAR; INTRAVENOUS at 08:33

## 2023-02-01 RX ADMIN — PRAVASTATIN SODIUM 40 MG: 40 TABLET ORAL at 17:05

## 2023-02-01 RX ADMIN — GUAIFENESIN 600 MG: 600 TABLET, EXTENDED RELEASE ORAL at 17:05

## 2023-02-01 RX ADMIN — LEVALBUTEROL HYDROCHLORIDE 1.25 MG: 1.25 SOLUTION, CONCENTRATE RESPIRATORY (INHALATION) at 07:36

## 2023-02-01 RX ADMIN — TRIAMCINOLONE ACETONIDE: 1 CREAM TOPICAL at 09:41

## 2023-02-01 RX ADMIN — AZITHROMYCIN MONOHYDRATE 500 MG: 250 TABLET ORAL at 08:33

## 2023-02-01 RX ADMIN — HYDROCODONE POLISTIREX AND CHLORPHENIRAMINE POLISTIREX 5 ML: 10; 8 SUSPENSION, EXTENDED RELEASE ORAL at 01:06

## 2023-02-01 RX ADMIN — BENZONATATE 200 MG: 100 CAPSULE ORAL at 17:05

## 2023-02-01 RX ADMIN — GUAIFENESIN 600 MG: 600 TABLET, EXTENDED RELEASE ORAL at 08:33

## 2023-02-01 NOTE — OCCUPATIONAL THERAPY NOTE
Occupational Therapy Evaluation     Patient Name: Maryann Rosales  YMHQU'T Date: 2/1/2023  Problem List  Principal Problem:    Acute respiratory failure with hypoxia ([facility] [address] )  Active Problems:    Type 2 diabetes, HbA1c goal < 7% ([facility])    Anxiety    SOB (shortness of breath)    COPD (chronic obstructive pulmonary disease) ([facility])    Essential hypertension, benign    Dysthymic disorder    History of COVID-19    COPD exacerbation ([facility])    Biventricular congestive heart failure ([facility])    Past Medical History  Past Medical History:   Diagnosis Date    Allergic rhinitis     Anxiety     Arthritis     Diabetes mellitus ([address] )     Oxygen decrease     Tobacco abuse      Past Surgical History  Past Surgical History:   Procedure Laterality Date    BREAST SURGERY Right     Cyst    OTHER SURGICAL HISTORY      Cataract implant    OTHER SURGICAL HISTORY      Fertilization           02/01/23 1239   OT Last Visit   OT Visit Date 02/01/23   Note Type   Note type Evaluation   Pain Assessment   Pain Assessment Tool 0-10   Pain Score No Pain   Restrictions/Precautions   Weight Bearing Precautions Per Order No   Other Precautions Fall Risk;O2;Multiple lines  (6L O2)   Home Living   Type of Home House  ( "[facility]" [facility])   Home Layout Two level; Able to live on main level with bedroom/bathroom; Performs ADLs on one level   Bathroom Shower/Tub Walk-in shower   Bathroom Toilet Raised   Bathroom Equipment Shower chair   Home Equipment Walker   Additional Comments Pt resides with  in a [facility] with 0 TROY  Prior Function   Level of [place] Independent with ADLs; Independent with functional mobility; Independent with IADLS   Lives With Spouse   Receives Help From Family   IADLs Independent with driving; Independent with meal prep; Independent with medication management  (Spouse A with IADLs suchs a cleaning/cooking)   Falls in the last 6 months 0   Vocational Retired   Comments PTA, Pt reports being I with ADLs/IADLs/no AD/ +   Pt reports her spouse works during the day and she will be alone at times  Lifestyle   Autonomy I with ADLs/IADLs/no AD/ +  Reciprocal Relationships Supportive spouse   Service to Others Retired   [address] Enjoys reading   ADL   Where Assessed Chair   Eating Assistance 7  Independent   Grooming Assistance 7  [address]  [address]  [address]  [address]  Supervision/Setup   LB Dressing Deficit Don/doff L sock; Don/doff R sock  (simulated)   Toileting Assistance  5  Supervision/Setup   Functional Assistance 5  Supervision/Setup   Additional Comments Pt educated on energy conservation techniques with ADLs upon DC home and Pt understanding  Pt also educated on/practiced pursed lip breathing  Bed Mobility   Supine to Sit 5  Supervision   Additional items Bedrails; Increased time required   Sit to Supine Unable to assess   Additional Comments Pt greeted supine in bed  Transfers   Sit to Stand 5  Supervision   Additional items Increased time required;Verbal cues   Stand to Sit 5  Supervision   Additional items Increased time required;Verbal cues   Additional Comments with RW   Functional Mobility   Functional Mobility 5  Supervision   Additional Comments S with functional mobility with RW- slow and short steps  Within room distances- Pt limited 2* to fatigue at this time  Additional items Rolling walker   Balance   Static Sitting Fair +   Dynamic Sitting Fair   Static Standing Fair   Dynamic Standing Fair -   Ambulatory Fair -   Activity Tolerance   Activity Tolerance Patient limited by fatigue  (O2 sats remained stable on 6L O2- Pt sat 87-91% with activity  Pt's O2 sats decreased to 87% when coughing )   Medical Staff Made Aware Co-eval with PT 2* to Pt's medical complexity and decreased endurance  Nurse Made Aware RN cleared/updated     RUE Assessment   RUE Assessment [facility]   LUIS MIGUEL Assessment   LUE Assessment WFL   Hand Function   Gross Motor Coordination Functional   Fine Motor Coordination Functional   Sensation   Light Touch No apparent deficits   Psychosocial   Psychosocial (WDL) WDL   Cognition   Overall Cognitive Status WFL   Arousal/Participation Cooperative; Alert   Attention Attends with cues to redirect   Orientation Level Oriented X4   Memory Within functional limits   Following Commands Follows multistep commands without difficulty   Comments Pt very pleasant and cooperative during OT session  Assessment   Limitation Decreased ADL status; Decreased UE ROM; Decreased UE strength;Decreased Safe judgement during ADL;Decreased cognition;Decreased endurance;Decreased self-care trans;Decreased high-level ADLs   Prognosis Fair   Assessment Pt is a 75 yo Female who presented to [facility] on 1/30/2023 with severe SOB  Pt with diagnosis of acute respiratory failure with hypoxia, COPD exacerbation  Pt  has a past medical history of Allergic rhinitis, Anxiety, Arthritis, Diabetes mellitus ([facility] [address] ), Oxygen decrease, and Tobacco abuse  Pt greeted bedside for OT evaluation on 2/1/2023  Pt resides with  in a [address] with 0 TROY  PTA, Pt reports being I with ADLs/IADLs/no AD/ +  Pt reports her spouse works during the day and she will be alone at times  Pt demonstrating the following occupational deficits: I with UB ADLs, S with LB ADLs, S with bed mobility, S with functional transfers, and S with functional mobility with RW  Pt educated on energy conservation techniques with ADLs upon DC home and Pt understanding  Pt also educated on/practiced pursed lip breathing  Pt encouraged to participate in ADLs/functional mobility with nursing/restorative staff during hospitalization  Pt with no further acute OT concerns  Pt would benefit from returning home with increased social support upon DC to maximize safety and independence with ADLs and functional tasks of choice  DC skilled OT services     Goals Patient Goals To breathe better   Plan   OT Frequency Eval only   Recommendation   OT Discharge Recommendation No rehabilitation needs   Equipment Recommended   (continue use of SC)   Additional Comments  The patient's raw score on the AM-PAC Daily Activity inpatient short form is 21, standardized score is 44 27, greater than 39 4  Patients at this level are likely to benefit from discharge to home  Please refer to the recommendation of the Occupational Therapist for safe discharge planning  AM-PAC Daily Activity Inpatient   Lower Body Dressing 3   Bathing 3   Toileting 3   Upper Body Dressing 4   Grooming 4   Eating 4   Daily Activity Raw Score 21   Daily Activity Standardized Score (Calc for Raw Score >=11) 44 27   AM-PAC Applied Cognition Inpatient   Following a Speech/Presentation 4   Understanding Ordinary Conversation 4   Taking Medications 4   Remembering Where Things Are Placed or Put Away 4   Remembering List of 4-5 Errands 4   Taking Care of Complicated Tasks 4   Applied Cognition Raw Score 24   Applied Cognition Standardized Score 62 21   End of Consult   Education Provided Yes   Patient Position at End of Consult Bedside chair; All needs within reach   Nurse Communication Nurse aware of consult       Kedar Reynoso MS, OTR/L

## 2023-02-01 NOTE — PLAN OF CARE
Problem: PAIN - ADULT  Goal: Verbalizes/displays adequate comfort level or baseline comfort level  Description: Interventions:  - Encourage patient to monitor pain and request assistance  - Assess pain using appropriate pain scale  - Administer analgesics based on type and severity of pain and evaluate response  - Implement non-pharmacological measures as appropriate and evaluate response  - Consider cultural and social influences on pain and pain management  - Notify physician/advanced practitioner if interventions unsuccessful or patient reports new pain  Outcome: Progressing     Problem: INFECTION - ADULT  Goal: Absence or prevention of progression during hospitalization  Description: INTERVENTIONS:  - Assess and monitor for signs and symptoms of infection  - Monitor lab/diagnostic results  - Monitor all insertion sites, i e  indwelling lines, tubes, and drains  - Monitor endotracheal if appropriate and nasal secretions for changes in amount and color  - [place] appropriate cooling/warming therapies per order  - Administer medications as ordered  - Instruct and encourage patient and family to use good hand hygiene technique  - Identify and instruct in appropriate isolation precautions for identified infection/condition  Outcome: Progressing     Problem: DISCHARGE PLANNING  Goal: Discharge to home or other facility with appropriate resources  Description: INTERVENTIONS:  - Identify barriers to discharge w/patient and caregiver  - Arrange for needed discharge resources and transportation as appropriate  - Identify discharge learning needs (meds, wound care, etc )  - Arrange for interpretive services to assist at discharge as needed  - Refer to Case Management Department for coordinating discharge planning if the patient needs post-hospital services based on physician/advanced practitioner order or complex needs related to functional status, cognitive ability, or social support system  Outcome: Progressing Problem: Knowledge Deficit  Goal: Patient/family/caregiver demonstrates understanding of disease process, treatment plan, medications, and discharge instructions  Description: Complete learning assessment and assess knowledge base  Interventions:  - Provide teaching at level of understanding  - Provide teaching via preferred learning methods  Outcome: Progressing     Problem: RESPIRATORY - ADULT  Goal: Achieves optimal ventilation and oxygenation  Description: INTERVENTIONS:  - Assess for changes in respiratory status  - Assess for changes in mentation and behavior  - Position to facilitate oxygenation and minimize respiratory effort  - Oxygen administered by appropriate delivery if ordered  - Initiate smoking cessation education as indicated  - Encourage broncho-pulmonary hygiene including cough, deep breathe, Incentive Spirometry  - Assess the need for suctioning and aspirate as needed  - Assess and instruct to report SOB or any respiratory difficulty  - Respiratory Therapy support as indicated  Outcome: Progressing     Problem: METABOLIC, FLUID AND ELECTROLYTES - ADULT  Goal: Electrolytes maintained within normal limits  Description: INTERVENTIONS:  - Monitor labs and assess patient for signs and symptoms of electrolyte imbalances  - Administer electrolyte replacement as ordered  - Monitor response to electrolyte replacements, including repeat lab results as appropriate  - Instruct patient on fluid and nutrition as appropriate  Outcome: Progressing     Problem: Nutrition/Hydration-ADULT  Goal: Nutrient/Hydration intake appropriate for improving, restoring or maintaining nutritional needs  Description: Monitor and assess patient's nutrition/hydration status for malnutrition  Collaborate with interdisciplinary team and initiate plan and interventions as ordered  Monitor patient's weight and dietary intake as ordered or per policy  Utilize nutrition screening tool and intervene as necessary   Determine patient's food preferences and provide high-protein, high-caloric foods as appropriate       INTERVENTIONS:  - Monitor oral intake, urinary output, labs, and treatment plans  - Assess nutrition and hydration status and recommend course of action  - Evaluate amount of meals eaten  - Assist patient with eating if necessary   - Allow adequate time for meals  - Recommend/ encourage appropriate diets, oral nutritional supplements, and vitamin/mineral supplements  - Order, calculate, and assess calorie counts as needed  - Recommend, monitor, and adjust tube feedings and TPN/PPN based on assessed needs  - Assess need for intravenous fluids  - Provide specific nutrition/hydration education as appropriate  - Include patient/family/caregiver in decisions related to nutrition  Outcome: Progressing

## 2023-02-01 NOTE — PLAN OF CARE
Problem: PAIN - ADULT  Goal: Verbalizes/displays adequate comfort level or baseline comfort level  Description: Interventions:  - Encourage patient to monitor pain and request assistance  - Assess pain using appropriate pain scale  - Administer analgesics based on type and severity of pain and evaluate response  - Implement non-pharmacological measures as appropriate and evaluate response  - Consider cultural and social influences on pain and pain management  - Notify physician/advanced practitioner if interventions unsuccessful or patient reports new pain  Outcome: Progressing     Problem: INFECTION - ADULT  Goal: Absence or prevention of progression during hospitalization  Description: INTERVENTIONS:  - Assess and monitor for signs and symptoms of infection  - Monitor lab/diagnostic results  - Monitor all insertion sites, i e  indwelling lines, tubes, and drains  - Monitor endotracheal if appropriate and nasal secretions for changes in amount and color  - [place] appropriate cooling/warming therapies per order  - Administer medications as ordered  - Instruct and encourage patient and family to use good hand hygiene technique  - Identify and instruct in appropriate isolation precautions for identified infection/condition  Outcome: Progressing     Problem: METABOLIC, FLUID AND ELECTROLYTES - ADULT  Goal: Electrolytes maintained within normal limits  Description: INTERVENTIONS:  - Monitor labs and assess patient for signs and symptoms of electrolyte imbalances  - Administer electrolyte replacement as ordered  - Monitor response to electrolyte replacements, including repeat lab results as appropriate  - Instruct patient on fluid and nutrition as appropriate  Outcome: Progressing     Problem: RESPIRATORY - ADULT  Goal: Achieves optimal ventilation and oxygenation  Description: INTERVENTIONS:  - Assess for changes in respiratory status  - Assess for changes in mentation and behavior  - Position to facilitate oxygenation and minimize respiratory effort  - Oxygen administered by appropriate delivery if ordered  - Initiate smoking cessation education as indicated  - Encourage broncho-pulmonary hygiene including cough, deep breathe, Incentive Spirometry  - Assess the need for suctioning and aspirate as needed  - Assess and instruct to report SOB or any respiratory difficulty  - Respiratory Therapy support as indicated  Outcome: Progressing

## 2023-02-01 NOTE — PROGRESS NOTES
INTERNAL MEDICINE RESIDENCY PROGRESS NOTE     Name: Ledy Ramon   Age & Sex: 76 y o  female   MRN: [id number]  Unit/Bed#: [facility] 834-01   Encounter: [id number]  Team: SOD Team B     PATIENT INFORMATION     Name: Ledy Ramon   Age & Sex: 76 y o  female   MRN: [id number]  Hospital Stay Days: 1    ASSESSMENT/PLAN     Principal Problem:    Acute respiratory failure with hypoxia ([place] [address] )  Active Problems:    Type 2 diabetes, HbA1c goal < 7% ([facility])    Anxiety    SOB (shortness of breath)    COPD (chronic obstructive pulmonary disease) ([facility] )    Essential hypertension, benign    Dysthymic disorder    History of COVID-19    COPD exacerbation (HCC)    Biventricular congestive heart failure (HCC)      Biventricular congestive heart failure (HCC)  Assessment & Plan  Hx of combined CHF 2/2 to CAD and pulmonary HTN in the setting of chronic tobacco abuse and severe COPD  With preserved LV systolic function and LV diastolic dysfunction, right ventricular dilatation and dysfunction  Patient required home O2 (3L tapered off over ~2 months), daily diuretics as well as inhalers back in [id number]  Patient had recovery of EF w/ smoking cessation, weight loss, and medication   Last ECHO June '22 w/ EF 60% w LV & LV normal systolic and diastolic dysfunction   Echo 1/31 LVEF 54%, vigorous systolic function, grade II diastolic dysfunction, mildly dilated RV with normal systolic function, RV systolic pressure 79TPPR, mild tricuspid regurgitation    - Appears dry on exam, no JVD, no pitting edema     · Continue home lasix 20 MWF    COPD exacerbation ([facility] )  Assessment & Plan  77 y/o F w/ hx of COPD, recent covid 1/23/23 s/p tx w Lagrevrio (molnupiravir) x 5 days who inititally felt better but presented with 2 days of worsening cough, increased sputum production SOB, GONZALEZ and fatigue      - Hypoxic on admission, requiring 5L O2  - Afebrile   No leukocytosis   - CT PE study unremarkable & w/o infilatrates, or pulm edema  - CXR no acute cardiopulmonary disease     · Admitted on combination of COPD/COVID pathway   · Increase methylprednisolone 40mg IV daily to 40mg BID   · 3 day course of Azithro completed 2/1  · Resp protocol     History of COVID-19  Assessment & Plan  Recent covid 1/23/23 s/p tx w Lagrevrio (molnupiravir) x 5 days who inititally felt better but presented with 2 days of worsening cough, increased sputum production SOB, GONZALEZ and fatigue    - Has required 4-7L O2 NC  - CTA PE unremarkable for PE, & w/o pulmonary infiltrates   - Afebrile, no leukocytosis, renal function WNL  - Repeat COVID/FLU/RSV negative this admission  - Procalcitonin, CK, BNP WNL  - CRP 20 0    - Will treat for mild covid pathway / COPD exacerbation as noted above  · Increase Methylprednisolone 40mg IV daily to 40mg BID  · Per pharmacy - as COVID negative remdisivir no longer indicated  · resp protocol     Dysthymic disorder  Assessment & Plan  · Continue home zoloft 50mg daily, home hydroxyzine 10mg BID PRN  · Xanax 0 5mg QHS PRN  · Ativan 0 5mg IV Q4H PRN    Essential hypertension, benign  Assessment & Plan  · Continue home lasix 20 MWF  COPD (chronic obstructive pulmonary disease) ([facility] )  Assessment & Plan  Patient with "many year" history of smoking, though states she quit several years ago  PFT's conducted 6/21 showed increased lung volumes, FEV1 of 47% predicted without significant bronchodilator response, and corrected DLCO of 40%, indicative of severe obstructive disease  Not on home medications  She states that this is the first time she has required hospitalization due to cough/dyspnea, though states she was hospitalized about 1 year ago for CHF exacerbation      Plan  · Duo-Neb Q6H  · Increase IV Solumedrol from 40mg daily to 40mg BID  · Completed Azithromycin 500mg x3 days 2/1  · Ventolin Q4h PRN  · Mucinex BID  · Tessalon perles TID   · Change Tussionex to Hycodan TID  · Resp protocol    SOB (shortness of breath)  Assessment & Plan  See a/p above    Anxiety  Assessment & Plan  Patient states that she has a history of anxiety and states that her coughing spells cause increased anxiety    · Continue home atarax 10 mg BID PRN    Type 2 diabetes, HbA1c goal < 7% ([facility])  Assessment & Plan  Lab Results   Component Value Date    HGBA1C 6 2 (H) 2022     Recent Labs     23  1652 23  1155   POCGLU 139 148*       Blood Sugar Average: Last 72 hrs:  (P) 143 5   Well-controlled with diet and exercise  Not on any medication  · Carb controlled diet  · in patient goal -180  · Add on sliding scale insulin if uncontrolled sugars 2/2 IV steroids    * Acute respiratory failure with hypoxia ([facility])  Assessment & Plan  Due to recent covid infection (23) c/b COPD exacerbation    · See COPD a/p below      Disposition: Inpatient for continued management of COPD exacerbation     SUBJECTIVE     Patient seen and examined  Patient had multiple coughing spells overnight that prevented her from sleeping without emesis or hemoptysis  During examination, patient experienced several prolonged coughing spells with associated desaturation  Repositioning the bed to an upright seated position reduced the frequency of coughing  States that her cough has not changed significantly from yesterday, with occasional production of mucus  Patient stated that in between coughing spells she breathes well with NC support  Denies other complaints at this time      OBJECTIVE     Vitals:    23 1115 23 1525 23 2241 23 0803   BP: 122/88 129/79 131/78 116/52   Pulse: 95 96 87 87   Resp:  16 18 16   Temp:  97 7 °F (36 5 °C) 97 7 °F (36 5 °C) (!) 96 6 °F (35 9 °C)   TempSrc:       SpO2:  91% 90% (!) 82%   Weight: 70 3 kg (155 lb)      Height: 5' 4" (1 626 m)         Temperature:   Temp (24hrs), Av 3 °F (36 3 °C), Min:96 6 °F (35 9 °C), Max:97 7 °F (36 5 °C)    Temperature: (!) 96 6 °F (35 9 °C)  Intake & Output:  I/O        0701   0700  0701  02/01 0700 02/01 0701  02/02 0700    P  O  480 310     Total Intake(mL/kg) 480 (6 8) 310 (4 4)     Urine (mL/kg/hr) 1 (0)      Total Output 1      Net +479 +310            Unmeasured Urine Occurrence 1 x 4 x         Weights:   IBW (Ideal Body Weight): 54 7 kg    Body mass index is 26 61 kg/m²  Weight (last 2 days)     Date/Time Weight    01/31/23 1115 70 3 (155)    01/31/23 0600 70 3 (155)    01/30/23 1700 69 9 (154)        Physical Exam  Vitals reviewed  Constitutional:       General: She is not in acute distress  Appearance: Normal appearance  She is normal weight  She is ill-appearing  She is not toxic-appearing or diaphoretic  Comments: In moderate distress   HENT:      Head: Normocephalic and atraumatic  Eyes:      General: No scleral icterus  Right eye: No discharge  Left eye: No discharge  Conjunctiva/sclera: Conjunctivae normal       Pupils: Pupils are equal, round, and reactive to light  Neck:      Vascular: No hepatojugular reflux or JVD  Cardiovascular:      Rate and Rhythm: Regular rhythm  Tachycardia present  Pulses: Normal pulses  Heart sounds: Normal heart sounds  No murmur heard  No friction rub  No gallop  Comments: Distant heart sounds  Pulmonary:      Effort: Tachypnea, accessory muscle usage and respiratory distress present  Breath sounds: Rhonchi (Similar to previous, diffuse b/l) present  No rales  Abdominal:      General: Abdomen is flat  There is no distension  Palpations: Abdomen is soft  There is no mass  Tenderness: There is no abdominal tenderness  There is no guarding  Musculoskeletal:      Cervical back: Normal range of motion and neck supple  Right lower leg: Edema present  Left lower leg: Edema present  Comments: Minimal/trace edema b/l   Skin:     General: Skin is warm and dry  Capillary Refill: Capillary refill takes 2 to 3 seconds     Neurological:      General: No focal deficit present  Mental Status: She is alert and oriented to person, place, and time  Mental status is at baseline  Psychiatric:         Mood and Affect: Mood normal          Behavior: Behavior normal        LABORATORY DATA     Labs: I have personally reviewed pertinent reports  Results from last 7 days   Lab Units 02/01/23  0655 01/31/23  0504 01/30/23  0407   WBC Thousand/uL 7 66 5 07 6 67   HEMOGLOBIN g/dL 13 2 13 8 14 3   HEMATOCRIT % 42 2 42 8 44 8   PLATELETS Thousands/uL 259 242 253   NEUTROS PCT %  --   --  61   MONOS PCT %  --   --  9   MONO PCT %  --  9  --       Results from last 7 days   Lab Units 02/01/23  0655 01/31/23  0504 01/30/23  0407   POTASSIUM mmol/L 4 3 3 9 3 9   CHLORIDE mmol/L 106 104 104   CO2 mmol/L 32 33* 31   BUN mg/dL 26* 22 21   CREATININE mg/dL 0 55* 0 54* 0 71   CALCIUM mg/dL 9 2 9 0 9 5   ALK PHOS U/L  --  60  --    ALT U/L  --  16  --    AST U/L  --  13  --                             IMAGING & DIAGNOSTIC TESTING     Radiology Results: I have personally reviewed pertinent reports  XR chest pa & lateral    Result Date: 1/30/2023  Impression: No acute cardiopulmonary disease  This report is in agreement with the preliminary interpretation  Workstation performed: [id number]     CTA ED chest PE study    Result Date: 1/30/2023  Impression: No pulmonary embolus  No evidence of acute thoracic process  COPD  3 nodules in the left lower lobe, the largest of which measures 6 mm stable since May 2021  No new pulmonary nodules  Noncontrast chest CT follow-up in 12 months could be considered to establish two-year stability  Additional chronic findings and negatives as above  Workstation performed: [id number]     Other Diagnostic Testing: I have personally reviewed pertinent reports      ACTIVE MEDICATIONS     Current Facility-Administered Medications   Medication Dose Route Frequency   • albuterol (PROVENTIL HFA,VENTOLIN HFA) inhaler 2 puff  2 puff Inhalation Q4H PRN   • ALPRAZolam Joshua Fischer) tablet 0 5 mg  0 5 mg Oral HS PRN   • benzonatate (TESSALON PERLES) capsule 200 mg  200 mg Oral TID   • furosemide (LASIX) tablet 20 mg  20 mg Oral Once per day on Mon Wed Fri   • guaiFENesin (MUCINEX) 12 hr tablet 600 mg  600 mg Oral BID   • heparin (porcine) subcutaneous injection 5,000 Units  5,000 Units Subcutaneous Q8H [address]   • HYDROcodone Bit-[place] MBr (HYCODAN) oral syrup 5 mL  5 mL Oral TID   • hydrOXYzine HCL (ATARAX) tablet 10 mg  10 mg Oral BID PRN   • insulin lispro (HumaLOG) 100 units/mL subcutaneous injection 1-6 Units  1-6 Units Subcutaneous TID AC   • ipratropium (ATROVENT) 0 02 % inhalation solution 0 5 mg  0 5 mg Nebulization TID   • levalbuterol (XOPENEX) inhalation solution 1 25 mg  1 25 mg Nebulization TID   • LORazepam (ATIVAN) injection 0 5 mg  0 5 mg Intravenous Q4H PRN   • methylPREDNISolone sodium succinate (Solu-MEDROL) injection 40 mg  40 mg Intravenous Q12H CHARLY   • pravastatin (PRAVACHOL) tablet 40 mg  40 mg Oral Daily With Dinner   • senna (SENOKOT) tablet 8 6 mg  1 tablet Oral Daily   • sertraline (ZOLOFT) tablet 50 mg  50 mg Oral Daily   • triamcinolone (KENALOG) 0 1 % cream   Topical BID       VTE Pharmacologic Prophylaxis: Heparin  VTE Mechanical Prophylaxis: sequential compression device    Portions of the record may have been created with voice recognition software  Occasional wrong word or "sound a like" substitutions may have occurred due to the inherent limitations of voice recognition software    Read the chart carefully and recognize, using context, where substitutions have occurred   ==  MD Denver Cary/St Lopez MS III

## 2023-02-01 NOTE — PLAN OF CARE
Problem: PHYSICAL THERAPY ADULT  Goal: Performs mobility at highest level of function for planned discharge setting  See evaluation for individualized goals  Description: Treatment/Interventions: Functional transfer training, LE strengthening/ROM, Therapeutic exercise, Endurance training, Gait training, Bed mobility, Equipment eval/education, OT  Equipment Recommended: Walker       See flowsheet documentation for full assessment, interventions and recommendations  Outcome: Progressing  Note: Prognosis: Good  Problem List: Decreased strength, Decreased endurance, Impaired balance, Decreased mobility  Assessment: Pt is a 75 yo female admitted to [place] [address] on 1/30/2023 s/p SOB  DX: biventricular HF, COPD, hx of COVID-19, SOB, HTN, anxiety, DM, hypoxic  Two patient identifiers were used to confirm  Pt lives in a [facility] at [employer] with her   Pt was I for ADL's and mobility prior  Pt required A with IADL's  Pt's impairments include reduced mobility, poor endurance, O2 dependent  These impairments limit the ability of the patient to perform mobility without increased assistance, return to PLOF and participate in everyday life activities  Pt would benefit from continued skilled therapy while in the hospital to improve overall mobility and work towards a safe d/c  Recommend discharge to home with home PT  At the end of the session the patient was left in seated position with call bell and phone within reach  The patient's AM-PAC Basic Mobility Inpatient Short Form Raw Score is 17  A Raw score of greater than 16 suggests the patient may benefit from discharge to home  Please also refer to the recommendation of the Physical Therapist for safe discharge planning  PT Discharge Recommendation: Home with home health rehabilitation    See flowsheet documentation for full assessment

## 2023-02-01 NOTE — PHYSICAL THERAPY NOTE
Physical Therapy evaluation note     Patient Name: Ledy Ramon    QKFJB'H Date: 2/1/2023     Problem List  Principal Problem:    Acute respiratory failure with hypoxia ([facility] )  Active Problems:    Type 2 diabetes, HbA1c goal < 7% ([facility])    Anxiety    SOB (shortness of breath)    COPD (chronic obstructive pulmonary disease) ([facility])    Essential hypertension, benign    Dysthymic disorder    History of COVID-19    COPD exacerbation ([facility])    Biventricular congestive heart failure ([facility])       Past Medical History  Past Medical History:   Diagnosis Date    Allergic rhinitis     Anxiety     Arthritis     Diabetes mellitus ([facility] )     Oxygen decrease     Tobacco abuse         Past Surgical History  Past Surgical History:   Procedure Laterality Date    BREAST SURGERY Right     Cyst    OTHER SURGICAL HISTORY      Cataract implant    OTHER SURGICAL HISTORY      Fertilization      02/01/23 1234   PT Last Visit   PT Visit Date 02/01/23   Note Type   Note type Evaluation   Pain Assessment   Pain Assessment Tool 0-10   Pain Score No Pain   Restrictions/Precautions   Weight Bearing Precautions Per Order No   Other Precautions Fall Risk;O2;Multiple lines  (6L of O2 via NC)   Home Living   Type of Home House  ([facility])   Home Layout Two level; One level   Additional Comments Pt lives with her SO in a [facility] [address] with [address]  Pt was I for ADL's and IADL's prior  Prior Function   Level of [place] Independent with ADLs; Independent with functional mobility; Independent with IADLS   Lives With Spouse   Receives Help From Family   IADLs Independent with medication management; Independent with meal prep; Independent with driving   Falls in the last 6 months 0   Vocational Retired   Comments +   General   Family/Caregiver Present No   Cognition   Overall Cognitive Status WFL   Attention Attends with cues to redirect   Orientation Level Oriented X4   Memory Within functional limits   RLE Assessment   RLE Assessment   (grossly 3/5 observed with mobility)   LLE Assessment   LLE Assessment   (grossly 3/5 observed with mobility)   Bed Mobility   Supine to Sit 5  Supervision   Additional items HOB elevated   Additional Comments sitting EOB S level   Transfers   Sit to Stand 5  Supervision   Stand to Sit 5  Supervision   Ambulation/Elevation   Gait pattern Excessively slow; Step to; Foward flexed; Shuffling   Gait Assistance 4  Minimal assist   Additional items Assist x 1   Assistive Device Rolling walker   Distance 12ftx2   Balance   Static Sitting Fair +   Dynamic Sitting Fair +   Static Standing Fair   Dynamic Standing Fair   Ambulatory Fair -   Endurance Deficit   Endurance Deficit Yes   Activity Tolerance   Activity Tolerance Patient limited by fatigue   Medical Staff Made Aware OT   Nurse Made Aware nurse approved therapy session   Assessment   Prognosis Good   Problem List Decreased strength;Decreased endurance; Impaired balance;Decreased mobility   Assessment Pt is a 77 yo female admitted to [place] [address] on 1/30/2023 s/p SOB  DX: biventricular HF, COPD, hx of COVID-19, SOB, HTN, anxiety, DM, hypoxic  Two patient identifiers were used to confirm  Pt lives in a [facility] with her   Pt was I for ADL's and mobility prior  Pt required A with IADL's  Pt's impairments include reduced mobility, poor endurance, O2 dependent  These impairments limit the ability of the patient to perform mobility without increased assistance, return to PLOF and participate in everyday life activities  Pt would benefit from continued skilled therapy while in the hospital to improve overall mobility and work towards a safe d/c  Recommend discharge to home with home PT  At the end of the session the patient was left in seated position with call bell and phone within reach  The patient's AM-PAC Basic Mobility Inpatient Short Form Raw Score is 17   A Raw score of greater than 16 suggests the patient may benefit from discharge to home  Please also refer to the recommendation of the Physical Therapist for safe discharge planning  Goals   STG Expiration Date 02/15/23   Short Term Goal #1 STG 1: Pt will perform transfers at a MI level to return to baseline of function  STG 2: Pt will ambulate 150ft with RW at a MI level to reduce the level of assistance needed upon d/c home  STG 3: Pt will perform bed mobility at a I to safety return to PLOF  PT Treatment Day 0   Plan   Treatment/Interventions Functional transfer training;LE strengthening/ROM; Therapeutic exercise; Endurance training;Gait training;Bed mobility; Equipment eval/education;OT   PT Frequency 3-5x/wk   Recommendation   PT Discharge Recommendation Home with home health rehabilitation   Antwon sanches   Change/add to [employer]?  No   AM-PAC Basic Mobility Inpatient   Turning in Flat Bed Without Bedrails 3   Lying on Back to Sitting on Edge of Flat Bed Without Bedrails 3   Moving Bed to Chair 3   Standing Up From Chair Using Arms 3   Walk in Room 3   Climb 3-5 Stairs With Railing 2   Basic Mobility Inpatient Raw Score 17   Basic Mobility Standardized Score 39 67   Highest Level Of Mobility   JH-HLM Goal 5: Stand one or more mins   JH-HLM Achieved 6: Walk 10 steps or more   Teresa Hoyt, PT, DPT

## 2023-02-02 LAB
ANION GAP SERPL CALCULATED.3IONS-SCNC: 6 MMOL/L (ref 4–13)
ARTERIAL PATENCY WRIST A: YES
BASE EXCESS BLDA CALC-SCNC: 4 MMOL/L
BASOPHILS # BLD AUTO: 0.03 THOUSANDS/ÂΜL (ref 0–0.1)
BASOPHILS NFR BLD AUTO: 0 % (ref 0–1)
BUN SERPL-MCNC: 22 MG/DL (ref 5–25)
CALCIUM SERPL-MCNC: 9.9 MG/DL (ref 8.3–10.1)
CHLORIDE SERPL-SCNC: 104 MMOL/L (ref 96–108)
CO2 SERPL-SCNC: 26 MMOL/L (ref 21–32)
CREAT SERPL-MCNC: 0.58 MG/DL (ref 0.6–1.3)
EOSINOPHIL # BLD AUTO: 0 THOUSAND/ÂΜL (ref 0–0.61)
EOSINOPHIL NFR BLD AUTO: 0 % (ref 0–6)
ERYTHROCYTE [DISTWIDTH] IN BLOOD BY AUTOMATED COUNT: 11.9 % (ref 11.6–15.1)
GFR SERPL CREATININE-BSD FRML MDRD: 87 ML/MIN/1.73SQ M
GLUCOSE SERPL-MCNC: 123 MG/DL (ref 65–140)
GLUCOSE SERPL-MCNC: 138 MG/DL (ref 65–140)
GLUCOSE SERPL-MCNC: 144 MG/DL (ref 65–140)
GLUCOSE SERPL-MCNC: 183 MG/DL (ref 65–140)
GLUCOSE SERPL-MCNC: 200 MG/DL (ref 65–140)
HCO3 BLDA-SCNC: 29 MMOL/L (ref 22–28)
HCT VFR BLD AUTO: 43.9 % (ref 34.8–46.1)
HGB BLD-MCNC: 14.2 G/DL (ref 11.5–15.4)
IMM GRANULOCYTES # BLD AUTO: 0.05 THOUSAND/UL (ref 0–0.2)
IMM GRANULOCYTES NFR BLD AUTO: 0 % (ref 0–2)
LYMPHOCYTES # BLD AUTO: 1.31 THOUSANDS/ÂΜL (ref 0.6–4.47)
LYMPHOCYTES NFR BLD AUTO: 12 % (ref 14–44)
MCH RBC QN AUTO: 30.7 PG (ref 26.8–34.3)
MCHC RBC AUTO-ENTMCNC: 32.3 G/DL (ref 31.4–37.4)
MCV RBC AUTO: 95 FL (ref 82–98)
MONOCYTES # BLD AUTO: 0.36 THOUSAND/ÂΜL (ref 0.17–1.22)
MONOCYTES NFR BLD AUTO: 3 % (ref 4–12)
NASAL CANNULA: 2
NEUTROPHILS # BLD AUTO: 9.6 THOUSANDS/ÂΜL (ref 1.85–7.62)
NEUTS SEG NFR BLD AUTO: 85 % (ref 43–75)
NRBC BLD AUTO-RTO: 0 /100 WBCS
NT-PROBNP SERPL-MCNC: 327 PG/ML
O2 CT BLDA-SCNC: 17 ML/DL (ref 16–23)
OXYHGB MFR BLDA: 85.3 % (ref 94–97)
PCO2 BLDA: 44.8 MM HG (ref 36–44)
PH BLDA: 7.43 [PH] (ref 7.35–7.45)
PLATELET # BLD AUTO: 384 THOUSANDS/UL (ref 149–390)
PMV BLD AUTO: 10.5 FL (ref 8.9–12.7)
PO2 BLDA: 50 MM HG (ref 75–129)
POTASSIUM SERPL-SCNC: 4.7 MMOL/L (ref 3.5–5.3)
RBC # BLD AUTO: 4.63 MILLION/UL (ref 3.81–5.12)
SODIUM SERPL-SCNC: 136 MMOL/L (ref 135–147)
SPECIMEN SOURCE: ABNORMAL
WBC # BLD AUTO: 11.35 THOUSAND/UL (ref 4.31–10.16)

## 2023-02-02 RX ORDER — FORMOTEROL FUMARATE 20 UG/2ML
20 SOLUTION RESPIRATORY (INHALATION)
Status: DISCONTINUED | OUTPATIENT
Start: 2023-02-02 | End: 2023-02-09

## 2023-02-02 RX ORDER — BUDESONIDE 0.5 MG/2ML
0.5 INHALANT ORAL
Status: DISCONTINUED | OUTPATIENT
Start: 2023-02-02 | End: 2023-02-09

## 2023-02-02 RX ADMIN — FORMOTEROL FUMARATE DIHYDRATE 20 MCG: 20 SOLUTION RESPIRATORY (INHALATION) at 19:29

## 2023-02-02 RX ADMIN — STANDARDIZED SENNA CONCENTRATE 8.6 MG: 8.6 TABLET ORAL at 08:44

## 2023-02-02 RX ADMIN — INSULIN LISPRO 2 UNITS: 100 INJECTION, SOLUTION INTRAVENOUS; SUBCUTANEOUS at 17:17

## 2023-02-02 RX ADMIN — HEPARIN SODIUM 5000 UNITS: 5000 INJECTION INTRAVENOUS; SUBCUTANEOUS at 14:02

## 2023-02-02 RX ADMIN — METHYLPREDNISOLONE SODIUM SUCCINATE 40 MG: 40 INJECTION, POWDER, FOR SOLUTION INTRAMUSCULAR; INTRAVENOUS at 09:38

## 2023-02-02 RX ADMIN — LEVALBUTEROL HYDROCHLORIDE 1.25 MG: 1.25 SOLUTION, CONCENTRATE RESPIRATORY (INHALATION) at 19:30

## 2023-02-02 RX ADMIN — BENZONATATE 200 MG: 100 CAPSULE ORAL at 22:02

## 2023-02-02 RX ADMIN — HYDROCODONE BITARTRATE AND HOMATROPINE METHYLBROMIDE 5 ML: 5; 1.5 SYRUP ORAL at 23:29

## 2023-02-02 RX ADMIN — BUDESONIDE 0.5 MG: 0.5 INHALANT ORAL at 19:29

## 2023-02-02 RX ADMIN — LEVALBUTEROL HYDROCHLORIDE 1.25 MG: 1.25 SOLUTION, CONCENTRATE RESPIRATORY (INHALATION) at 13:15

## 2023-02-02 RX ADMIN — GUAIFENESIN 600 MG: 600 TABLET, EXTENDED RELEASE ORAL at 08:44

## 2023-02-02 RX ADMIN — SERTRALINE HYDROCHLORIDE 50 MG: 50 TABLET ORAL at 08:44

## 2023-02-02 RX ADMIN — HYDROCODONE BITARTRATE AND HOMATROPINE METHYLBROMIDE 5 ML: 5; 1.5 SYRUP ORAL at 17:01

## 2023-02-02 RX ADMIN — BENZONATATE 200 MG: 100 CAPSULE ORAL at 17:01

## 2023-02-02 RX ADMIN — ALBUTEROL SULFATE 2 PUFF: 90 AEROSOL, METERED RESPIRATORY (INHALATION) at 09:42

## 2023-02-02 RX ADMIN — HEPARIN SODIUM 5000 UNITS: 5000 INJECTION INTRAVENOUS; SUBCUTANEOUS at 05:28

## 2023-02-02 RX ADMIN — PRAVASTATIN SODIUM 40 MG: 40 TABLET ORAL at 17:01

## 2023-02-02 RX ADMIN — HYDROCODONE BITARTRATE AND HOMATROPINE METHYLBROMIDE 5 ML: 5; 1.5 SYRUP ORAL at 08:44

## 2023-02-02 RX ADMIN — BENZONATATE 200 MG: 100 CAPSULE ORAL at 08:44

## 2023-02-02 RX ADMIN — IPRATROPIUM BROMIDE 0.5 MG: 0.5 SOLUTION RESPIRATORY (INHALATION) at 13:15

## 2023-02-02 RX ADMIN — TRIAMCINOLONE ACETONIDE: 1 CREAM TOPICAL at 22:09

## 2023-02-02 RX ADMIN — TRIAMCINOLONE ACETONIDE: 1 CREAM TOPICAL at 08:53

## 2023-02-02 RX ADMIN — GUAIFENESIN 600 MG: 600 TABLET, EXTENDED RELEASE ORAL at 17:01

## 2023-02-02 RX ADMIN — METHYLPREDNISOLONE SODIUM SUCCINATE 40 MG: 40 INJECTION, POWDER, FOR SOLUTION INTRAMUSCULAR; INTRAVENOUS at 22:02

## 2023-02-02 RX ADMIN — LEVALBUTEROL HYDROCHLORIDE 1.25 MG: 1.25 SOLUTION, CONCENTRATE RESPIRATORY (INHALATION) at 07:46

## 2023-02-02 RX ADMIN — IPRATROPIUM BROMIDE 0.5 MG: 0.5 SOLUTION RESPIRATORY (INHALATION) at 07:46

## 2023-02-02 RX ADMIN — HEPARIN SODIUM 5000 UNITS: 5000 INJECTION INTRAVENOUS; SUBCUTANEOUS at 22:02

## 2023-02-02 RX ADMIN — IPRATROPIUM BROMIDE 0.5 MG: 0.5 SOLUTION RESPIRATORY (INHALATION) at 19:29

## 2023-02-02 NOTE — RESTORATIVE TECHNICIAN NOTE
Restorative Technician Note      Patient Name: Eliot Reynoso     Restorative Tech Visit Date: 02/02/23  Note Type: Mobility  Patient Position Upon Consult: Supine  Patient Position at End of Consult: Supine; All needs within reach    Assisted PT with session; see PT notes for details      Zheng Mccormack  DPT, Restorative Technician

## 2023-02-02 NOTE — PLAN OF CARE
Problem: PAIN - ADULT  Goal: Verbalizes/displays adequate comfort level or baseline comfort level  Description: Interventions:  - Encourage patient to monitor pain and request assistance  - Assess pain using appropriate pain scale  - Administer analgesics based on type and severity of pain and evaluate response  - Implement non-pharmacological measures as appropriate and evaluate response  - Consider cultural and social influences on pain and pain management  - Notify physician/advanced practitioner if interventions unsuccessful or patient reports new pain  Outcome: Progressing     Problem: INFECTION - ADULT  Goal: Absence or prevention of progression during hospitalization  Description: INTERVENTIONS:  - Assess and monitor for signs and symptoms of infection  - Monitor lab/diagnostic results  - Monitor all insertion sites, i e  indwelling lines, tubes, and drains  - Monitor endotracheal if appropriate and nasal secretions for changes in amount and color  - [place] appropriate cooling/warming therapies per order  - Administer medications as ordered  - Instruct and encourage patient and family to use good hand hygiene technique  - Identify and instruct in appropriate isolation precautions for identified infection/condition  Outcome: Progressing     Problem: Nutrition/Hydration-ADULT  Goal: Nutrient/Hydration intake appropriate for improving, restoring or maintaining nutritional needs  Description: Monitor and assess patient's nutrition/hydration status for malnutrition  Collaborate with interdisciplinary team and initiate plan and interventions as ordered  Monitor patient's weight and dietary intake as ordered or per policy  Utilize nutrition screening tool and intervene as necessary  Determine patient's food preferences and provide high-protein, high-caloric foods as appropriate       INTERVENTIONS:  - Monitor oral intake, urinary output, labs, and treatment plans  - Assess nutrition and hydration status and recommend course of action  - Evaluate amount of meals eaten  - Assist patient with eating if necessary   - Allow adequate time for meals  - Recommend/ encourage appropriate diets, oral nutritional supplements, and vitamin/mineral supplements  - Order, calculate, and assess calorie counts as needed  - Recommend, monitor, and adjust tube feedings and TPN/PPN based on assessed needs  - Assess need for intravenous fluids  - Provide specific nutrition/hydration education as appropriate  - Include patient/family/caregiver in decisions related to nutrition  Outcome: Progressing     Problem: RESPIRATORY - ADULT  Goal: Achieves optimal ventilation and oxygenation  Description: INTERVENTIONS:  - Assess for changes in respiratory status  - Assess for changes in mentation and behavior  - Position to facilitate oxygenation and minimize respiratory effort  - Oxygen administered by appropriate delivery if ordered  - Initiate smoking cessation education as indicated  - Encourage broncho-pulmonary hygiene including cough, deep breathe, Incentive Spirometry  - Assess the need for suctioning and aspirate as needed  - Assess and instruct to report SOB or any respiratory difficulty  - Respiratory Therapy support as indicated  Outcome: Progressing

## 2023-02-02 NOTE — PHYSICAL THERAPY NOTE
PT orders received  Chart reviewed  Attempted to see the patient this AM  Pt's SpO2 was 80% on O2 laying in bed  With PLB pt was able to bring SpO2 up to 86% but she would quickly drop to 80% with conversation  Will hold  Will continue to follow   RN notified   Saray Gonzalez, PT, DPT     02/02/23 0905   PT Last Visit   PT Visit Date 02/02/23   Note Type   Note type Cancelled Session   Cancel Reasons Medical status

## 2023-02-02 NOTE — CASE MANAGEMENT
Case Management Discharge Planning Note    Patient name Landen Pérez  Location [facility] 834/[facility] 646-16 MRN [id number]  : 1945 Date 2023       Current Admission Date: 2023  Current Admission Diagnosis:Acute respiratory failure with hypoxia [facility])   Patient Active Problem List    Diagnosis Date Noted   • Acute [facility] failure with hypoxia ([facility] ) 2023   • History of COVID-19 2023   • COPD exacerbation ([place] [address] ) 2023   • Biventricular congestive heart failure ([place] [address] ) 2023   • Mixed hyperlipidemia 2022   • Dysthymic disorder 2022   • Impaired fasting blood sugar 2022   • Gastrointestinal hemorrhage 10/21/2021   • Dermatitis 10/21/2021   • Primary osteoarthritis of both hands 10/21/2021   • Congestive heart failure with right ventricular systolic dysfunction ([place] [address] ) 2021   • Rheumatoid factor positive 2021   • Arthritis 2021   • Diabetic eye exam ([place] [address] ) 2021   • Hypoxia 2021   • Essential hypertension, benign 2021   • Chronic combined systolic and diastolic congestive heart failure ([place] [address] ) 2021   • Pulmonary hypertension ([place] [address] ) 2021   • Cigarette nicotine dependence without complication    • Elevated BP without diagnosis of hypertension 2021   • Lung nodules 2021   • COPD (chronic obstructive pulmonary disease) ([place] [address] ) 2021   • Chronic respiratory failure with hypoxia ([place] [address] ) 2021   • Nicotine dependence 2021   • Microalbuminuria 2021   • SOB (shortness of breath) 2021   • Uncontrolled type 2 diabetes mellitus with hyperglycemia ([facility] ) 2021   • Allergic rhinitis    • Type 2 diabetes, HbA1c goal < 7% ([facility])    • Anxiety    • Tendinitis of finger 2015      LOS (days): 2  Geometric Mean LOS (GMLOS) (days): 3 40  Days to GMLOS:1 6     OBJECTIVE:  Risk of Unplanned Readmission Score: 11 89         Current admission status: Inpatient   Preferred Pharmacy:   [address] [place] [address] #[id number] JACKLYN Belcher  [facility] [place] [address]  Phone: [phone] Fax: [phone]    Primary Care Provider: Gurinder Mendoza MD    Primary Insurance: MEDICARE  Secondary Insurance: AARP    DISCHARGE DETAILS:    A post acute care recommendation was made by your care team for [facility]  Discussed [place] of Choice with patient  List of agencies given to patient via in person  patient aware the list is custom filtered for them by preference  and that [facility] post acute providers are designated  Prefers  [facility]NA< referral entered in [facility] unable to accept  [place] referrals sent    [address] [place] [address] OF [facility]  accepted    PT agreeable to same                                Requested [address]         Is the patient interested in [facility] at discharge?: Yes  [address] requested[de-identified] Nursing, Physical Therapy, Occupational [address] Name[de-identified] [address] [place] [address] Provider[de-identified] PCP  Home Health Services Needed[de-identified] COPD Management, Evaluate Functional Status and Safety, Strengthening/Theraputic Exercises to Improve Function  Homebound Criteria Met[de-identified] Uses an Assist Device (i e  cane, walker, etc)  Supporting Clincal Findings[de-identified] Limited Endurance                   Treatment Team Recommendation: Home with 2003 [employer] Scheduling  Discharge Destination Plan[de-identified] Home with 2003 [employer] Scheduling

## 2023-02-02 NOTE — PLAN OF CARE
Problem: PAIN - ADULT  Goal: Verbalizes/displays adequate comfort level or baseline comfort level  Description: Interventions:  - Encourage patient to monitor pain and request assistance  - Assess pain using appropriate pain scale  - Administer analgesics based on type and severity of pain and evaluate response  - Implement non-pharmacological measures as appropriate and evaluate response  - Consider cultural and social influences on pain and pain management  - Notify physician/advanced practitioner if interventions unsuccessful or patient reports new pain  Outcome: Progressing     Problem: INFECTION - ADULT  Goal: Absence or prevention of progression during hospitalization  Description: INTERVENTIONS:  - Assess and monitor for signs and symptoms of infection  - Monitor lab/diagnostic results  - Monitor all insertion sites, i e  indwelling lines, tubes, and drains  - Monitor endotracheal if appropriate and nasal secretions for changes in amount and color  - [place] appropriate cooling/warming therapies per order  - Administer medications as ordered  - Instruct and encourage patient and family to use good hand hygiene technique  - Identify and instruct in appropriate isolation precautions for identified infection/condition  Outcome: Progressing     Problem: DISCHARGE PLANNING  Goal: Discharge to home or other facility with appropriate resources  Description: INTERVENTIONS:  - Identify barriers to discharge w/patient and caregiver  - Arrange for needed discharge resources and transportation as appropriate  - Identify discharge learning needs (meds, wound care, etc )  - Arrange for interpretive services to assist at discharge as needed  - Refer to Case Management Department for coordinating discharge planning if the patient needs post-hospital services based on physician/advanced practitioner order or complex needs related to functional status, cognitive ability, or social support system  Outcome: Progressing Problem: Knowledge Deficit  Goal: Patient/family/caregiver demonstrates understanding of disease process, treatment plan, medications, and discharge instructions  Description: Complete learning assessment and assess knowledge base  Interventions:  - Provide teaching at level of understanding  - Provide teaching via preferred learning methods  Outcome: Progressing     Problem: RESPIRATORY - ADULT  Goal: Achieves optimal ventilation and oxygenation  Description: INTERVENTIONS:  - Assess for changes in respiratory status  - Assess for changes in mentation and behavior  - Position to facilitate oxygenation and minimize respiratory effort  - Oxygen administered by appropriate delivery if ordered  - Initiate smoking cessation education as indicated  - Encourage broncho-pulmonary hygiene including cough, deep breathe, Incentive Spirometry  - Assess the need for suctioning and aspirate as needed  - Assess and instruct to report SOB or any respiratory difficulty  - Respiratory Therapy support as indicated  Outcome: Progressing     Problem: METABOLIC, FLUID AND ELECTROLYTES - ADULT  Goal: Electrolytes maintained within normal limits  Description: INTERVENTIONS:  - Monitor labs and assess patient for signs and symptoms of electrolyte imbalances  - Administer electrolyte replacement as ordered  - Monitor response to electrolyte replacements, including repeat lab results as appropriate  - Instruct patient on fluid and nutrition as appropriate  Outcome: Progressing     Problem: Nutrition/Hydration-ADULT  Goal: Nutrient/Hydration intake appropriate for improving, restoring or maintaining nutritional needs  Description: Monitor and assess patient's nutrition/hydration status for malnutrition  Collaborate with interdisciplinary team and initiate plan and interventions as ordered  Monitor patient's weight and dietary intake as ordered or per policy  Utilize nutrition screening tool and intervene as necessary   Determine patient's food preferences and provide high-protein, high-caloric foods as appropriate       INTERVENTIONS:  - Monitor oral intake, urinary output, labs, and treatment plans  - Assess nutrition and hydration status and recommend course of action  - Evaluate amount of meals eaten  - Assist patient with eating if necessary   - Allow adequate time for meals  - Recommend/ encourage appropriate diets, oral nutritional supplements, and vitamin/mineral supplements  - Order, calculate, and assess calorie counts as needed  - Recommend, monitor, and adjust tube feedings and TPN/PPN based on assessed needs  - Assess need for intravenous fluids  - Provide specific nutrition/hydration education as appropriate  - Include patient/family/caregiver in decisions related to nutrition  Outcome: Progressing

## 2023-02-02 NOTE — PROGRESS NOTES
INTERNAL MEDICINE RESIDENCY PROGRESS NOTE     Name: Maryann Rosales   Age & Sex: 76 y o  female   MRN: [id number]  [facility]   Encounter: [id number]  Team: SOD Team B     PATIENT INFORMATION     Name: Maryann Rosales   Age & Sex: 76 y o  female   MRN: [id number]  Hospital Stay Days: 2    ASSESSMENT/PLAN     Principal Problem:    Acute respiratory failure with hypoxia ([place] [address] )  Active Problems:    Type 2 diabetes, HbA1c goal < 7% ([facility])    Anxiety    SOB (shortness of breath)    COPD (chronic obstructive pulmonary disease) ([facility] )    Essential hypertension, benign    Dysthymic disorder    History of COVID-19    COPD exacerbation (HCC)    Biventricular congestive heart failure (HCC)      Biventricular congestive heart failure (HCC)  Assessment & Plan  Hx of combined CHF 2/2 to CAD and pulmonary HTN in the setting of chronic tobacco abuse and severe COPD  With preserved LV systolic function and LV diastolic dysfunction, right ventricular dilatation and dysfunction  Patient required home O2 (3L tapered off over ~2 months), daily diuretics as well as inhalers back in [id number]  Patient had recovery of EF w/ smoking cessation, weight loss, and medication   Last ECHO June '22 w/ EF 60% w LV & LV normal systolic and diastolic dysfunction   Echo 1/31 LVEF 02%, vigorous systolic function, grade II diastolic dysfunction, mildly dilated RV with normal systolic function, RV systolic pressure 08HHJZ, mild tricuspid regurgitation    - Appears dry on exam, no JVD, no pitting edema     · Continue home lasix 20 MWF    COPD exacerbation ([facility] )  Assessment & Plan  75 y/o F w/ hx of COPD, recent covid 1/23/23 s/p tx w Lagrevrio (molnupiravir) x 5 days who inititally felt better but presented with 2 days of worsening cough, increased sputum production SOB, GONZALEZ and fatigue      - Hypoxic on admission, requiring 5L O2  - Afebrile   No leukocytosis   - CT PE study unremarkable & w/o infilatrates, or pulm edema  - CXR no acute cardiopulmonary disease     · Admitted on combination of COPD/COVID pathway   · Increase methylprednisolone 40mg IV daily to 40mg BID   · 3 day course of Azithro completed 2/1  · Resp protocol     History of COVID-19  Assessment & Plan  Recent covid 1/23/23 s/p tx w Lagrevrio (molnupiravir) x 5 days who inititally felt better but presented with 2 days of worsening cough, increased sputum production SOB, GONZALEZ and fatigue    - Has required 4-7L O2 NC  - CTA PE unremarkable for PE, & w/o pulmonary infiltrates   - Afebrile, no leukocytosis, renal function WNL  - Repeat COVID/FLU/RSV negative this admission  - Procalcitonin, CK, BNP WNL  - CRP 20 0    - Will treat for mild covid pathway / COPD exacerbation as noted above  · Increase Methylprednisolone 40mg IV daily to 40mg BID  · Per pharmacy - as COVID negative remdisivir no longer indicated  · resp protocol     Dysthymic disorder  Assessment & Plan  · Continue home zoloft 50mg daily, home hydroxyzine 10mg BID PRN  · Xanax 0 5mg QHS PRN  · Ativan 0 5mg IV Q4H PRN    Essential hypertension, benign  Assessment & Plan  · Continue home lasix 20 MWF  COPD (chronic obstructive pulmonary disease) ([facility] )  Assessment & Plan  Patient with "many year" history of smoking, though states she quit several years ago  PFT's conducted 6/21 showed increased lung volumes, FEV1 of 47% predicted without significant bronchodilator response, and corrected DLCO of 40%, indicative of severe obstructive disease  Not on home medications  She states that this is the first time she has required hospitalization due to cough/dyspnea, though states she was hospitalized about 1 year ago for CHF exacerbation      Plan  · Duo-Neb Q6H  · Increase IV Solumedrol from 40mg daily to 40mg BID  · Completed Azithromycin 500mg x3 days 2/1  · Ventolin Q4h PRN  · Mucinex BID  · Tessalon perles TID   · Change Tussionex to Hycodan TID  · Resp protocol    SOB (shortness of breath)  Assessment & Plan  See a/p above    Anxiety  Assessment & Plan  Patient states that she has a history of anxiety and states that her coughing spells cause increased anxiety    · Continue home atarax 10 mg BID PRN    Type 2 diabetes, HbA1c goal < 7% ([facility])  Assessment & Plan  Lab Results   Component Value Date    HGBA1C 6 2 (H) 2022     Recent Labs     23  1652 23  1155   POCGLU 139 148*       Blood Sugar Average: Last 72 hrs:  (P) 143 5   Well-controlled with diet and exercise  Not on any medication  · Carb controlled diet  · in patient goal -180  · Add on sliding scale insulin if uncontrolled sugars 2/2 IV steroids    * Acute respiratory failure with hypoxia ([facility])  Assessment & Plan  Due to recent covid infection (23) c/b COPD exacerbation    · See COPD a/p below        Disposition: Persistent respiratory distress  Pulm consulted      SUBJECTIVE     Patient seen and examined  No acute events overnight  Coughing spells with desaturations  OBJECTIVE     Vitals:    23 0531 23 0746 23 0755 23 1100   BP:   118/60    Pulse:   86    Resp:   16    Temp:   97 5 °F (36 4 °C)    TempSrc:       SpO2: (!) 88% (!) 89% (!) 83%    Weight:    69 5 kg (153 lb 3 5 oz)   Height:          Temperature:   Temp (24hrs), Av 5 °F (36 4 °C), Min:97 5 °F (36 4 °C), Max:97 5 °F (36 4 °C)    Temperature: 97 5 °F (36 4 °C)  Intake & Output:  I/O        07 07 0701   07 07 0700    P  O  310  120    Total Intake(mL/kg) 310 (4 4)  120 (1 7)    Urine (mL/kg/hr)       Total Output       Net +310  +120           Unmeasured Urine Occurrence 4 x          Weights:   IBW (Ideal Body Weight): 54 7 kg    Body mass index is 26 3 kg/m²  Weight (last 2 days)     Date/Time Weight    23 1100 69 5 (153 22)    23 1115 70 3 (155)    23 0600 70 3 (155)        Physical Exam  Constitutional:       General: She is not in acute distress  Appearance: She is obese   She is not ill-appearing, toxic-appearing or diaphoretic  HENT:      Head: Normocephalic and atraumatic  Cardiovascular:      Rate and Rhythm: Normal rate and regular rhythm  Pulses: Normal pulses  Heart sounds: Normal heart sounds  No gallop  Pulmonary:      Effort: Respiratory distress present  Breath sounds: Rhonchi present  Abdominal:      General: There is distension  Tenderness: There is no abdominal tenderness  There is no right CVA tenderness or left CVA tenderness  Hernia: No hernia is present  Musculoskeletal:      Right lower leg: No edema  Left lower leg: No edema  Neurological:      General: No focal deficit present  Psychiatric:         Mood and Affect: Mood normal          Behavior: Behavior normal        LABORATORY DATA     Labs: I have personally reviewed pertinent reports  Results from last 7 days   Lab Units 02/02/23  0733 02/01/23  0655 01/31/23  0504 01/30/23  0407   WBC Thousand/uL 11 35* 7 66 5 07 6 67   HEMOGLOBIN g/dL 14 2 13 2 13 8 14 3   HEMATOCRIT % 43 9 42 2 42 8 44 8   PLATELETS Thousands/uL 384 259 242 253   NEUTROS PCT % 85*  --   --  61   MONOS PCT % 3*  --   --  9   MONO PCT %  --   --  9  --       Results from last 7 days   Lab Units 02/02/23  0733 02/01/23  0655 01/31/23  0504   POTASSIUM mmol/L 4 7 4 3 3 9   CHLORIDE mmol/L 104 106 104   CO2 mmol/L 26 32 33*   BUN mg/dL 22 26* 22   CREATININE mg/dL 0 58* 0 55* 0 54*   CALCIUM mg/dL 9 9 9 2 9 0   ALK PHOS U/L  --   --  60   ALT U/L  --   --  16   AST U/L  --   --  13                            IMAGING & DIAGNOSTIC TESTING     Radiology Results: I have personally reviewed pertinent reports  XR chest pa & lateral    Result Date: 1/30/2023  Impression: No acute cardiopulmonary disease  This report is in agreement with the preliminary interpretation  Workstation performed: [id number]     CTA ED chest PE study    Result Date: 1/30/2023  Impression: No pulmonary embolus   No evidence of acute thoracic process  COPD  3 nodules in the left lower lobe, the largest of which measures 6 mm stable since May 2021  No new pulmonary nodules  Noncontrast chest CT follow-up in 12 months could be considered to establish two-year stability  Additional chronic findings and negatives as above  Workstation performed: [id number]     Other Diagnostic Testing: I have personally reviewed pertinent reports  ACTIVE MEDICATIONS     Current Facility-Administered Medications   Medication Dose Route Frequency   • albuterol (PROVENTIL HFA,VENTOLIN HFA) inhaler 2 puff  2 puff Inhalation Q4H PRN   • ALPRAZolam (XANAX) tablet 0 5 mg  0 5 mg Oral HS PRN   • benzonatate (TESSALON PERLES) capsule 200 mg  200 mg Oral TID   • furosemide (LASIX) tablet 20 mg  20 mg Oral Once per day on Mon Wed Fri   • guaiFENesin (MUCINEX) 12 hr tablet 600 mg  600 mg Oral BID   • heparin (porcine) subcutaneous injection 5,000 Units  5,000 Units Subcutaneous Q8H [address]   • HYDROcodone Bit-[place] MBr (HYCODAN) oral syrup 5 mL  5 mL Oral TID   • hydrOXYzine HCL (ATARAX) tablet 10 mg  10 mg Oral BID PRN   • insulin lispro (HumaLOG) 100 units/mL subcutaneous injection 1-6 Units  1-6 Units Subcutaneous TID AC   • ipratropium (ATROVENT) 0 02 % inhalation solution 0 5 mg  0 5 mg Nebulization TID   • levalbuterol (XOPENEX) inhalation solution 1 25 mg  1 25 mg Nebulization TID   • LORazepam (ATIVAN) injection 0 5 mg  0 5 mg Intravenous Q4H PRN   • methylPREDNISolone sodium succinate (Solu-MEDROL) injection 40 mg  40 mg Intravenous Q12H CHARLY   • pravastatin (PRAVACHOL) tablet 40 mg  40 mg Oral Daily With Dinner   • senna (SENOKOT) tablet 8 6 mg  1 tablet Oral Daily   • sertraline (ZOLOFT) tablet 50 mg  50 mg Oral Daily   • triamcinolone (KENALOG) 0 1 % cream   Topical BID       VTE Pharmacologic Prophylaxis: Heparin  VTE Mechanical Prophylaxis: sequential compression device    Portions of the record may have been created with voice recognition software    Occasional wrong word or "sound a like" substitutions may have occurred due to the inherent limitations of voice recognition software    Read the chart carefully and recognize, using context, where substitutions have occurred   ==  Tab Salter MD  [address]  Internal Medicine Residency PGY-3

## 2023-02-02 NOTE — CONSULTS
PULMONOLOGY CONSULT NOTE     Name: Deepika Diallo   Age & Sex: 76 y o  female   MRN: [id number]  Unit/Bed#: [facility] 834-01   Encounter: [id number]        Reason for consultation: COPD exacerbation    Requesting physician: Glenis Schirmer, MD     Assessment:   1  Acute on chronic respiratory failure with hypoxia and hypercapnia  2  Recent COVID, likely postacute sequela of COVID  3  Severe COPD  4  Pulmonary hypertension  5  Chronic systolic and diastolic heart failure  6  Multiple pulmonary nodules  7  Tobacco abuse history    Plan:  • Suspect that she requires more supplemental oxygen than she is aware that she actually needs  She states that she was taken off of oxygen that would appear from outpatient pulmonary notes that she was not supposed to be  • Continue supplement oxygen to maintain SPO2 greater than 88%  • ABG shows hypoxemia at 2 L  Likely will require baseline of at least 4 L  • She does have recent COVID infection and likely either postacute sequelae of COVID in the setting of severe COPD  • She was supposed to be on Trelegy Ellipta as per last pulmonology note  I have added inhaled budesonide and inhaled for Medrol to her regimen of inhaled levalbuterol and inhaled Atrovent  • She is currently on 40 mg of Solu-Medrol twice daily  Would not change this at this moment  • She is not appear to be actively wheezing does not appear to be in acute COPD exacerbation at this time  • She does seem to have evidence of pulmonary hypertension on most recent echocardiogram with evidence of diastolic dysfunction  Could trial some diuresis to see if some of her oxygen requirements can improve  • Pulmonary nodule follow-up as an outpatient  • Agree with airway clearance protocol flutter valve    • Agree with home O2 eval desaturation screening    History of Present Illness   HPI:  Deeipka Diallo is a 76 y o  female who has past medical history of type 2 diabetes, anxiety, COPD, hypertension, dysthymic disorder, COVID-19 infection, biventricular congestive heart failure, nicotine dependence, pulmonary hypertension, hyperlipidemia and osteoarthritis presented to [place] [id number] on 1/30/2023 with chief complaint of 2 days of worsening cough, increased pedal production shortness of breath and fatigue  She began having fevers on 1/23 and tested positive on the same day for COVID  Was seen at an urgent care in [place] was placed on [facility] area for 5 days  She completed the course and felt symptomatically better  However this past Saturday she experienced worsening shortness of breath  She also experienced increasing sputum production and cough  Presented to the ED and was requiring 10 L of supplemental oxygen via mid flow nasal cannula  CTA was performed that did not show evidence of PE or infiltrates but did show evidence of pulmonary edema  She was able to be weaned down to 5 L nasal cannula without intervention  Per chart she was then admitted for COPD exacerbation  On my evaluation found the patient sitting in bed comfortably lunch  She states that she was feeling some shortness of breath but specifically only when she coughed  Denied having oxygen at home and was on certain of seeing previous pulmonologist   Denied fever, chills, chest pain, abdominal pain, nausea, vomiting, diarrhea, numbness, tingling or weakness bilaterally throughout  Review of systems:  12 point review of systems was completed and was otherwise negative except as listed in HPI        Historical Information   Past Medical History:   Diagnosis Date   • Allergic rhinitis    • Anxiety    • Arthritis    • Diabetes mellitus ([address] )    • Oxygen decrease    • Tobacco abuse      Past Surgical History:   Procedure Laterality Date   • BREAST SURGERY Right     Cyst   • OTHER SURGICAL HISTORY      Cataract implant   • OTHER SURGICAL HISTORY      Fertilization     Family History   Problem Relation Age of Onset   • Prostate cancer Father • Diabetes Brother    • Heart disease Brother    • Lymphoma Brother    • No Known Problems Sister    • Parkinsonism Sister    • Hypotension Daughter         Takes "salt pills"   • Proteinuria Daughter         Occuring during her 3 pregnancies; follows with nephrology       Social History    Social History:   Social History     Socioeconomic History   • Marital status: /Civil Union     Spouse name: Not on file   • Number of children: 1   • Years of education: Not on file   • Highest education level: Some college, no degree   Occupational History   • Not on file   Tobacco Use   • Smoking status: Former     Packs/day: 0 50     Years: 30 00     Pack years: 15 00     Types: Cigarettes     Quit date: 2021     Years since quittin 7   • Smokeless tobacco: Never   • Tobacco comments:     Began smoking in her late teens, averaging 1/2 pack daily  States she quit smoking a few weeks ago (Updated 2021)  Vaping Use   • Vaping Use: Never used   Substance and Sexual Activity   • Alcohol use: Yes     Alcohol/week: 1 0 - 2 0 standard drink     Types: 1 - 2 Glasses of wine per week     Comment: On average, will drink 1-2 glasses of white wine daily with dinner  (Updated 2021),    • Drug use: Never     Comment: Last assessed 2021  • Sexual activity: Not on file   Other Topics Concern   • Not on file   Social History Narrative    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 ; 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  Has 1 biological daughter Estrellita Starkey) who lives locally  Social Determinants of Health     Financial Resource Strain: Low Risk    • Difficulty of Paying Living Expenses: Not hard at all   Food Insecurity: No Food Insecurity   • Worried About Running Out of Food in the Last Year: Never true   • Ran Out of Food in the Last Year: Never true   Transportation Needs: No Transportation Needs   • Lack of Transportation (Medical): No   • Lack of Transportation (Non-Medical):  No   Physical Activity: Not on file   Stress: Not on file   Social Connections: Not on file   Intimate Partner Violence: Not on file   Housing Stability: Low Risk    • Unable to Pay for Housing in the Last Year: No   • Number of Places Lived in the Last Year: 1   • Unstable Housing in the Last Year: No       Occupational History: Retired/noncontributory    Meds/Allergies   Current Facility-Administered Medications   Medication Dose Route Frequency   • albuterol (PROVENTIL HFA,VENTOLIN HFA) inhaler 2 puff  2 puff Inhalation Q4H PRN   • ALPRAZolam (XANAX) tablet 0 5 mg  0 5 mg Oral HS PRN   • benzonatate (TESSALON PERLES) capsule 200 mg  200 mg Oral TID   • furosemide (LASIX) tablet 20 mg  20 mg Oral Once per day on Mon Wed Fri   • guaiFENesin (MUCINEX) 12 hr tablet 600 mg  600 mg Oral BID   • heparin (porcine) subcutaneous injection 5,000 Units  5,000 Units Subcutaneous Q8H [facility]   • HYDROcodone Bit-Homatrop MBr (HYCODAN) oral syrup 5 mL  5 mL Oral TID   • hydrOXYzine HCL (ATARAX) tablet 10 mg  10 mg Oral BID PRN   • insulin lispro (HumaLOG) 100 units/mL subcutaneous injection 1-6 Units  1-6 Units Subcutaneous TID AC   • ipratropium (ATROVENT) 0 02 % inhalation solution 0 5 mg  0 5 mg Nebulization TID   • levalbuterol (XOPENEX) inhalation solution 1 25 mg  1 25 mg Nebulization TID   • LORazepam (ATIVAN) injection 0 5 mg  0 5 mg Intravenous Q4H PRN   • methylPREDNISolone sodium succinate (Solu-MEDROL) injection 40 mg  40 mg Intravenous Q12H [facility]   • pravastatin (PRAVACHOL) tablet 40 mg  40 mg Oral Daily With Dinner   • senna (SENOKOT) tablet 8 6 mg  1 tablet Oral Daily   • sertraline (ZOLOFT) tablet 50 mg  50 mg Oral Daily   • triamcinolone (KENALOG) 0 1 % cream   Topical BID     Medications Prior to Admission   Medication   • benzonatate (TESSALON) 200 MG capsule   • furosemide (LASIX) 20 mg tablet   • hydrOXYzine HCL (ATARAX) 10 mg tablet   • Lagevrio 200 MG capsule   • rosuvastatin (CRESTOR) 5 mg tablet   • sertraline (Zoloft) 50 mg tablet   • triamcinolone (KENALOG) 0 1 % cream     No Known Allergies    Objective    Vitals: Blood pressure 118/60, pulse 86, temperature 97 5 °F (36 4 °C), resp  rate 16, height 5' 4" (1 626 m), weight 69 5 kg (153 lb 3 5 oz), SpO2 92 %  , 6L, Body mass index is 26 3 kg/m²  Intake/Output Summary (Last 24 hours) at 2/2/2023 1351  Last data filed at 2/2/2023 0908  Gross per 24 hour   Intake 120 ml   Output --   Net 120 ml       Physical Exam  Vitals and nursing note reviewed  Constitutional:       General: She is not in acute distress  Appearance: Normal appearance  She is well-developed  She is obese  She is ill-appearing  She is not toxic-appearing  Interventions: She is not intubated  HENT:      Head: Normocephalic and atraumatic  Right Ear: External ear normal       Left Ear: External ear normal       Nose: Nose normal       Mouth/Throat:      Mouth: Mucous membranes are moist       Pharynx: Oropharynx is clear  Eyes:      General: No scleral icterus  Conjunctiva/sclera: Conjunctivae normal    Cardiovascular:      Rate and Rhythm: Normal rate and regular rhythm  Pulses: Normal pulses  Heart sounds: Normal heart sounds  No murmur heard  No friction rub  No gallop  Pulmonary:      Effort: Pulmonary effort is normal  No tachypnea, bradypnea, accessory muscle usage or respiratory distress  She is not intubated  Breath sounds: Normal air entry  No decreased air movement  Decreased breath sounds present  No wheezing, rhonchi or rales  Abdominal:      General: Abdomen is flat  Bowel sounds are normal       Palpations: Abdomen is soft  Musculoskeletal:         General: No swelling or tenderness  Cervical back: Neck supple  No tenderness  Skin:     General: Skin is warm and dry  Neurological:      General: No focal deficit present  Mental Status: She is alert and oriented to person, place, and time  Mental status is at baseline  Labs:  I have personally reviewed pertinent lab results  Laboratory and Diagnostics  Results from last 7 days   Lab Units 02/02/23  0733 02/01/23  0655 01/31/23  0504 01/30/23  0407   WBC Thousand/uL 11 35* 7 66 5 07 6 67   HEMOGLOBIN g/dL 14 2 13 2 13 8 14 3   HEMATOCRIT % 43 9 42 2 42 8 44 8   PLATELETS Thousands/uL 384 259 242 253   NEUTROS PCT % 85*  --   --  61   BANDS PCT %  --   --  5  --    MONOS PCT % 3*  --   --  9   MONO PCT %  --   --  9  --      Results from last 7 days   Lab Units 02/02/23  0733 02/01/23  0655 01/31/23  0504 01/30/23  0407   SODIUM mmol/L 136 140 139 139   POTASSIUM mmol/L 4 7 4 3 3 9 3 9   CHLORIDE mmol/L 104 106 104 104   CO2 mmol/L 26 32 33* 31   ANION GAP mmol/L 6 2* 2* 4   BUN mg/dL 22 26* 22 21   CREATININE mg/dL 0 58* 0 55* 0 54* 0 71   CALCIUM mg/dL 9 9 9 2 9 0 9 5   GLUCOSE RANDOM mg/dL 144* 107 119 136   ALT U/L  --   --  16  --    AST U/L  --   --  13  --    ALK PHOS U/L  --   --  60  --    ALBUMIN g/dL  --   --  3 3*  --    TOTAL BILIRUBIN mg/dL  --   --  0 52  --                        Results from last 7 days   Lab Units 01/30/23  0407   CRP mg/L 20 0*             Results from last 7 days   Lab Units 01/30/23  0412   D-DIMER QUANTITATIVE ug/ml FEU 0 74*     Results from last 7 days   Lab Units 01/31/23  0504 01/30/23  0407   PROCALCITONIN ng/ml <0 05 <0 05       ABG:       Micro:        Imaging and other studies: I have personally reviewed pertinent reports  and I have personally reviewed pertinent films in PACS  CTA chest 1/30/2023  PULMONARY ARTERIAL TREE:  No pulmonary embolus is seen       LUNGS:  COPD  No infiltrate  Minimal multilobar pulmonary scarring  3 nodules in the left lower lobe marked on series 3, the largest of which measures 6 mm image 166 series 3 stable since May 2021  No new suspicious pulmonary nodule      Central airways are clear      PLEURA:  Unremarkable      HEART/GREAT VESSELS:  Heart is not enlarged  No pericardial effusion    Aortic and coronary artery calcification  No thoracic aortic aneurysm      MEDIASTINUM AND JUILAN:  Stable 1 4 cm short axis anterior subcarinal lymph node image 88 series 2  Stable mildly prominent subcentimeter short axis mediastinal and bilateral hilar lymph nodes  CT chest   LUNGS:  Few pulmonary nodules will be measured on series 3:     Image 61, left lower lobe, 3 mm  Image 69, left lower lobe, 3 mm  Image 77, left lower lobe, 6 mm      Linear scar or subsegmental atelectasis in the right upper lobe along the minor fissure, lingula, and lung bases  No infiltrate  COPD  Minimal biapical pleural smooth septal thickening  Central airways are clear      PLEURA:  Trace right pleural effusion      HEART/GREAT VESSELS:  Heart is enlarged  No pericardial effusion  Aortic and coronary artery calcification      MEDIASTINUM AND JULIAN:  1 3 cm short axis precarinal lymph node image 22 series 2  Enteric contrast in the esophagus suggestive of gastroesophageal reflux      CHEST WALL AND LOWER NECK:   Punctate right breast calcification or clip  Patient has a history of right breast surgery  Incidental discovery of one or more thyroid nodule(s) measuring less than 1 5 cm and without suspicious features is noted in this   patient who is above 28years old; according to guidelines published in the 2015 white paper on incidental thyroid nodules in the Journal of the [employer] of radiology Allyn Garcia), no further evaluation is recommended      Pulmonary function testin2021  FVC: 1 74 L       66 % predicted  FEV1: 0 94 L     47 % predicted  FEV1/FVC Ratio: 54 %     After administration of bronchodilator   FVC: 1 84 L, 70 % predicted, +5 % change  FEV1: 0 99 L, 49 % predicted, +5 % change     Lung volumes by body plethysmography:   Total Lung Capacity 102 % predicted   Residual volume 148 % predicted     DLCO corrected for patients hemoglobin level: 30 %     Interpretation:     • Severe obstructive airflow defect on spirometry     • No significant improvement in airflow or forced vital capacity in response to the administration to bronchodilator per ATS standards       • Air trapping as indicated by the lung volumes     • Severe decrease in diffusion capacity     • Flow-volume loop consistent with obstruction    EKG, Pathology, and Other Studies: I have personally reviewed pertinent reports  Echo 1/31/2023  •  Left Ventricle: Left ventricular cavity size is normal  Wall thickness is normal  The left ventricular ejection fraction is 65%  Systolic function is vigorous  Wall motion is normal  Diastolic function is moderately abnormal, consistent with grade II (pseudonormal) relaxation  •  Right Ventricle: Right ventricular cavity size is mildly dilated  Systolic function is normal   •  Tricuspid Valve: There is mild regurgitation  The right ventricular systolic pressure is moderately elevated  The estimated right ventricular systolic pressure is 63 54 mmHg  Code Status: Level 3 - DNAR and DNI    VTE Pharmacologic Prophylaxis: Heparin  VTE Mechanical Prophylaxis: sequential compression device    Disclaimer: Portions of the record may have been created with voice recognition software  Occasional wrong word or "sound a like" substitutions may have occurred due to the inherent limitations of voice recognition software  Careful consideration should be taken to recognize, using context, where substitutions have occurred      Solo Oshea DO   Pulmonary/Critical Care Fellowship PGY-V  [facility]

## 2023-02-03 ENCOUNTER — APPOINTMENT (INPATIENT)
Dept: RADIOLOGY | Facility: HOSPITAL | Age: 78
End: 2023-02-03

## 2023-02-03 LAB
ANION GAP SERPL CALCULATED.3IONS-SCNC: 4 MMOL/L (ref 4–13)
BUN SERPL-MCNC: 19 MG/DL (ref 5–25)
CALCIUM SERPL-MCNC: 9.2 MG/DL (ref 8.3–10.1)
CHLORIDE SERPL-SCNC: 101 MMOL/L (ref 96–108)
CO2 SERPL-SCNC: 32 MMOL/L (ref 21–32)
CREAT SERPL-MCNC: 0.59 MG/DL (ref 0.6–1.3)
ERYTHROCYTE [DISTWIDTH] IN BLOOD BY AUTOMATED COUNT: 11.9 % (ref 11.6–15.1)
GFR SERPL CREATININE-BSD FRML MDRD: 87 ML/MIN/1.73SQ M
GLUCOSE SERPL-MCNC: 125 MG/DL (ref 65–140)
GLUCOSE SERPL-MCNC: 164 MG/DL (ref 65–140)
GLUCOSE SERPL-MCNC: 176 MG/DL (ref 65–140)
GLUCOSE SERPL-MCNC: 184 MG/DL (ref 65–140)
GLUCOSE SERPL-MCNC: 189 MG/DL (ref 65–140)
HCT VFR BLD AUTO: 41.2 % (ref 34.8–46.1)
HGB BLD-MCNC: 13.1 G/DL (ref 11.5–15.4)
MCH RBC QN AUTO: 29.8 PG (ref 26.8–34.3)
MCHC RBC AUTO-ENTMCNC: 31.8 G/DL (ref 31.4–37.4)
MCV RBC AUTO: 94 FL (ref 82–98)
PLATELET # BLD AUTO: 289 THOUSANDS/UL (ref 149–390)
PMV BLD AUTO: 11 FL (ref 8.9–12.7)
POTASSIUM SERPL-SCNC: 4.7 MMOL/L (ref 3.5–5.3)
RBC # BLD AUTO: 4.39 MILLION/UL (ref 3.81–5.12)
SODIUM SERPL-SCNC: 137 MMOL/L (ref 135–147)
WBC # BLD AUTO: 7.3 THOUSAND/UL (ref 4.31–10.16)

## 2023-02-03 RX ORDER — GUAIFENESIN 600 MG/1
1200 TABLET, EXTENDED RELEASE ORAL 2 TIMES DAILY
Status: DISCONTINUED | OUTPATIENT
Start: 2023-02-03 | End: 2023-02-10 | Stop reason: HOSPADM

## 2023-02-03 RX ORDER — ENOXAPARIN SODIUM 100 MG/ML
40 INJECTION SUBCUTANEOUS
Status: DISCONTINUED | OUTPATIENT
Start: 2023-02-03 | End: 2023-02-10 | Stop reason: HOSPADM

## 2023-02-03 RX ADMIN — BUDESONIDE 0.5 MG: 0.5 INHALANT ORAL at 07:31

## 2023-02-03 RX ADMIN — HEPARIN SODIUM 5000 UNITS: 5000 INJECTION INTRAVENOUS; SUBCUTANEOUS at 05:28

## 2023-02-03 RX ADMIN — FORMOTEROL FUMARATE DIHYDRATE 20 MCG: 20 SOLUTION RESPIRATORY (INHALATION) at 07:34

## 2023-02-03 RX ADMIN — GUAIFENESIN 1200 MG: 600 TABLET, EXTENDED RELEASE ORAL at 08:25

## 2023-02-03 RX ADMIN — IPRATROPIUM BROMIDE 0.5 MG: 0.5 SOLUTION RESPIRATORY (INHALATION) at 21:12

## 2023-02-03 RX ADMIN — BENZONATATE 200 MG: 100 CAPSULE ORAL at 16:23

## 2023-02-03 RX ADMIN — BENZONATATE 200 MG: 100 CAPSULE ORAL at 08:25

## 2023-02-03 RX ADMIN — BENZONATATE 200 MG: 100 CAPSULE ORAL at 22:17

## 2023-02-03 RX ADMIN — FORMOTEROL FUMARATE DIHYDRATE 20 MCG: 20 SOLUTION RESPIRATORY (INHALATION) at 21:13

## 2023-02-03 RX ADMIN — PRAVASTATIN SODIUM 40 MG: 40 TABLET ORAL at 16:23

## 2023-02-03 RX ADMIN — LEVALBUTEROL HYDROCHLORIDE 1.25 MG: 1.25 SOLUTION, CONCENTRATE RESPIRATORY (INHALATION) at 21:12

## 2023-02-03 RX ADMIN — ENOXAPARIN SODIUM 40 MG: 40 INJECTION SUBCUTANEOUS at 08:25

## 2023-02-03 RX ADMIN — TRIAMCINOLONE ACETONIDE 1 APPLICATION: 1 CREAM TOPICAL at 08:26

## 2023-02-03 RX ADMIN — STANDARDIZED SENNA CONCENTRATE 8.6 MG: 8.6 TABLET ORAL at 08:25

## 2023-02-03 RX ADMIN — INSULIN LISPRO 1 UNITS: 100 INJECTION, SOLUTION INTRAVENOUS; SUBCUTANEOUS at 17:00

## 2023-02-03 RX ADMIN — METHYLPREDNISOLONE SODIUM SUCCINATE 40 MG: 40 INJECTION, POWDER, FOR SOLUTION INTRAMUSCULAR; INTRAVENOUS at 08:25

## 2023-02-03 RX ADMIN — HYDROCODONE BITARTRATE AND HOMATROPINE METHYLBROMIDE 5 ML: 5; 1.5 SYRUP ORAL at 22:18

## 2023-02-03 RX ADMIN — IPRATROPIUM BROMIDE 0.5 MG: 0.5 SOLUTION RESPIRATORY (INHALATION) at 07:31

## 2023-02-03 RX ADMIN — BUDESONIDE 0.5 MG: 0.5 INHALANT ORAL at 21:12

## 2023-02-03 RX ADMIN — FUROSEMIDE 20 MG: 20 TABLET ORAL at 08:25

## 2023-02-03 RX ADMIN — SERTRALINE HYDROCHLORIDE 50 MG: 50 TABLET ORAL at 08:25

## 2023-02-03 RX ADMIN — IPRATROPIUM BROMIDE 0.5 MG: 0.5 SOLUTION RESPIRATORY (INHALATION) at 14:01

## 2023-02-03 RX ADMIN — LEVALBUTEROL HYDROCHLORIDE 1.25 MG: 1.25 SOLUTION, CONCENTRATE RESPIRATORY (INHALATION) at 07:31

## 2023-02-03 RX ADMIN — INSULIN LISPRO 1 UNITS: 100 INJECTION, SOLUTION INTRAVENOUS; SUBCUTANEOUS at 08:25

## 2023-02-03 RX ADMIN — GUAIFENESIN 1200 MG: 600 TABLET, EXTENDED RELEASE ORAL at 18:15

## 2023-02-03 RX ADMIN — METHYLPREDNISOLONE SODIUM SUCCINATE 40 MG: 40 INJECTION, POWDER, FOR SOLUTION INTRAMUSCULAR; INTRAVENOUS at 22:18

## 2023-02-03 RX ADMIN — HYDROCODONE BITARTRATE AND HOMATROPINE METHYLBROMIDE 5 ML: 5; 1.5 SYRUP ORAL at 16:23

## 2023-02-03 RX ADMIN — TRIAMCINOLONE ACETONIDE 1 APPLICATION: 1 CREAM TOPICAL at 18:16

## 2023-02-03 RX ADMIN — HYDROCODONE BITARTRATE AND HOMATROPINE METHYLBROMIDE 5 ML: 5; 1.5 SYRUP ORAL at 08:25

## 2023-02-03 RX ADMIN — LEVALBUTEROL HYDROCHLORIDE 1.25 MG: 1.25 SOLUTION, CONCENTRATE RESPIRATORY (INHALATION) at 14:01

## 2023-02-03 NOTE — PROGRESS NOTES
PULMONOLOGY PROGRESS NOTE     Name: Tim Cano   Age & Sex: 76 y o  female   MRN: [id number]  Unit/Bed#: [facility] 834-01   Encounter: [id number]    PATIENT INFORMATION     Name: Tim Cano   Age & Sex: 76 y o  female   MRN: [id number]  Hospital Stay Days: 3    ASSESSMENT/PLAN     Assessment:   1  Acute on chronic respiratory failure with hypoxia and hypercapnia  2  Possible acute exacerbation of COPD  3  Recent COVID, likely postacute sequela of COVID  4  Severe COPD  5  Pulmonary hypertension  6  Chronic systolic and diastolic heart failure  7  Multiple pulmonary nodules  8  Tobacco abuse history    Plan:  • Suspect that she requires more supplemental oxygen than she is aware that she actually needs  She states that she was taken off of oxygen that would appear from outpatient pulmonary notes that she was not supposed to be  • Continue supplement oxygen to maintain SPO2 greater than 88%  • ABG shows hypoxemia at 2 L  Likely will require baseline of at least 4 L  Will need oxygen titration walk testing to determine baseline oxygen requirements  • She does have recent COVID infection and likely either postacute sequelae of COVID in the setting of severe COPD  • She was supposed to be on Trelegy Ellipta as per last pulmonology note  Continue inhaled budesonide, inhaled formoterol, inhaled levalbuterol and inhaled ipratroprium  • She is currently on 40 mg of Solu-Medrol twice daily  Would not change this at this moment  Consider de-escalation to prednisone over the weekend  • She does seem to have evidence of pulmonary hypertension on most recent echocardiogram with evidence of diastolic dysfunction  Could trial some diuresis to see if some of her oxygen requirements can improve  • Pulmonary nodule follow-up as an outpatient  • Agree with airway clearance protocol flutter valve  • Agree with home O2 eval desaturation screening         SUBJECTIVE     Patient seen and examined  No acute events overnight   Feeling somewhat better  Less coughing    OBJECTIVE     Vitals:    23 1930 23 2211 23 0628 23 0734   BP:  134/72 132/70    Pulse:  92 84    Resp:  20 20    Temp:  (!) 97 °F (36 1 °C) (!) 97 2 °F (36 2 °C)    TempSrc:       SpO2: (!) 83% 92% 93% 97%   Weight:       Height:          Temperature:   Temp (24hrs), Av 3 °F (36 3 °C), Min:97 °F (36 1 °C), Max:97 7 °F (36 5 °C)    Temperature: (!) 97 2 °F (36 2 °C)  Intake & Output:  I/O        0701   0700  0701   0700  0701   0700    P  O   580     Total Intake(mL/kg)  580 (8 3)     Net  +580                Weights:   IBW (Ideal Body Weight): 54 7 kg    Body mass index is 26 3 kg/m²  Weight (last 2 days)     Date/Time Weight    23 1100 69 5 (153 22)        Physical Exam  Vitals and nursing note reviewed  Constitutional:       General: She is not in acute distress  Appearance: Normal appearance  She is well-developed  She is obese  She is ill-appearing  She is not toxic-appearing  Interventions: She is not intubated  HENT:      Head: Normocephalic and atraumatic  Right Ear: External ear normal       Left Ear: External ear normal       Nose: Nose normal       Mouth/Throat:      Mouth: Mucous membranes are moist       Pharynx: Oropharynx is clear  Eyes:      General: No scleral icterus  Conjunctiva/sclera: Conjunctivae normal    Cardiovascular:      Rate and Rhythm: Normal rate and regular rhythm  Pulses: Normal pulses  Heart sounds: Normal heart sounds  No murmur heard  No friction rub  No gallop  Pulmonary:      Effort: Pulmonary effort is normal  No tachypnea, bradypnea, accessory muscle usage or respiratory distress  She is not intubated  Breath sounds: Normal air entry  No decreased air movement  Decreased breath sounds and wheezing present  No rhonchi or rales  Abdominal:      General: Abdomen is flat  Bowel sounds are normal       Palpations: Abdomen is soft  Musculoskeletal:         General: No swelling or tenderness  Cervical back: Neck supple  No tenderness  Skin:     General: Skin is warm and dry  Neurological:      General: No focal deficit present  Mental Status: She is alert and oriented to person, place, and time  Mental status is at baseline  LABORATORY DATA     Labs: I have personally reviewed pertinent reports  Results from last 7 days   Lab Units 02/03/23  0437 02/02/23  0733 02/01/23  0655 01/31/23  0504 01/30/23  0407   WBC Thousand/uL 7 30 11 35* 7 66 5 07 6 67   HEMOGLOBIN g/dL 13 1 14 2 13 2 13 8 14 3   HEMATOCRIT % 41 2 43 9 42 2 42 8 44 8   PLATELETS Thousands/uL 289 384 259 242 253   NEUTROS PCT %  --  85*  --   --  61   MONOS PCT %  --  3*  --   --  9   MONO PCT %  --   --   --  9  --       Results from last 7 days   Lab Units 02/03/23  0437 02/02/23  0733 02/01/23  0655 01/31/23  0504   POTASSIUM mmol/L 4 7 4 7 4 3 3 9   CHLORIDE mmol/L 101 104 106 104   CO2 mmol/L 32 26 32 33*   BUN mg/dL 19 22 26* 22   CREATININE mg/dL 0 59* 0 58* 0 55* 0 54*   CALCIUM mg/dL 9 2 9 9 9 2 9 0   ALK PHOS U/L  --   --   --  60   ALT U/L  --   --   --  16   AST U/L  --   --   --  13                              ABG:   Results from last 7 days   Lab Units 02/02/23  1330   PH ART  7 429   PCO2 ART mm Hg 44 8*   PO2 ART mm Hg 50 0*   HCO3 ART mmol/L 29 0*   BASE EXC ART mmol/L 4 0   ABG SOURCE  Radial, Right       Micro:         IMAGING & DIAGNOSTIC TESTING     Radiology Results: I have personally reviewed pertinent reports  XR chest pa & lateral    Result Date: 1/30/2023  Impression: No acute cardiopulmonary disease  This report is in agreement with the preliminary interpretation  Workstation performed: [id number]     CTA ED chest PE study    Result Date: 1/30/2023  Impression: No pulmonary embolus  No evidence of acute thoracic process  COPD  3 nodules in the left lower lobe, the largest of which measures 6 mm stable since May 2021   No new pulmonary nodules  Noncontrast chest CT follow-up in 12 months could be considered to establish two-year stability  Additional chronic findings and negatives as above  Workstation performed: [id number]     Other Diagnostic Testing: I have personally reviewed pertinent reports  ACTIVE MEDICATIONS     Current Facility-Administered Medications   Medication Dose Route Frequency   • albuterol (PROVENTIL HFA,VENTOLIN HFA) inhaler 2 puff  2 puff Inhalation Q4H PRN   • ALPRAZolam (XANAX) tablet 0 5 mg  0 5 mg Oral HS PRN   • benzonatate (TESSALON PERLES) capsule 200 mg  200 mg Oral TID   • budesonide (PULMICORT) inhalation solution 0 5 mg  0 5 mg Nebulization Q12H   • formoterol (PERFOROMIST) nebulizer solution 20 mcg  20 mcg Nebulization Q12H   • furosemide (LASIX) tablet 20 mg  20 mg Oral Once per day on Mon Wed Fri   • guaiFENesin (MUCINEX) 12 hr tablet 600 mg  600 mg Oral BID   • heparin (porcine) subcutaneous injection 5,000 Units  5,000 Units Subcutaneous Q8H [facility]   • HYDROcodone Bit-Homatrop MBr (HYCODAN) oral syrup 5 mL  5 mL Oral TID   • hydrOXYzine HCL (ATARAX) tablet 10 mg  10 mg Oral BID PRN   • insulin lispro (HumaLOG) 100 units/mL subcutaneous injection 1-6 Units  1-6 Units Subcutaneous TID AC   • ipratropium (ATROVENT) 0 02 % inhalation solution 0 5 mg  0 5 mg Nebulization TID   • levalbuterol (XOPENEX) inhalation solution 1 25 mg  1 25 mg Nebulization TID   • LORazepam (ATIVAN) injection 0 5 mg  0 5 mg Intravenous Q4H PRN   • methylPREDNISolone sodium succinate (Solu-MEDROL) injection 40 mg  40 mg Intravenous Q12H [facility]   • pravastatin (PRAVACHOL) tablet 40 mg  40 mg Oral Daily With Dinner   • senna (SENOKOT) tablet 8 6 mg  1 tablet Oral Daily   • sertraline (ZOLOFT) tablet 50 mg  50 mg Oral Daily   • triamcinolone (KENALOG) 0 1 % cream   Topical BID         Disclaimer: Portions of the record may have been created with voice recognition software   Occasional wrong word or "sound a like" substitutions may have occurred due to the inherent limitations of voice recognition software  Careful consideration should be taken to recognize, using context, where substitutions have occurred      Niharika Francois DO   Pulmonary and Critical Care Fellow, PGY-V  [facility]

## 2023-02-03 NOTE — PLAN OF CARE
Problem: PAIN - ADULT  Goal: Verbalizes/displays adequate comfort level or baseline comfort level  Description: Interventions:  - Encourage patient to monitor pain and request assistance  - Assess pain using appropriate pain scale  - Administer analgesics based on type and severity of pain and evaluate response  - Implement non-pharmacological measures as appropriate and evaluate response  - Consider cultural and social influences on pain and pain management  - Notify physician/advanced practitioner if interventions unsuccessful or patient reports new pain  Outcome: Progressing     Problem: INFECTION - ADULT  Goal: Absence or prevention of progression during hospitalization  Description: INTERVENTIONS:  - Assess and monitor for signs and symptoms of infection  - Monitor lab/diagnostic results  - Monitor all insertion sites, i e  indwelling lines, tubes, and drains  - Monitor endotracheal if appropriate and nasal secretions for changes in amount and color  - [place] appropriate cooling/warming therapies per order  - Administer medications as ordered  - Instruct and encourage patient and family to use good hand hygiene technique  - Identify and instruct in appropriate isolation precautions for identified infection/condition  Outcome: Progressing     Problem: DISCHARGE PLANNING  Goal: Discharge to home or other facility with appropriate resources  Description: INTERVENTIONS:  - Identify barriers to discharge w/patient and caregiver  - Arrange for needed discharge resources and transportation as appropriate  - Identify discharge learning needs (meds, wound care, etc )  - Arrange for interpretive services to assist at discharge as needed  - Refer to Case Management Department for coordinating discharge planning if the patient needs post-hospital services based on physician/advanced practitioner order or complex needs related to functional status, cognitive ability, or social support system  Outcome: Progressing Problem: Knowledge Deficit  Goal: Patient/family/caregiver demonstrates understanding of disease process, treatment plan, medications, and discharge instructions  Description: Complete learning assessment and assess knowledge base  Interventions:  - Provide teaching at level of understanding  - Provide teaching via preferred learning methods  Outcome: Progressing     Problem: RESPIRATORY - ADULT  Goal: Achieves optimal ventilation and oxygenation  Description: INTERVENTIONS:  - Assess for changes in respiratory status  - Assess for changes in mentation and behavior  - Position to facilitate oxygenation and minimize respiratory effort  - Oxygen administered by appropriate delivery if ordered  - Initiate smoking cessation education as indicated  - Encourage broncho-pulmonary hygiene including cough, deep breathe, Incentive Spirometry  - Assess the need for suctioning and aspirate as needed  - Assess and instruct to report SOB or any respiratory difficulty  - Respiratory Therapy support as indicated  Outcome: Progressing     Problem: METABOLIC, FLUID AND ELECTROLYTES - ADULT  Goal: Electrolytes maintained within normal limits  Description: INTERVENTIONS:  - Monitor labs and assess patient for signs and symptoms of electrolyte imbalances  - Administer electrolyte replacement as ordered  - Monitor response to electrolyte replacements, including repeat lab results as appropriate  - Instruct patient on fluid and nutrition as appropriate  Outcome: Progressing     Problem: Nutrition/Hydration-ADULT  Goal: Nutrient/Hydration intake appropriate for improving, restoring or maintaining nutritional needs  Description: Monitor and assess patient's nutrition/hydration status for malnutrition  Collaborate with interdisciplinary team and initiate plan and interventions as ordered  Monitor patient's weight and dietary intake as ordered or per policy  Utilize nutrition screening tool and intervene as necessary   Determine patient's food preferences and provide high-protein, high-caloric foods as appropriate       INTERVENTIONS:  - Monitor oral intake, urinary output, labs, and treatment plans  - Assess nutrition and hydration status and recommend course of action  - Evaluate amount of meals eaten  - Assist patient with eating if necessary   - Allow adequate time for meals  - Recommend/ encourage appropriate diets, oral nutritional supplements, and vitamin/mineral supplements  - Order, calculate, and assess calorie counts as needed  - Recommend, monitor, and adjust tube feedings and TPN/PPN based on assessed needs  - Assess need for intravenous fluids  - Provide specific nutrition/hydration education as appropriate  - Include patient/family/caregiver in decisions related to nutrition  Outcome: Progressing

## 2023-02-03 NOTE — PLAN OF CARE
Problem: PAIN - ADULT  Goal: Verbalizes/displays adequate comfort level or baseline comfort level  Description: Interventions:  - Encourage patient to monitor pain and request assistance  - Assess pain using appropriate pain scale  - Administer analgesics based on type and severity of pain and evaluate response  - Implement non-pharmacological measures as appropriate and evaluate response  - Consider cultural and social influences on pain and pain management  - Notify physician/advanced practitioner if interventions unsuccessful or patient reports new pain  Outcome: Progressing     Problem: INFECTION - ADULT  Goal: Absence or prevention of progression during hospitalization  Description: INTERVENTIONS:  - Assess and monitor for signs and symptoms of infection  - Monitor lab/diagnostic results  - Monitor all insertion sites, i e  indwelling lines, tubes, and drains  - Monitor endotracheal if appropriate and nasal secretions for changes in amount and color  - [place] appropriate cooling/warming therapies per order  - Administer medications as ordered  - Instruct and encourage patient and family to use good hand hygiene technique  - Identify and instruct in appropriate isolation precautions for identified infection/condition  Outcome: Progressing     Problem: DISCHARGE PLANNING  Goal: Discharge to home or other facility with appropriate resources  Description: INTERVENTIONS:  - Identify barriers to discharge w/patient and caregiver  - Arrange for needed discharge resources and transportation as appropriate  - Identify discharge learning needs (meds, wound care, etc )  - Arrange for interpretive services to assist at discharge as needed  - Refer to Case Management Department for coordinating discharge planning if the patient needs post-hospital services based on physician/advanced practitioner order or complex needs related to functional status, cognitive ability, or social support system  Outcome: Progressing Problem: Knowledge Deficit  Goal: Patient/family/caregiver demonstrates understanding of disease process, treatment plan, medications, and discharge instructions  Description: Complete learning assessment and assess knowledge base  Interventions:  - Provide teaching at level of understanding  - Provide teaching via preferred learning methods  Outcome: Progressing     Problem: RESPIRATORY - ADULT  Goal: Achieves optimal ventilation and oxygenation  Description: INTERVENTIONS:  - Assess for changes in respiratory status  - Assess for changes in mentation and behavior  - Position to facilitate oxygenation and minimize respiratory effort  - Oxygen administered by appropriate delivery if ordered  - Initiate smoking cessation education as indicated  - Encourage broncho-pulmonary hygiene including cough, deep breathe, Incentive Spirometry  - Assess the need for suctioning and aspirate as needed  - Assess and instruct to report SOB or any respiratory difficulty  - Respiratory Therapy support as indicated  Outcome: Progressing     Problem: METABOLIC, FLUID AND ELECTROLYTES - ADULT  Goal: Electrolytes maintained within normal limits  Description: INTERVENTIONS:  - Monitor labs and assess patient for signs and symptoms of electrolyte imbalances  - Administer electrolyte replacement as ordered  - Monitor response to electrolyte replacements, including repeat lab results as appropriate  - Instruct patient on fluid and nutrition as appropriate  Outcome: Progressing     Problem: Nutrition/Hydration-ADULT  Goal: Nutrient/Hydration intake appropriate for improving, restoring or maintaining nutritional needs  Description: Monitor and assess patient's nutrition/hydration status for malnutrition  Collaborate with interdisciplinary team and initiate plan and interventions as ordered  Monitor patient's weight and dietary intake as ordered or per policy  Utilize nutrition screening tool and intervene as necessary   Determine patient's food preferences and provide high-protein, high-caloric foods as appropriate       INTERVENTIONS:  - Monitor oral intake, urinary output, labs, and treatment plans  - Assess nutrition and hydration status and recommend course of action  - Evaluate amount of meals eaten  - Assist patient with eating if necessary   - Allow adequate time for meals  - Recommend/ encourage appropriate diets, oral nutritional supplements, and vitamin/mineral supplements  - Order, calculate, and assess calorie counts as needed  - Recommend, monitor, and adjust tube feedings and TPN/PPN based on assessed needs  - Assess need for intravenous fluids  - Provide specific nutrition/hydration education as appropriate  - Include patient/family/caregiver in decisions related to nutrition  Outcome: Progressing

## 2023-02-03 NOTE — PROGRESS NOTES
INTERNAL MEDICINE RESIDENCY PROGRESS NOTE     Name: Pako Oconnor   Age & Sex: 76 y o  female   MRN: [id number]  Unit/Bed#: [facility] 834-01   Encounter: [id number]  Team: SOD Team B     PATIENT INFORMATION     Name: Pako Oconnor   Age & Sex: 76 y o  female   MRN: [id number]  Hospital Stay Days: 3    ASSESSMENT/PLAN     Principal Problem:    Acute respiratory failure with hypoxia ([facility] )  Active Problems:    Type 2 diabetes, HbA1c goal < 7% ([facility])    Anxiety    SOB (shortness of breath)    COPD (chronic obstructive pulmonary disease) ([facility] )    Essential hypertension, benign    Dysthymic disorder    History of COVID-19    COPD exacerbation (HCC)    Biventricular congestive heart failure (HCC)      Biventricular congestive heart failure (HCC)  Assessment & Plan  Hx of combined CHF 2/2 to CAD and pulmonary HTN in the setting of chronic tobacco abuse and severe COPD  With preserved LV systolic function and LV diastolic dysfunction, right ventricular dilatation and dysfunction  Patient required home O2 (3L tapered off over ~2 months), daily diuretics as well as inhalers back in [id number]  Patient had recovery of EF w/ smoking cessation, weight loss, and medication   Last ECHO June '22 w/ EF 60% w LV & LV normal systolic and diastolic dysfunction   Echo 1/31 LVEF 05%, vigorous systolic function, grade II diastolic dysfunction, mildly dilated RV with normal systolic function, RV systolic pressure 45QTVF, mild tricuspid regurgitation    - Appears dry on exam, no JVD, no pitting edema     · Continue home lasix 20 MWF    COPD exacerbation ([facility] )  Assessment & Plan  75 y/o F w/ hx of COPD, recent covid 1/23/23 s/p tx w Lagrevrio (molnupiravir) x 5 days who inititally felt better but presented with 2 days of worsening cough, increased sputum production SOB, GONZALEZ and fatigue      - Hypoxic on admission, requiring 5L O2  - Afebrile   No leukocytosis   - CT PE study unremarkable & w/o infilatrates, or pulm edema  - CXR no acute cardiopulmonary disease     · Admitted on combination of COPD/COVID pathway   · Increase methylprednisolone 40mg IV daily to 40mg BID   · 3 day course of Azithro completed 2/1  · Resp protocol   · Pulmonary consulted and recommendations appreciated:  · Started inhaled budesonide 0 5 mg Q12 and inhaled formoterol 20 mcg Q12  · Maintain SPO2>88%  · Will likely require 4L home O2    History of COVID-19  Assessment & Plan  Recent covid 1/23/23 s/p tx w Lagrevrio (molnupiravir) x 5 days who inititally felt better but presented with 2 days of worsening cough, increased sputum production SOB, GONZALEZ and fatigue    - Has required 4-7L O2 NC  - CTA PE unremarkable for PE, & w/o pulmonary infiltrates   - Afebrile, no leukocytosis, renal function WNL  - Repeat COVID/FLU/RSV negative this admission  - Procalcitonin, CK, BNP WNL  - CRP 20 0    - Will treat for mild covid pathway / COPD exacerbation as noted above  · Increase Methylprednisolone 40mg IV daily to 40mg BID  · Per pharmacy - as COVID negative remdisivir no longer indicated  · resp protocol     Dysthymic disorder  Assessment & Plan  · Continue home zoloft 50mg daily, home hydroxyzine 10mg BID PRN  · Xanax 0 5mg QHS PRN  · Ativan 0 5mg IV Q4H PRN    Essential hypertension, benign  Assessment & Plan  · Continue home lasix 20 MWF  COPD (chronic obstructive pulmonary disease) ([facility] )  Assessment & Plan  Patient with "many year" history of smoking, though states she quit several years ago  PFT's conducted 6/21 showed increased lung volumes, FEV1 of 47% predicted without significant bronchodilator response, and corrected DLCO of 40%, indicative of severe obstructive disease  Not on home medications  She states that this is the first time she has required hospitalization due to cough/dyspnea, though states she was hospitalized about 1 year ago for CHF exacerbation      Plan  · Duo-Neb Q6H  · Increase IV Solumedrol from 40mg daily to 40mg BID  · Completed Azithromycin 500mg x3 days   · Ventolin Q4h PRN  · Mucinex BID  · Tessalon perles TID   · Change Tussionex to Hycodan TID  · Resp protocol    SOB (shortness of breath)  Assessment & Plan  See a/p above    Anxiety  Assessment & Plan  Patient states that she has a history of anxiety and states that her coughing spells cause increased anxiety    · Continue home atarax 10 mg BID PRN    Type 2 diabetes, HbA1c goal < 7% (HCC)  Assessment & Plan  Lab Results   Component Value Date    HGBA1C 6 2 (H) 2022     Recent Labs     23  1652 23  1155   POCGLU 139 148*       Blood Sugar Average: Last 72 hrs:  (P) 143 5   Well-controlled with diet and exercise  Not on any medication  · Carb controlled diet  · in patient goal -180  · Add on sliding scale insulin if uncontrolled sugars 2/2 IV steroids    * Acute respiratory failure with hypoxia (HCC)  Assessment & Plan  Due to recent covid infection (23) c/b COPD exacerbation    · See COPD a/p below      Disposition: Continue inpatient care     SUBJECTIVE     Patient seen and examined  No acute events overnight  She reports sleeping better last night and is having less severe episodes of coughing  She continues to be on 4L nasal cannula  She is eating well  She denies any fever, chills, nausea, vomiting, shortness of breath or chest pain  OBJECTIVE     Vitals:    23 1930 23 2211 23 0628 23 0734   BP:  134/72 132/70    Pulse:  92 84    Resp:  20 20    Temp:  (!) 97 °F (36 1 °C) (!) 97 2 °F (36 2 °C)    TempSrc:       SpO2: (!) 83% 92% 93% 97%   Weight:    69 7 kg (153 lb 10 6 oz)   Height:          Temperature:   Temp (24hrs), Av 3 °F (36 3 °C), Min:97 °F (36 1 °C), Max:97 7 °F (36 5 °C)    Temperature: (!) 97 2 °F (36 2 °C)  Intake & Output:  I/O        07 0700  0701   07 07 0700    P  O   580     Total Intake(mL/kg)  580 (8 3)     Net  +580                Weights:   IBW (Ideal Body Weight): 54 7 kg    Body mass index is 26 38 kg/m²  Weight (last 2 days)     Date/Time Weight    02/03/23 0734 69 7 (153 66)    02/02/23 1100 69 5 (153 22)        Physical Exam  Vitals and nursing note reviewed  Constitutional:       General: She is not in acute distress  Appearance: She is not diaphoretic  HENT:      Head: Normocephalic  Nose: Nose normal       Mouth/Throat:      Mouth: Mucous membranes are moist    Eyes:      Extraocular Movements: Extraocular movements intact  Pupils: Pupils are equal, round, and reactive to light  Cardiovascular:      Rate and Rhythm: Normal rate and regular rhythm  Pulses: Normal pulses  Heart sounds: Normal heart sounds  Pulmonary:      Effort: No respiratory distress  Breath sounds: Rhonchi (improving) present  No rales  Abdominal:      Palpations: Abdomen is soft  Tenderness: There is no abdominal tenderness  Musculoskeletal:      Cervical back: No tenderness  Skin:     General: Skin is warm  Capillary Refill: Capillary refill takes less than 2 seconds  Neurological:      General: No focal deficit present  Mental Status: She is alert and oriented to person, place, and time  Psychiatric:         Mood and Affect: Mood normal        LABORATORY DATA     Labs: I have personally reviewed pertinent reports    Results from last 7 days   Lab Units 02/03/23  0437 02/02/23  0733 02/01/23  0655 01/31/23  0504 01/30/23  0407   WBC Thousand/uL 7 30 11 35* 7 66 5 07 6 67   HEMOGLOBIN g/dL 13 1 14 2 13 2 13 8 14 3   HEMATOCRIT % 41 2 43 9 42 2 42 8 44 8   PLATELETS Thousands/uL 289 384 259 242 253   NEUTROS PCT %  --  85*  --   --  61   MONOS PCT %  --  3*  --   --  9   MONO PCT %  --   --   --  9  --       Results from last 7 days   Lab Units 02/03/23  0437 02/02/23  0733 02/01/23  0655 01/31/23  0504   POTASSIUM mmol/L 4 7 4 7 4 3 3 9   CHLORIDE mmol/L 101 104 106 104   CO2 mmol/L 32 26 32 33*   BUN mg/dL 19 22 26* 22   CREATININE mg/dL 0 59* 0 58* 0 55* 0 54*   CALCIUM mg/dL 9 2 9 9 9 2 9 0   ALK PHOS U/L  --   --   --  60   ALT U/L  --   --   --  16   AST U/L  --   --   --  13                            IMAGING & DIAGNOSTIC TESTING     Radiology Results: I have personally reviewed pertinent reports  XR chest pa & lateral    Result Date: 1/30/2023  Impression: No acute cardiopulmonary disease  This report is in agreement with the preliminary interpretation  Workstation performed: [id number]     CTA ED chest PE study    Result Date: 1/30/2023  Impression: No pulmonary embolus  No evidence of acute thoracic process  COPD  3 nodules in the left lower lobe, the largest of which measures 6 mm stable since May 2021  No new pulmonary nodules  Noncontrast chest CT follow-up in 12 months could be considered to establish two-year stability  Additional chronic findings and negatives as above  Workstation performed: [id number]     Other Diagnostic Testing: I have personally reviewed pertinent reports      ACTIVE MEDICATIONS     Current Facility-Administered Medications   Medication Dose Route Frequency   • albuterol (PROVENTIL HFA,VENTOLIN HFA) inhaler 2 puff  2 puff Inhalation Q4H PRN   • ALPRAZolam (XANAX) tablet 0 5 mg  0 5 mg Oral HS PRN   • benzonatate (TESSALON PERLES) capsule 200 mg  200 mg Oral TID   • budesonide (PULMICORT) inhalation solution 0 5 mg  0 5 mg Nebulization Q12H   • enoxaparin (LOVENOX) subcutaneous injection 40 mg  40 mg Subcutaneous Q24H CHARLY   • formoterol (PERFOROMIST) nebulizer solution 20 mcg  20 mcg Nebulization Q12H   • furosemide (LASIX) tablet 20 mg  20 mg Oral Once per day on Mon Wed Fri   • guaiFENesin (MUCINEX) 12 hr tablet 1,200 mg  1,200 mg Oral BID   • HYDROcodone Bit-[place]omatrop MBr (HYCODAN) oral syrup 5 mL  5 mL Oral TID   • hydrOXYzine HCL (ATARAX) tablet 10 mg  10 mg Oral BID PRN   • insulin lispro (HumaLOG) 100 units/mL subcutaneous injection 1-6 Units  1-6 Units Subcutaneous TID AC   • ipratropium (ATROVENT) 0 02 % inhalation solution 0 5 mg  0 5 mg Nebulization TID   • levalbuterol (XOPENEX) inhalation solution 1 25 mg  1 25 mg Nebulization TID   • LORazepam (ATIVAN) injection 0 5 mg  0 5 mg Intravenous Q4H PRN   • methylPREDNISolone sodium succinate (Solu-MEDROL) injection 40 mg  40 mg Intravenous Q12H [address]   • pravastatin (PRAVACHOL) tablet 40 mg  40 mg Oral Daily With Dinner   • senna (SENOKOT) tablet 8 6 mg  1 tablet Oral Daily   • sertraline (ZOLOFT) tablet 50 mg  50 mg Oral Daily   • triamcinolone (KENALOG) 0 1 % cream   Topical BID       VTE Pharmacologic Prophylaxis: Heparin  VTE Mechanical Prophylaxis: sequential compression device    Portions of the record may have been created with voice recognition software  Occasional wrong word or "sound a like" substitutions may have occurred due to the inherent limitations of voice recognition software    Read the chart carefully and recognize, using context, where substitutions have occurred   ==  Lakeshia Mchugh

## 2023-02-03 NOTE — PROGRESS NOTES
INTERNAL MEDICINE RESIDENCY PROGRESS NOTE     Name: Dexter Scanlon   Age & Sex: 76 y o  female   MRN: [id number]  Unit/Bed#: [facility] 834-01   Encounter: [id number]  Team: SOD Team B     PATIENT INFORMATION     Name: Dexter Scanlon   Age & Sex: 76 y o  female   MRN: [id number]  Hospital Stay Days: 3    ASSESSMENT/PLAN     Principal Problem:    Acute respiratory failure with hypoxia ([place] [address] )  Active Problems:    Type 2 diabetes, HbA1c goal < 7% ([facility])    Anxiety    SOB (shortness of breath)    COPD (chronic obstructive pulmonary disease) ([place] [address] )    Essential hypertension, benign    Dysthymic disorder    History of COVID-19    COPD exacerbation (HCC)    Biventricular congestive heart failure (HCC)      Biventricular congestive heart failure (HCC)  Assessment & Plan  Hx of combined CHF 2/2 to CAD and pulmonary HTN in the setting of chronic tobacco abuse and severe COPD  With preserved LV systolic function and LV diastolic dysfunction, right ventricular dilatation and dysfunction  Patient required home O2 (3L tapered off over ~2 months), daily diuretics as well as inhalers back in [id number]  Patient had recovery of EF w/ smoking cessation, weight loss, and medication   Last ECHO June '22 w/ EF 60% w LV & LV normal systolic and diastolic dysfunction   Echo 1/31 LVEF 70%, vigorous systolic function, grade II diastolic dysfunction, mildly dilated RV with normal systolic function, RV systolic pressure 22OHQB, mild tricuspid regurgitation    - Appears dry on exam, no JVD, no pitting edema     · Continue home lasix 20 MWF    COPD exacerbation ([facility] )  Assessment & Plan  77 y/o F w/ hx of COPD, recent covid 1/23/23 s/p tx w Lagrevrio (molnupiravir) x 5 days who inititally felt better but presented with 2 days of worsening cough, increased sputum production SOB, GONZALEZ and fatigue      - Hypoxic on admission, requiring 5L O2  - Afebrile   No leukocytosis   - CT PE study unremarkable & w/o infilatrates, or pulm edema  - CXR no acute cardiopulmonary disease     · Admitted on combination of COPD/COVID pathway   · Increase methylprednisolone 40mg IV daily to 40mg BID   · 3 day course of Azithro completed 2/1  · Resp protocol   · Pulmonary consulted and recommendations appreciated:  · Started inhaled budesonide 0 5 mg Q12 and inhaled formoterol 20 mcg Q12  · Maintain SPO2>88%  · Will likely require 4L home O2    History of COVID-19  Assessment & Plan  Recent covid 1/23/23 s/p tx w Lagrevrio (molnupiravir) x 5 days who inititally felt better but presented with 2 days of worsening cough, increased sputum production SOB, GONZALEZ and fatigue    - Has required 4-7L O2 NC  - CTA PE unremarkable for PE, & w/o pulmonary infiltrates   - Afebrile, no leukocytosis, renal function WNL  - Repeat COVID/FLU/RSV negative this admission  - Procalcitonin, CK, BNP WNL  - CRP 20 0    - Will treat for mild covid pathway / COPD exacerbation as noted above  · Increase Methylprednisolone 40mg IV daily to 40mg BID  · Per pharmacy - as COVID negative remdisivir no longer indicated  · resp protocol     Dysthymic disorder  Assessment & Plan  · Continue home zoloft 50mg daily, home hydroxyzine 10mg BID PRN  · Xanax 0 5mg QHS PRN  · Ativan 0 5mg IV Q4H PRN    Essential hypertension, benign  Assessment & Plan  · Continue home lasix 20 MWF  COPD (chronic obstructive pulmonary disease) ([facility] )  Assessment & Plan  Patient with "many year" history of smoking, though states she quit several years ago  PFT's conducted 6/21 showed increased lung volumes, FEV1 of 47% predicted without significant bronchodilator response, and corrected DLCO of 40%, indicative of severe obstructive disease  Not on home medications  She states that this is the first time she has required hospitalization due to cough/dyspnea, though states she was hospitalized about 1 year ago for CHF exacerbation      Plan  · Duo-Neb Q6H  · Increase IV Solumedrol from 40mg daily to 40mg BID  · Completed Azithromycin 500mg x3 days   · Ventolin Q4h PRN  · Mucinex BID  · Tessalon perles TID   · Change Tussionex to Hycodan TID  · Resp protocol    SOB (shortness of breath)  Assessment & Plan  See a/p above    Anxiety  Assessment & Plan  Patient states that she has a history of anxiety and states that her coughing spells cause increased anxiety    · Continue home atarax 10 mg BID PRN    Type 2 diabetes, HbA1c goal < 7% (HCC)  Assessment & Plan  Lab Results   Component Value Date    HGBA1C 6 2 (H) 2022     Recent Labs     23  1652 23  1155   POCGLU 139 148*       Blood Sugar Average: Last 72 hrs:  (P) 143 5   Well-controlled with diet and exercise  Not on any medication  · Carb controlled diet  · in patient goal -180  · Add on sliding scale insulin if uncontrolled sugars 2/2 IV steroids    * Acute respiratory failure with hypoxia (HCC)  Assessment & Plan    2/2 IM- Acute respiratory failure with hypoxia (HCC)  Assessment & Plan    Due to recent covid infection (23) c/b COPD exacerbation   - Hypoxic on admission, requiring 5L O2  - Afebrile  No leukocytosis   - CT PE study unremarkable & w/o infilatrates, or pulm edema  - CXR no acute cardiopulmonary disease      -Currently on Duonebs / steroids / mucinex / hycodan         Disposition: Medical optimiz     SUBJECTIVE     Patient seen and examined  No acute events overnight  Improving in cough and SOB  Denies any chest pain, abdominal pain, nausea, vomiting, fever or chills       OBJECTIVE     Vitals:    23 2211 23 0628 23 0734 23 1139   BP: 134/72 132/70  129/67   Pulse: 92 84  85   Resp:  20  16   Temp: (!) 97 °F (36 1 °C) (!) 97 2 °F (36 2 °C)  97 5 °F (36 4 °C)   TempSrc:       SpO2: 92% 93% 97% 91%   Weight:   69 7 kg (153 lb 10 6 oz)    Height:          Temperature:   Temp (24hrs), Av 4 °F (36 3 °C), Min:97 °F (36 1 °C), Max:97 7 °F (36 5 °C)    Temperature: 97 5 °F (36 4 °C)  Intake & Output:  I/O        07 0701  02/03 0700 02/03 0701 02/04 0700    P  O   580     Total Intake(mL/kg)  580 (8 3)     Net  +580            Unmeasured Urine Occurrence   1 x        Weights:   IBW (Ideal Body Weight): 54 7 kg    Body mass index is 26 38 kg/m²  Weight (last 2 days)     Date/Time Weight    02/03/23 0734 69 7 (153 66)    02/02/23 1100 69 5 (153 22)        Physical Exam  Constitutional:       General: She is not in acute distress  Appearance: She is not ill-appearing, toxic-appearing or diaphoretic  HENT:      Head: Normocephalic and atraumatic  Cardiovascular:      Rate and Rhythm: Normal rate and regular rhythm  Pulses: Normal pulses  Heart sounds: Normal heart sounds  No gallop  Pulmonary:      Effort: Pulmonary effort is normal  No respiratory distress  Breath sounds: No stridor  No wheezing, rhonchi or rales  Abdominal:      General: Bowel sounds are normal  There is distension  Tenderness: There is no abdominal tenderness  There is no right CVA tenderness or left CVA tenderness  Musculoskeletal:      Right lower leg: No edema  Left lower leg: No edema  Neurological:      General: No focal deficit present  Mental Status: She is oriented to person, place, and time  LABORATORY DATA     Labs: I have personally reviewed pertinent reports    Results from last 7 days   Lab Units 02/03/23  0437 02/02/23  0733 02/01/23  0655 01/31/23  0504 01/30/23  0407   WBC Thousand/uL 7 30 11 35* 7 66 5 07 6 67   HEMOGLOBIN g/dL 13 1 14 2 13 2 13 8 14 3   HEMATOCRIT % 41 2 43 9 42 2 42 8 44 8   PLATELETS Thousands/uL 289 384 259 242 253   NEUTROS PCT %  --  85*  --   --  61   MONOS PCT %  --  3*  --   --  9   MONO PCT %  --   --   --  9  --       Results from last 7 days   Lab Units 02/03/23  0437 02/02/23  0733 02/01/23  0655 01/31/23  0504   POTASSIUM mmol/L 4 7 4 7 4 3 3 9   CHLORIDE mmol/L 101 104 106 104   CO2 mmol/L 32 26 32 33*   BUN mg/dL 19 22 26* 22   CREATININE mg/dL 0 59* 0 58* 0 55* 0 54*   CALCIUM mg/dL 9 2 9 9 9 2 9 0   ALK PHOS U/L  --   --   --  60   ALT U/L  --   --   --  16   AST U/L  --   --   --  13                            IMAGING & DIAGNOSTIC TESTING     Radiology Results: I have personally reviewed pertinent reports  XR chest pa & lateral    Result Date: 1/30/2023  Impression: No acute cardiopulmonary disease  This report is in agreement with the preliminary interpretation  Workstation performed: [id number]     CTA ED chest PE study    Result Date: 1/30/2023  Impression: No pulmonary embolus  No evidence of acute thoracic process  COPD  3 nodules in the left lower lobe, the largest of which measures 6 mm stable since May 2021  No new pulmonary nodules  Noncontrast chest CT follow-up in 12 months could be considered to establish two-year stability  Additional chronic findings and negatives as above  Workstation performed: [id number]     Other Diagnostic Testing: I have personally reviewed pertinent reports      ACTIVE MEDICATIONS     Current Facility-Administered Medications   Medication Dose Route Frequency   • albuterol (PROVENTIL HFA,VENTOLIN HFA) inhaler 2 puff  2 puff Inhalation Q4H PRN   • ALPRAZolam (XANAX) tablet 0 5 mg  0 5 mg Oral HS PRN   • benzonatate (TESSALON PERLES) capsule 200 mg  200 mg Oral TID   • budesonide (PULMICORT) inhalation solution 0 5 mg  0 5 mg Nebulization Q12H   • enoxaparin (LOVENOX) subcutaneous injection 40 mg  40 mg Subcutaneous Q24H CHARLY   • formoterol (PERFOROMIST) nebulizer solution 20 mcg  20 mcg Nebulization Q12H   • furosemide (LASIX) tablet 20 mg  20 mg Oral Once per day on Mon Wed Fri   • guaiFENesin (MUCINEX) 12 hr tablet 1,200 mg  1,200 mg Oral BID   • HYDROcodone Bit-Homatrop MBr (HYCODAN) oral syrup 5 mL  5 mL Oral TID   • hydrOXYzine HCL (ATARAX) tablet 10 mg  10 mg Oral BID PRN   • insulin lispro (HumaLOG) 100 units/mL subcutaneous injection 1-6 Units  1-6 Units Subcutaneous TID AC   • ipratropium (ATROVENT) 0 02 % inhalation solution 0 5 mg  0 5 mg Nebulization TID   • levalbuterol (XOPENEX) inhalation solution 1 25 mg  1 25 mg Nebulization TID   • LORazepam (ATIVAN) injection 0 5 mg  0 5 mg Intravenous Q4H PRN   • methylPREDNISolone sodium succinate (Solu-MEDROL) injection 40 mg  40 mg Intravenous Q12H [facility]   • pravastatin (PRAVACHOL) tablet 40 mg  40 mg Oral Daily With Dinner   • senna (SENOKOT) tablet 8 6 mg  1 tablet Oral Daily   • sertraline (ZOLOFT) tablet 50 mg  50 mg Oral Daily   • triamcinolone (KENALOG) 0 1 % cream   Topical BID       VTE Pharmacologic Prophylaxis: Enoxaparin (Lovenox)  VTE Mechanical Prophylaxis: sequential compression device    Portions of the record may have been created with voice recognition software  Occasional wrong word or "sound a like" substitutions may have occurred due to the inherent limitations of voice recognition software    Read the chart carefully and recognize, using context, where substitutions have occurred   ==  Jose Minaya MD  [address]  Internal Medicine Residency PGY-3

## 2023-02-04 ENCOUNTER — APPOINTMENT (INPATIENT)
Dept: RADIOLOGY | Facility: HOSPITAL | Age: 78
End: 2023-02-04

## 2023-02-04 LAB
ANION GAP SERPL CALCULATED.3IONS-SCNC: 3 MMOL/L (ref 4–13)
BASOPHILS # BLD AUTO: 0.01 THOUSANDS/ÂΜL (ref 0–0.1)
BASOPHILS NFR BLD AUTO: 0 % (ref 0–1)
BUN SERPL-MCNC: 27 MG/DL (ref 5–25)
CALCIUM SERPL-MCNC: 9.1 MG/DL (ref 8.3–10.1)
CHLORIDE SERPL-SCNC: 100 MMOL/L (ref 96–108)
CO2 SERPL-SCNC: 31 MMOL/L (ref 21–32)
CREAT SERPL-MCNC: 0.71 MG/DL (ref 0.6–1.3)
EOSINOPHIL # BLD AUTO: 0 THOUSAND/ÂΜL (ref 0–0.61)
EOSINOPHIL NFR BLD AUTO: 0 % (ref 0–6)
ERYTHROCYTE [DISTWIDTH] IN BLOOD BY AUTOMATED COUNT: 12 % (ref 11.6–15.1)
GFR SERPL CREATININE-BSD FRML MDRD: 81 ML/MIN/1.73SQ M
GLUCOSE SERPL-MCNC: 136 MG/DL (ref 65–140)
GLUCOSE SERPL-MCNC: 146 MG/DL (ref 65–140)
GLUCOSE SERPL-MCNC: 179 MG/DL (ref 65–140)
GLUCOSE SERPL-MCNC: 274 MG/DL (ref 65–140)
GLUCOSE SERPL-MCNC: 280 MG/DL (ref 65–140)
HCT VFR BLD AUTO: 42.1 % (ref 34.8–46.1)
HGB BLD-MCNC: 13.1 G/DL (ref 11.5–15.4)
IMM GRANULOCYTES # BLD AUTO: 0.03 THOUSAND/UL (ref 0–0.2)
IMM GRANULOCYTES NFR BLD AUTO: 0 % (ref 0–2)
LYMPHOCYTES # BLD AUTO: 0.82 THOUSANDS/ÂΜL (ref 0.6–4.47)
LYMPHOCYTES NFR BLD AUTO: 10 % (ref 14–44)
MCH RBC QN AUTO: 29.7 PG (ref 26.8–34.3)
MCHC RBC AUTO-ENTMCNC: 31.1 G/DL (ref 31.4–37.4)
MCV RBC AUTO: 96 FL (ref 82–98)
MONOCYTES # BLD AUTO: 0.39 THOUSAND/ÂΜL (ref 0.17–1.22)
MONOCYTES NFR BLD AUTO: 5 % (ref 4–12)
NEUTROPHILS # BLD AUTO: 6.95 THOUSANDS/ÂΜL (ref 1.85–7.62)
NEUTS SEG NFR BLD AUTO: 85 % (ref 43–75)
NRBC BLD AUTO-RTO: 0 /100 WBCS
PLATELET # BLD AUTO: 326 THOUSANDS/UL (ref 149–390)
PMV BLD AUTO: 11.4 FL (ref 8.9–12.7)
POTASSIUM SERPL-SCNC: 4.3 MMOL/L (ref 3.5–5.3)
RBC # BLD AUTO: 4.41 MILLION/UL (ref 3.81–5.12)
SODIUM SERPL-SCNC: 134 MMOL/L (ref 135–147)
WBC # BLD AUTO: 8.2 THOUSAND/UL (ref 4.31–10.16)

## 2023-02-04 RX ORDER — OXYCODONE HYDROCHLORIDE 5 MG/1
5 TABLET ORAL EVERY 4 HOURS PRN
Status: DISCONTINUED | OUTPATIENT
Start: 2023-02-04 | End: 2023-02-10 | Stop reason: HOSPADM

## 2023-02-04 RX ADMIN — GUAIFENESIN 1200 MG: 600 TABLET, EXTENDED RELEASE ORAL at 17:12

## 2023-02-04 RX ADMIN — HYDROCODONE BITARTRATE AND HOMATROPINE METHYLBROMIDE 5 ML: 5; 1.5 SYRUP ORAL at 08:47

## 2023-02-04 RX ADMIN — TRIAMCINOLONE ACETONIDE: 1 CREAM TOPICAL at 08:48

## 2023-02-04 RX ADMIN — LEVALBUTEROL HYDROCHLORIDE 1.25 MG: 1.25 SOLUTION, CONCENTRATE RESPIRATORY (INHALATION) at 20:50

## 2023-02-04 RX ADMIN — METHYLPREDNISOLONE SODIUM SUCCINATE 40 MG: 40 INJECTION, POWDER, FOR SOLUTION INTRAMUSCULAR; INTRAVENOUS at 22:14

## 2023-02-04 RX ADMIN — FORMOTEROL FUMARATE DIHYDRATE 20 MCG: 20 SOLUTION RESPIRATORY (INHALATION) at 07:45

## 2023-02-04 RX ADMIN — BENZONATATE 200 MG: 100 CAPSULE ORAL at 17:12

## 2023-02-04 RX ADMIN — TRIAMCINOLONE ACETONIDE: 1 CREAM TOPICAL at 17:12

## 2023-02-04 RX ADMIN — OXYCODONE HYDROCHLORIDE 5 MG: 5 TABLET ORAL at 11:11

## 2023-02-04 RX ADMIN — LEVALBUTEROL HYDROCHLORIDE 1.25 MG: 1.25 SOLUTION, CONCENTRATE RESPIRATORY (INHALATION) at 13:43

## 2023-02-04 RX ADMIN — BUDESONIDE 0.5 MG: 0.5 INHALANT ORAL at 07:45

## 2023-02-04 RX ADMIN — HYDROCODONE BITARTRATE AND HOMATROPINE METHYLBROMIDE 5 ML: 5; 1.5 SYRUP ORAL at 17:12

## 2023-02-04 RX ADMIN — FORMOTEROL FUMARATE DIHYDRATE 20 MCG: 20 SOLUTION RESPIRATORY (INHALATION) at 20:50

## 2023-02-04 RX ADMIN — METHYLPREDNISOLONE SODIUM SUCCINATE 40 MG: 40 INJECTION, POWDER, FOR SOLUTION INTRAMUSCULAR; INTRAVENOUS at 08:47

## 2023-02-04 RX ADMIN — ENOXAPARIN SODIUM 40 MG: 40 INJECTION SUBCUTANEOUS at 08:47

## 2023-02-04 RX ADMIN — BUDESONIDE 0.5 MG: 0.5 INHALANT ORAL at 20:50

## 2023-02-04 RX ADMIN — DICLOFENAC SODIUM 2 G: 10 GEL TOPICAL at 17:12

## 2023-02-04 RX ADMIN — GUAIFENESIN 1200 MG: 600 TABLET, EXTENDED RELEASE ORAL at 08:47

## 2023-02-04 RX ADMIN — SERTRALINE HYDROCHLORIDE 50 MG: 50 TABLET ORAL at 08:48

## 2023-02-04 RX ADMIN — PRAVASTATIN SODIUM 40 MG: 40 TABLET ORAL at 17:12

## 2023-02-04 RX ADMIN — STANDARDIZED SENNA CONCENTRATE 8.6 MG: 8.6 TABLET ORAL at 08:47

## 2023-02-04 RX ADMIN — BENZONATATE 200 MG: 100 CAPSULE ORAL at 22:14

## 2023-02-04 RX ADMIN — IPRATROPIUM BROMIDE 0.5 MG: 0.5 SOLUTION RESPIRATORY (INHALATION) at 13:43

## 2023-02-04 RX ADMIN — INSULIN LISPRO 4 UNITS: 100 INJECTION, SOLUTION INTRAVENOUS; SUBCUTANEOUS at 17:12

## 2023-02-04 RX ADMIN — IPRATROPIUM BROMIDE 0.5 MG: 0.5 SOLUTION RESPIRATORY (INHALATION) at 20:50

## 2023-02-04 RX ADMIN — IPRATROPIUM BROMIDE 0.5 MG: 0.5 SOLUTION RESPIRATORY (INHALATION) at 07:45

## 2023-02-04 RX ADMIN — HYDROCODONE BITARTRATE AND HOMATROPINE METHYLBROMIDE 5 ML: 5; 1.5 SYRUP ORAL at 22:14

## 2023-02-04 RX ADMIN — LEVALBUTEROL HYDROCHLORIDE 1.25 MG: 1.25 SOLUTION, CONCENTRATE RESPIRATORY (INHALATION) at 07:45

## 2023-02-04 RX ADMIN — BENZONATATE 200 MG: 100 CAPSULE ORAL at 08:47

## 2023-02-04 NOTE — PLAN OF CARE
Problem: PAIN - ADULT  Goal: Verbalizes/displays adequate comfort level or baseline comfort level  Description: Interventions:  - Encourage patient to monitor pain and request assistance  - Assess pain using appropriate pain scale  - Administer analgesics based on type and severity of pain and evaluate response  - Implement non-pharmacological measures as appropriate and evaluate response  - Consider cultural and social influences on pain and pain management  - Notify physician/advanced practitioner if interventions unsuccessful or patient reports new pain  Outcome: Progressing     Problem: INFECTION - ADULT  Goal: Absence or prevention of progression during hospitalization  Description: INTERVENTIONS:  - Assess and monitor for signs and symptoms of infection  - Monitor lab/diagnostic results  - Monitor all insertion sites, i e  indwelling lines, tubes, and drains  - Monitor endotracheal if appropriate and nasal secretions for changes in amount and color  - [place] appropriate cooling/warming therapies per order  - Administer medications as ordered  - Instruct and encourage patient and family to use good hand hygiene technique  - Identify and instruct in appropriate isolation precautions for identified infection/condition  Outcome: Progressing     Problem: DISCHARGE PLANNING  Goal: Discharge to home or other facility with appropriate resources  Description: INTERVENTIONS:  - Identify barriers to discharge w/patient and caregiver  - Arrange for needed discharge resources and transportation as appropriate  - Identify discharge learning needs (meds, wound care, etc )  - Arrange for interpretive services to assist at discharge as needed  - Refer to Case Management Department for coordinating discharge planning if the patient needs post-hospital services based on physician/advanced practitioner order or complex needs related to functional status, cognitive ability, or social support system  Outcome: Progressing

## 2023-02-04 NOTE — PROGRESS NOTES
Progress Note - Pulmonary   Summer Levy 76 y o  female MRN: [id number]  Unit/Bed#: [facility] 834-01 Encounter: [id number]    Assessment/Plan:  Patient is a 49-year-old female past medical history significant for severe obstruction with severe diffusion defect who presented with shortness of breath in setting of COVID-19 pneumonia  Currently, the patient is doing relatively well  She was fine except for a coughing spell which did occur this a m  causing her pain in her left upper quadrant  There was no findings on chest x-ray  The pain is likely musculoskeletal in nature  For her hypoxemic respiratory failure, this is likely chronic  The patient was previously prescribed oxygen she is currently on 4 L which is around her baseline  Would recommend amatory pulse ox prior to discharge to assess requirements    For an exacerbation of her underlying obstructive lung disease, patient is currently on Solu-Medrol  May transition to Solu-Medrol once daily tomorrow  If she is doing well then will transition to prednisone  May continue Atrovent and Xopenex  Additionally, may continue Pulmicort and Perforomist at this time  Given her chronic cough, may consider some as needed Hycodan or Tussionex  Plan of care discussed with SLIM  Chief Complaint:   Had a coughing spell this AM     Subjective:   Patient reports he is resting comfortably at the current time  She noted a strong coughing spell around 5 AM   There was some associated left upper quadrant pain with a coughing spell likely musculoskeletal   She denies fevers, chills, nausea or vomiting  Objective:     Vitals: Blood pressure 110/55, pulse 77, temperature (!) 97 2 °F (36 2 °C), resp  rate 18, height 5' 4" (1 626 m), weight 69 7 kg (153 lb 10 6 oz), SpO2 91 %  ,Body mass index is 26 38 kg/m²        Intake/Output Summary (Last 24 hours) at 2/4/2023 1421  Last data filed at 2/4/2023 1251  Gross per 24 hour   Intake 220 ml   Output --   Net 220 ml Invasive Devices     Peripheral Intravenous Line  Duration           Peripheral IV 02/01/23 Distal;Left;Ventral (anterior) Forearm 3 days              Physical Exam  Constitutional:       General: She is not in acute distress  Appearance: She is well-developed  She is not ill-appearing, toxic-appearing or diaphoretic  HENT:      Head: Normocephalic and atraumatic  Eyes:      Extraocular Movements: Extraocular movements intact  Pupils: Pupils are equal, round, and reactive to light  Cardiovascular:      Rate and Rhythm: Normal rate  Pulmonary:      Breath sounds: Wheezing (minimal) present  No decreased breath sounds, rhonchi or rales  Chest:      Chest wall: No tenderness  Abdominal:      Tenderness: There is no abdominal tenderness  There is no guarding or rebound  Musculoskeletal:      Right lower leg: No edema  Left lower leg: No edema  Skin:     General: Skin is warm and dry  Neurological:      General: No focal deficit present  Mental Status: She is alert and oriented to person, place, and time  Motor: No weakness  Psychiatric:         Mood and Affect: Mood normal  Mood is not anxious  Behavior: Behavior normal  Behavior is not agitated  Labs: I have personally reviewed pertinent lab results  Lab Results   Component Value Date    SODIUM 134 (L) 02/04/2023    K 4 3 02/04/2023     02/04/2023    CO2 31 02/04/2023    BUN 27 (H) 02/04/2023    CREATININE 0 71 02/04/2023    GLUC 280 (H) 02/04/2023    CALCIUM 9 1 02/04/2023     Lab Results   Component Value Date    WBC 8 20 02/04/2023    HGB 13 1 02/04/2023    HCT 42 1 02/04/2023    MCV 96 02/04/2023     02/04/2023       Imaging and other studies: I have personally reviewed pertinent reports     and I have personally reviewed pertinent films in PACS   CXR 2/4/2023  No acute intrathoracic process

## 2023-02-04 NOTE — PLAN OF CARE
Problem: PAIN - ADULT  Goal: Verbalizes/displays adequate comfort level or baseline comfort level  Description: Interventions:  - Encourage patient to monitor pain and request assistance  - Assess pain using appropriate pain scale  - Administer analgesics based on type and severity of pain and evaluate response  - Implement non-pharmacological measures as appropriate and evaluate response  - Consider cultural and social influences on pain and pain management  - Notify physician/advanced practitioner if interventions unsuccessful or patient reports new pain  Outcome: Progressing     Problem: INFECTION - ADULT  Goal: Absence or prevention of progression during hospitalization  Description: INTERVENTIONS:  - Assess and monitor for signs and symptoms of infection  - Monitor lab/diagnostic results  - Monitor all insertion sites, i e  indwelling lines, tubes, and drains  - Monitor endotracheal if appropriate and nasal secretions for changes in amount and color  - [place] appropriate cooling/warming therapies per order  - Administer medications as ordered  - Instruct and encourage patient and family to use good hand hygiene technique  - Identify and instruct in appropriate isolation precautions for identified infection/condition  Outcome: Progressing     Problem: DISCHARGE PLANNING  Goal: Discharge to home or other facility with appropriate resources  Description: INTERVENTIONS:  - Identify barriers to discharge w/patient and caregiver  - Arrange for needed discharge resources and transportation as appropriate  - Identify discharge learning needs (meds, wound care, etc )  - Arrange for interpretive services to assist at discharge as needed  - Refer to Case Management Department for coordinating discharge planning if the patient needs post-hospital services based on physician/advanced practitioner order or complex needs related to functional status, cognitive ability, or social support system  Outcome: Progressing Problem: Knowledge Deficit  Goal: Patient/family/caregiver demonstrates understanding of disease process, treatment plan, medications, and discharge instructions  Description: Complete learning assessment and assess knowledge base  Interventions:  - Provide teaching at level of understanding  - Provide teaching via preferred learning methods  Outcome: Progressing     Problem: RESPIRATORY - ADULT  Goal: Achieves optimal ventilation and oxygenation  Description: INTERVENTIONS:  - Assess for changes in respiratory status  - Assess for changes in mentation and behavior  - Position to facilitate oxygenation and minimize respiratory effort  - Oxygen administered by appropriate delivery if ordered  - Initiate smoking cessation education as indicated  - Encourage broncho-pulmonary hygiene including cough, deep breathe, Incentive Spirometry  - Assess the need for suctioning and aspirate as needed  - Assess and instruct to report SOB or any respiratory difficulty  - Respiratory Therapy support as indicated  Outcome: Progressing     Problem: METABOLIC, FLUID AND ELECTROLYTES - ADULT  Goal: Electrolytes maintained within normal limits  Description: INTERVENTIONS:  - Monitor labs and assess patient for signs and symptoms of electrolyte imbalances  - Administer electrolyte replacement as ordered  - Monitor response to electrolyte replacements, including repeat lab results as appropriate  - Instruct patient on fluid and nutrition as appropriate  Outcome: Progressing     Problem: Nutrition/Hydration-ADULT  Goal: Nutrient/Hydration intake appropriate for improving, restoring or maintaining nutritional needs  Description: Monitor and assess patient's nutrition/hydration status for malnutrition  Collaborate with interdisciplinary team and initiate plan and interventions as ordered  Monitor patient's weight and dietary intake as ordered or per policy  Utilize nutrition screening tool and intervene as necessary   Determine patient's food preferences and provide high-protein, high-caloric foods as appropriate       INTERVENTIONS:  - Monitor oral intake, urinary output, labs, and treatment plans  - Assess nutrition and hydration status and recommend course of action  - Evaluate amount of meals eaten  - Assist patient with eating if necessary   - Allow adequate time for meals  - Recommend/ encourage appropriate diets, oral nutritional supplements, and vitamin/mineral supplements  - Order, calculate, and assess calorie counts as needed  - Recommend, monitor, and adjust tube feedings and TPN/PPN based on assessed needs  - Assess need for intravenous fluids  - Provide specific nutrition/hydration education as appropriate  - Include patient/family/caregiver in decisions related to nutrition  Outcome: Progressing

## 2023-02-05 LAB
ANION GAP SERPL CALCULATED.3IONS-SCNC: 1 MMOL/L (ref 4–13)
BASOPHILS # BLD AUTO: 0.01 THOUSANDS/ÂΜL (ref 0–0.1)
BASOPHILS NFR BLD AUTO: 0 % (ref 0–1)
BUN SERPL-MCNC: 27 MG/DL (ref 5–25)
CALCIUM SERPL-MCNC: 9.2 MG/DL (ref 8.3–10.1)
CHLORIDE SERPL-SCNC: 102 MMOL/L (ref 96–108)
CO2 SERPL-SCNC: 32 MMOL/L (ref 21–32)
CREAT SERPL-MCNC: 0.66 MG/DL (ref 0.6–1.3)
EOSINOPHIL # BLD AUTO: 0 THOUSAND/ÂΜL (ref 0–0.61)
EOSINOPHIL NFR BLD AUTO: 0 % (ref 0–6)
ERYTHROCYTE [DISTWIDTH] IN BLOOD BY AUTOMATED COUNT: 12.2 % (ref 11.6–15.1)
GFR SERPL CREATININE-BSD FRML MDRD: 84 ML/MIN/1.73SQ M
GLUCOSE SERPL-MCNC: 144 MG/DL (ref 65–140)
GLUCOSE SERPL-MCNC: 150 MG/DL (ref 65–140)
GLUCOSE SERPL-MCNC: 154 MG/DL (ref 65–140)
GLUCOSE SERPL-MCNC: 223 MG/DL (ref 65–140)
GLUCOSE SERPL-MCNC: 279 MG/DL (ref 65–140)
HCT VFR BLD AUTO: 41.9 % (ref 34.8–46.1)
HGB BLD-MCNC: 13.3 G/DL (ref 11.5–15.4)
IMM GRANULOCYTES # BLD AUTO: 0.11 THOUSAND/UL (ref 0–0.2)
IMM GRANULOCYTES NFR BLD AUTO: 1 % (ref 0–2)
LYMPHOCYTES # BLD AUTO: 0.74 THOUSANDS/ÂΜL (ref 0.6–4.47)
LYMPHOCYTES NFR BLD AUTO: 8 % (ref 14–44)
MCH RBC QN AUTO: 30.5 PG (ref 26.8–34.3)
MCHC RBC AUTO-ENTMCNC: 31.7 G/DL (ref 31.4–37.4)
MCV RBC AUTO: 96 FL (ref 82–98)
MONOCYTES # BLD AUTO: 0.26 THOUSAND/ÂΜL (ref 0.17–1.22)
MONOCYTES NFR BLD AUTO: 3 % (ref 4–12)
NEUTROPHILS # BLD AUTO: 8.1 THOUSANDS/ÂΜL (ref 1.85–7.62)
NEUTS SEG NFR BLD AUTO: 88 % (ref 43–75)
NRBC BLD AUTO-RTO: 0 /100 WBCS
PLATELET # BLD AUTO: 340 THOUSANDS/UL (ref 149–390)
PMV BLD AUTO: 10.6 FL (ref 8.9–12.7)
POTASSIUM SERPL-SCNC: 4.6 MMOL/L (ref 3.5–5.3)
RBC # BLD AUTO: 4.36 MILLION/UL (ref 3.81–5.12)
SODIUM SERPL-SCNC: 135 MMOL/L (ref 135–147)
WBC # BLD AUTO: 9.22 THOUSAND/UL (ref 4.31–10.16)

## 2023-02-05 RX ORDER — PREDNISONE 20 MG/1
40 TABLET ORAL DAILY
Status: DISCONTINUED | OUTPATIENT
Start: 2023-02-06 | End: 2023-02-06

## 2023-02-05 RX ORDER — METHYLPREDNISOLONE SODIUM SUCCINATE 40 MG/ML
40 INJECTION, POWDER, LYOPHILIZED, FOR SOLUTION INTRAMUSCULAR; INTRAVENOUS DAILY
Status: DISCONTINUED | OUTPATIENT
Start: 2023-02-06 | End: 2023-02-05

## 2023-02-05 RX ADMIN — HYDROCODONE BITARTRATE AND HOMATROPINE METHYLBROMIDE 5 ML: 5; 1.5 SYRUP ORAL at 07:45

## 2023-02-05 RX ADMIN — TRIAMCINOLONE ACETONIDE: 1 CREAM TOPICAL at 07:45

## 2023-02-05 RX ADMIN — ENOXAPARIN SODIUM 40 MG: 40 INJECTION SUBCUTANEOUS at 07:44

## 2023-02-05 RX ADMIN — INSULIN LISPRO 4 UNITS: 100 INJECTION, SOLUTION INTRAVENOUS; SUBCUTANEOUS at 11:11

## 2023-02-05 RX ADMIN — DICLOFENAC SODIUM 2 G: 10 GEL TOPICAL at 07:45

## 2023-02-05 RX ADMIN — GUAIFENESIN 1200 MG: 600 TABLET, EXTENDED RELEASE ORAL at 17:21

## 2023-02-05 RX ADMIN — LEVALBUTEROL HYDROCHLORIDE 1.25 MG: 1.25 SOLUTION, CONCENTRATE RESPIRATORY (INHALATION) at 07:55

## 2023-02-05 RX ADMIN — BENZONATATE 200 MG: 100 CAPSULE ORAL at 22:38

## 2023-02-05 RX ADMIN — STANDARDIZED SENNA CONCENTRATE 8.6 MG: 8.6 TABLET ORAL at 07:44

## 2023-02-05 RX ADMIN — IPRATROPIUM BROMIDE 0.5 MG: 0.5 SOLUTION RESPIRATORY (INHALATION) at 13:34

## 2023-02-05 RX ADMIN — FORMOTEROL FUMARATE DIHYDRATE 20 MCG: 20 SOLUTION RESPIRATORY (INHALATION) at 20:52

## 2023-02-05 RX ADMIN — LEVALBUTEROL HYDROCHLORIDE 1.25 MG: 1.25 SOLUTION, CONCENTRATE RESPIRATORY (INHALATION) at 13:34

## 2023-02-05 RX ADMIN — BUDESONIDE 0.5 MG: 0.5 INHALANT ORAL at 20:52

## 2023-02-05 RX ADMIN — BUDESONIDE 0.5 MG: 0.5 INHALANT ORAL at 07:55

## 2023-02-05 RX ADMIN — BENZONATATE 200 MG: 100 CAPSULE ORAL at 17:21

## 2023-02-05 RX ADMIN — METHYLPREDNISOLONE SODIUM SUCCINATE 40 MG: 40 INJECTION, POWDER, FOR SOLUTION INTRAMUSCULAR; INTRAVENOUS at 07:45

## 2023-02-05 RX ADMIN — TRIAMCINOLONE ACETONIDE: 1 CREAM TOPICAL at 17:21

## 2023-02-05 RX ADMIN — FORMOTEROL FUMARATE DIHYDRATE 20 MCG: 20 SOLUTION RESPIRATORY (INHALATION) at 07:55

## 2023-02-05 RX ADMIN — SERTRALINE HYDROCHLORIDE 50 MG: 50 TABLET ORAL at 07:44

## 2023-02-05 RX ADMIN — INSULIN LISPRO 1 UNITS: 100 INJECTION, SOLUTION INTRAVENOUS; SUBCUTANEOUS at 07:45

## 2023-02-05 RX ADMIN — HYDROCODONE BITARTRATE AND HOMATROPINE METHYLBROMIDE 5 ML: 5; 1.5 SYRUP ORAL at 17:21

## 2023-02-05 RX ADMIN — LEVALBUTEROL HYDROCHLORIDE 1.25 MG: 1.25 SOLUTION, CONCENTRATE RESPIRATORY (INHALATION) at 20:52

## 2023-02-05 RX ADMIN — DICLOFENAC SODIUM 2 G: 10 GEL TOPICAL at 11:11

## 2023-02-05 RX ADMIN — BENZONATATE 200 MG: 100 CAPSULE ORAL at 07:44

## 2023-02-05 RX ADMIN — DICLOFENAC SODIUM 2 G: 10 GEL TOPICAL at 17:21

## 2023-02-05 RX ADMIN — IPRATROPIUM BROMIDE 0.5 MG: 0.5 SOLUTION RESPIRATORY (INHALATION) at 07:55

## 2023-02-05 RX ADMIN — GUAIFENESIN 1200 MG: 600 TABLET, EXTENDED RELEASE ORAL at 07:44

## 2023-02-05 RX ADMIN — IPRATROPIUM BROMIDE 0.5 MG: 0.5 SOLUTION RESPIRATORY (INHALATION) at 20:52

## 2023-02-05 RX ADMIN — PRAVASTATIN SODIUM 40 MG: 40 TABLET ORAL at 17:21

## 2023-02-05 RX ADMIN — OXYCODONE HYDROCHLORIDE 5 MG: 5 TABLET ORAL at 07:44

## 2023-02-05 RX ADMIN — HYDROCODONE BITARTRATE AND HOMATROPINE METHYLBROMIDE 5 ML: 5; 1.5 SYRUP ORAL at 22:38

## 2023-02-05 NOTE — PROGRESS NOTES
[facility] [facility]  Progress Note - Fawnskin Mace 1945, 76 y o  female MRN: [id number]  [facility] Encounter: [id number]  Primary Care Provider: Gurinder Mendoza MD   Date and time admitted to hospital: 1/30/2023  3:53 AM    Biventricular congestive heart failure [facility])  Assessment & Plan  Hx of combined CHF 2/2 to CAD and pulmonary HTN in the setting of chronic tobacco abuse and severe COPD  With preserved LV systolic function and LV diastolic dysfunction, right ventricular dilatation and dysfunction  Patient required home O2 (3L tapered off over ~2 months), daily diuretics as well as inhalers back in [id number]  Patient had recovery of EF w/ smoking cessation, weight loss, and medication   Last ECHO June '22 w/ EF 60% w LV & LV normal systolic and diastolic dysfunction   Echo 1/31 LVEF 93%, vigorous systolic function, grade II diastolic dysfunction, mildly dilated RV with normal systolic function, RV systolic pressure [id number], mild tricuspid regurgitation    - Appears dry on exam, no JVD, no pitting edema     · Continue home lasix 20 MWF    COPD exacerbation (HCC)  Assessment & Plan  77 y/o F w/ hx of COPD, recent covid 1/23/23 s/p tx w Karli Karmen (molnupiravir) x 5 days who inititally felt better but presented with 2 days of worsening cough, increased sputum production SOB, GONZALEZ and fatigue      - Hypoxic on admission, requiring 5L O2  - Afebrile   No leukocytosis   - CT PE study unremarkable & w/o infilatrates, or pulm edema  - CXR no acute cardiopulmonary disease     · Admitted on combination of COPD/COVID pathway   · IV Solu-Medrol 40 mg daily  · 3 day course of Azithro completed 2/1  · Resp protocol   · Pulmonary consulted and recommendations appreciated:  · Started inhaled budesonide 0 5 mg Q12 and inhaled formoterol 20 mcg Q12  · IV Solu-Medrol 40 mg twice daily switched to 40 mg daily  · Maintain SPO2>88%  · Will likely require 4L home O2    History of COVID-19  Assessment & Plan  Recent covid 1/23/23 s/p tx w Lagrevrio (molnupiravir) x 5 days who inititally felt better but presented with 2 days of worsening cough, increased sputum production SOB, GONZALEZ and fatigue    - Has required 4-7L O2 NC  - CTA PE unremarkable for PE, & w/o pulmonary infiltrates   - Afebrile, no leukocytosis, renal function WNL  - Repeat COVID/FLU/RSV negative this admission  - Procalcitonin, CK, BNP WNL  - CRP 20 0    - Will treat for mild covid pathway / COPD exacerbation as noted above  · Continue IV steroids  · Per pharmacy - as COVID negative remdisivir no longer indicated  · resp protocol     Dysthymic disorder  Assessment & Plan  · Continue home zoloft 50mg daily, home hydroxyzine 10mg BID PRN  · Xanax 0 5mg QHS PRN  · Ativan 0 5mg IV Q4H PRN    Essential hypertension, benign  Assessment & Plan  · Continue home lasix 20 MWF  COPD (chronic obstructive pulmonary disease) ([facility] [address] )  Assessment & Plan  Patient with "many year" history of smoking, though states she quit several years ago  PFT's conducted 6/21 showed increased lung volumes, FEV1 of 47% predicted without significant bronchodilator response, and corrected DLCO of 40%, indicative of severe obstructive disease  Not on home medications  She states that this is the first time she has required hospitalization due to cough/dyspnea, though states she was hospitalized about 1 year ago for CHF exacerbation      Plan  · Duo-Neb Q6H  · IV Solu-Medrol decreased from 40 mg twice daily to 40 mg daily  · Completed Azithromycin 500mg x3 days 2/1  · Ventolin Q4h PRN  · Mucinex BID  · Tessalon perles TID   · Change Tussionex to Hycodan TID  · Resp protocol    SOB (shortness of breath)  Assessment & Plan  See a/p above    Anxiety  Assessment & Plan  Patient states that she has a history of anxiety and states that her coughing spells cause increased anxiety    · Continue home atarax 10 mg BID PRN    Type 2 diabetes, HbA1c goal < 7% ([facility])  Assessment & Plan  Lab Results   Component Value Date    HGBA1C 6 2 (H) 2022     Recent Labs     23  1652 23  1155   POCGLU 139 148*       Blood Sugar Average: Last 72 hrs:  (P) 143 5   Well-controlled with diet and exercise  Not on any medication  · Carb controlled diet  · in patient goal -180  · Add on sliding scale insulin if uncontrolled sugars 2/2 IV steroids    * Acute respiratory failure with hypoxia (HCC)  Assessment & Plan    2/2 IM- Acute respiratory failure with hypoxia (HCC)  Assessment & Plan    Due to recent covid infection (23) c/b COPD exacerbation   - Hypoxic on admission, requiring 5L O2  - Afebrile  No leukocytosis   - CT PE study unremarkable & w/o infilatrates, or pulm edema  - CXR no acute cardiopulmonary disease      -Currently on Duonebs / steroids / mucinex / hycodan           VTE Pharmacologic Prophylaxis:     Moderate Risk (Score 3-4) - Pharmacological DVT Prophylaxis Ordered: Enoxaparin (Lovenox)  Mechanical VTE Prophylaxis in Place: Yes    Patient Centered Rounds: I have performed bedside rounds with nursing staff today  Discussions with Specialists or Other Care Team Provider: None    Current Length of Stay: 5 day(s)    Current Patient Status: Inpatient     Discharge Plan / Estimated Discharge Date: Pending further management      Code Status: Level 3 - DNAR and DNI      Subjective:   Patient was seen and examined by me at bedside  Patient was pleasant and cooperative  Patient reports that she had a "coughing fit" overnight, accompanied by desaturation  During the time of visit, states she is doing well and is not coughing  Otherwise, patient has no new complaints or concerns        Objective:     Vitals:   Temp (24hrs), Av 2 °F (36 2 °C), Min:97 °F (36 1 °C), Max:97 5 °F (36 4 °C)    Temp:  [97 °F (36 1 °C)-97 5 °F (36 4 °C)] 97 °F (36 1 °C)  HR:  [87-95] 87  Resp:  [17-25] 17  BP: (125-141)/() 137/64  SpO2:  [87 %-95 %] 92 %  Body mass index is 26 38 kg/m²  Input and Output Summary (last 24 hours): Intake/Output Summary (Last 24 hours) at 2/5/2023 1015  Last data filed at 2/4/2023 1817  Gross per 24 hour   Intake 400 ml   Output --   Net 400 ml       Physical Exam:     Physical Exam  Constitutional:       General: She is not in acute distress  Appearance: She is well-developed  She is not ill-appearing, toxic-appearing or diaphoretic  HENT:      Head: Normocephalic and atraumatic  Eyes:      General: No scleral icterus  Right eye: No discharge  Left eye: No discharge  Conjunctiva/sclera: Conjunctivae normal    Cardiovascular:      Rate and Rhythm: Normal rate  Pulmonary:      Effort: Pulmonary effort is normal  No respiratory distress  Breath sounds: No decreased breath sounds  Abdominal:      Tenderness: There is no abdominal tenderness  There is no guarding or rebound  Musculoskeletal:      Right lower leg: No edema  Left lower leg: No edema  Skin:     General: Skin is warm and dry  Neurological:      Mental Status: She is alert and oriented to person, place, and time  Mental status is at baseline  Motor: No weakness  Psychiatric:         Mood and Affect: Mood normal  Mood is not anxious  Behavior: Behavior normal  Behavior is not agitated  Additional Data:     Labs:  Results from last 7 days   Lab Units 02/05/23 0442 02/01/23  0655 01/31/23  0504   WBC Thousand/uL 9 22   < > 5 07   HEMOGLOBIN g/dL 13 3   < > 13 8   HEMATOCRIT % 41 9   < > 42 8   PLATELETS Thousands/uL 340   < > 242   BANDS PCT %  --   --  5   NEUTROS PCT % 88*   < >  --    LYMPHS PCT % 8*   < >  --    LYMPHO PCT %  --   --  21   MONOS PCT % 3*   < >  --    MONO PCT %  --   --  9   EOS PCT % 0   < > 0    < > = values in this interval not displayed       Results from last 7 days   Lab Units 02/05/23  0442 02/01/23  0655 01/31/23  0504   SODIUM mmol/L 135   < > 139   POTASSIUM mmol/L 4 6   < > 3 9   CHLORIDE mmol/L 102   < > 104   CO2 mmol/L 32   < > 33*   BUN mg/dL 27*   < > 22   CREATININE mg/dL 0 66   < > 0 54*   ANION GAP mmol/L 1*   < > 2*   CALCIUM mg/dL 9 2   < > 9 0   ALBUMIN g/dL  --   --  3 3*   TOTAL BILIRUBIN mg/dL  --   --  0 52   ALK PHOS U/L  --   --  60   ALT U/L  --   --  16   AST U/L  --   --  13   GLUCOSE RANDOM mg/dL 223*   < > 119    < > = values in this interval not displayed  Results from last 7 days   Lab Units 02/05/23  0658 02/04/23  2101 02/04/23  1614 02/04/23  1057 02/04/23  0717 02/03/23  2133 02/03/23  1613 02/03/23  1137 02/03/23  0734 02/02/23  2112 02/02/23  1601 02/02/23  1131   POC GLUCOSE mg/dl 150* 179* 274* 146* 136 176* 184* 125 164* 183* 200* 123         Results from last 7 days   Lab Units 01/31/23  0504 01/30/23  0407   PROCALCITONIN ng/ml <0 05 <0 05       Imaging: No pertinent imaging reviewed      Recent Cultures (last 7 days):           Lines/Drains:  Invasive Devices     Peripheral Intravenous Line  Duration           Peripheral IV 02/05/23 Right;Ventral (anterior) Forearm <1 day                Telemetry:        Last 24 Hours Medication List:   Current Facility-Administered Medications   Medication Dose Route Frequency Provider Last Rate   • albuterol  2 puff Inhalation Q4H PRN Yanni Lama MD     • ALPRAZolam  0 5 mg Oral HS PRN Jihan Joshi MD     • benzonatate  200 mg Oral TID Jihan Joshi MD     • budesonide  0 5 mg Nebulization Q12H Jay Mao DO     • Diclofenac Sodium  2 g Topical 4x Daily Shahnaz Lama MD     • enoxaparin  40 mg Subcutaneous Q24H [address] Jaywyatt Mao, DO     • formoterol  20 mcg Nebulization Q12H Jay Mao DO     • furosemide  20 mg Oral Once per day on Mon Wed Fri Pat Kim DO     • guaiFENesin  1,200 mg Oral BID Jay Daylin, DO     • HYDROcodone Bit-Homatrop MBr  5 mL Oral TID Jihan Joshi MD     • hydrOXYzine HCL  10 mg Oral BID PRN Pat Kim DO     • insulin lispro  1-6 Units Subcutaneous TID AC Shahnaz Lama MD     • ipratropium  0 5 mg Nebulization TID Karlo Escobar MD     • levalbuterol  1 25 mg Nebulization TID Karlo Escobar MD     • LORazepam  0 5 mg Intravenous Q4H PRN Jason Avery MD     • [START ON 2/6/2023] methylPREDNISolone sodium succinate  40 mg Intravenous Daily Paddy Valera MD     • oxyCODONE  5 mg Oral Q4H PRN Yoandy Lama MD     • pravastatin  40 mg Oral Daily With Hyper9 Tee, DO     • senna  1 tablet Oral Daily Pat Kim DO     • sertraline  50 mg Oral Daily Pat Kim DO     • triamcinolone   Topical BID Keerthi Oliveira DO          Today, Patient Was Seen By: Paddy Valera MD    ** Please Note: This note has been constructed using a voice recognition system   **

## 2023-02-05 NOTE — PLAN OF CARE
Problem: PAIN - ADULT  Goal: Verbalizes/displays adequate comfort level or baseline comfort level  Description: Interventions:  - Encourage patient to monitor pain and request assistance  - Assess pain using appropriate pain scale  - Administer analgesics based on type and severity of pain and evaluate response  - Implement non-pharmacological measures as appropriate and evaluate response  - Consider cultural and social influences on pain and pain management  - Notify physician/advanced practitioner if interventions unsuccessful or patient reports new pain  Outcome: Progressing     Problem: INFECTION - ADULT  Goal: Absence or prevention of progression during hospitalization  Description: INTERVENTIONS:  - Assess and monitor for signs and symptoms of infection  - Monitor lab/diagnostic results  - Monitor all insertion sites, i e  indwelling lines, tubes, and drains  - Monitor endotracheal if appropriate and nasal secretions for changes in amount and color  - Horner appropriate cooling/warming therapies per order  - Administer medications as ordered  - Instruct and encourage patient and family to use good hand hygiene technique  - Identify and instruct in appropriate isolation precautions for identified infection/condition  Outcome: Progressing     Problem: DISCHARGE PLANNING  Goal: Discharge to home or other facility with appropriate resources  Description: INTERVENTIONS:  - Identify barriers to discharge w/patient and caregiver  - Arrange for needed discharge resources and transportation as appropriate  - Identify discharge learning needs (meds, wound care, etc )  - Arrange for interpretive services to assist at discharge as needed  - Refer to Case Management Department for coordinating discharge planning if the patient needs post-hospital services based on physician/advanced practitioner order or complex needs related to functional status, cognitive ability, or social support system  Outcome: Progressing Problem: Knowledge Deficit  Goal: Patient/family/caregiver demonstrates understanding of disease process, treatment plan, medications, and discharge instructions  Description: Complete learning assessment and assess knowledge base  Interventions:  - Provide teaching at level of understanding  - Provide teaching via preferred learning methods  Outcome: Progressing     Problem: RESPIRATORY - ADULT  Goal: Achieves optimal ventilation and oxygenation  Description: INTERVENTIONS:  - Assess for changes in respiratory status  - Assess for changes in mentation and behavior  - Position to facilitate oxygenation and minimize respiratory effort  - Oxygen administered by appropriate delivery if ordered  - Initiate smoking cessation education as indicated  - Encourage broncho-pulmonary hygiene including cough, deep breathe, Incentive Spirometry  - Assess the need for suctioning and aspirate as needed  - Assess and instruct to report SOB or any respiratory difficulty  - Respiratory Therapy support as indicated  Outcome: Progressing     Problem: METABOLIC, FLUID AND ELECTROLYTES - ADULT  Goal: Electrolytes maintained within normal limits  Description: INTERVENTIONS:  - Monitor labs and assess patient for signs and symptoms of electrolyte imbalances  - Administer electrolyte replacement as ordered  - Monitor response to electrolyte replacements, including repeat lab results as appropriate  - Instruct patient on fluid and nutrition as appropriate  Outcome: Progressing     Problem: Nutrition/Hydration-ADULT  Goal: Nutrient/Hydration intake appropriate for improving, restoring or maintaining nutritional needs  Description: Monitor and assess patient's nutrition/hydration status for malnutrition  Collaborate with interdisciplinary team and initiate plan and interventions as ordered  Monitor patient's weight and dietary intake as ordered or per policy  Utilize nutrition screening tool and intervene as necessary   Determine patient's food preferences and provide high-protein, high-caloric foods as appropriate       INTERVENTIONS:  - Monitor oral intake, urinary output, labs, and treatment plans  - Assess nutrition and hydration status and recommend course of action  - Evaluate amount of meals eaten  - Assist patient with eating if necessary   - Allow adequate time for meals  - Recommend/ encourage appropriate diets, oral nutritional supplements, and vitamin/mineral supplements  - Order, calculate, and assess calorie counts as needed  - Recommend, monitor, and adjust tube feedings and TPN/PPN based on assessed needs  - Assess need for intravenous fluids  - Provide specific nutrition/hydration education as appropriate  - Include patient/family/caregiver in decisions related to nutrition  Outcome: Progressing

## 2023-02-05 NOTE — PROGRESS NOTES
Progress Note - Pulmonary   Kathleen Settler 76 y o  female MRN: [id number]  Unit/Bed#: [facility] 834-01 Encounter: [id number]      Assessment & Recommendations:  1  Acute/chronic hypoxic respiratory failure  · Likely near baseline 4L/min NC  · Would check ambulatory SpO2 prior to d/c    2  Severe COPD with acute exacerbation  · Stop solumedrol now  · Start prednisone 40mg daily tomorrow and taper by 10mg every 3 days  · Continue xopenex/atrovent TID and pulmicort/perforomist BID  · Would plan for d/c on triple therapy (LAMA/LABA/ICS) and should check with CM for insurance coverage    3  Recent COVID-19 infection - no need for therapy    4  Chronic cough  · Continue hycodan syrup, tessalon and mucinex    Subjective:   Feeling improved, noted episode last evening with coughing but shorter duration, improving pain post cough, scant sputum production, no pleurisy    Objective:       Vitals: Blood pressure 137/64, pulse 87, temperature (!) 97 °F (36 1 °C), resp  rate 17, height 5' 4" (1 626 m), weight 69 7 kg (153 lb 10 6 oz), SpO2 92 %  , 4LNC, Body mass index is 26 38 kg/m²  Intake/Output Summary (Last 24 hours) at 2/5/2023 1400  Last data filed at 2/4/2023 1817  Gross per 24 hour   Intake 180 ml   Output --   Net 180 ml         Physical Exam  Gen: Awake, alert, oriented x 3, no acute distress  HEENT: Mucous membranes moist, no oral lesions, no thrush  NECK: No accessory muscle use, JVP not elevated  Cardiac: Regular, single S1, single S2, no murmurs, no rubs, no gallops  Lungs: diminished BS diffusely, no wheeze, no rales appreciated, mildly prolonged expiratory phase  Abdomen: normoactive bowel sounds, soft nontender, nondistended, no rebound or rigidity, no guarding  Extremities: no cyanosis, no clubbing, no edema    Labs: I have personally reviewed pertinent lab results    Laboratory and Diagnostics  Results from last 7 days   Lab Units 02/05/23  2085 02/04/23  6660 02/03/23  5255 02/02/23  2182 02/01/23  1640 01/31/23  0504 01/30/23  0407   WBC Thousand/uL 9 22 8 20 7 30 11 35* 7 66 5 07 6 67   HEMOGLOBIN g/dL 13 3 13 1 13 1 14 2 13 2 13 8 14 3   HEMATOCRIT % 41 9 42 1 41 2 43 9 42 2 42 8 44 8   PLATELETS Thousands/uL 340 326 289 384 259 242 253   NEUTROS PCT % 88* 85*  --  85*  --   --  61   BANDS PCT %  --   --   --   --   --  5  --    MONOS PCT % 3* 5  --  3*  --   --  9   MONO PCT %  --   --   --   --   --  9  --      Results from last 7 days   Lab Units 02/05/23  0442 02/04/23  0435 02/03/23  0437 02/02/23  0733 02/01/23  0655 01/31/23  0504 01/30/23 0407   SODIUM mmol/L 135 134* 137 136 140 139 139   POTASSIUM mmol/L 4 6 4 3 4 7 4 7 4 3 3 9 3 9   CHLORIDE mmol/L 102 100 101 104 106 104 104   CO2 mmol/L 32 31 32 26 32 33* 31   ANION GAP mmol/L 1* 3* 4 6 2* 2* 4   BUN mg/dL 27* 27* 19 22 26* 22 21   CREATININE mg/dL 0 66 0 71 0 59* 0 58* 0 55* 0 54* 0 71   CALCIUM mg/dL 9 2 9 1 9 2 9 9 9 2 9 0 9 5   GLUCOSE RANDOM mg/dL 223* 280* 189* 144* 107 119 136   ALT U/L  --   --   --   --   --  16  --    AST U/L  --   --   --   --   --  13  --    ALK PHOS U/L  --   --   --   --   --  60  --    ALBUMIN g/dL  --   --   --   --   --  3 3*  --    TOTAL BILIRUBIN mg/dL  --   --   --   --   --  0 52  --                        Results from last 7 days   Lab Units 01/30/23 0407   CRP mg/L 20 0*             Results from last 7 days   Lab Units 01/30/23 0412   D-DIMER QUANTITATIVE ug/ml FEU 0 74*     Results from last 7 days   Lab Units 01/31/23  0504 01/30/23 0407   PROCALCITONIN ng/ml <0 05 <0 05       ABG:   Results from last 7 days   Lab Units 02/02/23  1330   PH ART  7 429   PCO2 ART mm Hg 44 8*   PO2 ART mm Hg 50 0*   HCO3 ART mmol/L 29 0*   BASE EXC ART mmol/L 4 0   ABG SOURCE  Radial, Right       Microbiology:  FLU/RSV/COVID-19 neg  Reported (+) COVID-19 1/23/23    Imaging and other studies: I have personally reviewed pertinent reports     and I have personally reviewed pertinent films in PACS  CXR 2/4/2023 - hyperinflation, no focal infiltrates, no effusions, no PTX      Thomas B Zanders, DO, [facility]

## 2023-02-05 NOTE — PHYSICAL THERAPY NOTE
PHYSICAL THERAPY NOTE          Patient Name: Dexter Scanlon  [id number] Date: 2/5/2023 02/05/23 0945   PT Last Visit   PT Visit Date 02/05/23   Note Type   Note Type Treatment   Pain Assessment   Pain Assessment Tool 0-10   Pain Score 6   Pain Location/Orientation Orientation: Left; Location: Rib Cage   Restrictions/Precautions   Other Precautions O2;Fall Risk;Pain;Multiple lines   General   Chart Reviewed Yes   Response to Previous Treatment Patient with no complaints from previous session  Family/Caregiver Present No   Cognition   Arousal/Participation Cooperative; Alert   Attention Attends with cues to redirect   Orientation Level Oriented X4   Following Commands Follows multistep commands without difficulty   Bed Mobility   Supine to Sit 6  Modified independent   Additional items Increased time required;HOB elevated; Bedrails   Additional Comments Pt left seated EOB with tray at end of session   Transfers   Sit to Stand 5  Supervision   Additional items Increased time required   Stand to Sit 5  Supervision   Additional items Increased time required   Additional Comments w/ RW   Ambulation/Elevation   Gait pattern Wide BARBRA; Forward Flexion   Gait Assistance 5  Supervision   Assistive Device Rolling walker   Distance 125'  (2 standing rest breaks, VC to focus on breathing)   Balance   Static Sitting Good   Dynamic Sitting Fair +   Static Standing Fair   Dynamic Standing Fair -   Ambulatory Fair -   Endurance Deficit   Endurance Deficit Yes   Activity Tolerance   Activity Tolerance Patient limited by fatigue   Nurse Made Aware RN cleared pt for PT treatment session   Assessment   Prognosis Good   Problem List Decreased strength;Decreased endurance; Impaired balance;Decreased mobility   Assessment Pt seen for PT session today   Treatment session included bed mobility, functional transfers, and ambulation to improve overall mobility and independence  Pt requires supervision for bed mobility, transfers and ambulation  Pt is making good towards goals, as demonstrated by increased activity tolerance  Pt was able to tolerate increased ambulation during today's session with noted mild GONZALEZ  Pt was educated on purse lipped breathing to maintain O2 saturation  Pt is eager to return home and notes that she needs to increase her activity tolerance  Pt was left seated edge of bed at end of session, all needs within reach  Pt states that she was going to rest a bit then brush her teeth  Pt would benefit from continued skilled physical therapy to address remaining limitations  PT to continue to follow pt, recommending HHPT  Goals   Patient Goals To brush my teeth   STG Expiration Date 02/15/23   Plan   Treatment/Interventions ADL retraining;Elevations; Functional transfer training;LE strengthening/ROM; Therapeutic exercise; Endurance training;Patient/family training;Equipment eval/education; Bed mobility;Continued evaluation; Compensatory technique education;Gait training;Spoke to nursing   Progress Progressing toward goals   PT Frequency 3-5x/wk   Recommendation   PT Discharge Recommendation Home with home health rehabilitation   [facility] Mobility Inpatient   Turning in Flat Bed Without Bedrails 3   Lying on Back to Sitting on Edge of Flat Bed Without Bedrails 4   Moving Bed to Chair 4   Standing Up From Chair Using Arms 4   Walk in Room 4   Climb 3-5 Stairs With Railing 3   Basic Mobility Inpatient Raw Score 22   Basic Mobility Standardized Score 47 4   Highest Level Of Mobility   JH-HLM Goal 7: Walk 25 feet or more   JH-HLM Achieved 7: Walk 25 feet or more           Rei Fierro, PT

## 2023-02-05 NOTE — PLAN OF CARE
Problem: PHYSICAL THERAPY ADULT  Goal: Performs mobility at highest level of function for planned discharge setting  See evaluation for individualized goals  Description: Treatment/Interventions: Functional transfer training, LE strengthening/ROM, Therapeutic exercise, Endurance training, Gait training, Bed mobility, Equipment eval/education, OT  Equipment Recommended: Walker       See flowsheet documentation for full assessment, interventions and recommendations  Outcome: Progressing  Note: Prognosis: Good  Problem List: Decreased strength, Decreased endurance, Impaired balance, Decreased mobility  Assessment: Pt seen for PT session today  Treatment session included bed mobility, functional transfers, and ambulation to improve overall mobility and independence  Pt requires supervision for bed mobility, transfers and ambulation  Pt is making good towards goals, as demonstrated by increased activity tolerance  Pt was able to tolerate increased ambulation during today's session with noted mild GONZALEZ  Pt was educated on purse lipped breathing to maintain O2 saturation  Pt is eager to return home and notes that she needs to increase her activity tolerance  Pt was left seated edge of bed at end of session, all needs within reach  Pt states that she was going to rest a bit then brush her teeth  Pt would benefit from continued skilled physical therapy to address remaining limitations  PT to continue to follow pt, recommending HHPT  PT Discharge Recommendation: Home with home health rehabilitation    See flowsheet documentation for full assessment

## 2023-02-05 NOTE — PLAN OF CARE
Problem: PAIN - ADULT  Goal: Verbalizes/displays adequate comfort level or baseline comfort level  Description: Interventions:  - Encourage patient to monitor pain and request assistance  - Assess pain using appropriate pain scale  - Administer analgesics based on type and severity of pain and evaluate response  - Implement non-pharmacological measures as appropriate and evaluate response  - Consider cultural and social influences on pain and pain management  - Notify physician/advanced practitioner if interventions unsuccessful or patient reports new pain  Outcome: Progressing     Problem: INFECTION - ADULT  Goal: Absence or prevention of progression during hospitalization  Description: INTERVENTIONS:  - Assess and monitor for signs and symptoms of infection  - Monitor lab/diagnostic results  - Monitor all insertion sites, i e  indwelling lines, tubes, and drains  - Monitor endotracheal if appropriate and nasal secretions for changes in amount and color  - [place] appropriate cooling/warming therapies per order  - Administer medications as ordered  - Instruct and encourage patient and family to use good hand hygiene technique  - Identify and instruct in appropriate isolation precautions for identified infection/condition  Outcome: Progressing     Problem: DISCHARGE PLANNING  Goal: Discharge to home or other facility with appropriate resources  Description: INTERVENTIONS:  - Identify barriers to discharge w/patient and caregiver  - Arrange for needed discharge resources and transportation as appropriate  - Identify discharge learning needs (meds, wound care, etc )  - Arrange for interpretive services to assist at discharge as needed  - Refer to Case Management Department for coordinating discharge planning if the patient needs post-hospital services based on physician/advanced practitioner order or complex needs related to functional status, cognitive ability, or social support system  Outcome: Progressing Problem: Knowledge Deficit  Goal: Patient/family/caregiver demonstrates understanding of disease process, treatment plan, medications, and discharge instructions  Description: Complete learning assessment and assess knowledge base  Interventions:  - Provide teaching at level of understanding  - Provide teaching via preferred learning methods  Outcome: Progressing     Problem: Knowledge Deficit  Goal: Patient/family/caregiver demonstrates understanding of disease process, treatment plan, medications, and discharge instructions  Description: Complete learning assessment and assess knowledge base    Interventions:  - Provide teaching at level of understanding  - Provide teaching via preferred learning methods  Outcome: Progressing

## 2023-02-06 ENCOUNTER — APPOINTMENT (INPATIENT)
Dept: RADIOLOGY | Facility: HOSPITAL | Age: 78
End: 2023-02-06

## 2023-02-06 LAB
ANION GAP SERPL CALCULATED.3IONS-SCNC: 5 MMOL/L (ref 4–13)
BASOPHILS # BLD AUTO: 0.02 THOUSANDS/ÂΜL (ref 0–0.1)
BASOPHILS NFR BLD AUTO: 0 % (ref 0–1)
BUN SERPL-MCNC: 21 MG/DL (ref 5–25)
CALCIUM SERPL-MCNC: 9 MG/DL (ref 8.3–10.1)
CHLORIDE SERPL-SCNC: 104 MMOL/L (ref 96–108)
CO2 SERPL-SCNC: 27 MMOL/L (ref 21–32)
CREAT SERPL-MCNC: 0.59 MG/DL (ref 0.6–1.3)
EOSINOPHIL # BLD AUTO: 0.05 THOUSAND/ÂΜL (ref 0–0.61)
EOSINOPHIL NFR BLD AUTO: 1 % (ref 0–6)
ERYTHROCYTE [DISTWIDTH] IN BLOOD BY AUTOMATED COUNT: 12.3 % (ref 11.6–15.1)
GFR SERPL CREATININE-BSD FRML MDRD: 87 ML/MIN/1.73SQ M
GLUCOSE SERPL-MCNC: 107 MG/DL (ref 65–140)
GLUCOSE SERPL-MCNC: 120 MG/DL (ref 65–140)
GLUCOSE SERPL-MCNC: 144 MG/DL (ref 65–140)
GLUCOSE SERPL-MCNC: 163 MG/DL (ref 65–140)
GLUCOSE SERPL-MCNC: 175 MG/DL (ref 65–140)
GLUCOSE SERPL-MCNC: 226 MG/DL (ref 65–140)
HCT VFR BLD AUTO: 42.6 % (ref 34.8–46.1)
HGB BLD-MCNC: 13.3 G/DL (ref 11.5–15.4)
IMM GRANULOCYTES # BLD AUTO: 0.07 THOUSAND/UL (ref 0–0.2)
IMM GRANULOCYTES NFR BLD AUTO: 1 % (ref 0–2)
LYMPHOCYTES # BLD AUTO: 2.45 THOUSANDS/ÂΜL (ref 0.6–4.47)
LYMPHOCYTES NFR BLD AUTO: 26 % (ref 14–44)
MCH RBC QN AUTO: 30 PG (ref 26.8–34.3)
MCHC RBC AUTO-ENTMCNC: 31.2 G/DL (ref 31.4–37.4)
MCV RBC AUTO: 96 FL (ref 82–98)
MONOCYTES # BLD AUTO: 0.82 THOUSAND/ÂΜL (ref 0.17–1.22)
MONOCYTES NFR BLD AUTO: 9 % (ref 4–12)
NEUTROPHILS # BLD AUTO: 6.21 THOUSANDS/ÂΜL (ref 1.85–7.62)
NEUTS SEG NFR BLD AUTO: 63 % (ref 43–75)
NRBC BLD AUTO-RTO: 0 /100 WBCS
PLATELET # BLD AUTO: 330 THOUSANDS/UL (ref 149–390)
PMV BLD AUTO: 10.8 FL (ref 8.9–12.7)
POTASSIUM SERPL-SCNC: 4.4 MMOL/L (ref 3.5–5.3)
RBC # BLD AUTO: 4.43 MILLION/UL (ref 3.81–5.12)
SODIUM SERPL-SCNC: 136 MMOL/L (ref 135–147)
WBC # BLD AUTO: 9.62 THOUSAND/UL (ref 4.31–10.16)

## 2023-02-06 RX ORDER — PREDNISONE 20 MG/1
20 TABLET ORAL DAILY
Status: DISCONTINUED | OUTPATIENT
Start: 2023-02-12 | End: 2023-02-10 | Stop reason: HOSPADM

## 2023-02-06 RX ORDER — PREDNISONE 10 MG/1
10 TABLET ORAL DAILY
Status: DISCONTINUED | OUTPATIENT
Start: 2023-02-15 | End: 2023-02-10 | Stop reason: HOSPADM

## 2023-02-06 RX ORDER — HYDROCODONE BITARTRATE AND HOMATROPINE METHYLBROMIDE ORAL SOLUTION 5; 1.5 MG/5ML; MG/5ML
5 LIQUID ORAL DAILY
Status: DISCONTINUED | OUTPATIENT
Start: 2023-02-07 | End: 2023-02-06

## 2023-02-06 RX ORDER — FUROSEMIDE 20 MG/1
20 TABLET ORAL DAILY
Status: DISCONTINUED | OUTPATIENT
Start: 2023-02-07 | End: 2023-02-10 | Stop reason: HOSPADM

## 2023-02-06 RX ORDER — PREDNISONE 20 MG/1
40 TABLET ORAL DAILY
Status: COMPLETED | OUTPATIENT
Start: 2023-02-06 | End: 2023-02-08

## 2023-02-06 RX ORDER — PREDNISONE 20 MG/1
40 TABLET ORAL DAILY
Status: DISCONTINUED | OUTPATIENT
Start: 2023-02-06 | End: 2023-02-06

## 2023-02-06 RX ADMIN — LEVALBUTEROL HYDROCHLORIDE 1.25 MG: 1.25 SOLUTION, CONCENTRATE RESPIRATORY (INHALATION) at 07:45

## 2023-02-06 RX ADMIN — DICLOFENAC SODIUM 2 G: 10 GEL TOPICAL at 08:23

## 2023-02-06 RX ADMIN — ALBUTEROL SULFATE 2 PUFF: 90 AEROSOL, METERED RESPIRATORY (INHALATION) at 04:42

## 2023-02-06 RX ADMIN — DICLOFENAC SODIUM 2 G: 10 GEL TOPICAL at 11:06

## 2023-02-06 RX ADMIN — PREDNISONE 40 MG: 20 TABLET ORAL at 08:22

## 2023-02-06 RX ADMIN — DICLOFENAC SODIUM 2 G: 10 GEL TOPICAL at 16:49

## 2023-02-06 RX ADMIN — INSULIN LISPRO 1 UNITS: 100 INJECTION, SOLUTION INTRAVENOUS; SUBCUTANEOUS at 16:48

## 2023-02-06 RX ADMIN — BUDESONIDE 0.5 MG: 0.5 INHALANT ORAL at 07:45

## 2023-02-06 RX ADMIN — GUAIFENESIN 1200 MG: 600 TABLET, EXTENDED RELEASE ORAL at 08:22

## 2023-02-06 RX ADMIN — ENOXAPARIN SODIUM 40 MG: 40 INJECTION SUBCUTANEOUS at 08:22

## 2023-02-06 RX ADMIN — DICLOFENAC SODIUM 2 G: 10 GEL TOPICAL at 21:31

## 2023-02-06 RX ADMIN — TRIAMCINOLONE ACETONIDE: 1 CREAM TOPICAL at 16:49

## 2023-02-06 RX ADMIN — FUROSEMIDE 20 MG: 20 TABLET ORAL at 08:22

## 2023-02-06 RX ADMIN — BENZONATATE 200 MG: 100 CAPSULE ORAL at 08:22

## 2023-02-06 RX ADMIN — GUAIFENESIN 1200 MG: 600 TABLET, EXTENDED RELEASE ORAL at 16:48

## 2023-02-06 RX ADMIN — STANDARDIZED SENNA CONCENTRATE 8.6 MG: 8.6 TABLET ORAL at 08:22

## 2023-02-06 RX ADMIN — INSULIN LISPRO 1 UNITS: 100 INJECTION, SOLUTION INTRAVENOUS; SUBCUTANEOUS at 11:05

## 2023-02-06 RX ADMIN — IPRATROPIUM BROMIDE 0.5 MG: 0.5 SOLUTION RESPIRATORY (INHALATION) at 07:45

## 2023-02-06 RX ADMIN — TRIAMCINOLONE ACETONIDE: 1 CREAM TOPICAL at 08:23

## 2023-02-06 RX ADMIN — BENZONATATE 200 MG: 100 CAPSULE ORAL at 16:48

## 2023-02-06 RX ADMIN — PRAVASTATIN SODIUM 40 MG: 40 TABLET ORAL at 16:48

## 2023-02-06 RX ADMIN — LEVALBUTEROL HYDROCHLORIDE 1.25 MG: 1.25 SOLUTION, CONCENTRATE RESPIRATORY (INHALATION) at 21:04

## 2023-02-06 RX ADMIN — SERTRALINE HYDROCHLORIDE 50 MG: 50 TABLET ORAL at 08:22

## 2023-02-06 RX ADMIN — IPRATROPIUM BROMIDE 0.5 MG: 0.5 SOLUTION RESPIRATORY (INHALATION) at 21:04

## 2023-02-06 RX ADMIN — BENZONATATE 200 MG: 100 CAPSULE ORAL at 21:31

## 2023-02-06 RX ADMIN — FORMOTEROL FUMARATE DIHYDRATE 20 MCG: 20 SOLUTION RESPIRATORY (INHALATION) at 21:04

## 2023-02-06 RX ADMIN — FORMOTEROL FUMARATE DIHYDRATE 20 MCG: 20 SOLUTION RESPIRATORY (INHALATION) at 07:45

## 2023-02-06 RX ADMIN — IPRATROPIUM BROMIDE 0.5 MG: 0.5 SOLUTION RESPIRATORY (INHALATION) at 13:37

## 2023-02-06 RX ADMIN — LEVALBUTEROL HYDROCHLORIDE 1.25 MG: 1.25 SOLUTION, CONCENTRATE RESPIRATORY (INHALATION) at 13:37

## 2023-02-06 RX ADMIN — BUDESONIDE 0.5 MG: 0.5 INHALANT ORAL at 21:04

## 2023-02-06 NOTE — ASSESSMENT & PLAN NOTE
Lab Results   Component Value Date    HGBA1C 6 2 (H) 09/20/2022     Recent Labs     02/05/23  1044 02/05/23  1555 02/05/23  2107 02/06/23  0704   POCGLU 279* 144* 154* 107       Blood Sugar Average: Last 72 hrs:  (P) 919 [id number]   Well-controlled with diet and exercise  Not on any medication  · Carb controlled diet  · in patient goal -180  · Add on sliding scale insulin if uncontrolled sugars 2/2 IV steroids

## 2023-02-06 NOTE — PLAN OF CARE
Problem: PAIN - ADULT  Goal: Verbalizes/displays adequate comfort level or baseline comfort level  Description: Interventions:  - Encourage patient to monitor pain and request assistance  - Assess pain using appropriate pain scale  - Administer analgesics based on type and severity of pain and evaluate response  - Implement non-pharmacological measures as appropriate and evaluate response  - Consider cultural and social influences on pain and pain management  - Notify physician/advanced practitioner if interventions unsuccessful or patient reports new pain  Outcome: Progressing     Problem: INFECTION - ADULT  Goal: Absence or prevention of progression during hospitalization  Description: INTERVENTIONS:  - Assess and monitor for signs and symptoms of infection  - Monitor lab/diagnostic results  - Monitor all insertion sites, i e  indwelling lines, tubes, and drains  - Monitor endotracheal if appropriate and nasal secretions for changes in amount and color  - [place] appropriate cooling/warming therapies per order  - Administer medications as ordered  - Instruct and encourage patient and family to use good hand hygiene technique  - Identify and instruct in appropriate isolation precautions for identified infection/condition  Outcome: Progressing     Problem: DISCHARGE PLANNING  Goal: Discharge to home or other facility with appropriate resources  Description: INTERVENTIONS:  - Identify barriers to discharge w/patient and caregiver  - Arrange for needed discharge resources and transportation as appropriate  - Identify discharge learning needs (meds, wound care, etc )  - Arrange for interpretive services to assist at discharge as needed  - Refer to Case Management Department for coordinating discharge planning if the patient needs post-hospital services based on physician/advanced practitioner order or complex needs related to functional status, cognitive ability, or social support system  Outcome: Progressing Problem: Knowledge Deficit  Goal: Patient/family/caregiver demonstrates understanding of disease process, treatment plan, medications, and discharge instructions  Description: Complete learning assessment and assess knowledge base  Interventions:  - Provide teaching at level of understanding  - Provide teaching via preferred learning methods  Outcome: Progressing     Problem: RESPIRATORY - ADULT  Goal: Achieves optimal ventilation and oxygenation  Description: INTERVENTIONS:  - Assess for changes in respiratory status  - Assess for changes in mentation and behavior  - Position to facilitate oxygenation and minimize respiratory effort  - Oxygen administered by appropriate delivery if ordered  - Initiate smoking cessation education as indicated  - Encourage broncho-pulmonary hygiene including cough, deep breathe, Incentive Spirometry  - Assess the need for suctioning and aspirate as needed  - Assess and instruct to report SOB or any respiratory difficulty  - Respiratory Therapy support as indicated  Outcome: Progressing     Problem: METABOLIC, FLUID AND ELECTROLYTES - ADULT  Goal: Electrolytes maintained within normal limits  Description: INTERVENTIONS:  - Monitor labs and assess patient for signs and symptoms of electrolyte imbalances  - Administer electrolyte replacement as ordered  - Monitor response to electrolyte replacements, including repeat lab results as appropriate  - Instruct patient on fluid and nutrition as appropriate  Outcome: Progressing     Problem: Nutrition/Hydration-ADULT  Goal: Nutrient/Hydration intake appropriate for improving, restoring or maintaining nutritional needs  Description: Monitor and assess patient's nutrition/hydration status for malnutrition  Collaborate with interdisciplinary team and initiate plan and interventions as ordered  Monitor patient's weight and dietary intake as ordered or per policy  Utilize nutrition screening tool and intervene as necessary   Determine patient's food preferences and provide high-protein, high-caloric foods as appropriate       INTERVENTIONS:  - Monitor oral intake, urinary output, labs, and treatment plans  - Assess nutrition and hydration status and recommend course of action  - Evaluate amount of meals eaten  - Assist patient with eating if necessary   - Allow adequate time for meals  - Recommend/ encourage appropriate diets, oral nutritional supplements, and vitamin/mineral supplements  - Order, calculate, and assess calorie counts as needed  - Recommend, monitor, and adjust tube feedings and TPN/PPN based on assessed needs  - Assess need for intravenous fluids  - Provide specific nutrition/hydration education as appropriate  - Include patient/family/caregiver in decisions related to nutrition  Outcome: Progressing

## 2023-02-06 NOTE — PLAN OF CARE
Problem: PAIN - ADULT  Goal: Verbalizes/displays adequate comfort level or baseline comfort level  Description: Interventions:  - Encourage patient to monitor pain and request assistance  - Assess pain using appropriate pain scale  - Administer analgesics based on type and severity of pain and evaluate response  - Implement non-pharmacological measures as appropriate and evaluate response  - Consider cultural and social influences on pain and pain management  - Notify physician/advanced practitioner if interventions unsuccessful or patient reports new pain  Outcome: Progressing     Problem: INFECTION - ADULT  Goal: Absence or prevention of progression during hospitalization  Description: INTERVENTIONS:  - Assess and monitor for signs and symptoms of infection  - Monitor lab/diagnostic results  - Monitor all insertion sites, i e  indwelling lines, tubes, and drains  - Monitor endotracheal if appropriate and nasal secretions for changes in amount and color  - [place] appropriate cooling/warming therapies per order  - Administer medications as ordered  - Instruct and encourage patient and family to use good hand hygiene technique  - Identify and instruct in appropriate isolation precautions for identified infection/condition  Outcome: Progressing     Problem: DISCHARGE PLANNING  Goal: Discharge to home or other facility with appropriate resources  Description: INTERVENTIONS:  - Identify barriers to discharge w/patient and caregiver  - Arrange for needed discharge resources and transportation as appropriate  - Identify discharge learning needs (meds, wound care, etc )  - Arrange for interpretive services to assist at discharge as needed  - Refer to Case Management Department for coordinating discharge planning if the patient needs post-hospital services based on physician/advanced practitioner order or complex needs related to functional status, cognitive ability, or social support system  Outcome: Progressing Problem: Knowledge Deficit  Goal: Patient/family/caregiver demonstrates understanding of disease process, treatment plan, medications, and discharge instructions  Description: Complete learning assessment and assess knowledge base    Interventions:  - Provide teaching at level of understanding  - Provide teaching via preferred learning methods  Outcome: Progressing

## 2023-02-06 NOTE — ASSESSMENT & PLAN NOTE
75 y/o F w/ hx of COPD, recent covid 1/23/23 s/p tx w [place] [facility] (molnupiravir) x 5 days who inititally felt better but presented with 2 days of worsening cough, increased sputum production SOB, GONZALEZ and fatigue      - Hypoxic on admission, requiring 5L O2  - Afebrile   No leukocytosis   - CT PE study unremarkable & w/o infilatrates, or pulm edema  - CXR no acute cardiopulmonary disease     · Admitted on combination of COPD/COVID pathway   · IV Solu-Medrol 40 mg daily  · 3 day course of Azithro completed 2/1  · Resp protocol   · Pulmonary consulted and recommendations appreciated:  · Started inhaled budesonide 0 5 mg Q12 and inhaled formoterol 20 mcg Q12  · IV Solu-Medrol 40 mg twice daily switched to 40 mg daily w/ a taper  · Maintain SPO2>88%  · Will likely require 4L home O2

## 2023-02-06 NOTE — CASE MANAGEMENT
Case Management Discharge Planning Note    Patient name Maximo Palomo  Location [facility] [id number] MRN [id number]  : 1945 Date 2023       Current Admission Date: 2023  Current Admission Diagnosis:Acute respiratory failure with hypoxia [facility])   Patient Active Problem List    Diagnosis Date Noted   • Acute respiratory failure with hypoxia ([facility] ) 2023   • History of COVID-19 2023   • COPD exacerbation ([place] [address] ) 2023   • Biventricular congestive heart failure ([place] [address] ) 2023   • Mixed hyperlipidemia 2022   • Dysthymic disorder 2022   • Impaired fasting blood sugar 2022   • Gastrointestinal hemorrhage 10/21/2021   • Dermatitis 10/21/2021   • Primary osteoarthritis of both hands 10/21/2021   • Congestive heart failure with right ventricular systolic dysfunction ([place] [address] ) 2021   • Rheumatoid factor positive 2021   • Arthritis 2021   • Diabetic eye exam ([place] [address] ) 2021   • Hypoxia 2021   • Essential hypertension, benign 2021   • Chronic combined systolic and diastolic congestive heart failure ([place] [address] ) 2021   • Pulmonary hypertension ([place] [address] ) 2021   • Cigarette nicotine dependence without complication    • Elevated BP without diagnosis of hypertension 2021   • Lung nodules 2021   • COPD (chronic obstructive pulmonary disease) ([place] [address] ) 2021   • Chronic respiratory failure with hypoxia ([place] [address] ) 2021   • Nicotine dependence 2021   • Microalbuminuria 2021   • SOB (shortness of breath) 2021   • Uncontrolled type 2 diabetes mellitus with hyperglycemia ([facility] ) 2021   • Allergic rhinitis    • Type 2 diabetes, HbA1c goal < 7% ([facility])    • Anxiety    • Tendinitis of finger 2015      LOS (days): 6  Geometric Mean LOS (GMLOS) (days): 3 40  Days to GMLOS:-2 4     OBJECTIVE:  Risk of Unplanned Readmission Score: 11 65         Current admission status: Inpatient   Preferred Pharmacy:   [id number] Elvira Lares PA - [address] [facility] [id number]  Phone: [phone] Fax: [phone]    Primary Care Provider: Yumiko Quintana MD    Primary Insurance: MEDICARE  Secondary Insurance: [facility]    DISCHARGE DETAILS:    Had O2 previously with [facility]    Will need home O2 study prior to DC                                                                                        IMM Given (Date):: 02/06/23  IMM Given to[de-identified] Patient

## 2023-02-06 NOTE — ASSESSMENT & PLAN NOTE
2/2 IM- Acute respiratory failure with hypoxia ([facility] )  Assessment & Plan    Due to recent covid infection (1/23/23) c/b COPD exacerbation   - Hypoxic on admission, requiring 5L O2  - Afebrile   No leukocytosis   - CT PE study unremarkable & w/o infilatrates, or pulm edema  - CXR no acute cardiopulmonary disease      -Currently on Duonebs / steroids / mucinex / hycodan

## 2023-02-06 NOTE — PROGRESS NOTES
INTERNAL MEDICINE RESIDENCY PROGRESS NOTE     Name: Shane Marx   Age & Sex: 76 y o  female   MRN: [id number]  Unit/Bed#: [facility] 834-01   Encounter: [id number]  Team: SOD Team B     PATIENT INFORMATION     Name: Shane Marx   Age & Sex: 76 y o  female   MRN: [id number]  Hospital Stay Days: 6    ASSESSMENT/PLAN     Principal Problem:    Acute respiratory failure with hypoxia ([facility] )  Active Problems:    Type 2 diabetes, HbA1c goal < 7% ([facility])    Anxiety    SOB (shortness of breath)    COPD (chronic obstructive pulmonary disease) ([facility] )    Essential hypertension, benign    Dysthymic disorder    History of COVID-19    COPD exacerbation (HCC)    Biventricular congestive heart failure (HCC)      Biventricular congestive heart failure (HCC)  Assessment & Plan  Hx of combined CHF 2/2 to CAD and pulmonary HTN in the setting of chronic tobacco abuse and severe COPD  With preserved LV systolic function and LV diastolic dysfunction, right ventricular dilatation and dysfunction  Patient required home O2 (3L tapered off over ~2 months), daily diuretics as well as inhalers back in 5365-5553  Patient had recovery of EF w/ smoking cessation, weight loss, and medication   Last ECHO June '22 w/ EF 60% w LV & LV normal systolic and diastolic dysfunction   Echo 1/31 LVEF 86%, vigorous systolic function, grade II diastolic dysfunction, mildly dilated RV with normal systolic function, RV systolic pressure 21BTFY, mild tricuspid regurgitation    - Appears dry on exam, no JVD, no pitting edema     · Continue home lasix 20 MWF    COPD exacerbation ([facility] )  Assessment & Plan  75 y/o F w/ hx of COPD, recent covid 1/23/23 s/p tx w Lagrevrio (molnupiravir) x 5 days who inititally felt better but presented with 2 days of worsening cough, increased sputum production SOB, GONZALEZ and fatigue      - Hypoxic on admission, requiring 5L O2  - Afebrile   No leukocytosis   - CT PE study unremarkable & w/o infilatrates, or pulm edema  - CXR no acute cardiopulmonary disease     · Admitted on combination of COPD/COVID pathway   · IV Solu-Medrol 40 mg daily  · 3 day course of Azithro completed 2/1  · Resp protocol   · Pulmonary consulted and recommendations appreciated:  · Started inhaled budesonide 0 5 mg Q12 and inhaled formoterol 20 mcg Q12  · IV Solu-Medrol 40 mg twice daily switched to 40 mg daily w/ a taper  · Maintain SPO2>88%  · Will likely require 4L home O2    History of COVID-19  Assessment & Plan  Recent covid 1/23/23 s/p tx w Lagrevrio (molnupiravir) x 5 days who inititally felt better but presented with 2 days of worsening cough, increased sputum production SOB, GONZALEZ and fatigue    - Has required 4-7L O2 NC  - CTA PE unremarkable for PE, & w/o pulmonary infiltrates   - Afebrile, no leukocytosis, renal function WNL  - Repeat COVID/FLU/RSV negative this admission  - Procalcitonin, CK, BNP WNL  - CRP 20 0    - Will treat for mild covid pathway / COPD exacerbation as noted above  · Continue IV steroids  · Per pharmacy - as COVID negative remdisivir no longer indicated  · resp protocol     Dysthymic disorder  Assessment & Plan  · Continue home zoloft 50mg daily, home hydroxyzine 10mg BID PRN  · Xanax 0 5mg QHS PRN  · Ativan 0 5mg IV Q4H PRN    Essential hypertension, benign  Assessment & Plan  · Continue home lasix 20 MWF  COPD (chronic obstructive pulmonary disease) ([facility] )  Assessment & Plan  Patient with "many year" history of smoking, though states she quit several years ago  PFT's conducted 6/21 showed increased lung volumes, FEV1 of 47% predicted without significant bronchodilator response, and corrected DLCO of 40%, indicative of severe obstructive disease  Not on home medications  She states that this is the first time she has required hospitalization due to cough/dyspnea, though states she was hospitalized about 1 year ago for CHF exacerbation      Plan  · Duo-Neb Q6H  · IV Solu-Medrol decreased from 40 mg twice daily to 40 mg daily  · Completed Azithromycin 500mg x3 days   · Ventolin Q4h PRN  · Mucinex BID  · Tessalon perles TID   · Change Tussionex to Hycodan TID  · Resp protocol    SOB (shortness of breath)  Assessment & Plan  See a/p above    Anxiety  Assessment & Plan  Patient states that she has a history of anxiety and states that her coughing spells cause increased anxiety    · Continue home atarax 10 mg BID PRN    Type 2 diabetes, HbA1c goal < 7% [facility])  Assessment & Plan  Lab Results   Component Value Date    HGBA1C 6 2 (H) 2022     Recent Labs     23  1044 23  1555 23  2107 23  0704   POCGLU 279* 144* 154* 107       Blood Sugar Average: Last 72 hrs:  (P) 369 [id number]   Well-controlled with diet and exercise  Not on any medication  · Carb controlled diet  · in patient goal -180  · Add on sliding scale insulin if uncontrolled sugars 2/2 IV steroids    * Acute respiratory failure with hypoxia (HCC)  Assessment & Plan    2/2 IM- Acute respiratory failure with hypoxia (HCC)  Assessment & Plan    Due to recent covid infection (23) c/b COPD exacerbation   - Hypoxic on admission, requiring 5L O2  - Afebrile  No leukocytosis   - CT PE study unremarkable & w/o infilatrates, or pulm edema  - CXR no acute cardiopulmonary disease      -Currently on Duonebs / steroids / mucinex / hycodan       Disposition: medical management      SUBJECTIVE     Patient seen and examined  No acute events overnight  Improvement in cough and SOB  Persistent night time awakenings with cough  Does not endorse choking or grasping for ain      OBJECTIVE     Vitals:    23 2139 23 0703 23 0703 23 0745   BP: 136/72 115/55 115/55    Pulse: 91  82    Resp: (!) 25      Temp: 97 7 °F (36 5 °C) (!) 97 3 °F (36 3 °C) (!) 97 3 °F (36 3 °C)    TempSrc:       SpO2: (!) 89%  94% 92%   Weight:       Height:          Temperature:   Temp (24hrs), Av 6 °F (36 4 °C), Min:97 3 °F (36 3 °C), Max:98 2 °F (36 8 °C)    Temperature: (!) 97 3 °F (36 3 °C)  Intake & Output:  I/O       02/04 0701 02/05 0700 02/05 0701 02/06 0700 02/06 0701 02/07 0700    P  O  400 180     Total Intake(mL/kg) 400 (5 7) 180 (2 6)     Net +400 +180            Unmeasured Urine Occurrence 3 x 3 x         Weights:   IBW (Ideal Body Weight): 54 7 kg    Body mass index is 26 38 kg/m²  Weight (last 2 days)     None        Physical Exam  Constitutional:       General: She is not in acute distress  Appearance: She is not ill-appearing, toxic-appearing or diaphoretic  HENT:      Head: Normocephalic and atraumatic  Cardiovascular:      Rate and Rhythm: Normal rate and regular rhythm  Pulses: Normal pulses  Heart sounds: Normal heart sounds  No gallop  Pulmonary:      Effort: Pulmonary effort is normal       Breath sounds: Rhonchi present  Abdominal:      General: Bowel sounds are normal  There is no distension  Tenderness: There is no abdominal tenderness  There is no right CVA tenderness or left CVA tenderness  Musculoskeletal:      Right lower leg: No edema  Left lower leg: No edema  Psychiatric:         Mood and Affect: Mood normal          Behavior: Behavior normal        LABORATORY DATA     Labs: I have personally reviewed pertinent reports    Results from last 7 days   Lab Units 02/06/23 0530 02/05/23 0442 02/04/23  0435   WBC Thousand/uL 9 62 9 22 8 20   HEMOGLOBIN g/dL 13 3 13 3 13 1   HEMATOCRIT % 42 6 41 9 42 1   PLATELETS Thousands/uL 330 340 326   NEUTROS PCT % 63 88* 85*   MONOS PCT % 9 3* 5      Results from last 7 days   Lab Units 02/06/23 0530 02/05/23 0442 02/04/23  0435 02/01/23  0655 01/31/23  0504   POTASSIUM mmol/L 4 4 4 6 4 3   < > 3 9   CHLORIDE mmol/L 104 102 100   < > 104   CO2 mmol/L 27 32 31   < > 33*   BUN mg/dL 21 27* 27*   < > 22   CREATININE mg/dL 0 59* 0 66 0 71   < > 0 54*   CALCIUM mg/dL 9 0 9 2 9 1   < > 9 0   ALK PHOS U/L  --   --   --   --  60   ALT U/L  --   --   --   --  16   AST U/L  --   --   --   -- 13    < > = values in this interval not displayed  IMAGING & DIAGNOSTIC TESTING     Radiology Results: I have personally reviewed pertinent reports  XR chest portable    Result Date: 2/4/2023  Impression: No acute findings  Workstation performed: [id number]     XR chest portable    Result Date: 2/3/2023  Impression: No acute cardiopulmonary disease  Workstation performed: [id number]     XR chest pa & lateral    Result Date: 1/30/2023  Impression: No acute cardiopulmonary disease  This report is in agreement with the preliminary interpretation  Workstation performed: [id number]     CTA ED chest PE study    Result Date: 1/30/2023  Impression: No pulmonary embolus  No evidence of acute thoracic process  COPD  3 nodules in the left lower lobe, the largest of which measures 6 mm stable since May 2021  No new pulmonary nodules  Noncontrast chest CT follow-up in 12 months could be considered to establish two-year stability  Additional chronic findings and negatives as above  Workstation performed: [id number]     Other Diagnostic Testing: I have personally reviewed pertinent reports      ACTIVE MEDICATIONS     Current Facility-Administered Medications   Medication Dose Route Frequency   • albuterol (PROVENTIL HFA,VENTOLIN HFA) inhaler 2 puff  2 puff Inhalation Q4H PRN   • ALPRAZolam (XANAX) tablet 0 5 mg  0 5 mg Oral HS PRN   • benzonatate (TESSALON PERLES) capsule 200 mg  200 mg Oral TID   • budesonide (PULMICORT) inhalation solution 0 5 mg  0 5 mg Nebulization Q12H   • Diclofenac Sodium (VOLTAREN) 1 % topical gel 2 g  2 g Topical 4x Daily   • enoxaparin (LOVENOX) subcutaneous injection 40 mg  40 mg Subcutaneous Q24H CHARLY   • formoterol (PERFOROMIST) nebulizer solution 20 mcg  20 mcg Nebulization Q12H   • [START ON 2/7/2023] furosemide (LASIX) tablet 20 mg  20 mg Oral Daily   • guaiFENesin (MUCINEX) 12 hr tablet 1,200 mg  1,200 mg Oral BID   • hydrOXYzine HCL (ATARAX) tablet 10 mg  10 mg Oral BID PRN   • insulin lispro (HumaLOG) 100 units/mL subcutaneous injection 1-6 Units  1-6 Units Subcutaneous TID AC   • ipratropium (ATROVENT) 0 02 % inhalation solution 0 5 mg  0 5 mg Nebulization TID   • levalbuterol (XOPENEX) inhalation solution 1 25 mg  1 25 mg Nebulization TID   • LORazepam (ATIVAN) injection 0 5 mg  0 5 mg Intravenous Q4H PRN   • oxyCODONE (ROXICODONE) IR tablet 5 mg  5 mg Oral Q4H PRN   • pravastatin (PRAVACHOL) tablet 40 mg  40 mg Oral Daily With Dinner   • predniSONE tablet 40 mg  40 mg Oral Daily    Followed by   • [START ON 2/9/2023] predniSONE tablet 30 mg  30 mg Oral Daily    Followed by   • [START ON 2/12/2023] predniSONE tablet 20 mg  20 mg Oral Daily    Followed by   • [START ON 2/15/2023] predniSONE tablet 10 mg  10 mg Oral Daily   • senna (SENOKOT) tablet 8 6 mg  1 tablet Oral Daily   • sertraline (ZOLOFT) tablet 50 mg  50 mg Oral Daily   • triamcinolone (KENALOG) 0 1 % cream   Topical BID       VTE Pharmacologic Prophylaxis: Enoxaparin (Lovenox)  VTE Mechanical Prophylaxis: sequential compression device    Portions of the record may have been created with voice recognition software  Occasional wrong word or "sound a like" substitutions may have occurred due to the inherent limitations of voice recognition software    Read the chart carefully and recognize, using context, where substitutions have occurred   ==  Jihan Joshi MD  [address] [place]  Internal Medicine Residency PGY-3

## 2023-02-06 NOTE — ASSESSMENT & PLAN NOTE
Hx of combined CHF 2/2 to CAD and pulmonary HTN in the setting of chronic tobacco abuse and severe COPD  With preserved LV systolic function and LV diastolic dysfunction, right ventricular dilatation and dysfunction  Patient required home O2 (3L tapered off over ~2 months), daily diuretics as well as inhalers back in 1043-1466  Patient had recovery of EF w/ smoking cessation, weight loss, and medication   Last ECHO June '22 w/ EF 60% w LV & LV normal systolic and diastolic dysfunction   Echo 1/31 LVEF 89%, vigorous systolic function, grade II diastolic dysfunction, mildly dilated RV with normal systolic function, RV systolic pressure 12DRTM, mild tricuspid regurgitation    - Appears dry on exam, no JVD, no pitting edema     · Continue home lasix 20 MWF

## 2023-02-06 NOTE — PROGRESS NOTES
PULMONOLOGY PROGRESS NOTE     Name: Jazzy Alves   Age & Sex: 76 y o  female   MRN: [id number]  Unit/Bed#: [facility] 834-01   Encounter: [id number]    PATIENT INFORMATION     Name: Jazzy Alves   Age & Sex: 76 y o  female   MRN: [id number]  Hospital Stay Days: 6    ASSESSMENT/PLAN     Assessment:   1  Acute on chronic respiratory failure with hypoxia and hypercapnia  2  Possible acute exacerbation of COPD  3  Recent COVID, likely postacute sequela of COVID  4  Severe COPD  5  Pulmonary hypertension  6  Chronic systolic and diastolic heart failure  7  Multiple pulmonary nodules  8  Tobacco abuse history    Plan:  • Suspect that she requires more supplemental oxygen than she is aware that she actually needs  She states that she was taken off of oxygen that would appear from outpatient pulmonary notes that she was not supposed to be  • Continue supplement oxygen to maintain SPO2 greater than 88%  • ABG shows hypoxemia at 2 L  Will need oxygen titration walk testing to determine baseline oxygen requirements  • She was supposed to be on Trelegy Ellipta as per last pulmonology note  Continue inhaled budesonide, inhaled formoterol, inhaled levalbuterol and inhaled ipratroprium  Discharge with Trelegy 200  • Transitioned to prednisone taper today  • Pulmonary nodule follow-up as an outpatient  • Agree with airway clearance protocol flutter valve  • Agree with home O2 eval desaturation screening         SUBJECTIVE     Patient seen and examined  No acute events overnight  Cough events overnight  Able to resolve them and go back to sleep  Feels breathing is improved      OBJECTIVE     Vitals:    23 2139 23 0703 23 0703 23 0745   BP: 136/72 115/55 115/55    Pulse: 91  82    Resp: (!) 25      Temp: 97 7 °F (36 5 °C) (!) 97 3 °F (36 3 °C) (!) 97 3 °F (36 3 °C)    TempSrc:       SpO2: (!) 89%  94% 92%   Weight:       Height:          Temperature:   Temp (24hrs), Av 6 °F (36 4 °C), Min:97 3 °F (36 3 °C), Max:98 2 °F (36 8 °C)    Temperature: (!) 97 3 °F (36 3 °C)  Intake & Output:  I/O       02/01 0701  02/02 0700 02/02 0701  02/03 0700 02/03 0701  02/04 0700    P  O   580     Total Intake(mL/kg)  580 (8 3)     Net  +580                Weights:   IBW (Ideal Body Weight): 54 7 kg    Body mass index is 26 38 kg/m²  Weight (last 2 days)     None        Physical Exam  Vitals and nursing note reviewed  Constitutional:       General: She is not in acute distress  Appearance: Normal appearance  She is well-developed  She is obese  She is not ill-appearing or toxic-appearing  Interventions: She is not intubated  HENT:      Head: Normocephalic and atraumatic  Right Ear: External ear normal       Left Ear: External ear normal       Nose: Nose normal       Mouth/Throat:      Mouth: Mucous membranes are moist       Pharynx: Oropharynx is clear  Eyes:      General: No scleral icterus  Conjunctiva/sclera: Conjunctivae normal    Cardiovascular:      Rate and Rhythm: Normal rate and regular rhythm  Pulses: Normal pulses  Heart sounds: Normal heart sounds  No murmur heard  No friction rub  No gallop  Pulmonary:      Effort: Pulmonary effort is normal  No tachypnea, bradypnea, accessory muscle usage or respiratory distress  She is not intubated  Breath sounds: Normal air entry  No decreased air movement  Decreased breath sounds and wheezing present  No rhonchi or rales  Abdominal:      General: Abdomen is flat  Bowel sounds are normal       Palpations: Abdomen is soft  Musculoskeletal:         General: No swelling or tenderness  Cervical back: Neck supple  No tenderness  Skin:     General: Skin is warm and dry  Neurological:      General: No focal deficit present  Mental Status: She is alert and oriented to person, place, and time  Mental status is at baseline  LABORATORY DATA     Labs: I have personally reviewed pertinent reports    Results from last 7 days   Lab Units 02/06/23  0530 02/05/23  0442 02/04/23  0435   WBC Thousand/uL 9 62 9 22 8 20   HEMOGLOBIN g/dL 13 3 13 3 13 1   HEMATOCRIT % 42 6 41 9 42 1   PLATELETS Thousands/uL 330 340 326   NEUTROS PCT % 63 88* 85*   MONOS PCT % 9 3* 5      Results from last 7 days   Lab Units 02/06/23  0530 02/05/23  0442 02/04/23  0435 02/01/23  0655 01/31/23  0504   POTASSIUM mmol/L 4 4 4 6 4 3   < > 3 9   CHLORIDE mmol/L 104 102 100   < > 104   CO2 mmol/L 27 32 31   < > 33*   BUN mg/dL 21 27* 27*   < > 22   CREATININE mg/dL 0 59* 0 66 0 71   < > 0 54*   CALCIUM mg/dL 9 0 9 2 9 1   < > 9 0   ALK PHOS U/L  --   --   --   --  60   ALT U/L  --   --   --   --  16   AST U/L  --   --   --   --  13    < > = values in this interval not displayed  ABG:   Results from last 7 days   Lab Units 02/02/23  1330   PH ART  7 429   PCO2 ART mm Hg 44 8*   PO2 ART mm Hg 50 0*   HCO3 ART mmol/L 29 0*   BASE EXC ART mmol/L 4 0   ABG SOURCE  Radial, Right       Micro:         IMAGING & DIAGNOSTIC TESTING     Radiology Results: I have personally reviewed pertinent reports  XR chest pa & lateral    Result Date: 1/30/2023  Impression: No acute cardiopulmonary disease  This report is in agreement with the preliminary interpretation  Workstation performed: [id number]     CTA ED chest PE study    Result Date: 1/30/2023  Impression: No pulmonary embolus  No evidence of acute thoracic process  COPD  3 nodules in the left lower lobe, the largest of which measures 6 mm stable since May 2021  No new pulmonary nodules  Noncontrast chest CT follow-up in 12 months could be considered to establish two-year stability  Additional chronic findings and negatives as above  Workstation performed: [id number]     Other Diagnostic Testing: I have personally reviewed pertinent reports      ACTIVE MEDICATIONS     Current Facility-Administered Medications   Medication Dose Route Frequency   • albuterol (PROVENTIL HFA,VENTOLIN HFA) inhaler 2 puff  2 puff Inhalation Q4H PRN   • ALPRAZolam (XANAX) tablet 0 5 mg  0 5 mg Oral HS PRN   • benzonatate (TESSALON PERLES) capsule 200 mg  200 mg Oral TID   • budesonide (PULMICORT) inhalation solution 0 5 mg  0 5 mg Nebulization Q12H   • Diclofenac Sodium (VOLTAREN) 1 % topical gel 2 g  2 g Topical 4x Daily   • enoxaparin (LOVENOX) subcutaneous injection 40 mg  40 mg Subcutaneous Q24H CHARLY   • formoterol (PERFOROMIST) nebulizer solution 20 mcg  20 mcg Nebulization Q12H   • furosemide (LASIX) tablet 20 mg  20 mg Oral Once per day on Mon Wed Fri   • guaiFENesin (MUCINEX) 12 hr tablet 1,200 mg  1,200 mg Oral BID   • HYDROcodone Bit-Homatrop MBr (HYCODAN) oral syrup 5 mL  5 mL Oral TID   • hydrOXYzine HCL (ATARAX) tablet 10 mg  10 mg Oral BID PRN   • insulin lispro (HumaLOG) 100 units/mL subcutaneous injection 1-6 Units  1-6 Units Subcutaneous TID AC   • ipratropium (ATROVENT) 0 02 % inhalation solution 0 5 mg  0 5 mg Nebulization TID   • levalbuterol (XOPENEX) inhalation solution 1 25 mg  1 25 mg Nebulization TID   • LORazepam (ATIVAN) injection 0 5 mg  0 5 mg Intravenous Q4H PRN   • oxyCODONE (ROXICODONE) IR tablet 5 mg  5 mg Oral Q4H PRN   • pravastatin (PRAVACHOL) tablet 40 mg  40 mg Oral Daily With Dinner   • predniSONE tablet 40 mg  40 mg Oral Daily    Followed by   • [START ON 2/9/2023] predniSONE tablet 30 mg  30 mg Oral Daily    Followed by   • [START ON 2/12/2023] predniSONE tablet 20 mg  20 mg Oral Daily    Followed by   • [START ON 2/15/2023] predniSONE tablet 10 mg  10 mg Oral Daily   • senna (SENOKOT) tablet 8 6 mg  1 tablet Oral Daily   • sertraline (ZOLOFT) tablet 50 mg  50 mg Oral Daily   • triamcinolone (KENALOG) 0 1 % cream   Topical BID         Disclaimer: Portions of the record may have been created with voice recognition software  Occasional wrong word or "sound a like" substitutions may have occurred due to the inherent limitations of voice recognition software   Careful consideration should be taken to recognize, using context, where substitutions have occurred      Enrrique Damon DO   Pulmonary and Critical Care Fellow, PGY-V  [facility]

## 2023-02-07 PROBLEM — R07.9 CHEST PAIN: Status: ACTIVE | Noted: 2023-02-07

## 2023-02-07 LAB
ANION GAP SERPL CALCULATED.3IONS-SCNC: 4 MMOL/L (ref 4–13)
BASOPHILS # BLD AUTO: 0.01 THOUSANDS/ÂΜL (ref 0–0.1)
BASOPHILS NFR BLD AUTO: 0 % (ref 0–1)
BUN SERPL-MCNC: 23 MG/DL (ref 5–25)
CALCIUM SERPL-MCNC: 8.6 MG/DL (ref 8.3–10.1)
CHLORIDE SERPL-SCNC: 103 MMOL/L (ref 96–108)
CO2 SERPL-SCNC: 32 MMOL/L (ref 21–32)
CREAT SERPL-MCNC: 0.57 MG/DL (ref 0.6–1.3)
DME PARACHUTE DELIVERY DATE ACTUAL: NORMAL
DME PARACHUTE DELIVERY DATE EXPECTED: NORMAL
DME PARACHUTE DELIVERY DATE REQUESTED: NORMAL
DME PARACHUTE ITEM DESCRIPTION: NORMAL
DME PARACHUTE ORDER STATUS: NORMAL
DME PARACHUTE SUPPLIER NAME: NORMAL
DME PARACHUTE SUPPLIER PHONE: NORMAL
EOSINOPHIL # BLD AUTO: 0.12 THOUSAND/ÂΜL (ref 0–0.61)
EOSINOPHIL NFR BLD AUTO: 1 % (ref 0–6)
ERYTHROCYTE [DISTWIDTH] IN BLOOD BY AUTOMATED COUNT: 12.2 % (ref 11.6–15.1)
GFR SERPL CREATININE-BSD FRML MDRD: 88 ML/MIN/1.73SQ M
GLUCOSE SERPL-MCNC: 111 MG/DL (ref 65–140)
GLUCOSE SERPL-MCNC: 114 MG/DL (ref 65–140)
GLUCOSE SERPL-MCNC: 122 MG/DL (ref 65–140)
GLUCOSE SERPL-MCNC: 146 MG/DL (ref 65–140)
GLUCOSE SERPL-MCNC: 228 MG/DL (ref 65–140)
HCT VFR BLD AUTO: 42 % (ref 34.8–46.1)
HGB BLD-MCNC: 13 G/DL (ref 11.5–15.4)
IMM GRANULOCYTES # BLD AUTO: 0.05 THOUSAND/UL (ref 0–0.2)
IMM GRANULOCYTES NFR BLD AUTO: 1 % (ref 0–2)
LYMPHOCYTES # BLD AUTO: 2.55 THOUSANDS/ÂΜL (ref 0.6–4.47)
LYMPHOCYTES NFR BLD AUTO: 28 % (ref 14–44)
MAGNESIUM SERPL-MCNC: 2.5 MG/DL (ref 1.6–2.6)
MCH RBC QN AUTO: 30.1 PG (ref 26.8–34.3)
MCHC RBC AUTO-ENTMCNC: 31 G/DL (ref 31.4–37.4)
MCV RBC AUTO: 97 FL (ref 82–98)
MONOCYTES # BLD AUTO: 0.7 THOUSAND/ÂΜL (ref 0.17–1.22)
MONOCYTES NFR BLD AUTO: 8 % (ref 4–12)
NEUTROPHILS # BLD AUTO: 5.8 THOUSANDS/ÂΜL (ref 1.85–7.62)
NEUTS SEG NFR BLD AUTO: 62 % (ref 43–75)
NRBC BLD AUTO-RTO: 0 /100 WBCS
PLATELET # BLD AUTO: 319 THOUSANDS/UL (ref 149–390)
PMV BLD AUTO: 11 FL (ref 8.9–12.7)
POTASSIUM SERPL-SCNC: 3.8 MMOL/L (ref 3.5–5.3)
RBC # BLD AUTO: 4.32 MILLION/UL (ref 3.81–5.12)
SODIUM SERPL-SCNC: 139 MMOL/L (ref 135–147)
WBC # BLD AUTO: 9.23 THOUSAND/UL (ref 4.31–10.16)

## 2023-02-07 RX ORDER — GABAPENTIN 100 MG/1
200 CAPSULE ORAL 2 TIMES DAILY
Status: DISCONTINUED | OUTPATIENT
Start: 2023-02-07 | End: 2023-02-10 | Stop reason: HOSPADM

## 2023-02-07 RX ORDER — OXYCODONE HYDROCHLORIDE 5 MG/1
5 TABLET ORAL EVERY 6 HOURS PRN
Status: DISCONTINUED | OUTPATIENT
Start: 2023-02-07 | End: 2023-02-07

## 2023-02-07 RX ORDER — HYDROCODONE BITARTRATE AND HOMATROPINE METHYLBROMIDE ORAL SOLUTION 5; 1.5 MG/5ML; MG/5ML
5 LIQUID ORAL EVERY 6 HOURS
Status: DISCONTINUED | OUTPATIENT
Start: 2023-02-07 | End: 2023-02-09

## 2023-02-07 RX ORDER — OXYCODONE HYDROCHLORIDE 10 MG/1
10 TABLET ORAL EVERY 6 HOURS PRN
Status: DISCONTINUED | OUTPATIENT
Start: 2023-02-07 | End: 2023-02-10 | Stop reason: HOSPADM

## 2023-02-07 RX ORDER — POTASSIUM CHLORIDE 20 MEQ/1
40 TABLET, EXTENDED RELEASE ORAL ONCE
Status: COMPLETED | OUTPATIENT
Start: 2023-02-07 | End: 2023-02-07

## 2023-02-07 RX ORDER — CYCLOBENZAPRINE HCL 10 MG
5 TABLET ORAL ONCE
Status: COMPLETED | OUTPATIENT
Start: 2023-02-07 | End: 2023-02-07

## 2023-02-07 RX ORDER — LIDOCAINE 50 MG/G
1 PATCH TOPICAL DAILY
Status: DISCONTINUED | OUTPATIENT
Start: 2023-02-07 | End: 2023-02-10 | Stop reason: HOSPADM

## 2023-02-07 RX ADMIN — ENOXAPARIN SODIUM 40 MG: 40 INJECTION SUBCUTANEOUS at 08:20

## 2023-02-07 RX ADMIN — POTASSIUM CHLORIDE 40 MEQ: 1500 TABLET, EXTENDED RELEASE ORAL at 11:07

## 2023-02-07 RX ADMIN — SERTRALINE HYDROCHLORIDE 50 MG: 50 TABLET ORAL at 08:20

## 2023-02-07 RX ADMIN — LEVALBUTEROL HYDROCHLORIDE 1.25 MG: 1.25 SOLUTION, CONCENTRATE RESPIRATORY (INHALATION) at 08:41

## 2023-02-07 RX ADMIN — DICLOFENAC SODIUM 4 G: 10 GEL TOPICAL at 11:07

## 2023-02-07 RX ADMIN — GABAPENTIN 200 MG: 100 CAPSULE ORAL at 16:47

## 2023-02-07 RX ADMIN — IPRATROPIUM BROMIDE 0.5 MG: 0.5 SOLUTION RESPIRATORY (INHALATION) at 14:00

## 2023-02-07 RX ADMIN — PREDNISONE 40 MG: 20 TABLET ORAL at 08:20

## 2023-02-07 RX ADMIN — DICLOFENAC SODIUM 4 G: 10 GEL TOPICAL at 16:48

## 2023-02-07 RX ADMIN — TRIAMCINOLONE ACETONIDE: 1 CREAM TOPICAL at 08:21

## 2023-02-07 RX ADMIN — IPRATROPIUM BROMIDE 0.5 MG: 0.5 SOLUTION RESPIRATORY (INHALATION) at 20:50

## 2023-02-07 RX ADMIN — TRIAMCINOLONE ACETONIDE: 1 CREAM TOPICAL at 16:48

## 2023-02-07 RX ADMIN — STANDARDIZED SENNA CONCENTRATE 8.6 MG: 8.6 TABLET ORAL at 08:20

## 2023-02-07 RX ADMIN — OXYCODONE HYDROCHLORIDE 5 MG: 5 TABLET ORAL at 01:17

## 2023-02-07 RX ADMIN — INSULIN LISPRO 2 UNITS: 100 INJECTION, SOLUTION INTRAVENOUS; SUBCUTANEOUS at 16:48

## 2023-02-07 RX ADMIN — FORMOTEROL FUMARATE DIHYDRATE 20 MCG: 20 SOLUTION RESPIRATORY (INHALATION) at 20:50

## 2023-02-07 RX ADMIN — HYDROCODONE BITARTRATE AND HOMATROPINE METHYLBROMIDE 5 ML: 5; 1.5 SYRUP ORAL at 11:07

## 2023-02-07 RX ADMIN — BENZONATATE 200 MG: 100 CAPSULE ORAL at 21:42

## 2023-02-07 RX ADMIN — BUDESONIDE 0.5 MG: 0.5 INHALANT ORAL at 20:50

## 2023-02-07 RX ADMIN — DICLOFENAC SODIUM 2 G: 10 GEL TOPICAL at 08:21

## 2023-02-07 RX ADMIN — HYDROCODONE BITARTRATE AND HOMATROPINE METHYLBROMIDE 5 ML: 5; 1.5 SYRUP ORAL at 16:47

## 2023-02-07 RX ADMIN — BENZONATATE 200 MG: 100 CAPSULE ORAL at 08:20

## 2023-02-07 RX ADMIN — GUAIFENESIN 1200 MG: 600 TABLET, EXTENDED RELEASE ORAL at 08:20

## 2023-02-07 RX ADMIN — DICLOFENAC SODIUM 4 G: 10 GEL TOPICAL at 21:43

## 2023-02-07 RX ADMIN — PRAVASTATIN SODIUM 40 MG: 40 TABLET ORAL at 16:48

## 2023-02-07 RX ADMIN — LEVALBUTEROL HYDROCHLORIDE 1.25 MG: 1.25 SOLUTION, CONCENTRATE RESPIRATORY (INHALATION) at 20:50

## 2023-02-07 RX ADMIN — OXYCODONE HYDROCHLORIDE 5 MG: 5 TABLET ORAL at 05:01

## 2023-02-07 RX ADMIN — FORMOTEROL FUMARATE DIHYDRATE 20 MCG: 20 SOLUTION RESPIRATORY (INHALATION) at 08:41

## 2023-02-07 RX ADMIN — CYCLOBENZAPRINE HYDROCHLORIDE 5 MG: 10 TABLET, FILM COATED ORAL at 03:35

## 2023-02-07 RX ADMIN — BENZONATATE 200 MG: 100 CAPSULE ORAL at 16:47

## 2023-02-07 RX ADMIN — GABAPENTIN 200 MG: 100 CAPSULE ORAL at 11:07

## 2023-02-07 RX ADMIN — GUAIFENESIN 1200 MG: 600 TABLET, EXTENDED RELEASE ORAL at 16:47

## 2023-02-07 RX ADMIN — LIDOCAINE 5% 1 PATCH: 700 PATCH TOPICAL at 16:48

## 2023-02-07 RX ADMIN — IPRATROPIUM BROMIDE 0.5 MG: 0.5 SOLUTION RESPIRATORY (INHALATION) at 08:41

## 2023-02-07 RX ADMIN — FUROSEMIDE 20 MG: 20 TABLET ORAL at 08:21

## 2023-02-07 RX ADMIN — OXYCODONE HYDROCHLORIDE 5 MG: 5 TABLET ORAL at 16:48

## 2023-02-07 RX ADMIN — HYDROCODONE BITARTRATE AND HOMATROPINE METHYLBROMIDE 5 ML: 5; 1.5 SYRUP ORAL at 22:44

## 2023-02-07 RX ADMIN — LEVALBUTEROL HYDROCHLORIDE 1.25 MG: 1.25 SOLUTION, CONCENTRATE RESPIRATORY (INHALATION) at 14:00

## 2023-02-07 RX ADMIN — BUDESONIDE 0.5 MG: 0.5 INHALANT ORAL at 08:41

## 2023-02-07 NOTE — RESPIRATORY THERAPY NOTE
Home Oxygen Qualifying Test     Patient name: Guillaume Moore        : 1945   Date of Test:  2023  Diagnosis:    Home Oxygen Test:    **Medicare Guidelines require item(s) 1-5 on all ambulatory patients or 1 and 2 on non-ambulatory patients  1  Baseline SPO2 on Room Air at rest 85%   If <= 88% on Room Air add O2 via NC to obtain SpO2 >=88%  If LPM needed, document LPM 3LPM needed to reach =>88%    SPO2 during exertion on Room Air N/A due to O2 indicated at rest%  During exertion monitor SPO2  If SPO2 increases >=89%, do not add supplemental oxygen    SPO2 on Oxygen at Rest 93% at 3 LPM    SPO2 during exertion on Oxygen 90 % at 6 LPM    Test performed during exertion activity  Pt ambulated 280 feet, O2 delivery titrated to maintain adequate saturations  [x]  Supplemental Home Oxygen is indicated  []  Client does not qualify for home oxygen      Respiratory Additional Notes-     Rosana Beck, RT

## 2023-02-07 NOTE — CASE MANAGEMENT
Case Management Discharge Planning Note    Patient name Early Europe  Location [facility] 184-11 MRN [id number]  : 1945 Date 2023       Current Admission Date: 2023  Current Admission Diagnosis:Acute respiratory failure with hypoxia [facility])   Patient Active Problem List    Diagnosis Date Noted   • Acute respiratory failure with hypoxia ([facility] ) 2023   • History of COVID-19 2023   • COPD exacerbation ([facility] ) 2023   • Biventricular congestive heart failure ([facility] ) 2023   • Mixed hyperlipidemia 2022   • Dysthymic disorder 2022   • Impaired fasting blood sugar 2022   • Gastrointestinal hemorrhage 10/21/2021   • Dermatitis 10/21/2021   • Primary osteoarthritis of both hands 10/21/2021   • Congestive heart failure with right ventricular systolic dysfunction ([facility] ) 2021   • Rheumatoid factor positive 2021   • Arthritis 2021   • Diabetic eye exam ([facility] ) 2021   • Hypoxia 2021   • Essential hypertension, benign 2021   • Chronic combined systolic and diastolic congestive heart failure ([facility] ) 2021   • Pulmonary hypertension ([facility] ) 2021   • Cigarette nicotine dependence without complication    • Elevated BP without diagnosis of hypertension 2021   • Lung nodules 2021   • COPD (chronic obstructive pulmonary disease) ([facility] ) 2021   • Chronic respiratory failure with hypoxia ([facility] ) 2021   • Nicotine dependence 2021   • Microalbuminuria 2021   • SOB (shortness of breath) 2021   • Uncontrolled type 2 diabetes mellitus with hyperglycemia ([facility] ) 2021   • Allergic rhinitis    • Type 2 diabetes, HbA1c goal < 7% ([facility])    • Anxiety    • Tendinitis of finger 2015      LOS (days): 7  Geometric Mean LOS (GMLOS) (days): 3 40  Days to GMLOS:-3 4     OBJECTIVE:  Risk of Unplanned Readmission Score: 12 09         Current admission status: Inpatient   Preferred Pharmacy:   [address] [place] [address] [id number] [place] - [address] [facility] [address]  Phone: [phone] Fax: [phone]    Primary Care Provider: Escobar Grier MD    Primary Insurance: MEDICARE  Secondary Insurance: AARP    DISCHARGE DETAILS:    O2 requested for home via [employer]

## 2023-02-07 NOTE — PLAN OF CARE
Problem: PAIN - ADULT  Goal: Verbalizes/displays adequate comfort level or baseline comfort level  Description: Interventions:  - Encourage patient to monitor pain and request assistance  - Assess pain using appropriate pain scale  - Administer analgesics based on type and severity of pain and evaluate response  - Implement non-pharmacological measures as appropriate and evaluate response  - Consider cultural and social influences on pain and pain management  - Notify physician/advanced practitioner if interventions unsuccessful or patient reports new pain  Outcome: Progressing     Problem: INFECTION - ADULT  Goal: Absence or prevention of progression during hospitalization  Description: INTERVENTIONS:  - Assess and monitor for signs and symptoms of infection  - Monitor lab/diagnostic results  - Monitor all insertion sites, i e  indwelling lines, tubes, and drains  - Monitor endotracheal if appropriate and nasal secretions for changes in amount and color  - [place] appropriate cooling/warming therapies per order  - Administer medications as ordered  - Instruct and encourage patient and family to use good hand hygiene technique  - Identify and instruct in appropriate isolation precautions for identified infection/condition  Outcome: Progressing     Problem: DISCHARGE PLANNING  Goal: Discharge to home or other facility with appropriate resources  Description: INTERVENTIONS:  - Identify barriers to discharge w/patient and caregiver  - Arrange for needed discharge resources and transportation as appropriate  - Identify discharge learning needs (meds, wound care, etc )  - Arrange for interpretive services to assist at discharge as needed  - Refer to Case Management Department for coordinating discharge planning if the patient needs post-hospital services based on physician/advanced practitioner order or complex needs related to functional status, cognitive ability, or social support system  Outcome: Progressing Problem: Knowledge Deficit  Goal: Patient/family/caregiver demonstrates understanding of disease process, treatment plan, medications, and discharge instructions  Description: Complete learning assessment and assess knowledge base  Interventions:  - Provide teaching at level of understanding  - Provide teaching via preferred learning methods  Outcome: Progressing     Problem: RESPIRATORY - ADULT  Goal: Achieves optimal ventilation and oxygenation  Description: INTERVENTIONS:  - Assess for changes in respiratory status  - Assess for changes in mentation and behavior  - Position to facilitate oxygenation and minimize respiratory effort  - Oxygen administered by appropriate delivery if ordered  - Initiate smoking cessation education as indicated  - Encourage broncho-pulmonary hygiene including cough, deep breathe, Incentive Spirometry  - Assess the need for suctioning and aspirate as needed  - Assess and instruct to report SOB or any respiratory difficulty  - Respiratory Therapy support as indicated  Outcome: Progressing     Problem: METABOLIC, FLUID AND ELECTROLYTES - ADULT  Goal: Electrolytes maintained within normal limits  Description: INTERVENTIONS:  - Monitor labs and assess patient for signs and symptoms of electrolyte imbalances  - Administer electrolyte replacement as ordered  - Monitor response to electrolyte replacements, including repeat lab results as appropriate  - Instruct patient on fluid and nutrition as appropriate  Outcome: Progressing     Problem: Nutrition/Hydration-ADULT  Goal: Nutrient/Hydration intake appropriate for improving, restoring or maintaining nutritional needs  Description: Monitor and assess patient's nutrition/hydration status for malnutrition  Collaborate with interdisciplinary team and initiate plan and interventions as ordered  Monitor patient's weight and dietary intake as ordered or per policy  Utilize nutrition screening tool and intervene as necessary   Determine patient's food preferences and provide high-protein, high-caloric foods as appropriate       INTERVENTIONS:  - Monitor oral intake, urinary output, labs, and treatment plans  - Assess nutrition and hydration status and recommend course of action  - Evaluate amount of meals eaten  - Assist patient with eating if necessary   - Allow adequate time for meals  - Recommend/ encourage appropriate diets, oral nutritional supplements, and vitamin/mineral supplements  - Order, calculate, and assess calorie counts as needed  - Recommend, monitor, and adjust tube feedings and TPN/PPN based on assessed needs  - Assess need for intravenous fluids  - Provide specific nutrition/hydration education as appropriate  - Include patient/family/caregiver in decisions related to nutrition  Outcome: Progressing

## 2023-02-07 NOTE — PROGRESS NOTES
PULMONOLOGY PROGRESS NOTE     Name: Janiya Pederson   Age & Sex: 76 y o  female   MRN: [id number]  Unit/Bed#: [facility] 834-01   Encounter: [id number]    PATIENT INFORMATION     Name: Janiya Pederson   Age & Sex: 76 y o  female   MRN: [id number]  Hospital Stay Days: 7    ASSESSMENT/PLAN     Assessment:   1  Acute on chronic respiratory failure with hypoxia and hypercapnia  2  Possible acute exacerbation of COPD  3  Recent COVID, likely postacute sequela of COVID  4  Severe COPD  5  Pulmonary hypertension  6  Chronic systolic and diastolic heart failure  7  Multiple pulmonary nodules  8  Tobacco abuse history    Plan:  • Continue supplement oxygen to maintain SPO2 greater than 88%  • Oxygen titration study shows baseline requirement of 3 L and 6 L with exertion  • She was supposed to be on Trelegy Ellipta as per last pulmonology note  Continue inhaled budesonide, inhaled formoterol, inhaled levalbuterol and inhaled ipratroprium  Discharge with LAMA/LABA/ICA  • Transitioned to prednisone taper  • Pulmonary nodule follow-up as an outpatient  • Agree with airway clearance protocol flutter valve  • Follow-up with Dr Licha Abraham as an outpatient          SUBJECTIVE     Patient seen and examined  No acute events overnight  Coughing fit at 1 am  Difficulty falling back asleep  OBJECTIVE     Vitals:    23 2106 23 2115 23 2135 23 0657   BP:   127/68 120/67   Pulse:   82 80   Resp:   17    Temp:   (!) 97 °F (36 1 °C)    TempSrc:       SpO2: 94% 94% 91% 95%   Weight:       Height:          Temperature:   Temp (24hrs), Av 9 °F (36 1 °C), Min:96 8 °F (36 °C), Max:97 °F (36 1 °C)    Temperature: (!) 97 °F (36 1 °C)  Intake & Output:  I/O        0701  / 0700 / 0701  02/ 0700 02/ 0701  /04 0700    P  O   580     Total Intake(mL/kg)  580 (8 3)     Net  +580                Weights:   IBW (Ideal Body Weight): 54 7 kg    Body mass index is 26 38 kg/m²    Weight (last 2 days)     None        Physical Exam  Vitals and nursing note reviewed  Constitutional:       General: She is not in acute distress  Appearance: Normal appearance  She is well-developed  She is obese  She is ill-appearing  She is not toxic-appearing  Interventions: She is not intubated  HENT:      Head: Normocephalic and atraumatic  Right Ear: External ear normal       Left Ear: External ear normal       Nose: Nose normal       Mouth/Throat:      Mouth: Mucous membranes are moist       Pharynx: Oropharynx is clear  Eyes:      General: No scleral icterus  Conjunctiva/sclera: Conjunctivae normal    Cardiovascular:      Rate and Rhythm: Normal rate and regular rhythm  Pulses: Normal pulses  Heart sounds: Normal heart sounds  No murmur heard  No friction rub  No gallop  Pulmonary:      Effort: Pulmonary effort is normal  No tachypnea, bradypnea, accessory muscle usage or respiratory distress  She is not intubated  Breath sounds: Normal air entry  No decreased air movement  Decreased breath sounds present  No wheezing, rhonchi or rales  Abdominal:      General: Abdomen is flat  Bowel sounds are normal       Palpations: Abdomen is soft  Musculoskeletal:         General: No swelling or tenderness  Cervical back: Neck supple  No tenderness  Skin:     General: Skin is warm and dry  Neurological:      General: No focal deficit present  Mental Status: She is alert and oriented to person, place, and time  Mental status is at baseline  LABORATORY DATA     Labs: I have personally reviewed pertinent reports    Results from last 7 days   Lab Units 02/07/23 0620 02/06/23  0530 02/05/23  0442   WBC Thousand/uL 9 23 9 62 9 22   HEMOGLOBIN g/dL 13 0 13 3 13 3   HEMATOCRIT % 42 0 42 6 41 9   PLATELETS Thousands/uL 319 330 340   NEUTROS PCT % 62 63 88*   MONOS PCT % 8 9 3*      Results from last 7 days   Lab Units 02/07/23 0620 02/06/23  0530 02/05/23  0442   POTASSIUM mmol/L 3 8 4 4 4 6   CHLORIDE mmol/L 103 104 102   CO2 mmol/L 32 27 32   BUN mg/dL 23 21 27*   CREATININE mg/dL 0 57* 0 59* 0 66   CALCIUM mg/dL 8 6 9 0 9 2     Results from last 7 days   Lab Units 02/07/23  0620   MAGNESIUM mg/dL 2 5                          ABG:   Results from last 7 days   Lab Units 02/02/23  1330   PH ART  7 429   PCO2 ART mm Hg 44 8*   PO2 ART mm Hg 50 0*   HCO3 ART mmol/L 29 0*   BASE EXC ART mmol/L 4 0   ABG SOURCE  Radial, Right       Micro:         IMAGING & DIAGNOSTIC TESTING     Radiology Results: I have personally reviewed pertinent reports  XR chest pa & lateral    Result Date: 1/30/2023  Impression: No acute cardiopulmonary disease  This report is in agreement with the preliminary interpretation  Workstation performed: [id number]     CTA ED chest PE study    Result Date: 1/30/2023  Impression: No pulmonary embolus  No evidence of acute thoracic process  COPD  3 nodules in the left lower lobe, the largest of which measures 6 mm stable since May 2021  No new pulmonary nodules  Noncontrast chest CT follow-up in 12 months could be considered to establish two-year stability  Additional chronic findings and negatives as above  Workstation performed: [id number]     Other Diagnostic Testing: I have personally reviewed pertinent reports      ACTIVE MEDICATIONS     Current Facility-Administered Medications   Medication Dose Route Frequency   • albuterol (PROVENTIL HFA,VENTOLIN HFA) inhaler 2 puff  2 puff Inhalation Q4H PRN   • ALPRAZolam (XANAX) tablet 0 5 mg  0 5 mg Oral HS PRN   • benzonatate (TESSALON PERLES) capsule 200 mg  200 mg Oral TID   • budesonide (PULMICORT) inhalation solution 0 5 mg  0 5 mg Nebulization Q12H   • Diclofenac Sodium (VOLTAREN) 1 % topical gel 2 g  2 g Topical 4x Daily   • enoxaparin (LOVENOX) subcutaneous injection 40 mg  40 mg Subcutaneous Q24H CHARLY   • formoterol (PERFOROMIST) nebulizer solution 20 mcg  20 mcg Nebulization Q12H   • furosemide (LASIX) tablet 20 mg  20 mg Oral Daily   • guaiFENesin (MUCINEX) 12 hr tablet 1,200 mg  1,200 mg Oral BID   • hydrOXYzine HCL (ATARAX) tablet 10 mg  10 mg Oral BID PRN   • insulin lispro (HumaLOG) 100 units/mL subcutaneous injection 1-6 Units  1-6 Units Subcutaneous TID AC   • ipratropium (ATROVENT) 0 02 % inhalation solution 0 5 mg  0 5 mg Nebulization TID   • levalbuterol (XOPENEX) inhalation solution 1 25 mg  1 25 mg Nebulization TID   • LORazepam (ATIVAN) injection 0 5 mg  0 5 mg Intravenous Q4H PRN   • oxyCODONE (ROXICODONE) IR tablet 5 mg  5 mg Oral Q4H PRN   • pravastatin (PRAVACHOL) tablet 40 mg  40 mg Oral Daily With Dinner   • predniSONE tablet 40 mg  40 mg Oral Daily    Followed by   • [START ON 2/9/2023] predniSONE tablet 30 mg  30 mg Oral Daily    Followed by   • [START ON 2/12/2023] predniSONE tablet 20 mg  20 mg Oral Daily    Followed by   • [START ON 2/15/2023] predniSONE tablet 10 mg  10 mg Oral Daily   • senna (SENOKOT) tablet 8 6 mg  1 tablet Oral Daily   • sertraline (ZOLOFT) tablet 50 mg  50 mg Oral Daily   • triamcinolone (KENALOG) 0 1 % cream   Topical BID         Disclaimer: Portions of the record may have been created with voice recognition software  Occasional wrong word or "sound a like" substitutions may have occurred due to the inherent limitations of voice recognition software  Careful consideration should be taken to recognize, using context, where substitutions have occurred      Jordan Ramirez DO   Pulmonary and Critical Care Fellow, PGY-V  [facility]

## 2023-02-07 NOTE — PROGRESS NOTES
INTERNAL MEDICINE RESIDENCY PROGRESS NOTE     Name: Ciera Moulton   Age & Sex: 76 y o  female   MRN: [id number]  Unit/Bed#: [facility] 834-01   Encounter: [id number]  Team: SOD Team B     PATIENT INFORMATION     Name: Ciera Moulton   Age & Sex: 76 y o  female   MRN: [id number]  Hospital Stay Days: 7    ASSESSMENT/PLAN     Principal Problem:    Acute respiratory failure with hypoxia ([place] [address] )  Active Problems:    Type 2 diabetes, HbA1c goal < 7% ([facility])    Anxiety    SOB (shortness of breath)    COPD (chronic obstructive pulmonary disease) ([place] [address] )    Essential hypertension, benign    Dysthymic disorder    History of COVID-19    COPD exacerbation ([facility])    Biventricular congestive heart failure ([facility])    Chest pain      Chest pain  Assessment & Plan  Left sided chest pain   Pain on palpation  CXR neg for fracture   Likely hairline frac from severe coughing    Pain control  Maintain oxygen     Biventricular congestive heart failure ([place] [address] )  Assessment & Plan  Hx of combined CHF 2/2 to CAD and pulmonary HTN in the setting of chronic tobacco abuse and severe COPD  With preserved LV systolic function and LV diastolic dysfunction, right ventricular dilatation and dysfunction  Patient required home O2 (3L tapered off over ~2 months), daily diuretics as well as inhalers back in 1316-8906  Patient had recovery of EF w/ smoking cessation, weight loss, and medication   Last ECHO June '22 w/ EF 60% w LV & LV normal systolic and diastolic dysfunction   Echo 1/31 LVEF 21%, vigorous systolic function, grade II diastolic dysfunction, mildly dilated RV with normal systolic function, RV systolic pressure 59MPIH, mild tricuspid regurgitation    - Appears dry on exam, no JVD, no pitting edema     · Continue home lasix 20 MWF    COPD exacerbation ([facility] )  Assessment & Plan  75 y/o F w/ hx of COPD, recent covid 1/23/23 s/p tx w Lagrevrio (molnupiravir) x 5 days who inititally felt better but presented with 2 days of worsening cough, increased sputum production SOB, GONZALEZ and fatigue      - Hypoxic on admission, requiring 5L O2  - Afebrile  No leukocytosis   - CT PE study unremarkable & w/o infilatrates, or pulm edema  - CXR no acute cardiopulmonary disease     · Admitted on combination of COPD/COVID pathway   · IV Solu-Medrol 40 mg daily  · 3 day course of Azithro completed 2/1  · Resp protocol   · Pulmonary consulted and recommendations appreciated:  · Started inhaled budesonide 0 5 mg Q12 and inhaled formoterol 20 mcg Q12  · IV Solu-Medrol 40 mg twice daily switched to 40 mg daily w/ a taper  · Maintain SPO2>88%  · Will likely require 4L home O2    History of COVID-19  Assessment & Plan  Recent covid 1/23/23 s/p tx w Lagrevrio (molnupiravir) x 5 days who inititally felt better but presented with 2 days of worsening cough, increased sputum production SOB, GONZALEZ and fatigue    - Has required 4-7L O2 NC  - CTA PE unremarkable for PE, & w/o pulmonary infiltrates   - Afebrile, no leukocytosis, renal function WNL  - Repeat COVID/FLU/RSV negative this admission  - Procalcitonin, CK, BNP WNL  - CRP 20 0    - Will treat for mild covid pathway / COPD exacerbation as noted above  · Continue IV steroids  · Per pharmacy - as COVID negative remdisivir no longer indicated  · resp protocol     Dysthymic disorder  Assessment & Plan  · Continue home zoloft 50mg daily, home hydroxyzine 10mg BID PRN  · Xanax 0 5mg QHS PRN  · Ativan 0 5mg IV Q4H PRN    Essential hypertension, benign  Assessment & Plan  · Continue home lasix 20 MWF  COPD (chronic obstructive pulmonary disease) ([facility] )  Assessment & Plan  Patient with "many year" history of smoking, though states she quit several years ago  PFT's conducted 6/21 showed increased lung volumes, FEV1 of 47% predicted without significant bronchodilator response, and corrected DLCO of 40%, indicative of severe obstructive disease  Not on home medications   She states that this is the first time she has required hospitalization due to cough/dyspnea, though states she was hospitalized about 1 year ago for CHF exacerbation  Plan  · Duo-Neb Q6H  · IV Solu-Medrol decreased from 40 mg twice daily to 40 mg daily  · Completed Azithromycin 500mg x3 days 2/1  · Ventolin Q4h PRN  · Mucinex BID  · Tessalon perles TID   · Change Tussionex to Hycodan TID  · Resp protocol    SOB (shortness of breath)  Assessment & Plan  See a/p above    Anxiety  Assessment & Plan  Patient states that she has a history of anxiety and states that her coughing spells cause increased anxiety    · Continue home atarax 10 mg BID PRN    Type 2 diabetes, HbA1c goal < 7% [facility])  Assessment & Plan  Lab Results   Component Value Date    HGBA1C 6 2 (H) 09/20/2022     Recent Labs     02/05/23  1044 02/05/23  1555 02/05/23  2107 02/06/23  0704   POCGLU 279* 144* 154* 107       Blood Sugar Average: Last 72 hrs:  (P) 594 [id number]   Well-controlled with diet and exercise  Not on any medication  · Carb controlled diet  · in patient goal -180  · Add on sliding scale insulin if uncontrolled sugars 2/2 IV steroids    * Acute respiratory failure with hypoxia ([facility])  Assessment & Plan    2/2 IM- Acute respiratory failure with hypoxia ([facility])  Assessment & Plan    Due to recent covid infection (1/23/23) c/b COPD exacerbation   - Hypoxic on admission, requiring 5L O2  - Afebrile  No leukocytosis   - CT PE study unremarkable & w/o infilatrates, or pulm edema  - CXR no acute cardiopulmonary disease      -Currently on Duonebs / steroids / mucinex / hycodan       Disposition: medical management      SUBJECTIVE     Patient seen and examined  No acute events overnight  Worsening chest pain likely due to rib fracture from the severe coughing       OBJECTIVE     Vitals:    02/06/23 2115 02/06/23 2135 02/07/23 0600 02/07/23 0657   BP:  127/68  120/67   Pulse:  82  80   Resp:  17     Temp:  (!) 97 °F (36 1 °C)     TempSrc:       SpO2: 94% 91%  95%   Weight:   70 4 kg (155 lb 1 6 oz)    Height:          Temperature: Temp (24hrs), Av 9 °F (36 1 °C), Min:96 8 °F (36 °C), Max:97 °F (36 1 °C)    Temperature: (!) 97 °F (36 1 °C)  Intake & Output:  I/O           P  O  180      Total Intake(mL/kg) 180 (2 6)      Net +180             Unmeasured Urine Occurrence 3 x          Weights:   IBW (Ideal Body Weight): 54 7 kg    Body mass index is 26 62 kg/m²  Weight (last 2 days)     Date/Time Weight    23 0600 70 4 (155 1)        Physical Exam  Constitutional:       General: She is not in acute distress  Appearance: She is not ill-appearing, toxic-appearing or diaphoretic  HENT:      Head: Normocephalic and atraumatic  Cardiovascular:      Rate and Rhythm: Normal rate and regular rhythm  Pulses: Normal pulses  Heart sounds: Normal heart sounds  No gallop  Pulmonary:      Effort: Pulmonary effort is normal  No respiratory distress  Breath sounds: No wheezing or rales  Chest:      Chest wall: No tenderness  Abdominal:      General: There is no distension  Tenderness: There is no right CVA tenderness or left CVA tenderness  Musculoskeletal:      Right lower leg: No edema  Left lower leg: No edema  Neurological:      General: No focal deficit present  Mental Status: She is oriented to person, place, and time  Mental status is at baseline  Psychiatric:         Mood and Affect: Mood normal          Behavior: Behavior normal        LABORATORY DATA     Labs: I have personally reviewed pertinent reports    Results from last 7 days   Lab Units 23  0530 23  0442   WBC Thousand/uL 9 23 9 62 9 22   HEMOGLOBIN g/dL 13 0 13 3 13 3   HEMATOCRIT % 42 0 42 6 41 9   PLATELETS Thousands/uL 319 330 340   NEUTROS PCT % 62 63 88*   MONOS PCT % 8 9 3*      Results from last 7 days   Lab Units 23  0623  0530 23  0442   POTASSIUM mmol/L 3 8 4 4 4 6   CHLORIDE mmol/L 103 104 102   CO2 mmol/L 32 27 32   BUN mg/dL 23 21 27*   CREATININE mg/dL 0 57* 0 59* 0 66   CALCIUM mg/dL 8 6 9 0 9 2     Results from last 7 days   Lab Units 02/07/23  0620   MAGNESIUM mg/dL 2 5                        IMAGING & DIAGNOSTIC TESTING     Radiology Results: I have personally reviewed pertinent reports  XR chest portable    Result Date: 2/4/2023  Impression: No acute findings  Workstation performed: [id number]     XR chest portable    Result Date: 2/3/2023  Impression: No acute cardiopulmonary disease  Workstation performed: [id number]     XR chest pa & lateral    Result Date: 1/30/2023  Impression: No acute cardiopulmonary disease  This report is in agreement with the preliminary interpretation  Workstation performed: [id number]     CTA ED chest PE study    Result Date: 1/30/2023  Impression: No pulmonary embolus  No evidence of acute thoracic process  COPD  3 nodules in the left lower lobe, the largest of which measures 6 mm stable since May 2021  No new pulmonary nodules  Noncontrast chest CT follow-up in 12 months could be considered to establish two-year stability  Additional chronic findings and negatives as above  Workstation performed: [id number]     Other Diagnostic Testing: I have personally reviewed pertinent reports      ACTIVE MEDICATIONS     Current Facility-Administered Medications   Medication Dose Route Frequency   • albuterol (PROVENTIL HFA,VENTOLIN HFA) inhaler 2 puff  2 puff Inhalation Q4H PRN   • ALPRAZolam (XANAX) tablet 0 5 mg  0 5 mg Oral HS PRN   • benzonatate (TESSALON PERLES) capsule 200 mg  200 mg Oral TID   • budesonide (PULMICORT) inhalation solution 0 5 mg  0 5 mg Nebulization Q12H   • Diclofenac Sodium (VOLTAREN) 1 % topical gel 4 g  4 g Topical 4x Daily   • enoxaparin (LOVENOX) subcutaneous injection 40 mg  40 mg Subcutaneous Q24H CHARLY   • formoterol (PERFOROMIST) nebulizer solution 20 mcg  20 mcg Nebulization Q12H   • furosemide (LASIX) tablet 20 mg  20 mg Oral Daily   • gabapentin (NEURONTIN) capsule 200 mg  200 mg Oral BID   • guaiFENesin (MUCINEX) 12 hr tablet 1,200 mg  1,200 mg Oral BID   • HYDROcodone Bit-Homatrop MBr (HYCODAN) oral syrup 5 mL  5 mL Oral Q6H   • hydrOXYzine HCL (ATARAX) tablet 10 mg  10 mg Oral BID PRN   • insulin lispro (HumaLOG) 100 units/mL subcutaneous injection 1-6 Units  1-6 Units Subcutaneous TID AC   • ipratropium (ATROVENT) 0 02 % inhalation solution 0 5 mg  0 5 mg Nebulization TID   • levalbuterol (XOPENEX) inhalation solution 1 25 mg  1 25 mg Nebulization TID   • LORazepam (ATIVAN) injection 0 5 mg  0 5 mg Intravenous Q4H PRN   • oxyCODONE (ROXICODONE) immediate release tablet 10 mg  10 mg Oral Q6H PRN   • oxyCODONE (ROXICODONE) IR tablet 5 mg  5 mg Oral Q4H PRN   • pravastatin (PRAVACHOL) tablet 40 mg  40 mg Oral Daily With Dinner   • predniSONE tablet 40 mg  40 mg Oral Daily    Followed by   • [START ON 2/9/2023] predniSONE tablet 30 mg  30 mg Oral Daily    Followed by   • [START ON 2/12/2023] predniSONE tablet 20 mg  20 mg Oral Daily    Followed by   • [START ON 2/15/2023] predniSONE tablet 10 mg  10 mg Oral Daily   • senna (SENOKOT) tablet 8 6 mg  1 tablet Oral Daily   • sertraline (ZOLOFT) tablet 50 mg  50 mg Oral Daily   • triamcinolone (KENALOG) 0 1 % cream   Topical BID       VTE Pharmacologic Prophylaxis: Enoxaparin (Lovenox)  VTE Mechanical Prophylaxis: sequential compression device    Portions of the record may have been created with voice recognition software  Occasional wrong word or "sound a like" substitutions may have occurred due to the inherent limitations of voice recognition software    Read the chart carefully and recognize, using context, where substitutions have occurred   ==  Sarah Deluca MD  [facility]  Internal Medicine Residency PGY-3

## 2023-02-08 LAB
ANION GAP SERPL CALCULATED.3IONS-SCNC: 2 MMOL/L (ref 4–13)
BASOPHILS # BLD AUTO: 0.01 THOUSANDS/ÂΜL (ref 0–0.1)
BASOPHILS NFR BLD AUTO: 0 % (ref 0–1)
BUN SERPL-MCNC: 23 MG/DL (ref 5–25)
CALCIUM SERPL-MCNC: 8.7 MG/DL (ref 8.3–10.1)
CHLORIDE SERPL-SCNC: 102 MMOL/L (ref 96–108)
CO2 SERPL-SCNC: 32 MMOL/L (ref 21–32)
CREAT SERPL-MCNC: 0.55 MG/DL (ref 0.6–1.3)
EOSINOPHIL # BLD AUTO: 0.1 THOUSAND/ÂΜL (ref 0–0.61)
EOSINOPHIL NFR BLD AUTO: 1 % (ref 0–6)
ERYTHROCYTE [DISTWIDTH] IN BLOOD BY AUTOMATED COUNT: 12.4 % (ref 11.6–15.1)
GFR SERPL CREATININE-BSD FRML MDRD: 89 ML/MIN/1.73SQ M
GLUCOSE SERPL-MCNC: 114 MG/DL (ref 65–140)
GLUCOSE SERPL-MCNC: 130 MG/DL (ref 65–140)
GLUCOSE SERPL-MCNC: 131 MG/DL (ref 65–140)
GLUCOSE SERPL-MCNC: 193 MG/DL (ref 65–140)
GLUCOSE SERPL-MCNC: 97 MG/DL (ref 65–140)
HCT VFR BLD AUTO: 40 % (ref 34.8–46.1)
HGB BLD-MCNC: 12.6 G/DL (ref 11.5–15.4)
IMM GRANULOCYTES # BLD AUTO: 0.05 THOUSAND/UL (ref 0–0.2)
IMM GRANULOCYTES NFR BLD AUTO: 1 % (ref 0–2)
LYMPHOCYTES # BLD AUTO: 1.74 THOUSANDS/ÂΜL (ref 0.6–4.47)
LYMPHOCYTES NFR BLD AUTO: 18 % (ref 14–44)
MCH RBC QN AUTO: 29.9 PG (ref 26.8–34.3)
MCHC RBC AUTO-ENTMCNC: 31.5 G/DL (ref 31.4–37.4)
MCV RBC AUTO: 95 FL (ref 82–98)
MONOCYTES # BLD AUTO: 0.64 THOUSAND/ÂΜL (ref 0.17–1.22)
MONOCYTES NFR BLD AUTO: 7 % (ref 4–12)
NEUTROPHILS # BLD AUTO: 7.05 THOUSANDS/ÂΜL (ref 1.85–7.62)
NEUTS SEG NFR BLD AUTO: 73 % (ref 43–75)
NRBC BLD AUTO-RTO: 0 /100 WBCS
PLATELET # BLD AUTO: 271 THOUSANDS/UL (ref 149–390)
PMV BLD AUTO: 11.7 FL (ref 8.9–12.7)
POTASSIUM SERPL-SCNC: 4.2 MMOL/L (ref 3.5–5.3)
RBC # BLD AUTO: 4.21 MILLION/UL (ref 3.81–5.12)
SODIUM SERPL-SCNC: 136 MMOL/L (ref 135–147)
WBC # BLD AUTO: 9.59 THOUSAND/UL (ref 4.31–10.16)

## 2023-02-08 RX ADMIN — SERTRALINE HYDROCHLORIDE 50 MG: 50 TABLET ORAL at 09:03

## 2023-02-08 RX ADMIN — LEVALBUTEROL HYDROCHLORIDE 1.25 MG: 1.25 SOLUTION, CONCENTRATE RESPIRATORY (INHALATION) at 13:36

## 2023-02-08 RX ADMIN — LIDOCAINE 5% 1 PATCH: 700 PATCH TOPICAL at 09:03

## 2023-02-08 RX ADMIN — TRIAMCINOLONE ACETONIDE: 1 CREAM TOPICAL at 17:22

## 2023-02-08 RX ADMIN — FORMOTEROL FUMARATE DIHYDRATE 20 MCG: 20 SOLUTION RESPIRATORY (INHALATION) at 20:30

## 2023-02-08 RX ADMIN — DICLOFENAC SODIUM 4 G: 10 GEL TOPICAL at 22:25

## 2023-02-08 RX ADMIN — GABAPENTIN 200 MG: 100 CAPSULE ORAL at 09:03

## 2023-02-08 RX ADMIN — FORMOTEROL FUMARATE DIHYDRATE 20 MCG: 20 SOLUTION RESPIRATORY (INHALATION) at 07:39

## 2023-02-08 RX ADMIN — GUAIFENESIN 1200 MG: 600 TABLET, EXTENDED RELEASE ORAL at 09:03

## 2023-02-08 RX ADMIN — ENOXAPARIN SODIUM 40 MG: 40 INJECTION SUBCUTANEOUS at 09:03

## 2023-02-08 RX ADMIN — BUDESONIDE 0.5 MG: 0.5 INHALANT ORAL at 07:38

## 2023-02-08 RX ADMIN — PREDNISONE 40 MG: 20 TABLET ORAL at 09:03

## 2023-02-08 RX ADMIN — IPRATROPIUM BROMIDE 0.5 MG: 0.5 SOLUTION RESPIRATORY (INHALATION) at 20:30

## 2023-02-08 RX ADMIN — GUAIFENESIN 1200 MG: 600 TABLET, EXTENDED RELEASE ORAL at 17:21

## 2023-02-08 RX ADMIN — TRIAMCINOLONE ACETONIDE: 1 CREAM TOPICAL at 09:04

## 2023-02-08 RX ADMIN — GABAPENTIN 200 MG: 100 CAPSULE ORAL at 17:21

## 2023-02-08 RX ADMIN — BUDESONIDE 0.5 MG: 0.5 INHALANT ORAL at 20:30

## 2023-02-08 RX ADMIN — HYDROCODONE BITARTRATE AND HOMATROPINE METHYLBROMIDE 5 ML: 5; 1.5 SYRUP ORAL at 17:21

## 2023-02-08 RX ADMIN — PRAVASTATIN SODIUM 40 MG: 40 TABLET ORAL at 17:21

## 2023-02-08 RX ADMIN — HYDROCODONE BITARTRATE AND HOMATROPINE METHYLBROMIDE 5 ML: 5; 1.5 SYRUP ORAL at 04:10

## 2023-02-08 RX ADMIN — LEVALBUTEROL HYDROCHLORIDE 1.25 MG: 1.25 SOLUTION, CONCENTRATE RESPIRATORY (INHALATION) at 20:30

## 2023-02-08 RX ADMIN — IPRATROPIUM BROMIDE 0.5 MG: 0.5 SOLUTION RESPIRATORY (INHALATION) at 07:38

## 2023-02-08 RX ADMIN — HYDROCODONE BITARTRATE AND HOMATROPINE METHYLBROMIDE 5 ML: 5; 1.5 SYRUP ORAL at 11:02

## 2023-02-08 RX ADMIN — BENZONATATE 200 MG: 100 CAPSULE ORAL at 09:03

## 2023-02-08 RX ADMIN — LEVALBUTEROL HYDROCHLORIDE 1.25 MG: 1.25 SOLUTION, CONCENTRATE RESPIRATORY (INHALATION) at 07:38

## 2023-02-08 RX ADMIN — STANDARDIZED SENNA CONCENTRATE 8.6 MG: 8.6 TABLET ORAL at 09:03

## 2023-02-08 RX ADMIN — DICLOFENAC SODIUM 4 G: 10 GEL TOPICAL at 09:04

## 2023-02-08 RX ADMIN — HYDROCODONE BITARTRATE AND HOMATROPINE METHYLBROMIDE 5 ML: 5; 1.5 SYRUP ORAL at 22:24

## 2023-02-08 RX ADMIN — INSULIN LISPRO 2 UNITS: 100 INJECTION, SOLUTION INTRAVENOUS; SUBCUTANEOUS at 17:21

## 2023-02-08 RX ADMIN — FUROSEMIDE 20 MG: 20 TABLET ORAL at 09:03

## 2023-02-08 RX ADMIN — IPRATROPIUM BROMIDE 0.5 MG: 0.5 SOLUTION RESPIRATORY (INHALATION) at 13:36

## 2023-02-08 RX ADMIN — BENZONATATE 200 MG: 100 CAPSULE ORAL at 22:24

## 2023-02-08 RX ADMIN — BENZONATATE 200 MG: 100 CAPSULE ORAL at 17:24

## 2023-02-08 NOTE — PROGRESS NOTES
INTERNAL MEDICINE RESIDENCY PROGRESS NOTE     Name: Ayush Verdugo   Age & Sex: 76 y o  female   MRN: [id number]  Unit/Bed#: [facility] 834-01   Encounter: [id number]  Team: SOD Team B     PATIENT INFORMATION     Name: Ayush Verdugo   Age & Sex: 76 y o  female   MRN: [id number]  Hospital Stay Days: 8    ASSESSMENT/PLAN     Principal Problem:    Acute respiratory failure with hypoxia ([place] [address] )  Active Problems:    Type 2 diabetes, HbA1c goal < 7% ([facility])    Anxiety    SOB (shortness of breath)    COPD (chronic obstructive pulmonary disease) ([place] [address] )    Essential hypertension, benign    Dysthymic disorder    History of COVID-19    COPD exacerbation ([facility])    Biventricular congestive heart failure ([facility])    Chest pain      Chest pain  Assessment & Plan  Left sided chest pain   Pain on palpation  CXR neg for fracture   Likely hairline frac from severe coughing    Pain control  Maintain oxygen     Biventricular congestive heart failure ([place] [address] )  Assessment & Plan  Hx of combined CHF 2/2 to CAD and pulmonary HTN in the setting of chronic tobacco abuse and severe COPD  With preserved LV systolic function and LV diastolic dysfunction, right ventricular dilatation and dysfunction  Patient required home O2 (3L tapered off over ~2 months), daily diuretics as well as inhalers back in 5507-2422  Patient had recovery of EF w/ smoking cessation, weight loss, and medication   Last ECHO June '22 w/ EF 60% w LV & LV normal systolic and diastolic dysfunction   Echo 1/31 LVEF 62%, vigorous systolic function, grade II diastolic dysfunction, mildly dilated RV with normal systolic function, RV systolic pressure 47WFCV, mild tricuspid regurgitation    - Appears dry on exam, no JVD, no pitting edema     · Continue home lasix 20 MWF    COPD exacerbation ([facility] )  Assessment & Plan  77 y/o F w/ hx of COPD, recent covid 1/23/23 s/p tx w Lagrevrio (molnupiravir) x 5 days who inititally felt better but presented with 2 days of worsening cough, increased sputum production SOB, GONZALEZ and fatigue      - Hypoxic on admission, requiring 5L O2  - Afebrile  No leukocytosis   - CT PE study unremarkable & w/o infilatrates, or pulm edema  - CXR no acute cardiopulmonary disease     · Admitted on combination of COPD/COVID pathway   · IV Solu-Medrol 40 mg daily  · 3 day course of Azithro completed 2/1  · Resp protocol   · Pulmonary consulted and recommendations appreciated:  · Started inhaled budesonide 0 5 mg Q12 and inhaled formoterol 20 mcg Q12  · IV Solu-Medrol 40 mg twice daily switched to 40 mg daily w/ a taper  · Maintain SPO2>88%  · Will likely require 4L home O2    History of COVID-19  Assessment & Plan  Recent covid 1/23/23 s/p tx w Lagrevrio (molnupiravir) x 5 days who inititally felt better but presented with 2 days of worsening cough, increased sputum production SOB, GONZALEZ and fatigue    - Has required 4-7L O2 NC  - CTA PE unremarkable for PE, & w/o pulmonary infiltrates   - Afebrile, no leukocytosis, renal function WNL  - Repeat COVID/FLU/RSV negative this admission  - Procalcitonin, CK, BNP WNL  - CRP 20 0    - Will treat for mild covid pathway / COPD exacerbation as noted above  · Continue IV steroids  · Per pharmacy - as COVID negative remdisivir no longer indicated  · resp protocol     Dysthymic disorder  Assessment & Plan  · Continue home zoloft 50mg daily, home hydroxyzine 10mg BID PRN  · Xanax 0 5mg QHS PRN  · Ativan 0 5mg IV Q4H PRN    Essential hypertension, benign  Assessment & Plan  · Continue home lasix 20 MWF  COPD (chronic obstructive pulmonary disease) ([facility] )  Assessment & Plan  Patient with "many year" history of smoking, though states she quit several years ago  PFT's conducted 6/21 showed increased lung volumes, FEV1 of 47% predicted without significant bronchodilator response, and corrected DLCO of 40%, indicative of severe obstructive disease  Not on home medications   She states that this is the first time she has required hospitalization due to cough/dyspnea, though states she was hospitalized about 1 year ago for CHF exacerbation  Plan  · Duo-Neb Q6H  · IV Solu-Medrol decreased from 40 mg twice daily to 40 mg daily  · Completed Azithromycin 500mg x3 days 2/1  · Ventolin Q4h PRN  · Mucinex BID  · Tessalon perles TID   · Change Tussionex to Hycodan TID  · Resp protocol    SOB (shortness of breath)  Assessment & Plan  See a/p above    Anxiety  Assessment & Plan  Patient states that she has a history of anxiety and states that her coughing spells cause increased anxiety    · Continue home atarax 10 mg BID PRN    Type 2 diabetes, HbA1c goal < 7% [facility])  Assessment & Plan  Lab Results   Component Value Date    HGBA1C 6 2 (H) 09/20/2022     Recent Labs     02/05/23  1044 02/05/23  1555 02/05/23  2107 02/06/23  0704   POCGLU 279* 144* 154* 107       Blood Sugar Average: Last 72 hrs:  (P) 835 [id number]   Well-controlled with diet and exercise  Not on any medication  · Carb controlled diet  · in patient goal -180  · Add on sliding scale insulin if uncontrolled sugars 2/2 IV steroids    * Acute respiratory failure with hypoxia ([facility])  Assessment & Plan    2/2 IM- Acute respiratory failure with hypoxia ([facility])  Assessment & Plan    Due to recent covid infection (1/23/23) c/b COPD exacerbation   - Hypoxic on admission, requiring 5L O2  - Afebrile  No leukocytosis   - CT PE study unremarkable & w/o infilatrates, or pulm edema  - CXR no acute cardiopulmonary disease      -Currently on Duonebs / steroids / mucinex / hycodan         Disposition: Home O2 eval  / DC Friday     SUBJECTIVE     Patient seen and examined  No acute events overnight  Had a better night last night  Chest pain is well improved  Denies any abdominal pain or worsening SOB       OBJECTIVE     Vitals:    02/07/23 1510 02/07/23 2051 02/07/23 2207 02/08/23 0739   BP: 104/50  124/62    Pulse: 91  96    Resp: 18  18    Temp: (!) 97 2 °F (36 2 °C)  (!) 97 2 °F (36 2 °C)    TempSrc:       SpO2: 92% 92% 93% 94% Weight:       Height:          Temperature:   Temp (24hrs), Av 2 °F (36 2 °C), Min:97 2 °F (36 2 °C), Max:97 2 °F (36 2 °C)    Temperature: (!) 97 2 °F (36 2 °C)  Intake & Output:  I/O        0701   07 0701   07 07 07    P  O   120     Total Intake(mL/kg)  120 (1 7)     Net  +120            Unmeasured Urine Occurrence 2 x 3 x         Weights:   IBW (Ideal Body Weight): 54 7 kg    Body mass index is 26 62 kg/m²  Weight (last 2 days)     Date/Time Weight    23 0600 70 4 (155 1)        Physical Exam  Constitutional:       General: She is not in acute distress  Appearance: She is not ill-appearing, toxic-appearing or diaphoretic  HENT:      Head: Normocephalic and atraumatic  Nose: No congestion or rhinorrhea  Cardiovascular:      Rate and Rhythm: Normal rate and regular rhythm  Pulses: Normal pulses  Heart sounds: Normal heart sounds  No murmur heard  No friction rub  No gallop  Pulmonary:      Effort: Pulmonary effort is normal       Breath sounds: No rales  Chest:      Chest wall: No tenderness  Abdominal:      General: Bowel sounds are normal  There is distension  Palpations: There is no mass  Tenderness: There is no abdominal tenderness  There is no right CVA tenderness or left CVA tenderness  Musculoskeletal:      Right lower leg: No edema  Left lower leg: No edema  Skin:     Findings: No lesion  Neurological:      General: No focal deficit present  Mental Status: She is oriented to person, place, and time  Psychiatric:         Mood and Affect: Mood normal          Behavior: Behavior normal        LABORATORY DATA     Labs: I have personally reviewed pertinent reports    Results from last 7 days   Lab Units 23  0415 23  0620 23  0530   WBC Thousand/uL 9 59 9 23 9 62   HEMOGLOBIN g/dL 12 6 13 0 13 3   HEMATOCRIT % 40 0 42 0 42 6   PLATELETS Thousands/uL 271 319 330   NEUTROS PCT % 73 62 63   MONOS PCT % 7 8 9      Results from last 7 days   Lab Units 02/08/23  0415 02/07/23  0620 02/06/23  0530   POTASSIUM mmol/L 4 2 3 8 4 4   CHLORIDE mmol/L 102 103 104   CO2 mmol/L 32 32 27   BUN mg/dL 23 23 21   CREATININE mg/dL 0 55* 0 57* 0 59*   CALCIUM mg/dL 8 7 8 6 9 0     Results from last 7 days   Lab Units 02/07/23  0620   MAGNESIUM mg/dL 2 5                        IMAGING & DIAGNOSTIC TESTING     Radiology Results: I have personally reviewed pertinent reports  XR chest portable    Result Date: 2/4/2023  Impression: No acute findings  Workstation performed: [id number]     XR chest portable    Result Date: 2/3/2023  Impression: No acute cardiopulmonary disease  Workstation performed: [id number]     XR chest pa & lateral    Result Date: 1/30/2023  Impression: No acute cardiopulmonary disease  This report is in agreement with the preliminary interpretation  Workstation performed: [id number]     CTA ED chest PE study    Result Date: 1/30/2023  Impression: No pulmonary embolus  No evidence of acute thoracic process  COPD  3 nodules in the left lower lobe, the largest of which measures 6 mm stable since May 2021  No new pulmonary nodules  Noncontrast chest CT follow-up in 12 months could be considered to establish two-year stability  Additional chronic findings and negatives as above  Workstation performed: [id number]     Other Diagnostic Testing: I have personally reviewed pertinent reports      ACTIVE MEDICATIONS     Current Facility-Administered Medications   Medication Dose Route Frequency   • albuterol (PROVENTIL HFA,VENTOLIN HFA) inhaler 2 puff  2 puff Inhalation Q4H PRN   • ALPRAZolam (XANAX) tablet 0 5 mg  0 5 mg Oral HS PRN   • benzonatate (TESSALON PERLES) capsule 200 mg  200 mg Oral TID   • budesonide (PULMICORT) inhalation solution 0 5 mg  0 5 mg Nebulization Q12H   • Diclofenac Sodium (VOLTAREN) 1 % topical gel 4 g  4 g Topical 4x Daily   • enoxaparin (LOVENOX) subcutaneous injection 40 mg  40 mg Subcutaneous Q24H [address]   • formoterol (PERFOROMIST) nebulizer solution 20 mcg  20 mcg Nebulization Q12H   • furosemide (LASIX) tablet 20 mg  20 mg Oral Daily   • gabapentin (NEURONTIN) capsule 200 mg  200 mg Oral BID   • guaiFENesin (MUCINEX) 12 hr tablet 1,200 mg  1,200 mg Oral BID   • HYDROcodone Bit-[place]omat[place] MBr (HYCODAN) oral syrup 5 mL  5 mL Oral Q6H   • hydrOXYzine HCL (ATARAX) tablet 10 mg  10 mg Oral BID PRN   • insulin lispro (HumaLOG) 100 units/mL subcutaneous injection 1-6 Units  1-6 Units Subcutaneous TID AC   • ipratropium (ATROVENT) 0 02 % inhalation solution 0 5 mg  0 5 mg Nebulization TID   • levalbuterol (XOPENEX) inhalation solution 1 25 mg  1 25 mg Nebulization TID   • lidocaine (LIDODERM) 5 % patch 1 patch  1 patch Topical Daily   • LORazepam (ATIVAN) injection 0 5 mg  0 5 mg Intravenous Q4H PRN   • oxyCODONE (ROXICODONE) immediate release tablet 10 mg  10 mg Oral Q6H PRN   • oxyCODONE (ROXICODONE) IR tablet 5 mg  5 mg Oral Q4H PRN   • pravastatin (PRAVACHOL) tablet 40 mg  40 mg Oral Daily With Dinner   • predniSONE tablet 40 mg  40 mg Oral Daily    Followed by   • [START ON 2/9/2023] predniSONE tablet 30 mg  30 mg Oral Daily    Followed by   • [START ON 2/12/2023] predniSONE tablet 20 mg  20 mg Oral Daily    Followed by   • [START ON 2/15/2023] predniSONE tablet 10 mg  10 mg Oral Daily   • senna (SENOKOT) tablet 8 6 mg  1 tablet Oral Daily   • sertraline (ZOLOFT) tablet 50 mg  50 mg Oral Daily   • triamcinolone (KENALOG) 0 1 % cream   Topical BID       VTE Pharmacologic Prophylaxis: Enoxaparin (Lovenox)  VTE Mechanical Prophylaxis: sequential compression device    Portions of the record may have been created with voice recognition software  Occasional wrong word or "sound a like" substitutions may have occurred due to the inherent limitations of voice recognition software    Read the chart carefully and recognize, using context, where substitutions have occurred   ==  Erich Sunshine MD    [address] [facility]  Internal Medicine Residency PGY-3

## 2023-02-08 NOTE — CASE MANAGEMENT
Case Management Discharge Planning Note    Patient name Jazzy Alves  Location [facility] 834/[facility] 272-78 MRN [id number]  : 1945 Date 2023       Current Admission Date: 2023  Current Admission Diagnosis:Acute respiratory failure with hypoxia [facility])   Patient Active Problem List    Diagnosis Date Noted   • Chest pain 2023   • Acute respiratory failure with hypoxia ([facility] ) 2023   • History of COVID-19 2023   • COPD exacerbation ([facility] ) 2023   • Biventricular congestive heart failure ([facility] ) 2023   • Mixed hyperlipidemia 2022   • Dysthymic disorder 2022   • Impaired fasting blood sugar 2022   • Gastrointestinal hemorrhage 10/21/2021   • Dermatitis 10/21/2021   • Primary osteoarthritis of both hands 10/21/2021   • Congestive heart failure with right ventricular systolic dysfunction ([facility] ) 2021   • Rheumatoid factor positive 2021   • Arthritis 2021   • Diabetic eye exam ([facility] ) 2021   • Hypoxia 2021   • Essential hypertension, benign 2021   • Chronic combined systolic and diastolic congestive heart failure ([facility] ) 2021   • Pulmonary hypertension ([facility] ) 2021   • Cigarette nicotine dependence without complication    • Elevated BP without diagnosis of hypertension 2021   • Lung nodules 2021   • COPD (chronic obstructive pulmonary disease) ([facility] ) 2021   • Chronic respiratory failure with hypoxia ([facility] ) 2021   • Nicotine dependence 2021   • Microalbuminuria 2021   • SOB (shortness of breath) 2021   • Uncontrolled type 2 diabetes mellitus with hyperglycemia ([facility] ) 2021   • Allergic rhinitis    • Type 2 diabetes, HbA1c goal < 7% ([facility])    • Anxiety    • Tendinitis of finger 2015      LOS (days): 8  Geometric Mean LOS (GMLOS) (days): 3 40  Days to GMLOS:-4 5     OBJECTIVE:  Risk of Unplanned Readmission Score: 12 83         Current admission status: Inpatient   Preferred Pharmacy:   [facility], [address] [facility]  [address] [facility] [address]  Phone: [phone] Fax: [phone]    Primary Care Provider: Escobar Grier MD    Primary Insurance: MEDICARE  Secondary Insurance: AARP    DISCHARGE DETAILS:    DCP-confirmed with pt O2 was delivered to home 2/7    O2 for transport home is in her room    IMM reviewed and signed                                                                                        IMM Given (Date):: 02/08/23  IMM Given to[de-identified] Patient     Additional Comments: was for DC 2/8, O2 was ordered and delivered to pts room and home on 2/7

## 2023-02-08 NOTE — PLAN OF CARE
Problem: PAIN - ADULT  Goal: Verbalizes/displays adequate comfort level or baseline comfort level  Description: Interventions:  - Encourage patient to monitor pain and request assistance  - Assess pain using appropriate pain scale  - Administer analgesics based on type and severity of pain and evaluate response  - Implement non-pharmacological measures as appropriate and evaluate response  - Consider cultural and social influences on pain and pain management  - Notify physician/advanced practitioner if interventions unsuccessful or patient reports new pain  Outcome: Progressing     Problem: INFECTION - ADULT  Goal: Absence or prevention of progression during hospitalization  Description: INTERVENTIONS:  - Assess and monitor for signs and symptoms of infection  - Monitor lab/diagnostic results  - Monitor all insertion sites, i e  indwelling lines, tubes, and drains  - Monitor endotracheal if appropriate and nasal secretions for changes in amount and color  - [place] appropriate cooling/warming therapies per order  - Administer medications as ordered  - Instruct and encourage patient and family to use good hand hygiene technique  - Identify and instruct in appropriate isolation precautions for identified infection/condition  Outcome: Progressing

## 2023-02-08 NOTE — PLAN OF CARE
Problem: PHYSICAL THERAPY ADULT  Goal: Performs mobility at highest level of function for planned discharge setting  See evaluation for individualized goals  Description: Treatment/Interventions: Functional transfer training, LE strengthening/ROM, Therapeutic exercise, Endurance training, Gait training, Bed mobility, Equipment eval/education, OT  Equipment Recommended: Walker       See flowsheet documentation for full assessment, interventions and recommendations  Outcome: Progressing  Note: Prognosis: Good  Problem List: Decreased strength, Decreased endurance, Impaired balance, Decreased mobility  Assessment: Pt seen for PT session today with a focus on gait, endurance, stair training, and LE strengthening  Pt continues to make good progress towards mobility goals  At this time, pt is able to perform transfers at mod (I) level and ambulates short community distances with RW at the supervision level  Initiated stair training today during which pt had 1 episode of LOB when descending, but was able to safely lower herself down to sit on step and rest  Pt educated on pacing and nonreciprocal pattern to improve safety and conserve energy on stairs  Pt briefly desatting to 86-88% on steps, but quickly returning to [facility] with VCs for pursed lip breathing  Pt would benefit from 1 follow up session to ensure safety on stairs and continue endurance training  Continuing to recommend HHPT upon d/c         PT Discharge Recommendation: Home with home health rehabilitation    See flowsheet documentation for full assessment

## 2023-02-08 NOTE — PHYSICAL THERAPY NOTE
Physical Therapy Treatment Note    Patient's Name: Sergey Marrero  : 23 1333   PT Last Visit   PT Visit Date 23   Note Type   Note Type Treatment   Pain Assessment   Pain Assessment Tool 0-10   Pain Score No Pain   Restrictions/Precautions   Weight Bearing Precautions Per Order No   Other Precautions O2;Fall Risk   General   Chart Reviewed Yes   Family/Caregiver Present No   Cognition   Overall Cognitive Status WFL   Attention Within functional limits   Orientation Level Oriented X4   Memory Within functional limits   Following Commands Follows all commands and directions without difficulty   Bed Mobility   Supine to Sit 6  Modified independent   Additional items HOB elevated   Sit to Supine 6  Modified independent   Transfers   Sit to Stand 6  Modified independent   Stand to Sit 6  Modified independent   Additional Comments transfers with RW   Ambulation/Elevation   Gait pattern Step through pattern;Excessively slow   Gait Assistance 5  Supervision   Additional items Assist x 1   Assistive Device Rolling walker   Distance 140ft x2 with seated rest break and stair trial in between   Stair Management Assistance 5  Supervision   Additional items Assist x 1;Verbal cues   Stair Management Technique One rail L;Step to pattern; Foreward   Number of Stairs 7   Ambulation/Elevation Additional Comments Pt with 1 LOB when descending steps, using handrail to steady herself and slowly lower to sit on step, sat and rested before continuing down steps  Balance   Static Sitting Good   Dynamic Sitting Fair +   Static Standing Fair   Dynamic Standing Fair   Ambulatory Fair -   Endurance Deficit   Endurance Deficit Yes   Activity Tolerance   Activity Tolerance Patient limited by fatigue   Nurse Made Aware ok to see per RN   Exercises   Squat   (STS x10 without UE use)   Marching Standing;10 reps;AROM; Bilateral  (at [facility])   Assessment   Prognosis Good   Problem List Decreased strength;Decreased endurance; Impaired balance;Decreased mobility   Assessment Pt seen for PT session today with a focus on gait, endurance, stair training, and LE strengthening  Pt continues to make good progress towards mobility goals  At this time, pt is able to perform transfers at mod (I) level and ambulates short community distances with RW at the supervision level  Initiated stair training today during which pt had 1 episode of LOB when descending, but was able to safely lower herself down to sit on step and rest  Pt educated on pacing and nonreciprocal pattern to improve safety and conserve energy on stairs  Pt briefly desatting to 86-88% on steps, but quickly returning to [facility] with VCs for pursed lip breathing  Pt would benefit from 1 follow up session to ensure safety on stairs and continue endurance training  Continuing to recommend HHPT upon d/c  Goals   Patient Goals to go home soon   STG Expiration Date 02/15/23   PT Treatment Day 1   Plan   Treatment/Interventions Functional transfer training;LE strengthening/ROM; Endurance training; Therapeutic exercise;Elevations; Equipment eval/education; Bed mobility;Gait training;Spoke to nursing;Spoke to case management;OT   Progress Progressing toward goals   PT Frequency 3-5x/wk   Recommendation   PT Discharge Recommendation Home with home health rehabilitation   Equipment Recommended [place] walker   AM-PAC Basic Mobility Inpatient   Turning in Flat Bed Without Bedrails 4   Lying on Back to Sitting on Edge of Flat Bed Without Bedrails 4   Moving Bed to Chair 4   Standing Up From Chair Using Arms 4   Walk in Room 4   Climb 3-5 Stairs With Railing 3   Basic Mobility Inpatient Raw Score 23   Basic Mobility Standardized Score 50 88   Highest Level Of Mobility   JH-HLM Goal 7: Walk 25 feet or more   JH-HLM Achieved 7: Walk 25 feet or more   End of Consult   Patient Position at End of Consult Supine; All needs within reach       Fatuma Alia, PT, DPT

## 2023-02-09 LAB
ANION GAP SERPL CALCULATED.3IONS-SCNC: 5 MMOL/L (ref 4–13)
BASOPHILS # BLD AUTO: 0.01 THOUSANDS/ÂΜL (ref 0–0.1)
BASOPHILS NFR BLD AUTO: 0 % (ref 0–1)
BUN SERPL-MCNC: 25 MG/DL (ref 5–25)
CALCIUM SERPL-MCNC: 8.7 MG/DL (ref 8.3–10.1)
CHLORIDE SERPL-SCNC: 103 MMOL/L (ref 96–108)
CO2 SERPL-SCNC: 31 MMOL/L (ref 21–32)
CREAT SERPL-MCNC: 0.65 MG/DL (ref 0.6–1.3)
EOSINOPHIL # BLD AUTO: 0.06 THOUSAND/ÂΜL (ref 0–0.61)
EOSINOPHIL NFR BLD AUTO: 1 % (ref 0–6)
ERYTHROCYTE [DISTWIDTH] IN BLOOD BY AUTOMATED COUNT: 12.5 % (ref 11.6–15.1)
GFR SERPL CREATININE-BSD FRML MDRD: 84 ML/MIN/1.73SQ M
GLUCOSE SERPL-MCNC: 135 MG/DL (ref 65–140)
GLUCOSE SERPL-MCNC: 143 MG/DL (ref 65–140)
GLUCOSE SERPL-MCNC: 143 MG/DL (ref 65–140)
GLUCOSE SERPL-MCNC: 167 MG/DL (ref 65–140)
GLUCOSE SERPL-MCNC: 96 MG/DL (ref 65–140)
HCT VFR BLD AUTO: 42.2 % (ref 34.8–46.1)
HGB BLD-MCNC: 13.3 G/DL (ref 11.5–15.4)
IMM GRANULOCYTES # BLD AUTO: 0.02 THOUSAND/UL (ref 0–0.2)
IMM GRANULOCYTES NFR BLD AUTO: 0 % (ref 0–2)
LYMPHOCYTES # BLD AUTO: 1.78 THOUSANDS/ÂΜL (ref 0.6–4.47)
LYMPHOCYTES NFR BLD AUTO: 17 % (ref 14–44)
MCH RBC QN AUTO: 29.7 PG (ref 26.8–34.3)
MCHC RBC AUTO-ENTMCNC: 31.5 G/DL (ref 31.4–37.4)
MCV RBC AUTO: 94 FL (ref 82–98)
MONOCYTES # BLD AUTO: 0.51 THOUSAND/ÂΜL (ref 0.17–1.22)
MONOCYTES NFR BLD AUTO: 5 % (ref 4–12)
NEUTROPHILS # BLD AUTO: 8.09 THOUSANDS/ÂΜL (ref 1.85–7.62)
NEUTS SEG NFR BLD AUTO: 77 % (ref 43–75)
NRBC BLD AUTO-RTO: 0 /100 WBCS
PLATELET # BLD AUTO: 317 THOUSANDS/UL (ref 149–390)
PMV BLD AUTO: 10.6 FL (ref 8.9–12.7)
POTASSIUM SERPL-SCNC: 4.1 MMOL/L (ref 3.5–5.3)
RBC # BLD AUTO: 4.48 MILLION/UL (ref 3.81–5.12)
SODIUM SERPL-SCNC: 139 MMOL/L (ref 135–147)
WBC # BLD AUTO: 10.47 THOUSAND/UL (ref 4.31–10.16)

## 2023-02-09 RX ORDER — HYDROCODONE BITARTRATE AND HOMATROPINE METHYLBROMIDE ORAL SOLUTION 5; 1.5 MG/5ML; MG/5ML
5 LIQUID ORAL EVERY 12 HOURS
Status: DISCONTINUED | OUTPATIENT
Start: 2023-02-09 | End: 2023-02-10 | Stop reason: HOSPADM

## 2023-02-09 RX ORDER — SODIUM CHLORIDE FOR INHALATION 0.9 %
3 VIAL, NEBULIZER (ML) INHALATION
Status: DISCONTINUED | OUTPATIENT
Start: 2023-02-09 | End: 2023-02-10 | Stop reason: HOSPADM

## 2023-02-09 RX ORDER — LEVALBUTEROL 1.25 MG/.5ML
1.25 SOLUTION, CONCENTRATE RESPIRATORY (INHALATION)
Status: DISCONTINUED | OUTPATIENT
Start: 2023-02-09 | End: 2023-02-10 | Stop reason: HOSPADM

## 2023-02-09 RX ORDER — LEVALBUTEROL 1.25 MG/.5ML
1.25 SOLUTION, CONCENTRATE RESPIRATORY (INHALATION) EVERY 12 HOURS
Status: DISCONTINUED | OUTPATIENT
Start: 2023-02-09 | End: 2023-02-09

## 2023-02-09 RX ORDER — FLUTICASONE PROPIONATE 220 UG/1
2 AEROSOL, METERED RESPIRATORY (INHALATION) EVERY 12 HOURS SCHEDULED
Status: DISCONTINUED | OUTPATIENT
Start: 2023-02-09 | End: 2023-02-10 | Stop reason: HOSPADM

## 2023-02-09 RX ADMIN — ISODIUM CHLORIDE 3 ML: 0.03 SOLUTION RESPIRATORY (INHALATION) at 19:55

## 2023-02-09 RX ADMIN — BENZONATATE 200 MG: 100 CAPSULE ORAL at 16:51

## 2023-02-09 RX ADMIN — PRAVASTATIN SODIUM 40 MG: 40 TABLET ORAL at 16:51

## 2023-02-09 RX ADMIN — HYDROCODONE BITARTRATE AND HOMATROPINE METHYLBROMIDE 5 ML: 5; 1.5 SYRUP ORAL at 17:00

## 2023-02-09 RX ADMIN — FLUTICASONE PROPIONATE 2 PUFF: 220 AEROSOL, METERED RESPIRATORY (INHALATION) at 21:47

## 2023-02-09 RX ADMIN — GUAIFENESIN 1200 MG: 600 TABLET, EXTENDED RELEASE ORAL at 09:51

## 2023-02-09 RX ADMIN — HYDROCODONE BITARTRATE AND HOMATROPINE METHYLBROMIDE 5 ML: 5; 1.5 SYRUP ORAL at 04:50

## 2023-02-09 RX ADMIN — PREDNISONE 30 MG: 20 TABLET ORAL at 09:51

## 2023-02-09 RX ADMIN — LEVALBUTEROL HYDROCHLORIDE 1.25 MG: 1.25 SOLUTION, CONCENTRATE RESPIRATORY (INHALATION) at 19:55

## 2023-02-09 RX ADMIN — GABAPENTIN 200 MG: 100 CAPSULE ORAL at 09:51

## 2023-02-09 RX ADMIN — SERTRALINE HYDROCHLORIDE 50 MG: 50 TABLET ORAL at 09:51

## 2023-02-09 RX ADMIN — BENZONATATE 200 MG: 100 CAPSULE ORAL at 21:45

## 2023-02-09 RX ADMIN — FORMOTEROL FUMARATE DIHYDRATE 20 MCG: 20 SOLUTION RESPIRATORY (INHALATION) at 07:24

## 2023-02-09 RX ADMIN — BUDESONIDE 0.5 MG: 0.5 INHALANT ORAL at 07:24

## 2023-02-09 RX ADMIN — IPRATROPIUM BROMIDE 0.5 MG: 0.5 SOLUTION RESPIRATORY (INHALATION) at 07:24

## 2023-02-09 RX ADMIN — LIDOCAINE 5% 1 PATCH: 700 PATCH TOPICAL at 09:54

## 2023-02-09 RX ADMIN — BENZONATATE 200 MG: 100 CAPSULE ORAL at 09:51

## 2023-02-09 RX ADMIN — ENOXAPARIN SODIUM 40 MG: 40 INJECTION SUBCUTANEOUS at 09:51

## 2023-02-09 RX ADMIN — UMECLIDINIUM BROMIDE AND VILANTEROL TRIFENATATE 1 PUFF: 62.5; 25 POWDER RESPIRATORY (INHALATION) at 09:53

## 2023-02-09 RX ADMIN — TRIAMCINOLONE ACETONIDE: 1 CREAM TOPICAL at 10:18

## 2023-02-09 RX ADMIN — FUROSEMIDE 20 MG: 20 TABLET ORAL at 09:51

## 2023-02-09 RX ADMIN — LEVALBUTEROL HYDROCHLORIDE 1.25 MG: 1.25 SOLUTION, CONCENTRATE RESPIRATORY (INHALATION) at 07:24

## 2023-02-09 RX ADMIN — DICLOFENAC SODIUM 4 G: 10 GEL TOPICAL at 21:49

## 2023-02-09 RX ADMIN — GUAIFENESIN 1200 MG: 600 TABLET, EXTENDED RELEASE ORAL at 19:11

## 2023-02-09 RX ADMIN — FLUTICASONE PROPIONATE 2 PUFF: 220 AEROSOL, METERED RESPIRATORY (INHALATION) at 09:53

## 2023-02-09 RX ADMIN — DICLOFENAC SODIUM 4 G: 10 GEL TOPICAL at 16:54

## 2023-02-09 RX ADMIN — LORAZEPAM 0.5 MG: 2 INJECTION INTRAMUSCULAR; INTRAVENOUS at 03:05

## 2023-02-09 RX ADMIN — DICLOFENAC SODIUM 4 G: 10 GEL TOPICAL at 09:53

## 2023-02-09 RX ADMIN — STANDARDIZED SENNA CONCENTRATE 8.6 MG: 8.6 TABLET ORAL at 09:51

## 2023-02-09 RX ADMIN — GABAPENTIN 200 MG: 100 CAPSULE ORAL at 19:11

## 2023-02-09 NOTE — PLAN OF CARE
Problem: PAIN - ADULT  Goal: Verbalizes/displays adequate comfort level or baseline comfort level  Description: Interventions:  - Encourage patient to monitor pain and request assistance  - Assess pain using appropriate pain scale  - Administer analgesics based on type and severity of pain and evaluate response  - Implement non-pharmacological measures as appropriate and evaluate response  - Consider cultural and social influences on pain and pain management  - Notify physician/advanced practitioner if interventions unsuccessful or patient reports new pain  Outcome: Progressing     Problem: INFECTION - ADULT  Goal: Absence or prevention of progression during hospitalization  Description: INTERVENTIONS:  - Assess and monitor for signs and symptoms of infection  - Monitor lab/diagnostic results  - Monitor all insertion sites, i e  indwelling lines, tubes, and drains  - Monitor endotracheal if appropriate and nasal secretions for changes in amount and color  - [place] appropriate cooling/warming therapies per order  - Administer medications as ordered  - Instruct and encourage patient and family to use good hand hygiene technique  - Identify and instruct in appropriate isolation precautions for identified infection/condition  Outcome: Progressing     Problem: DISCHARGE PLANNING  Goal: Discharge to home or other facility with appropriate resources  Description: INTERVENTIONS:  - Identify barriers to discharge w/patient and caregiver  - Arrange for needed discharge resources and transportation as appropriate  - Identify discharge learning needs (meds, wound care, etc )  - Arrange for interpretive services to assist at discharge as needed  - Refer to Case Management Department for coordinating discharge planning if the patient needs post-hospital services based on physician/advanced practitioner order or complex needs related to functional status, cognitive ability, or social support system  Outcome: Progressing Problem: Knowledge Deficit  Goal: Patient/family/caregiver demonstrates understanding of disease process, treatment plan, medications, and discharge instructions  Description: Complete learning assessment and assess knowledge base  Interventions:  - Provide teaching at level of understanding  - Provide teaching via preferred learning methods  Outcome: Progressing     Problem: RESPIRATORY - ADULT  Goal: Achieves optimal ventilation and oxygenation  Description: INTERVENTIONS:  - Assess for changes in respiratory status  - Assess for changes in mentation and behavior  - Position to facilitate oxygenation and minimize respiratory effort  - Oxygen administered by appropriate delivery if ordered  - Initiate smoking cessation education as indicated  - Encourage broncho-pulmonary hygiene including cough, deep breathe, Incentive Spirometry  - Assess the need for suctioning and aspirate as needed  - Assess and instruct to report SOB or any respiratory difficulty  - Respiratory Therapy support as indicated  Outcome: Progressing     Problem: METABOLIC, FLUID AND ELECTROLYTES - ADULT  Goal: Electrolytes maintained within normal limits  Description: INTERVENTIONS:  - Monitor labs and assess patient for signs and symptoms of electrolyte imbalances  - Administer electrolyte replacement as ordered  - Monitor response to electrolyte replacements, including repeat lab results as appropriate  - Instruct patient on fluid and nutrition as appropriate  Outcome: Progressing     Problem: Nutrition/Hydration-ADULT  Goal: Nutrient/Hydration intake appropriate for improving, restoring or maintaining nutritional needs  Description: Monitor and assess patient's nutrition/hydration status for malnutrition  Collaborate with interdisciplinary team and initiate plan and interventions as ordered  Monitor patient's weight and dietary intake as ordered or per policy  Utilize nutrition screening tool and intervene as necessary   Determine patient's food preferences and provide high-protein, high-caloric foods as appropriate       INTERVENTIONS:  - Monitor oral intake, urinary output, labs, and treatment plans  - Assess nutrition and hydration status and recommend course of action  - Evaluate amount of meals eaten  - Assist patient with eating if necessary   - Allow adequate time for meals  - Recommend/ encourage appropriate diets, oral nutritional supplements, and vitamin/mineral supplements  - Order, calculate, and assess calorie counts as needed  - Recommend, monitor, and adjust tube feedings and TPN/PPN based on assessed needs  - Assess need for intravenous fluids  - Provide specific nutrition/hydration education as appropriate  - Include patient/family/caregiver in decisions related to nutrition  Outcome: Progressing

## 2023-02-09 NOTE — PROGRESS NOTES
INTERNAL MEDICINE RESIDENCY PROGRESS NOTE     Name: Galen Gonzalez   Age & Sex: 76 y o  female   MRN: [id number]  Unit/Bed#: [facility] 834-01   Encounter: [id number]  Team: SOD Team B     PATIENT INFORMATION     Name: Galen Gonzalez   Age & Sex: 76 y o  female   MRN: [id number]  Hospital Stay Days: 9    ASSESSMENT/PLAN     Principal Problem:    Acute respiratory failure with hypoxia ([place] [address] )  Active Problems:    Type 2 diabetes, HbA1c goal < 7% ([facility])    Anxiety    SOB (shortness of breath)    COPD (chronic obstructive pulmonary disease) ([place] [address] )    Essential hypertension, benign    Dysthymic disorder    History of COVID-19    COPD exacerbation ([facility])    Biventricular congestive heart failure ([facility])    Chest pain      Chest pain  Assessment & Plan  Left sided chest pain   Pain on palpation  CXR neg for fracture   Likely hairline frac from severe coughing    Pain control  Maintain oxygen     Biventricular congestive heart failure ([place] [address] )  Assessment & Plan  Hx of combined CHF 2/2 to CAD and pulmonary HTN in the setting of chronic tobacco abuse and severe COPD  With preserved LV systolic function and LV diastolic dysfunction, right ventricular dilatation and dysfunction  Patient required home O2 (3L tapered off over ~2 months), daily diuretics as well as inhalers back in 2012-8112  Patient had recovery of EF w/ smoking cessation, weight loss, and medication   Last ECHO June '22 w/ EF 60% w LV & LV normal systolic and diastolic dysfunction   Echo 1/31 LVEF 60%, vigorous systolic function, grade II diastolic dysfunction, mildly dilated RV with normal systolic function, RV systolic pressure [id number], mild tricuspid regurgitation    - Appears dry on exam, no JVD, no pitting edema     · Continue home lasix 20 MWF    COPD exacerbation ([facility] )  Assessment & Plan  75 y/o F w/ hx of COPD, recent covid 1/23/23 s/p tx w Lagrevrio (molnupiravir) x 5 days who inititally felt better but presented with 2 days of worsening cough, increased sputum production SOB, GONZALEZ and fatigue      - Hypoxic on admission, requiring 5L O2  - Afebrile  No leukocytosis   - CT PE study unremarkable & w/o infilatrates, or pulm edema  - CXR no acute cardiopulmonary disease     · Admitted on combination of COPD/COVID pathway   · IV Solu-Medrol 40 mg daily  · 3 day course of Azithro completed 2/1  · Resp protocol   · Pulmonary consulted and recommendations appreciated:  · Started inhaled budesonide 0 5 mg Q12 and inhaled formoterol 20 mcg Q12  · IV Solu-Medrol 40 mg twice daily switched to 40 mg daily w/ a taper  · Maintain SPO2>88%  · Will likely require 4L home O2    History of COVID-19  Assessment & Plan  Recent covid 1/23/23 s/p tx w Lagrevrio (molnupiravir) x 5 days who inititally felt better but presented with 2 days of worsening cough, increased sputum production SOB, GONZALEZ and fatigue    - Has required 4-7L O2 NC  - CTA PE unremarkable for PE, & w/o pulmonary infiltrates   - Afebrile, no leukocytosis, renal function WNL  - Repeat COVID/FLU/RSV negative this admission  - Procalcitonin, CK, BNP WNL  - CRP 20 0    - Will treat for mild covid pathway / COPD exacerbation as noted above  · Continue IV steroids  · Per pharmacy - as COVID negative remdisivir no longer indicated  · resp protocol     Dysthymic disorder  Assessment & Plan  · Continue home zoloft 50mg daily, home hydroxyzine 10mg BID PRN  · Xanax 0 5mg QHS PRN  · Ativan 0 5mg IV Q4H PRN    Essential hypertension, benign  Assessment & Plan  · Continue home lasix 20 MWF  COPD (chronic obstructive pulmonary disease) ([facility] )  Assessment & Plan  Patient with "many year" history of smoking, though states she quit several years ago  PFT's conducted 6/21 showed increased lung volumes, FEV1 of 47% predicted without significant bronchodilator response, and corrected DLCO of 40%, indicative of severe obstructive disease  Not on home medications   She states that this is the first time she has required hospitalization due to cough/dyspnea, though states she was hospitalized about 1 year ago for CHF exacerbation  Plan  · Duo-Neb Q6H  · IV Solu-Medrol decreased from 40 mg twice daily to 40 mg daily  · Completed Azithromycin 500mg x3 days 2/1  · Ventolin Q4h PRN  · Mucinex BID  · Tessalon perles TID   · Change Tussionex to Hycodan TID  · Resp protocol    SOB (shortness of breath)  Assessment & Plan  See a/p above    Anxiety  Assessment & Plan  Patient states that she has a history of anxiety and states that her coughing spells cause increased anxiety    · Continue home atarax 10 mg BID PRN    Type 2 diabetes, HbA1c goal < 7% [facility])  Assessment & Plan  Lab Results   Component Value Date    HGBA1C 6 2 (H) 09/20/2022     Recent Labs     02/05/23  1044 02/05/23  1555 02/05/23  2107 02/06/23  0704   POCGLU 279* 144* 154* 107       Blood Sugar Average: Last 72 hrs:  (P) 982 [id number]   Well-controlled with diet and exercise  Not on any medication  · Carb controlled diet  · in patient goal -180  · Add on sliding scale insulin if uncontrolled sugars 2/2 IV steroids    * Acute respiratory failure with hypoxia ([facility])  Assessment & Plan    2/2 IM- Acute respiratory failure with hypoxia ([facility])  Assessment & Plan    Due to recent covid infection (1/23/23) c/b COPD exacerbation   - Hypoxic on admission, requiring 5L O2  - Afebrile  No leukocytosis   - CT PE study unremarkable & w/o infilatrates, or pulm edema  - CXR no acute cardiopulmonary disease      -Currently on Duonebs / steroids / mucinex / hycodan       Disposition: DC tomorrow      SUBJECTIVE     Patient seen and examined  No acute events overnight  Improve cough  Denies any worsening in chest pain due to coughing, SOB, abdominal pain, nausea, vomiting, fever or chills       OBJECTIVE     Vitals:    02/08/23 1455 02/08/23 2030 02/08/23 2122 02/09/23 0726   BP: 120/62  117/56    Pulse: 91  87    Resp: 18  22    Temp: 97 5 °F (36 4 °C)  (!) 97 2 °F (36 2 °C)    TempSrc:       SpO2: 92% 95% 93% 94% Weight:       Height:          Temperature:   Temp (24hrs), Av 4 °F (36 3 °C), Min:97 2 °F (36 2 °C), Max:97 5 °F (36 4 °C)    Temperature: (!) 97 2 °F (36 2 °C)  Intake & Output:  I/O        07 0700  0701   07 0701  02/10 0700    P  O  120      Total Intake(mL/kg) 120 (1 7)      Net +120             Unmeasured Urine Occurrence 3 x          Weights:   IBW (Ideal Body Weight): 54 7 kg    Body mass index is 26 62 kg/m²  Weight (last 2 days)     Date/Time Weight    23 0600 70 4 (155 1)        Physical Exam  Constitutional:       General: She is not in acute distress  Appearance: She is obese  She is not toxic-appearing or diaphoretic  HENT:      Head: Normocephalic and atraumatic  Cardiovascular:      Rate and Rhythm: Normal rate and regular rhythm  Pulses: Normal pulses  Heart sounds: Normal heart sounds  No murmur heard  No gallop  Pulmonary:      Effort: Pulmonary effort is normal  No respiratory distress  Breath sounds: Normal breath sounds  No stridor  No wheezing, rhonchi or rales  Chest:      Chest wall: No tenderness  Abdominal:      General: Bowel sounds are normal  There is no distension  Palpations: There is no mass  Tenderness: There is no abdominal tenderness  There is no right CVA tenderness or left CVA tenderness  Musculoskeletal:      Right lower leg: No edema  Left lower leg: No edema  Neurological:      General: No focal deficit present  Mental Status: She is oriented to person, place, and time  Psychiatric:         Mood and Affect: Mood normal          Behavior: Behavior normal        LABORATORY DATA     Labs: I have personally reviewed pertinent reports    Results from last 7 days   Lab Units 23  0321 23  0415 23  0620   WBC Thousand/uL 10 47* 9 59 9 23   HEMOGLOBIN g/dL 13 3 12 6 13 0   HEMATOCRIT % 42 2 40 0 42 0   PLATELETS Thousands/uL 317 271 319   NEUTROS PCT % 77* 73 62 MONOS PCT % 5 7 8      Results from last 7 days   Lab Units 02/09/23  0321 02/08/23  0415 02/07/23  0620   POTASSIUM mmol/L 4 1 4 2 3 8   CHLORIDE mmol/L 103 102 103   CO2 mmol/L 31 32 32   BUN mg/dL 25 23 23   CREATININE mg/dL 0 65 0 55* 0 57*   CALCIUM mg/dL 8 7 8 7 8 6     Results from last 7 days   Lab Units 02/07/23  0620   MAGNESIUM mg/dL 2 5                        IMAGING & DIAGNOSTIC TESTING     Radiology Results: I have personally reviewed pertinent reports  XR chest portable    Result Date: 2/4/2023  Impression: No acute findings  Workstation performed: [id number]     XR chest portable    Result Date: 2/3/2023  Impression: No acute cardiopulmonary disease  Workstation performed: [id number]     XR chest pa & lateral    Result Date: 1/30/2023  Impression: No acute cardiopulmonary disease  This report is in agreement with the preliminary interpretation  Workstation performed: [id number]     CTA ED chest PE study    Result Date: 1/30/2023  Impression: No pulmonary embolus  No evidence of acute thoracic process  COPD  3 nodules in the left lower lobe, the largest of which measures 6 mm stable since May 2021  No new pulmonary nodules  Noncontrast chest CT follow-up in 12 months could be considered to establish two-year stability  Additional chronic findings and negatives as above  Workstation performed: [id number]     Other Diagnostic Testing: I have personally reviewed pertinent reports      ACTIVE MEDICATIONS     Current Facility-Administered Medications   Medication Dose Route Frequency   • albuterol (PROVENTIL HFA,VENTOLIN HFA) inhaler 2 puff  2 puff Inhalation Q4H PRN   • ALPRAZolam (XANAX) tablet 0 5 mg  0 5 mg Oral HS PRN   • benzonatate (TESSALON PERLES) capsule 200 mg  200 mg Oral TID   • Diclofenac Sodium (VOLTAREN) 1 % topical gel 4 g  4 g Topical 4x Daily   • enoxaparin (LOVENOX) subcutaneous injection 40 mg  40 mg Subcutaneous Q24H CHARLY   • fluticasone (FLOVENT HFA) 220 mcg/act inhaler 2 puff  2 puff Inhalation Q12H [facility]   • furosemide (LASIX) tablet 20 mg  20 mg Oral Daily   • gabapentin (NEURONTIN) capsule 200 mg  200 mg Oral BID   • guaiFENesin (MUCINEX) 12 hr tablet 1,200 mg  1,200 mg Oral BID   • HYDROcodone Bit-[place]omatrop MBr (HYCODAN) oral syrup 5 mL  5 mL Oral Q12H   • hydrOXYzine HCL (ATARAX) tablet 10 mg  10 mg Oral BID PRN   • insulin lispro (HumaLOG) 100 units/mL subcutaneous injection 1-6 Units  1-6 Units Subcutaneous TID AC   • levalbuterol (XOPENEX) inhalation solution 1 25 mg  1 25 mg Nebulization Q12H   • lidocaine (LIDODERM) 5 % patch 1 patch  1 patch Topical Daily   • LORazepam (ATIVAN) injection 0 5 mg  0 5 mg Intravenous Q4H PRN   • oxyCODONE (ROXICODONE) immediate release tablet 10 mg  10 mg Oral Q6H PRN   • oxyCODONE (ROXICODONE) IR tablet 5 mg  5 mg Oral Q4H PRN   • pravastatin (PRAVACHOL) tablet 40 mg  40 mg Oral Daily With Dinner   • predniSONE tablet 30 mg  30 mg Oral Daily    Followed by   • [START ON 2/12/2023] predniSONE tablet 20 mg  20 mg Oral Daily    Followed by   • [START ON 2/15/2023] predniSONE tablet 10 mg  10 mg Oral Daily   • senna (SENOKOT) tablet 8 6 mg  1 tablet Oral Daily   • sertraline (ZOLOFT) tablet 50 mg  50 mg Oral Daily   • sodium chloride 0 9 % inhalation solution 3 mL  3 mL Nebulization Q12H   • triamcinolone (KENALOG) 0 1 % cream   Topical BID   • umeclidinium-vilanterol 62 5-25 mcg/actuation inhaler 1 puff  1 puff Inhalation Daily       VTE Pharmacologic Prophylaxis: Enoxaparin (Lovenox)  VTE Mechanical Prophylaxis: sequential compression device    Portions of the record may have been created with voice recognition software  Occasional wrong word or "sound a like" substitutions may have occurred due to the inherent limitations of voice recognition software    Read the chart carefully and recognize, using context, where substitutions have occurred   ==  Antonio Dempsey MD  [address] [facility]  Internal Medicine Residency PGY-3

## 2023-02-09 NOTE — RESTORATIVE TECHNICIAN NOTE
Restorative Technician Note      Patient Name: Tim Cano     Restorative Tech Visit Date: 02/09/23  Note Type: Mobility  Patient Position Upon Consult: Supine  Activity Performed: Ambulated  Assistive Device: Other (Comment) (Initially with RW, then with no AD with success  2L O2)  Education Provided: Yes  Patient Position at End of Consult: Seated edge of bed;  All needs within reach    Keyshawn Lighter  DPT, Restorative Technician

## 2023-02-10 VITALS
RESPIRATION RATE: 16 BRPM | OXYGEN SATURATION: 95 % | HEART RATE: 88 BPM | SYSTOLIC BLOOD PRESSURE: 115 MMHG | TEMPERATURE: 97.9 F | WEIGHT: 154.98 LBS | DIASTOLIC BLOOD PRESSURE: 62 MMHG | BODY MASS INDEX: 26.46 KG/M2 | HEIGHT: 64 IN

## 2023-02-10 LAB
GLUCOSE SERPL-MCNC: 110 MG/DL (ref 65–140)
GLUCOSE SERPL-MCNC: 146 MG/DL (ref 65–140)

## 2023-02-10 RX ORDER — GUAIFENESIN 1200 MG/1
1200 TABLET, EXTENDED RELEASE ORAL 2 TIMES DAILY
Qty: 14 TABLET | Refills: 0 | Status: SHIPPED | OUTPATIENT
Start: 2023-02-10

## 2023-02-10 RX ORDER — PREDNISONE 10 MG/1
TABLET ORAL
Qty: 15 TABLET | Refills: 0 | Status: SHIPPED | OUTPATIENT
Start: 2023-02-11 | End: 2023-02-19

## 2023-02-10 RX ORDER — GABAPENTIN 300 MG/1
300 CAPSULE ORAL
Qty: 30 CAPSULE | Refills: 0 | Status: SHIPPED | OUTPATIENT
Start: 2023-02-10

## 2023-02-10 RX ORDER — FUROSEMIDE 20 MG/1
20 TABLET ORAL DAILY
Qty: 30 TABLET | Refills: 0 | Status: SHIPPED | OUTPATIENT
Start: 2023-02-11

## 2023-02-10 RX ORDER — BENZONATATE 200 MG/1
200 CAPSULE ORAL 3 TIMES DAILY
Qty: 20 CAPSULE | Refills: 0 | Status: SHIPPED | OUTPATIENT
Start: 2023-02-10

## 2023-02-10 RX ORDER — PREDNISONE 10 MG/1
30 TABLET ORAL DAILY
Qty: 3 TABLET | Refills: 0 | Status: SHIPPED | OUTPATIENT
Start: 2023-02-11 | End: 2023-02-10 | Stop reason: SDUPTHER

## 2023-02-10 RX ORDER — ALBUTEROL SULFATE 90 UG/1
2 AEROSOL, METERED RESPIRATORY (INHALATION) EVERY 4 HOURS PRN
Qty: 6.7 G | Refills: 1 | Status: SHIPPED | OUTPATIENT
Start: 2023-02-10

## 2023-02-10 RX ORDER — FLUTICASONE PROPIONATE 110 UG/1
2 AEROSOL, METERED RESPIRATORY (INHALATION) 2 TIMES DAILY
Qty: 12 G | Refills: 1 | Status: SHIPPED | OUTPATIENT
Start: 2023-02-10

## 2023-02-10 RX ADMIN — GUAIFENESIN 1200 MG: 600 TABLET, EXTENDED RELEASE ORAL at 08:00

## 2023-02-10 RX ADMIN — UMECLIDINIUM BROMIDE AND VILANTEROL TRIFENATATE 1 PUFF: 62.5; 25 POWDER RESPIRATORY (INHALATION) at 08:06

## 2023-02-10 RX ADMIN — LEVALBUTEROL HYDROCHLORIDE 1.25 MG: 1.25 SOLUTION, CONCENTRATE RESPIRATORY (INHALATION) at 09:03

## 2023-02-10 RX ADMIN — LIDOCAINE 5% 1 PATCH: 700 PATCH TOPICAL at 08:01

## 2023-02-10 RX ADMIN — BENZONATATE 200 MG: 100 CAPSULE ORAL at 08:00

## 2023-02-10 RX ADMIN — GABAPENTIN 200 MG: 100 CAPSULE ORAL at 08:00

## 2023-02-10 RX ADMIN — PREDNISONE 30 MG: 20 TABLET ORAL at 08:02

## 2023-02-10 RX ADMIN — ISODIUM CHLORIDE 3 ML: 0.03 SOLUTION RESPIRATORY (INHALATION) at 09:03

## 2023-02-10 RX ADMIN — ENOXAPARIN SODIUM 40 MG: 40 INJECTION SUBCUTANEOUS at 08:01

## 2023-02-10 RX ADMIN — STANDARDIZED SENNA CONCENTRATE 8.6 MG: 8.6 TABLET ORAL at 08:04

## 2023-02-10 RX ADMIN — FLUTICASONE PROPIONATE 2 PUFF: 220 AEROSOL, METERED RESPIRATORY (INHALATION) at 08:03

## 2023-02-10 RX ADMIN — FUROSEMIDE 20 MG: 20 TABLET ORAL at 08:01

## 2023-02-10 RX ADMIN — HYDROCODONE BITARTRATE AND HOMATROPINE METHYLBROMIDE 5 ML: 5; 1.5 SYRUP ORAL at 05:54

## 2023-02-10 RX ADMIN — SERTRALINE HYDROCHLORIDE 50 MG: 50 TABLET ORAL at 08:04

## 2023-02-10 NOTE — DISCHARGE INSTR - AVS FIRST PAGE
Please follow up with PCP in 1 week of discharge    Please follow up with Pulmonology with 1 week of discharge

## 2023-02-10 NOTE — DISCHARGE SUMMARY
INTERNAL MEDICINE RESIDENCY DISCHARGE SUMMARY     Monica Perrin   76 y o  female  MRN: [id number]  Room/Bed: [facility] 834/[facility] 834-01     [address] [place] [address]   Encounter: [id number]    Principal Problem:    Acute respiratory failure with hypoxia [facility])  Active Problems:    Type 2 diabetes, HbA1c goal < 7% ([facility])    Anxiety    SOB (shortness of breath)    COPD (chronic obstructive pulmonary disease) ([facility])    Essential hypertension, benign    Dysthymic disorder    History of COVID-19    COPD exacerbation ([facility])    Biventricular congestive heart failure ([facility])    Chest pain      Chest pain  Assessment & Plan  Left sided chest pain   Pain on palpation  CXR neg for fracture   Likely hairline frac from severe coughing    Pain control  Maintain oxygen     Biventricular congestive heart failure (HCC)  Assessment & Plan  Hx of combined CHF 2/2 to CAD and pulmonary HTN in the setting of chronic tobacco abuse and severe COPD  With preserved LV systolic function and LV diastolic dysfunction, right ventricular dilatation and dysfunction  Patient required home O2 (3L tapered off over ~2 months), daily diuretics as well as inhalers back in 1726-3846  Patient had recovery of EF w/ smoking cessation, weight loss, and medication   Last ECHO June '22 w/ EF 60% w LV & LV normal systolic and diastolic dysfunction   Echo 1/31 LVEF 04%, vigorous systolic function, grade II diastolic dysfunction, mildly dilated RV with normal systolic function, RV systolic pressure 99FPDT, mild tricuspid regurgitation    - Appears dry on exam, no JVD, no pitting edema     · Continue home lasix 20 MWF    COPD exacerbation (HCC)  Assessment & Plan  75 y/o F w/ hx of COPD, recent covid 1/23/23 s/p tx w [facility] (molnupiravir) x 5 days who inititally felt better but presented with 2 days of worsening cough, increased sputum production SOB, GONZALEZ and fatigue      - Hypoxic on admission, requiring 5L O2  - Afebrile   No leukocytosis   - CT PE study unremarkable & w/o infilatrates, or pulm edema  - CXR no acute cardiopulmonary disease     · Admitted on combination of COPD/COVID pathway   · IV Solu-Medrol 40 mg daily  · 3 day course of Azithro completed 2/1  · Resp protocol   · Pulmonary consulted and recommendations appreciated:  · Started inhaled budesonide 0 5 mg Q12 and inhaled formoterol 20 mcg Q12  · IV Solu-Medrol 40 mg twice daily switched to 40 mg daily w/ a taper  · Maintain SPO2>88%  · Will likely require 4L home O2    History of COVID-19  Assessment & Plan  Recent covid 1/23/23 s/p tx w [facility] (molnupiravir) x 5 days who inititally felt better but presented with 2 days of worsening cough, increased sputum production SOB, GONZALEZ and fatigue    - Has required 4-7L O2 NC  - CTA PE unremarkable for PE, & w/o pulmonary infiltrates   - Afebrile, no leukocytosis, renal function WNL  - Repeat COVID/FLU/RSV negative this admission  - Procalcitonin, CK, BNP WNL  - CRP 20 0    - Will treat for mild covid pathway / COPD exacerbation as noted above  · Continue IV steroids  · Per pharmacy - as COVID negative remdisivir no longer indicated  · resp protocol     Dysthymic disorder  Assessment & Plan  · Continue home zoloft 50mg daily, home hydroxyzine 10mg BID PRN  · Xanax 0 5mg QHS PRN  · Ativan 0 5mg IV Q4H PRN    Essential hypertension, benign  Assessment & Plan  · Continue home lasix 20 MWF  COPD (chronic obstructive pulmonary disease) ([facility] [address] )  Assessment & Plan  Patient with "many year" history of smoking, though states she quit several years ago  PFT's conducted 6/21 showed increased lung volumes, FEV1 of 47% predicted without significant bronchodilator response, and corrected DLCO of 40%, indicative of severe obstructive disease  Not on home medications   She states that this is the first time she has required hospitalization due to cough/dyspnea, though states she was hospitalized about 1 year ago for CHF exacerbation  Plan  · Duo-Neb Q6H  · IV Solu-Medrol decreased from 40 mg twice daily to 40 mg daily  · Completed Azithromycin 500mg x3 days 2/1  · Ventolin Q4h PRN  · Mucinex BID  · Tessalon perles TID   · Change Tussionex to Hycodan TID  · Resp protocol    SOB (shortness of breath)  Assessment & Plan  See a/p above    Anxiety  Assessment & Plan  Patient states that she has a history of anxiety and states that her coughing spells cause increased anxiety    · Continue home atarax 10 mg BID PRN    Type 2 diabetes, HbA1c goal < 7% [facility])  Assessment & Plan  Lab Results   Component Value Date    HGBA1C 6 2 (H) 09/20/2022     Recent Labs     02/05/23  1044 02/05/23  1555 02/05/23  2107 02/06/23  0704   POCGLU 279* 144* 154* 107       Blood Sugar Average: Last 72 hrs:  (P) 196 [id number]   Well-controlled with diet and exercise  Not on any medication  · Carb controlled diet  · in patient goal -180  · Add on sliding scale insulin if uncontrolled sugars 2/2 IV steroids    * Acute respiratory failure with hypoxia (HCC)  Assessment & Plan    2/2 IM- Acute respiratory failure with hypoxia (HCC)  Assessment & Plan    Due to recent covid infection (1/23/23) c/b COPD exacerbation   - Hypoxic on admission, requiring 5L O2  - Afebrile  No leukocytosis   - CT PE study unremarkable & w/o infilatrates, or pulm edema  - CXR no acute cardiopulmonary disease      -Currently on Duonebs / steroids / mucinex / hycodan         DETAILS OF HOSPITAL COURSE          H &P by Dr Sarkis Beck 77 y/o F w/ hx of COPD, history of CHF, pulm HTN, type 2 diabetes not on insulin, recent covid 1/23/23 s/p tx w [facility] (molnupiravir) x 5 days who inititally felt better but presented with 2 days of worsening cough, increased sputum production SOB, GONZALEZ and fatigue       On 1/23 patient began having subjective, fever, chills, congestion, cough and increasing sputum production    He tested positive for COVID on a home test   Later that day she was seen at an urgent care in [place] who prescribed lageverio 200 mg 4 capsules bid x 5 days, and benzonatate capsules as needed  She reports that she finished the course  She reported feeling symptomatically better initially as well  However on Saturday, about 2 days ago, patient's SOB worsened  She began to have GONZALEZ, increased sputum production, increasing cough, some chest tightness now with exertion,  increasing cough, chest tightness and loss of appetite  Patient denies fever, chest pain, abdominal pain, nausea vomiting diarrhea constipation  Weight gain, weight loss, lower extremity edema, headache, myalgias     On arrival to ED patient was hypoxic requiring 10 L O2  She was afebrile  Labs including CBC and CMP were within normal limits  Specifically no leukocytosis, kidney function stable and at baseline, high-sensitivity troponin normal at 0 and 2 hours  Chest x-ray wet read did not show pulmonary infiltrates  CTA PE study unremarkable for PE and also & w/o infilatrates, or pulm edema  Patient was able to be brought down to 5 L oxygen nasal cannula without intervention  She was admitted admitted on combination of COPD exacerbation/COVID pathway     PMHx reviewed with patient + chart reviewed  Patient with hx of combined CHF 2/2 pulmonary HTN in the setting of chronic tobacco abuse and severe COPD w/ preserved LV systolic function and LV diastolic dysfunction, right ventricular dilatation and dysfunction  Patient required home O2, daily diuretics as well as inhalers back in 2021  Patient had recovery of EF w/ smoking cessation, weight loss, and medication  Last ECHO June '22 w/ EF 63% w normal systolic and diastolic dysfunction   She has been off home O2 since feb 2022 and her diuretic regimen has decreased from lasix 60 mg qd to 20 mg MWF       Med rec preformed: lasix 20 mg MWF, crestor 5 qd, sertraline 50 qd, atrax 10 BID PRN, kenalog cream BID     Patient confirmed level 1 full code     Patient seen and examined at bedside  Saturating 94% on 5 L oxygen  Frequent productive cough unable to clear mucus with coughing  Patient appears dry on exam with dry mucous membranes  No pitting edema  Heart sounds normal S1-S2  Lung sounds distant however no wheezes or crackles "     Pt completed course of antibiotics  Was put on IV steroids with PO taper  She was set up for oxygen 3L  She is feeling much better today and will be going home with the   Physical Exam  Constitutional:       General: She is not in acute distress  Appearance: She is obese  She is not toxic-appearing or diaphoretic  Cardiovascular:      Rate and Rhythm: Normal rate and regular rhythm  Pulses: Normal pulses  Heart sounds: Normal heart sounds  No gallop  Pulmonary:      Effort: Pulmonary effort is normal       Breath sounds: Normal breath sounds  No wheezing or rales  Abdominal:      General: Bowel sounds are normal  There is distension  Palpations: There is no mass  Tenderness: There is no abdominal tenderness  There is no right CVA tenderness or left CVA tenderness  Musculoskeletal:      Right lower leg: No edema  Left lower leg: No edema  Neurological:      General: No focal deficit present  Mental Status: She is oriented to person, place, and time     Psychiatric:         Mood and Affect: Mood normal          DISCHARGE INFORMATION     PCP at Discharge: Dr Annie Christian Provider: Adebayo Wu MD  Admission Date: 1/30/2023    Discharge Provider: Adebayo Wu MD  Discharge Date: 2/10/2023    Discharge Disposition: Home with [address] [place] [address]  Discharge Condition: good  Discharge with Lines: no    Discharge Diet: cardiac diet  Activity Restrictions: as tolerated'  Test Results Pending at Discharge: no    Discharge Diagnoses:  Principal Problem:    Acute respiratory failure with hypoxia ([facility] [address] )  Active Problems:    Type 2 diabetes, HbA1c goal < 7% ([facility])    Anxiety    SOB (shortness of breath)    COPD (chronic obstructive pulmonary disease) (HCC)    Essential hypertension, benign    Dysthymic disorder    History of COVID-19    COPD exacerbation (HCC)    Biventricular congestive heart failure (HCC)    Chest pain  Resolved Problems:    * No resolved hospital problems  *      Consulting Providers:      Diagnostic & Therapeutic Procedures Performed:  XR chest portable    Result Date: 2/4/2023  Impression: No acute findings  Workstation performed: [id number]     XR chest portable    Result Date: 2/3/2023  Impression: No acute cardiopulmonary disease  Workstation performed: [id number]     XR chest pa & lateral    Result Date: 1/30/2023  Impression: No acute cardiopulmonary disease  This report is in agreement with the preliminary interpretation  Workstation performed: [id number]     CTA ED chest PE study    Result Date: 1/30/2023  Impression: No pulmonary embolus  No evidence of acute thoracic process  COPD  3 nodules in the left lower lobe, the largest of which measures 6 mm stable since May 2021  No new pulmonary nodules  Noncontrast chest CT follow-up in 12 months could be considered to establish two-year stability  Additional chronic findings and negatives as above   Workstation performed: [id number]       Code Status: Level 3 - DNAR and DNI  Advance Directive & Living Will: <no information>  Power of :    POLST:      Medications:  Current Discharge Medication List        Current Discharge Medication List        Current Discharge Medication List      CONTINUE these medications which have NOT CHANGED    Details   benzonatate (TESSALON) 200 MG capsule Take 200 mg by mouth 3 (three) times a day as needed      furosemide (LASIX) 20 mg tablet Take 3 tablets (60 mg total) by mouth daily  Qty: 270 tablet, Refills: 1    Associated Diagnoses: Chronic combined systolic and diastolic congestive heart failure (HCC)      hydrOXYzine HCL (ATARAX) 10 mg tablet Take 1 tablet (10 mg total) by mouth 2 (two) times a day as needed for itching  Qty: 60 tablet, Refills: 2    Associated Diagnoses: Anxiety      Lagevrio 200 MG capsule 200 mg      rosuvastatin (CRESTOR) 5 mg tablet Take 1 tablet (5 mg total) by mouth daily  Qty: 30 tablet, Refills: 3    Associated Diagnoses: Mixed hyperlipidemia      sertraline (Zoloft) 50 mg tablet Take 1 tablet (50 mg total) by mouth daily  Qty: 90 tablet, Refills: 1    Associated Diagnoses: Dysthymic disorder      triamcinolone (KENALOG) 0 1 % cream Apply topically 2 (two) times a day  Qty: 30 g, Refills: 0    Associated Diagnoses: Dermatitis             Allergies:  No Known Allergies    FOLLOW-UP     PCP Outpatient Follow-up:  yes      Follow up: 1 week      Consulting Providers Follow-up:  yes      Physician name: Pulm       Active Issues Requiring Follow-up:   none    Discharge Statement:   I spent 45 minutes minutes discharging the patient  This time was spent on the day of discharge  I had direct contact with the patient on the day of discharge  Additional documentation is required if more than 30 minutes were spent on discharge  Portions of the record may have been created with voice recognition software  Occasional wrong word or "sound a like" substitutions may have occurred due to the inherent limitations of voice recognition software    Read the chart carefully and recognize, using context, where substitutions have occurred     ==  Ellis Douglas MD  [address]  Internal Medicine Resident PGY-3

## 2023-02-10 NOTE — PLAN OF CARE
Problem: PAIN - ADULT  Goal: Verbalizes/displays adequate comfort level or baseline comfort level  Description: Interventions:  - Encourage patient to monitor pain and request assistance  - Assess pain using appropriate pain scale  - Administer analgesics based on type and severity of pain and evaluate response  - Implement non-pharmacological measures as appropriate and evaluate response  - Consider cultural and social influences on pain and pain management  - Notify physician/advanced practitioner if interventions unsuccessful or patient reports new pain  Outcome: Progressing     Problem: INFECTION - ADULT  Goal: Absence or prevention of progression during hospitalization  Description: INTERVENTIONS:  - Assess and monitor for signs and symptoms of infection  - Monitor lab/diagnostic results  - Monitor all insertion sites, i e  indwelling lines, tubes, and drains  - Monitor endotracheal if appropriate and nasal secretions for changes in amount and color  - [place] appropriate cooling/warming therapies per order  - Administer medications as ordered  - Instruct and encourage patient and family to use good hand hygiene technique  - Identify and instruct in appropriate isolation precautions for identified infection/condition  Outcome: Progressing     Problem: DISCHARGE PLANNING  Goal: Discharge to home or other facility with appropriate resources  Description: INTERVENTIONS:  - Identify barriers to discharge w/patient and caregiver  - Arrange for needed discharge resources and transportation as appropriate  - Identify discharge learning needs (meds, wound care, etc )  - Arrange for interpretive services to assist at discharge as needed  - Refer to Case Management Department for coordinating discharge planning if the patient needs post-hospital services based on physician/advanced practitioner order or complex needs related to functional status, cognitive ability, or social support system  Outcome: Progressing Problem: Knowledge Deficit  Goal: Patient/family/caregiver demonstrates understanding of disease process, treatment plan, medications, and discharge instructions  Description: Complete learning assessment and assess knowledge base  Interventions:  - Provide teaching at level of understanding  - Provide teaching via preferred learning methods  Outcome: Progressing     Problem: RESPIRATORY - ADULT  Goal: Achieves optimal ventilation and oxygenation  Description: INTERVENTIONS:  - Assess for changes in respiratory status  - Assess for changes in mentation and behavior  - Position to facilitate oxygenation and minimize respiratory effort  - Oxygen administered by appropriate delivery if ordered  - Initiate smoking cessation education as indicated  - Encourage broncho-pulmonary hygiene including cough, deep breathe, Incentive Spirometry  - Assess the need for suctioning and aspirate as needed  - Assess and instruct to report SOB or any respiratory difficulty  - Respiratory Therapy support as indicated  Outcome: Progressing     Problem: METABOLIC, FLUID AND ELECTROLYTES - ADULT  Goal: Electrolytes maintained within normal limits  Description: INTERVENTIONS:  - Monitor labs and assess patient for signs and symptoms of electrolyte imbalances  - Administer electrolyte replacement as ordered  - Monitor response to electrolyte replacements, including repeat lab results as appropriate  - Instruct patient on fluid and nutrition as appropriate  Outcome: Progressing     Problem: Nutrition/Hydration-ADULT  Goal: Nutrient/Hydration intake appropriate for improving, restoring or maintaining nutritional needs  Description: Monitor and assess patient's nutrition/hydration status for malnutrition  Collaborate with interdisciplinary team and initiate plan and interventions as ordered  Monitor patient's weight and dietary intake as ordered or per policy  Utilize nutrition screening tool and intervene as necessary   Determine patient's food preferences and provide high-protein, high-caloric foods as appropriate       INTERVENTIONS:  - Monitor oral intake, urinary output, labs, and treatment plans  - Assess nutrition and hydration status and recommend course of action  - Evaluate amount of meals eaten  - Assist patient with eating if necessary   - Allow adequate time for meals  - Recommend/ encourage appropriate diets, oral nutritional supplements, and vitamin/mineral supplements  - Order, calculate, and assess calorie counts as needed  - Recommend, monitor, and adjust tube feedings and TPN/PPN based on assessed needs  - Assess need for intravenous fluids  - Provide specific nutrition/hydration education as appropriate  - Include patient/family/caregiver in decisions related to nutrition  Outcome: Progressing

## 2023-02-10 NOTE — CASE MANAGEMENT
Case Management Discharge Planning Note    Patient name Damien Marquez  [facility] [id number]  : 1945 Date 2/10/2023       Current Admission Date: 2023  Current Admission Diagnosis:Acute respiratory failure with hypoxia [facility])   Patient Active Problem List    Diagnosis Date Noted   • Chest pain 2023   • Acute respiratory failure with hypoxia ([facility] [address] ) 2023   • History of COVID-19 2023   • COPD exacerbation ([facility] [address] ) 2023   • Biventricular congestive heart failure ([facility] [address] ) 2023   • Mixed hyperlipidemia 2022   • Dysthymic disorder 2022   • Impaired fasting blood sugar 2022   • Gastrointestinal hemorrhage 10/21/2021   • Dermatitis 10/21/2021   • Primary osteoarthritis of both hands 10/21/2021   • Congestive heart failure with right ventricular systolic dysfunction ([facility] [address] ) 2021   • Rheumatoid factor positive 2021   • Arthritis 2021   • Diabetic eye exam ([facility] ) 2021   • Hypoxia 2021   • Essential hypertension, benign 2021   • Chronic combined systolic and diastolic congestive heart failure ([facility] [address] ) 2021   • Pulmonary hypertension ([facility] ) 2021   • Cigarette nicotine dependence without complication    • Elevated BP without diagnosis of hypertension 2021   • Lung nodules 2021   • COPD (chronic obstructive pulmonary disease) ([facility] [address] ) 2021   • Chronic respiratory failure with hypoxia ([facility] ) 2021   • Nicotine dependence 2021   • Microalbuminuria 2021   • SOB (shortness of breath) 2021   • Uncontrolled type 2 diabetes mellitus with hyperglycemia ([facility] ) 2021   • Allergic rhinitis    • Type 2 diabetes, HbA1c goal < 7% ([facility])    • Anxiety    • Tendinitis of finger 2015      LOS (days): 10  Geometric Mean LOS (GMLOS) (days): 3 40  Days to GMLOS:-6 5     OBJECTIVE:  Risk of Unplanned Readmission Score: 13 08         Current admission status: Inpatient   Preferred Pharmacy: [id number] [address], [address] [id number]  Phone: [phone] Fax: [phone]    Primary Care Provider: Isabel Posada MD    Primary Insurance: MEDICARE  Secondary Insurance: AARP    DISCHARGE DETAILS:                                          Other Referral/Resources/Interventions Provided:  Referral Comments: Pt discharged  AID[facility] message sent to [facility] & faxed AVS via epic  6106 update: Manually faxed AVS to fax [phone]

## 2023-02-13 ENCOUNTER — TRANSITIONAL CARE MANAGEMENT (OUTPATIENT)
Dept: INTERNAL MEDICINE CLINIC | Facility: OTHER | Age: 78
End: 2023-02-13

## 2023-02-16 ENCOUNTER — OFFICE VISIT (OUTPATIENT)
Dept: INTERNAL MEDICINE CLINIC | Facility: CLINIC | Age: 78
End: 2023-02-16

## 2023-02-16 VITALS
DIASTOLIC BLOOD PRESSURE: 80 MMHG | OXYGEN SATURATION: 96 % | BODY MASS INDEX: 25.78 KG/M2 | HEIGHT: 64 IN | SYSTOLIC BLOOD PRESSURE: 126 MMHG | TEMPERATURE: 98.6 F | WEIGHT: 151 LBS | HEART RATE: 93 BPM

## 2023-02-16 DIAGNOSIS — J44.1 COPD EXACERBATION (HCC): ICD-10-CM

## 2023-02-16 DIAGNOSIS — R73.01 IMPAIRED FASTING BLOOD SUGAR: ICD-10-CM

## 2023-02-16 DIAGNOSIS — I50.42 CHRONIC COMBINED SYSTOLIC AND DIASTOLIC CONGESTIVE HEART FAILURE (HCC): ICD-10-CM

## 2023-02-16 DIAGNOSIS — J96.01 ACUTE RESPIRATORY FAILURE WITH HYPOXIA (HCC): Primary | ICD-10-CM

## 2023-02-16 NOTE — PROGRESS NOTES
Transitional Care Management Review:  Claude Grey is a 76 y o  female here for TCM follow up  During the TCM phone call patient stated:    TCM Call     Date and time call was made  2/13/2023 11:47 AM    Hospital care reviewed  Records reviewed    Patient was hospitialized at  [facility]    Date of Admission  01/30/23    Date of discharge  02/10/23    Diagnosis  acute respiratory failure with hypoxia    Disposition  Home    Current Symptoms  Fatigue    Fatigue severity  Mild      TCM Call     Post hospital issues  Reduced activity    Scheduled for follow up? Yes    Did you obtain your prescribed medications  Yes    Do you need help managing your prescriptions or medications  Yes    Why type of assitance do you need   assists    Is transportation to your appointment needed  No    Specify why      I have advised the patient to call PCP with any new or worsening symptoms  Remberto Li MD      Assessment/Plan:             1  Acute respiratory failure with hypoxia ([address] )    2  COPD exacerbation ([address] )    3  Chronic combined systolic and diastolic congestive heart failure ([address] )    4  Impaired fasting blood sugar         Subjective:      Patient ID: Claude Grey is a 76 y o  female  Follow-up from hospitalization, doing well, hospital record reviewed      The following portions of the patient's history were reviewed and updated as appropriate: She  has a past medical history of Allergic rhinitis, Anxiety, Arthritis, Diabetes mellitus ([address] ), Oxygen decrease, and Tobacco abuse    She   Patient Active Problem List    Diagnosis Date Noted   • Chest pain 02/07/2023   • Acute respiratory failure with hypoxia ([address] ) 01/30/2023   • History of COVID-19 01/30/2023   • COPD exacerbation ([address] ) 01/30/2023   • Biventricular congestive heart failure ([address] ) 01/30/2023   • Mixed hyperlipidemia 06/06/2022   • Dysthymic disorder 01/24/2022   • Impaired fasting blood sugar 01/24/2022 • Gastrointestinal hemorrhage 10/21/2021   • Dermatitis 10/21/2021   • Primary osteoarthritis of both hands 10/21/2021   • Congestive heart failure with right ventricular systolic dysfunction ([place] [address] ) 09/23/2021   • Rheumatoid factor positive 09/22/2021   • Arthritis 09/22/2021   • Diabetic eye exam ([place] [address] ) 06/30/2021   • Hypoxia 06/16/2021   • Essential hypertension, benign 06/02/2021   • Chronic combined systolic and diastolic congestive heart failure ([place] [address] ) 06/02/2021   • Pulmonary hypertension ([place] [address] ) 06/02/2021   • Cigarette nicotine dependence without complication    • Elevated BP without diagnosis of hypertension 05/22/2021   • Lung nodules 05/18/2021   • COPD (chronic obstructive pulmonary disease) ([place] [address] ) 05/18/2021   • Chronic respiratory failure with hypoxia ([place] [address] ) 05/18/2021   • Nicotine dependence 05/18/2021   • Microalbuminuria 05/17/2021   • SOB (shortness of breath) 05/11/2021   • Uncontrolled type 2 diabetes mellitus with hyperglycemia ([place] [address] ) 05/11/2021   • Allergic rhinitis    • Type 2 diabetes, HbA1c goal < 7% ([facility])    • Anxiety    • Tendinitis of finger 05/01/2015     She  has a past surgical history that includes Breast surgery (Right); Other surgical history; and Other surgical history  Her family history includes Diabetes in her brother; Heart disease in her brother; Hypotension in her daughter; Lymphoma in her brother; No Known Problems in her sister; Parkinsonism in her sister; Prostate cancer in her father; Proteinuria in her daughter  She  reports that she quit smoking about 21 months ago  Her smoking use included cigarettes  She has a 15 00 pack-year smoking history  She has never used smokeless tobacco  She reports current alcohol use of about 1 0 - 2 0 standard drink per week  She reports that she does not use drugs    Current Outpatient Medications   Medication Sig Dispense Refill   • albuterol (PROVENTIL HFA,VENTOLIN HFA) 90 mcg/act inhaler Inhale 2 puffs every 4 (four) hours as needed for wheezing 6 7 g 1   • benzonatate (TESSALON) 200 MG capsule Take 1 capsule (200 mg total) by mouth 3 (three) times a day 20 capsule 0   • fluticasone (Flovent HFA) 110 MCG/ACT inhaler Inhale 2 puffs 2 (two) times a day Rinse mouth after use  12 g 1   • furosemide (LASIX) 20 mg tablet Take 1 tablet (20 mg total) by mouth daily Do not start before February 11, 2023  30 tablet 0   • gabapentin (NEURONTIN) 300 mg capsule Take 1 capsule (300 mg total) by mouth daily at bedtime 30 capsule 0   • guaiFENesin 1200 MG TB12 Take 1 tablet (1,200 mg total) by mouth 2 (two) times a day 14 tablet 0   • hydrOXYzine HCL (ATARAX) 10 mg tablet Take 1 tablet (10 mg total) by mouth 2 (two) times a day as needed for itching 60 tablet 2   • predniSONE 10 mg tablet Take 3 tablets (30 mg total) by mouth daily for 2 days, THEN 2 tablets (20 mg total) daily for 3 days, THEN 1 tablet (10 mg total) daily for 3 days  Do not start before February 11, 2023  15 tablet 0   • sertraline (Zoloft) 50 mg tablet Take 1 tablet (50 mg total) by mouth daily 90 tablet 1   • triamcinolone (KENALOG) 0 1 % cream Apply topically 2 (two) times a day 30 g 0   • umeclidinium-vilanterol 62 5-25 mcg/actuation inhaler Inhale 1 puff daily Do not start before February 11, 2023  60 blister 1   • rosuvastatin (CRESTOR) 5 mg tablet Take 1 tablet (5 mg total) by mouth daily (Patient not taking: Reported on 2/16/2023) 30 tablet 3     No current facility-administered medications for this visit  She has No Known Allergies       Review of Systems   Constitutional: Negative for chills and fever  HENT: Negative for congestion, ear pain and sore throat  Eyes: Negative for pain  Respiratory: Positive for cough and shortness of breath  Cardiovascular: Negative for chest pain and leg swelling  Gastrointestinal: Negative for abdominal pain, nausea and vomiting  Endocrine: Negative for polyuria  Genitourinary: Negative for difficulty urinating, frequency and urgency  Musculoskeletal: Negative for arthralgias and back pain  Skin: Negative for rash  Neurological: Negative for weakness and headaches  Psychiatric/Behavioral: Negative for sleep disturbance  The patient is not nervous/anxious            Objective:      /80 (BP Location: Left arm, Patient Position: Sitting, Cuff Size: Large)   Pulse 93   Temp 98 6 °F (37 °C) (Temporal)   Ht 5' 4" (1 626 m)   Wt 68 5 kg (151 lb)   SpO2 96% Comment: RA  BMI 25 92 kg/m²     Recent Results (from the past 336 hour(s))   Fingerstick Glucose (POCT)    Collection Time: 02/02/23  4:01 PM   Result Value Ref Range    POC Glucose 200 (H) 65 - 140 mg/dl   Fingerstick Glucose (POCT)    Collection Time: 02/02/23  9:12 PM   Result Value Ref Range    POC Glucose 183 (H) 65 - 140 mg/dl   CBC and differential    Collection Time: 02/03/23  4:37 AM   Result Value Ref Range    WBC 7 30 4 31 - 10 16 Thousand/uL    RBC 4 39 3 81 - 5 12 Million/uL    Hemoglobin 13 1 11 5 - 15 4 g/dL    Hematocrit 41 2 34 8 - 46 1 %    MCV 94 82 - 98 fL    MCH 29 8 26 8 - 34 3 pg    MCHC 31 8 31 4 - 37 4 g/dL    RDW 11 9 11 6 - 15 1 %    MPV 11 0 8 9 - 12 7 fL    Platelets 017 044 - 173 Thousands/uL   Basic metabolic panel    Collection Time: 02/03/23  4:37 AM   Result Value Ref Range    Sodium 137 135 - 147 mmol/L    Potassium 4 7 3 5 - 5 3 mmol/L    Chloride 101 96 - 108 mmol/L    CO2 32 21 - 32 mmol/L    ANION GAP 4 4 - 13 mmol/L    BUN 19 5 - 25 mg/dL    Creatinine 0 59 (L) 0 60 - 1 30 mg/dL    Glucose 189 (H) 65 - 140 mg/dL    Calcium 9 2 8 3 - 10 1 mg/dL    eGFR 87 ml/min/1 73sq m   Fingerstick Glucose (POCT)    Collection Time: 02/03/23  7:34 AM   Result Value Ref Range    POC Glucose 164 (H) 65 - 140 mg/dl   Fingerstick Glucose (POCT)    Collection Time: 02/03/23 11:37 AM   Result Value Ref Range    POC Glucose 125 65 - 140 mg/dl   Fingerstick Glucose (POCT)    Collection Time: 02/03/23  4:13 PM   Result Value Ref Range    POC Glucose 184 (H) 65 - 140 mg/dl   Fingerstick Glucose (POCT)    Collection Time: 02/03/23  9:33 PM   Result Value Ref Range    POC Glucose 176 (H) 65 - 140 mg/dl   CBC and differential    Collection Time: 02/04/23  4:35 AM   Result Value Ref Range    WBC 8 20 4  31 - 10 16 Thousand/uL    RBC 4 41 3 81 - 5 12 Million/uL    Hemoglobin 13 1 11 5 - 15 4 g/dL    Hematocrit 42 1 34 8 - 46 1 %    MCV 96 82 - 98 fL    MCH 29 7 26 8 - 34 3 pg    MCHC 31 1 (L) 31 4 - 37 4 g/dL    RDW 12 0 11 6 - 15 1 %    MPV 11 4 8 9 - 12 7 fL    Platelets 252 616 - 183 Thousands/uL    nRBC 0 /100 WBCs    Neutrophils Relative 85 (H) 43 - 75 %    Immat GRANS % 0 0 - 2 %    Lymphocytes Relative 10 (L) 14 - 44 %    Monocytes Relative 5 4 - 12 %    Eosinophils Relative 0 0 - 6 %    Basophils Relative 0 0 - 1 %    Neutrophils Absolute 6 95 1 85 - 7 62 Thousands/µL    Immature Grans Absolute 0 03 0 00 - 0 20 Thousand/uL    Lymphocytes Absolute 0 82 0 60 - 4 47 Thousands/µL    Monocytes Absolute 0 39 0 17 - 1 22 Thousand/µL    Eosinophils Absolute 0 00 0 00 - 0 61 Thousand/µL    Basophils Absolute 0 01 0 00 - 0 10 Thousands/µL   Basic metabolic panel    Collection Time: 02/04/23  4:35 AM   Result Value Ref Range    Sodium 134 (L) 135 - 147 mmol/L    Potassium 4 3 3 5 - 5 3 mmol/L    Chloride 100 96 - 108 mmol/L    CO2 31 21 - 32 mmol/L    ANION GAP 3 (L) 4 - 13 mmol/L    BUN 27 (H) 5 - 25 mg/dL    Creatinine 0 71 0 60 - 1 30 mg/dL    Glucose 280 (H) 65 - 140 mg/dL    Calcium 9 1 8 3 - 10 1 mg/dL    eGFR 81 ml/min/1 73sq m   Fingerstick Glucose (POCT)    Collection Time: 02/04/23  7:17 AM   Result Value Ref Range    POC Glucose 136 65 - 140 mg/dl   Fingerstick Glucose (POCT)    Collection Time: 02/04/23 10:57 AM   Result Value Ref Range    POC Glucose 146 (H) 65 - 140 mg/dl   Fingerstick Glucose (POCT)    Collection Time: 02/04/23  4:14 PM   Result Value Ref Range    POC Glucose 274 (H) 65 - 140 mg/dl   Fingerstick Glucose (POCT)    Collection Time: 02/04/23  9:01 PM   Result Value Ref Range    POC Glucose 179 (H) 65 - 140 mg/dl   Basic metabolic panel    Collection Time: 02/05/23  4:42 AM   Result Value Ref Range    Sodium 135 135 - 147 mmol/L    Potassium 4 6 3 5 - 5 3 mmol/L    Chloride 102 96 - 108 mmol/L    CO2 32 21 - 32 mmol/L    ANION GAP 1 (L) 4 - 13 mmol/L    BUN 27 (H) 5 - 25 mg/dL    Creatinine 0 66 0 60 - 1 30 mg/dL    Glucose 223 (H) 65 - 140 mg/dL    Calcium 9 2 8 3 - 10 1 mg/dL    eGFR 84 ml/min/1 73sq m   CBC and differential    Collection Time: 02/05/23  4:42 AM   Result Value Ref Range    WBC 9 22 4 31 - 10 16 Thousand/uL    RBC 4 36 3 81 - 5 12 Million/uL    Hemoglobin 13 3 11 5 - 15 4 g/dL    Hematocrit 41 9 34 8 - 46 1 %    MCV 96 82 - 98 fL    MCH 30 5 26 8 - 34 3 pg    MCHC 31 7 31 4 - 37 4 g/dL    RDW 12 2 11 6 - 15 1 %    MPV 10 6 8 9 - 12 7 fL    Platelets 939 723 - 895 Thousands/uL    nRBC 0 /100 WBCs    Neutrophils Relative 88 (H) 43 - 75 %    Immat GRANS % 1 0 - 2 %    Lymphocytes Relative 8 (L) 14 - 44 %    Monocytes Relative 3 (L) 4 - 12 %    Eosinophils Relative 0 0 - 6 %    Basophils Relative 0 0 - 1 %    Neutrophils Absolute 8 10 (H) 1 85 - 7 62 Thousands/µL    Immature Grans Absolute 0 11 0 00 - 0 20 Thousand/uL    Lymphocytes Absolute 0 74 0 60 - 4 47 Thousands/µL    Monocytes Absolute 0 26 0 17 - 1 22 Thousand/µL    Eosinophils Absolute 0 00 0 00 - 0 61 Thousand/µL    Basophils Absolute 0 01 0 00 - 0 10 Thousands/µL   Fingerstick Glucose (POCT)    Collection Time: 02/05/23  6:58 AM   Result Value Ref Range    POC Glucose 150 (H) 65 - 140 mg/dl   Fingerstick Glucose (POCT)    Collection Time: 02/05/23 10:44 AM   Result Value Ref Range    POC Glucose 279 (H) 65 - 140 mg/dl   Fingerstick Glucose (POCT)    Collection Time: 02/05/23  3:55 PM   Result Value Ref Range    POC Glucose 144 (H) 65 - 140 mg/dl   Fingerstick Glucose (POCT)    Collection Time: 02/05/23  9:07 PM   Result Value Ref Range    POC Glucose 154 (H) 65 - 140 mg/dl   CBC and differential Collection Time: 02/06/23  5:30 AM   Result Value Ref Range    WBC 9 62 4 31 - 10 16 Thousand/uL    RBC 4 43 3 81 - 5 12 Million/uL    Hemoglobin 13 3 11 5 - 15 4 g/dL    Hematocrit 42 6 34 8 - 46 1 %    MCV 96 82 - 98 fL    MCH 30 0 26 8 - 34 3 pg    MCHC 31 2 (L) 31 4 - 37 4 g/dL    RDW 12 3 11 6 - 15 1 %    MPV 10 8 8 9 - 12 7 fL    Platelets 697 271 - 469 Thousands/uL    nRBC 0 /100 WBCs    Neutrophils Relative 63 43 - 75 %    Immat GRANS % 1 0 - 2 %    Lymphocytes Relative 26 14 - 44 %    Monocytes Relative 9 4 - 12 %    Eosinophils Relative 1 0 - 6 %    Basophils Relative 0 0 - 1 %    Neutrophils Absolute 6 21 1 85 - 7 62 Thousands/µL    Immature Grans Absolute 0 07 0 00 - 0 20 Thousand/uL    Lymphocytes Absolute 2 45 0 60 - 4 47 Thousands/µL    Monocytes Absolute 0 82 0 17 - 1 22 Thousand/µL    Eosinophils Absolute 0 05 0 00 - 0 61 Thousand/µL    Basophils Absolute 0 02 0 00 - 0 10 Thousands/µL   Basic metabolic panel    Collection Time: 02/06/23  5:30 AM   Result Value Ref Range    Sodium 136 135 - 147 mmol/L    Potassium 4 4 3 5 - 5 3 mmol/L    Chloride 104 96 - 108 mmol/L    CO2 27 21 - 32 mmol/L    ANION GAP 5 4 - 13 mmol/L    BUN 21 5 - 25 mg/dL    Creatinine 0 59 (L) 0 60 - 1 30 mg/dL    Glucose 120 65 - 140 mg/dL    Calcium 9 0 8 3 - 10 1 mg/dL    eGFR 87 ml/min/1 73sq m   Fingerstick Glucose (POCT)    Collection Time: 02/06/23  7:04 AM   Result Value Ref Range    POC Glucose 107 65 - 140 mg/dl   Fingerstick Glucose (POCT)    Collection Time: 02/06/23 10:57 AM   Result Value Ref Range    POC Glucose 175 (H) 65 - 140 mg/dl   Fingerstick Glucose (POCT)    Collection Time: 02/06/23  2:29 PM   Result Value Ref Range    POC Glucose 226 (H) 65 - 140 mg/dl   Fingerstick Glucose (POCT)    Collection Time: 02/06/23  4:33 PM   Result Value Ref Range    POC Glucose 163 (H) 65 - 140 mg/dl   Fingerstick Glucose (POCT)    Collection Time: 02/06/23  8:48 PM   Result Value Ref Range    POC Glucose 144 (H) 65 - 140 mg/dl   CBC and differential    Collection Time: 02/07/23  6:20 AM   Result Value Ref Range    WBC 9 23 4 31 - 10 16 Thousand/uL    RBC 4 32 3 81 - 5 12 Million/uL    Hemoglobin 13 0 11 5 - 15 4 g/dL    Hematocrit 42 0 34 8 - 46 1 %    MCV 97 82 - 98 fL    MCH 30 1 26 8 - 34 3 pg    MCHC 31 0 (L) 31 4 - 37 4 g/dL    RDW 12 2 11 6 - 15 1 %    MPV 11 0 8 9 - 12 7 fL    Platelets 269 441 - 105 Thousands/uL    nRBC 0 /100 WBCs    Neutrophils Relative 62 43 - 75 %    Immat GRANS % 1 0 - 2 %    Lymphocytes Relative 28 14 - 44 %    Monocytes Relative 8 4 - 12 %    Eosinophils Relative 1 0 - 6 %    Basophils Relative 0 0 - 1 %    Neutrophils Absolute 5 80 1 85 - 7 62 Thousands/µL    Immature Grans Absolute 0 05 0 00 - 0 20 Thousand/uL    Lymphocytes Absolute 2 55 0 60 - 4 47 Thousands/µL    Monocytes Absolute 0 70 0 17 - 1 22 Thousand/µL    Eosinophils Absolute 0 12 0 00 - 0 61 Thousand/µL    Basophils Absolute 0 01 0 00 - 0 10 Thousands/µL   Basic metabolic panel    Collection Time: 02/07/23  6:20 AM   Result Value Ref Range    Sodium 139 135 - 147 mmol/L    Potassium 3 8 3 5 - 5 3 mmol/L    Chloride 103 96 - 108 mmol/L    CO2 32 21 - 32 mmol/L    ANION GAP 4 4 - 13 mmol/L    BUN 23 5 - 25 mg/dL    Creatinine 0 57 (L) 0 60 - 1 30 mg/dL    Glucose 114 65 - 140 mg/dL    Calcium 8 6 8 3 - 10 1 mg/dL    eGFR 88 ml/min/1 73sq m   Magnesium    Collection Time: 02/07/23  6:20 AM   Result Value Ref Range    Magnesium 2 5 1 6 - 2 6 mg/dL   Fingerstick Glucose (POCT)    Collection Time: 02/07/23  6:56 AM   Result Value Ref Range    POC Glucose 111 65 - 140 mg/dl   Home O2 Setup    Collection Time: 02/07/23  9:59 AM   Result Value Ref Range    Supplier Name [facility]/[address]     Supplier Phone Number [phone]     Order Status Delivery Successful     Delivery Note      Delivery Request Date 02/08/2023     Date Delivered  02/07/2023     Supplier Name 02/07/2023     Item Description       Home Oxygen Concentrator with Portability, Adult, Standard Liter Flow    Item Description Portable Gaseous Oxygen System     Item Description Portable O2 Contents, Gas     Item Description No Conserving Device     Item Description O2 Humidifier Bottle, Standard Liter Flow    Fingerstick Glucose (POCT)    Collection Time: 02/07/23 10:55 AM   Result Value Ref Range    POC Glucose 146 (H) 65 - 140 mg/dl   Fingerstick Glucose (POCT)    Collection Time: 02/07/23  3:51 PM   Result Value Ref Range    POC Glucose 228 (H) 65 - 140 mg/dl   Fingerstick Glucose (POCT)    Collection Time: 02/07/23 10:05 PM   Result Value Ref Range    POC Glucose 122 65 - 140 mg/dl   CBC and differential    Collection Time: 02/08/23  4:15 AM   Result Value Ref Range    WBC 9 59 4 31 - 10 16 Thousand/uL    RBC 4 21 3 81 - 5 12 Million/uL    Hemoglobin 12 6 11 5 - 15 4 g/dL    Hematocrit 40 0 34 8 - 46 1 %    MCV 95 82 - 98 fL    MCH 29 9 26 8 - 34 3 pg    MCHC 31 5 31 4 - 37 4 g/dL    RDW 12 4 11 6 - 15 1 %    MPV 11 7 8 9 - 12 7 fL    Platelets 624 156 - 833 Thousands/uL    nRBC 0 /100 WBCs    Neutrophils Relative 73 43 - 75 %    Immat GRANS % 1 0 - 2 %    Lymphocytes Relative 18 14 - 44 %    Monocytes Relative 7 4 - 12 %    Eosinophils Relative 1 0 - 6 %    Basophils Relative 0 0 - 1 %    Neutrophils Absolute 7 05 1 85 - 7 62 Thousands/µL    Immature Grans Absolute 0 05 0 00 - 0 20 Thousand/uL    Lymphocytes Absolute 1 74 0 60 - 4 47 Thousands/µL    Monocytes Absolute 0 64 0 17 - 1 22 Thousand/µL    Eosinophils Absolute 0 10 0 00 - 0 61 Thousand/µL    Basophils Absolute 0 01 0 00 - 0 10 Thousands/µL   Basic metabolic panel    Collection Time: 02/08/23  4:15 AM   Result Value Ref Range    Sodium 136 135 - 147 mmol/L    Potassium 4 2 3 5 - 5 3 mmol/L    Chloride 102 96 - 108 mmol/L    CO2 32 21 - 32 mmol/L    ANION GAP 2 (L) 4 - 13 mmol/L    BUN 23 5 - 25 mg/dL    Creatinine 0 55 (L) 0 60 - 1 30 mg/dL    Glucose 131 65 - 140 mg/dL    Calcium 8 7 8 3 - 10 1 mg/dL    eGFR 89 ml/min/1 73sq m   Fingerstick Glucose (POCT)    Collection Time: 02/08/23  8:16 AM   Result Value Ref Range    POC Glucose 97 65 - 140 mg/dl   Fingerstick Glucose (POCT)    Collection Time: 02/08/23 10:47 AM   Result Value Ref Range    POC Glucose 114 65 - 140 mg/dl   Fingerstick Glucose (POCT)    Collection Time: 02/08/23  4:38 PM   Result Value Ref Range    POC Glucose 193 (H) 65 - 140 mg/dl   Fingerstick Glucose (POCT)    Collection Time: 02/08/23  9:20 PM   Result Value Ref Range    POC Glucose 130 65 - 140 mg/dl   Basic metabolic panel    Collection Time: 02/09/23  3:21 AM   Result Value Ref Range    Sodium 139 135 - 147 mmol/L    Potassium 4 1 3 5 - 5 3 mmol/L    Chloride 103 96 - 108 mmol/L    CO2 31 21 - 32 mmol/L    ANION GAP 5 4 - 13 mmol/L    BUN 25 5 - 25 mg/dL    Creatinine 0 65 0 60 - 1 30 mg/dL    Glucose 135 65 - 140 mg/dL    Calcium 8 7 8 3 - 10 1 mg/dL    eGFR 84 ml/min/1 73sq m   CBC and differential    Collection Time: 02/09/23  3:21 AM   Result Value Ref Range    WBC 10 47 (H) 4 31 - 10 16 Thousand/uL    RBC 4 48 3 81 - 5 12 Million/uL    Hemoglobin 13 3 11 5 - 15 4 g/dL    Hematocrit 42 2 34 8 - 46 1 %    MCV 94 82 - 98 fL    MCH 29 7 26 8 - 34 3 pg    MCHC 31 5 31 4 - 37 4 g/dL    RDW 12 5 11 6 - 15 1 %    MPV 10 6 8 9 - 12 7 fL    Platelets 695 714 - 980 Thousands/uL    nRBC 0 /100 WBCs    Neutrophils Relative 77 (H) 43 - 75 %    Immat GRANS % 0 0 - 2 %    Lymphocytes Relative 17 14 - 44 %    Monocytes Relative 5 4 - 12 %    Eosinophils Relative 1 0 - 6 %    Basophils Relative 0 0 - 1 %    Neutrophils Absolute 8 09 (H) 1 85 - 7 62 Thousands/µL    Immature Grans Absolute 0 02 0 00 - 0 20 Thousand/uL    Lymphocytes Absolute 1 78 0 60 - 4 47 Thousands/µL    Monocytes Absolute 0 51 0 17 - 1 22 Thousand/µL    Eosinophils Absolute 0 06 0 00 - 0 61 Thousand/µL    Basophils Absolute 0 01 0 00 - 0 10 Thousands/µL   Fingerstick Glucose (POCT)    Collection Time: 02/09/23  8:26 AM   Result Value Ref Range    POC Glucose 167 (H) 65 - 140 mg/dl   Fingerstick Glucose (POCT)    Collection Time: 02/09/23 11:13 AM   Result Value Ref Range    POC Glucose 96 65 - 140 mg/dl   Fingerstick Glucose (POCT)    Collection Time: 02/09/23  4:46 PM   Result Value Ref Range    POC Glucose 143 (H) 65 - 140 mg/dl   Fingerstick Glucose (POCT)    Collection Time: 02/09/23  9:04 PM   Result Value Ref Range    POC Glucose 143 (H) 65 - 140 mg/dl   Fingerstick Glucose (POCT)    Collection Time: 02/10/23  7:54 AM   Result Value Ref Range    POC Glucose 110 65 - 140 mg/dl   Fingerstick Glucose (POCT)    Collection Time: 02/10/23 10:57 AM   Result Value Ref Range    POC Glucose 146 (H) 65 - 140 mg/dl        Physical Exam  Constitutional:       Appearance: Normal appearance  HENT:      Head: Normocephalic  Right Ear: Tympanic membrane, ear canal and external ear normal       Left Ear: Tympanic membrane, ear canal and external ear normal       Nose: Nose normal  No congestion  Mouth/Throat:      Mouth: Mucous membranes are moist       Pharynx: Oropharynx is clear  No oropharyngeal exudate or posterior oropharyngeal erythema  Eyes:      Extraocular Movements: Extraocular movements intact  Conjunctiva/sclera: Conjunctivae normal       Pupils: Pupils are equal, round, and reactive to light  Cardiovascular:      Rate and Rhythm: Normal rate and regular rhythm  Heart sounds: Normal heart sounds  No murmur heard  Pulmonary:      Effort: Pulmonary effort is normal       Breath sounds: No wheezing or rales  Comments: Decreased air entry bilaterally  Abdominal:      General: Bowel sounds are normal  There is no distension  Palpations: Abdomen is soft  Tenderness: There is no abdominal tenderness  Musculoskeletal:         General: Normal range of motion  Cervical back: Normal range of motion and neck supple  Right lower leg: No edema  Left lower leg: No edema     Lymphadenopathy:      Cervical: No cervical adenopathy  Skin:     General: Skin is warm  Neurological:      General: No focal deficit present  Mental Status: She is alert and oriented to person, place, and time

## 2023-02-27 ENCOUNTER — RA CDI HCC (OUTPATIENT)
Dept: OTHER | Facility: HOSPITAL | Age: 78
End: 2023-02-27

## 2023-02-27 NOTE — PROGRESS NOTES
[facility]  coding opportunities          Chart Reviewed number of suggestions sent to Provider: 1   I11 0  Patients Insurance     Medicare Insurance: [employer]

## 2023-03-06 ENCOUNTER — OFFICE VISIT (OUTPATIENT)
Dept: INTERNAL MEDICINE CLINIC | Facility: CLINIC | Age: 78
End: 2023-03-06

## 2023-03-06 VITALS
OXYGEN SATURATION: 97 % | WEIGHT: 150 LBS | HEIGHT: 64 IN | SYSTOLIC BLOOD PRESSURE: 118 MMHG | TEMPERATURE: 99.7 F | BODY MASS INDEX: 25.61 KG/M2 | HEART RATE: 92 BPM | DIASTOLIC BLOOD PRESSURE: 74 MMHG

## 2023-03-06 DIAGNOSIS — I50.82 CONGESTIVE HEART FAILURE WITH RIGHT VENTRICULAR SYSTOLIC DYSFUNCTION (HCC): Primary | ICD-10-CM

## 2023-03-06 DIAGNOSIS — E11.65 UNCONTROLLED TYPE 2 DIABETES MELLITUS WITH HYPERGLYCEMIA (HCC): ICD-10-CM

## 2023-03-06 DIAGNOSIS — I50.20 CONGESTIVE HEART FAILURE WITH RIGHT VENTRICULAR SYSTOLIC DYSFUNCTION (HCC): Primary | ICD-10-CM

## 2023-03-06 DIAGNOSIS — E78.2 MIXED HYPERLIPIDEMIA: ICD-10-CM

## 2023-03-06 DIAGNOSIS — M53.3 COCCYGEAL PAIN, ACUTE: ICD-10-CM
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
## 2023-03-06 DIAGNOSIS — F34.1 DYSTHYMIC DISORDER: ICD-10-CM

## 2023-03-06 DIAGNOSIS — J44.9 CHRONIC OBSTRUCTIVE PULMONARY DISEASE, UNSPECIFIED COPD TYPE (HCC): ICD-10-CM

## 2023-03-06 RX ORDER — FUROSEMIDE 20 MG/1
20 TABLET ORAL DAILY
Qty: 30 TABLET | Refills: 3 | Status: SHIPPED | OUTPATIENT
Start: 2023-03-06

## 2023-03-06 RX ORDER — ROSUVASTATIN CALCIUM 5 MG/1
5 TABLET, COATED ORAL DAILY
Qty: 30 TABLET | Refills: 3 | Status: SHIPPED | OUTPATIENT
Start: 2023-03-06

## 2023-03-06 RX ORDER — FLUTICASONE PROPIONATE 110 UG/1
2 AEROSOL, METERED RESPIRATORY (INHALATION) 2 TIMES DAILY
Qty: 12 G | Refills: 1 | Status: SHIPPED | OUTPATIENT
Start: 2023-03-06

## 2023-03-06 RX ORDER — ALBUTEROL SULFATE 90 UG/1
2 AEROSOL, METERED RESPIRATORY (INHALATION) EVERY 4 HOURS PRN
Qty: 6.7 G | Refills: 1 | Status: SHIPPED | OUTPATIENT
Start: 2023-03-06

## 2023-03-06 NOTE — PROGRESS NOTES
Assessment/Plan:             1  Congestive heart failure with right ventricular systolic dysfunction (HCC)  -     furosemide (LASIX) 20 mg tablet; Take 1 tablet (20 mg total) by mouth daily  -     Durable Medical Equipment    2  Mixed hyperlipidemia  -     rosuvastatin (CRESTOR) 5 mg tablet; Take 1 tablet (5 mg total) by mouth daily    3  Dysthymic disorder  -     sertraline (Zoloft) 50 mg tablet; Take 1 tablet (50 mg total) by mouth daily    4  Chronic obstructive pulmonary disease, unspecified COPD type (HCC)  -     albuterol (PROVENTIL HFA,VENTOLIN HFA) 90 mcg/act inhaler; Inhale 2 puffs every 4 (four) hours as needed for wheezing  -     fluticasone (Flovent HFA) 110 MCG/ACT inhaler; Inhale 2 puffs 2 (two) times a day Rinse mouth after use  -     umeclidinium-vilanterol 62 5-25 mcg/actuation inhaler; Inhale 1 puff daily  -     Durable Medical Equipment    5  Coccygeal pain, acute  -     Durable Medical Equipment    6  Fall, initial encounter  -     Durable Medical Equipment    7  Uncontrolled type 2 diabetes mellitus with hyperglycemia ([facility])         Subjective:      Patient ID: Ledy Ramon is a 76 y o  female  Follow-up on multiple medical problems to ensure they are stable on current medications      The following portions of the patient's history were reviewed and updated as appropriate: She  has a past medical history of Allergic rhinitis, Anxiety, Arthritis, Diabetes mellitus ([address] ), Oxygen decrease, and Tobacco abuse    She   Patient Active Problem List    Diagnosis Date Noted   • Fall 03/06/2023   • Coccygeal pain, acute 03/06/2023   • Chest pain 02/07/2023   • Acute respiratory failure with hypoxia ([address] ) 01/30/2023   • History of COVID-19 01/30/2023   • COPD exacerbation ([address] ) 01/30/2023   • Biventricular congestive heart failure ([address] ) 01/30/2023   • Mixed hyperlipidemia 06/06/2022   • Dysthymic disorder 01/24/2022   • Impaired fasting blood sugar 01/24/2022   • Gastrointestinal hemorrhage 10/21/2021   • Dermatitis 10/21/2021   • Primary osteoarthritis of both hands 10/21/2021   • Congestive heart failure with right ventricular systolic dysfunction ([place] [id number] ) 09/23/2021   • Rheumatoid factor positive 09/22/2021   • Arthritis 09/22/2021   • Diabetic eye exam ([place] [address] ) 06/30/2021   • Hypoxia 06/16/2021   • Essential hypertension, benign 06/02/2021   • Chronic combined systolic and diastolic congestive heart failure ([place] [address] ) 06/02/2021   • Pulmonary hypertension ([place] [address] ) 06/02/2021   • Cigarette nicotine dependence without complication    • Elevated BP without diagnosis of hypertension 05/22/2021   • Lung nodules 05/18/2021   • COPD (chronic obstructive pulmonary disease) ([place] [address] ) 05/18/2021   • Chronic respiratory failure with hypoxia (HCC) 05/18/2021   • Nicotine dependence 05/18/2021   • Microalbuminuria 05/17/2021   • SOB (shortness of breath) 05/11/2021   • Uncontrolled type 2 diabetes mellitus with hyperglycemia ([place] [address] ) 05/11/2021   • Allergic rhinitis    • Type 2 diabetes, HbA1c goal < 7% ([facility])    • Anxiety    • Tendinitis of finger 05/01/2015     She  has a past surgical history that includes Breast surgery (Right); Other surgical history; and Other surgical history  Her family history includes Diabetes in her brother; Heart disease in her brother; Hypotension in her daughter; Lymphoma in her brother; No Known Problems in her sister; Parkinsonism in her sister; Prostate cancer in her father; Proteinuria in her daughter  She  reports that she quit smoking about 21 months ago  Her smoking use included cigarettes  She has a 15 00 pack-year smoking history  She has never used smokeless tobacco  She reports current alcohol use of about 1 0 - 2 0 standard drink per week  She reports that she does not use drugs    Current Outpatient Medications   Medication Sig Dispense Refill   • albuterol (PROVENTIL HFA,VENTOLIN HFA) 90 mcg/act inhaler Inhale 2 puffs every 4 (four) hours as needed for wheezing 6 7 g 1   • fluticasone (Flovent HFA) 110 MCG/ACT inhaler Inhale 2 puffs 2 (two) times a day Rinse mouth after use  12 g 1   • furosemide (LASIX) 20 mg tablet Take 1 tablet (20 mg total) by mouth daily 30 tablet 3   • guaiFENesin 1200 MG TB12 Take 1 tablet (1,200 mg total) by mouth 2 (two) times a day 14 tablet 0   • hydrOXYzine HCL (ATARAX) 10 mg tablet Take 1 tablet (10 mg total) by mouth 2 (two) times a day as needed for itching 60 tablet 2   • rosuvastatin (CRESTOR) 5 mg tablet Take 1 tablet (5 mg total) by mouth daily 30 tablet 3   • sertraline (Zoloft) 50 mg tablet Take 1 tablet (50 mg total) by mouth daily 90 tablet 1   • triamcinolone (KENALOG) 0 1 % cream Apply topically 2 (two) times a day 30 g 0   • umeclidinium-vilanterol 62 5-25 mcg/actuation inhaler Inhale 1 puff daily 60 blister 1     No current facility-administered medications for this visit  Current Outpatient Medications on File Prior to Visit   Medication Sig   • guaiFENesin 1200 MG TB12 Take 1 tablet (1,200 mg total) by mouth 2 (two) times a day   • hydrOXYzine HCL (ATARAX) 10 mg tablet Take 1 tablet (10 mg total) by mouth 2 (two) times a day as needed for itching   • triamcinolone (KENALOG) 0 1 % cream Apply topically 2 (two) times a day   • [DISCONTINUED] albuterol (PROVENTIL HFA,VENTOLIN HFA) 90 mcg/act inhaler Inhale 2 puffs every 4 (four) hours as needed for wheezing   • [DISCONTINUED] fluticasone (Flovent HFA) 110 MCG/ACT inhaler Inhale 2 puffs 2 (two) times a day Rinse mouth after use  • [DISCONTINUED] furosemide (LASIX) 20 mg tablet Take 1 tablet (20 mg total) by mouth daily Do not start before February 11, 2023  • [DISCONTINUED] gabapentin (NEURONTIN) 300 mg capsule Take 1 capsule (300 mg total) by mouth daily at bedtime   • [DISCONTINUED] sertraline (Zoloft) 50 mg tablet Take 1 tablet (50 mg total) by mouth daily   • [DISCONTINUED] umeclidinium-vilanterol 62 5-25 mcg/actuation inhaler Inhale 1 puff daily Do not start before February 11, 2023     • [DISCONTINUED] benzonatate (TESSALON) 200 MG capsule Take 1 capsule (200 mg total) by mouth 3 (three) times a day   • [DISCONTINUED] rosuvastatin (CRESTOR) 5 mg tablet Take 1 tablet (5 mg total) by mouth daily (Patient not taking: Reported on 2/16/2023)     No current facility-administered medications on file prior to visit  She has No Known Allergies       Review of Systems   Constitutional: Negative for chills and fever  HENT: Negative for congestion, ear pain and sore throat  Eyes: Negative for pain  Respiratory: Positive for cough and shortness of breath  Cardiovascular: Negative for chest pain and leg swelling  Gastrointestinal: Negative for abdominal pain, nausea and vomiting  Endocrine: Negative for polyuria  Genitourinary: Negative for difficulty urinating, frequency and urgency  Musculoskeletal: Positive for back pain  Negative for arthralgias  Skin: Negative for rash  Neurological: Negative for weakness and headaches  Psychiatric/Behavioral: Negative for sleep disturbance  The patient is not nervous/anxious            Objective:      /74 (BP Location: Left arm, Patient Position: Sitting, Cuff Size: Standard)   Pulse 92   Temp 99 7 °F (37 6 °C) (Temporal)   Ht 5' 4" (1 626 m)   Wt 68 kg (150 lb)   SpO2 97%   BMI 25 75 kg/m²     Recent Results (from the past 1344 hour(s))   CBC and differential    Collection Time: 01/30/23  4:07 AM   Result Value Ref Range    WBC 6 67 4 31 - 10 16 Thousand/uL    RBC 4 79 3 81 - 5 12 Million/uL    Hemoglobin 14 3 11 5 - 15 4 g/dL    Hematocrit 44 8 34 8 - 46 1 %    MCV 94 82 - 98 fL    MCH 29 9 26 8 - 34 3 pg    MCHC 31 9 31 4 - 37 4 g/dL    RDW 12 1 11 6 - 15 1 %    MPV 10 3 8 9 - 12 7 fL    Platelets 263 538 - 092 Thousands/uL    nRBC 0 /100 WBCs    Neutrophils Relative 61 43 - 75 %    Immat GRANS % 0 0 - 2 %    Lymphocytes Relative 27 14 - 44 %    Monocytes Relative 9 4 - 12 %    Eosinophils Relative 3 0 - 6 %    Basophils Relative 0 0 - 1 % Neutrophils Absolute 4 01 1 85 - 7 62 Thousands/µL    Immature Grans Absolute 0 01 0 00 - 0 20 Thousand/uL    Lymphocytes Absolute 1 80 0 60 - 4 47 Thousands/µL    Monocytes Absolute 0 60 0 17 - 1 22 Thousand/µL    Eosinophils Absolute 0 22 0 00 - 0 61 Thousand/µL    Basophils Absolute 0 03 0 00 - 0 10 Thousands/µL   Basic metabolic panel    Collection Time: 01/30/23  4:07 AM   Result Value Ref Range    Sodium 139 135 - 147 mmol/L    Potassium 3 9 3 5 - 5 3 mmol/L    Chloride 104 96 - 108 mmol/L    CO2 31 21 - 32 mmol/L    ANION GAP 4 4 - 13 mmol/L    BUN 21 5 - 25 mg/dL    Creatinine 0 71 0 60 - 1 30 mg/dL    Glucose 136 65 - 140 mg/dL    Calcium 9 5 8 3 - 10 1 mg/dL    eGFR 81 ml/min/1 73sq m   HS Troponin 0hr (reflex protocol)    Collection Time: 01/30/23  4:07 AM   Result Value Ref Range    hs TnI 0hr 10 "Refer to ACS Flowchart"- see link ng/L   C-reactive protein    Collection Time: 01/30/23  4:07 AM   Result Value Ref Range    CRP 20 0 (H) <3 0 mg/L   Procalcitonin    Collection Time: 01/30/23  4:07 AM   Result Value Ref Range    Procalcitonin <0 05 <=0 25 ng/ml   NT-BNP [facility] [facility] only    Collection Time: 01/30/23  4:07 AM   Result Value Ref Range    NT-proBNP 331 <450 pg/mL   CK (with reflex to MB)    Collection Time: 01/30/23  4:07 AM   Result Value Ref Range    Total CK 90 26 - 192 U/L   ECG 12 lead    Collection Time: 01/30/23  4:11 AM   Result Value Ref Range    Ventricular Rate 75 BPM    Atrial Rate 75 BPM    NE Interval 196 ms    QRSD Interval 68 ms    QT Interval 388 ms    QTC Interval 433 ms    P Axis 91 degrees    QRS Axis -14 degrees    T Wave Axis 82 degrees   D-dimer, quantitative    Collection Time: 01/30/23  4:12 AM   Result Value Ref Range    D-Dimer, Quant 0 74 (H) <0 50 ug/ml FEU   HS Troponin I 2hr    Collection Time: 01/30/23  6:09 AM   Result Value Ref Range    hs TnI 2hr 9 "Refer to ACS Flowchart"- see link ng/L    Delta 2hr hsTnI -1 <20 ng/L   COVID/FLU/RSV Collection Time: 01/30/23 10:38 AM    Specimen: Nose; Nares   Result Value Ref Range    SARS-CoV-2 Negative Negative    INFLUENZA A PCR Negative Negative    INFLUENZA B PCR Negative Negative    RSV PCR Negative Negative   Fingerstick Glucose (POCT)    Collection Time: 01/30/23  4:52 PM   Result Value Ref Range    POC Glucose 139 65 - 140 mg/dl   Comprehensive metabolic panel    Collection Time: 01/31/23  5:04 AM   Result Value Ref Range    Sodium 139 135 - 147 mmol/L    Potassium 3 9 3 5 - 5 3 mmol/L    Chloride 104 96 - 108 mmol/L    CO2 33 (H) 21 - 32 mmol/L    ANION GAP 2 (L) 4 - 13 mmol/L    BUN 22 5 - 25 mg/dL    Creatinine 0 54 (L) 0 60 - 1 30 mg/dL    Glucose 119 65 - 140 mg/dL    Calcium 9 0 8 3 - 10 1 mg/dL    Corrected Calcium 9 6 8 3 - 10 1 mg/dL    AST 13 5 - 45 U/L    ALT 16 12 - 78 U/L    Alkaline Phosphatase 60 46 - 116 U/L    Total Protein 7 3 6 4 - 8 4 g/dL    Albumin 3 3 (L) 3 5 - 5 0 g/dL    Total Bilirubin 0 52 0 20 - 1 00 mg/dL    eGFR 90 ml/min/1 73sq m   CBC and differential    Collection Time: 01/31/23  5:04 AM   Result Value Ref Range    WBC 5 07 4 31 - 10 16 Thousand/uL    RBC 4 50 3 81 - 5 12 Million/uL    Hemoglobin 13 8 11 5 - 15 4 g/dL    Hematocrit 42 8 34 8 - 46 1 %    MCV 95 82 - 98 fL    MCH 30 7 26 8 - 34 3 pg    MCHC 32 2 31 4 - 37 4 g/dL    RDW 12 0 11 6 - 15 1 %    MPV 10 4 8 9 - 12 7 fL    Platelets 355 841 - 588 Thousands/uL   Procalcitonin    Collection Time: 01/31/23  5:04 AM   Result Value Ref Range    Procalcitonin <0 05 <=0 25 ng/ml   Path Slide Review    Collection Time: 01/31/23  5:04 AM   Result Value Ref Range    Path Review       Peripheral blood smear shows normochromic normocytic RBCs, normal number and morphology of platelets, reactive PMNs and lymphocytes with large granular lymphocytes  Evaluated by JENNY Manzano    Interpretation performed at [facility], [address] [facility] [address] [facility] [place] [id number]   Manual Differential(PHLEBS Do Not Order)    Collection Time: 01/31/23  5:04 AM   Result Value Ref Range    Segmented % 65 43 - 75 %    Bands % 5 0 - 8 %    Lymphocytes % 21 14 - 44 %    Monocytes % 9 4 - 12 %    Eosinophils, % 0 0 - 6 %    Basophils % 0 0 - 1 %    Absolute Neutrophils 3 55 1 85 - 7 62 Thousand/uL    Lymphocytes Absolute 1 06 0 60 - 4 47 Thousand/uL    Monocytes Absolute 0 46 0 00 - 1 22 Thousand/uL    Eosinophils Absolute 0 00 0 00 - 0 40 Thousand/uL    Basophils Absolute 0 00 0 00 - 0 10 Thousand/uL    Total Counted 100     RBC Morphology Normal     Platelet Estimate Adequate Adequate    Pathology Review Yes (A) No   Echo complete w/ contrast if indicated    Collection Time: 01/31/23 11:34 AM   Result Value Ref Range    A4C EF 67 %    LVIDd 3 20 cm    LVIDS 2 00 cm    IVSd 1 00 cm    LVPWd 1 00 cm    FS 38 28 - 44    MV E' Tissue Velocity Septal 7 cm/s    E wave deceleration time 177 ms    MV Peak E Paul 79 cm/s    MV Peak A Paul 0 7 m/s    RVID d 4 3 cm    Tricuspid annular plane systolic excursion 2 99 cm    LA size 2 9 cm    LA length (A2C) 4 70 cm    RA 2D Volume 55 0 mL    RAA A4C 20 2 cm2    MV stenosis pressure 1/2 time 51 ms    MV valve area p 1/2 method 4 30     TR Peak Paul 3 4 m/s    Triscuspid Valve Regurgitation Peak Gradient 46 0 mmHg    Ao root 3 00 cm    Asc Ao 2 7 cm    Tricuspid valve peak regurgitation velocity 3 39 m/s    Left ventricular stroke volume (2D) 29 00 mL    IVS 1 cm    LEFT VENTRICLE SYSTOLIC VOLUME (MOD BIPLANE) 2D 12 mL    LV DIASTOLIC VOLUME (MOD BIPLANE) 2D 41 mL    Left Atrium Area-systolic Four Chamber 85 1 cm2    Left Atrium Area-systolic Apical Two Chamber 13 8 cm2    LVSV, 2D 29 mL    LA Volume Index (BP) 15 3     E/A ratio 1 13     LV EF 65     Est  RA pres 5 0 mmHg    Right Ventricular Peak Systolic Pressure 35 28 mmHg   CBC and differential    Collection Time: 02/01/23  6:55 AM   Result Value Ref Range    WBC 7 66 4 31 - 10 16 Thousand/uL    RBC 4 42 3 81 - 5 12 Million/uL    Hemoglobin 13 2 11 5 - 15 4 g/dL Hematocrit 42 2 34 8 - 46 1 %    MCV 96 82 - 98 fL    MCH 29 9 26 8 - 34 3 pg    MCHC 31 3 (L) 31 4 - 37 4 g/dL    RDW 12 1 11 6 - 15 1 %    MPV 10 4 8 9 - 12 7 fL    Platelets 810 858 - 142 Thousands/uL   Basic metabolic panel    Collection Time: 02/01/23  6:55 AM   Result Value Ref Range    Sodium 140 135 - 147 mmol/L    Potassium 4 3 3 5 - 5 3 mmol/L    Chloride 106 96 - 108 mmol/L    CO2 32 21 - 32 mmol/L    ANION GAP 2 (L) 4 - 13 mmol/L    BUN 26 (H) 5 - 25 mg/dL    Creatinine 0 55 (L) 0 60 - 1 30 mg/dL    Glucose 107 65 - 140 mg/dL    Calcium 9 2 8 3 - 10 1 mg/dL    eGFR 89 ml/min/1 73sq m   Fingerstick Glucose (POCT)    Collection Time: 02/01/23 11:55 AM   Result Value Ref Range    POC Glucose 148 (H) 65 - 140 mg/dl   Fingerstick Glucose (POCT)    Collection Time: 02/01/23  3:53 PM   Result Value Ref Range    POC Glucose 237 (H) 65 - 140 mg/dl   Fingerstick Glucose (POCT)    Collection Time: 02/01/23  9:00 PM   Result Value Ref Range    POC Glucose 157 (H) 65 - 140 mg/dl   Basic metabolic panel    Collection Time: 02/02/23  7:33 AM   Result Value Ref Range    Sodium 136 135 - 147 mmol/L    Potassium 4 7 3 5 - 5 3 mmol/L    Chloride 104 96 - 108 mmol/L    CO2 26 21 - 32 mmol/L    ANION GAP 6 4 - 13 mmol/L    BUN 22 5 - 25 mg/dL    Creatinine 0 58 (L) 0 60 - 1 30 mg/dL    Glucose 144 (H) 65 - 140 mg/dL    Calcium 9 9 8 3 - 10 1 mg/dL    eGFR 87 ml/min/1 73sq m   CBC and differential    Collection Time: 02/02/23  7:33 AM   Result Value Ref Range    WBC 11 35 (H) 4 31 - 10 16 Thousand/uL    RBC 4 63 3 81 - 5 12 Million/uL    Hemoglobin 14 2 11 5 - 15 4 g/dL    Hematocrit 43 9 34 8 - 46 1 %    MCV 95 82 - 98 fL    MCH 30 7 26 8 - 34 3 pg    MCHC 32 3 31 4 - 37 4 g/dL    RDW 11 9 11 6 - 15 1 %    MPV 10 5 8 9 - 12 7 fL    Platelets 408 551 - 888 Thousands/uL    nRBC 0 /100 WBCs    Neutrophils Relative 85 (H) 43 - 75 %    Immat GRANS % 0 0 - 2 %    Lymphocytes Relative 12 (L) 14 - 44 %    Monocytes Relative 3 (L) 4 - 12 %    Eosinophils Relative 0 0 - 6 %    Basophils Relative 0 0 - 1 %    Neutrophils Absolute 9 60 (H) 1 85 - 7 62 Thousands/µL    Immature Grans Absolute 0 05 0 00 - 0 20 Thousand/uL    Lymphocytes Absolute 1 31 0 60 - 4 47 Thousands/µL    Monocytes Absolute 0 36 0 17 - 1 22 Thousand/µL    Eosinophils Absolute 0 00 0 00 - 0 61 Thousand/µL    Basophils Absolute 0 03 0 00 - 0 10 Thousands/µL   Fingerstick Glucose (POCT)    Collection Time: 02/02/23  7:53 AM   Result Value Ref Range    POC Glucose 138 65 - 140 mg/dl   Fingerstick Glucose (POCT)    Collection Time: 02/02/23 11:31 AM   Result Value Ref Range    POC Glucose 123 65 - 140 mg/dl   NT-BNP [facility] [facility] only    Collection Time: 02/02/23 11:42 AM   Result Value Ref Range    NT-proBNP 327 <450 pg/mL   Blood gas, arterial    Collection Time: 02/02/23  1:30 PM   Result Value Ref Range    pH, Arterial 7 429 7 350 - 7 450    pCO2, Arterial 44 8 (H) 36 0 - 44 0 mm Hg    pO2, Arterial 50 0 (LL) 75 0 - 129 0 mm Hg    HCO3, Arterial 29 0 (H) 22 0 - 28 0 mmol/L    Base Excess, Arterial 4 0 mmol/L    O2 Content, Arterial 17 0 16 0 - 23 0 mL/dL    O2 HGB,Arterial  85 3 (L) 94 0 - 97 0 %    SOURCE Radial, Right     DOMINGO TEST Yes     Nasal Cannula 2    Fingerstick Glucose (POCT)    Collection Time: 02/02/23  4:01 PM   Result Value Ref Range    POC Glucose 200 (H) 65 - 140 mg/dl   Fingerstick Glucose (POCT)    Collection Time: 02/02/23  9:12 PM   Result Value Ref Range    POC Glucose 183 (H) 65 - 140 mg/dl   CBC and differential    Collection Time: 02/03/23  4:37 AM   Result Value Ref Range    WBC 7 30 4 31 - 10 16 Thousand/uL    RBC 4 39 3 81 - 5 12 Million/uL    Hemoglobin 13 1 11 5 - 15 4 g/dL    Hematocrit 41 2 34 8 - 46 1 %    MCV 94 82 - 98 fL    MCH 29 8 26 8 - 34 3 pg    MCHC 31 8 31 4 - 37 4 g/dL    RDW 11 9 11 6 - 15 1 %    MPV 11 0 8 9 - 12 7 fL    Platelets 675 356 - 157 Thousands/uL   Basic metabolic panel    Collection Time: 02/03/23  4:37 AM Result Value Ref Range    Sodium 137 135 - 147 mmol/L    Potassium 4 7 3 5 - 5 3 mmol/L    Chloride 101 96 - 108 mmol/L    CO2 32 21 - 32 mmol/L    ANION GAP 4 4 - 13 mmol/L    BUN 19 5 - 25 mg/dL    Creatinine 0 59 (L) 0 60 - 1 30 mg/dL    Glucose 189 (H) 65 - 140 mg/dL    Calcium 9 2 8 3 - 10 1 mg/dL    eGFR 87 ml/min/1 73sq m   Fingerstick Glucose (POCT)    Collection Time: 02/03/23  7:34 AM   Result Value Ref Range    POC Glucose 164 (H) 65 - 140 mg/dl   Fingerstick Glucose (POCT)    Collection Time: 02/03/23 11:37 AM   Result Value Ref Range    POC Glucose 125 65 - 140 mg/dl   Fingerstick Glucose (POCT)    Collection Time: 02/03/23  4:13 PM   Result Value Ref Range    POC Glucose 184 (H) 65 - 140 mg/dl   Fingerstick Glucose (POCT)    Collection Time: 02/03/23  9:33 PM   Result Value Ref Range    POC Glucose 176 (H) 65 - 140 mg/dl   CBC and differential    Collection Time: 02/04/23  4:35 AM   Result Value Ref Range    WBC 8 20 4  31 - 10 16 Thousand/uL    RBC 4 41 3 81 - 5 12 Million/uL    Hemoglobin 13 1 11 5 - 15 4 g/dL    Hematocrit 42 1 34 8 - 46 1 %    MCV 96 82 - 98 fL    MCH 29 7 26 8 - 34 3 pg    MCHC 31 1 (L) 31 4 - 37 4 g/dL    RDW 12 0 11 6 - 15 1 %    MPV 11 4 8 9 - 12 7 fL    Platelets 668 718 - 126 Thousands/uL    nRBC 0 /100 WBCs    Neutrophils Relative 85 (H) 43 - 75 %    Immat GRANS % 0 0 - 2 %    Lymphocytes Relative 10 (L) 14 - 44 %    Monocytes Relative 5 4 - 12 %    Eosinophils Relative 0 0 - 6 %    Basophils Relative 0 0 - 1 %    Neutrophils Absolute 6 95 1 85 - 7 62 Thousands/µL    Immature Grans Absolute 0 03 0 00 - 0 20 Thousand/uL    Lymphocytes Absolute 0 82 0 60 - 4 47 Thousands/µL    Monocytes Absolute 0 39 0 17 - 1 22 Thousand/µL    Eosinophils Absolute 0 00 0 00 - 0 61 Thousand/µL    Basophils Absolute 0 01 0 00 - 0 10 Thousands/µL   Basic metabolic panel    Collection Time: 02/04/23  4:35 AM   Result Value Ref Range    Sodium 134 (L) 135 - 147 mmol/L    Potassium 4 3 3 5 - 5 3 mmol/L    Chloride 100 96 - 108 mmol/L    CO2 31 21 - 32 mmol/L    ANION GAP 3 (L) 4 - 13 mmol/L    BUN 27 (H) 5 - 25 mg/dL    Creatinine 0 71 0 60 - 1 30 mg/dL    Glucose 280 (H) 65 - 140 mg/dL    Calcium 9 1 8 3 - 10 1 mg/dL    eGFR 81 ml/min/1 73sq m   Fingerstick Glucose (POCT)    Collection Time: 02/04/23  7:17 AM   Result Value Ref Range    POC Glucose 136 65 - 140 mg/dl   Fingerstick Glucose (POCT)    Collection Time: 02/04/23 10:57 AM   Result Value Ref Range    POC Glucose 146 (H) 65 - 140 mg/dl   Fingerstick Glucose (POCT)    Collection Time: 02/04/23  4:14 PM   Result Value Ref Range    POC Glucose 274 (H) 65 - 140 mg/dl   Fingerstick Glucose (POCT)    Collection Time: 02/04/23  9:01 PM   Result Value Ref Range    POC Glucose 179 (H) 65 - 140 mg/dl   Basic metabolic panel    Collection Time: 02/05/23  4:42 AM   Result Value Ref Range    Sodium 135 135 - 147 mmol/L    Potassium 4 6 3 5 - 5 3 mmol/L    Chloride 102 96 - 108 mmol/L    CO2 32 21 - 32 mmol/L    ANION GAP 1 (L) 4 - 13 mmol/L    BUN 27 (H) 5 - 25 mg/dL    Creatinine 0 66 0 60 - 1 30 mg/dL    Glucose 223 (H) 65 - 140 mg/dL    Calcium 9 2 8 3 - 10 1 mg/dL    eGFR 84 ml/min/1 73sq m   CBC and differential    Collection Time: 02/05/23  4:42 AM   Result Value Ref Range    WBC 9 22 4 31 - 10 16 Thousand/uL    RBC 4 36 3 81 - 5 12 Million/uL    Hemoglobin 13 3 11 5 - 15 4 g/dL    Hematocrit 41 9 34 8 - 46 1 %    MCV 96 82 - 98 fL    MCH 30 5 26 8 - 34 3 pg    MCHC 31 7 31 4 - 37 4 g/dL    RDW 12 2 11 6 - 15 1 %    MPV 10 6 8 9 - 12 7 fL    Platelets 473 447 - 357 Thousands/uL    nRBC 0 /100 WBCs    Neutrophils Relative 88 (H) 43 - 75 %    Immat GRANS % 1 0 - 2 %    Lymphocytes Relative 8 (L) 14 - 44 %    Monocytes Relative 3 (L) 4 - 12 %    Eosinophils Relative 0 0 - 6 %    Basophils Relative 0 0 - 1 %    Neutrophils Absolute 8 10 (H) 1 85 - 7 62 Thousands/µL    Immature Grans Absolute 0 11 0 00 - 0 20 Thousand/uL    Lymphocytes Absolute 0 74 0 60 - 4 47 Thousands/µL    Monocytes Absolute 0 26 0 17 - 1 22 Thousand/µL    Eosinophils Absolute 0 00 0 00 - 0 61 Thousand/µL    Basophils Absolute 0 01 0 00 - 0 10 Thousands/µL   Fingerstick Glucose (POCT)    Collection Time: 02/05/23  6:58 AM   Result Value Ref Range    POC Glucose 150 (H) 65 - 140 mg/dl   Fingerstick Glucose (POCT)    Collection Time: 02/05/23 10:44 AM   Result Value Ref Range    POC Glucose 279 (H) 65 - 140 mg/dl   Fingerstick Glucose (POCT)    Collection Time: 02/05/23  3:55 PM   Result Value Ref Range    POC Glucose 144 (H) 65 - 140 mg/dl   Fingerstick Glucose (POCT)    Collection Time: 02/05/23  9:07 PM   Result Value Ref Range    POC Glucose 154 (H) 65 - 140 mg/dl   CBC and differential    Collection Time: 02/06/23  5:30 AM   Result Value Ref Range    WBC 9 62 4 31 - 10 16 Thousand/uL    RBC 4 43 3 81 - 5 12 Million/uL    Hemoglobin 13 3 11 5 - 15 4 g/dL    Hematocrit 42 6 34 8 - 46 1 %    MCV 96 82 - 98 fL    MCH 30 0 26 8 - 34 3 pg    MCHC 31 2 (L) 31 4 - 37 4 g/dL    RDW 12 3 11 6 - 15 1 %    MPV 10 8 8 9 - 12 7 fL    Platelets 690 462 - 678 Thousands/uL    nRBC 0 /100 WBCs    Neutrophils Relative 63 43 - 75 %    Immat GRANS % 1 0 - 2 %    Lymphocytes Relative 26 14 - 44 %    Monocytes Relative 9 4 - 12 %    Eosinophils Relative 1 0 - 6 %    Basophils Relative 0 0 - 1 %    Neutrophils Absolute 6 21 1 85 - 7 62 Thousands/µL    Immature Grans Absolute 0 07 0 00 - 0 20 Thousand/uL    Lymphocytes Absolute 2 45 0 60 - 4 47 Thousands/µL    Monocytes Absolute 0 82 0 17 - 1 22 Thousand/µL    Eosinophils Absolute 0 05 0 00 - 0 61 Thousand/µL    Basophils Absolute 0 02 0 00 - 0 10 Thousands/µL   Basic metabolic panel    Collection Time: 02/06/23  5:30 AM   Result Value Ref Range    Sodium 136 135 - 147 mmol/L    Potassium 4 4 3 5 - 5 3 mmol/L    Chloride 104 96 - 108 mmol/L    CO2 27 21 - 32 mmol/L    ANION GAP 5 4 - 13 mmol/L    BUN 21 5 - 25 mg/dL    Creatinine 0 59 (L) 0 60 - 1 30 mg/dL    Glucose 120 65 - 140 mg/dL    Calcium 9 0 8 3 - 10 1 mg/dL    eGFR 87 ml/min/1 73sq m   Fingerstick Glucose (POCT)    Collection Time: 02/06/23  7:04 AM   Result Value Ref Range    POC Glucose 107 65 - 140 mg/dl   Fingerstick Glucose (POCT)    Collection Time: 02/06/23 10:57 AM   Result Value Ref Range    POC Glucose 175 (H) 65 - 140 mg/dl   Fingerstick Glucose (POCT)    Collection Time: 02/06/23  2:29 PM   Result Value Ref Range    POC Glucose 226 (H) 65 - 140 mg/dl   Fingerstick Glucose (POCT)    Collection Time: 02/06/23  4:33 PM   Result Value Ref Range    POC Glucose 163 (H) 65 - 140 mg/dl   Fingerstick Glucose (POCT)    Collection Time: 02/06/23  8:48 PM   Result Value Ref Range    POC Glucose 144 (H) 65 - 140 mg/dl   CBC and differential    Collection Time: 02/07/23  6:20 AM   Result Value Ref Range    WBC 9 23 4 31 - 10 16 Thousand/uL    RBC 4 32 3 81 - 5 12 Million/uL    Hemoglobin 13 0 11 5 - 15 4 g/dL    Hematocrit 42 0 34 8 - 46 1 %    MCV 97 82 - 98 fL    MCH 30 1 26 8 - 34 3 pg    MCHC 31 0 (L) 31 4 - 37 4 g/dL    RDW 12 2 11 6 - 15 1 %    MPV 11 0 8 9 - 12 7 fL    Platelets 100 991 - 704 Thousands/uL    nRBC 0 /100 WBCs    Neutrophils Relative 62 43 - 75 %    Immat GRANS % 1 0 - 2 %    Lymphocytes Relative 28 14 - 44 %    Monocytes Relative 8 4 - 12 %    Eosinophils Relative 1 0 - 6 %    Basophils Relative 0 0 - 1 %    Neutrophils Absolute 5 80 1 85 - 7 62 Thousands/µL    Immature Grans Absolute 0 05 0 00 - 0 20 Thousand/uL    Lymphocytes Absolute 2 55 0 60 - 4 47 Thousands/µL    Monocytes Absolute 0 70 0 17 - 1 22 Thousand/µL    Eosinophils Absolute 0 12 0 00 - 0 61 Thousand/µL    Basophils Absolute 0 01 0 00 - 0 10 Thousands/µL   Basic metabolic panel    Collection Time: 02/07/23  6:20 AM   Result Value Ref Range    Sodium 139 135 - 147 mmol/L    Potassium 3 8 3 5 - 5 3 mmol/L    Chloride 103 96 - 108 mmol/L    CO2 32 21 - 32 mmol/L    ANION GAP 4 4 - 13 mmol/L    BUN 23 5 - 25 mg/dL    Creatinine 0 57 (L) 0 60 - 1 30 mg/dL    Glucose 114 65 - 140 mg/dL    Calcium 8 6 8 3 - 10 1 mg/dL    eGFR 88 ml/min/1 73sq m   Magnesium    Collection Time: 02/07/23  6:20 AM   Result Value Ref Range    Magnesium 2 5 1 6 - 2 6 mg/dL   Fingerstick Glucose (POCT)    Collection Time: 02/07/23  6:56 AM   Result Value Ref Range    POC Glucose 111 65 - 140 mg/dl   Home O2 Setup    Collection Time: 02/07/23  9:59 AM   Result Value Ref Range    Supplier Name [facility]/[address]     Supplier Phone Number [phone]     Order Status Delivery Successful     Delivery Note      Delivery Request Date 02/08/2023     Date Delivered  02/07/2023     Supplier Name 02/07/2023     Item Description       Home Oxygen Concentrator with Portability, Adult, Standard Liter Flow    Item Description Portable Gaseous Oxygen System     Item Description Portable O2 Contents, Gas     Item Description No Conserving Device     Item Description O2 Humidifier Bottle, Standard Liter Flow    Fingerstick Glucose (POCT)    Collection Time: 02/07/23 10:55 AM   Result Value Ref Range    POC Glucose 146 (H) 65 - 140 mg/dl   Fingerstick Glucose (POCT)    Collection Time: 02/07/23  3:51 PM   Result Value Ref Range    POC Glucose 228 (H) 65 - 140 mg/dl   Fingerstick Glucose (POCT)    Collection Time: 02/07/23 10:05 PM   Result Value Ref Range    POC Glucose 122 65 - 140 mg/dl   CBC and differential    Collection Time: 02/08/23  4:15 AM   Result Value Ref Range    WBC 9 59 4 31 - 10 16 Thousand/uL    RBC 4 21 3 81 - 5 12 Million/uL    Hemoglobin 12 6 11 5 - 15 4 g/dL    Hematocrit 40 0 34 8 - 46 1 %    MCV 95 82 - 98 fL    MCH 29 9 26 8 - 34 3 pg    MCHC 31 5 31 4 - 37 4 g/dL    RDW 12 4 11 6 - 15 1 %    MPV 11 7 8 9 - 12 7 fL    Platelets 230 005 - 350 Thousands/uL    nRBC 0 /100 WBCs    Neutrophils Relative 73 43 - 75 %    Immat GRANS % 1 0 - 2 %    Lymphocytes Relative 18 14 - 44 %    Monocytes Relative 7 4 - 12 %    Eosinophils Relative 1 0 - 6 % Basophils Relative 0 0 - 1 %    Neutrophils Absolute 7 05 1 85 - 7 62 Thousands/µL    Immature Grans Absolute 0 05 0 00 - 0 20 Thousand/uL    Lymphocytes Absolute 1 74 0 60 - 4 47 Thousands/µL    Monocytes Absolute 0 64 0 17 - 1 22 Thousand/µL    Eosinophils Absolute 0 10 0 00 - 0 61 Thousand/µL    Basophils Absolute 0 01 0 00 - 0 10 Thousands/µL   Basic metabolic panel    Collection Time: 02/08/23  4:15 AM   Result Value Ref Range    Sodium 136 135 - 147 mmol/L    Potassium 4 2 3 5 - 5 3 mmol/L    Chloride 102 96 - 108 mmol/L    CO2 32 21 - 32 mmol/L    ANION GAP 2 (L) 4 - 13 mmol/L    BUN 23 5 - 25 mg/dL    Creatinine 0 55 (L) 0 60 - 1 30 mg/dL    Glucose 131 65 - 140 mg/dL    Calcium 8 7 8 3 - 10 1 mg/dL    eGFR 89 ml/min/1 73sq m   Fingerstick Glucose (POCT)    Collection Time: 02/08/23  8:16 AM   Result Value Ref Range    POC Glucose 97 65 - 140 mg/dl   Fingerstick Glucose (POCT)    Collection Time: 02/08/23 10:47 AM   Result Value Ref Range    POC Glucose 114 65 - 140 mg/dl   Fingerstick Glucose (POCT)    Collection Time: 02/08/23  4:38 PM   Result Value Ref Range    POC Glucose 193 (H) 65 - 140 mg/dl   Fingerstick Glucose (POCT)    Collection Time: 02/08/23  9:20 PM   Result Value Ref Range    POC Glucose 130 65 - 140 mg/dl   Basic metabolic panel    Collection Time: 02/09/23  3:21 AM   Result Value Ref Range    Sodium 139 135 - 147 mmol/L    Potassium 4 1 3 5 - 5 3 mmol/L    Chloride 103 96 - 108 mmol/L    CO2 31 21 - 32 mmol/L    ANION GAP 5 4 - 13 mmol/L    BUN 25 5 - 25 mg/dL    Creatinine 0 65 0 60 - 1 30 mg/dL    Glucose 135 65 - 140 mg/dL    Calcium 8 7 8 3 - 10 1 mg/dL    eGFR 84 ml/min/1 73sq m   CBC and differential    Collection Time: 02/09/23  3:21 AM   Result Value Ref Range    WBC 10 47 (H) 4 31 - 10 16 Thousand/uL    RBC 4 48 3 81 - 5 12 Million/uL    Hemoglobin 13 3 11 5 - 15 4 g/dL    Hematocrit 42 2 34 8 - 46 1 %    MCV 94 82 - 98 fL    MCH 29 7 26 8 - 34 3 pg    MCHC 31 5 31 4 - 37 4 g/dL RDW 12 5 11 6 - 15 1 %    MPV 10 6 8 9 - 12 7 fL    Platelets 619 905 - 988 Thousands/uL    nRBC 0 /100 WBCs    Neutrophils Relative 77 (H) 43 - 75 %    Immat GRANS % 0 0 - 2 %    Lymphocytes Relative 17 14 - 44 %    Monocytes Relative 5 4 - 12 %    Eosinophils Relative 1 0 - 6 %    Basophils Relative 0 0 - 1 %    Neutrophils Absolute 8 09 (H) 1 85 - 7 62 Thousands/µL    Immature Grans Absolute 0 02 0 00 - 0 20 Thousand/uL    Lymphocytes Absolute 1 78 0 60 - 4 47 Thousands/µL    Monocytes Absolute 0 51 0 17 - 1 22 Thousand/µL    Eosinophils Absolute 0 06 0 00 - 0 61 Thousand/µL    Basophils Absolute 0 01 0 00 - 0 10 Thousands/µL   Fingerstick Glucose (POCT)    Collection Time: 02/09/23  8:26 AM   Result Value Ref Range    POC Glucose 167 (H) 65 - 140 mg/dl   Fingerstick Glucose (POCT)    Collection Time: 02/09/23 11:13 AM   Result Value Ref Range    POC Glucose 96 65 - 140 mg/dl   Fingerstick Glucose (POCT)    Collection Time: 02/09/23  4:46 PM   Result Value Ref Range    POC Glucose 143 (H) 65 - 140 mg/dl   Fingerstick Glucose (POCT)    Collection Time: 02/09/23  9:04 PM   Result Value Ref Range    POC Glucose 143 (H) 65 - 140 mg/dl   Fingerstick Glucose (POCT)    Collection Time: 02/10/23  7:54 AM   Result Value Ref Range    POC Glucose 110 65 - 140 mg/dl   Fingerstick Glucose (POCT)    Collection Time: 02/10/23 10:57 AM   Result Value Ref Range    POC Glucose 146 (H) 65 - 140 mg/dl        Physical Exam  Constitutional:       Appearance: Normal appearance  HENT:      Head: Normocephalic  Right Ear: Tympanic membrane, ear canal and external ear normal       Left Ear: Tympanic membrane, ear canal and external ear normal       Nose: Nose normal  No congestion  Mouth/Throat:      Mouth: Mucous membranes are moist       Pharynx: Oropharynx is clear  No oropharyngeal exudate or posterior oropharyngeal erythema  Eyes:      Extraocular Movements: Extraocular movements intact        Conjunctiva/sclera: Conjunctivae normal       Pupils: Pupils are equal, round, and reactive to light  Cardiovascular:      Rate and Rhythm: Normal rate and regular rhythm  Heart sounds: Normal heart sounds  No murmur heard  Pulmonary:      Effort: Pulmonary effort is normal       Breath sounds: No wheezing or rales  Comments: Decreased air entry bilaterally  Abdominal:      General: Bowel sounds are normal  There is no distension  Palpations: Abdomen is soft  Tenderness: There is no abdominal tenderness  Musculoskeletal:         General: Normal range of motion  Cervical back: Normal range of motion and neck supple  Right lower leg: No edema  Left lower leg: No edema  Lymphadenopathy:      Cervical: No cervical adenopathy  Skin:     General: Skin is warm  Neurological:      General: No focal deficit present  Mental Status: She is alert and oriented to person, place, and time

## 2023-05-15 ENCOUNTER — OFFICE VISIT (OUTPATIENT)
Dept: INTERNAL MEDICINE CLINIC | Facility: CLINIC | Age: 78
End: 2023-05-15

## 2023-05-15 VITALS
HEART RATE: 90 BPM | WEIGHT: 156 LBS | TEMPERATURE: 98.6 F | DIASTOLIC BLOOD PRESSURE: 82 MMHG | BODY MASS INDEX: 26.63 KG/M2 | OXYGEN SATURATION: 94 % | SYSTOLIC BLOOD PRESSURE: 134 MMHG | HEIGHT: 64 IN

## 2023-05-15 DIAGNOSIS — E11.9 TYPE 2 DIABETES, HBA1C GOAL < 7% (HCC): ICD-10-CM

## 2023-05-15 DIAGNOSIS — J44.9 CHRONIC OBSTRUCTIVE PULMONARY DISEASE, UNSPECIFIED COPD TYPE (HCC): ICD-10-CM

## 2023-05-15 DIAGNOSIS — E11.9 DIABETIC EYE EXAM (HCC): ICD-10-CM

## 2023-05-15 DIAGNOSIS — I50.42 CHRONIC COMBINED SYSTOLIC AND DIASTOLIC CONGESTIVE HEART FAILURE (HCC): Primary | ICD-10-CM

## 2023-05-15 DIAGNOSIS — F34.1 DYSTHYMIC DISORDER: ICD-10-CM

## 2023-05-15 DIAGNOSIS — F41.9 ANXIETY: ICD-10-CM

## 2023-05-15 DIAGNOSIS — E78.2 MIXED HYPERLIPIDEMIA: ICD-10-CM

## 2023-05-15 DIAGNOSIS — M19.042 PRIMARY OSTEOARTHRITIS OF BOTH HANDS: ICD-10-CM

## 2023-05-15 DIAGNOSIS — M81.0 SENILE OSTEOPOROSIS: ICD-10-CM

## 2023-05-15 DIAGNOSIS — M19.041 PRIMARY OSTEOARTHRITIS OF BOTH HANDS: ICD-10-CM

## 2023-05-15 DIAGNOSIS — Z01.00 DIABETIC EYE EXAM (HCC): ICD-10-CM

## 2023-05-15 LAB — SL AMB POCT HEMOGLOBIN AIC: 6.2 (ref ?–6.5)

## 2023-05-15 RX ORDER — HYDROXYZINE HYDROCHLORIDE 10 MG/1
10 TABLET, FILM COATED ORAL 2 TIMES DAILY PRN
Qty: 60 TABLET | Refills: 2 | Status: SHIPPED | OUTPATIENT
Start: 2023-05-15

## 2023-05-15 NOTE — PROGRESS NOTES
Assessment/Plan:             1  Chronic combined systolic and diastolic congestive heart failure (HCC)  -     CBC and differential; Future  -     Comprehensive metabolic panel; Future    2  Chronic obstructive pulmonary disease, unspecified COPD type ([facility] )  -     Ambulatory Referral to Pulmonology; Future    3  Diabetic eye exam [facility])  -     Ambulatory Referral to Ophthalmology; Future    4  Mixed hyperlipidemia  -     Lipid Panel with Direct LDL reflex; Future  -     TSH, 3rd generation; Future    5  Primary osteoarthritis of both hands    6  Dysthymic disorder  -     sertraline (Zoloft) 50 mg tablet; Take 1 5 tablets (75 mg total) by mouth daily    7  Anxiety  -     hydrOXYzine HCL (ATARAX) 10 mg tablet; Take 1 tablet (10 mg total) by mouth 2 (two) times a day as needed for itching    8  Senile osteoporosis  -     DXA bone density spine hip and pelvis; Future; Expected date: 05/15/2023    9  Type 2 diabetes, HbA1c goal < 7% ([facility])  -     CBC and differential; Future  -     Comprehensive metabolic panel; Future  -     Hemoglobin A1C; Future  -     Lipid Panel with Direct LDL reflex; Future  -     TSH, 3rd generation; Future  -     Albumin / creatinine urine ratio           Subjective:      Patient ID: Jelani Locke is a 76 y o  female  Follow-up on multiple medical problems to ensure they are stable on current medications      The following portions of the patient's history were reviewed and updated as appropriate: She  has a past medical history of Allergic rhinitis, Anxiety, Arthritis, Diabetes mellitus ([facility] ), Oxygen decrease, and Tobacco abuse    She   Patient Active Problem List    Diagnosis Date Noted   • Fall 03/06/2023   • Coccygeal pain, acute 03/06/2023   • Chest pain 02/07/2023   • Acute respiratory failure with hypoxia ([facility] ) 01/30/2023   • History of COVID-19 01/30/2023   • COPD exacerbation ([facility] ) 01/30/2023   • Biventricular congestive heart failure ([facility] ) 01/30/2023   • Mixed hyperlipidemia 06/06/2022 • Dysthymic disorder 01/24/2022   • Impaired fasting blood sugar 01/24/2022   • Gastrointestinal hemorrhage 10/21/2021   • Dermatitis 10/21/2021   • Primary osteoarthritis of both hands 10/21/2021   • Congestive heart failure with right ventricular systolic dysfunction ([facility] ) 09/23/2021   • Rheumatoid factor positive 09/22/2021   • Arthritis 09/22/2021   • Diabetic eye exam ([place] [address] ) 06/30/2021   • Hypoxia 06/16/2021   • Essential hypertension, benign 06/02/2021   • Chronic combined systolic and diastolic congestive heart failure ([place] [address] ) 06/02/2021   • Pulmonary hypertension ([place] [address] ) 06/02/2021   • Cigarette nicotine dependence without complication    • Elevated BP without diagnosis of hypertension 05/22/2021   • Lung nodules 05/18/2021   • COPD (chronic obstructive pulmonary disease) ([place] [address] ) 05/18/2021   • Chronic respiratory failure with hypoxia ([place] [address] ) 05/18/2021   • Nicotine dependence 05/18/2021   • Microalbuminuria 05/17/2021   • SOB (shortness of breath) 05/11/2021   • Uncontrolled type 2 diabetes mellitus with hyperglycemia ([place] [address] ) 05/11/2021   • Allergic rhinitis    • Type 2 diabetes, HbA1c goal < 7% ([facility])    • Anxiety    • Tendinitis of finger 05/01/2015     She  has a past surgical history that includes Breast surgery (Right); Other surgical history; and Other surgical history  Her family history includes Diabetes in her brother; Heart disease in her brother; Hypotension in her daughter; Lymphoma in her brother; No Known Problems in her sister; Parkinsonism in her sister; Prostate cancer in her father; Proteinuria in her daughter  She  reports that she quit smoking about 1 years ago  Her smoking use included cigarettes  She has a 15 00 pack-year smoking history  She has never used smokeless tobacco  She reports current alcohol use of about 1 0 - 2 0 standard drink per week  She reports that she does not use drugs    Current Outpatient Medications   Medication Sig Dispense Refill   • fluticasone (Flovent HFA) 110 MCG/ACT inhaler Inhale 2 puffs 2 (two) times a day Rinse mouth after use  12 g 1   • furosemide (LASIX) 20 mg tablet Take 1 tablet (20 mg total) by mouth daily 30 tablet 3   • hydrOXYzine HCL (ATARAX) 10 mg tablet Take 1 tablet (10 mg total) by mouth 2 (two) times a day as needed for itching 60 tablet 2   • rosuvastatin (CRESTOR) 5 mg tablet Take 1 tablet (5 mg total) by mouth daily 30 tablet 3   • sertraline (Zoloft) 50 mg tablet Take 1 5 tablets (75 mg total) by mouth daily 135 tablet 1   • triamcinolone (KENALOG) 0 1 % cream Apply topically 2 (two) times a day 30 g 0   • umeclidinium-vilanterol 62 5-25 mcg/actuation inhaler Inhale 1 puff daily 60 blister 1   • albuterol (PROVENTIL HFA,VENTOLIN HFA) 90 mcg/act inhaler Inhale 2 puffs every 4 (four) hours as needed for wheezing (Patient not taking: Reported on 5/15/2023) 6 7 g 1   • guaiFENesin 1200 MG TB12 Take 1 tablet (1,200 mg total) by mouth 2 (two) times a day (Patient not taking: Reported on 5/15/2023) 14 tablet 0     No current facility-administered medications for this visit  Current Outpatient Medications on File Prior to Visit   Medication Sig   • fluticasone (Flovent HFA) 110 MCG/ACT inhaler Inhale 2 puffs 2 (two) times a day Rinse mouth after use     • furosemide (LASIX) 20 mg tablet Take 1 tablet (20 mg total) by mouth daily   • rosuvastatin (CRESTOR) 5 mg tablet Take 1 tablet (5 mg total) by mouth daily   • triamcinolone (KENALOG) 0 1 % cream Apply topically 2 (two) times a day   • umeclidinium-vilanterol 62 5-25 mcg/actuation inhaler Inhale 1 puff daily   • [DISCONTINUED] hydrOXYzine HCL (ATARAX) 10 mg tablet Take 1 tablet (10 mg total) by mouth 2 (two) times a day as needed for itching   • [DISCONTINUED] sertraline (Zoloft) 50 mg tablet Take 1 tablet (50 mg total) by mouth daily   • albuterol (PROVENTIL HFA,VENTOLIN HFA) 90 mcg/act inhaler Inhale 2 puffs every 4 (four) hours as needed for wheezing (Patient not taking: Reported on 5/15/2023)   • "guaiFENesin 1200 MG TB12 Take 1 tablet (1,200 mg total) by mouth 2 (two) times a day (Patient not taking: Reported on 5/15/2023)     No current facility-administered medications on file prior to visit  She has No Known Allergies       Review of Systems   Constitutional: Negative for chills and fever  HENT: Negative for congestion, ear pain and sore throat  Eyes: Negative for pain  Respiratory: Positive for shortness of breath  Negative for cough  Cardiovascular: Negative for chest pain and leg swelling  Gastrointestinal: Negative for abdominal pain, nausea and vomiting  Endocrine: Negative for polyuria  Genitourinary: Negative for difficulty urinating, frequency and urgency  Musculoskeletal: Positive for arthralgias and back pain  Skin: Negative for rash  Neurological: Negative for weakness and headaches  Psychiatric/Behavioral: Negative for sleep disturbance  The patient is not nervous/anxious  Objective:      /82 (BP Location: Left arm, Patient Position: Sitting, Cuff Size: Standard)   Pulse 90   Temp 98 6 °F (37 °C) (Temporal)   Ht 5' 4\" (1 626 m)   Wt 70 8 kg (156 lb)   SpO2 94%   BMI 26 78 kg/m²     No results found for this or any previous visit (from the past 1344 hour(s))  Physical Exam  Constitutional:       Appearance: Normal appearance  HENT:      Head: Normocephalic  Right Ear: External ear normal       Left Ear: External ear normal       Nose: Nose normal  No congestion  Mouth/Throat:      Mouth: Mucous membranes are moist       Pharynx: Oropharynx is clear  No oropharyngeal exudate or posterior oropharyngeal erythema  Eyes:      Extraocular Movements: Extraocular movements intact  Conjunctiva/sclera: Conjunctivae normal    Cardiovascular:      Rate and Rhythm: Normal rate and regular rhythm  Heart sounds: Normal heart sounds  No murmur heard    Pulmonary:      Effort: Pulmonary effort is normal       Breath sounds: Normal breath " sounds  No wheezing or rales  Abdominal:      General: Bowel sounds are normal  There is no distension  Palpations: Abdomen is soft  Tenderness: There is no abdominal tenderness  Musculoskeletal:         General: Normal range of motion  Cervical back: Normal range of motion and neck supple  Right lower leg: No edema  Left lower leg: No edema  Lymphadenopathy:      Cervical: No cervical adenopathy  Skin:     General: Skin is warm  Neurological:      General: No focal deficit present  Mental Status: She is alert and oriented to person, place, and time

## 2023-05-15 NOTE — PROGRESS NOTES
Diabetic Foot Exam    Patient's shoes and socks removed  Right Foot/Ankle   Right Foot Inspection  Skin Exam: skin normal  Skin not intact, no dry skin, no warmth, no callus, no erythema, no maceration, no abnormal color, no pre-ulcer, no ulcer and no callus  Toe Exam: ROM and strength within normal limits  No swelling, no tenderness, erythema and  no right toe deformity    Sensory   Monofilament testing: intact    Vascular  Capillary refills: < 3 seconds  The right DP pulse is 2+  The right PT pulse is 2+  Left Foot/Ankle  Left Foot Inspection  Skin Exam: skin normal  Skin not intact, no dry skin, no warmth, no erythema, no maceration, normal color, no pre-ulcer, no ulcer and no callus  Toe Exam: ROM and strength within normal limits  No swelling, no tenderness, no erythema and no left toe deformity  Sensory   Monofilament testing: intact    Vascular  Capillary refills: < 3 seconds  The left DP pulse is 2+  The left PT pulse is 2+       Assign Risk Category  No deformity present  No loss of protective sensation  No weak pulses  Risk: 0

## 2023-06-21 ENCOUNTER — APPOINTMENT (OUTPATIENT)
Dept: LAB | Facility: CLINIC | Age: 78
End: 2023-06-21
Payer: MEDICARE

## 2023-06-21 DIAGNOSIS — E78.2 MIXED HYPERLIPIDEMIA: ICD-10-CM

## 2023-06-21 DIAGNOSIS — I50.42 CHRONIC COMBINED SYSTOLIC AND DIASTOLIC CONGESTIVE HEART FAILURE (HCC): ICD-10-CM

## 2023-06-21 DIAGNOSIS — E11.9 TYPE 2 DIABETES, HBA1C GOAL < 7% (HCC): ICD-10-CM

## 2023-06-21 LAB
ALBUMIN SERPL BCP-MCNC: 4.3 G/DL (ref 3.5–5)
ALP SERPL-CCNC: 76 U/L (ref 46–116)
ALT SERPL W P-5'-P-CCNC: 22 U/L (ref 12–78)
ANION GAP SERPL CALCULATED.3IONS-SCNC: 1 MMOL/L
AST SERPL W P-5'-P-CCNC: 13 U/L (ref 5–45)
BASOPHILS # BLD AUTO: 0.08 THOUSANDS/ÂΜL (ref 0–0.1)
BASOPHILS NFR BLD AUTO: 1 % (ref 0–1)
BILIRUB SERPL-MCNC: 0.81 MG/DL (ref 0.2–1)
BUN SERPL-MCNC: 23 MG/DL (ref 5–25)
CALCIUM SERPL-MCNC: 9.8 MG/DL (ref 8.3–10.1)
CHLORIDE SERPL-SCNC: 107 MMOL/L (ref 96–108)
CHOLEST SERPL-MCNC: 169 MG/DL
CO2 SERPL-SCNC: 29 MMOL/L (ref 21–32)
CREAT SERPL-MCNC: 0.83 MG/DL (ref 0.6–1.3)
CREAT UR-MCNC: 96.9 MG/DL
EOSINOPHIL # BLD AUTO: 0.22 THOUSAND/ÂΜL (ref 0–0.61)
EOSINOPHIL NFR BLD AUTO: 3 % (ref 0–6)
ERYTHROCYTE [DISTWIDTH] IN BLOOD BY AUTOMATED COUNT: 12.4 % (ref 11.6–15.1)
EST. AVERAGE GLUCOSE BLD GHB EST-MCNC: 131 MG/DL
GFR SERPL CREATININE-BSD FRML MDRD: 67 ML/MIN/1.73SQ M
GLUCOSE P FAST SERPL-MCNC: 148 MG/DL (ref 65–99)
HBA1C MFR BLD: 6.2 %
HCT VFR BLD AUTO: 44.9 % (ref 34.8–46.1)
HDLC SERPL-MCNC: 85 MG/DL
HGB BLD-MCNC: 15.1 G/DL (ref 11.5–15.4)
IMM GRANULOCYTES # BLD AUTO: 0.02 THOUSAND/UL (ref 0–0.2)
IMM GRANULOCYTES NFR BLD AUTO: 0 % (ref 0–2)
LDLC SERPL CALC-MCNC: 66 MG/DL (ref 0–100)
LYMPHOCYTES # BLD AUTO: 2.08 THOUSANDS/ÂΜL (ref 0.6–4.47)
LYMPHOCYTES NFR BLD AUTO: 28 % (ref 14–44)
MCH RBC QN AUTO: 31.1 PG (ref 26.8–34.3)
MCHC RBC AUTO-ENTMCNC: 33.6 G/DL (ref 31.4–37.4)
MCV RBC AUTO: 92 FL (ref 82–98)
MICROALBUMIN UR-MCNC: 304 MG/L (ref 0–20)
MICROALBUMIN/CREAT 24H UR: 314 MG/G CREATININE (ref 0–30)
MONOCYTES # BLD AUTO: 0.52 THOUSAND/ÂΜL (ref 0.17–1.22)
MONOCYTES NFR BLD AUTO: 7 % (ref 4–12)
NEUTROPHILS # BLD AUTO: 4.63 THOUSANDS/ÂΜL (ref 1.85–7.62)
NEUTS SEG NFR BLD AUTO: 61 % (ref 43–75)
NRBC BLD AUTO-RTO: 0 /100 WBCS
PLATELET # BLD AUTO: 329 THOUSANDS/UL (ref 149–390)
PMV BLD AUTO: 10.5 FL (ref 8.9–12.7)
POTASSIUM SERPL-SCNC: 3.9 MMOL/L (ref 3.5–5.3)
PROT SERPL-MCNC: 8.1 G/DL (ref 6.4–8.4)
RBC # BLD AUTO: 4.86 MILLION/UL (ref 3.81–5.12)
SODIUM SERPL-SCNC: 137 MMOL/L (ref 135–147)
TRIGL SERPL-MCNC: 88 MG/DL
TSH SERPL DL<=0.05 MIU/L-ACNC: 4.58 UIU/ML (ref 0.45–4.5)
WBC # BLD AUTO: 7.55 THOUSAND/UL (ref 4.31–10.16)

## 2023-06-21 PROCEDURE — 80053 COMPREHEN METABOLIC PANEL: CPT

## 2023-06-21 PROCEDURE — 84443 ASSAY THYROID STIM HORMONE: CPT

## 2023-06-21 PROCEDURE — 83036 HEMOGLOBIN GLYCOSYLATED A1C: CPT

## 2023-06-21 PROCEDURE — 36415 COLL VENOUS BLD VENIPUNCTURE: CPT

## 2023-06-21 PROCEDURE — 85025 COMPLETE CBC W/AUTO DIFF WBC: CPT

## 2023-06-23 ENCOUNTER — TELEPHONE (OUTPATIENT)
Dept: CARDIOLOGY CLINIC | Facility: CLINIC | Age: 78
End: 2023-06-23

## 2023-06-23 NOTE — TELEPHONE ENCOUNTER
----- Message from Meron Verma MD sent at 6/23/2023 12:00 PM EDT -----  Results are normal. Please notify pt.

## 2023-06-23 NOTE — TELEPHONE ENCOUNTER
Spoke with pt, advised pt of lipid panel results per Dr. Shelly Carroll. Pt is due for a f/u. Scheduling dept  Please contact pt to set up a f/u appt with Dr. Shelly Carroll.  Thank you

## 2023-06-26 ENCOUNTER — OFFICE VISIT (OUTPATIENT)
Dept: INTERNAL MEDICINE CLINIC | Facility: CLINIC | Age: 78
End: 2023-06-26
Payer: MEDICARE

## 2023-06-26 VITALS
HEIGHT: 64 IN | TEMPERATURE: 98.9 F | OXYGEN SATURATION: 95 % | WEIGHT: 161 LBS | SYSTOLIC BLOOD PRESSURE: 118 MMHG | DIASTOLIC BLOOD PRESSURE: 70 MMHG | BODY MASS INDEX: 27.49 KG/M2 | HEART RATE: 89 BPM

## 2023-06-26 DIAGNOSIS — M19.041 PRIMARY OSTEOARTHRITIS OF BOTH HANDS: ICD-10-CM

## 2023-06-26 DIAGNOSIS — F41.9 ANXIETY: ICD-10-CM

## 2023-06-26 DIAGNOSIS — F34.1 DYSTHYMIC DISORDER: ICD-10-CM

## 2023-06-26 DIAGNOSIS — R73.01 IMPAIRED FASTING BLOOD SUGAR: ICD-10-CM

## 2023-06-26 DIAGNOSIS — E78.2 MIXED HYPERLIPIDEMIA: Primary | ICD-10-CM

## 2023-06-26 DIAGNOSIS — I50.82 CONGESTIVE HEART FAILURE WITH RIGHT VENTRICULAR SYSTOLIC DYSFUNCTION (HCC): ICD-10-CM

## 2023-06-26 DIAGNOSIS — M19.042 PRIMARY OSTEOARTHRITIS OF BOTH HANDS: ICD-10-CM

## 2023-06-26 DIAGNOSIS — I50.42 CHRONIC COMBINED SYSTOLIC AND DIASTOLIC CONGESTIVE HEART FAILURE (HCC): ICD-10-CM

## 2023-06-26 DIAGNOSIS — Z01.00 DIABETIC EYE EXAM (HCC): ICD-10-CM

## 2023-06-26 DIAGNOSIS — E11.9 DIABETIC EYE EXAM (HCC): ICD-10-CM

## 2023-06-26 DIAGNOSIS — J44.9 CHRONIC OBSTRUCTIVE PULMONARY DISEASE, UNSPECIFIED COPD TYPE (HCC): ICD-10-CM

## 2023-06-26 DIAGNOSIS — I50.20 CONGESTIVE HEART FAILURE WITH RIGHT VENTRICULAR SYSTOLIC DYSFUNCTION (HCC): ICD-10-CM

## 2023-06-26 DIAGNOSIS — M81.0 SENILE OSTEOPOROSIS: ICD-10-CM

## 2023-06-26 PROCEDURE — 99214 OFFICE O/P EST MOD 30 MIN: CPT | Performed by: INTERNAL MEDICINE

## 2023-06-26 RX ORDER — HYDROXYZINE HYDROCHLORIDE 10 MG/1
10 TABLET, FILM COATED ORAL 2 TIMES DAILY PRN
Qty: 60 TABLET | Refills: 2 | Status: SHIPPED | OUTPATIENT
Start: 2023-06-26

## 2023-06-26 RX ORDER — FUROSEMIDE 20 MG/1
20 TABLET ORAL DAILY
Qty: 30 TABLET | Refills: 3 | Status: SHIPPED | OUTPATIENT
Start: 2023-06-26

## 2023-06-26 RX ORDER — ROSUVASTATIN CALCIUM 5 MG/1
5 TABLET, COATED ORAL DAILY
Qty: 90 TABLET | Refills: 1 | Status: SHIPPED | OUTPATIENT
Start: 2023-06-26

## 2023-06-26 NOTE — PROGRESS NOTES
Assessment/Plan:             1  Mixed hyperlipidemia  -     rosuvastatin (CRESTOR) 5 mg tablet; Take 1 tablet (5 mg total) by mouth daily    2  Impaired fasting blood sugar    3  Chronic combined systolic and diastolic congestive heart failure ([address] )    4  Chronic obstructive pulmonary disease, unspecified COPD type ([address] )    5  Diabetic eye exam [facility])  -     Ambulatory Referral to Ophthalmology; Future    6  Primary osteoarthritis of both hands    7  Dysthymic disorder  -     sertraline (Zoloft) 50 mg tablet; Take 1 5 tablets (75 mg total) by mouth daily    8  Senile osteoporosis  -     DXA bone density spine hip and pelvis; Future; Expected date: 06/26/2023    9  Anxiety  -     hydrOXYzine HCL (ATARAX) 10 mg tablet; Take 1 tablet (10 mg total) by mouth 2 (two) times a day as needed for itching    10  Congestive heart failure with right ventricular systolic dysfunction (HCC)  -     furosemide (LASIX) 20 mg tablet; Take 1 tablet (20 mg total) by mouth daily           Subjective:      Patient ID: Caitlin Cooper is a 76 y o  female  Follow-up on blood test done on 6/21/2023 test discussed with her      The following portions of the patient's history were reviewed and updated as appropriate: She  has a past medical history of Allergic rhinitis, Anxiety, Arthritis, Diabetes mellitus ([facility] ), Oxygen decrease, and Tobacco abuse    She   Patient Active Problem List    Diagnosis Date Noted   • Fall 03/06/2023   • Coccygeal pain, acute 03/06/2023   • Chest pain 02/07/2023   • Acute respiratory failure with hypoxia ([facility] [address] ) 01/30/2023   • History of COVID-19 01/30/2023   • COPD exacerbation ([address] ) 01/30/2023   • Biventricular congestive heart failure ([address] ) 01/30/2023   • Mixed hyperlipidemia 06/06/2022   • Dysthymic disorder 01/24/2022   • Impaired fasting blood sugar 01/24/2022   • Gastrointestinal hemorrhage 10/21/2021   • Dermatitis 10/21/2021   • Primary osteoarthritis of both hands 10/21/2021   • Congestive heart failure with right ventricular systolic dysfunction ([place] [address] ) 09/23/2021   • Rheumatoid factor positive 09/22/2021   • Arthritis 09/22/2021   • Diabetic eye exam ([place] [address] ) 06/30/2021   • Hypoxia 06/16/2021   • Essential hypertension, benign 06/02/2021   • Chronic combined systolic and diastolic congestive heart failure ([place] [address] ) 06/02/2021   • Pulmonary hypertension ([place] [address] ) 06/02/2021   • Cigarette nicotine dependence without complication    • Elevated BP without diagnosis of hypertension 05/22/2021   • Lung nodules 05/18/2021   • COPD (chronic obstructive pulmonary disease) ([place] [address] ) 05/18/2021   • Chronic respiratory failure with hypoxia ([place] [address] ) 05/18/2021   • Nicotine dependence 05/18/2021   • Microalbuminuria 05/17/2021   • SOB (shortness of breath) 05/11/2021   • Uncontrolled type 2 diabetes mellitus with hyperglycemia ([place] [address] ) 05/11/2021   • Allergic rhinitis    • Type 2 diabetes, HbA1c goal < 7% ([facility])    • Anxiety    • Tendinitis of finger 05/01/2015     She  has a past surgical history that includes Breast surgery (Right); Other surgical history; and Other surgical history  Her family history includes Diabetes in her brother; Heart disease in her brother; Hypotension in her daughter; Lymphoma in her brother; No Known Problems in her sister; Parkinsonism in her sister; Prostate cancer in her father; Proteinuria in her daughter  She  reports that she quit smoking about 2 years ago  Her smoking use included cigarettes  She has a 15 00 pack-year smoking history  She has never used smokeless tobacco  She reports current alcohol use of about 1 0 - 2 0 standard drink of alcohol per week  She reports that she does not use drugs  Current Outpatient Medications   Medication Sig Dispense Refill   • fluticasone (Flovent HFA) 110 MCG/ACT inhaler Inhale 2 puffs 2 (two) times a day Rinse mouth after use   12 g 1   • furosemide (LASIX) 20 mg tablet Take 1 tablet (20 mg total) by mouth daily 30 tablet 3   • hydrOXYzine HCL (ATARAX) 10 mg tablet Take 1 tablet (10 mg total) by mouth 2 (two) times a day as needed for itching 60 tablet 2   • rosuvastatin (CRESTOR) 5 mg tablet Take 1 tablet (5 mg total) by mouth daily 90 tablet 1   • sertraline (Zoloft) 50 mg tablet Take 1 5 tablets (75 mg total) by mouth daily 135 tablet 1   • triamcinolone (KENALOG) 0 1 % cream Apply topically 2 (two) times a day 30 g 0   • umeclidinium-vilanterol 62 5-25 mcg/actuation inhaler Inhale 1 puff daily 60 blister 1   • albuterol (PROVENTIL HFA,VENTOLIN HFA) 90 mcg/act inhaler Inhale 2 puffs every 4 (four) hours as needed for wheezing (Patient not taking: Reported on 6/26/2023) 6 7 g 1   • guaiFENesin 1200 MG TB12 Take 1 tablet (1,200 mg total) by mouth 2 (two) times a day (Patient not taking: Reported on 6/26/2023) 14 tablet 0     No current facility-administered medications for this visit  Current Outpatient Medications on File Prior to Visit   Medication Sig   • fluticasone (Flovent HFA) 110 MCG/ACT inhaler Inhale 2 puffs 2 (two) times a day Rinse mouth after use     • triamcinolone (KENALOG) 0 1 % cream Apply topically 2 (two) times a day   • umeclidinium-vilanterol 62 5-25 mcg/actuation inhaler Inhale 1 puff daily   • [DISCONTINUED] furosemide (LASIX) 20 mg tablet Take 1 tablet (20 mg total) by mouth daily   • [DISCONTINUED] hydrOXYzine HCL (ATARAX) 10 mg tablet Take 1 tablet (10 mg total) by mouth 2 (two) times a day as needed for itching   • [DISCONTINUED] rosuvastatin (CRESTOR) 5 mg tablet Take 1 tablet (5 mg total) by mouth daily   • [DISCONTINUED] sertraline (Zoloft) 50 mg tablet Take 1 5 tablets (75 mg total) by mouth daily   • albuterol (PROVENTIL HFA,VENTOLIN HFA) 90 mcg/act inhaler Inhale 2 puffs every 4 (four) hours as needed for wheezing (Patient not taking: Reported on 6/26/2023)   • guaiFENesin 1200 MG TB12 Take 1 tablet (1,200 mg total) by mouth 2 (two) times a day (Patient not taking: Reported on 6/26/2023)     No current facility-administered medications on file prior to "visit  She has No Known Allergies       Review of Systems   Constitutional: Negative for chills and fever  HENT: Negative for congestion, ear pain and sore throat  Eyes: Negative for pain  Respiratory: Positive for shortness of breath  Negative for cough  Cardiovascular: Negative for chest pain and leg swelling  Gastrointestinal: Negative for abdominal pain, nausea and vomiting  Endocrine: Negative for polyuria  Genitourinary: Negative for difficulty urinating, frequency and urgency  Musculoskeletal: Positive for arthralgias and back pain  Skin: Negative for rash  Neurological: Negative for weakness and headaches  Psychiatric/Behavioral: Negative for sleep disturbance  The patient is not nervous/anxious            Objective:      /70 (BP Location: Left arm, Patient Position: Sitting, Cuff Size: Standard)   Pulse 89   Temp 98 9 °F (37 2 °C) (Temporal)   Ht 5' 4\" (1 626 m)   Wt 73 kg (161 lb)   SpO2 95%   BMI 27 64 kg/m²     Recent Results (from the past 1344 hour(s))   POCT hemoglobin A1c    Collection Time: 05/15/23  2:10 PM   Result Value Ref Range    Hemoglobin A1C 6 2 6 5   Lipid Panel with Direct LDL reflex    Collection Time: 06/21/23  9:50 AM   Result Value Ref Range    Cholesterol 169 See Comment mg/dL    Triglycerides 88 See Comment mg/dL    HDL, Direct 85 >=50 mg/dL    LDL Calculated 66 0 - 100 mg/dL   Microalbumin / creatinine urine ratio    Collection Time: 06/21/23  9:50 AM   Result Value Ref Range    Creatinine, Ur 96 9 mg/dL    Albumin,U,Random 304 0 (H) 0 0 - 20 0 mg/L    Albumin Creat Ratio 314 (H) 0 - 30 mg/g creatinine   CBC and differential    Collection Time: 06/21/23  9:50 AM   Result Value Ref Range    WBC 7 55 4 31 - 10 16 Thousand/uL    RBC 4 86 3 81 - 5 12 Million/uL    Hemoglobin 15 1 11 5 - 15 4 g/dL    Hematocrit 44 9 34 8 - 46 1 %    MCV 92 82 - 98 fL    MCH 31 1 26 8 - 34 3 pg    MCHC 33 6 31 4 - 37 4 g/dL    RDW 12 4 11 6 - 15 1 %    MPV 10 5 8 9 - " 12 7 fL    Platelets 417 098 - 152 Thousands/uL    nRBC 0 /100 WBCs    Neutrophils Relative 61 43 - 75 %    Immat GRANS % 0 0 - 2 %    Lymphocytes Relative 28 14 - 44 %    Monocytes Relative 7 4 - 12 %    Eosinophils Relative 3 0 - 6 %    Basophils Relative 1 0 - 1 %    Neutrophils Absolute 4 63 1 85 - 7 62 Thousands/µL    Immature Grans Absolute 0 02 0 00 - 0 20 Thousand/uL    Lymphocytes Absolute 2 08 0 60 - 4 47 Thousands/µL    Monocytes Absolute 0 52 0 17 - 1 22 Thousand/µL    Eosinophils Absolute 0 22 0 00 - 0 61 Thousand/µL    Basophils Absolute 0 08 0 00 - 0 10 Thousands/µL   Comprehensive metabolic panel    Collection Time: 06/21/23  9:50 AM   Result Value Ref Range    Sodium 137 135 - 147 mmol/L    Potassium 3 9 3 5 - 5 3 mmol/L    Chloride 107 96 - 108 mmol/L    CO2 29 21 - 32 mmol/L    ANION GAP 1 mmol/L    BUN 23 5 - 25 mg/dL    Creatinine 0 83 0 60 - 1 30 mg/dL    Glucose, Fasting 148 (H) 65 - 99 mg/dL    Calcium 9 8 8 3 - 10 1 mg/dL    AST 13 5 - 45 U/L    ALT 22 12 - 78 U/L    Alkaline Phosphatase 76 46 - 116 U/L    Total Protein 8 1 6 4 - 8 4 g/dL    Albumin 4 3 3 5 - 5 0 g/dL    Total Bilirubin 0 81 0 20 - 1 00 mg/dL    eGFR 67 ml/min/1 73sq m   Hemoglobin A1C    Collection Time: 06/21/23  9:50 AM   Result Value Ref Range    Hemoglobin A1C 6 2 (H) Normal 3 8-5 6%; PreDiabetic 5 7-6 4%; Diabetic >=6 5%; Glycemic control for adults with diabetes <7 0% %     mg/dl   TSH, 3rd generation    Collection Time: 06/21/23  9:50 AM   Result Value Ref Range    TSH 3RD GENERATON 4 584 (H) 0 450 - 4 500 uIU/mL        Physical Exam  Constitutional:       Appearance: Normal appearance  HENT:      Head: Normocephalic  Right Ear: External ear normal       Left Ear: External ear normal       Nose: Nose normal  No congestion  Mouth/Throat:      Mouth: Mucous membranes are moist       Pharynx: Oropharynx is clear  No oropharyngeal exudate or posterior oropharyngeal erythema     Eyes:      Extraocular Movements: Extraocular movements intact  Conjunctiva/sclera: Conjunctivae normal    Cardiovascular:      Rate and Rhythm: Normal rate and regular rhythm  Heart sounds: Normal heart sounds  No murmur heard  Pulmonary:      Effort: Pulmonary effort is normal       Breath sounds: Normal breath sounds  No wheezing or rales  Abdominal:      General: Bowel sounds are normal  There is no distension  Palpations: Abdomen is soft  Tenderness: There is no abdominal tenderness  Musculoskeletal:         General: Normal range of motion  Cervical back: Normal range of motion and neck supple  Right lower leg: No edema  Left lower leg: No edema  Lymphadenopathy:      Cervical: No cervical adenopathy  Skin:     General: Skin is warm  Neurological:      General: No focal deficit present  Mental Status: She is alert and oriented to person, place, and time

## 2023-07-10 ENCOUNTER — VBI (OUTPATIENT)
Dept: ADMINISTRATIVE | Facility: OTHER | Age: 78
End: 2023-07-10

## 2023-07-10 LAB
LEFT EYE DIABETIC RETINOPATHY: NORMAL
RIGHT EYE DIABETIC RETINOPATHY: NORMAL

## 2023-07-13 NOTE — TELEPHONE ENCOUNTER
Upon review of the In Basket request we were able to locate, review, and update the patient chart as requested for Diabetic Eye Exam.    Any additional questions or concerns should be emailed to the Practice Liaisons via the appropriate education email address, please do not reply via In Basket.     Thank you  Evi Mejia MA

## 2023-07-17 DIAGNOSIS — E78.2 MIXED HYPERLIPIDEMIA: ICD-10-CM

## 2023-07-17 RX ORDER — ROSUVASTATIN CALCIUM 5 MG/1
5 TABLET, COATED ORAL DAILY
Qty: 90 TABLET | Refills: 1 | Status: SHIPPED | OUTPATIENT
Start: 2023-07-17

## 2023-07-25 DIAGNOSIS — J44.9 CHRONIC OBSTRUCTIVE PULMONARY DISEASE, UNSPECIFIED COPD TYPE (HCC): ICD-10-CM

## 2023-07-25 RX ORDER — UMECLIDINIUM BROMIDE AND VILANTEROL TRIFENATATE 62.5; 25 UG/1; UG/1
POWDER RESPIRATORY (INHALATION)
Qty: 60 BLISTER | Refills: 1 | OUTPATIENT
Start: 2023-07-25

## 2023-08-23 ENCOUNTER — OFFICE VISIT (OUTPATIENT)
Dept: PULMONOLOGY | Facility: CLINIC | Age: 78
End: 2023-08-23
Payer: MEDICARE

## 2023-08-23 ENCOUNTER — TELEPHONE (OUTPATIENT)
Dept: PULMONOLOGY | Facility: CLINIC | Age: 78
End: 2023-08-23

## 2023-08-23 VITALS
WEIGHT: 163 LBS | SYSTOLIC BLOOD PRESSURE: 136 MMHG | OXYGEN SATURATION: 92 % | DIASTOLIC BLOOD PRESSURE: 76 MMHG | BODY MASS INDEX: 27.98 KG/M2 | TEMPERATURE: 97.6 F | HEART RATE: 114 BPM

## 2023-08-23 DIAGNOSIS — F17.211 CIGARETTE NICOTINE DEPENDENCE IN REMISSION: ICD-10-CM

## 2023-08-23 DIAGNOSIS — J44.9 CHRONIC OBSTRUCTIVE PULMONARY DISEASE, UNSPECIFIED COPD TYPE (HCC): ICD-10-CM

## 2023-08-23 DIAGNOSIS — J96.11 CHRONIC RESPIRATORY FAILURE WITH HYPOXIA (HCC): Primary | ICD-10-CM

## 2023-08-23 PROCEDURE — 94618 PULMONARY STRESS TESTING: CPT | Performed by: INTERNAL MEDICINE

## 2023-08-23 PROCEDURE — 94010 BREATHING CAPACITY TEST: CPT | Performed by: INTERNAL MEDICINE

## 2023-08-23 PROCEDURE — 99214 OFFICE O/P EST MOD 30 MIN: CPT | Performed by: INTERNAL MEDICINE

## 2023-08-23 RX ORDER — UMECLIDINIUM BROMIDE AND VILANTEROL TRIFENATATE 62.5; 25 UG/1; UG/1
1 POWDER RESPIRATORY (INHALATION) DAILY
Qty: 180 BLISTER | Refills: 1 | Status: SHIPPED | OUTPATIENT
Start: 2023-08-23 | End: 2023-08-23

## 2023-08-23 RX ORDER — ALBUTEROL SULFATE 90 UG/1
2 AEROSOL, METERED RESPIRATORY (INHALATION) EVERY 4 HOURS PRN
Qty: 6.7 G | Refills: 1 | Status: SHIPPED | OUTPATIENT
Start: 2023-08-23

## 2023-08-23 RX ORDER — CHLORHEXIDINE GLUCONATE 0.12 MG/ML
0.12 RINSE ORAL 2 TIMES DAILY
COMMUNITY
Start: 2023-07-25

## 2023-08-23 RX ORDER — UMECLIDINIUM BROMIDE AND VILANTEROL TRIFENATATE 62.5; 25 UG/1; UG/1
1 POWDER RESPIRATORY (INHALATION) DAILY
Qty: 180 BLISTER | Refills: 1 | Status: SHIPPED | OUTPATIENT
Start: 2023-08-23 | End: 2023-11-21

## 2023-08-23 NOTE — TELEPHONE ENCOUNTER
[facility] calling regarding pt's POC order.  They are requesting that we fax her insurance information to them at [phone]

## 2023-08-23 NOTE — PROGRESS NOTES
Pulmonary Follow Up Note   Radha Thornton 78 y.o. female MRN: [id number]  8/23/2023      Assessment and Plan     Chronic hypoxic respiratory failure likely secondary to severe COPD - not in acute exacerbation  - former smoker. CT scan in 2021 showed emphysema  - PFTs 7/2021 showed FEV1 47%  2021 6MWT showed desaturation and will need at least 2lpm on exertion   - has been on 4 lpm since last hospitalization in 2/2023, inconsistant use at home  - previous meds including Trellegy 200. Again patient was not using at home  - repeat 6MWT and spirometry today:  Noted hypoxia on 6MWT and Spirometry showed severe obstruction with FEV1 36%  - adjust her medication: Start Anoro 62.5-25 QD and albuterol PRN. Patient needs oxygen at home. Portable concentration one ordered  - will refer for pulmonary rehab  - off window for LDCT screen. Patient completed pneumococcus vaccination    Chronic diastolic heart failure with mild pulmonary HTN likely Group 3 secondary to COPD   5/19/21 2D echo normal LVEF 50-55%, NRWMA,   Mild concentric hypertrophy, right ventricular systolic function moderately reduced, RA mildly dilated, mild MR, mild TR, mild PHTN.     Continue to follow up as per primary care/cardiology. On lasix     Multiple Pulmonary nodules   - stable 3mm LLL, stable 2mm subpleural nodule, stable 6mm LLL nodule  - last CT scan in 2022.     Tobacco Abuse   - quit May 2021   -  LDCT as above               Return in about 3 months (around 11/23/2023). History of Present Illness   HPI:  Radha Thornton is a 78 y.o. female who had hx of severe COPD on home O2 3-6L, DM2, anxiety, past COVID infection, biventricular congestive heart failure, mild pHTN, nicoteine dependence, presents today for  Follow up    Patient was admitted to our hospital in 2/2023 following COPD exacerbation, and was also found to have hypoxia needing more oxygen support. She was discharged with Trelegy 200, and 4L O2 support.  Now patient did not use any maintance [facility]. She self discontinued trellegy because of the cost, and only using ventolin PRN which she also finished 4 days ago. She uses oxygen at night, not measuring her saturation, and felt sometimes she could not catch up her breath. No fever/cough, no worsening shortness of breath. She quit smoking in . She did not participate in any pul rehab in the past.     She quit smoking in . She smokes about 35 years 0.5pk/day. Previous LDCT scan showed stable multiple nodules in the lung. 6MWT : desaturation to 83% at 6 mins  PFT : severe obstructive airflow with FEV1 47%    Review of Systems   All other systems reviewed and are negative. Historical Information   Past Medical History:   Diagnosis Date   • Allergic rhinitis    • Anxiety    • Arthritis    • Diabetes mellitus (HCC)    • Oxygen decrease    • Tobacco abuse      Past Surgical History:   Procedure Laterality Date   • BREAST SURGERY Right     Cyst   • OTHER SURGICAL HISTORY      Cataract implant   • OTHER SURGICAL HISTORY      Fertilization     Family History   Problem Relation Age of Onset   • Prostate cancer Father    • Diabetes Brother    • Heart disease Brother    • Lymphoma Brother    • No Known Problems Sister    • Parkinsonism Sister    • Hypotension Daughter         Takes "salt pills"   • Proteinuria Daughter         Occuring during her 3 pregnancies; follows with nephrology     Social History     Tobacco Use   Smoking Status Former   • Packs/day: 0.50   • Years: 30.00   • Total pack years: 15.00   • Types: Cigarettes   • Quit date: 2021   • Years since quittin.2   Smokeless Tobacco Never   Tobacco Comments    Began smoking in her late teens, averaging 1/2 pack daily. States she quit smoking a few weeks ago (Updated 2021).        Meds/Allergies     Current Outpatient Medications:   •  albuterol (PROVENTIL HFA,VENTOLIN HFA) 90 mcg/act inhaler, Inhale 2 puffs every 4 (four) hours as needed for wheezing, Disp: 6.7 g, Rfl: 1  •  furosemide (LASIX) 20 mg tablet, Take 1 tablet (20 mg total) by mouth daily, Disp: 30 tablet, Rfl: 3  •  hydrOXYzine HCL (ATARAX) 10 mg tablet, Take 1 tablet (10 mg total) by mouth 2 (two) times a day as needed for itching, Disp: 60 tablet, Rfl: 2  •  rosuvastatin (CRESTOR) 5 mg tablet, Take 1 tablet (5 mg total) by mouth daily, Disp: 90 tablet, Rfl: 1  •  sertraline (Zoloft) 50 mg tablet, Take 1.5 tablets (75 mg total) by mouth daily, Disp: 135 tablet, Rfl: 1  •  triamcinolone (KENALOG) 0.1 % cream, Apply topically 2 (two) times a day, Disp: 30 g, Rfl: 0  •  umeclidinium-vilanterol (Anoro Ellipta) 62.5-25 mcg/actuation inhaler, Inhale 1 puff daily, Disp: 180 blister, Rfl: 1  •  chlorhexidine (PERIDEX) 0.12 % solution, Apply 0.12 mL to the mouth or throat 2 (two) times a day, Disp: , Rfl:   No Known Allergies    Vitals: Blood pressure 136/76, pulse (!) 114, temperature 97.6 °F (36.4 °C), temperature source Tympanic, weight 73.9 kg (163 lb), SpO2 92 %. Body mass index is 27.98 kg/m². Oxygen Therapy  SpO2: 92 %  Oxygen Therapy: None (Room air)      Physical Exam  Physical Exam  Vitals reviewed. Constitutional:       Appearance: Normal appearance. HENT:      Head: Normocephalic. Nose: Nose normal.      Mouth/Throat:      Mouth: Mucous membranes are moist.   Eyes:      Pupils: Pupils are equal, round, and reactive to light. Cardiovascular:      Rate and Rhythm: Normal rate and regular rhythm. Pulses: Normal pulses. Heart sounds: Normal heart sounds. Abdominal:      General: Abdomen is flat. Palpations: Abdomen is soft. Musculoskeletal:         General: Normal range of motion. Skin:     General: Skin is warm and dry. Neurological:      General: No focal deficit present. Mental Status: She is alert and oriented to person, place, and time. Labs: I have personally reviewed pertinent lab results.   Lab Results   Component Value Date    WBC 7.55 06/21/2023    HGB 15.1 2023    HCT 44.9 2023    MCV 92 2023     2023     Lab Results   Component Value Date    CALCIUM 9.8 2023    K 3.9 2023    CO2 29 2023     2023    BUN 23 2023    CREATININE 0.83 2023     No results found for: "IGE"  Lab Results   Component Value Date    ALT 22 2023    AST 13 2023    ALKPHOS 76 2023       maging and other studies: I have personally reviewed pertinent reports. and I have personally reviewed pertinent films in PACS    Pulmonary function testin/2021: severe obstruction with FEV1 47%      2023 Spirometry showed severe obstruction with FEV1/FVC 50%. FVC 1.49L 57% and FEV1 0.71L 36%. 2023 6MWT: Patient started with resting HR of 103 per minute and SpO2 of 94% under room air. She desat to 78% after 2 minutes with HR increased to 113 per minute. She walked about 36M in 2 mins. Patient was then putting 2L of N/C and restarted. She started with 96% SpO2 with HR of 105 per minutes. She finished 6 minute walk test with increase oxygen demand to 4L. She finished with SpO2 of 93% and HR of 121 per minute. She walked 108M. EKG, Pathology, and Other Studies: I have personally reviewed pertinent reports.    and I have personally reviewed pertinent films in PACS      Timothy Mccarthy MD  Pulmonary and Critical Care Fellow  [facility]

## 2023-08-24 DIAGNOSIS — J96.11 CHRONIC RESPIRATORY FAILURE WITH HYPOXIA (HCC): Primary | ICD-10-CM

## 2023-08-24 PROBLEM — F17.201 NICOTINE DEPENDENCE IN REMISSION: Status: ACTIVE | Noted: 2021-05-18

## 2023-08-24 NOTE — ASSESSMENT & PLAN NOTE
Severe, FEV1 36% today  GOLD E  Anoro prescribed today; samples provided  Pharmacy can check for affordable alternative if Anoro prohibitively expensive  Refer to [place]  UTD on pneumococcal vaccine

## 2023-10-19 ENCOUNTER — OFFICE VISIT (OUTPATIENT)
Dept: CARDIOLOGY CLINIC | Facility: CLINIC | Age: 78
End: 2023-10-19
Payer: MEDICARE

## 2023-10-19 VITALS
OXYGEN SATURATION: 92 % | HEIGHT: 64 IN | WEIGHT: 166.6 LBS | BODY MASS INDEX: 28.44 KG/M2 | HEART RATE: 106 BPM | DIASTOLIC BLOOD PRESSURE: 88 MMHG | SYSTOLIC BLOOD PRESSURE: 146 MMHG

## 2023-10-19 DIAGNOSIS — R06.02 SOB (SHORTNESS OF BREATH): ICD-10-CM

## 2023-10-19 DIAGNOSIS — I10 ESSENTIAL HYPERTENSION, BENIGN: Primary | ICD-10-CM

## 2023-10-19 DIAGNOSIS — I27.20 PULMONARY HYPERTENSION (HCC): ICD-10-CM

## 2023-10-19 DIAGNOSIS — I50.20 CONGESTIVE HEART FAILURE WITH RIGHT VENTRICULAR SYSTOLIC DYSFUNCTION (HCC): ICD-10-CM

## 2023-10-19 DIAGNOSIS — I50.82 CONGESTIVE HEART FAILURE WITH RIGHT VENTRICULAR SYSTOLIC DYSFUNCTION (HCC): ICD-10-CM

## 2023-10-19 DIAGNOSIS — E78.5 HYPERLIPIDEMIA, UNSPECIFIED HYPERLIPIDEMIA TYPE: ICD-10-CM

## 2023-10-19 PROCEDURE — 99214 OFFICE O/P EST MOD 30 MIN: CPT | Performed by: INTERNAL MEDICINE

## 2023-10-19 NOTE — PROGRESS NOTES
Cardiology Follow Up    Rajesh Mitchell  1945  [id number]  [facility]  [address]  [facility] [address] [place] [address] [place],[address] [place]  [phone]    No diagnosis found. There are no diagnoses linked to this encounter. I had the pleasure of seeing Rajesh Mitchell for a follow up visit. INTERVAL HISTORY: none    History of the presenting illness, Discussion/Summary and My Plan are as follows:::    Magaly Enriquez is a pleasant 75-year-old lady with a history diabetes-not on any medications, last A1c of 6.3, prior tobacco use-now quit, was admitted with acute right more than left biventricular congestive heart failure in May 2021 with a proBNP of 3K. Needed intravenous diuresis and ultimately was discharged on Lasix 80 mg twice daily, currently on 20 mg once daily and appears euvolemic. February 2023-: Was admitted with COPD exacerbation/shortness of breath, after having had COVID in January 2023 and had returned from [place]. No evidence of volume overload/CHF, also had chest pains attributed to musculoskeletal pains from coughing, troponins were negative. Even now she has shortness of breath with exertion and will undergo pulmonary rehabilitation. No chest pains. Has had some caloric weight gain    No symptoms at moderate levels of exertions. Plan:    Chronic biventricular congestive heart failure: With preserved LV systolic function and LV diastolic dysfunction, right ventricular dilatation and dysfunction  Remains on Lasix at 20 mg daily-will continue the same dose. Normal renal function and electrolytes - no edema, pulm edema  Euvolemic at this time at 166 lb at home on Lasix 20 daily     Coronary calcifications on CT: no symptoms at this time. Will consider a stress test with any symptoms. No FH of CAD.  Trops negative in hosp in Feb 2023    Right ventricular dilatation and dysfunction: with Mild puln HTN on echo - PASP of 48 mm, good doppler signal.  Likely group 2 and group 3 from pulmonary disease. No pulmonary artery enlargement, no notching on echo, cardiac MRI without delayed gadolinium enhancement. But with IVC dilatation. Last echo in Jan 2023 and stable  Rheumatology panel was essentially negative but is being checked for RA through her rheumatologist.  Has quit smoking and commended on that    Elevated BPs: excited today and stressed out, was normal at home recently, and usually well controlled    Lipids are progressively increasing:see below, she will watch her saturated fat intake and recheck in 6mo    Follow-up in 6 months     Latest Reference Range & Units 06/21/23 09:50   Cholesterol See Comment mg/dL 169   Triglycerides See Comment mg/dL 88   HDL >=50 mg/dL 85   LDL Calculated 0 - 100 mg/dL 66      Latest Reference Range & Units 06/21/23 09:50   BUN 5 - 25 mg/dL 23   Creatinine 0.60 - 1.30 mg/dL 0.83      Latest Reference Range & Units 02/02/23 11:42   NT-proBNP <450 pg/mL 327        Latest Reference Range & Units 02/02/23 11:42   NT-proBNP <450 pg/mL 327       [1  Results for Javon Stout (MRN [id number]) as of 9/23/2021 13:11   Ref.  Range 5/20/2021 14:35   ANTI-NUCLEAR ANTIBODY (FARIDEH) Latest Ref Range: Negative  Negative   Atypical pANCA Latest Ref Range: Neg:<1:20 titer <1:20   C-ANCA Latest Ref Range: Neg:<1:20 titer <1:20   C3 Complement Latest Ref Range: 90.0 - 180.0 mg/dL 94.3   C4, COMPLEMENT Latest Ref Range: 10.0 - 40.0 mg/dL 15.0   P-ANCA Latest Ref Range: Neg:<1:20 titer <1:20   EXTERNAL MD-3 AB Latest Ref Range: 0.0 - 3.5 U/mL <3.5   RF Quantitation Latest Ref Range: (none)  20 IU/mL (A)   RHEUMATOID FACTOR Latest Ref Range: Negative  Positive (A)     Patient Active Problem List   Diagnosis    Allergic rhinitis    Type 2 diabetes, HbA1c goal < 7% (HCC)    Anxiety    SOB (shortness of breath)    Uncontrolled type 2 diabetes mellitus with hyperglycemia (HCC)    Microalbuminuria    Lung nodules    COPD (chronic obstructive pulmonary disease) (HCC)    Chronic respiratory failure with hypoxia (HCC)    Nicotine dependence in remission    Elevated BP without diagnosis of hypertension    Essential hypertension, benign    Chronic combined systolic and diastolic congestive heart failure (HCC)    Pulmonary hypertension (HCC)    Cigarette nicotine dependence without complication    Hypoxia    Diabetic eye exam (HCC)    Rheumatoid factor positive    Arthritis    Congestive heart failure with right ventricular systolic dysfunction (HCC)    Gastrointestinal hemorrhage    Dermatitis    Primary osteoarthritis of both hands    Dysthymic disorder    Impaired fasting blood sugar    Mixed hyperlipidemia    Tendinitis of finger    Acute respiratory failure with hypoxia (HCC)    History of COVID-19    COPD exacerbation (HCC)    Biventricular congestive heart failure (HCC)    Chest pain    Fall    Coccygeal pain, acute     Past Medical History:   Diagnosis Date    Allergic rhinitis     Anxiety     Arthritis     Diabetes mellitus ([address])     Oxygen decrease     Tobacco abuse      Social History     Socioeconomic History    Marital status: /Civil Union     Spouse name: Not on file    Number of children: 1    Years of education: Not on file    Highest education level: Some college, no degree   Occupational History    Not on file   Tobacco Use    Smoking status: Former     Packs/day: 0.50     Years: 30.00     Total pack years: 15.00     Types: Cigarettes     Quit date: 2021     Years since quittin.4    Smokeless tobacco: Never    Tobacco comments:     Began smoking in her late teens, averaging 1/2 pack daily. States she quit smoking a few weeks ago (Updated 2021). Vaping Use    Vaping Use: Never used   Substance and Sexual Activity    Alcohol use: Yes     Alcohol/week: 1.0 - 2.0 standard drink of alcohol     Types: 1 - 2 Glasses of wine per week     Comment: On average, will drink 1-2 glasses of white wine daily with dinner. (Updated 05/21/2021),     Drug use: Never     Comment: Last assessed 05/21/2021. 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 Has 1 biological daughter Lara Craig) who lives locally. Social Determinants of Health     Financial Resource Strain: Low Risk  (9/26/2022)    Overall Financial Resource Strain (CARDIA)     Difficulty of Paying Living Expenses: Not hard at all   Food Insecurity: No Food Insecurity (1/30/2023)    Hunger Vital Sign     Worried About Running Out of Food in the Last Year: Never true     Ran Out of Food in the Last Year: Never true   Transportation Needs: No Transportation Needs (1/30/2023)    PRAPARE - Transportation     Lack of Transportation (Medical): No     Lack of Transportation (Non-Medical):  No   Physical Activity: Not on file   Stress: Not on file   Social Connections: Not on file   Intimate Partner Violence: Not on file   Housing Stability: Low Risk  (1/30/2023)    Housing Stability Vital Sign     Unable to Pay for Housing in the Last Year: No     Number of Places Lived in the Last Year: 1     Unstable Housing in the Last Year: No      Family History   Problem Relation Age of Onset    Prostate cancer Father     Diabetes Brother     Heart disease Brother     Lymphoma Brother     No Known Problems Sister     Parkinsonism Sister     Hypotension Daughter         Takes "salt pills"    Proteinuria Daughter         Occuring during her 3 pregnancies; follows with nephrology     Past Surgical History:   Procedure Laterality Date    BREAST SURGERY Right     Cyst    OTHER SURGICAL HISTORY      Cataract implant    OTHER SURGICAL HISTORY      Fertilization       Current Outpatient Medications:     albuterol (PROVENTIL HFA,VENTOLIN HFA) 90 mcg/act inhaler, Inhale 2 puffs every 4 (four) hours as needed for wheezing, Disp: 6.7 g, Rfl: 1    furosemide (LASIX) 20 mg tablet, Take 1 tablet (20 mg total) by mouth daily, Disp: 30 tablet, Rfl: 3    hydrOXYzine HCL (ATARAX) 10 mg tablet, Take 1 tablet (10 mg total) by mouth 2 (two) times a day as needed for itching, Disp: 60 tablet, Rfl: 2    rosuvastatin (CRESTOR) 5 mg tablet, Take 1 tablet (5 mg total) by mouth daily, Disp: 90 tablet, Rfl: 1    sertraline (Zoloft) 50 mg tablet, Take 1.5 tablets (75 mg total) by mouth daily, Disp: 135 tablet, Rfl: 1    triamcinolone (KENALOG) 0.1 % cream, Apply topically 2 (two) times a day, Disp: 30 g, Rfl: 0    umeclidinium-vilanterol (Anoro Ellipta) 62.5-25 mcg/actuation inhaler, Inhale 1 puff daily, Disp: 180 blister, Rfl: 1    chlorhexidine (PERIDEX) 0.12 % solution, Apply 0.12 mL to the mouth or throat 2 (two) times a day (Patient not taking: Reported on 10/19/2023), Disp: , Rfl:   No Known Allergies    Imaging: No results found. Review of Systems:  Review of Systems   Constitutional: Negative. Itching back - chronic   HENT: Negative. Eyes: Negative. Respiratory: Negative. Cardiovascular: Negative. Endocrine: Negative. Musculoskeletal: Negative. Physical Exam:  /88 (BP Location: Right arm, Patient Position: Sitting, Cuff Size: Standard)   Pulse (!) 106   Ht 5' 4" (1.626 m)   Wt 75.6 kg (166 lb 9.6 oz)   SpO2 92%   BMI 28.60 kg/m²   Physical Exam  Constitutional:       General: She is not in acute distress. Appearance: Normal appearance. She is not ill-appearing. HENT:      Head: Normocephalic. Nose: Nose normal. No congestion. Mouth/Throat:      Mouth: Mucous membranes are moist.      Pharynx: No oropharyngeal exudate or posterior oropharyngeal erythema. Eyes:      General:         Right eye: No discharge. Left eye: No discharge. Pupils: Pupils are equal, round, and reactive to light. Neck:      Vascular: No carotid bruit. Cardiovascular:      Rate and Rhythm: Normal rate and regular rhythm. Pulses: Normal pulses. Heart sounds: No murmur heard. No friction rub. No gallop. Pulmonary:      Effort: Pulmonary effort is normal. No respiratory distress. Breath sounds: No stridor. No wheezing or rhonchi. Abdominal:      General: Abdomen is flat. There is no distension. Palpations: There is no mass. Tenderness: There is no abdominal tenderness. Hernia: No hernia is present. Musculoskeletal:         General: No swelling, tenderness, deformity or signs of injury. Normal range of motion. Cervical back: Normal range of motion. No rigidity or tenderness. Lymphadenopathy:      Cervical: No cervical adenopathy. Neurological:      Mental Status: She is alert. This note was completed in part utilizing [employer] direct voice recognition software. Grammatical errors, random word insertion, spelling mistakes, occasional wrong word or "sound-alike" substitutions and incomplete sentences may be an occasional consequence of the system secondary to software limitations, ambient noise and hardware issues. At the time of dictation, efforts were made to edit, clarify and /or correct errors. Please read the chart carefully and recognize, using context, where substitutions have occurred. If you have any questions or concerns about the context, text or information contained within the body of this dictation, please contact myself, the provider, for further clarification.

## 2023-10-23 ENCOUNTER — OFFICE VISIT (OUTPATIENT)
Dept: INTERNAL MEDICINE CLINIC | Facility: CLINIC | Age: 78
End: 2023-10-23
Payer: MEDICARE

## 2023-10-23 VITALS
OXYGEN SATURATION: 91 % | SYSTOLIC BLOOD PRESSURE: 142 MMHG | WEIGHT: 167 LBS | TEMPERATURE: 98.2 F | HEIGHT: 64 IN | BODY MASS INDEX: 28.51 KG/M2 | HEART RATE: 92 BPM | DIASTOLIC BLOOD PRESSURE: 72 MMHG

## 2023-10-23 DIAGNOSIS — M19.042 PRIMARY OSTEOARTHRITIS OF BOTH HANDS: ICD-10-CM

## 2023-10-23 DIAGNOSIS — E78.2 MIXED HYPERLIPIDEMIA: ICD-10-CM

## 2023-10-23 DIAGNOSIS — F34.1 DYSTHYMIC DISORDER: ICD-10-CM

## 2023-10-23 DIAGNOSIS — F41.9 ANXIETY: ICD-10-CM

## 2023-10-23 DIAGNOSIS — I50.42 CHRONIC COMBINED SYSTOLIC AND DIASTOLIC CONGESTIVE HEART FAILURE (HCC): ICD-10-CM

## 2023-10-23 DIAGNOSIS — M19.041 PRIMARY OSTEOARTHRITIS OF BOTH HANDS: ICD-10-CM

## 2023-10-23 DIAGNOSIS — J43.8 OTHER EMPHYSEMA (HCC): Primary | ICD-10-CM

## 2023-10-23 DIAGNOSIS — R73.01 IMPAIRED FASTING BLOOD SUGAR: ICD-10-CM

## 2023-10-23 DIAGNOSIS — Z00.00 MEDICARE ANNUAL WELLNESS VISIT, SUBSEQUENT: ICD-10-CM

## 2023-10-23 PROBLEM — E11.65 UNCONTROLLED TYPE 2 DIABETES MELLITUS WITH HYPERGLYCEMIA (HCC): Status: RESOLVED | Noted: 2021-05-11 | Resolved: 2023-10-23

## 2023-10-23 PROCEDURE — G0439 PPPS, SUBSEQ VISIT: HCPCS | Performed by: INTERNAL MEDICINE

## 2023-10-23 PROCEDURE — 99214 OFFICE O/P EST MOD 30 MIN: CPT | Performed by: INTERNAL MEDICINE

## 2023-10-23 NOTE — PROGRESS NOTES
Assessment and Plan:     Problem List Items Addressed This Visit          Endocrine    Impaired fasting blood sugar    Relevant Orders    CBC and differential    Hemoglobin A1C    Albumin / creatinine urine ratio       Respiratory    COPD (chronic obstructive pulmonary disease) ([address]) - Primary       Cardiovascular and Mediastinum    Chronic combined systolic and diastolic congestive heart failure (HCC)       Musculoskeletal and Integument    Primary osteoarthritis of both hands       Other    Anxiety    Dysthymic disorder    Relevant Medications    sertraline (Zoloft) 50 mg tablet    Hyperlipidemia    Relevant Orders    Comprehensive metabolic panel    Lipid Panel with Direct LDL reflex    TSH, 3rd generation     Other Visit Diagnoses       Medicare annual wellness visit, subsequent              BMI Counseling: Body mass index is 28.67 kg/m². The BMI is above normal. Nutrition recommendations include decreasing portion sizes, encouraging healthy choices of fruits and vegetables and decreasing fast food intake. Exercise recommendations include moderate physical activity 150 minutes/week. Rationale for BMI follow-up plan is due to patient being overweight or obese. Depression Screening and Follow-up Plan: Patient assessed for underlying major depression. Brief counseling provided and recommend additional follow-up/re-evaluation next office visit. Falls Plan of Care: balance, strength, and gait training instructions were provided. Urinary Incontinence Plan of Care: counseling topics discussed: use restroom every 2 hours, limit alcohol, caffeine, spicy foods, and acidic foods and limiting fluid intake 3-4 hours before bed. Preventive health issues were discussed with patient, and age appropriate screening tests were ordered as noted in patient's After Visit Summary.   Personalized health advice and appropriate referrals for health education or preventive services given if needed, as noted in patient's After Visit Summary. History of Present Illness:     Patient presents for a Medicare Wellness Visit    Follow-up on multiple medical problems to ensure they are stable on current medication, also Medicare wellness exam       Patient Care Team:  Brian Caal MD as PCP - General (Internal Medicine)     Review of Systems:     Review of Systems   Constitutional:  Negative for chills and fever. HENT:  Negative for congestion, ear pain and sore throat. Eyes:  Negative for pain. Respiratory:  Positive for shortness of breath. Negative for cough. Cardiovascular:  Negative for chest pain and leg swelling. Gastrointestinal:  Negative for abdominal pain, nausea and vomiting. Endocrine: Negative for polyuria. Genitourinary:  Negative for difficulty urinating, frequency and urgency. Musculoskeletal:  Negative for arthralgias and back pain. Skin:  Negative for rash. Neurological:  Negative for weakness and headaches. Psychiatric/Behavioral:  Negative for sleep disturbance. The patient is not nervous/anxious.          Problem List:     Patient Active Problem List   Diagnosis    Allergic rhinitis    Anxiety    SOB (shortness of breath)    Microalbuminuria    Lung nodules    COPD (chronic obstructive pulmonary disease) (HCC)    Chronic respiratory failure with hypoxia (HCC)    Nicotine dependence in remission    Elevated BP without diagnosis of hypertension    Essential hypertension, benign    Chronic combined systolic and diastolic congestive heart failure (HCC)    Pulmonary hypertension (HCC)    Cigarette nicotine dependence without complication    Hypoxia    Diabetic eye exam (HCC)    Rheumatoid factor positive    Arthritis    Congestive heart failure with right ventricular systolic dysfunction (HCC)    Gastrointestinal hemorrhage    Dermatitis    Primary osteoarthritis of both hands    Dysthymic disorder    Impaired fasting blood sugar    Hyperlipidemia    Tendinitis of finger    Acute respiratory failure with hypoxia (HCC)    History of COVID-19    COPD exacerbation (HCC)    Biventricular congestive heart failure (HCC)    Chest pain    Fall    Coccygeal pain, acute      Past Medical and Surgical History:     Past Medical History:   Diagnosis Date    Allergic rhinitis     Anxiety     Arthritis     Diabetes mellitus (HCC)     Oxygen decrease     Tobacco abuse      Past Surgical History:   Procedure Laterality Date    BREAST SURGERY Right     Cyst    OTHER SURGICAL HISTORY      Cataract implant    OTHER SURGICAL HISTORY      Fertilization      Family History:     Family History   Problem Relation Age of Onset    Prostate cancer Father     Diabetes Brother     Heart disease Brother     Lymphoma Brother     No Known Problems Sister     Parkinsonism Sister     Hypotension Daughter         Takes "salt pills"    Proteinuria Daughter         Occuring during her 3 pregnancies; follows with nephrology      Social History:     Social History     Socioeconomic History    Marital status: /Civil Union     Spouse name: None    Number of children: 1    Years of education: None    Highest education level: Some college, no degree   Occupational History    None   Tobacco Use    Smoking status: Former     Packs/day: 0.50     Years: 30.00     Total pack years: 15.00     Types: Cigarettes     Quit date: 2021     Years since quittin.4    Smokeless tobacco: Never    Tobacco comments:     Began smoking in her late teens, averaging 1/2 pack daily. States she quit smoking a few weeks ago (Updated 2021). Vaping Use    Vaping Use: Never used   Substance and Sexual Activity    Alcohol use: Yes     Alcohol/week: 1.0 - 2.0 standard drink of alcohol     Types: 1 - 2 Glasses of wine per week     Comment: On average, will drink 1-2 glasses of white wine daily with dinner. (Updated 2021),     Drug use: Never     Comment: Last assessed 2021.     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 Has 1 biological daughter Jaci Almendarez) who lives locally. Social Determinants of Health     Financial Resource Strain: Low Risk  (10/23/2023)    Overall Financial Resource Strain (CARDIA)     Difficulty of Paying Living Expenses: Not hard at all   Food Insecurity: No Food Insecurity (1/30/2023)    Hunger Vital Sign     Worried About Running Out of Food in the Last Year: Never true     Ran Out of Food in the Last Year: Never true   Transportation Needs: No Transportation Needs (10/23/2023)    PRAPARE - Transportation     Lack of Transportation (Medical): No     Lack of Transportation (Non-Medical):  No   Physical Activity: Not on file   Stress: Not on file   Social Connections: Not on file   Intimate Partner Violence: Not on file   Housing Stability: Low Risk  (1/30/2023)    Housing Stability Vital Sign     Unable to Pay for Housing in the Last Year: No     Number of Places Lived in the Last Year: 1     Unstable Housing in the Last Year: No      Medications and Allergies:     Current Outpatient Medications   Medication Sig Dispense Refill    albuterol (PROVENTIL HFA,VENTOLIN HFA) 90 mcg/act inhaler Inhale 2 puffs every 4 (four) hours as needed for wheezing 6.7 g 1    furosemide (LASIX) 20 mg tablet Take 1 tablet (20 mg total) by mouth daily 30 tablet 3    hydrOXYzine HCL (ATARAX) 10 mg tablet Take 1 tablet (10 mg total) by mouth 2 (two) times a day as needed for itching 60 tablet 2    rosuvastatin (CRESTOR) 5 mg tablet Take 1 tablet (5 mg total) by mouth daily 90 tablet 1    sertraline (Zoloft) 50 mg tablet Take 1.5 tablets (75 mg total) by mouth daily 135 tablet 1    umeclidinium-vilanterol (Anoro Ellipta) 62.5-25 mcg/actuation inhaler Inhale 1 puff daily 180 blister 1    chlorhexidine (PERIDEX) 0.12 % solution Apply 0.12 mL to the mouth or throat 2 (two) times a day (Patient not taking: Reported on 10/19/2023) triamcinolone (KENALOG) 0.1 % cream Apply topically 2 (two) times a day (Patient not taking: Reported on 10/23/2023) 30 g 0     No current facility-administered medications for this visit. No Known Allergies   Immunizations:     Immunization History   Administered Date(s) Administered    COVID-19 MODERNA VACC 0.5 ML IM 01/21/2021, 02/23/2021, 11/16/2021, 04/15/2022, 09/20/2022    COVID-19 Moderna Vac BIVALENT 12 Yr+ IM 0.5 ML 09/20/2022    H1N1, All Formulations 01/09/2010    INFLUENZA 11/11/2005, 11/07/2006, 10/01/2014, 10/01/2015, 10/12/2016, 09/21/2017, 09/18/2018, 08/25/2020, 09/15/2021, 09/09/2022    Pneumococcal Conjugate 13-Valent 09/21/2017    Pneumococcal Polysaccharide PPV23 10/17/2018      Health Maintenance: There are no preventive care reminders to display for this patient. Topic Date Due    Hepatitis A Vaccine (1 of 2 - Risk 2-dose series) Never done    Hepatitis B Vaccine (1 of 3 - Risk 3-dose series) Never done      Medicare Screening Tests and Risk Assessments:     Deborah Kirkland is here for her Subsequent Wellness visit. Last Medicare Wellness visit information reviewed, patient interviewed and updates made to the record today. Health Risk Assessment:   Patient rates overall health as fair. Patient feels that their physical health rating is same. Patient is satisfied with their life. Eyesight was rated as same. Hearing was rated as same. Patient feels that their emotional and mental health rating is same. Patients states they are sometimes angry. Patient states they are always unusually tired/fatigued. Pain experienced in the last 7 days has been none. Patient states that she has experienced no weight loss or gain in last 6 months. Depression Screening:   PHQ-9 Score: 5      Fall Risk Screening: In the past year, patient has experienced: no history of falling in past year      Urinary Incontinence Screening:   Patient has not leaked urine accidently in the last six months.      Home Safety:  Patient does not have trouble with stairs inside or outside of their home. Patient has working smoke alarms and has working carbon monoxide detector. Home safety hazards include: none. Nutrition:   Current diet is Regular. Medications:   Patient is currently taking over-the-counter supplements. OTC medications include: see medication list. Patient is able to manage medications. Activities of Daily Living (ADLs)/Instrumental Activities of Daily Living (IADLs):   Walk and transfer into and out of bed and chair?: Yes  Dress and groom yourself?: Yes    Bathe or shower yourself?: Yes    Feed yourself? Yes  Do your laundry/housekeeping?: Yes  Manage your money, pay your bills and track your expenses?: Yes  Make your own meals?: Yes    Do your own shopping?: No    Previous Hospitalizations:   Any hospitalizations or ED visits within the last 12 months?: Yes    How many hospitalizations have you had in the last year?: 1-2    Advance Care Planning:   Living will: No    Durable POA for healthcare: Yes    Advanced directive: No    ACP document given: Yes      Cognitive Screening:   Provider or family/friend/caregiver concerned regarding cognition?: No    PREVENTIVE SCREENINGS      Cardiovascular Screening:    General: Screening Not Indicated and History Lipid Disorder      Diabetes Screening:     General: Screening Not Indicated and History Diabetes      Cervical Cancer Screening:    General: Screening Not Indicated      Osteoporosis Screening:    General: Screening Not Indicated and History Osteoporosis      Lung Cancer Screening:     General: Screening Not Indicated    Screening, Brief Intervention, and Referral to Treatment (SBIRT)    Screening  Typical number of drinks in a day: 0  Typical number of drinks in a week: 0  Interpretation: Low risk drinking behavior.     Single Item Drug Screening:  How often have you used an illegal drug (including marijuana) or a prescription medication for non-medical reasons in the past year? never    Single Item Drug Screen Score: 0  Interpretation: Negative screen for possible drug use disorder    No results found. Physical Exam:     /72 (BP Location: Left arm, Patient Position: Sitting, Cuff Size: Large)   Pulse 92   Temp 98.2 °F (36.8 °C) (Temporal)   Ht 5' 4" (1.626 m)   Wt 75.8 kg (167 lb)   SpO2 91%   BMI 28.67 kg/m²     Physical Exam  Vitals and nursing note reviewed. Constitutional:       General: She is not in acute distress. Appearance: She is well-developed. HENT:      Head: Normocephalic and atraumatic. Right Ear: Tympanic membrane, ear canal and external ear normal.      Left Ear: Tympanic membrane, ear canal and external ear normal.      Mouth/Throat:      Pharynx: Oropharynx is clear. Eyes:      Extraocular Movements: Extraocular movements intact. Conjunctiva/sclera: Conjunctivae normal.   Cardiovascular:      Rate and Rhythm: Normal rate and regular rhythm. Heart sounds: Normal heart sounds. No murmur heard. Pulmonary:      Effort: Pulmonary effort is normal. No respiratory distress. Breath sounds: Normal breath sounds. Abdominal:      General: Abdomen is flat. Palpations: Abdomen is soft. Tenderness: There is no abdominal tenderness. Musculoskeletal:         General: No swelling. Cervical back: Neck supple. Right lower leg: No edema. Left lower leg: No edema. Skin:     General: Skin is warm and dry. Capillary Refill: Capillary refill takes less than 2 seconds. Neurological:      General: No focal deficit present. Mental Status: She is alert and oriented to person, place, and time.    Psychiatric:         Mood and Affect: Mood normal.          Angeles Garcia MD

## 2023-12-08 ENCOUNTER — RA CDI HCC (OUTPATIENT)
Dept: OTHER | Facility: HOSPITAL | Age: 78
End: 2023-12-08

## 2023-12-08 NOTE — PROGRESS NOTES
[address] [facility] coding opportunities          Chart Reviewed number of suggestions sent to Provider: 1   I11.0  Patients Insurance     Medicare Insurance: [facility]

## 2024-01-15 DIAGNOSIS — E78.2 MIXED HYPERLIPIDEMIA: ICD-10-CM

## 2024-01-15 RX ORDER — ROSUVASTATIN CALCIUM 5 MG/1
5 TABLET, COATED ORAL DAILY
Qty: 90 TABLET | Refills: 1 | Status: SHIPPED | OUTPATIENT
Start: 2024-01-15 | End: 2024-01-22 | Stop reason: SDUPTHER

## 2024-01-18 ENCOUNTER — APPOINTMENT (OUTPATIENT)
Dept: LAB | Facility: CLINIC | Age: 79
End: 2024-01-18
Payer: MEDICARE

## 2024-01-18 DIAGNOSIS — R73.01 IMPAIRED FASTING BLOOD SUGAR: ICD-10-CM

## 2024-01-18 DIAGNOSIS — E78.2 MIXED HYPERLIPIDEMIA: ICD-10-CM

## 2024-01-18 LAB
ALBUMIN SERPL BCP-MCNC: 4.7 G/DL (ref 3.5–5)
ALP SERPL-CCNC: 53 U/L (ref 34–104)
ALT SERPL W P-5'-P-CCNC: 12 U/L (ref 7–52)
ANION GAP SERPL CALCULATED.3IONS-SCNC: 9 MMOL/L
AST SERPL W P-5'-P-CCNC: 15 U/L (ref 13–39)
BASOPHILS # BLD AUTO: 0.07 THOUSANDS/ÂΜL (ref 0–0.1)
BASOPHILS NFR BLD AUTO: 1 % (ref 0–1)
BILIRUB SERPL-MCNC: 1.18 MG/DL (ref 0.2–1)
BUN SERPL-MCNC: 22 MG/DL (ref 5–25)
CALCIUM SERPL-MCNC: 9.7 MG/DL (ref 8.4–10.2)
CHLORIDE SERPL-SCNC: 102 MMOL/L (ref 96–108)
CHOLEST SERPL-MCNC: 148 MG/DL
CO2 SERPL-SCNC: 29 MMOL/L (ref 21–32)
CREAT SERPL-MCNC: 0.72 MG/DL (ref 0.6–1.3)
CREAT UR-MCNC: 91.7 MG/DL
EOSINOPHIL # BLD AUTO: 0.26 THOUSAND/ÂΜL (ref 0–0.61)
EOSINOPHIL NFR BLD AUTO: 3 % (ref 0–6)
ERYTHROCYTE [DISTWIDTH] IN BLOOD BY AUTOMATED COUNT: 12.4 % (ref 11.6–15.1)
GFR SERPL CREATININE-BSD FRML MDRD: 79 ML/MIN/1.73SQ M
GLUCOSE P FAST SERPL-MCNC: 142 MG/DL (ref 65–99)
HCT VFR BLD AUTO: 45 % (ref 34.8–46.1)
HDLC SERPL-MCNC: 66 MG/DL
HGB BLD-MCNC: 14.1 G/DL (ref 11.5–15.4)
IMM GRANULOCYTES # BLD AUTO: 0.02 THOUSAND/UL (ref 0–0.2)
IMM GRANULOCYTES NFR BLD AUTO: 0 % (ref 0–2)
LDLC SERPL CALC-MCNC: 66 MG/DL (ref 0–100)
LYMPHOCYTES # BLD AUTO: 2.48 THOUSANDS/ÂΜL (ref 0.6–4.47)
LYMPHOCYTES NFR BLD AUTO: 29 % (ref 14–44)
MCH RBC QN AUTO: 29.9 PG (ref 26.8–34.3)
MCHC RBC AUTO-ENTMCNC: 31.3 G/DL (ref 31.4–37.4)
MCV RBC AUTO: 96 FL (ref 82–98)
MICROALBUMIN UR-MCNC: 36 MG/L
MICROALBUMIN/CREAT 24H UR: 39 MG/G CREATININE (ref 0–30)
MONOCYTES # BLD AUTO: 0.55 THOUSAND/ÂΜL (ref 0.17–1.22)
MONOCYTES NFR BLD AUTO: 6 % (ref 4–12)
NEUTROPHILS # BLD AUTO: 5.24 THOUSANDS/ÂΜL (ref 1.85–7.62)
NEUTS SEG NFR BLD AUTO: 61 % (ref 43–75)
NRBC BLD AUTO-RTO: 0 /100 WBCS
PLATELET # BLD AUTO: 318 THOUSANDS/UL (ref 149–390)
PMV BLD AUTO: 11.6 FL (ref 8.9–12.7)
POTASSIUM SERPL-SCNC: 4.1 MMOL/L (ref 3.5–5.3)
PROT SERPL-MCNC: 7.1 G/DL (ref 6.4–8.4)
RBC # BLD AUTO: 4.71 MILLION/UL (ref 3.81–5.12)
SODIUM SERPL-SCNC: 140 MMOL/L (ref 135–147)
TRIGL SERPL-MCNC: 82 MG/DL
TSH SERPL DL<=0.05 MIU/L-ACNC: 2.79 UIU/ML (ref 0.45–4.5)
WBC # BLD AUTO: 8.62 THOUSAND/UL (ref 4.31–10.16)

## 2024-01-18 PROCEDURE — 84443 ASSAY THYROID STIM HORMONE: CPT

## 2024-01-18 PROCEDURE — 83036 HEMOGLOBIN GLYCOSYLATED A1C: CPT

## 2024-01-18 PROCEDURE — 85025 COMPLETE CBC W/AUTO DIFF WBC: CPT

## 2024-01-18 PROCEDURE — 80061 LIPID PANEL: CPT

## 2024-01-18 PROCEDURE — 80053 COMPREHEN METABOLIC PANEL: CPT

## 2024-01-18 PROCEDURE — 36415 COLL VENOUS BLD VENIPUNCTURE: CPT

## 2024-01-19 LAB
EST. AVERAGE GLUCOSE BLD GHB EST-MCNC: 137 MG/DL
HBA1C MFR BLD: 6.4 %

## 2024-01-22 ENCOUNTER — OFFICE VISIT (OUTPATIENT)
Dept: INTERNAL MEDICINE CLINIC | Facility: CLINIC | Age: 79
End: 2024-01-22
Payer: MEDICARE

## 2024-01-22 VITALS
BODY MASS INDEX: 28.84 KG/M2 | SYSTOLIC BLOOD PRESSURE: 136 MMHG | WEIGHT: 168 LBS | TEMPERATURE: 98.5 F | DIASTOLIC BLOOD PRESSURE: 82 MMHG | OXYGEN SATURATION: 93 % | HEART RATE: 90 BPM

## 2024-01-22 DIAGNOSIS — E78.2 MIXED HYPERLIPIDEMIA: Primary | ICD-10-CM

## 2024-01-22 DIAGNOSIS — F33.9 DEPRESSION, RECURRENT (HCC): ICD-10-CM

## 2024-01-22 DIAGNOSIS — M19.042 PRIMARY OSTEOARTHRITIS OF BOTH HANDS: ICD-10-CM

## 2024-01-22 DIAGNOSIS — F41.9 ANXIETY: ICD-10-CM

## 2024-01-22 DIAGNOSIS — J43.8 OTHER EMPHYSEMA (HCC): ICD-10-CM

## 2024-01-22 DIAGNOSIS — I50.42 CHRONIC COMBINED SYSTOLIC AND DIASTOLIC CONGESTIVE HEART FAILURE (HCC): ICD-10-CM

## 2024-01-22 DIAGNOSIS — M19.041 PRIMARY OSTEOARTHRITIS OF BOTH HANDS: ICD-10-CM

## 2024-01-22 DIAGNOSIS — Z13.820 SCREENING FOR OSTEOPOROSIS: ICD-10-CM

## 2024-01-22 DIAGNOSIS — M81.0 SENILE OSTEOPOROSIS: ICD-10-CM

## 2024-01-22 DIAGNOSIS — F34.1 DYSTHYMIC DISORDER: ICD-10-CM

## 2024-01-22 DIAGNOSIS — R73.01 IMPAIRED FASTING BLOOD SUGAR: ICD-10-CM

## 2024-01-22 PROBLEM — R18.0 MALIGNANT ASCITES: Status: ACTIVE | Noted: 2024-01-22

## 2024-01-22 PROBLEM — I50.33 ACUTE ON CHRONIC DIASTOLIC (CONGESTIVE) HEART FAILURE (HCC): Status: ACTIVE | Noted: 2021-05-11

## 2024-01-22 PROCEDURE — 99214 OFFICE O/P EST MOD 30 MIN: CPT | Performed by: INTERNAL MEDICINE

## 2024-01-22 RX ORDER — ROSUVASTATIN CALCIUM 5 MG/1
5 TABLET, COATED ORAL DAILY
Qty: 90 TABLET | Refills: 1 | Status: SHIPPED | OUTPATIENT
Start: 2024-01-22

## 2024-01-22 RX ORDER — HYDROXYZINE HYDROCHLORIDE 10 MG/1
10 TABLET, FILM COATED ORAL 2 TIMES DAILY PRN
Qty: 60 TABLET | Refills: 2 | Status: SHIPPED | OUTPATIENT
Start: 2024-01-22

## 2024-01-22 NOTE — PROGRESS NOTES
Assessment/Plan:             1. Mixed hyperlipidemia  -     rosuvastatin (CRESTOR) 5 mg tablet; Take 1 tablet (5 mg total) by mouth daily    2. Impaired fasting blood sugar    3. Chronic combined systolic and diastolic congestive heart failure (HCC)    4. Primary osteoarthritis of both hands    5. Other emphysema (HCC)    6. Screening for osteoporosis    7. Dysthymic disorder  -     sertraline (Zoloft) 50 mg tablet; Take 1.5 tablets (75 mg total) by mouth daily    8. Senile osteoporosis  -     DXA bone density spine hip and pelvis; Future; Expected date: 04/22/2024    9. Depression, recurrent (HCC)    10. Anxiety  -     hydrOXYzine HCL (ATARAX) 10 mg tablet; Take 1 tablet (10 mg total) by mouth 2 (two) times a day as needed for itching           Subjective:      Patient ID: Adelita Smith is a 78 y.o. female.    Follow-up on blood test done on 1/18/2024 test discussed with her        The following portions of the patient's history were reviewed and updated as appropriate: She  has a past medical history of Allergic rhinitis, Anxiety, Arthritis, Diabetes mellitus (HCC), Oxygen decrease, and Tobacco abuse.  She   Patient Active Problem List    Diagnosis Date Noted    Malignant ascites 01/22/2024    Depression, recurrent (HCC) 01/22/2024    Fall 03/06/2023    Coccygeal pain, acute 03/06/2023    Chest pain 02/07/2023    Acute respiratory failure with hypoxia (HCC) 01/30/2023    History of COVID-19 01/30/2023    COPD exacerbation (HCC) 01/30/2023    Biventricular congestive heart failure (HCC) 01/30/2023    Hyperlipidemia 06/06/2022    Dysthymic disorder 01/24/2022    Impaired fasting blood sugar 01/24/2022    Gastrointestinal hemorrhage 10/21/2021    Dermatitis 10/21/2021    Primary osteoarthritis of both hands 10/21/2021    Congestive heart failure with right ventricular systolic dysfunction (HCC) 09/23/2021    Rheumatoid factor positive 09/22/2021    Arthritis 09/22/2021    Diabetic eye exam (HCC) 06/30/2021    Hypoxia  06/16/2021    Essential hypertension, benign 06/02/2021    Chronic combined systolic and diastolic congestive heart failure (HCC) 06/02/2021    Pulmonary hypertension ([facility]) 06/02/2021    Cigarette nicotine dependence without complication     Elevated BP without diagnosis of hypertension 05/22/2021    Lung nodules 05/18/2021    COPD (chronic obstructive pulmonary disease) ([facility]) 05/18/2021    Chronic respiratory failure with hypoxia ([facility]) 05/18/2021    Nicotine dependence in remission 05/18/2021    Microalbuminuria 05/17/2021    Acute on chronic diastolic (congestive) heart failure ([facility]) 05/11/2021    SOB (shortness of breath) 05/11/2021    Allergic rhinitis     Anxiety     Tendinitis of finger 05/01/2015     She  has a past surgical history that includes Breast surgery (Right); Other surgical history; and Other surgical history.  Her family history includes Diabetes in her brother; Heart disease in her brother; Hypotension in her daughter; Lymphoma in her brother; No Known Problems in her sister; Parkinsonism in her sister; Prostate cancer in her father; Proteinuria in her daughter.  She  reports that she quit smoking about 2 years ago. Her smoking use included cigarettes. She started smoking about 32 years ago. She has a 15.0 pack-year smoking history. She has never used smokeless tobacco. She reports current alcohol use of about 1.0 - 2.0 standard drink of alcohol per week. She reports that she does not use drugs.  Current Outpatient Medications   Medication Sig Dispense Refill    albuterol (PROVENTIL HFA,VENTOLIN HFA) 90 mcg/act inhaler Inhale 2 puffs every 4 (four) hours as needed for wheezing 6.7 g 1    hydrOXYzine HCL (ATARAX) 10 mg tablet Take 1 tablet (10 mg total) by mouth 2 (two) times a day as needed for itching 60 tablet 2    rosuvastatin (CRESTOR) 5 mg tablet Take 1 tablet (5 mg total) by mouth daily 90 tablet 1    sertraline (Zoloft) 50 mg tablet Take 1.5 tablets (75 mg total) by mouth daily 135 tablet  1    umeclidinium-vilanterol (Anoro Ellipta) 62.5-25 mcg/actuation inhaler Inhale 1 puff daily 180 blister 1    chlorhexidine (PERIDEX) 0.12 % solution Apply 0.12 mL to the mouth or throat 2 (two) times a day (Patient not taking: Reported on 1/22/2024)      furosemide (LASIX) 20 mg tablet Take 1 tablet (20 mg total) by mouth daily (Patient not taking: Reported on 1/22/2024) 30 tablet 3    triamcinolone (KENALOG) 0.1 % cream Apply topically 2 (two) times a day (Patient not taking: Reported on 10/23/2023) 30 g 0     No current facility-administered medications for this visit.     Current Outpatient Medications on File Prior to Visit   Medication Sig    albuterol (PROVENTIL HFA,VENTOLIN HFA) 90 mcg/act inhaler Inhale 2 puffs every 4 (four) hours as needed for wheezing    umeclidinium-vilanterol (Anoro Ellipta) 62.5-25 mcg/actuation inhaler Inhale 1 puff daily    [DISCONTINUED] hydrOXYzine HCL (ATARAX) 10 mg tablet Take 1 tablet (10 mg total) by mouth 2 (two) times a day as needed for itching    [DISCONTINUED] rosuvastatin (CRESTOR) 5 mg tablet Take 1 tablet (5 mg total) by mouth daily    [DISCONTINUED] sertraline (Zoloft) 50 mg tablet Take 1.5 tablets (75 mg total) by mouth daily    chlorhexidine (PERIDEX) 0.12 % solution Apply 0.12 mL to the mouth or throat 2 (two) times a day (Patient not taking: Reported on 1/22/2024)    furosemide (LASIX) 20 mg tablet Take 1 tablet (20 mg total) by mouth daily (Patient not taking: Reported on 1/22/2024)    triamcinolone (KENALOG) 0.1 % cream Apply topically 2 (two) times a day (Patient not taking: Reported on 10/23/2023)     No current facility-administered medications on file prior to visit.     She has No Known Allergies..    Review of Systems   Constitutional:  Negative for chills and fever.   HENT:  Negative for congestion, ear pain and sore throat.    Eyes:  Negative for pain.   Respiratory:  Positive for cough and shortness of breath.    Cardiovascular:  Negative for chest  pain and leg swelling.   Gastrointestinal:  Negative for abdominal pain, nausea and vomiting.   Endocrine: Negative for polyuria.   Genitourinary:  Negative for difficulty urinating, frequency and urgency.   Musculoskeletal:  Positive for arthralgias. Negative for back pain.   Skin:  Negative for rash.   Neurological:  Negative for weakness and headaches.   Psychiatric/Behavioral:  Negative for sleep disturbance. The patient is not nervous/anxious.          Objective:      /82 (BP Location: Left arm, Patient Position: Sitting, Cuff Size: Standard)   Pulse 90   Temp 98.5 °F (36.9 °C) (Temporal)   Wt 76.2 kg (168 lb)   SpO2 93%   BMI 28.84 kg/m²     Recent Results (from the past 1344 hour(s))   Albumin / creatinine urine ratio    Collection Time: 01/18/24 11:53 AM   Result Value Ref Range    Creatinine, Ur 91.7 Reference range not established. mg/dL    Albumin,U,Random 36.0 (H) <20.0 mg/L    Albumin Creat Ratio 39 (H) 0 - 30 mg/g creatinine   CBC and differential    Collection Time: 01/18/24 11:53 AM   Result Value Ref Range    WBC 8.62 4.31 - 10.16 Thousand/uL    RBC 4.71 3.81 - 5.12 Million/uL    Hemoglobin 14.1 11.5 - 15.4 g/dL    Hematocrit 45.0 34.8 - 46.1 %    MCV 96 82 - 98 fL    MCH 29.9 26.8 - 34.3 pg    MCHC 31.3 (L) 31.4 - 37.4 g/dL    RDW 12.4 11.6 - 15.1 %    MPV 11.6 8.9 - 12.7 fL    Platelets 318 149 - 390 Thousands/uL    nRBC 0 /100 WBCs    Neutrophils Relative 61 43 - 75 %    Immat GRANS % 0 0 - 2 %    Lymphocytes Relative 29 14 - 44 %    Monocytes Relative 6 4 - 12 %    Eosinophils Relative 3 0 - 6 %    Basophils Relative 1 0 - 1 %    Neutrophils Absolute 5.24 1.85 - 7.62 Thousands/µL    Immature Grans Absolute 0.02 0.00 - 0.20 Thousand/uL    Lymphocytes Absolute 2.48 0.60 - 4.47 Thousands/µL    Monocytes Absolute 0.55 0.17 - 1.22 Thousand/µL    Eosinophils Absolute 0.26 0.00 - 0.61 Thousand/µL    Basophils Absolute 0.07 0.00 - 0.10 Thousands/µL   Comprehensive metabolic panel     Collection Time: 01/18/24 11:53 AM   Result Value Ref Range    Sodium 140 135 - 147 mmol/L    Potassium 4.1 3.5 - 5.3 mmol/L    Chloride 102 96 - 108 mmol/L    CO2 29 21 - 32 mmol/L    ANION GAP 9 mmol/L    BUN 22 5 - 25 mg/dL    Creatinine 0.72 0.60 - 1.30 mg/dL    Glucose, Fasting 142 (H) 65 - 99 mg/dL    Calcium 9.7 8.4 - 10.2 mg/dL    AST 15 13 - 39 U/L    ALT 12 7 - 52 U/L    Alkaline Phosphatase 53 34 - 104 U/L    Total Protein 7.1 6.4 - 8.4 g/dL    Albumin 4.7 3.5 - 5.0 g/dL    Total Bilirubin 1.18 (H) 0.20 - 1.00 mg/dL    eGFR 79 ml/min/1.73sq m   Lipid Panel with Direct LDL reflex    Collection Time: 01/18/24 11:53 AM   Result Value Ref Range    Cholesterol 148 See Comment mg/dL    Triglycerides 82 See Comment mg/dL    HDL, Direct 66 >=50 mg/dL    LDL Calculated 66 0 - 100 mg/dL   TSH, 3rd generation    Collection Time: 01/18/24 11:53 AM   Result Value Ref Range    TSH 3RD GENERATON 2.785 0.450 - 4.500 uIU/mL   Hemoglobin A1C    Collection Time: 01/18/24 11:53 AM   Result Value Ref Range    Hemoglobin A1C 6.4 (H) Normal 4.0-5.6%; PreDiabetic 5.7-6.4%; Diabetic >=6.5%; Glycemic control for adults with diabetes <7.0% %     mg/dl        Physical Exam  Constitutional:       Appearance: Normal appearance.   HENT:      Head: Normocephalic.      Right Ear: External ear normal.      Left Ear: External ear normal.      Nose: Nose normal. No congestion.      Mouth/Throat:      Mouth: Mucous membranes are moist.      Pharynx: Oropharynx is clear. No oropharyngeal exudate or posterior oropharyngeal erythema.   Eyes:      Extraocular Movements: Extraocular movements intact.      Conjunctiva/sclera: Conjunctivae normal.   Cardiovascular:      Rate and Rhythm: Normal rate and regular rhythm.      Heart sounds: Normal heart sounds. No murmur heard.  Pulmonary:      Effort: Pulmonary effort is normal.      Breath sounds: No wheezing or rales.      Comments: Decreased air entry bilaterally  Abdominal:      General:  There is no distension.      Palpations: Abdomen is soft.      Tenderness: There is no abdominal tenderness.   Musculoskeletal:         General: Normal range of motion.      Cervical back: Normal range of motion and neck supple.      Right lower leg: No edema.      Left lower leg: No edema.   Lymphadenopathy:      Cervical: No cervical adenopathy.   Skin:     General: Skin is warm.   Neurological:      General: No focal deficit present.      Mental Status: She is alert and oriented to person, place, and time.

## 2024-03-07 ENCOUNTER — CLINICAL SUPPORT (OUTPATIENT)
Dept: PULMONOLOGY | Age: 79
End: 2024-03-07
Payer: MEDICARE

## 2024-03-07 DIAGNOSIS — J96.11 CHRONIC RESPIRATORY FAILURE WITH HYPOXIA (HCC): ICD-10-CM

## 2024-03-07 DIAGNOSIS — J44.9 CHRONIC OBSTRUCTIVE PULMONARY DISEASE, UNSPECIFIED COPD TYPE (HCC): ICD-10-CM

## 2024-03-07 PROCEDURE — 94625 PHY/QHP OP PULM RHB W/O MNTR: CPT

## 2024-03-07 NOTE — PROGRESS NOTES
"PULMONARY REHAB ASSESSMENT      Today's date: 2024  Patient name: Adelita Smith     : 1945       MRN: [id number]  PCP: Mundo Hubbard MD  Referring Physician: Pramod Hassan MD  Pulmonologist: Dr. Cox     Dx:   Encounter Diagnoses   Name Primary?    Chronic respiratory failure with hypoxia (HCC)     Chronic obstructive pulmonary disease, unspecified COPD type (HCC)          Date of onset: 21  Cultural needs:     Weight:    Wt Readings from Last 1 Encounters:   24 76.2 kg (168 lb)      Height:   Ht Readings from Last 1 Encounters:   10/23/23 5' 4\" (1.626 m)       Medical History:   Past Medical History:   Diagnosis Date    Allergic rhinitis     Anxiety     Arthritis     Diabetes mellitus (HCC)     Oxygen decrease     Tobacco abuse        Family History:  Family History   Problem Relation Age of Onset    Prostate cancer Father     Diabetes Brother     Heart disease Brother     Lymphoma Brother     No Known Problems Sister     Parkinsonism Sister     Hypotension Daughter         Takes \"salt pills\"    Proteinuria Daughter         Occuring during her 3 pregnancies; follows with nephrology       Allergies:   Patient has no known allergies.    ETOH:   Social History     Substance and Sexual Activity   Alcohol Use Yes    Alcohol/week: 1.0 - 2.0 standard drink of alcohol    Types: 1 - 2 Glasses of wine per week    Comment: On average, will drink 1-2 glasses of white wine daily with dinner. (Updated 2021),        Current Medications:   Current Outpatient Medications   Medication Sig Dispense Refill    albuterol (PROVENTIL HFA,VENTOLIN HFA) 90 mcg/act inhaler Inhale 2 puffs every 4 (four) hours as needed for wheezing 6.7 g 1    chlorhexidine (PERIDEX) 0.12 % solution Apply 0.12 mL to the mouth or throat 2 (two) times a day (Patient not taking: Reported on 2024)      furosemide (LASIX) 20 mg tablet Take 1 tablet (20 mg total) by mouth daily (Patient not taking: Reported on 2024) 30 tablet " "3    hydrOXYzine HCL (ATARAX) 10 mg tablet Take 1 tablet (10 mg total) by mouth 2 (two) times a day as needed for itching 60 tablet 2    rosuvastatin (CRESTOR) 5 mg tablet Take 1 tablet (5 mg total) by mouth daily 90 tablet 1    sertraline (Zoloft) 50 mg tablet Take 1.5 tablets (75 mg total) by mouth daily 135 tablet 1    triamcinolone (KENALOG) 0.1 % cream Apply topically 2 (two) times a day (Patient not taking: Reported on 10/23/2023) 30 g 0    umeclidinium-vilanterol (Anoro Ellipta) 62.5-25 mcg/actuation inhaler Inhale 1 puff daily 180 blister 1     No current facility-administered medications for this visit.     Physical Limitations: none    Fall Risk: Low   Comments: Ambulates with a steady gait with no assist device and Denies a fall in the past 6 months    Diagnostic Test:   PFT: Date: :  FEV1/FVC 50,  FEV1 of 36% predicted. suggestive of severeobstruction.    Oxygen needs:   Rest:  room air  Exercise/physical activity:  supplemental O2 via nasal cannula @ 2-2.5L/min   Sleep:   CPAP/BiPap    Patient has Rx for supplemental O2:  yes, up to 4L    Rating of Perceived Dyspnea at rest:  0/10    Does Pt monitor home SpO2? Yes, between 88-92%   Average SpO2 at rest:  %   Average SpO2 with ADLs/physical activity:  %      Use of Rescue Inhaler: Yes:  rarely times per      Use of Maintenance Inhaler: Yes:  1 times per day    Use of Nebulizer Treatments:  No    Patient practices breathing techniques at home:  yes    Pulmonary Disease Risk Factors:  second hand smoke  smoking      CAD Risk Factors   Cholesterol: No  Smoking: Former user  HTN: Yes  DM: Type 2   Obesity: Yes   Inactivity: Yes  Stress:  perceived  stress: low/10   Stressors: gets anxious at times, lack of independence, feels \"useless\"   Goals for Stress Management: Read      Current Functional Status  Occupation: retired  Recreation: read   ADL’s:Capable of performing light ADLs only limited by Dyspnea  Wilson: Capable of performing light ADLs only " limited by Dyspnea  Exercise: none   Home exercise equipment: gym in community  Other:     Patient Specific Goals:     EXERCISE GOALS (home exercise, ADLs):   Be a bit more active, go for walks if she wants    NUTRITION GOALS (wt management, diabetes management, dietary modifications):   Weight loss   PSYCHOCOSOCIAL GOALS (stress, emotional well being, social support):   Reduce stress/anxiety when leaving the home    CORE COMPONENT GOALS (smoking, BP control, medication):   Medication compliance      Oxygen Goals: Maintain SpO2>88% titrating supplemental oxygen as needed     Ability to reach goals/rehabilitation potential:  Very Good     Projected return to function: 12 weeks  Objective tests: 6 MWT      Nutritional   Reviewed details of Rate your Plate. Discussed key elements of heart healthy eating. Reviewed patient goals for dietary modifications and their clinical implications.  Reviewed most recent lipid profile.     Goals for dietary modification: low fat dairy   reduced fat cheese  increase whole grains      Psychosocial Assessment as it relates to rehabilitation  Are there any aspects of the patient's family and home situation that will affect their rehabilitation?  No    Assessment of depression and anxiety   Patient reports feelings of anxiety. Zolft and atarax    Psychosocial Evaluation:   PHQ-9: 13  10-14 = Moderate Depression   RAMESH-7: 7  5-9 = Mild anxiety       Marital status:   Other Social Support: spouse    Domestic Violence Screening: No    Comments: since covid exposure 1 year ago she hasn't been the same.

## 2024-03-07 NOTE — PROGRESS NOTES
See scanned exercise session detailed report.  Pt was completing 6MWT for initial evaluation and during her final minute of testing she went into SVT with HR 188bpm, /80. She was asymptomatic and SVT lasted approximately 35 seconds which she then converted to NSR with recovery rate of 96bpm and /84. Additional telemetry strips attached for physician review.

## 2024-03-07 NOTE — PROGRESS NOTES
Pulmonary Rehabilitation Plan of Care   Initial Care Plan      Today's date: 3/7/2024   # of Exercise Sessions Completed: 1   Patient name: Adelita Smith      : 1945  Age: 78 y.o.       MRN: [id number]  Referring Physician: Pramod Hassan MD  Pulmonologist: Dr. Cox    Provider: [facility]  Clinician: Martina Guerra, MS, CEP    Dx:    Encounter Diagnoses   Name Primary?    Chronic obstructive pulmonary disease, unspecified COPD type (HCC)     Chronic respiratory failure with hypoxia (HCC)      Date of onset: 21      Dr. Hassan,   Please review the following patient assessment for COPD to begin pulmonary rehabilitation.  Please verify you agree with the outlined plan of care and exercise prescription by signing with attached order.    Thank You.      SUMMARY OF PROGRESS:  Today is Adelita's initial evaluation to begin Pulmonary Rehab for the diagnosis of COPD. Patient does have spirometry on file, revealing an FEV1/FVC ratio of 50% and an FEV1 of 36% predicted. This is suggestive of severeobstruction. The patient has been experiencing increased dyspnea, decreased physical activity, and increased reliance on supplemental O2.  They report dyspnea when completing ADLs . The patient currently does not follow a home exercise program.  Depression screening using the PHQ-9 interprets the patient's score of 13 10-14 = Moderate Depression. Anxiety screening using the RAMESH-7 interprets the patients score of 7  5-9 = Mild anxiety. When addressed, the patient admits to having anxiety. Patient reports excellent social/emotional support from her spouse and children.  Information to begin using [employer] was provided as well as contact information for counseling through [facility].  PHQ-9 score will be reassessed in 30 days.The patient is a recent user, quit 3 years ago, and is doing well abstaining. Patient admits to 100% medication compliance.  At rest, the patient rated dyspnea 0/10 with SpO2 95% on supplemental O2  2L/min via nasal canula.  They completed an initial 6MWT, walking 750 ft on supplemental O2 2L/min via nasal canula. The patient’s rating of perceived dyspnea during the 6MWT was 5/10 with SpO2 83%.  Patient took 1 rest breaks. Telemetry revealed NSR, rare PVCs, SVT during last minute.   Resting  /80 with appropriate hemodynamic response to exercise reaching 160/80.  Patient will exercise on supplemental O2 2-4L/min via nasal canula. Group and individualized education will be provided on smoking cessation, oxygen use, breathing techniques, pulmonary anatomy, exercise for the pulmonary patient, healthy eating, stress, and relaxation.  Patient specific goals include: weight loss, be able to do more/go for walks if she wants, reduce stress and anxiety .  Adelita will attend 24 exercise sessions beginning 3/12/24.  Will progress patient as tolerated over the next 30 days.  See outlined plan of care below for specific patient goals in each component of care.        Medication compliance: Yes   Comments: Pt reports to be compliant with medications    Fall Risk: Low   Comments: Ambulates with a steady gait with no assist device and Denies a fall in the past 6 months    Smoking: Former user    Pulmonary Disease Risk Factors:  second hand smoke  smoking    RPD at Rest: 0/10  RPD with Exercise:  5/10    Assessment of progression of lung disease and functional status:  CAT: 31/40  Shortness of breath questionnaire: 47/120      EXERCISE ASSESSMENT and PLAN    Current Exercise Program in Rehab:       Frequency: 2 days/week   Supplement with home exercise 2+ days/wk as tolerated        Minutes: 20-40         METS: 2.0-3.0              SpO2: >88%              RPD: 4-6                      HR: 50-85% HRR or RHR +30-40bpm: RHR+30   RPE: 4-5         Modalities: UBE, NuStep, Recumbent bike, and Room walking      Exercise Progression 30 Day Goals :    Frequency: 2 days/week    Supplement with home exercise 2+ days/wk as tolerated        Minutes: 30-40        METS: 2.5-4.0              SpO2: >88%              RPD: 4-6                      HR: RHR+30   RPE: 4-5        Modalities: Treadmill, UBE, NuStep, and Recumbent bike     Strength training:  Will be added following 2-3 weeks of monitored exercise sessions   Modalities: Arm Curl, Sit to Stands, Wall push-ups, and Shoulder Shrugs    Home Exercise: none    Education: pursed lipped breathing, home exercise guidelines, benefits of exercise for pulmonary disease, RPE scale, and RPD scale    SMART Goals:   reduced score on CAT and improved exercise tolerance based on peak METs tolerated in pulmonary rehab exercise session    Patient Specific EXERCISE GOALS:   (home exercise, ADLs): reduced number of COPD exacerbations, attend pulmonary rehab regularly, decrease sitting time at home, start a walking program, reduced pulmonary related hospital readmissions , and increased stamina with ADLs    Patient's progress toward SMART and personal goals: pt agreeable to attend 24 sessions    Patient's goals for next 30 days: attend 2x/wk and supplement with home walking    Plan: Titrate supplemental oxygen as needed to maintain SpO2>88% with exercise, reduce time sitting at home, and utilize PLB with physical activity    Readiness to change: Preparation:  (Getting ready to change)       NUTRITION ASSESSMENT AND PLAN    Weight control:    Starting weight: 170   Current weight:       SMART Goals:   choose 1% or skim milk      Patient Specific NUTRITION GOALS:  (wt control, diabetes management, dietary modifications): weight loss    Patient's progress toward SMART and personal goals: pt will begin regular exercise for increase caloric burn    Patient's goals for next 30 days: weight loss      Measurable goals were based Rate Your Plate Dietary Self-Assessment. These are the areas in which the patient could score higher on the assessment.  Goals include recommendations for a heart healthy diet based on American  Heart Association.    Education: heart healthy eating principles  weight loss and management strategies    Plan: group class: Reading Food Labels and group Class: Heart Healthy Eating    Readiness to change: Preparation:  (Getting ready to change)       PSYCHOSOCIAL ASSESSMENT AND PLAN    Emotional:  Depression assessment:  PHQ-9 = 13  10-14 = Moderate Depression            Anxiety measure:  RAMESH-7 = 7  5-9 = Mild anxiety    Assessment of depression and anxiety    Patient reports feelings of anxiety  compliant with medical therapy for depression/anxiety    Self-reported stress level:   low  Stress Management: Read    Patient's rating of Social support: Excellent   Social Support Network: spouse and children    Psychosocial Assessment as it relates to rehabilitation: Patient denies issues with his/her family or home life that may affect their rehabilitation efforts.       SMART Goals:    Reduce perceived stress to 1-3/10 and PHQ-9 - reduced severity by one level    Patient Specific PSYCHOCOSOCIAL GOALS:  (stress, emotional well being, social support): maintain compliance with medical therapy    Patient's progress toward SMART and personal goals: medication compliance     Patient's goals for next 30 days: medication compliance       Education: stress management techniques    Plan: Class: Stress and Your Health, Class: Relaxation, and Read a Book    Readiness to change: Preparation:  (Getting ready to change)  and Action:  (Changing behavior)      OTHER CORE COMPONENTS     Tobacco:   Social History     Tobacco Use   Smoking Status Former    Current packs/day: 0.00    Average packs/day: 0.5 packs/day for 30.0 years (15.0 ttl pk-yrs)    Types: Cigarettes    Start date: 1991    Quit date: 2021    Years since quittin.8   Smokeless Tobacco Never   Tobacco Comments    Began smoking in her late teens, averaging 1/2 pack daily. States she quit smoking a few weeks ago (Updated 2021).       Tobacco Use  Intervention: Referral to tobacco expert:   Pt quit in    and has abstained    Blood pressure:    Restin/80   Exercise: 142/84    Oxygen needs:   Rest:  room air  Exercise/physical activity:  supplemental O2 via nasal cannula @ 2L/min   Sleep:   CPAP/BiPap    Oxygen Goal: Maintain SpO2>88% during exercise    SMART Goals: medication compliance    Patient Specific CORE COMPONENT GOALS (smoking, BP control, angina control, medication compliance): BP control     Patient's progress toward SMART and personal goals: pt will monitor home BP    Patient's goals for next 30 days: pt will have regular BP taken in rehab     Education:  inspiratory muscle training and bronchodilators    Plan: medication compliance and monitor home BP    Readiness to change: Preparation:  (Getting ready to change)

## 2024-03-11 ENCOUNTER — TELEPHONE (OUTPATIENT)
Dept: CARDIOLOGY CLINIC | Facility: CLINIC | Age: 79
End: 2024-03-11

## 2024-03-11 DIAGNOSIS — I47.10 SVT (SUPRAVENTRICULAR TACHYCARDIA): Primary | ICD-10-CM

## 2024-03-11 RX ORDER — METOPROLOL SUCCINATE 25 MG/1
25 TABLET, EXTENDED RELEASE ORAL DAILY
Qty: 90 TABLET | Refills: 3 | Status: SHIPPED | OUTPATIENT
Start: 2024-03-11

## 2024-03-11 NOTE — TELEPHONE ENCOUNTER
Adelita called back to speak with you about the results of the testing, unsure of message that was left.     C/B [phone]

## 2024-03-11 NOTE — PROGRESS NOTES
Received a message about SVT during a 6-minute walk test, will add metoprolol XL 25 mg daily  Called and left a message on her phone.

## 2024-03-12 ENCOUNTER — APPOINTMENT (OUTPATIENT)
Dept: PULMONOLOGY | Age: 79
End: 2024-03-12
Payer: MEDICARE

## 2024-03-12 NOTE — TELEPHONE ENCOUNTER
---- Message -----   From: Tara Franco MD   Sent: 3/11/2024   1:05 PM EDT   To: [facility]     Please let her know that I have added metoprolol-had fast heart rates during her 6-minute walk test while at the pulmonary doctor's office

## 2024-03-14 ENCOUNTER — CLINICAL SUPPORT (OUTPATIENT)
Dept: PULMONOLOGY | Age: 79
End: 2024-03-14
Payer: MEDICARE

## 2024-03-14 DIAGNOSIS — J44.9 CHRONIC OBSTRUCTIVE PULMONARY DISEASE, UNSPECIFIED COPD TYPE (HCC): Primary | ICD-10-CM

## 2024-03-14 PROCEDURE — 94625 PHY/QHP OP PULM RHB W/O MNTR: CPT

## 2024-03-19 ENCOUNTER — APPOINTMENT (OUTPATIENT)
Dept: PULMONOLOGY | Age: 79
End: 2024-03-19
Payer: MEDICARE

## 2024-03-21 ENCOUNTER — CLINICAL SUPPORT (OUTPATIENT)
Dept: PULMONOLOGY | Age: 79
End: 2024-03-21
Payer: MEDICARE

## 2024-03-21 DIAGNOSIS — J44.9 CHRONIC OBSTRUCTIVE PULMONARY DISEASE, UNSPECIFIED COPD TYPE (HCC): Primary | ICD-10-CM

## 2024-03-21 PROCEDURE — 94625 PHY/QHP OP PULM RHB W/O MNTR: CPT

## 2024-03-26 ENCOUNTER — CLINICAL SUPPORT (OUTPATIENT)
Dept: PULMONOLOGY | Age: 79
End: 2024-03-26
Payer: MEDICARE

## 2024-03-26 DIAGNOSIS — J44.9 CHRONIC OBSTRUCTIVE PULMONARY DISEASE, UNSPECIFIED COPD TYPE (HCC): ICD-10-CM

## 2024-03-26 DIAGNOSIS — J44.9 CHRONIC OBSTRUCTIVE PULMONARY DISEASE, UNSPECIFIED COPD TYPE (HCC): Primary | ICD-10-CM

## 2024-03-26 PROCEDURE — 94625 PHY/QHP OP PULM RHB W/O MNTR: CPT

## 2024-03-26 RX ORDER — UMECLIDINIUM BROMIDE AND VILANTEROL TRIFENATATE 62.5; 25 UG/1; UG/1
POWDER RESPIRATORY (INHALATION)
Qty: 180 BLISTER | Refills: 1 | Status: SHIPPED | OUTPATIENT
Start: 2024-03-26

## 2024-03-28 ENCOUNTER — APPOINTMENT (OUTPATIENT)
Dept: PULMONOLOGY | Age: 79
End: 2024-03-28
Payer: MEDICARE

## 2024-04-04 NOTE — PROGRESS NOTES
Pulmonary Rehabilitation Plan of Care   30 DAY      Today's date: 2024   # of Exercise Sessions Completed: 4  Patient name: Adelita Smith      : 1945  Age: 78 y.o.       MRN: [id number]  Referring Physician: Pramod Hassan MD  Pulmonologist: Dr. Hassan    Provider: [facility]  Clinician: Martina Guerra, MS, CEP    Dx:    Encounter Diagnosis   Name Primary?    Chronic obstructive pulmonary disease, unspecified COPD type (HCC) Yes     Date of onset: 21      Dr. Hassan,   Please review the following patient assessment for COPD to begin pulmonary rehabilitation.  Please verify you agree with the outlined plan of care and exercise prescription by signing with attached order.    Thank You.      SUMMARY OF PROGRESS:   : Adelita has attended 3 exercise sessions in the past 30 days.   She tolerates 28-33 mins at 2.5 - 3.4 METs.  A light strength training component has not been added to their exercise program..   She is tolerating progression of intensity levels to maintain RPE 4-6. Resting SpO2 98-95%.  Resting BP  120/74 - 130/78 with Normal response to exercise reaching 160/80.  NSR on tele with occ PVCs observed.  RHR 77 - 87  with Normal response to exercise reaching 96 - 102.  EX SpO2 97-91% on 2L supplemental O2 via NC.  No pulmonary complaints.  Patient attends group educational classes on pulmonary risk factor modification. Patient specific goals include: weight loss, be able to do more/go for walks if she wants, reduce stress and anxiety.  Her exercise program will be progressed as tolerated to maintain RPE 4-6.  They will continue to be educated on lifestyle modification and encouraged to supplement with a home exercise program as tolerated.      Medication compliance: Yes   Comments: Pt reports to be compliant with medications    Fall Risk: Low   Comments: Ambulates with a steady gait with no assist device and Denies a fall in the past 6 months    Smoking: Former user    Pulmonary Disease Risk  Factors:  second hand smoke  smoking    RPD at Rest: 0/10  RPD with Exercise:  5/10    Assessment of progression of lung disease and functional status:  CAT: 31/40  Shortness of breath questionnaire: 47/120      EXERCISE ASSESSMENT and PLAN    Current Exercise Program in Rehab:       Frequency: 2 days/week   Supplement with home exercise 2+ days/wk as tolerated        Minutes: 28-33         METS: 2.5-3.4              SpO2: >88%              RPD: 4-6                      HR: 50-85% HRR or RHR +30-40bpm: RHR+30   RPE: 4-5         Modalities: UBE, NuStep, Recumbent bike, and Room walking      Exercise Progression 30 Day Goals :    Frequency: 2 days/week    Supplement with home exercise 2+ days/wk as tolerated       Minutes: 38        METS: 2.5-4.0              SpO2: >88%              RPD: 4-6                      HR: RHR+30   RPE: 4-5        Modalities: Treadmill, UBE, NuStep, and Recumbent bike     Strength training:  Will be added following 2-3 weeks of monitored exercise sessions   Modalities: Arm Curl, Sit to Stands, Wall push-ups, and Shoulder Shrugs    Home Exercise: none    Education: pursed lipped breathing, home exercise guidelines, benefits of exercise for pulmonary disease, RPE scale, and RPD scale    SMART Goals:   reduced score on CAT and improved exercise tolerance based on peak METs tolerated in pulmonary rehab exercise session    Patient Specific EXERCISE GOALS:   (home exercise, ADLs): reduced number of COPD exacerbations, attend pulmonary rehab regularly, decrease sitting time at home, start a walking program, reduced pulmonary related hospital readmissions , and increased stamina with ADLs    Patient's progress toward SMART and personal goals: pt agreeable to attend 24 sessions    Patient's goals for next 30 days: attend 2x/wk and supplement with home walking    Plan: Titrate supplemental oxygen as needed to maintain SpO2>88% with exercise, reduce time sitting at home, and utilize PLB with physical  activity    Readiness to change: Preparation:  (Getting ready to change)       NUTRITION ASSESSMENT AND PLAN    Weight control:    Starting weight: 170   Current weight:   170    SMART Goals:   choose 1% or skim milk      Patient Specific NUTRITION GOALS:  (wt control, diabetes management, dietary modifications): weight loss    Patient's progress toward SMART and personal goals: pt will begin regular exercise for increase caloric burn    Patient's goals for next 30 days: weight loss      Measurable goals were based Rate Your Plate Dietary Self-Assessment. These are the areas in which the patient could score higher on the assessment.  Goals include recommendations for a heart healthy diet based on American Heart Association.    Education: heart healthy eating principles  weight loss and management strategies    Plan: group class: Reading Food Labels and group Class: Heart Healthy Eating    Readiness to change: Preparation:  (Getting ready to change)       PSYCHOSOCIAL ASSESSMENT AND PLAN    Emotional:  Depression assessment:  PHQ-9 = 13  10-14 = Moderate Depression            Anxiety measure:  RAMESH-7 = 7  5-9 = Mild anxiety    Assessment of depression and anxiety    Patient reports feelings of anxiety  compliant with medical therapy for depression/anxiety    Self-reported stress level:   low  Stress Management: Read    Patient's rating of Social support: Excellent   Social Support Network: spouse and children    Psychosocial Assessment as it relates to rehabilitation: Patient denies issues with his/her family or home life that may affect their rehabilitation efforts.       SMART Goals:    Reduce perceived stress to 1-3/10 and PHQ-9 - reduced severity by one level    Patient Specific PSYCHOCOSOCIAL GOALS:  (stress, emotional well being, social support): maintain compliance with medical therapy    Patient's progress toward SMART and personal goals: medication compliance     Patient's goals for next 30 days: medication  compliance     Education: stress management techniques    Plan: Class: Stress and Your Health, Class: Relaxation, and Read a Book    Readiness to change: Preparation:  (Getting ready to change)  and Action:  (Changing behavior)      OTHER CORE COMPONENTS     Tobacco:   Social History     Tobacco Use   Smoking Status Former    Current packs/day: 0.00    Average packs/day: 0.5 packs/day for 30.0 years (15.0 ttl pk-yrs)    Types: Cigarettes    Start date: 1991    Quit date: 2021    Years since quittin.8   Smokeless Tobacco Never   Tobacco Comments    Began smoking in her late teens, averaging 1/2 pack daily. States she quit smoking a few weeks ago (Updated 2021).       Tobacco Use Intervention: Referral to tobacco expert:   Pt quit in    and has abstained    Blood pressure:    Restin//78   Exercise: 160/80    Oxygen needs:   Rest:  room air  Exercise/physical activity:  supplemental O2 via nasal cannula @ 2L/min   Sleep:   CPAP/BiPap    Oxygen Goal: Maintain SpO2>88% during exercise    SMART Goals: medication compliance    Patient Specific CORE COMPONENT GOALS (smoking, BP control, angina control, medication compliance): BP control     Patient's progress toward SMART and personal goals: pt will monitor home BP    Patient's goals for next 30 days: pt will have regular BP taken in rehab     Education:  inspiratory muscle training, bronchodilators, and EDU: Respiratory Devices    Plan: medication compliance and monitor home BP    Readiness to change: Preparation:  (Getting ready to change)

## 2024-04-09 NOTE — PROGRESS NOTES
Pulmonary Follow Up Note   Adelita Smith 78 y.o. female MRN: [id number]  4/10/2024      Assessment and Plan     Chronic hypoxic respiratory failure likely secondary to severe COPD (FEV1 36%) - not in acute exacerbation  - former smoker. CT scan in 2021 showed emphysema  - severe obstruction with FEV1 36%  2023 6MWT showing needing 4L when ambulating  - has been on 4 lpm since last hospitalization in 2/2023, inconsistant use at home, only uses 2L today    - previous meds including Trellegy 200, dc due to cost. Now on Anoro 62.5-25 QD and albuterol PRN.  - continue with Anoro 62.5-25 QD and albuterol PRN. Continue with pulmonary rehab.   - refer to respiratory therapy management program for assisted of financial planning for medication    - repeat 6MWT today: hypoxia needing 4L when ambulating. Express the importance of using oxygen when ambulation    - off window for LDCT screen. Patient completed pneumococcus vaccination    Chronic diastolic heart failure with mild pulmonary HTN likely Group 3 secondary to COPD   SVT  5/19/21 2D echo normal LVEF 50-55%, NRWMA,   Mild concentric hypertrophy, right ventricular systolic function moderately reduced, RA mildly dilated, mild MR, mild TR, mild PHTN.     Continue to follow up as per primary care/cardiology. On lasix  Documented SVT upon recent visit at pulmonary rehab. Her cardiologist add on Metoprolol for her  Continue to follow up with cardiology     Multiple Pulmonary nodules   - stable 3mm LLL, stable 2mm subpleural nodule, stable 6mm LLL nodule  - last CT scan in 2022.     Tobacco Abuse   - quit May 2021   -  LDCT as above       Return in about 3 months (around 7/10/2024).    History of Present Illness   HPI:  Adelita Smith is a 78 y.o. female who had hx of severe COPD on home O2 3-6L, DM2, anxiety, past COVID infection, biventricular congestive heart failure, mild pHTN, nicoteine dependence, presents today for  Follow up    Patient was admitted to our hospital in 2/2023  following COPD exacerbation, and was also found to have hypoxia needing more oxygen support. She was discharged with Trelegy 200, and 4L O2 support. She self discontinued trellegy because of the cost, and only using ventolin PRN which she also finished 4 days ago. She uses oxygen at night, not measuring her saturation, and felt sometimes she could not catch up her breath. No fever/cough, no worsening shortness of breath. She quit smoking in 2020. She did not participate in any pul rehab in the past.     Last seen in 8/2023. Medication was changed to Anoro. She was advised to continue using oxygen supplement. She was also referred to pulmonary rehab. She however canceled her appointment with us for the last couple of months.    Seen today for follow up COPD. She is feeling better for her respiratory symptoms after pulmonary rehab. She was found to have SVT during rehab that her cardiologist add on metoprolol for her. She still needs O2 supplement and requires 2 L at home, stating her saturation was in low 90s. She is compliant with Anoro daily and only uses 2 times of albuterol within last 6 months. She has no COPD exacerbation. She is wondering if she can DC her oxygen today. She also mentioned that she is compilant with Anoro, once daily, but noticed last time she has to pay 200 dollars for the inhaler each month.     She quit smoking in 2020. She smokes about 35 years 0.5pk/day. Previous LDCT scan showed stable multiple nodules in the lung.     6MWT 2021: desaturation to 83% at 6 mins  PFT 2021: severe obstructive airflow with FEV1 47%    Review of Systems   All other systems reviewed and are negative.      Historical Information   Past Medical History:   Diagnosis Date    Allergic rhinitis     Anxiety     Arthritis     Diabetes mellitus (HCC)     Oxygen decrease     Tobacco abuse      Past Surgical History:   Procedure Laterality Date    BREAST SURGERY Right     Cyst    OTHER SURGICAL HISTORY      Cataract  "implant    OTHER SURGICAL HISTORY      Fertilization     Family History   Problem Relation Age of Onset    Prostate cancer Father     Diabetes Brother     Heart disease Brother     Lymphoma Brother     No Known Problems Sister     Parkinsonism Sister     Hypotension Daughter         Takes \"salt pills\"    Proteinuria Daughter         Occuring during her 3 pregnancies; follows with nephrology     Social History     Tobacco Use   Smoking Status Former    Current packs/day: 0.00    Average packs/day: 0.5 packs/day for 30.0 years (15.0 ttl pk-yrs)    Types: Cigarettes    Start date: 1991    Quit date: 2021    Years since quittin.8   Smokeless Tobacco Never   Tobacco Comments    Began smoking in her late teens, averaging 1/2 pack daily. States she quit smoking a few weeks ago (Updated 2021).       Meds/Allergies     Current Outpatient Medications:     albuterol (PROVENTIL HFA,VENTOLIN HFA) 90 mcg/act inhaler, Inhale 2 puffs every 4 (four) hours as needed for wheezing, Disp: 6.7 g, Rfl: 1    Anoro Ellipta 62.5-25 MCG/ACT inhaler, inhale 1 puff by mouth and INTO THE LUNGS once daily, Disp: 180 blister, Rfl: 1    hydrOXYzine HCL (ATARAX) 10 mg tablet, Take 1 tablet (10 mg total) by mouth 2 (two) times a day as needed for itching, Disp: 60 tablet, Rfl: 2    metoprolol succinate (TOPROL-XL) 25 mg 24 hr tablet, Take 1 tablet (25 mg total) by mouth daily, Disp: 90 tablet, Rfl: 3    rosuvastatin (CRESTOR) 5 mg tablet, Take 1 tablet (5 mg total) by mouth daily, Disp: 90 tablet, Rfl: 1    sertraline (Zoloft) 50 mg tablet, Take 1.5 tablets (75 mg total) by mouth daily, Disp: 135 tablet, Rfl: 1    chlorhexidine (PERIDEX) 0.12 % solution, Apply 0.12 mL to the mouth or throat 2 (two) times a day (Patient not taking: Reported on 2024), Disp: , Rfl:     furosemide (LASIX) 20 mg tablet, Take 1 tablet (20 mg total) by mouth daily (Patient not taking: Reported on 2024), Disp: 30 tablet, Rfl: 3    triamcinolone " "(KENALOG) 0.1 % cream, Apply topically 2 (two) times a day (Patient not taking: Reported on 10/23/2023), Disp: 30 g, Rfl: 0  No Known Allergies    Vitals: Blood pressure 130/78, pulse 65, temperature 98.4 °F (36.9 °C), temperature source Tympanic, height 5' 4\" (1.626 m), weight 76.2 kg (168 lb), SpO2 96%. Body mass index is 28.84 kg/m². Oxygen Therapy  SpO2: 96 %  Oxygen Therapy: None (Room air)      Physical Exam  Physical Exam  Vitals reviewed.   Constitutional:       Appearance: Normal appearance.   HENT:      Head: Normocephalic.      Nose: Nose normal.      Mouth/Throat:      Mouth: Mucous membranes are moist.   Eyes:      Pupils: Pupils are equal, round, and reactive to light.   Cardiovascular:      Rate and Rhythm: Normal rate and regular rhythm.      Pulses: Normal pulses.      Heart sounds: Normal heart sounds.   Abdominal:      General: Abdomen is flat.      Palpations: Abdomen is soft.   Musculoskeletal:         General: Normal range of motion.   Skin:     General: Skin is warm and dry.   Neurological:      General: No focal deficit present.      Mental Status: She is alert and oriented to person, place, and time.         Labs: I have personally reviewed pertinent lab results.  Lab Results   Component Value Date    WBC 8.62 2024    HGB 14.1 2024    HCT 45.0 2024    MCV 96 2024     2024     Lab Results   Component Value Date    CALCIUM 9.7 2024    K 4.1 2024    CO2 29 2024     2024    BUN 22 2024    CREATININE 0.72 2024     No results found for: \"IGE\"  Lab Results   Component Value Date    ALT 12 2024    AST 15 2024    ALKPHOS 53 2024       maging and other studies: I have personally reviewed pertinent reports.   and I have personally reviewed pertinent films in PACS    Pulmonary function testin/2021: severe obstruction with FEV1 47%    2023 Spirometry showed severe obstruction with FEV1/FVC 50%. FVC " 1.49L 57% and FEV1 0.71L 36%.    8/2023 6MWT: She finished 6 minute walk test with increase oxygen demand to 4L.     4/10/2024 O2 titration study:  Patient completed the 6 minute walk test with initial HR of 104 per minute and Sat 94% under RA. Patient desaturated at minute 1 and needs oxygen support til 4L at minute 3. She had an episode of HR of 144 and resolved after increase oxygen support. She walked a total of 289meters with ending HR of 101 and Sat 92% on 4L oxygen support.      EKG, Pathology, and Other Studies: I have personally reviewed pertinent reports.   and I have personally reviewed pertinent films in PACS      Elizabeth Cox MD  Pulmonary and Critical Care Fellow   [facility]

## 2024-04-10 ENCOUNTER — OFFICE VISIT (OUTPATIENT)
Dept: PULMONOLOGY | Facility: CLINIC | Age: 79
End: 2024-04-10
Payer: MEDICARE

## 2024-04-10 VITALS
HEART RATE: 65 BPM | HEIGHT: 64 IN | OXYGEN SATURATION: 96 % | TEMPERATURE: 98.4 F | WEIGHT: 168 LBS | BODY MASS INDEX: 28.68 KG/M2 | SYSTOLIC BLOOD PRESSURE: 130 MMHG | DIASTOLIC BLOOD PRESSURE: 78 MMHG

## 2024-04-10 DIAGNOSIS — F17.211 CIGARETTE NICOTINE DEPENDENCE IN REMISSION: ICD-10-CM

## 2024-04-10 DIAGNOSIS — I47.10 SVT (SUPRAVENTRICULAR TACHYCARDIA): ICD-10-CM

## 2024-04-10 DIAGNOSIS — J44.9 CHRONIC OBSTRUCTIVE PULMONARY DISEASE, UNSPECIFIED COPD TYPE (HCC): Primary | ICD-10-CM

## 2024-04-10 DIAGNOSIS — J96.11 CHRONIC RESPIRATORY FAILURE WITH HYPOXIA (HCC): ICD-10-CM

## 2024-04-10 PROCEDURE — 99214 OFFICE O/P EST MOD 30 MIN: CPT | Performed by: STUDENT IN AN ORGANIZED HEALTH CARE EDUCATION/TRAINING PROGRAM

## 2024-04-10 PROCEDURE — 94618 PULMONARY STRESS TESTING: CPT | Performed by: STUDENT IN AN ORGANIZED HEALTH CARE EDUCATION/TRAINING PROGRAM

## 2024-04-11 ENCOUNTER — PATIENT OUTREACH (OUTPATIENT)
Dept: CASE MANAGEMENT | Facility: OTHER | Age: 79
End: 2024-04-11

## 2024-04-11 NOTE — PROGRESS NOTES
OP RT CM received referral from pulmonary MD regarding financial assistance for Anoro.   .OP RT CM called and spoke with patient regarding her breathing, medications and financial assistance. Adelita just picked up Anoro at the local [facility] and paid over $600.00. Medicare OOP deductible is $600.00/year, so I believe her deductible is now met.    We discussed nebulizer solution options as well.    Adelita's  got on the phone as well and I explained the process.  Pete is still working full time, so they may be over income for [employer] assistance.   I encouraged Pete to call pharmacy to see what cost would be monthly for the remainder of the year  for Anoro and decide if nebulized solutions would be appropriate for Adelita now.    They were grateful for the information and they have my contact information.  I will outreach next week.

## 2024-04-15 ENCOUNTER — RA CDI HCC (OUTPATIENT)
Dept: OTHER | Facility: HOSPITAL | Age: 79
End: 2024-04-15

## 2024-04-15 NOTE — PROGRESS NOTES
HCC coding opportunities          Chart Reviewed number of suggestions sent to Provider: 1   I11.0    Patients Insurance     Medicare Insurance: Medicare

## 2024-04-17 ENCOUNTER — PATIENT OUTREACH (OUTPATIENT)
Dept: CASE MANAGEMENT | Facility: OTHER | Age: 79
End: 2024-04-17

## 2024-04-17 NOTE — PROGRESS NOTES
OP RT CM called and spoke with patient regarding their breathing, medications and O2. Adelita reports she is doing well from a pulmonary perspective. Her sister is  very sick and she has been understandably upset and feeling stresses.  She is wearing her home oxygen 4 lpm with POX  rest and in the upper 80's to 90% activity  ( ranges) .  Patient does attend pulmonary rehab but has called out this week due to her family issues and her stress. We did discuss the importance of her attending and she verbalizes understanding.   Adelita is not coughing and does not have fever or excess SOB.    OP RT CM spoke with patient and her  last week regarding her Anoro and the cost for her to use. Patient  does report that her  looked into advice but she did not remember outcome. I did stress that patient can go on Good RX and pay less than what she paid at pharmacy last time. Patient can also switch to nebulizer but she isn't sure she wants to do that.  Patient will talk with  and get back to OP RT CM if she needs further help. OP RT CM will follow up in 2 weeks to check on patient . She will outreach if she and her  need to speak with CM prior. I did provide my contact information .

## 2024-05-02 ENCOUNTER — PATIENT OUTREACH (OUTPATIENT)
Dept: CASE MANAGEMENT | Facility: OTHER | Age: 79
End: 2024-05-02

## 2024-05-17 ENCOUNTER — PATIENT OUTREACH (OUTPATIENT)
Dept: CASE MANAGEMENT | Facility: OTHER | Age: 79
End: 2024-05-17

## 2024-05-17 NOTE — PROGRESS NOTES
Adelita was contacted as a follow-up to the previous RT call. Both Pete and Adelita were on the call.    Currently, she is breathing without difficulty, at rest with O2. She gets more SOB with activity.     Adelita is taking her Anoro as directed and her  is looking into the cheapest option for renewal. As previously discussed by RT's,  we reviewed the conversation regarding nebulized medication. In addition, we discussed GoodRx, and the possibility of getting samples from the Pulmonary office if the need arises. Adelita mentioned she has enough for now and is considering her options.    Adelita has not attended Pulmonary Rehab since her sister passed away. She has an appt with her PCP on Monday and will discuss. She also plans to discuss with Guillermina at Pulm Rehab. She may need a new order from her Pulmonologist to resume.     We reviewed the benefits of ME as well as daily exercise that can be done in the home.    Adelita appreciates the outreach and will welcome another call in several weeks.

## 2024-05-20 ENCOUNTER — OFFICE VISIT (OUTPATIENT)
Dept: INTERNAL MEDICINE CLINIC | Facility: CLINIC | Age: 79
End: 2024-05-20
Payer: MEDICARE

## 2024-05-20 VITALS
OXYGEN SATURATION: 90 % | WEIGHT: 170 LBS | HEIGHT: 64 IN | DIASTOLIC BLOOD PRESSURE: 72 MMHG | BODY MASS INDEX: 29.02 KG/M2 | SYSTOLIC BLOOD PRESSURE: 134 MMHG | TEMPERATURE: 97.5 F | HEART RATE: 91 BPM

## 2024-05-20 DIAGNOSIS — R73.01 IMPAIRED FASTING BLOOD SUGAR: ICD-10-CM

## 2024-05-20 DIAGNOSIS — E78.2 MIXED HYPERLIPIDEMIA: ICD-10-CM

## 2024-05-20 DIAGNOSIS — M19.041 PRIMARY OSTEOARTHRITIS OF BOTH HANDS: ICD-10-CM

## 2024-05-20 DIAGNOSIS — J96.11 CHRONIC RESPIRATORY FAILURE WITH HYPOXIA (HCC): ICD-10-CM

## 2024-05-20 DIAGNOSIS — M19.042 PRIMARY OSTEOARTHRITIS OF BOTH HANDS: ICD-10-CM

## 2024-05-20 DIAGNOSIS — J43.8 OTHER EMPHYSEMA (HCC): ICD-10-CM

## 2024-05-20 DIAGNOSIS — I10 ESSENTIAL HYPERTENSION, BENIGN: ICD-10-CM

## 2024-05-20 DIAGNOSIS — I50.42 CHRONIC COMBINED SYSTOLIC AND DIASTOLIC CONGESTIVE HEART FAILURE (HCC): Primary | ICD-10-CM

## 2024-05-20 PROCEDURE — G2211 COMPLEX E/M VISIT ADD ON: HCPCS | Performed by: INTERNAL MEDICINE

## 2024-05-20 PROCEDURE — 99214 OFFICE O/P EST MOD 30 MIN: CPT | Performed by: INTERNAL MEDICINE

## 2024-05-20 RX ORDER — SEMAGLUTIDE 0.25 MG/.5ML
INJECTION, SOLUTION SUBCUTANEOUS
Qty: 2 ML | Refills: 0 | Status: SHIPPED | OUTPATIENT
Start: 2024-05-20

## 2024-05-20 NOTE — PROGRESS NOTES
Assessment/Plan:             1. Chronic combined systolic and diastolic congestive heart failure (HCC)  Comments:  Advised for low-salt diet  Orders:  -     Semaglutide-Weight Management (Wegovy) 0.25 MG/0.5ML; Inject 0.25 mg under the skin weekly  2. Chronic respiratory failure with hypoxia (HCC)  Comments:  Continue use of oxygen  Orders:  -     Semaglutide-Weight Management (Wegovy) 0.25 MG/0.5ML; Inject 0.25 mg under the skin weekly  3. Other emphysema (HCC)  -     Semaglutide-Weight Management (Wegovy) 0.25 MG/0.5ML; Inject 0.25 mg under the skin weekly  4. Impaired fasting blood sugar  -     Semaglutide-Weight Management (Wegovy) 0.25 MG/0.5ML; Inject 0.25 mg under the skin weekly  5. Essential hypertension, benign  Comments:  Stable, continue same medication  Orders:  -     Semaglutide-Weight Management (Wegovy) 0.25 MG/0.5ML; Inject 0.25 mg under the skin weekly  6. Primary osteoarthritis of both hands  7. Body mass index 29.0-29.9, adult  -     Semaglutide-Weight Management (Wegovy) 0.25 MG/0.5ML; Inject 0.25 mg under the skin weekly  8. Mixed hyperlipidemia  Comments:  , continue same medication         Subjective:      Patient ID: Adelita Smith is a 78 y.o. female.    Follow-up on multiple medical problems to ensure they are stable on current medications        The following portions of the patient's history were reviewed and updated as appropriate: She  has a past medical history of Allergic rhinitis, Anxiety, Arthritis, Diabetes mellitus (HCC), Oxygen decrease, and Tobacco abuse.  She   Patient Active Problem List    Diagnosis Date Noted    Body mass index 29.0-29.9, adult 05/20/2024    Malignant ascites 01/22/2024    Depression, recurrent (HCC) 01/22/2024    Fall 03/06/2023    Coccygeal pain, acute 03/06/2023    Chest pain 02/07/2023    Acute respiratory failure with hypoxia (HCC) 01/30/2023    History of COVID-19 01/30/2023    COPD exacerbation (HCC) 01/30/2023    Biventricular congestive heart failure  (Formerly [facility]) 01/30/2023    Mixed hyperlipidemia 06/06/2022    Dysthymic disorder 01/24/2022    Impaired fasting blood sugar 01/24/2022    Gastrointestinal hemorrhage 10/21/2021    Dermatitis 10/21/2021    Primary osteoarthritis of both hands 10/21/2021    Congestive heart failure with right ventricular systolic dysfunction ([facility]) 09/23/2021    Rheumatoid factor positive 09/22/2021    Arthritis 09/22/2021    Diabetic eye exam ([facility]) 06/30/2021    Hypoxia 06/16/2021    Essential hypertension, benign 06/02/2021    Chronic combined systolic and diastolic congestive heart failure ([facility]) 06/02/2021    Pulmonary hypertension ([facility]) 06/02/2021    Cigarette nicotine dependence without complication     Elevated BP without diagnosis of hypertension 05/22/2021    Lung nodules 05/18/2021    COPD (chronic obstructive pulmonary disease) (Former[facility]) 05/18/2021    Chronic respiratory failure with hypoxia ([facility]) 05/18/2021    Nicotine dependence in remission 05/18/2021    Microalbuminuria 05/17/2021    Acute on chronic diastolic (congestive) heart failure ([facility]) 05/11/2021    SOB (shortness of breath) 05/11/2021    Allergic rhinitis     Anxiety     Tendinitis of finger 05/01/2015     She  has a past surgical history that includes Breast surgery (Right); Other surgical history; and Other surgical history.  Her family history includes Diabetes in her brother; Heart disease in her brother; Hypotension in her daughter; Lymphoma in her brother; No Known Problems in her sister; Parkinsonism in her sister; Prostate cancer in her father; Proteinuria in her daughter.  She  reports that she quit smoking about 3 years ago. Her smoking use included cigarettes. She started smoking about 33 years ago. She has a 15 pack-year smoking history. She has never used smokeless tobacco. She reports current alcohol use of about 1.0 - 2.0 standard drink of alcohol per week. She reports that she does not use drugs.  Current Outpatient Medications   Medication Sig Dispense  Refill    albuterol (PROVENTIL HFA,VENTOLIN HFA) 90 mcg/act inhaler Inhale 2 puffs every 4 (four) hours as needed for wheezing 6.7 g 1    Anoro Ellipta 62.5-25 MCG/ACT inhaler inhale 1 puff by mouth and INTO THE LUNGS once daily 180 blister 1    hydrOXYzine HCL (ATARAX) 10 mg tablet Take 1 tablet (10 mg total) by mouth 2 (two) times a day as needed for itching 60 tablet 2    metoprolol succinate (TOPROL-XL) 25 mg 24 hr tablet Take 1 tablet (25 mg total) by mouth daily 90 tablet 3    rosuvastatin (CRESTOR) 5 mg tablet Take 1 tablet (5 mg total) by mouth daily 90 tablet 1    Semaglutide-Weight Management (Wegovy) 0.25 MG/0.5ML Inject 0.25 mg under the skin weekly 2 mL 0    sertraline (Zoloft) 50 mg tablet Take 1.5 tablets (75 mg total) by mouth daily 135 tablet 1    furosemide (LASIX) 20 mg tablet Take 1 tablet (20 mg total) by mouth daily (Patient not taking: Reported on 1/22/2024) 30 tablet 3    triamcinolone (KENALOG) 0.1 % cream Apply topically 2 (two) times a day (Patient not taking: Reported on 10/23/2023) 30 g 0     No current facility-administered medications for this visit.     Current Outpatient Medications on File Prior to Visit   Medication Sig    albuterol (PROVENTIL HFA,VENTOLIN HFA) 90 mcg/act inhaler Inhale 2 puffs every 4 (four) hours as needed for wheezing    Anoro Ellipta 62.5-25 MCG/ACT inhaler inhale 1 puff by mouth and INTO THE LUNGS once daily    hydrOXYzine HCL (ATARAX) 10 mg tablet Take 1 tablet (10 mg total) by mouth 2 (two) times a day as needed for itching    metoprolol succinate (TOPROL-XL) 25 mg 24 hr tablet Take 1 tablet (25 mg total) by mouth daily    rosuvastatin (CRESTOR) 5 mg tablet Take 1 tablet (5 mg total) by mouth daily    sertraline (Zoloft) 50 mg tablet Take 1.5 tablets (75 mg total) by mouth daily    furosemide (LASIX) 20 mg tablet Take 1 tablet (20 mg total) by mouth daily (Patient not taking: Reported on 1/22/2024)    triamcinolone (KENALOG) 0.1 % cream Apply topically 2  "(two) times a day (Patient not taking: Reported on 10/23/2023)    [DISCONTINUED] chlorhexidine (PERIDEX) 0.12 % solution Apply 0.12 mL to the mouth or throat 2 (two) times a day (Patient not taking: Reported on 1/22/2024)     No current facility-administered medications on file prior to visit.     She has No Known Allergies..    Review of Systems   Constitutional:  Negative for chills and fever.   HENT:  Negative for congestion, ear pain and sore throat.    Eyes:  Negative for pain.   Respiratory:  Positive for shortness of breath. Negative for cough.    Cardiovascular:  Negative for chest pain and leg swelling.   Gastrointestinal:  Negative for abdominal pain, nausea and vomiting.   Endocrine: Negative for polyuria.   Genitourinary:  Negative for difficulty urinating, frequency and urgency.   Musculoskeletal:  Negative for arthralgias and back pain.   Skin:  Negative for rash.   Neurological:  Negative for weakness and headaches.   Psychiatric/Behavioral:  Negative for sleep disturbance. The patient is not nervous/anxious.          Objective:      /72 (BP Location: Left arm, Patient Position: Sitting, Cuff Size: Standard)   Pulse 91   Temp 97.5 °F (36.4 °C) (Temporal)   Ht 5' 4\" (1.626 m)   Wt 77.1 kg (170 lb)   SpO2 90% Comment: has O2  BMI 29.18 kg/m²     No results found for this or any previous visit (from the past 1344 hour(s)).     Physical Exam  Constitutional:       Appearance: Normal appearance.   HENT:      Head: Normocephalic.      Right Ear: External ear normal.      Left Ear: External ear normal.      Nose: Nose normal. No congestion.      Mouth/Throat:      Mouth: Mucous membranes are moist.      Pharynx: Oropharynx is clear. No oropharyngeal exudate or posterior oropharyngeal erythema.   Eyes:      Extraocular Movements: Extraocular movements intact.      Conjunctiva/sclera: Conjunctivae normal.   Cardiovascular:      Rate and Rhythm: Normal rate and regular rhythm.      Heart sounds: " Normal heart sounds. No murmur heard.  Pulmonary:      Effort: Pulmonary effort is normal.      Breath sounds: Normal breath sounds. No wheezing or rales.   Abdominal:      General: Abdomen is flat. There is no distension.      Palpations: Abdomen is soft.      Tenderness: There is no abdominal tenderness.   Musculoskeletal:         General: Normal range of motion.      Cervical back: Normal range of motion and neck supple.      Right lower leg: No edema.      Left lower leg: No edema.   Lymphadenopathy:      Cervical: No cervical adenopathy.   Skin:     General: Skin is warm.   Neurological:      General: No focal deficit present.      Mental Status: She is alert and oriented to person, place, and time.

## 2024-05-21 ENCOUNTER — TELEPHONE (OUTPATIENT)
Dept: INTERNAL MEDICINE CLINIC | Facility: CLINIC | Age: 79
End: 2024-05-21

## 2024-06-03 ENCOUNTER — PATIENT OUTREACH (OUTPATIENT)
Dept: CASE MANAGEMENT | Facility: OTHER | Age: 79
End: 2024-06-03

## 2024-06-03 NOTE — PROGRESS NOTES
Adelita was contacted to discuss her respiratory status, use of O2 and Pulmonary rehab.    Adelita has not gone back to [place] and did discuss with her PCP. She has a Cardiology appt tomorrow and will discuss again at that time. We discussed the In-home exercises that can be done.  I will send her a booklet of specific VT exercises.     Adelita is taking the Anoro as directed and has not felt the need for the Albutrol Inhaler. She is at baseline with her COPD but feels discouraged that she needs O2 whenever she ambulates or exercises. She checks her pulse ox regularly.    Another call will be placed in 3-4 weeks to review the exercises.

## 2024-06-06 ENCOUNTER — TELEPHONE (OUTPATIENT)
Dept: INTERNAL MEDICINE CLINIC | Facility: CLINIC | Age: 79
End: 2024-06-06

## 2024-06-06 DIAGNOSIS — J96.11 CHRONIC RESPIRATORY FAILURE WITH HYPOXIA (HCC): ICD-10-CM

## 2024-06-06 DIAGNOSIS — I10 ESSENTIAL HYPERTENSION, BENIGN: ICD-10-CM

## 2024-06-06 DIAGNOSIS — J43.8 OTHER EMPHYSEMA (HCC): ICD-10-CM

## 2024-06-06 DIAGNOSIS — R73.01 IMPAIRED FASTING BLOOD SUGAR: ICD-10-CM

## 2024-06-06 DIAGNOSIS — I50.42 CHRONIC COMBINED SYSTOLIC AND DIASTOLIC CONGESTIVE HEART FAILURE (HCC): ICD-10-CM

## 2024-06-06 RX ORDER — TIRZEPATIDE 2.5 MG/.5ML
2.5 INJECTION, SOLUTION SUBCUTANEOUS WEEKLY
Qty: 2 ML | Refills: 0 | Status: SHIPPED | OUTPATIENT
Start: 2024-06-06 | End: 2024-07-04

## 2024-06-06 NOTE — TELEPHONE ENCOUNTER
PA for Zepbound 2.5 EXCLUDED from plan       Reason:(Screenshot if applicable)        Message sent to office clinical pool Yes

## 2024-06-24 ENCOUNTER — OFFICE VISIT (OUTPATIENT)
Dept: INTERNAL MEDICINE CLINIC | Facility: CLINIC | Age: 79
End: 2024-06-24
Payer: MEDICARE

## 2024-06-24 VITALS
SYSTOLIC BLOOD PRESSURE: 132 MMHG | DIASTOLIC BLOOD PRESSURE: 80 MMHG | TEMPERATURE: 98.1 F | OXYGEN SATURATION: 98 % | HEART RATE: 79 BPM

## 2024-06-24 DIAGNOSIS — E11.9 DIABETIC EYE EXAM (HCC): ICD-10-CM

## 2024-06-24 DIAGNOSIS — I50.42 CHRONIC COMBINED SYSTOLIC AND DIASTOLIC CONGESTIVE HEART FAILURE (HCC): ICD-10-CM

## 2024-06-24 DIAGNOSIS — R18.0 MALIGNANT ASCITES: ICD-10-CM

## 2024-06-24 DIAGNOSIS — J43.8 OTHER EMPHYSEMA (HCC): ICD-10-CM

## 2024-06-24 DIAGNOSIS — I10 ESSENTIAL HYPERTENSION, BENIGN: Primary | ICD-10-CM

## 2024-06-24 DIAGNOSIS — F41.9 ANXIETY: ICD-10-CM

## 2024-06-24 DIAGNOSIS — E78.2 MIXED HYPERLIPIDEMIA: ICD-10-CM

## 2024-06-24 DIAGNOSIS — J96.11 CHRONIC RESPIRATORY FAILURE WITH HYPOXIA (HCC): ICD-10-CM

## 2024-06-24 DIAGNOSIS — I50.33 ACUTE ON CHRONIC DIASTOLIC (CONGESTIVE) HEART FAILURE (HCC): ICD-10-CM

## 2024-06-24 DIAGNOSIS — Z01.00 DIABETIC EYE EXAM (HCC): ICD-10-CM

## 2024-06-24 DIAGNOSIS — R73.01 IMPAIRED FASTING BLOOD SUGAR: ICD-10-CM

## 2024-06-24 PROCEDURE — 99214 OFFICE O/P EST MOD 30 MIN: CPT | Performed by: INTERNAL MEDICINE

## 2024-06-24 PROCEDURE — G2211 COMPLEX E/M VISIT ADD ON: HCPCS | Performed by: INTERNAL MEDICINE

## 2024-06-24 RX ORDER — HYDROXYZINE HYDROCHLORIDE 10 MG/1
10 TABLET, FILM COATED ORAL 2 TIMES DAILY PRN
Qty: 60 TABLET | Refills: 2 | Status: SHIPPED | OUTPATIENT
Start: 2024-06-24

## 2024-06-24 RX ORDER — TIRZEPATIDE 2.5 MG/.5ML
2.5 INJECTION, SOLUTION SUBCUTANEOUS WEEKLY
Qty: 2 ML | Refills: 0 | Status: SHIPPED | OUTPATIENT
Start: 2024-06-24 | End: 2024-07-22

## 2024-06-24 NOTE — PROGRESS NOTES
Assessment/Plan:             1. Essential hypertension, benign  Comments:  Stable, continue same medication  Orders:  -     tirzepatide (Zepbound) 2.5 mg/0.5 mL auto-injector; Inject 0.5 mL (2.5 mg total) under the skin once a week for 28 days  -     CBC and differential; Future  -     Comprehensive metabolic panel; Future  -     Lipid Panel with Direct LDL reflex; Future  -     TSH, 3rd generation; Future  2. Chronic combined systolic and diastolic congestive heart failure (HCC)  Comments:  Advised for low-salt diet  Orders:  -     tirzepatide (Zepbound) 2.5 mg/0.5 mL auto-injector; Inject 0.5 mL (2.5 mg total) under the skin once a week for 28 days  -     CBC and differential; Future  3. Chronic respiratory failure with hypoxia (HCC)  Comments:  Continue use of oxygen  Orders:  -     tirzepatide (Zepbound) 2.5 mg/0.5 mL auto-injector; Inject 0.5 mL (2.5 mg total) under the skin once a week for 28 days  4. Other emphysema (HCC)  -     tirzepatide (Zepbound) 2.5 mg/0.5 mL auto-injector; Inject 0.5 mL (2.5 mg total) under the skin once a week for 28 days  5. Impaired fasting blood sugar  -     tirzepatide (Zepbound) 2.5 mg/0.5 mL auto-injector; Inject 0.5 mL (2.5 mg total) under the skin once a week for 28 days  -     Hemoglobin A1C; Future  6. Body mass index 29.0-29.9, adult  -     tirzepatide (Zepbound) 2.5 mg/0.5 mL auto-injector; Inject 0.5 mL (2.5 mg total) under the skin once a week for 28 days  7. Malignant ascites  8. Acute on chronic diastolic (congestive) heart failure (HCC)  9. Diabetic eye exam (HCC)  10. Anxiety  -     hydrOXYzine HCL (ATARAX) 10 mg tablet; Take 1 tablet (10 mg total) by mouth 2 (two) times a day as needed for itching  11. Mixed hyperlipidemia  -     Comprehensive metabolic panel; Future  -     Lipid Panel with Direct LDL reflex; Future  -     TSH, 3rd generation; Future         Subjective:      Patient ID: Adelita Smith is a 78 y.o. female.    Follow-up on multiple medical problems to  ensure they are stable on current medications        The following portions of the patient's history were reviewed and updated as appropriate: She  has a past medical history of Allergic rhinitis, Anxiety, Arthritis, Diabetes mellitus (HCC), Oxygen decrease, and Tobacco abuse.  She   Patient Active Problem List    Diagnosis Date Noted    Body mass index 29.0-29.9, adult 05/20/2024    Malignant ascites 01/22/2024    Depression, recurrent (HCC) 01/22/2024    Fall 03/06/2023    Coccygeal pain, acute 03/06/2023    Chest pain 02/07/2023    Acute respiratory failure with hypoxia (Formerly [facility]) 01/30/2023    History of COVID-19 01/30/2023    COPD exacerbation (Formerly [facility]) 01/30/2023    Biventricular congestive heart failure (Formerly [facility]) 01/30/2023    Mixed hyperlipidemia 06/06/2022    Dysthymic disorder 01/24/2022    Impaired fasting blood sugar 01/24/2022    Gastrointestinal hemorrhage 10/21/2021    Dermatitis 10/21/2021    Primary osteoarthritis of both hands 10/21/2021    Congestive heart failure with right ventricular systolic dysfunction (HCC) 09/23/2021    Rheumatoid factor positive 09/22/2021    Arthritis 09/22/2021    Diabetic eye exam (Formerly [facility]) 06/30/2021    Hypoxia 06/16/2021    Essential hypertension, benign 06/02/2021    Chronic combined systolic and diastolic congestive heart failure (HCC) 06/02/2021    Pulmonary hypertension ([facility]) 06/02/2021    Cigarette nicotine dependence without complication     Elevated BP without diagnosis of hypertension 05/22/2021    Lung nodules 05/18/2021    COPD (chronic obstructive pulmonary disease) (Formerly [facility]) 05/18/2021    Chronic respiratory failure with hypoxia ([facility]) 05/18/2021    Nicotine dependence in remission 05/18/2021    Microalbuminuria 05/17/2021    Acute on chronic diastolic (congestive) heart failure ([facility]) 05/11/2021    SOB (shortness of breath) 05/11/2021    Allergic rhinitis     Anxiety     Tendinitis of finger 05/01/2015     She  has a past surgical history that includes Breast surgery (Right);  Other surgical history; and Other surgical history.  Her family history includes Diabetes in her brother; Heart disease in her brother; Hypotension in her daughter; Lymphoma in her brother; No Known Problems in her sister; Parkinsonism in her sister; Prostate cancer in her father; Proteinuria in her daughter.  She  reports that she quit smoking about 3 years ago. Her smoking use included cigarettes. She started smoking about 33 years ago. She has a 15 pack-year smoking history. She has never used smokeless tobacco. She reports current alcohol use of about 1.0 - 2.0 standard drink of alcohol per week. She reports that she does not use drugs.  Current Outpatient Medications   Medication Sig Dispense Refill    albuterol (PROVENTIL HFA,VENTOLIN HFA) 90 mcg/act inhaler Inhale 2 puffs every 4 (four) hours as needed for wheezing 6.7 g 1    Anoro Ellipta 62.5-25 MCG/ACT inhaler inhale 1 puff by mouth and INTO THE LUNGS once daily 180 blister 1    hydrOXYzine HCL (ATARAX) 10 mg tablet Take 1 tablet (10 mg total) by mouth 2 (two) times a day as needed for itching 60 tablet 2    metoprolol succinate (TOPROL-XL) 25 mg 24 hr tablet Take 1 tablet (25 mg total) by mouth daily 90 tablet 3    rosuvastatin (CRESTOR) 5 mg tablet Take 1 tablet (5 mg total) by mouth daily 90 tablet 1    sertraline (Zoloft) 50 mg tablet Take 1.5 tablets (75 mg total) by mouth daily 135 tablet 1    tirzepatide (Zepbound) 2.5 mg/0.5 mL auto-injector Inject 0.5 mL (2.5 mg total) under the skin once a week for 28 days 2 mL 0    furosemide (LASIX) 20 mg tablet Take 1 tablet (20 mg total) by mouth daily (Patient not taking: Reported on 1/22/2024) 30 tablet 3    triamcinolone (KENALOG) 0.1 % cream Apply topically 2 (two) times a day (Patient not taking: Reported on 10/23/2023) 30 g 0     No current facility-administered medications for this visit.     Current Outpatient Medications on File Prior to Visit   Medication Sig    albuterol (PROVENTIL HFA,VENTOLIN  HFA) 90 mcg/act inhaler Inhale 2 puffs every 4 (four) hours as needed for wheezing    Anoro Ellipta 62.5-25 MCG/ACT inhaler inhale 1 puff by mouth and INTO THE LUNGS once daily    metoprolol succinate (TOPROL-XL) 25 mg 24 hr tablet Take 1 tablet (25 mg total) by mouth daily    rosuvastatin (CRESTOR) 5 mg tablet Take 1 tablet (5 mg total) by mouth daily    sertraline (Zoloft) 50 mg tablet Take 1.5 tablets (75 mg total) by mouth daily    [DISCONTINUED] hydrOXYzine HCL (ATARAX) 10 mg tablet Take 1 tablet (10 mg total) by mouth 2 (two) times a day as needed for itching    [DISCONTINUED] tirzepatide (Zepbound) 2.5 mg/0.5 mL auto-injector Inject 0.5 mL (2.5 mg total) under the skin once a week for 28 days    furosemide (LASIX) 20 mg tablet Take 1 tablet (20 mg total) by mouth daily (Patient not taking: Reported on 1/22/2024)    triamcinolone (KENALOG) 0.1 % cream Apply topically 2 (two) times a day (Patient not taking: Reported on 10/23/2023)     No current facility-administered medications on file prior to visit.     She has No Known Allergies..    Review of Systems   Constitutional:  Negative for chills and fever.   HENT:  Negative for congestion, ear pain and sore throat.    Eyes:  Negative for pain.   Respiratory:  Positive for cough and shortness of breath.    Cardiovascular:  Negative for chest pain and leg swelling.   Gastrointestinal:  Negative for abdominal pain, nausea and vomiting.   Endocrine: Negative for polyuria.   Genitourinary:  Negative for difficulty urinating, frequency and urgency.   Musculoskeletal:  Negative for arthralgias and back pain.   Skin:  Negative for rash.   Neurological:  Negative for weakness and headaches.   Psychiatric/Behavioral:  Negative for sleep disturbance. The patient is not nervous/anxious.          Objective:      /80 (BP Location: Left arm, Patient Position: Sitting, Cuff Size: Standard)   Pulse 79   Temp 98.1 °F (36.7 °C) (Temporal)   SpO2 98%     No results found  for this or any previous visit (from the past 1344 hour(s)).     Physical Exam  Constitutional:       Appearance: Normal appearance.   HENT:      Head: Normocephalic.      Right Ear: External ear normal.      Left Ear: External ear normal.      Nose: Nose normal. No congestion.      Mouth/Throat:      Mouth: Mucous membranes are moist.      Pharynx: Oropharynx is clear. No oropharyngeal exudate or posterior oropharyngeal erythema.   Eyes:      Extraocular Movements: Extraocular movements intact.      Conjunctiva/sclera: Conjunctivae normal.   Cardiovascular:      Rate and Rhythm: Normal rate and regular rhythm.      Heart sounds: Normal heart sounds. No murmur heard.  Pulmonary:      Effort: Pulmonary effort is normal.      Breath sounds: Normal breath sounds. No wheezing or rales.   Abdominal:      General: Abdomen is flat. There is no distension.      Palpations: Abdomen is soft.      Tenderness: There is no abdominal tenderness.   Musculoskeletal:      Cervical back: Normal range of motion and neck supple.      Right lower leg: No edema.      Left lower leg: No edema.   Lymphadenopathy:      Cervical: No cervical adenopathy.   Skin:     General: Skin is warm.   Neurological:      General: No focal deficit present.      Mental Status: She is alert and oriented to person, place, and time.

## 2024-07-01 ENCOUNTER — TELEPHONE (OUTPATIENT)
Age: 79
End: 2024-07-01

## 2024-07-01 ENCOUNTER — PATIENT OUTREACH (OUTPATIENT)
Dept: CASE MANAGEMENT | Facility: OTHER | Age: 79
End: 2024-07-01

## 2024-07-01 NOTE — TELEPHONE ENCOUNTER
PA for hydroxyzine hcl 10 mg submitted     Submitted via    []CMM-KEY   [x]Surescripts-Case ID # [id number]   []Faxed to plan   []Other website   []Phone call Case ID #     Office notes sent, clinical questions answered. Awaiting determination    Turnaround time for your insurance to make a decision on your Prior Authorization can take 7-21 business days.

## 2024-07-02 NOTE — TELEPHONE ENCOUNTER
PA for hydroxyzine Approved     Date(s) approved 6/1/24-7/1/25    Case #[id number]     Patient advised by          [x] [employer] Message  [] Phone call   []LMOM  []L/M to call office as no active Communication consent on file  []Unable to leave detailed message as VM not approved on Communication consent       Pharmacy advised by    [x]Fax  []Phone call    Approval letter scanned into Media Yes

## 2024-07-09 ENCOUNTER — TELEPHONE (OUTPATIENT)
Age: 79
End: 2024-07-09

## 2024-07-09 DIAGNOSIS — E78.2 MIXED HYPERLIPIDEMIA: ICD-10-CM

## 2024-07-09 NOTE — TELEPHONE ENCOUNTER
Patient calling requesting clarification on Diagnosis on Malignant ascites patient does not recall discussing this with .    Please review and advise.  Thank You.

## 2024-07-10 RX ORDER — ROSUVASTATIN CALCIUM 5 MG/1
5 TABLET, COATED ORAL DAILY
Qty: 100 TABLET | Refills: 1 | Status: SHIPPED | OUTPATIENT
Start: 2024-07-10

## 2024-07-15 ENCOUNTER — PATIENT OUTREACH (OUTPATIENT)
Dept: CASE MANAGEMENT | Facility: OTHER | Age: 79
End: 2024-07-15

## 2024-07-15 NOTE — LETTER
07/15/24    Dear Adelita Smith,    .  This is your [facility] Management Respiratory Therapist Team reaching out to check on your breathing status     We have not been able to reach you and would like to set a time to talk with you over the phone.  Our work is to help patients that have complex respiratory conditions get the care they need. This includes patients who may have been in the hospital or in the emergency room.    Please reach out to Liana at [phone], Latha or Eleonora at the phone numbers below. We look forward to speaking with you.      Sincerely,    Liana Mack RCP, CRT  Outpatient Respiratory Care Manager  [phone]    JACKIE Figueroa RRT  Outpatient Rspiratory Care Manager  [phone]    Eleonora Bentley RCP, CRT  Outpatient Respiratory Care Manager  [phone]    Care Management Team  [facility] [place], [address] [facility] [place] [address]

## 2024-07-15 NOTE — PROGRESS NOTES
.OP RT CM called patient and requested a return phone call.     Unable to reach letter sent via [employer].     OP RT CM will discontinue outreach if there is not response from patient within two weeks.

## 2024-07-21 DIAGNOSIS — F34.1 DYSTHYMIC DISORDER: ICD-10-CM

## 2024-07-21 PROBLEM — R18.0 MALIGNANT ASCITES: Status: RESOLVED | Noted: 2024-01-22 | Resolved: 2024-07-21

## 2024-07-21 NOTE — TELEPHONE ENCOUNTER
Please let her know ,that was from before. I took out from her chart ,she does not have that, and she never had before

## 2024-07-30 ENCOUNTER — HOSPITAL ENCOUNTER (OUTPATIENT)
Dept: RADIOLOGY | Age: 79
Discharge: HOME | End: 2024-07-30
Payer: MEDICARE

## 2024-07-30 ENCOUNTER — APPOINTMENT (OUTPATIENT)
Dept: RADIOLOGY | Age: 79
End: 2024-07-30
Payer: MEDICARE

## 2024-07-30 DIAGNOSIS — M81.0 SENILE OSTEOPOROSIS: ICD-10-CM

## 2024-07-30 PROCEDURE — 77080 DXA BONE DENSITY AXIAL: CPT

## 2024-08-08 ENCOUNTER — RA CDI HCC (OUTPATIENT)
Dept: OTHER | Facility: HOSPITAL | Age: 79
End: 2024-08-08

## 2024-08-08 NOTE — PROGRESS NOTES
HCC coding opportunities          Chart Reviewed number of suggestions sent to Provider: 1   I11.0    Patients Insurance        Commercial Insurance: [employer]

## 2024-08-12 ENCOUNTER — APPOINTMENT (OUTPATIENT)
Dept: LAB | Facility: CLINIC | Age: 79
End: 2024-08-12
Payer: MEDICARE

## 2024-08-12 DIAGNOSIS — I10 ESSENTIAL HYPERTENSION, BENIGN: ICD-10-CM

## 2024-08-12 DIAGNOSIS — I50.42 CHRONIC COMBINED SYSTOLIC AND DIASTOLIC CONGESTIVE HEART FAILURE (HCC): ICD-10-CM

## 2024-08-12 DIAGNOSIS — E78.2 MIXED HYPERLIPIDEMIA: ICD-10-CM

## 2024-08-12 DIAGNOSIS — R73.01 IMPAIRED FASTING BLOOD SUGAR: ICD-10-CM

## 2024-08-12 LAB
ALBUMIN SERPL BCG-MCNC: 4.4 G/DL (ref 3.5–5)
ALP SERPL-CCNC: 58 U/L (ref 34–104)
ALT SERPL W P-5'-P-CCNC: 10 U/L (ref 7–52)
ANION GAP SERPL CALCULATED.3IONS-SCNC: 6 MMOL/L (ref 4–13)
AST SERPL W P-5'-P-CCNC: 12 U/L (ref 13–39)
BASOPHILS # BLD AUTO: 0.06 THOUSANDS/ÂΜL (ref 0–0.1)
BASOPHILS NFR BLD AUTO: 1 % (ref 0–1)
BILIRUB SERPL-MCNC: 0.96 MG/DL (ref 0.2–1)
BUN SERPL-MCNC: 25 MG/DL (ref 5–25)
CALCIUM SERPL-MCNC: 9.3 MG/DL (ref 8.4–10.2)
CHLORIDE SERPL-SCNC: 105 MMOL/L (ref 96–108)
CHOLEST SERPL-MCNC: 141 MG/DL
CO2 SERPL-SCNC: 28 MMOL/L (ref 21–32)
CREAT SERPL-MCNC: 0.71 MG/DL (ref 0.6–1.3)
EOSINOPHIL # BLD AUTO: 0.18 THOUSAND/ÂΜL (ref 0–0.61)
EOSINOPHIL NFR BLD AUTO: 3 % (ref 0–6)
ERYTHROCYTE [DISTWIDTH] IN BLOOD BY AUTOMATED COUNT: 12.7 % (ref 11.6–15.1)
EST. AVERAGE GLUCOSE BLD GHB EST-MCNC: 131 MG/DL
GFR SERPL CREATININE-BSD FRML MDRD: 80 ML/MIN/1.73SQ M
GLUCOSE P FAST SERPL-MCNC: 119 MG/DL (ref 65–99)
HBA1C MFR BLD: 6.2 %
HCT VFR BLD AUTO: 42.4 % (ref 34.8–46.1)
HDLC SERPL-MCNC: 63 MG/DL
HGB BLD-MCNC: 13.8 G/DL (ref 11.5–15.4)
IMM GRANULOCYTES # BLD AUTO: 0.02 THOUSAND/UL (ref 0–0.2)
IMM GRANULOCYTES NFR BLD AUTO: 0 % (ref 0–2)
LDLC SERPL CALC-MCNC: 58 MG/DL (ref 0–100)
LYMPHOCYTES # BLD AUTO: 1.75 THOUSANDS/ÂΜL (ref 0.6–4.47)
LYMPHOCYTES NFR BLD AUTO: 24 % (ref 14–44)
MCH RBC QN AUTO: 30.3 PG (ref 26.8–34.3)
MCHC RBC AUTO-ENTMCNC: 32.5 G/DL (ref 31.4–37.4)
MCV RBC AUTO: 93 FL (ref 82–98)
MONOCYTES # BLD AUTO: 0.44 THOUSAND/ÂΜL (ref 0.17–1.22)
MONOCYTES NFR BLD AUTO: 6 % (ref 4–12)
NEUTROPHILS # BLD AUTO: 4.76 THOUSANDS/ÂΜL (ref 1.85–7.62)
NEUTS SEG NFR BLD AUTO: 66 % (ref 43–75)
NRBC BLD AUTO-RTO: 0 /100 WBCS
PLATELET # BLD AUTO: 278 THOUSANDS/UL (ref 149–390)
PMV BLD AUTO: 11.3 FL (ref 8.9–12.7)
POTASSIUM SERPL-SCNC: 4 MMOL/L (ref 3.5–5.3)
PROT SERPL-MCNC: 7.4 G/DL (ref 6.4–8.4)
RBC # BLD AUTO: 4.55 MILLION/UL (ref 3.81–5.12)
SODIUM SERPL-SCNC: 139 MMOL/L (ref 135–147)
TRIGL SERPL-MCNC: 99 MG/DL
TSH SERPL DL<=0.05 MIU/L-ACNC: 3.38 UIU/ML (ref 0.45–4.5)
WBC # BLD AUTO: 7.21 THOUSAND/UL (ref 4.31–10.16)

## 2024-08-12 PROCEDURE — 83036 HEMOGLOBIN GLYCOSYLATED A1C: CPT

## 2024-08-12 PROCEDURE — 80061 LIPID PANEL: CPT

## 2024-08-12 PROCEDURE — 85025 COMPLETE CBC W/AUTO DIFF WBC: CPT

## 2024-08-12 PROCEDURE — 36415 COLL VENOUS BLD VENIPUNCTURE: CPT

## 2024-08-12 PROCEDURE — 84443 ASSAY THYROID STIM HORMONE: CPT

## 2024-08-12 PROCEDURE — 80053 COMPREHEN METABOLIC PANEL: CPT

## 2024-08-15 ENCOUNTER — OFFICE VISIT (OUTPATIENT)
Dept: INTERNAL MEDICINE CLINIC | Facility: CLINIC | Age: 79
End: 2024-08-15
Payer: MEDICARE

## 2024-08-15 VITALS
BODY MASS INDEX: 29.53 KG/M2 | HEART RATE: 82 BPM | TEMPERATURE: 98.8 F | OXYGEN SATURATION: 92 % | HEIGHT: 64 IN | WEIGHT: 173 LBS | SYSTOLIC BLOOD PRESSURE: 136 MMHG | DIASTOLIC BLOOD PRESSURE: 74 MMHG

## 2024-08-15 DIAGNOSIS — M19.042 PRIMARY OSTEOARTHRITIS OF BOTH HANDS: ICD-10-CM

## 2024-08-15 DIAGNOSIS — I50.42 CHRONIC COMBINED SYSTOLIC AND DIASTOLIC CONGESTIVE HEART FAILURE (HCC): ICD-10-CM

## 2024-08-15 DIAGNOSIS — F34.1 DYSTHYMIC DISORDER: ICD-10-CM

## 2024-08-15 DIAGNOSIS — R73.01 IMPAIRED FASTING BLOOD SUGAR: ICD-10-CM

## 2024-08-15 DIAGNOSIS — J96.11 CHRONIC RESPIRATORY FAILURE WITH HYPOXIA (HCC): ICD-10-CM

## 2024-08-15 DIAGNOSIS — M19.041 PRIMARY OSTEOARTHRITIS OF BOTH HANDS: ICD-10-CM

## 2024-08-15 DIAGNOSIS — J43.8 OTHER EMPHYSEMA (HCC): ICD-10-CM

## 2024-08-15 DIAGNOSIS — I10 ESSENTIAL HYPERTENSION, BENIGN: Primary | ICD-10-CM

## 2024-08-15 DIAGNOSIS — E78.2 MIXED HYPERLIPIDEMIA: ICD-10-CM

## 2024-08-15 PROCEDURE — 99214 OFFICE O/P EST MOD 30 MIN: CPT | Performed by: INTERNAL MEDICINE

## 2024-08-15 PROCEDURE — G2211 COMPLEX E/M VISIT ADD ON: HCPCS | Performed by: INTERNAL MEDICINE

## 2024-08-15 RX ORDER — TIRZEPATIDE 2.5 MG/.5ML
2.5 INJECTION, SOLUTION SUBCUTANEOUS WEEKLY
Start: 2024-08-15 | End: 2024-09-12

## 2024-08-15 NOTE — PROGRESS NOTES
Assessment/Plan:             1. Essential hypertension, benign  Comments:  stable, continue same med  Orders:  -     tirzepatide (Zepbound) 2.5 mg/0.5 mL auto-injector; Inject 0.5 mL (2.5 mg total) under the skin once a week for 28 days  2. Chronic combined systolic and diastolic congestive heart failure (HCC)  -     tirzepatide (Zepbound) 2.5 mg/0.5 mL auto-injector; Inject 0.5 mL (2.5 mg total) under the skin once a week for 28 days  3. Impaired fasting blood sugar  -     tirzepatide (Zepbound) 2.5 mg/0.5 mL auto-injector; Inject 0.5 mL (2.5 mg total) under the skin once a week for 28 days  4. Primary osteoarthritis of both hands  -     tirzepatide (Zepbound) 2.5 mg/0.5 mL auto-injector; Inject 0.5 mL (2.5 mg total) under the skin once a week for 28 days  5. Dysthymic disorder  Comments:  continue same med  6. Mixed hyperlipidemia  Comments:  continue same med  7. Chronic respiratory failure with hypoxia (HCC)  -     tirzepatide (Zepbound) 2.5 mg/0.5 mL auto-injector; Inject 0.5 mL (2.5 mg total) under the skin once a week for 28 days  8. Body mass index 29.0-29.9, adult  -     tirzepatide (Zepbound) 2.5 mg/0.5 mL auto-injector; Inject 0.5 mL (2.5 mg total) under the skin once a week for 28 days  9. Other emphysema (HCC)         Subjective:      Patient ID: Adelita Smith is a 79 y.o. female.    Follow-up on blood test done on 8/12/2024 test discussed with her        The following portions of the patient's history were reviewed and updated as appropriate: She  has a past medical history of Allergic rhinitis, Anxiety, Arthritis, Diabetes mellitus (HCC), Oxygen decrease, and Tobacco abuse.  She   Patient Active Problem List    Diagnosis Date Noted    Body mass index 29.0-29.9, adult 05/20/2024    Depression, recurrent (HCC) 01/22/2024    Fall 03/06/2023    Coccygeal pain, acute 03/06/2023    Chest pain 02/07/2023    Acute respiratory failure with hypoxia (HCC) 01/30/2023    History of COVID-19 01/30/2023    COPD  exacerbation (HCC) 01/30/2023    Biventricular congestive heart failure (HCC) 01/30/2023    Mixed hyperlipidemia 06/06/2022    Dysthymic disorder 01/24/2022    Impaired fasting blood sugar 01/24/2022    Gastrointestinal hemorrhage 10/21/2021    Dermatitis 10/21/2021    Primary osteoarthritis of both hands 10/21/2021    Congestive heart failure with right ventricular systolic dysfunction (HCC) 09/23/2021    Rheumatoid factor positive 09/22/2021    Arthritis 09/22/2021    Diabetic eye exam (HCC) 06/30/2021    Hypoxia 06/16/2021    Essential hypertension, benign 06/02/2021    Chronic combined systolic and diastolic congestive heart failure (HCC) 06/02/2021    Pulmonary hypertension ([facility]) 06/02/2021    Cigarette nicotine dependence without complication     Elevated BP without diagnosis of hypertension 05/22/2021    Lung nodules 05/18/2021    Other emphysema ([facility]) 05/18/2021    Chronic respiratory failure with hypoxia ([facility]) 05/18/2021    Nicotine dependence in remission 05/18/2021    Microalbuminuria 05/17/2021    Acute on chronic diastolic (congestive) heart failure (HCC) 05/11/2021    SOB (shortness of breath) 05/11/2021    Allergic rhinitis     Anxiety     Tendinitis of finger 05/01/2015     She  has a past surgical history that includes Breast surgery (Right); Other surgical history; and Other surgical history.  Her family history includes Diabetes in her brother; Heart disease in her brother; Hypotension in her daughter; Lymphoma in her brother; No Known Problems in her sister; Parkinsonism in her sister; Prostate cancer in her father; Proteinuria in her daughter.  She  reports that she quit smoking about 3 years ago. Her smoking use included cigarettes. She started smoking about 33 years ago. She has a 15 pack-year smoking history. She has never used smokeless tobacco. She reports current alcohol use of about 1.0 - 2.0 standard drink of alcohol per week. She reports that she does not use drugs.  Current Outpatient  Medications   Medication Sig Dispense Refill    albuterol (PROVENTIL HFA,VENTOLIN HFA) 90 mcg/act inhaler Inhale 2 puffs every 4 (four) hours as needed for wheezing 6.7 g 1    Anoro Ellipta 62.5-25 MCG/ACT inhaler inhale 1 puff by mouth and INTO THE LUNGS once daily 180 blister 1    hydrOXYzine HCL (ATARAX) 10 mg tablet Take 1 tablet (10 mg total) by mouth 2 (two) times a day as needed for itching 60 tablet 2    metoprolol succinate (TOPROL-XL) 25 mg 24 hr tablet Take 1 tablet (25 mg total) by mouth daily 90 tablet 3    rosuvastatin (CRESTOR) 5 mg tablet Take 1 tablet (5 mg total) by mouth daily 100 tablet 1    sertraline (ZOLOFT) 50 mg tablet TAKE 1 AND 1/2 TABLET BY MOUTH DAILY 135 tablet 1    tirzepatide (Zepbound) 2.5 mg/0.5 mL auto-injector Inject 0.5 mL (2.5 mg total) under the skin once a week for 28 days      furosemide (LASIX) 20 mg tablet Take 1 tablet (20 mg total) by mouth daily (Patient not taking: Reported on 1/22/2024) 30 tablet 3    triamcinolone (KENALOG) 0.1 % cream Apply topically 2 (two) times a day (Patient not taking: Reported on 10/23/2023) 30 g 0     No current facility-administered medications for this visit.     Current Outpatient Medications on File Prior to Visit   Medication Sig    albuterol (PROVENTIL HFA,VENTOLIN HFA) 90 mcg/act inhaler Inhale 2 puffs every 4 (four) hours as needed for wheezing    Anoro Ellipta 62.5-25 MCG/ACT inhaler inhale 1 puff by mouth and INTO THE LUNGS once daily    hydrOXYzine HCL (ATARAX) 10 mg tablet Take 1 tablet (10 mg total) by mouth 2 (two) times a day as needed for itching    metoprolol succinate (TOPROL-XL) 25 mg 24 hr tablet Take 1 tablet (25 mg total) by mouth daily    rosuvastatin (CRESTOR) 5 mg tablet Take 1 tablet (5 mg total) by mouth daily    sertraline (ZOLOFT) 50 mg tablet TAKE 1 AND 1/2 TABLET BY MOUTH DAILY    furosemide (LASIX) 20 mg tablet Take 1 tablet (20 mg total) by mouth daily (Patient not taking: Reported on 1/22/2024)     "triamcinolone (KENALOG) 0.1 % cream Apply topically 2 (two) times a day (Patient not taking: Reported on 10/23/2023)     No current facility-administered medications on file prior to visit.     She has No Known Allergies..    Review of Systems   Constitutional:  Negative for chills and fever.   HENT:  Negative for congestion, ear pain and sore throat.    Eyes:  Negative for pain.   Respiratory:  Positive for shortness of breath. Negative for cough.    Cardiovascular:  Negative for chest pain and leg swelling.   Gastrointestinal:  Negative for abdominal pain, nausea and vomiting.   Endocrine: Negative for polyuria.   Genitourinary:  Negative for difficulty urinating, frequency and urgency.   Musculoskeletal:  Positive for arthralgias and back pain.   Skin:  Negative for rash.   Neurological:  Negative for weakness and headaches.   Psychiatric/Behavioral:  Negative for sleep disturbance. The patient is not nervous/anxious.          Objective:      /74 (BP Location: Left arm, Patient Position: Sitting, Cuff Size: Standard)   Pulse 82   Temp 98.8 °F (37.1 °C) (Temporal)   Ht 5' 4\" (1.626 m)   Wt 78.5 kg (173 lb)   SpO2 92% Comment: with 3L of oxygen  BMI 29.70 kg/m²     Recent Results (from the past 1344 hour(s))   CBC and differential    Collection Time: 08/12/24 11:16 AM   Result Value Ref Range    WBC 7.21 4.31 - 10.16 Thousand/uL    RBC 4.55 3.81 - 5.12 Million/uL    Hemoglobin 13.8 11.5 - 15.4 g/dL    Hematocrit 42.4 34.8 - 46.1 %    MCV 93 82 - 98 fL    MCH 30.3 26.8 - 34.3 pg    MCHC 32.5 31.4 - 37.4 g/dL    RDW 12.7 11.6 - 15.1 %    MPV 11.3 8.9 - 12.7 fL    Platelets 278 149 - 390 Thousands/uL    nRBC 0 /100 WBCs    Segmented % 66 43 - 75 %    Immature Grans % 0 0 - 2 %    Lymphocytes % 24 14 - 44 %    Monocytes % 6 4 - 12 %    Eosinophils Relative 3 0 - 6 %    Basophils Relative 1 0 - 1 %    Absolute Neutrophils 4.76 1.85 - 7.62 Thousands/µL    Absolute Immature Grans 0.02 0.00 - 0.20 Thousand/uL "    Absolute Lymphocytes 1.75 0.60 - 4.47 Thousands/µL    Absolute Monocytes 0.44 0.17 - 1.22 Thousand/µL    Eosinophils Absolute 0.18 0.00 - 0.61 Thousand/µL    Basophils Absolute 0.06 0.00 - 0.10 Thousands/µL   Comprehensive metabolic panel    Collection Time: 08/12/24 11:16 AM   Result Value Ref Range    Sodium 139 135 - 147 mmol/L    Potassium 4.0 3.5 - 5.3 mmol/L    Chloride 105 96 - 108 mmol/L    CO2 28 21 - 32 mmol/L    ANION GAP 6 4 - 13 mmol/L    BUN 25 5 - 25 mg/dL    Creatinine 0.71 0.60 - 1.30 mg/dL    Glucose, Fasting 119 (H) 65 - 99 mg/dL    Calcium 9.3 8.4 - 10.2 mg/dL    AST 12 (L) 13 - 39 U/L    ALT 10 7 - 52 U/L    Alkaline Phosphatase 58 34 - 104 U/L    Total Protein 7.4 6.4 - 8.4 g/dL    Albumin 4.4 3.5 - 5.0 g/dL    Total Bilirubin 0.96 0.20 - 1.00 mg/dL    eGFR 80 ml/min/1.73sq m   Lipid Panel with Direct LDL reflex    Collection Time: 08/12/24 11:16 AM   Result Value Ref Range    Cholesterol 141 See Comment mg/dL    Triglycerides 99 See Comment mg/dL    HDL, Direct 63 >=50 mg/dL    LDL Calculated 58 0 - 100 mg/dL   TSH, 3rd generation    Collection Time: 08/12/24 11:16 AM   Result Value Ref Range    TSH 3RD GENERATON 3.380 0.450 - 4.500 uIU/mL   Hemoglobin A1C    Collection Time: 08/12/24 11:16 AM   Result Value Ref Range    Hemoglobin A1C 6.2 (H) Normal 4.0-5.6%; PreDiabetic 5.7-6.4%; Diabetic >=6.5%; Glycemic control for adults with diabetes <7.0% %     mg/dl        Physical Exam  Constitutional:       Appearance: Normal appearance.   HENT:      Head: Normocephalic.      Right Ear: Tympanic membrane, ear canal and external ear normal.      Left Ear: Tympanic membrane, ear canal and external ear normal.      Nose: Nose normal. No congestion.      Mouth/Throat:      Mouth: Mucous membranes are moist.      Pharynx: Oropharynx is clear. No oropharyngeal exudate or posterior oropharyngeal erythema.   Eyes:      Extraocular Movements: Extraocular movements intact.      Conjunctiva/sclera:  Conjunctivae normal.   Cardiovascular:      Rate and Rhythm: Normal rate and regular rhythm.      Heart sounds: Normal heart sounds. No murmur heard.  Pulmonary:      Effort: Pulmonary effort is normal.      Breath sounds: Normal breath sounds. No wheezing or rales.   Abdominal:      General: Abdomen is flat. There is no distension.      Palpations: Abdomen is soft.      Tenderness: There is no abdominal tenderness.   Musculoskeletal:         General: Normal range of motion.      Cervical back: Normal range of motion and neck supple.      Right lower leg: No edema.      Left lower leg: No edema.   Lymphadenopathy:      Cervical: No cervical adenopathy.   Skin:     General: Skin is warm.   Neurological:      General: No focal deficit present.      Mental Status: She is alert and oriented to person, place, and time.

## 2024-09-03 NOTE — PROGRESS NOTES
Pulmonary Follow Up Note   Adelita Smith 79 y.o. female MRN: [id number]  9/4/2024      Assessment and Plan     Chronic hypoxic respiratory failure likely secondary to severe COPD (FEV1 36%) - not in acute exacerbation  - former smoker. CT scan in 2021 showed emphysema  - severe obstruction with FEV1 36%    2023 6MWT showing needing 4L when ambulating  - has been on 4 lpm since last hospitalization in 2/2023, inconsistant use at home in the past, now using 3L full time.     - previous meds including Trellegy 200, dc due to cost. was on Anoro 62.5-25 QD and albuterol PRN however was switched back to Trellegy that she preferred. Previously referred to RT management team but she did not come to the conclusion yet. She is willing to pay $600 for Trelegy 200.   - continue with Trelegy 200 QD and albuterol PRN. I gave her 2 samples today. Continue with pulmonary rehab.     - off window for LDCT screen. Patient completed pneumococcus vaccination  - due for flu shot and RSV shot this year. She will get it from pharmacy    Chronic diastolic heart failure with mild pulmonary HTN likely Group 3 secondary to COPD   SVT  5/19/21 2D echo normal LVEF 50-55%, NRWMA,   Mild concentric hypertrophy, right ventricular systolic function moderately reduced, RA mildly dilated, mild MR, mild TR, mild PHTN.     5/2023 pHTN with RVSP of 51.   Continue to follow up as per primary care/cardiology. On lasix  Documented SVT upon recent visit at pulmonary rehab. Her cardiologist add on Metoprolol for her  Continue to follow up with cardiology  Again stress the importance of pulmonary rehab to build up stigma     Multiple Pulmonary nodules   - stable 3mm LLL, stable 2mm subpleural nodule, stable 6mm LLL nodule  - last CT scan in 2023. With stable nodules from 2021 and 2023. With two year stability     Tobacco Abuse   - quit May 2021   -  LDCT as above       Return in about 6 months (around 3/4/2025).    History of Present Illness   HPI:  Adelita Smith  is a 79 y.o. female who had hx of severe COPD on home O2 3-6L, DM2, anxiety, past COVID infection, biventricular congestive heart failure, mild pHTN, nicoteine dependence, presents today for  Follow up    Patient was admitted to our hospital in 2/2023 following COPD exacerbation, and was also found to have hypoxia needing more oxygen support. She was discharged with Trelegy 200, and 4L O2 support. She self discontinued trellegy because of the cost, and only using ventolin PRN which she also finished 4 days ago. She uses oxygen at night, not measuring her saturation, and felt sometimes she could not catch up her breath. No fever/cough, no worsening shortness of breath. She quit smoking in 2020. She did not participate in any pul rehab in the past.     Last seen in 8/2023. Medication was changed to Anoro. She was advised to continue using oxygen supplement. She was also referred to pulmonary rehab. She however canceled her appointment with us for the last couple of months.    Seen again in 4/2024 for follow up COPD. She is feeling better for her respiratory symptoms after pulmonary rehab. She was found to have SVT during rehab that her cardiologist add on metoprolol for her. She still needs O2 supplement and requires 2 L at home, stating her saturation was in low 90s. She is compliant with Anoro daily and only uses 2 times of albuterol within last 6 months. She has no COPD exacerbation. She is wondering if she can DC her oxygen. She also mentioned that she is compilant with Anoro, once daily, but noticed last time she has to pay 200 dollars for the inhaler each month. A repeat 6MWT test was done and indicating she required 4L of oxygen when ambulating. She verbalized understanding. Pulm rehab referral sent.    Seen today for follow up. Feeling awful that her panic attack is getting worse. She quit pulmonary rehab because of anxiety. She started to wear oxygen all the time, she uses 3 L now and oxygen level is about  "95% at rest and 74% when ambulating.     She quit smoking in 2020. She smokes about 35 years 0.5pk/day. Previous LDCT scan showed stable multiple nodules in the lung. She is using Trelegy once daily, not needing albuterol. She did not recall which dose she is on now.     6MWT 2021: desaturation to 83% at 6 mins  PFT 2021: severe obstructive airflow with FEV1 47%    ECHo 1/2023 E F 67%, nukd Trm RVSO 51 mm Hg    Review of Systems   All other systems reviewed and are negative.      Historical Information   Past Medical History:   Diagnosis Date    Allergic rhinitis     Anxiety     Arthritis     Diabetes mellitus (HCC)     Oxygen decrease     Tobacco abuse      Past Surgical History:   Procedure Laterality Date    BREAST SURGERY Right     Cyst    OTHER SURGICAL HISTORY      Cataract implant    OTHER SURGICAL HISTORY      Fertilization     Family History   Problem Relation Age of Onset    Prostate cancer Father     Diabetes Brother     Heart disease Brother     Lymphoma Brother     No Known Problems Sister     Parkinsonism Sister     Hypotension Daughter         Takes \"salt pills\"    Proteinuria Daughter         Occuring during her 3 pregnancies; follows with nephrology     Social History     Tobacco Use   Smoking Status Former    Current packs/day: 0.00    Average packs/day: 0.5 packs/day for 30.0 years (15.0 ttl pk-yrs)    Types: Cigarettes    Start date: 5/18/1991    Quit date: 5/18/2021    Years since quitting: 3.3   Smokeless Tobacco Never   Tobacco Comments    Began smoking in her late teens, averaging 1/2 pack daily. States she quit smoking a few weeks ago (Updated 05/21/2021).       Meds/Allergies     Current Outpatient Medications:     Anoro Ellipta 62.5-25 MCG/ACT inhaler, inhale 1 puff by mouth and INTO THE LUNGS once daily, Disp: 180 blister, Rfl: 1    hydrOXYzine HCL (ATARAX) 10 mg tablet, Take 1 tablet (10 mg total) by mouth 2 (two) times a day as needed for itching, Disp: 60 tablet, Rfl: 2    metoprolol " "succinate (TOPROL-XL) 25 mg 24 hr tablet, Take 1 tablet (25 mg total) by mouth daily, Disp: 90 tablet, Rfl: 3    tirzepatide (Zepbound) 2.5 mg/0.5 mL auto-injector, Inject 0.5 mL (2.5 mg total) under the skin once a week for 28 days, Disp: , Rfl:     albuterol (PROVENTIL HFA,VENTOLIN HFA) 90 mcg/act inhaler, Inhale 2 puffs every 4 (four) hours as needed for wheezing, Disp: 6.7 g, Rfl: 1    furosemide (LASIX) 20 mg tablet, Take 1 tablet (20 mg total) by mouth daily (Patient not taking: Reported on 1/22/2024), Disp: 30 tablet, Rfl: 3    rosuvastatin (CRESTOR) 5 mg tablet, Take 1 tablet (5 mg total) by mouth daily, Disp: 100 tablet, Rfl: 1    sertraline (ZOLOFT) 50 mg tablet, TAKE 1 AND 1/2 TABLET BY MOUTH DAILY, Disp: 135 tablet, Rfl: 1    triamcinolone (KENALOG) 0.1 % cream, Apply topically 2 (two) times a day (Patient not taking: Reported on 10/23/2023), Disp: 30 g, Rfl: 0  No Known Allergies    Vitals: Blood pressure 124/82, pulse 83, temperature 98.2 °F (36.8 °C), temperature source Tympanic, resp. rate 16, height 5' 4\" (1.626 m), weight 81.6 kg (180 lb), SpO2 96%. Body mass index is 30.9 kg/m². Oxygen Therapy  SpO2: 96 %      Physical Exam  Physical Exam  Vitals reviewed.   Constitutional:       Appearance: Normal appearance.   HENT:      Head: Normocephalic.      Nose: Nose normal.      Mouth/Throat:      Mouth: Mucous membranes are moist.   Eyes:      Pupils: Pupils are equal, round, and reactive to light.   Cardiovascular:      Rate and Rhythm: Normal rate and regular rhythm.      Pulses: Normal pulses.      Heart sounds: Normal heart sounds.   Abdominal:      General: Abdomen is flat.      Palpations: Abdomen is soft.   Musculoskeletal:         General: Normal range of motion.   Skin:     General: Skin is warm and dry.   Neurological:      General: No focal deficit present.      Mental Status: She is alert and oriented to person, place, and time.         Labs: I have personally reviewed pertinent lab " "results.  Lab Results   Component Value Date    WBC 7.21 2024    HGB 13.8 2024    HCT 42.4 2024    MCV 93 2024     2024     Lab Results   Component Value Date    CALCIUM 9.3 2024    K 4.0 2024    CO2 28 2024     2024    BUN 25 2024    CREATININE 0.71 2024     No results found for: \"IGE\"  Lab Results   Component Value Date    ALT 10 2024    AST 12 (L) 2024    ALKPHOS 58 2024       maging and other studies: I have personally reviewed pertinent reports.   and I have personally reviewed pertinent films in PACS    Pulmonary function testin/2021: severe obstruction with FEV1 47%    2023 Spirometry showed severe obstruction with FEV1/FVC 50%. FVC 1.49L 57% and FEV1 0.71L 36%.    2023 6MWT: She finished 6 minute walk test with increase oxygen demand to 4L.     4/10/2024 O2 titration study:  Patient completed the 6 minute walk test with initial HR of 104 per minute and Sat 94% under RA. Patient desaturated at minute 1 and needs oxygen support til 4L at minute 3. She had an episode of HR of 144 and resolved after increase oxygen support. She walked a total of 289meters with ending HR of 101 and Sat 92% on 4L oxygen support.      EKG, Pathology, and Other Studies: I have personally reviewed pertinent reports.   and I have personally reviewed pertinent films in PACS      Elizabeth Cox MD  Pulmonary and Critical Care Fellow  [facility]    "

## 2024-09-04 ENCOUNTER — OFFICE VISIT (OUTPATIENT)
Dept: PULMONOLOGY | Facility: CLINIC | Age: 79
End: 2024-09-04
Payer: MEDICARE

## 2024-09-04 VITALS
OXYGEN SATURATION: 96 % | HEART RATE: 83 BPM | TEMPERATURE: 98.2 F | DIASTOLIC BLOOD PRESSURE: 82 MMHG | SYSTOLIC BLOOD PRESSURE: 124 MMHG | WEIGHT: 180 LBS | HEIGHT: 64 IN | BODY MASS INDEX: 30.73 KG/M2 | RESPIRATION RATE: 16 BRPM

## 2024-09-04 DIAGNOSIS — I27.20 PULMONARY HYPERTENSION (HCC): ICD-10-CM

## 2024-09-04 DIAGNOSIS — J44.9 CHRONIC OBSTRUCTIVE PULMONARY DISEASE, UNSPECIFIED COPD TYPE (HCC): Primary | ICD-10-CM

## 2024-09-04 DIAGNOSIS — F17.211 CIGARETTE NICOTINE DEPENDENCE IN REMISSION: ICD-10-CM

## 2024-09-04 PROCEDURE — 99215 OFFICE O/P EST HI 40 MIN: CPT | Performed by: INTERNAL MEDICINE

## 2024-10-21 ENCOUNTER — RA CDI HCC (OUTPATIENT)
Dept: OTHER | Facility: HOSPITAL | Age: 79
End: 2024-10-21

## 2024-10-21 PROBLEM — Z86.16 HISTORY OF COVID-19: Status: RESOLVED | Noted: 2023-01-30 | Resolved: 2024-10-21

## 2024-10-28 ENCOUNTER — OFFICE VISIT (OUTPATIENT)
Dept: INTERNAL MEDICINE CLINIC | Facility: CLINIC | Age: 79
End: 2024-10-28
Payer: MEDICARE

## 2024-10-28 VITALS
BODY MASS INDEX: 30.81 KG/M2 | WEIGHT: 180.5 LBS | SYSTOLIC BLOOD PRESSURE: 134 MMHG | HEIGHT: 64 IN | TEMPERATURE: 99.5 F | HEART RATE: 84 BPM | DIASTOLIC BLOOD PRESSURE: 70 MMHG | OXYGEN SATURATION: 94 %

## 2024-10-28 DIAGNOSIS — F34.1 DYSTHYMIC DISORDER: ICD-10-CM

## 2024-10-28 DIAGNOSIS — M19.042 PRIMARY OSTEOARTHRITIS OF BOTH HANDS: ICD-10-CM

## 2024-10-28 DIAGNOSIS — M19.041 PRIMARY OSTEOARTHRITIS OF BOTH HANDS: ICD-10-CM

## 2024-10-28 DIAGNOSIS — E11.9 TYPE 2 DIABETES, HBA1C GOAL < 8% (HCC): ICD-10-CM

## 2024-10-28 DIAGNOSIS — J96.11 CHRONIC RESPIRATORY FAILURE WITH HYPOXIA (HCC): ICD-10-CM

## 2024-10-28 DIAGNOSIS — I50.42 CHRONIC COMBINED SYSTOLIC AND DIASTOLIC CONGESTIVE HEART FAILURE (HCC): ICD-10-CM

## 2024-10-28 DIAGNOSIS — J43.8 OTHER EMPHYSEMA (HCC): ICD-10-CM

## 2024-10-28 DIAGNOSIS — I10 ESSENTIAL HYPERTENSION, BENIGN: Primary | ICD-10-CM

## 2024-10-28 DIAGNOSIS — E78.2 MIXED HYPERLIPIDEMIA: ICD-10-CM

## 2024-10-28 PROCEDURE — 99214 OFFICE O/P EST MOD 30 MIN: CPT | Performed by: INTERNAL MEDICINE

## 2024-10-28 PROCEDURE — G0439 PPPS, SUBSEQ VISIT: HCPCS | Performed by: INTERNAL MEDICINE

## 2024-10-28 RX ORDER — ROSUVASTATIN CALCIUM 5 MG/1
5 TABLET, COATED ORAL DAILY
Qty: 100 TABLET | Refills: 1 | Status: SHIPPED | OUTPATIENT
Start: 2024-10-28

## 2024-10-28 NOTE — ASSESSMENT & PLAN NOTE
Orders:    rosuvastatin (CRESTOR) 5 mg tablet; Take 1 tablet (5 mg total) by mouth daily     Subjective   Patient ID: Melissa is a 2 year old female who is accompanied by:mother     Chief Complaint   Patient presents with   • Headache     For 2 days per mom   • Nose Problem     Runny nose for 2 days    • Other     Warm to touch, per mom did not measure temp.   • Abdominal Pain     Stomach ache       Headache   This is a new problem. The current episode started in the past 7 days. The problem occurs intermittently. The problem has been gradually improving since onset. The pain is present in the frontal. The pain does not radiate. The pain quality is similar to prior headaches. Associated symptoms include abdominal pain, coughing, rhinorrhea and a sore throat. Pertinent negatives include no diarrhea, ear pain, eye pain, eye redness, fever, hearing loss, nausea or vomiting. Nothing aggravates the symptoms. Past treatments include nothing.   Nose Problem   The current episode started in the past 7 days. The problem occurs constantly. The problem has been unchanged. Associated symptoms include abdominal pain, congestion, coughing, headaches and a sore throat. Pertinent negatives include no arthralgias, chest pain, chills, fatigue, fever, joint swelling, nausea, rash or vomiting. Nothing aggravates the symptoms. She has tried nothing for the symptoms.   Abdominal Pain   Associated symptoms include headaches and a sore throat. Pertinent negatives include no arthralgias, constipation, diarrhea, dysuria, fever, nausea, rash or vomiting.     Review of Systems   Constitutional: Negative for activity change, appetite change, chills, crying, fatigue, fever and unexpected weight change.   HENT: Positive for congestion, rhinorrhea and sore throat. Negative for dental problem, ear discharge, ear pain, facial swelling, hearing loss, mouth sores, nosebleeds, sneezing and trouble swallowing.    Eyes: Negative for pain, discharge, redness and itching.   Respiratory: Positive for cough. Negative for wheezing.     Cardiovascular: Negative for chest pain.   Gastrointestinal: Positive for abdominal pain. Negative for abdominal distention, constipation, diarrhea, nausea and vomiting.   Genitourinary: Negative for difficulty urinating, dysuria and urgency.   Musculoskeletal: Negative for arthralgias and joint swelling.   Skin: Negative for color change and rash.   Allergic/Immunologic: Negative for environmental allergies and food allergies.   Neurological: Positive for headaches.   Psychiatric/Behavioral: Negative for agitation and behavioral problems.       Patient's medications, allergies, past medical, surgical, social and family histories were reviewed and updated as appropriate.    Objective   Vitals:There were no vitals taken for this visit.  Physical Exam   Constitutional: She appears well-developed and well-nourished. She is active.   HENT:   Right Ear: Tympanic membrane normal.   Left Ear: Tympanic membrane normal.   Nose: Mucosal edema (pale), rhinorrhea and nasal discharge present.   Mouth/Throat: Mucous membranes are moist. Dentition is normal. Oropharynx is clear. Pharynx is normal.   Eyes: Pupils are equal, round, and reactive to light. Conjunctivae and EOM are normal.   Neck: Normal range of motion.   Cardiovascular: Regular rhythm, S1 normal and S2 normal.   Pulmonary/Chest: Effort normal. No nasal flaring. No respiratory distress. She has no wheezes. She has no rhonchi. She has no rales.   Abdominal: Soft. Bowel sounds are normal.   Neurological: She is alert.   Skin: Skin is warm.   Nursing note and vitals reviewed.      Assessment   Problem List Items Addressed This Visit        Respiratory    Seasonal allergic rhinitis due to pollen - Primary          Instructed to call if problem worsens or does not improve within the next 24 hours otherwise follow-up mervat Torres was seen today for headache, nose problem, other and abdominal pain.    Diagnoses and all orders for this visit:    Seasonal allergic  rhinitis due to pollen    Other orders  -     cetirizine (YRTE CHILDRENS ALLERGY) 5 MG/5ML solution; Take 7.5 mLs by mouth daily.

## 2024-10-28 NOTE — PATIENT INSTRUCTIONS
Medicare Preventive Visit Patient Instructions  Thank you for completing your Welcome to Medicare Visit or Medicare Annual Wellness Visit today. Your next wellness visit will be due in one year (10/29/2025).  The screening/preventive services that you may require over the next 5-10 years are detailed below. Some tests may not apply to you based off risk factors and/or age. Screening tests ordered at today's visit but not completed yet may show as past due. Also, please note that scanned in results may not display below.  Preventive Screenings:  Service Recommendations Previous Testing/Comments   Colorectal Cancer Screening  * Colonoscopy    * Fecal Occult Blood Test (FOBT)/Fecal Immunochemical Test (FIT)  * Fecal DNA/Cologuard Test  * Flexible Sigmoidoscopy Age: 45-75 years old   Colonoscopy: every 10 years (may be performed more frequently if at higher risk)  OR  FOBT/FIT: every 1 year  OR  Cologuard: every 3 years  OR  Sigmoidoscopy: every 5 years  Screening may be recommended earlier than age 45 if at higher risk for colorectal cancer. Also, an individualized decision between you and your healthcare provider will decide whether screening between the ages of 76-85 would be appropriate. Colonoscopy: 12/03/2021  FOBT/FIT: Not on file  Cologuard: Not on file  Sigmoidoscopy: Not on file          Breast Cancer Screening Age: 40+ years old  Frequency: every 1-2 years  Not required if history of left and right mastectomy Mammogram: Not on file        Cervical Cancer Screening Between the ages of 21-29, pap smear recommended once every 3 years.   Between the ages of 30-65, can perform pap smear with HPV co-testing every 5 years.   Recommendations may differ for women with a history of total hysterectomy, cervical cancer, or abnormal pap smears in past. Pap Smear: Not on file    Screening Not Indicated   Hepatitis C Screening Once for adults born between 1945 and 1965  More frequently in patients at high risk for Hepatitis  C Hep C Antibody: Not on file        Diabetes Screening 1-2 times per year if you're at risk for diabetes or have pre-diabetes Fasting glucose: 119 mg/dL (8/12/2024)  A1C: 6.2 % (8/12/2024)  Screening Current   Cholesterol Screening Once every 5 years if you don't have a lipid disorder. May order more often based on risk factors. Lipid panel: 08/12/2024    Screening Not Indicated  History Lipid Disorder     Other Preventive Screenings Covered by Medicare:  Abdominal Aortic Aneurysm (AAA) Screening: covered once if your at risk. You're considered to be at risk if you have a family history of AAA.  Lung Cancer Screening: covers low dose CT scan once per year if you meet all of the following conditions: (1) Age 55-77; (2) No signs or symptoms of lung cancer; (3) Current smoker or have quit smoking within the last 15 years; (4) You have a tobacco smoking history of at least 20 pack years (packs per day multiplied by number of years you smoked); (5) You get a written order from a healthcare provider.  Glaucoma Screening: covered annually if you're considered high risk: (1) You have diabetes OR (2) Family history of glaucoma OR (3)  aged 50 and older OR (4)  American aged 65 and older  Osteoporosis Screening: covered every 2 years if you meet one of the following conditions: (1) You're estrogen deficient and at risk for osteoporosis based off medical history and other findings; (2) Have a vertebral abnormality; (3) On glucocorticoid therapy for more than 3 months; (4) Have primary hyperparathyroidism; (5) On osteoporosis medications and need to assess response to drug therapy.   Last bone density test (DXA Scan): 07/30/2024.  HIV Screening: covered annually if you're between the age of 15-65. Also covered annually if you are younger than 15 and older than 65 with risk factors for HIV infection. For pregnant patients, it is covered up to 3 times per pregnancy.    Immunizations:  Immunization  Recommendations   Influenza Vaccine Annual influenza vaccination during flu season is recommended for all persons aged >= 6 months who do not have contraindications   Pneumococcal Vaccine   * Pneumococcal conjugate vaccine = PCV13 (Prevnar 13), PCV15 (Vaxneuvance), PCV20 (Prevnar 20)  * Pneumococcal polysaccharide vaccine = PPSV23 (Pneumovax) Adults 19-65 yo with certain risk factors or if 65+ yo  If never received any pneumonia vaccine: recommend Prevnar 20 (PCV20)  Give PCV20 if previously received 1 dose of PCV13 or PPSV23   Hepatitis B Vaccine 3 dose series if at intermediate or high risk (ex: diabetes, end stage renal disease, liver disease)   Respiratory syncytial virus (RSV) Vaccine - COVERED BY MEDICARE PART D  * RSVPreF3 (Arexvy) CDC recommends that adults 60 years of age and older may receive a single dose of RSV vaccine using shared clinical decision-making (SCDM)   Tetanus (Td) Vaccine - COST NOT COVERED BY MEDICARE PART B Following completion of primary series, a booster dose should be given every 10 years to maintain immunity against tetanus. Td may also be given as tetanus wound prophylaxis.   Tdap Vaccine - COST NOT COVERED BY MEDICARE PART B Recommended at least once for all adults. For pregnant patients, recommended with each pregnancy.   Shingles Vaccine (Shingrix) - COST NOT COVERED BY MEDICARE PART B  2 shot series recommended in those 19 years and older who have or will have weakened immune systems or those 50 years and older     Health Maintenance Due:  There are no preventive care reminders to display for this patient.  Immunizations Due:      Topic Date Due   • Hepatitis A Vaccine (1 of 2 - Risk 2-dose series) Never done     Advance Directives   What are advance directives?  Advance directives are legal documents that state your wishes and plans for medical care. These plans are made ahead of time in case you lose your ability to make decisions for yourself. Advance directives can apply to  any medical decision, such as the treatments you want, and if you want to donate organs.   What are the types of advance directives?  There are many types of advance directives, and each state has rules about how to use them. You may choose a combination of any of the following:  Living will:  This is a written record of the treatment you want. You can also choose which treatments you do not want, which to limit, and which to stop at a certain time. This includes surgery, medicine, IV fluid, and tube feedings.   Durable power of  for healthcare (DPAHC):  This is a written record that states who you want to make healthcare choices for you when you are unable to make them for yourself. This person, called a proxy, is usually a family member or a friend. You may choose more than 1 proxy.  Do not resuscitate (DNR) order:  A DNR order is used in case your heart stops beating or you stop breathing. It is a request not to have certain forms of treatment, such as CPR. A DNR order may be included in other types of advance directives.  Medical directive:  This covers the care that you want if you are in a coma, near death, or unable to make decisions for yourself. You can list the treatments you want for each condition. Treatment may include pain medicine, surgery, blood transfusions, dialysis, IV or tube feedings, and a ventilator (breathing machine).  Values history:  This document has questions about your views, beliefs, and how you feel and think about life. This information can help others choose the care that you would choose.  Why are advance directives important?  An advance directive helps you control your care. Although spoken wishes may be used, it is better to have your wishes written down. Spoken wishes can be misunderstood, or not followed. Treatments may be given even if you do not want them. An advance directive may make it easier for your family to make difficult choices about your care.   Fall  Prevention    Fall prevention  includes ways to make your home and other areas safer. It also includes ways you can move more carefully to prevent a fall. Health conditions that cause changes in your blood pressure, vision, or muscle strength and coordination may increase your risk for falls. Medicines may also increase your risk for falls if they make you dizzy, weak, or sleepy.   Fall prevention tips:   Stand or sit up slowly.    Use assistive devices as directed.    Wear shoes that fit well and have soles that .    Wear a personal alarm.    Stay active.    Manage your medical conditions.    Home Safety Tips:  Add items to prevent falls in the bathroom.    Keep paths clear.    Install bright lights in your home.    Keep items you use often on shelves within reach.    Paint or place reflective tape on the edges of your stairs.    Weight Management   Why it is important to manage your weight:  Being overweight increases your risk of health conditions such as heart disease, high blood pressure, type 2 diabetes, and certain types of cancer. It can also increase your risk for osteoarthritis, sleep apnea, and other respiratory problems. Aim for a slow, steady weight loss. Even a small amount of weight loss can lower your risk of health problems.  How to lose weight safely:  A safe and healthy way to lose weight is to eat fewer calories and get regular exercise. You can lose up about 1 pound a week by decreasing the number of calories you eat by 500 calories each day.   Healthy meal plan for weight management:  A healthy meal plan includes a variety of foods, contains fewer calories, and helps you stay healthy. A healthy meal plan includes the following:  Eat whole-grain foods more often.  A healthy meal plan should contain fiber. Fiber is the part of grains, fruits, and vegetables that is not broken down by your body. Whole-grain foods are healthy and provide extra fiber in your diet. Some examples of whole-grain  foods are whole-wheat breads and pastas, oatmeal, brown rice, and bulgur.  Eat a variety of vegetables every day.  Include dark, leafy greens such as spinach, kale, hill greens, and mustard greens. Eat yellow and orange vegetables such as carrots, sweet potatoes, and winter squash.   Eat a variety of fruits every day.  Choose fresh or canned fruit (canned in its own juice or light syrup) instead of juice. Fruit juice has very little or no fiber.  Eat low-fat dairy foods.  Drink fat-free (skim) milk or 1% milk. Eat fat-free yogurt and low-fat cottage cheese. Try low-fat cheeses such as mozzarella and other reduced-fat cheeses.  Choose meat and other protein foods that are low in fat.  Choose beans or other legumes such as split peas or lentils. Choose fish, skinless poultry (chicken or turkey), or lean cuts of red meat (beef or pork). Before you cook meat or poultry, cut off any visible fat.   Use less fat and oil.  Try baking foods instead of frying them. Add less fat, such as margarine, sour cream, regular salad dressing and mayonnaise to foods. Eat fewer high-fat foods. Some examples of high-fat foods include french fries, doughnuts, ice cream, and cakes.  Eat fewer sweets.  Limit foods and drinks that are high in sugar. This includes candy, cookies, regular soda, and sweetened drinks.  Exercise:  Exercise at least 30 minutes per day on most days of the week. Some examples of exercise include walking, biking, dancing, and swimming. You can also fit in more physical activity by taking the stairs instead of the elevator or parking farther away from stores. Ask your healthcare provider about the best exercise plan for you.      © Copyright [employer] 2018 Information is for End User's use only and may not be sold, redistributed or otherwise used for commercial purposes. All illustrations and images included in CareNotes® are the copyrighted property of [employer] or [employer]

## 2024-10-28 NOTE — PROGRESS NOTES
Ambulatory Visit  Name: Adelita Smith      : 1945      MRN: [id number]  Encounter Provider: Mundo Hubbard MD  Encounter Date: 10/28/2024   Encounter department: [facility] INTERNAL MEDICINE [facility]    Assessment & Plan  Essential hypertension, benign    continue same med     Chronic combined systolic and diastolic congestive heart failure (HCC)  Wt Readings from Last 3 Encounters:   10/28/24 81.9 kg (180 lb 8 oz)   24 81.6 kg (180 lb)   08/15/24 78.5 kg (173 lb)       continue same med              Other emphysema (HCC)    continue same med     Chronic respiratory failure with hypoxia (HCC)         Primary osteoarthritis of both hands         Dysthymic disorder           Mixed hyperlipidemia    Orders:    rosuvastatin (CRESTOR) 5 mg tablet; Take 1 tablet (5 mg total) by mouth daily    Type 2 diabetes, HbA1C goal < 8% (HCC)    Lab Results   Component Value Date    HGBA1C 6.2 (H) 2024              Depression Screening and Follow-up Plan: Patient was screened for depression during today's encounter. They screened negative with a PHQ-9 score of 0.    Falls Plan of Care: balance, strength, and gait training instructions were provided.       Preventive health issues were discussed with patient, and age appropriate screening tests were ordered as noted in patient's After Visit Summary. Personalized health advice and appropriate referrals for health education or preventive services given if needed, as noted in patient's After Visit Summary.    History of Present Illness     Follow-up on multiple medical problems to ensure they are stable on current medication, also medical wellness exam       Patient Care Team:  Mundo Hubbard MD as PCP - General (Internal Medicine)    Review of Systems   Constitutional:  Negative for chills and fever.   HENT:  Negative for congestion, ear pain and sore throat.    Eyes:  Negative for pain.   Respiratory:  Positive for shortness of breath. Negative for cough.     Cardiovascular:  Negative for chest pain and leg swelling.   Gastrointestinal:  Negative for abdominal pain, nausea and vomiting.   Endocrine: Negative for polyuria.   Genitourinary:  Negative for difficulty urinating, frequency and urgency.   Musculoskeletal:  Negative for arthralgias and back pain.   Skin:  Negative for rash.   Neurological:  Negative for weakness and headaches.   Psychiatric/Behavioral:  Negative for sleep disturbance. The patient is not nervous/anxious.      Medical History Reviewed by provider this encounter:  Tobacco  Allergies  Meds  Problems  Med Hx  Surg Hx  Fam Hx       Annual Wellness Visit Questionnaire   Adelita is here for her Subsequent Wellness visit. Last Medicare Wellness visit information reviewed, patient interviewed and updates made to the record today.      Health Risk Assessment:   Patient rates overall health as fair. Patient feels that their physical health rating is same. Patient is dissatisfied with their life. Eyesight was rated as same. Hearing was rated as same. Patient feels that their emotional and mental health rating is same. Patients states they are sometimes angry. Patient states they are often unusually tired/fatigued. Pain experienced in the last 7 days has been none. Patient states that she has experienced no weight loss or gain in last 6 months.     Depression Screening:   PHQ-9 Score: 0      Fall Risk Screening:   In the past year, patient has experienced: history of falling in past year    Number of falls: 2 or more  Injured during fall?: No    Feels unsteady when standing or walking?: Yes    Worried about falling?: Yes      Urinary Incontinence Screening:   Patient has not leaked urine accidently in the last six months.     Home Safety:  Patient has trouble with stairs inside or outside of their home. Patient has working smoke alarms and has working carbon monoxide detector. Home safety hazards include: none.     Nutrition:   Current diet is Regular.      Medications:   Patient is not currently taking any over-the-counter supplements. Patient is able to manage medications.     Activities of Daily Living (ADLs)/Instrumental Activities of Daily Living (IADLs):   Walk and transfer into and out of bed and chair?: Yes  Dress and groom yourself?: Yes    Bathe or shower yourself?: Yes    Feed yourself? Yes  Do your laundry/housekeeping?: Yes  Manage your money, pay your bills and track your expenses?: Yes  Make your own meals?: No    Do your own shopping?: No    Previous Hospitalizations:   Any hospitalizations or ED visits within the last 12 months?: Yes    How many hospitalizations have you had in the last year?: 1-2    Advance Care Planning:   Living will: No    Durable POA for healthcare: Yes    Advanced directive: No    ACP document given: Yes      Cognitive Screening:   Provider or family/friend/caregiver concerned regarding cognition?: No    PREVENTIVE SCREENINGS      Cardiovascular Screening:    General: Screening Not Indicated and History Lipid Disorder      Diabetes Screening:     General: Screening Current      Cervical Cancer Screening:    General: Screening Not Indicated      Osteoporosis Screening:    General: Screening Not Indicated and History Osteoporosis      Lung Cancer Screening:     General: Screening Not Indicated    Screening, Brief Intervention, and Referral to Treatment (SBIRT)    Screening  Typical number of drinks in a day: 1  Typical number of drinks in a week: 3  Interpretation: Low risk drinking behavior.    Single Item Drug Screening:  How often have you used an illegal drug (including marijuana) or a prescription medication for non-medical reasons in the past year? never    Single Item Drug Screen Score: 0  Interpretation: Negative screen for possible drug use disorder    Social Determinants of Health     Financial Resource Strain: Low Risk  (10/23/2023)    Overall Financial Resource Strain (CARDI)     Difficulty of Paying Living  "Expenses: Not hard at all   Food Insecurity: No Food Insecurity (10/28/2024)    Hunger Vital Sign     Worried About Running Out of Food in the Last Year: Never true     Ran Out of Food in the Last Year: Never true   Transportation Needs: No Transportation Needs (10/28/2024)    PRAPARE - Transportation     Lack of Transportation (Medical): No     Lack of Transportation (Non-Medical): No   Housing Stability: Low Risk  (10/28/2024)    Housing Stability Vital Sign     Unable to Pay for Housing in the Last Year: No     Number of Times Moved in the Last Year: 1     Homeless in the Last Year: No   Utilities: Not At Risk (10/28/2024)    [facility] Utilities     Threatened with loss of utilities: No     No results found.    Objective     /70 (BP Location: Left arm, Patient Position: Sitting, Cuff Size: Standard)   Pulse 84   Temp 99.5 °F (37.5 °C) (Temporal)   Ht 5' 4\" (1.626 m)   Wt 81.9 kg (180 lb 8 oz)   SpO2 94%   BMI 30.98 kg/m²     Physical Exam  Vitals and nursing note reviewed.   Constitutional:       General: She is not in acute distress.     Appearance: She is well-developed.   HENT:      Head: Normocephalic and atraumatic.      Right Ear: Tympanic membrane, ear canal and external ear normal.      Left Ear: Tympanic membrane, ear canal and external ear normal.      Mouth/Throat:      Mouth: Mucous membranes are moist.      Pharynx: Oropharynx is clear.   Eyes:      Extraocular Movements: Extraocular movements intact.      Conjunctiva/sclera: Conjunctivae normal.   Cardiovascular:      Rate and Rhythm: Normal rate and regular rhythm.      Heart sounds: Normal heart sounds. No murmur heard.  Pulmonary:      Effort: Pulmonary effort is normal. No respiratory distress.      Breath sounds: Normal breath sounds. No wheezing or rales.   Abdominal:      General: Abdomen is flat. There is no distension.      Palpations: Abdomen is soft.      Tenderness: There is no abdominal tenderness.   Musculoskeletal:         " General: No swelling.      Cervical back: Neck supple.      Right lower leg: No edema.      Left lower leg: No edema.   Skin:     General: Skin is warm and dry.      Capillary Refill: Capillary refill takes less than 2 seconds.   Neurological:      General: No focal deficit present.      Mental Status: She is alert and oriented to person, place, and time.   Psychiatric:         Mood and Affect: Mood normal.

## 2024-10-28 NOTE — ASSESSMENT & PLAN NOTE
Wt Readings from Last 3 Encounters:   10/28/24 81.9 kg (180 lb 8 oz)   09/04/24 81.6 kg (180 lb)   08/15/24 78.5 kg (173 lb)       continue same med

## 2025-01-02 ENCOUNTER — RA CDI HCC (OUTPATIENT)
Dept: OTHER | Facility: HOSPITAL | Age: 80
End: 2025-01-02

## 2025-01-09 ENCOUNTER — OFFICE VISIT (OUTPATIENT)
Dept: INTERNAL MEDICINE CLINIC | Facility: CLINIC | Age: 80
End: 2025-01-09
Payer: MEDICARE

## 2025-01-09 ENCOUNTER — TELEPHONE (OUTPATIENT)
Dept: INTERNAL MEDICINE CLINIC | Facility: CLINIC | Age: 80
End: 2025-01-09

## 2025-01-09 VITALS
TEMPERATURE: 98.2 F | HEIGHT: 64 IN | SYSTOLIC BLOOD PRESSURE: 122 MMHG | HEART RATE: 85 BPM | DIASTOLIC BLOOD PRESSURE: 82 MMHG | BODY MASS INDEX: 31.07 KG/M2 | WEIGHT: 182 LBS | OXYGEN SATURATION: 98 %

## 2025-01-09 DIAGNOSIS — I50.42 CHRONIC COMBINED SYSTOLIC AND DIASTOLIC CONGESTIVE HEART FAILURE (HCC): ICD-10-CM

## 2025-01-09 DIAGNOSIS — J96.11 CHRONIC RESPIRATORY FAILURE WITH HYPOXIA (HCC): ICD-10-CM

## 2025-01-09 DIAGNOSIS — E66.811 CLASS 1 OBESITY DUE TO EXCESS CALORIES WITH SERIOUS COMORBIDITY AND BODY MASS INDEX (BMI) OF 31.0 TO 31.9 IN ADULT: ICD-10-CM

## 2025-01-09 DIAGNOSIS — J43.8 OTHER EMPHYSEMA (HCC): ICD-10-CM

## 2025-01-09 DIAGNOSIS — M19.042 PRIMARY OSTEOARTHRITIS OF BOTH HANDS: ICD-10-CM

## 2025-01-09 DIAGNOSIS — E66.09 CLASS 1 OBESITY DUE TO EXCESS CALORIES WITH SERIOUS COMORBIDITY AND BODY MASS INDEX (BMI) OF 31.0 TO 31.9 IN ADULT: ICD-10-CM

## 2025-01-09 DIAGNOSIS — F33.9 DEPRESSION, RECURRENT (HCC): ICD-10-CM

## 2025-01-09 DIAGNOSIS — E78.2 MIXED HYPERLIPIDEMIA: ICD-10-CM

## 2025-01-09 DIAGNOSIS — I10 ESSENTIAL HYPERTENSION, BENIGN: Primary | ICD-10-CM

## 2025-01-09 DIAGNOSIS — F34.1 DYSTHYMIC DISORDER: ICD-10-CM

## 2025-01-09 DIAGNOSIS — E53.8 B12 DEFICIENCY: ICD-10-CM

## 2025-01-09 DIAGNOSIS — R73.01 IMPAIRED FASTING BLOOD SUGAR: ICD-10-CM

## 2025-01-09 DIAGNOSIS — I47.10 SVT (SUPRAVENTRICULAR TACHYCARDIA) (HCC): ICD-10-CM

## 2025-01-09 DIAGNOSIS — F41.9 ANXIETY: ICD-10-CM

## 2025-01-09 DIAGNOSIS — M19.041 PRIMARY OSTEOARTHRITIS OF BOTH HANDS: ICD-10-CM

## 2025-01-09 PROCEDURE — 99214 OFFICE O/P EST MOD 30 MIN: CPT | Performed by: INTERNAL MEDICINE

## 2025-01-09 RX ORDER — HYDROXYZINE HYDROCHLORIDE 10 MG/1
10 TABLET, FILM COATED ORAL 2 TIMES DAILY PRN
Qty: 60 TABLET | Refills: 2 | Status: SHIPPED | OUTPATIENT
Start: 2025-01-09

## 2025-01-09 RX ORDER — ROSUVASTATIN CALCIUM 5 MG/1
5 TABLET, COATED ORAL DAILY
Qty: 100 TABLET | Refills: 1 | Status: SHIPPED | OUTPATIENT
Start: 2025-01-09

## 2025-01-09 RX ORDER — TIRZEPATIDE 2.5 MG/.5ML
2.5 INJECTION, SOLUTION SUBCUTANEOUS WEEKLY
Qty: 2 ML | Refills: 0 | Status: SHIPPED | OUTPATIENT
Start: 2025-01-09 | End: 2025-02-06

## 2025-01-09 NOTE — PROGRESS NOTES
Assessment/Plan:      Falls Plan of Care: balance, strength, and gait training instructions were provided.             1. Essential hypertension, benign  Comments:  continue same med  Orders:  -     tirzepatide (Zepbound) 2.5 mg/0.5 mL auto-injector; Inject 0.5 mL (2.5 mg total) under the skin once a week for 28 days  -     CBC and differential; Future  -     Comprehensive metabolic panel; Future  -     Lipid Panel with Direct LDL reflex; Future  -     TSH, 3rd generation; Future  2. Mixed hyperlipidemia  Comments:  continue same med  Orders:  -     rosuvastatin (CRESTOR) 5 mg tablet; Take 1 tablet (5 mg total) by mouth daily  -     Comprehensive metabolic panel; Future  -     Lipid Panel with Direct LDL reflex; Future  -     TSH, 3rd generation; Future  3. Class 1 obesity due to excess calories with serious comorbidity and body mass index (BMI) of 31.0 to 31.9 in adult  -     tirzepatide (Zepbound) 2.5 mg/0.5 mL auto-injector; Inject 0.5 mL (2.5 mg total) under the skin once a week for 28 days  4. Other emphysema (HCC)  Comments:  continue same med  Orders:  -     tirzepatide (Zepbound) 2.5 mg/0.5 mL auto-injector; Inject 0.5 mL (2.5 mg total) under the skin once a week for 28 days  -     CBC and differential; Future  -     Comprehensive metabolic panel; Future  5. Chronic combined systolic and diastolic congestive heart failure (HCC)  -     tirzepatide (Zepbound) 2.5 mg/0.5 mL auto-injector; Inject 0.5 mL (2.5 mg total) under the skin once a week for 28 days  6. Anxiety  -     hydrOXYzine HCL (ATARAX) 10 mg tablet; Take 1 tablet (10 mg total) by mouth 2 (two) times a day as needed for itching  7. Dysthymic disorder  Comments:  continue same med  8. Impaired fasting blood sugar  -     tirzepatide (Zepbound) 2.5 mg/0.5 mL auto-injector; Inject 0.5 mL (2.5 mg total) under the skin once a week for 28 days  -     Hemoglobin A1C; Future  9. Primary osteoarthritis of both hands  10. SVT (supraventricular tachycardia)  (HCC)  11. Chronic respiratory failure with hypoxia (HCC)  12. Depression, recurrent (HCC)  13. B12 deficiency  -     Vitamin B12; Future         Subjective:      Patient ID: Adelita Smith is a 79 y.o. female.    Follow-up on multiple medical problems to ensure they are stable on current medications        The following portions of the patient's history were reviewed and updated as appropriate: She  has a past medical history of Allergic rhinitis, Anxiety, Arthritis, Diabetes mellitus (HCC), History of COVID-19 (01/30/2023), Oxygen decrease, and Tobacco abuse.  She   Patient Active Problem List    Diagnosis Date Noted    Class 1 obesity due to excess calories with serious comorbidity and body mass index (BMI) of 31.0 to 31.9 in adult 01/09/2025    SVT (supraventricular tachycardia) ([facility]) 01/09/2025    Body mass index 29.0-29.9, adult 05/20/2024    Depression, recurrent (HCC) 01/22/2024    Fall 03/06/2023    Coccygeal pain, acute 03/06/2023    Chest pain 02/07/2023    Acute respiratory failure with hypoxia (HCC) 01/30/2023    COPD exacerbation ([facility]) 01/30/2023    Biventricular congestive heart failure (HCC) 01/30/2023    Mixed hyperlipidemia 06/06/2022    Dysthymic disorder 01/24/2022    Impaired fasting blood sugar 01/24/2022    Gastrointestinal hemorrhage 10/21/2021    Dermatitis 10/21/2021    Primary osteoarthritis of both hands 10/21/2021    Congestive heart failure with right ventricular systolic dysfunction (HCC) 09/23/2021    Rheumatoid factor positive 09/22/2021    Arthritis 09/22/2021    Diabetic eye exam (HCC) 06/30/2021    Hypoxia 06/16/2021    Essential hypertension, benign 06/02/2021    Chronic combined systolic and diastolic congestive heart failure (HCC) 06/02/2021    Pulmonary hypertension (HCC) 06/02/2021    Cigarette nicotine dependence without complication     Elevated BP without diagnosis of hypertension 05/22/2021    Lung nodules 05/18/2021    Other emphysema (HCC) 05/18/2021    Chronic respiratory  failure with hypoxia (HCC) 05/18/2021    Nicotine dependence in remission 05/18/2021    Microalbuminuria 05/17/2021    Acute on chronic diastolic (congestive) heart failure (HCC) 05/11/2021    SOB (shortness of breath) 05/11/2021    Allergic rhinitis     Anxiety     Tendinitis of finger 05/01/2015     She  has a past surgical history that includes Breast surgery (Right); Other surgical history; and Other surgical history.  Her family history includes Diabetes in her brother; Heart disease in her brother; Hypotension in her daughter; Lymphoma in her brother; No Known Problems in her sister; Parkinsonism in her sister; Prostate cancer in her father; Proteinuria in her daughter.  She  reports that she quit smoking about 3 years ago. Her smoking use included cigarettes. She started smoking about 33 years ago. She has a 15 pack-year smoking history. She has never been exposed to tobacco smoke. She has never used smokeless tobacco. She reports current alcohol use of about 1.0 - 2.0 standard drink of alcohol per week. She reports that she does not use drugs.  Current Outpatient Medications   Medication Sig Dispense Refill    albuterol (PROVENTIL HFA,VENTOLIN HFA) 90 mcg/act inhaler Inhale 2 puffs every 4 (four) hours as needed for wheezing 6.7 g 1    Anoro Ellipta 62.5-25 MCG/ACT inhaler inhale 1 puff by mouth and INTO THE LUNGS once daily 180 blister 1    hydrOXYzine HCL (ATARAX) 10 mg tablet Take 1 tablet (10 mg total) by mouth 2 (two) times a day as needed for itching 60 tablet 2    metoprolol succinate (TOPROL-XL) 25 mg 24 hr tablet Take 1 tablet (25 mg total) by mouth daily 90 tablet 3    rosuvastatin (CRESTOR) 5 mg tablet Take 1 tablet (5 mg total) by mouth daily 100 tablet 1    sertraline (ZOLOFT) 50 mg tablet TAKE 1 AND 1/2 TABLET BY MOUTH DAILY 135 tablet 1    tirzepatide (Zepbound) 2.5 mg/0.5 mL auto-injector Inject 0.5 mL (2.5 mg total) under the skin once a week for 28 days 2 mL 0    furosemide (LASIX) 20 mg  tablet Take 1 tablet (20 mg total) by mouth daily (Patient not taking: Reported on 1/22/2024) 30 tablet 3    triamcinolone (KENALOG) 0.1 % cream Apply topically 2 (two) times a day (Patient not taking: Reported on 10/23/2023) 30 g 0     No current facility-administered medications for this visit.     Current Outpatient Medications on File Prior to Visit   Medication Sig    albuterol (PROVENTIL HFA,VENTOLIN HFA) 90 mcg/act inhaler Inhale 2 puffs every 4 (four) hours as needed for wheezing    Anoro Ellipta 62.5-25 MCG/ACT inhaler inhale 1 puff by mouth and INTO THE LUNGS once daily    metoprolol succinate (TOPROL-XL) 25 mg 24 hr tablet Take 1 tablet (25 mg total) by mouth daily    sertraline (ZOLOFT) 50 mg tablet TAKE 1 AND 1/2 TABLET BY MOUTH DAILY    [DISCONTINUED] hydrOXYzine HCL (ATARAX) 10 mg tablet Take 1 tablet (10 mg total) by mouth 2 (two) times a day as needed for itching    [DISCONTINUED] rosuvastatin (CRESTOR) 5 mg tablet Take 1 tablet (5 mg total) by mouth daily    furosemide (LASIX) 20 mg tablet Take 1 tablet (20 mg total) by mouth daily (Patient not taking: Reported on 1/22/2024)    triamcinolone (KENALOG) 0.1 % cream Apply topically 2 (two) times a day (Patient not taking: Reported on 10/23/2023)     No current facility-administered medications on file prior to visit.     She has no known allergies..    Review of Systems   Constitutional:  Negative for chills and fever.   HENT:  Negative for congestion, ear pain and sore throat.    Eyes:  Negative for pain.   Respiratory:  Positive for shortness of breath. Negative for cough.    Cardiovascular:  Negative for chest pain and leg swelling.   Gastrointestinal:  Negative for abdominal pain, nausea and vomiting.   Endocrine: Negative for polyuria.   Genitourinary:  Negative for difficulty urinating, frequency and urgency.   Musculoskeletal:  Positive for arthralgias and back pain.   Skin:  Negative for rash.   Neurological:  Negative for weakness and  "headaches.   Psychiatric/Behavioral:  Negative for sleep disturbance. The patient is not nervous/anxious.          Objective:      /82 (BP Location: Left arm, Patient Position: Sitting, Cuff Size: Large)   Pulse 85   Temp 98.2 °F (36.8 °C) (Temporal)   Ht 5' 4\" (1.626 m)   Wt 82.6 kg (182 lb)   SpO2 98%   BMI 31.24 kg/m²     No results found for this or any previous visit (from the past 8 weeks).     Physical Exam  Constitutional:       Appearance: Normal appearance.   HENT:      Head: Normocephalic.      Right Ear: Tympanic membrane, ear canal and external ear normal.      Left Ear: Tympanic membrane, ear canal and external ear normal.      Nose: Nose normal. No congestion.      Mouth/Throat:      Mouth: Mucous membranes are moist.      Pharynx: Oropharynx is clear. No oropharyngeal exudate or posterior oropharyngeal erythema.   Eyes:      Extraocular Movements: Extraocular movements intact.      Conjunctiva/sclera: Conjunctivae normal.   Cardiovascular:      Rate and Rhythm: Normal rate and regular rhythm.      Heart sounds: Normal heart sounds. No murmur heard.  Pulmonary:      Effort: Pulmonary effort is normal.      Breath sounds: Normal breath sounds. No wheezing or rales.   Abdominal:      General: Abdomen is flat. There is no distension.      Palpations: Abdomen is soft.      Tenderness: There is no abdominal tenderness.   Musculoskeletal:      Cervical back: Normal range of motion and neck supple.      Right lower leg: No edema.      Left lower leg: No edema.   Lymphadenopathy:      Cervical: No cervical adenopathy.   Skin:     General: Skin is warm.   Neurological:      General: No focal deficit present.      Mental Status: She is alert and oriented to person, place, and time.         "

## 2025-01-10 ENCOUNTER — TELEPHONE (OUTPATIENT)
Dept: INTERNAL MEDICINE CLINIC | Facility: CLINIC | Age: 80
End: 2025-01-10

## 2025-01-10 NOTE — TELEPHONE ENCOUNTER
PA for (Zepbound) 2.5 mg/0.5 mL auto-injector SUBMITTED to [employer]    via    []CMM-KEY:   [x][employer]-Case ID # [id number]   []Availity-Auth ID # NDC #   []Faxed to plan   []Other website   []Phone call Case ID #     []PA sent as URGENT    All office notes, labs and other pertaining documents and studies sent. Clinical questions answered. Awaiting determination from insurance company.     Turnaround time for your insurance to make a decision on your Prior Authorization can take 7-21 business days.                  [address]   OCCUPATIONAL THERAPY MISSED TREATMENT NOTE   INPATIENT   Date: 20  Patient Name: Mg Brooks       Room: 9-  MRN: [id number]   Account #: [de-identified]    : 1988  (27 y.o.)  Gender: male                 REASON FOR MISSED TREATMENT:  Patient refusal   -   OT being discontinued at this time.  Patient functioning at Premorbid Level  No further needs        Bing Villafuerte, OT

## 2025-01-10 NOTE — TELEPHONE ENCOUNTER
PA for (Zepbound) 2.5 mg/0.5 mL auto-injector EXCLUDED from plan       Reason:(Screenshot if applicable)        Message sent to office clinical pool Yes

## 2025-01-15 NOTE — PROGRESS NOTES
Cardiology Follow Up    Adelita Smith  1945  [id number]  [facility]  [address] [facility]  [place] [address] [phone]    Assessment & Plan  Chronic combined systolic and diastolic congestive heart failure (HCC)  Wt Readings from Last 3 Encounters:   01/16/25 83.1 kg (183 lb 4.8 oz)   01/09/25 82.6 kg (182 lb)   10/28/24 81.9 kg (180 lb 8 oz)   Weight up in the office since her last office visit at 166 pounds up to 183 pounds  NYHA class IV stage C  Heart rate and blood pressure controlled  On physical exam left-sided Rales anterior Rales  She is not taking Lasix  Admits to abdominal bloating.  Lasix 20 mg daily for 2 to 3 days then only as needed  Continue metoprolol succinate 25 mg daily  Continue with daily weights 2 g sodium diet 1500 cc fluid restriction  Follow-up TTE in the near future evaluate wall function  Pulmonary hypertension (HCC)   PASP 48mmHg WHO group 2 ( CHF) and WHO group 3 (COPD)  Follow up TTE in near future evaluate pulmonary pressures  Mixed hyperlipidemia  8/12/24  TG 99 HDL 63 LDL 58  LDL below goal 70, continue on Crestor 5 mg daily.  Fasting lipid panel in the future per PCP  Heart healthy diet  Chronic respiratory failure with hypoxia (HCC)  Using oxygen 3 L nasal cannula around-the-clock  Type 2 diabetes, HbA1C goal < 8% (HCC)    Lab Results   Component Value Date    HGBA1C 6.2 (H) 08/12/2024           Interval History:   Ms Adelita Smith presents to our office for a follow up visit.  She is pretty by her .  Adelita is using oxygen 3 L nasal cannula round-the-clock.  She denies worsening dyspnea with rest or exertion orthopnea PND.  Adelita  admits to abdominal bloating and occasional chest tightness with worsening dyspnea and anxiety.  According to Deloris she is following a low-sodium diet.  Her weight is up in the office from 166 pounds to 183 pounds.    Medical History   Primary Cardiologist   Joan  BIV heart failure hospitalized 5/2021 dry weight 166 pounds, weight up to 183   Hyperlipidemia 8/12/24  TG 99 HDL 63 LDL 58  Mild PHTN PASP 48mmHg group 2 and group 3 from pulmonary disease  Coronary calcification on CT  COVID-19 1/2023  DM2 HgbA1C 6.2 on 8/12/24  Prior tobacco abuse       1/31/23 TTE, LVEF 65% systolic function vigorous wall motion normal diastolic function mildly abnormal consistent with grade 2 pseudonormal relaxation.  Right ventricular cavity size mildly dilated systolic function normal.  Mild TR.  Right ventricular systolic pressure moderately elevated right ventricular systolic pressure 51 mmHg.  Patient Active Problem List   Diagnosis    Allergic rhinitis    Anxiety    Acute on chronic diastolic (congestive) heart failure (HCC)    SOB (shortness of breath)    Microalbuminuria    Lung nodules    Other emphysema (HCC)    Chronic respiratory failure with hypoxia (HCC)    Nicotine dependence in remission    Elevated BP without diagnosis of hypertension    Essential hypertension, benign    Chronic combined systolic and diastolic congestive heart failure (HCC)    Pulmonary hypertension (HCC)    Cigarette nicotine dependence without complication    Hypoxia    Diabetic eye exam (HCC)    Rheumatoid factor positive    Arthritis    Congestive heart failure with right ventricular systolic dysfunction (HCC)    Gastrointestinal hemorrhage    Dermatitis    Primary osteoarthritis of both hands    Dysthymic disorder    Impaired fasting blood sugar    Mixed hyperlipidemia    Tendinitis of finger    Acute respiratory failure with hypoxia (HCC)    COPD exacerbation (HCC)    Biventricular congestive heart failure (HCC)    Chest pain    Fall    Coccygeal pain, acute    Depression, recurrent (HCC)    Body mass index 29.0-29.9, adult    Class 1 obesity due to excess calories with serious comorbidity and body mass index (BMI) of 31.0 to 31.9 in adult    SVT (supraventricular tachycardia) (HCC)     Past  Medical History:   Diagnosis Date    Allergic rhinitis     Anxiety     Arthritis     Diabetes mellitus (HCC)     History of COVID-19 01/30/2023    Oxygen decrease     Tobacco abuse      Social History     Socioeconomic History    Marital status: /Civil Union     Spouse name: Not on file    Number of children: 1    Years of education: Not on file    Highest education level: Some college, no degree   Occupational History    Not on file   Tobacco Use    Smoking status: Former     Current packs/day: 0.00     Average packs/day: 0.5 packs/day for 30.0 years (15.0 ttl pk-yrs)     Types: Cigarettes     Start date: 5/18/1991     Quit date: 5/18/2021     Years since quitting: 3.6     Passive exposure: Never    Smokeless tobacco: Never    Tobacco comments:     Began smoking in her late teens, averaging 1/2 pack daily. States she quit smoking a few weeks ago (Updated 05/21/2021).   Vaping Use    Vaping status: Never Used   Substance and Sexual Activity    Alcohol use: Yes     Alcohol/week: 1.0 - 2.0 standard drink of alcohol     Types: 1 - 2 Glasses of wine per week     Comment: On average, will drink 1-2 glasses of white wine daily with dinner. (Updated 05/21/2021),     Drug use: Never     Comment: Last assessed 05/21/2021.    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     Has 1 biological daughter (Bella) who lives locally.      Social Drivers of Health     Financial Resource Strain: Low Risk  (10/23/2023)    Overall Financial Resource Strain (CARDIA)     Difficulty of Paying Living Expenses: Not hard at all   Food Insecurity: No Food Insecurity (10/28/2024)    Hunger Vital Sign     Worried About Running Out of Food in the Last Year: Never true     Ran Out of Food in the Last Year: Never true   Transportation Needs: No Transportation Needs (10/28/2024)    PRAPARE - Transportation     Lack of  "Transportation (Medical): No     Lack of Transportation (Non-Medical): No   Physical Activity: Not on file   Stress: Not on file   Social Connections: Not on file   Intimate Partner Violence: Not on file   Housing Stability: Low Risk  (10/28/2024)    Housing Stability Vital Sign     Unable to Pay for Housing in the Last Year: No     Number of Times Moved in the Last Year: 1     Homeless in the Last Year: No      Family History   Problem Relation Age of Onset    Prostate cancer Father     Diabetes Brother     Heart disease Brother     Lymphoma Brother     No Known Problems Sister     Parkinsonism Sister     Hypotension Daughter         Takes \"salt pills\"    Proteinuria Daughter         Occuring during her 3 pregnancies; follows with nephrology     Past Surgical History:   Procedure Laterality Date    BREAST SURGERY Right     Cyst    OTHER SURGICAL HISTORY      Cataract implant    OTHER SURGICAL HISTORY      Fertilization       Current Outpatient Medications:     albuterol (PROVENTIL HFA,VENTOLIN HFA) 90 mcg/act inhaler, Inhale 2 puffs every 4 (four) hours as needed for wheezing, Disp: 6.7 g, Rfl: 1    Anoro Ellipta 62.5-25 MCG/ACT inhaler, inhale 1 puff by mouth and INTO THE LUNGS once daily, Disp: 180 blister, Rfl: 1    furosemide (LASIX) 20 mg tablet, Take 1 tablet (20 mg total) by mouth daily (Patient not taking: Reported on 1/22/2024), Disp: 30 tablet, Rfl: 3    hydrOXYzine HCL (ATARAX) 10 mg tablet, Take 1 tablet (10 mg total) by mouth 2 (two) times a day as needed for itching, Disp: 60 tablet, Rfl: 2    metoprolol succinate (TOPROL-XL) 25 mg 24 hr tablet, Take 1 tablet (25 mg total) by mouth daily, Disp: 90 tablet, Rfl: 3    rosuvastatin (CRESTOR) 5 mg tablet, Take 1 tablet (5 mg total) by mouth daily, Disp: 100 tablet, Rfl: 1    sertraline (ZOLOFT) 50 mg tablet, TAKE 1 AND 1/2 TABLET BY MOUTH DAILY, Disp: 135 tablet, Rfl: 1    tirzepatide (Zepbound) 2.5 mg/0.5 mL auto-injector, Inject 0.5 mL (2.5 mg total) " under the skin once a week for 28 days, Disp: 2 mL, Rfl: 0    triamcinolone (KENALOG) 0.1 % cream, Apply topically 2 (two) times a day (Patient not taking: Reported on 10/23/2023), Disp: 30 g, Rfl: 0  No Known Allergies    Labs:  No visits with results within 2 Month(s) from this visit.   Latest known visit with results is:   Appointment on 08/12/2024   Component Date Value    WBC 08/12/2024 7.21     RBC 08/12/2024 4.55     Hemoglobin 08/12/2024 13.8     Hematocrit 08/12/2024 42.4     MCV 08/12/2024 93     MCH 08/12/2024 30.3     MCHC 08/12/2024 32.5     RDW 08/12/2024 12.7     MPV 08/12/2024 11.3     Platelets 08/12/2024 278     nRBC 08/12/2024 0     Segmented % 08/12/2024 66     Immature Grans % 08/12/2024 0     Lymphocytes % 08/12/2024 24     Monocytes % 08/12/2024 6     Eosinophils Relative 08/12/2024 3     Basophils Relative 08/12/2024 1     Absolute Neutrophils 08/12/2024 4.76     Absolute Immature Grans 08/12/2024 0.02     Absolute Lymphocytes 08/12/2024 1.75     Absolute Monocytes 08/12/2024 0.44     Eosinophils Absolute 08/12/2024 0.18     Basophils Absolute 08/12/2024 0.06     Sodium 08/12/2024 139     Potassium 08/12/2024 4.0     Chloride 08/12/2024 105     CO2 08/12/2024 28     ANION GAP 08/12/2024 6     BUN 08/12/2024 25     Creatinine 08/12/2024 0.71     Glucose, Fasting 08/12/2024 119 (H)     Calcium 08/12/2024 9.3     AST 08/12/2024 12 (L)     ALT 08/12/2024 10     Alkaline Phosphatase 08/12/2024 58     Total Protein 08/12/2024 7.4     Albumin 08/12/2024 4.4     Total Bilirubin 08/12/2024 0.96     eGFR 08/12/2024 80     Cholesterol 08/12/2024 141     Triglycerides 08/12/2024 99     HDL, Direct 08/12/2024 63     LDL Calculated 08/12/2024 58     TSH 3RD GENERATON 08/12/2024 3.380     Hemoglobin A1C 08/12/2024 6.2 (H)     EAG 08/12/2024 131      Imaging: No results found.    Review of Systems:  Review of Systems   Respiratory:  Positive for shortness of breath.    Musculoskeletal:  Positive for  arthralgias and myalgias.   All other systems reviewed and are negative.      Physical Exam:  Physical Exam  Vitals reviewed.   Constitutional:       Appearance: Normal appearance.   Cardiovascular:      Rate and Rhythm: Normal rate and regular rhythm.      Pulses: Normal pulses.      Heart sounds: Normal heart sounds.   Pulmonary:      Effort: Pulmonary effort is normal.      Breath sounds: Rales present.      Comments: Respirations appear slightly labored  Left  upper lung fields, anterior chest   Abdominal:      Comments: Obese    Musculoskeletal:         General: Normal range of motion.      Cervical back: Normal range of motion and neck supple.      Right lower leg: No edema.      Left lower leg: No edema.   Skin:     General: Skin is warm and dry.      Capillary Refill: Capillary refill takes less than 2 seconds.   Neurological:      General: No focal deficit present.      Mental Status: She is alert and oriented to person, place, and time.   Psychiatric:         Mood and Affect: Mood normal.         Behavior: Behavior normal.

## 2025-01-16 ENCOUNTER — OFFICE VISIT (OUTPATIENT)
Dept: CARDIOLOGY CLINIC | Facility: CLINIC | Age: 80
End: 2025-01-16
Payer: MEDICARE

## 2025-01-16 VITALS
SYSTOLIC BLOOD PRESSURE: 120 MMHG | HEIGHT: 64 IN | OXYGEN SATURATION: 93 % | BODY MASS INDEX: 31.29 KG/M2 | WEIGHT: 183.3 LBS | HEART RATE: 64 BPM | DIASTOLIC BLOOD PRESSURE: 68 MMHG

## 2025-01-16 DIAGNOSIS — J96.11 CHRONIC RESPIRATORY FAILURE WITH HYPOXIA (HCC): ICD-10-CM

## 2025-01-16 DIAGNOSIS — I27.20 PULMONARY HYPERTENSION (HCC): ICD-10-CM

## 2025-01-16 DIAGNOSIS — E11.9 TYPE 2 DIABETES, HBA1C GOAL < 8% (HCC): ICD-10-CM

## 2025-01-16 DIAGNOSIS — E78.2 MIXED HYPERLIPIDEMIA: ICD-10-CM

## 2025-01-16 DIAGNOSIS — I50.42 CHRONIC COMBINED SYSTOLIC AND DIASTOLIC CONGESTIVE HEART FAILURE (HCC): Primary | ICD-10-CM

## 2025-01-16 PROCEDURE — 93000 ELECTROCARDIOGRAM COMPLETE: CPT | Performed by: NURSE PRACTITIONER

## 2025-01-16 PROCEDURE — 99214 OFFICE O/P EST MOD 30 MIN: CPT | Performed by: NURSE PRACTITIONER

## 2025-01-16 RX ORDER — FUROSEMIDE 20 MG/1
TABLET ORAL
Qty: 20 TABLET | Refills: 3 | Status: SHIPPED | OUTPATIENT
Start: 2025-01-16

## 2025-01-16 RX ORDER — METOPROLOL SUCCINATE 25 MG/1
25 TABLET, EXTENDED RELEASE ORAL DAILY
Qty: 90 TABLET | Refills: 3 | Status: SHIPPED | OUTPATIENT
Start: 2025-01-16

## 2025-01-16 NOTE — ASSESSMENT & PLAN NOTE
Wt Readings from Last 3 Encounters:   01/16/25 83.1 kg (183 lb 4.8 oz)   01/09/25 82.6 kg (182 lb)   10/28/24 81.9 kg (180 lb 8 oz)   Weight up in the office since her last office visit at 166 pounds up to 183 pounds  NYHA class IV stage C  Heart rate and blood pressure controlled  On physical exam left-sided Rales anterior Rales  She is not taking Lasix  Admits to abdominal bloating.  Lasix 20 mg daily for 2 to 3 days then only as needed  Continue metoprolol succinate 25 mg daily  Continue with daily weights 2 g sodium diet 1500 cc fluid restriction  Follow-up TTE in the near future evaluate wall function

## 2025-01-16 NOTE — ASSESSMENT & PLAN NOTE
PASP 48mmHg WHO group 2 ( CHF) and WHO group 3 (COPD)  Follow up TTE in near future evaluate pulmonary pressures

## 2025-01-16 NOTE — ASSESSMENT & PLAN NOTE
8/12/24  TG 99 HDL 63 LDL 58  LDL below goal 70, continue on Crestor 5 mg daily.  Fasting lipid panel in the future per PCP  Heart healthy diet

## 2025-02-02 DIAGNOSIS — F34.1 DYSTHYMIC DISORDER: ICD-10-CM

## 2025-02-03 ENCOUNTER — HOSPITAL ENCOUNTER (OUTPATIENT)
Dept: NON INVASIVE DIAGNOSTICS | Facility: CLINIC | Age: 80
Discharge: HOME | End: 2025-02-03
Payer: MEDICARE

## 2025-02-03 VITALS
HEIGHT: 64 IN | BODY MASS INDEX: 31.24 KG/M2 | WEIGHT: 183 LBS | HEART RATE: 72 BPM | SYSTOLIC BLOOD PRESSURE: 120 MMHG | DIASTOLIC BLOOD PRESSURE: 68 MMHG

## 2025-02-03 DIAGNOSIS — J96.11 CHRONIC RESPIRATORY FAILURE WITH HYPOXIA (HCC): ICD-10-CM

## 2025-02-03 DIAGNOSIS — I27.20 PULMONARY HYPERTENSION (HCC): ICD-10-CM

## 2025-02-03 LAB
AORTIC ROOT: 2.6 CM
ASCENDING AORTA: 2.9 CM
BSA FOR ECHO PROCEDURE: 1.88 M2
E WAVE DECELERATION TIME: 189 MS
E/A RATIO: 0.91
FRACTIONAL SHORTENING: 34 (ref 28–44)
INTERVENTRICULAR SEPTUM IN DIASTOLE (PARASTERNAL SHORT AXIS VIEW): 1.2 CM
INTERVENTRICULAR SEPTUM: 1.2 CM (ref 0.6–1.1)
LAAS-AP2: 11.8 CM2
LAAS-AP4: 17.9 CM2
LEFT ATRIUM SIZE: 3.8 CM
LEFT ATRIUM VOLUME (MOD BIPLANE): 38 ML
LEFT ATRIUM VOLUME INDEX (MOD BIPLANE): 20.1 ML/M2
LEFT INTERNAL DIMENSION IN SYSTOLE: 2.3 CM (ref 2.1–4)
LEFT VENTRICULAR INTERNAL DIMENSION IN DIASTOLE: 3.5 CM (ref 3.5–6)
LEFT VENTRICULAR POSTERIOR WALL IN END DIASTOLE: 0.9 CM
LEFT VENTRICULAR STROKE VOLUME: 33 ML
LV EF US.2D.A4C+ESTIMATED: 60 %
LVSV (TEICH): 33 ML
MV E'TISSUE VEL-LAT: 10 CM/S
MV E'TISSUE VEL-SEP: 6 CM/S
MV PEAK A VEL: 0.77 M/S
MV PEAK E VEL: 70 CM/S
MV STENOSIS PRESSURE HALF TIME: 55 MS
MV VALVE AREA P 1/2 METHOD: 4
RA PRESSURE ESTIMATED: 3 MMHG
RIGHT ATRIAL 2D VOLUME: 37 ML
RIGHT ATRIUM AREA SYSTOLE A4C: 16.2 CM2
RIGHT VENTRICLE ID DIMENSION: 3.7 CM
RV PSP: 43 MMHG
SL CV LEFT ATRIUM LENGTH A2C: 4.6 CM
SL CV LV EF: 60
SL CV PED ECHO LEFT VENTRICLE DIASTOLIC VOLUME (MOD BIPLANE) 2D: 51 ML
SL CV PED ECHO LEFT VENTRICLE SYSTOLIC VOLUME (MOD BIPLANE) 2D: 18 ML
TR MAX PG: 40 MMHG
TR PEAK VELOCITY: 3.2 M/S
TRICUSPID ANNULAR PLANE SYSTOLIC EXCURSION: 1.6 CM
TRICUSPID VALVE PEAK REGURGITATION VELOCITY: 3.17 M/S

## 2025-02-03 PROCEDURE — 93306 TTE W/DOPPLER COMPLETE: CPT

## 2025-02-03 PROCEDURE — 93306 TTE W/DOPPLER COMPLETE: CPT | Performed by: STUDENT IN AN ORGANIZED HEALTH CARE EDUCATION/TRAINING PROGRAM

## 2025-02-04 ENCOUNTER — RESULTS FOLLOW-UP (OUTPATIENT)
Dept: CARDIOLOGY CLINIC | Facility: CLINIC | Age: 80
End: 2025-02-04

## 2025-02-13 DIAGNOSIS — J96.11 CHRONIC RESPIRATORY FAILURE WITH HYPOXIA (HCC): ICD-10-CM

## 2025-02-13 RX ORDER — FUROSEMIDE 20 MG/1
TABLET ORAL
Qty: 60 TABLET | Refills: 3 | Status: SHIPPED | OUTPATIENT
Start: 2025-02-13

## 2025-03-04 ENCOUNTER — NURSE TRIAGE (OUTPATIENT)
Dept: OTHER | Facility: OTHER | Age: 80
End: 2025-03-04

## 2025-03-04 NOTE — TELEPHONE ENCOUNTER
"FOLLOW UP: No follow up needed     REASON FOR CONVERSATION: Medication Refill    SYMPTOMS: None, patient was calling due to be out of her Metoprolol. RN advised she has 3 refills remaining.     OTHER: None    DISPOSITION: Home Care          Reason for Disposition   Patient has refills remaining on their prescription    Answer Assessment - Initial Assessment Questions  1. DRUG NAME: \"What medicine do you need to have refilled?\"        metoprolol succinate (TOPROL-XL) 25 mg 24 hr tablet    2. REFILLS REMAINING: \"How many refills are remaining?\" (Note: The label on the medicine or pill bottle will show how many refills are remaining. If there are no refills remaining, then a renewal may be needed.)        3    3. EXPIRATION DATE: \"What is the expiration date?\" (Note: The label states when the prescription will , and thus can no longer be refilled.)        Unknown     4. PRESCRIBING HCP: \"Who prescribed it?\" Reason: If prescribed by specialist, call should be referred to that group.        JAY Leo    5. SYMPTOMS: \"Do you have any symptoms?\"        No symptoms, Patient states she is out of her pills    Protocols used: Medication Refill and Renewal Call-Adult-    "

## 2025-03-24 ENCOUNTER — APPOINTMENT (OUTPATIENT)
Dept: LAB | Facility: CLINIC | Age: 80
End: 2025-03-24
Payer: MEDICARE

## 2025-03-24 DIAGNOSIS — I10 ESSENTIAL HYPERTENSION, BENIGN: ICD-10-CM

## 2025-03-24 DIAGNOSIS — E78.2 MIXED HYPERLIPIDEMIA: ICD-10-CM

## 2025-03-24 DIAGNOSIS — J43.8 OTHER EMPHYSEMA (HCC): ICD-10-CM

## 2025-03-24 DIAGNOSIS — R73.01 IMPAIRED FASTING BLOOD SUGAR: ICD-10-CM

## 2025-03-24 DIAGNOSIS — E53.8 B12 DEFICIENCY: ICD-10-CM

## 2025-03-24 LAB
ALBUMIN SERPL BCG-MCNC: 4.8 G/DL (ref 3.5–5)
ALP SERPL-CCNC: 60 U/L (ref 34–104)
ALT SERPL W P-5'-P-CCNC: 10 U/L (ref 7–52)
ANION GAP SERPL CALCULATED.3IONS-SCNC: 10 MMOL/L (ref 4–13)
AST SERPL W P-5'-P-CCNC: 14 U/L (ref 13–39)
BASOPHILS # BLD AUTO: 0.06 THOUSANDS/ÂΜL (ref 0–0.1)
BASOPHILS NFR BLD AUTO: 1 % (ref 0–1)
BILIRUB SERPL-MCNC: 0.8 MG/DL (ref 0.2–1)
BUN SERPL-MCNC: 21 MG/DL (ref 5–25)
CALCIUM SERPL-MCNC: 10.1 MG/DL (ref 8.4–10.2)
CHLORIDE SERPL-SCNC: 101 MMOL/L (ref 96–108)
CHOLEST SERPL-MCNC: 156 MG/DL (ref ?–200)
CO2 SERPL-SCNC: 29 MMOL/L (ref 21–32)
CREAT SERPL-MCNC: 0.8 MG/DL (ref 0.6–1.3)
EOSINOPHIL # BLD AUTO: 0.23 THOUSAND/ÂΜL (ref 0–0.61)
EOSINOPHIL NFR BLD AUTO: 2 % (ref 0–6)
ERYTHROCYTE [DISTWIDTH] IN BLOOD BY AUTOMATED COUNT: 12.6 % (ref 11.6–15.1)
EST. AVERAGE GLUCOSE BLD GHB EST-MCNC: 143 MG/DL
GFR SERPL CREATININE-BSD FRML MDRD: 69 ML/MIN/1.73SQ M
GLUCOSE P FAST SERPL-MCNC: 160 MG/DL (ref 65–99)
HBA1C MFR BLD: 6.6 %
HCT VFR BLD AUTO: 43.9 % (ref 34.8–46.1)
HDLC SERPL-MCNC: 57 MG/DL
HGB BLD-MCNC: 14.2 G/DL (ref 11.5–15.4)
IMM GRANULOCYTES # BLD AUTO: 0.04 THOUSAND/UL (ref 0–0.2)
IMM GRANULOCYTES NFR BLD AUTO: 0 % (ref 0–2)
LDLC SERPL CALC-MCNC: 76 MG/DL (ref 0–100)
LYMPHOCYTES # BLD AUTO: 1.87 THOUSANDS/ÂΜL (ref 0.6–4.47)
LYMPHOCYTES NFR BLD AUTO: 19 % (ref 14–44)
MCH RBC QN AUTO: 29.6 PG (ref 26.8–34.3)
MCHC RBC AUTO-ENTMCNC: 32.3 G/DL (ref 31.4–37.4)
MCV RBC AUTO: 92 FL (ref 82–98)
MONOCYTES # BLD AUTO: 0.55 THOUSAND/ÂΜL (ref 0.17–1.22)
MONOCYTES NFR BLD AUTO: 6 % (ref 4–12)
NEUTROPHILS # BLD AUTO: 7.04 THOUSANDS/ÂΜL (ref 1.85–7.62)
NEUTS SEG NFR BLD AUTO: 72 % (ref 43–75)
NRBC BLD AUTO-RTO: 0 /100 WBCS
PLATELET # BLD AUTO: 351 THOUSANDS/UL (ref 149–390)
PMV BLD AUTO: 10.8 FL (ref 8.9–12.7)
POTASSIUM SERPL-SCNC: 4.1 MMOL/L (ref 3.5–5.3)
PROT SERPL-MCNC: 7.7 G/DL (ref 6.4–8.4)
RBC # BLD AUTO: 4.8 MILLION/UL (ref 3.81–5.12)
SODIUM SERPL-SCNC: 140 MMOL/L (ref 135–147)
TRIGL SERPL-MCNC: 113 MG/DL (ref ?–150)
TSH SERPL DL<=0.05 MIU/L-ACNC: 3.79 UIU/ML (ref 0.45–4.5)
VIT B12 SERPL-MCNC: 725 PG/ML (ref 180–914)
WBC # BLD AUTO: 9.79 THOUSAND/UL (ref 4.31–10.16)

## 2025-03-24 PROCEDURE — 80053 COMPREHEN METABOLIC PANEL: CPT

## 2025-03-24 PROCEDURE — 80061 LIPID PANEL: CPT

## 2025-03-24 PROCEDURE — 83036 HEMOGLOBIN GLYCOSYLATED A1C: CPT

## 2025-03-24 PROCEDURE — 82607 VITAMIN B-12: CPT

## 2025-03-24 PROCEDURE — 85025 COMPLETE CBC W/AUTO DIFF WBC: CPT

## 2025-03-24 PROCEDURE — 84443 ASSAY THYROID STIM HORMONE: CPT

## 2025-03-24 PROCEDURE — 36415 COLL VENOUS BLD VENIPUNCTURE: CPT

## 2025-03-27 ENCOUNTER — OFFICE VISIT (OUTPATIENT)
Dept: INTERNAL MEDICINE CLINIC | Facility: CLINIC | Age: 80
End: 2025-03-27
Payer: MEDICARE

## 2025-03-27 VITALS
BODY MASS INDEX: 31.38 KG/M2 | HEIGHT: 64 IN | OXYGEN SATURATION: 92 % | HEART RATE: 75 BPM | SYSTOLIC BLOOD PRESSURE: 132 MMHG | DIASTOLIC BLOOD PRESSURE: 76 MMHG | WEIGHT: 183.8 LBS | TEMPERATURE: 98.3 F

## 2025-03-27 DIAGNOSIS — E78.2 MIXED HYPERLIPIDEMIA: ICD-10-CM

## 2025-03-27 DIAGNOSIS — E66.09 CLASS 1 OBESITY DUE TO EXCESS CALORIES WITH SERIOUS COMORBIDITY AND BODY MASS INDEX (BMI) OF 31.0 TO 31.9 IN ADULT: ICD-10-CM

## 2025-03-27 DIAGNOSIS — J43.8 OTHER EMPHYSEMA (HCC): ICD-10-CM

## 2025-03-27 DIAGNOSIS — I50.33 ACUTE ON CHRONIC DIASTOLIC (CONGESTIVE) HEART FAILURE (HCC): ICD-10-CM

## 2025-03-27 DIAGNOSIS — I50.42 CHRONIC COMBINED SYSTOLIC AND DIASTOLIC CONGESTIVE HEART FAILURE (HCC): ICD-10-CM

## 2025-03-27 DIAGNOSIS — E66.811 CLASS 1 OBESITY DUE TO EXCESS CALORIES WITH SERIOUS COMORBIDITY AND BODY MASS INDEX (BMI) OF 31.0 TO 31.9 IN ADULT: ICD-10-CM

## 2025-03-27 DIAGNOSIS — I10 ESSENTIAL HYPERTENSION, BENIGN: ICD-10-CM

## 2025-03-27 DIAGNOSIS — E11.65 TYPE 2 DIABETES MELLITUS WITH HYPERGLYCEMIA, WITHOUT LONG-TERM CURRENT USE OF INSULIN (HCC): Primary | ICD-10-CM

## 2025-03-27 PROCEDURE — 99214 OFFICE O/P EST MOD 30 MIN: CPT | Performed by: INTERNAL MEDICINE

## 2025-03-27 PROCEDURE — G2211 COMPLEX E/M VISIT ADD ON: HCPCS | Performed by: INTERNAL MEDICINE

## 2025-03-27 RX ORDER — TIRZEPATIDE 2.5 MG/.5ML
2.5 INJECTION, SOLUTION SUBCUTANEOUS WEEKLY
Qty: 2 ML | Refills: 0 | Status: SHIPPED | OUTPATIENT
Start: 2025-03-27

## 2025-03-27 NOTE — PROGRESS NOTES
Assessment/Plan:             1. Type 2 diabetes mellitus with hyperglycemia, without long-term current use of insulin (HCC)  -     Tirzepatide (Mounjaro) 2.5 MG/0.5ML SOAJ; Inject 2.5 mg under the skin once a week  2. Essential hypertension, benign  Comments:  continue same med  3. Chronic combined systolic and diastolic congestive heart failure (HCC)  Comments:  continue same med  4. Other emphysema (HCC)  Comments:  continue same med  5. Mixed hyperlipidemia  Comments:  continue same med  6. Class 1 obesity due to excess calories with serious comorbidity and body mass index (BMI) of 31.0 to 31.9 in adult  7. Acute on chronic diastolic (congestive) heart failure (HCC)         Subjective:      Patient ID: Adelita Smith is a 79 y.o. female.    Follow-up on blood test done on 3/24/2025 test discussed with her        The following portions of the patient's history were reviewed and updated as appropriate: She  has a past medical history of Allergic rhinitis, Anxiety, Arthritis, Diabetes mellitus ([facility]), History of COVID-19 (01/30/2023), Oxygen decrease, and Tobacco abuse.  She   Patient Active Problem List    Diagnosis Date Noted   • Class 1 obesity due to excess calories with serious comorbidity and body mass index (BMI) of 31.0 to 31.9 in adult 01/09/2025   • SVT (supraventricular tachycardia) ([facility]) 01/09/2025   • Body mass index 29.0-29.9, adult 05/20/2024   • Depression, recurrent ([facility]) 01/22/2024   • Fall 03/06/2023   • Coccygeal pain, acute 03/06/2023   • Chest pain 02/07/2023   • Acute respiratory failure with hypoxia ([facility]) 01/30/2023   • COPD exacerbation ([facility]) 01/30/2023   • Biventricular congestive heart failure ([facility]) 01/30/2023   • Mixed hyperlipidemia 06/06/2022   • Dysthymic disorder 01/24/2022   • Impaired fasting blood sugar 01/24/2022   • Gastrointestinal hemorrhage 10/21/2021   • Dermatitis 10/21/2021   • Primary osteoarthritis of both hands 10/21/2021   • Congestive heart failure with right ventricular  systolic dysfunction ([facility]) 09/23/2021   • Rheumatoid factor positive 09/22/2021   • Arthritis 09/22/2021   • Diabetic eye exam ([facility]) 06/30/2021   • Hypoxia 06/16/2021   • Essential hypertension, benign 06/02/2021   • Chronic combined systolic and diastolic congestive heart failure ([facility]) 06/02/2021   • Pulmonary hypertension ([facility]) 06/02/2021   • Cigarette nicotine dependence without complication    • Elevated BP without diagnosis of hypertension 05/22/2021   • Lung nodules 05/18/2021   • Other emphysema ([facility]) 05/18/2021   • Chronic respiratory failure with hypoxia ([facility]) 05/18/2021   • Nicotine dependence in remission 05/18/2021   • Microalbuminuria 05/17/2021   • Acute on chronic diastolic (congestive) heart failure ([facility]) 05/11/2021   • SOB (shortness of breath) 05/11/2021   • Type 2 diabetes mellitus with hyperglycemia, without long-term current use of insulin ([facility]) 05/11/2021   • Allergic rhinitis    • Anxiety    • Tendinitis of finger 05/01/2015     She  has a past surgical history that includes Breast surgery (Right); Other surgical history; and Other surgical history.  Her family history includes Diabetes in her brother; Heart disease in her brother; Hypotension in her daughter; Lymphoma in her brother; No Known Problems in her sister; Parkinsonism in her sister; Prostate cancer in her father; Proteinuria in her daughter.  She  reports that she quit smoking about 3 years ago. Her smoking use included cigarettes. She started smoking about 33 years ago. She has a 15 pack-year smoking history. She has never been exposed to tobacco smoke. She has never used smokeless tobacco. She reports current alcohol use of about 1.0 - 2.0 standard drink of alcohol per week. She reports that she does not use drugs.  Current Outpatient Medications   Medication Sig Dispense Refill   • albuterol (PROVENTIL HFA,VENTOLIN HFA) 90 mcg/act inhaler Inhale 2 puffs every 4 (four) hours as needed for wheezing 6.7 g 1   • Anoro Ellipta  62.5-25 MCG/ACT inhaler inhale 1 puff by mouth and INTO THE LUNGS once daily 180 blister 1   • furosemide (LASIX) 20 mg tablet TAKE 1 TABLET BY MOUTH DAILY FOR 2 TO 3 DAYS ONLY, THEN WHEN NEEDED FOR WORSENING SHORTNESS OF BREATH, ABDOMINAL BLOATING OR LE EDEMA. 60 tablet 3   • hydrOXYzine HCL (ATARAX) 10 mg tablet Take 1 tablet (10 mg total) by mouth 2 (two) times a day as needed for itching 60 tablet 2   • metoprolol succinate (TOPROL-XL) 25 mg 24 hr tablet Take 1 tablet (25 mg total) by mouth daily 90 tablet 3   • rosuvastatin (CRESTOR) 5 mg tablet Take 1 tablet (5 mg total) by mouth daily 100 tablet 1   • sertraline (ZOLOFT) 50 mg tablet TAKE 1 AND 1/2 TABLET BY MOUTH DAILY 135 tablet 1   • Tirzepatide (Mounjaro) 2.5 MG/0.5ML SOAJ Inject 2.5 mg under the skin once a week 2 mL 0   • triamcinolone (KENALOG) 0.1 % cream Apply topically 2 (two) times a day 30 g 0     No current facility-administered medications for this visit.     Current Outpatient Medications on File Prior to Visit   Medication Sig   • albuterol (PROVENTIL HFA,VENTOLIN HFA) 90 mcg/act inhaler Inhale 2 puffs every 4 (four) hours as needed for wheezing   • Anoro Ellipta 62.5-25 MCG/ACT inhaler inhale 1 puff by mouth and INTO THE LUNGS once daily   • furosemide (LASIX) 20 mg tablet TAKE 1 TABLET BY MOUTH DAILY FOR 2 TO 3 DAYS ONLY, THEN WHEN NEEDED FOR WORSENING SHORTNESS OF BREATH, ABDOMINAL BLOATING OR LE EDEMA.   • hydrOXYzine HCL (ATARAX) 10 mg tablet Take 1 tablet (10 mg total) by mouth 2 (two) times a day as needed for itching   • metoprolol succinate (TOPROL-XL) 25 mg 24 hr tablet Take 1 tablet (25 mg total) by mouth daily   • rosuvastatin (CRESTOR) 5 mg tablet Take 1 tablet (5 mg total) by mouth daily   • sertraline (ZOLOFT) 50 mg tablet TAKE 1 AND 1/2 TABLET BY MOUTH DAILY   • triamcinolone (KENALOG) 0.1 % cream Apply topically 2 (two) times a day     No current facility-administered medications on file prior to visit.     She has no known  "allergies..    Review of Systems   Constitutional:  Negative for chills and fever.   HENT:  Negative for congestion, ear pain and sore throat.    Eyes:  Negative for pain.   Respiratory:  Positive for shortness of breath. Negative for cough.    Cardiovascular:  Negative for chest pain and leg swelling.   Gastrointestinal:  Negative for abdominal pain, nausea and vomiting.   Endocrine: Negative for polyuria.   Genitourinary:  Negative for difficulty urinating, frequency and urgency.   Musculoskeletal:  Negative for arthralgias and back pain.   Skin:  Negative for rash.   Neurological:  Negative for weakness and headaches.   Psychiatric/Behavioral:  Negative for sleep disturbance. The patient is not nervous/anxious.          Objective:      /76 (BP Location: Right arm, Patient Position: Sitting, Cuff Size: Standard)   Pulse 75   Temp 98.3 °F (36.8 °C) (Temporal)   Ht 5' 4\" (1.626 m)   Wt 83.4 kg (183 lb 12.8 oz)   SpO2 92%   BMI 31.55 kg/m²     Recent Results (from the past 8 weeks)   Echo complete w/ contrast if indicated    Collection Time: 02/03/25  4:00 PM   Result Value Ref Range    Triscuspid Valve Regurgitation Peak Gradient 40.0 mmHg    RAA A4C 16.2 cm2    LA Volume Index (BP) 20.1 mL/m2    MV Peak A Paul 0.77 m/s    MV stenosis pressure 1/2 time 55 ms    MV Peak E Paul 70 cm/s    E wave deceleration time 189 ms    E/A ratio 0.91     MV valve area p 1/2 method 4.00     RA 2D Volume 37.0 mL    TR Peak Paul 3.2 m/s    RVID d 3.7 cm    A4C EF 60 %    Tricuspid valve peak regurgitation velocity 3.17 m/s    Left ventricular stroke volume (2D) 33.00 mL    IVSd 1.20 cm    Tricuspid annular plane systolic excursion 1.60 cm    Ao root 2.60 cm    LVPWd 0.90 cm    LA size 3.8 cm    Asc Ao 2.9 cm    LA volume (BP) 38 mL    FS 34 28 - 44    LVIDS 2.30 cm    IVS 1.2 cm    LVIDd 3.50 cm    LA length (A2C) 4.60 cm    LEFT VENTRICLE SYSTOLIC VOLUME (MOD BIPLANE) 2D 18 mL    LV DIASTOLIC VOLUME (MOD BIPLANE) 2D 51 mL "    Left Atrium Area-systolic Four Chamber 17.9 cm2    Left Atrium Area-systolic Apical Two Chamber 11.8 cm2    MV E' Tissue Velocity Lateral 10 cm/s    MV E' Tissue Velocity Septal 6 cm/s    LVSV, 2D 33 mL    BSA 1.88 m2    LV EF 60     Est. RA pres 3.0 mmHg    Right Ventricular Peak Systolic Pressure 43.00 mmHg   CBC and differential    Collection Time: 03/24/25 11:27 AM   Result Value Ref Range    WBC 9.79 4.31 - 10.16 Thousand/uL    RBC 4.80 3.81 - 5.12 Million/uL    Hemoglobin 14.2 11.5 - 15.4 g/dL    Hematocrit 43.9 34.8 - 46.1 %    MCV 92 82 - 98 fL    MCH 29.6 26.8 - 34.3 pg    MCHC 32.3 31.4 - 37.4 g/dL    RDW 12.6 11.6 - 15.1 %    MPV 10.8 8.9 - 12.7 fL    Platelets 351 149 - 390 Thousands/uL    nRBC 0 /100 WBCs    Segmented % 72 43 - 75 %    Immature Grans % 0 0 - 2 %    Lymphocytes % 19 14 - 44 %    Monocytes % 6 4 - 12 %    Eosinophils Relative 2 0 - 6 %    Basophils Relative 1 0 - 1 %    Absolute Neutrophils 7.04 1.85 - 7.62 Thousands/µL    Absolute Immature Grans 0.04 0.00 - 0.20 Thousand/uL    Absolute Lymphocytes 1.87 0.60 - 4.47 Thousands/µL    Absolute Monocytes 0.55 0.17 - 1.22 Thousand/µL    Eosinophils Absolute 0.23 0.00 - 0.61 Thousand/µL    Basophils Absolute 0.06 0.00 - 0.10 Thousands/µL   Comprehensive metabolic panel    Collection Time: 03/24/25 11:27 AM   Result Value Ref Range    Sodium 140 135 - 147 mmol/L    Potassium 4.1 3.5 - 5.3 mmol/L    Chloride 101 96 - 108 mmol/L    CO2 29 21 - 32 mmol/L    ANION GAP 10 4 - 13 mmol/L    BUN 21 5 - 25 mg/dL    Creatinine 0.80 0.60 - 1.30 mg/dL    Glucose, Fasting 160 (H) 65 - 99 mg/dL    Calcium 10.1 8.4 - 10.2 mg/dL    AST 14 13 - 39 U/L    ALT 10 7 - 52 U/L    Alkaline Phosphatase 60 34 - 104 U/L    Total Protein 7.7 6.4 - 8.4 g/dL    Albumin 4.8 3.5 - 5.0 g/dL    Total Bilirubin 0.80 0.20 - 1.00 mg/dL    eGFR 69 ml/min/1.73sq m   Lipid Panel with Direct LDL reflex    Collection Time: 03/24/25 11:27 AM   Result Value Ref Range    Cholesterol 156  See Comment mg/dL    Triglycerides 113 See Comment mg/dL    HDL, Direct 57 >=50 mg/dL    LDL Calculated 76 0 - 100 mg/dL   TSH, 3rd generation    Collection Time: 03/24/25 11:27 AM   Result Value Ref Range    TSH 3RD GENERATON 3.786 0.450 - 4.500 uIU/mL   Vitamin B12    Collection Time: 03/24/25 11:27 AM   Result Value Ref Range    Vitamin B-12 725 180 - 914 pg/mL   Hemoglobin A1C    Collection Time: 03/24/25 11:27 AM   Result Value Ref Range    Hemoglobin A1C 6.6 (H) Normal 4.0-5.6%; PreDiabetic 5.7-6.4%; Diabetic >=6.5%; Glycemic control for adults with diabetes <7.0% %     mg/dl        Physical Exam  Constitutional:       Appearance: Normal appearance.   HENT:      Head: Normocephalic.      Right Ear: External ear normal.      Left Ear: External ear normal.      Nose: Nose normal. No congestion.      Mouth/Throat:      Mouth: Mucous membranes are moist.      Pharynx: Oropharynx is clear. No oropharyngeal exudate or posterior oropharyngeal erythema.   Eyes:      Extraocular Movements: Extraocular movements intact.      Conjunctiva/sclera: Conjunctivae normal.   Cardiovascular:      Rate and Rhythm: Normal rate and regular rhythm.      Heart sounds: Normal heart sounds. No murmur heard.  Pulmonary:      Effort: Pulmonary effort is normal.      Breath sounds: Normal breath sounds. No wheezing or rales.   Abdominal:      General: Abdomen is flat. There is no distension.      Palpations: Abdomen is soft.      Tenderness: There is no abdominal tenderness.   Musculoskeletal:      Cervical back: Normal range of motion and neck supple.      Right lower leg: No edema.      Left lower leg: No edema.   Lymphadenopathy:      Cervical: No cervical adenopathy.   Skin:     General: Skin is warm.   Neurological:      General: No focal deficit present.      Mental Status: She is alert and oriented to person, place, and time.

## 2025-03-28 ENCOUNTER — TELEPHONE (OUTPATIENT)
Age: 80
End: 2025-03-28

## 2025-03-28 NOTE — TELEPHONE ENCOUNTER
PA for  (Mounjaro) 2.5 MG/0.5MLSUBMITTED to Medicare     via      [x][facility]-Case ID # [id number]    [x]PA sent as URGENT    All office notes, labs and other pertaining documents and studies sent. Clinical questions answered. Awaiting determination from insurance company.     Turnaround time for your insurance to make a decision on your Prior Authorization can take 7-21 business days.

## 2025-03-28 NOTE — TELEPHONE ENCOUNTER
PA for (Mounjaro) 2.5 MG/0.5ML  APPROVED     Date(s) approved February 26, 2025 to March 28, 2026     Case #[id number]     Patient advised by          []MyChart Message  [x]Phone call   []LMOM  []L/M to call office as no active Communication consent on file  []Unable to leave detailed message as VM not approved on Communication consent       Pharmacy advised by    [x]Fax  []Phone call  []Secure Chat    Specialty Pharmacy    []     Approval letter scanned into Media Yes

## 2025-04-07 ENCOUNTER — TELEPHONE (OUTPATIENT)
Age: 80
End: 2025-04-07

## 2025-04-07 NOTE — TELEPHONE ENCOUNTER
Christ from [employer] calling in to see if office received fax regarding prior authorization for pt's shoulder and back. I did not see anything scanned into chart and he is going to refax; please advise, TY.

## 2025-04-09 NOTE — TELEPHONE ENCOUNTER
Christ called again to check status of fax sent to office for pt. I relayed to rep that pt does not want or need back brace. Rep had no further questions.

## 2025-05-01 ENCOUNTER — RA CDI HCC (OUTPATIENT)
Dept: OTHER | Facility: HOSPITAL | Age: 80
End: 2025-05-01

## 2025-05-02 DIAGNOSIS — E11.9 DIABETIC EYE EXAM (HCC): Primary | ICD-10-CM

## 2025-05-02 DIAGNOSIS — Z01.00 DIABETIC EYE EXAM (HCC): Primary | ICD-10-CM

## 2025-05-08 ENCOUNTER — OFFICE VISIT (OUTPATIENT)
Dept: INTERNAL MEDICINE CLINIC | Facility: CLINIC | Age: 80
End: 2025-05-08
Payer: MEDICARE

## 2025-05-08 VITALS
WEIGHT: 176.2 LBS | HEIGHT: 64 IN | OXYGEN SATURATION: 91 % | HEART RATE: 82 BPM | BODY MASS INDEX: 30.08 KG/M2 | DIASTOLIC BLOOD PRESSURE: 78 MMHG | SYSTOLIC BLOOD PRESSURE: 130 MMHG | TEMPERATURE: 97.4 F

## 2025-05-08 DIAGNOSIS — I50.42 CHRONIC COMBINED SYSTOLIC AND DIASTOLIC CONGESTIVE HEART FAILURE (HCC): ICD-10-CM

## 2025-05-08 DIAGNOSIS — E11.9 DIABETIC EYE EXAM (HCC): ICD-10-CM

## 2025-05-08 DIAGNOSIS — E66.09 CLASS 1 OBESITY DUE TO EXCESS CALORIES WITH SERIOUS COMORBIDITY AND BODY MASS INDEX (BMI) OF 31.0 TO 31.9 IN ADULT: ICD-10-CM

## 2025-05-08 DIAGNOSIS — Z01.00 DIABETIC EYE EXAM (HCC): ICD-10-CM

## 2025-05-08 DIAGNOSIS — I10 ESSENTIAL HYPERTENSION, BENIGN: ICD-10-CM

## 2025-05-08 DIAGNOSIS — E78.2 MIXED HYPERLIPIDEMIA: ICD-10-CM

## 2025-05-08 DIAGNOSIS — E66.811 CLASS 1 OBESITY DUE TO EXCESS CALORIES WITH SERIOUS COMORBIDITY AND BODY MASS INDEX (BMI) OF 31.0 TO 31.9 IN ADULT: ICD-10-CM

## 2025-05-08 DIAGNOSIS — E11.65 TYPE 2 DIABETES MELLITUS WITH HYPERGLYCEMIA, WITHOUT LONG-TERM CURRENT USE OF INSULIN (HCC): Primary | ICD-10-CM

## 2025-05-08 DIAGNOSIS — F34.1 DYSTHYMIC DISORDER: ICD-10-CM

## 2025-05-08 PROCEDURE — 99214 OFFICE O/P EST MOD 30 MIN: CPT | Performed by: INTERNAL MEDICINE

## 2025-05-08 PROCEDURE — G2211 COMPLEX E/M VISIT ADD ON: HCPCS | Performed by: INTERNAL MEDICINE

## 2025-05-08 RX ORDER — TIRZEPATIDE 2.5 MG/.5ML
2.5 INJECTION, SOLUTION SUBCUTANEOUS WEEKLY
Qty: 2 ML | Refills: 3 | Status: SHIPPED | OUTPATIENT
Start: 2025-05-08

## 2025-05-08 NOTE — ASSESSMENT & PLAN NOTE
Wt Readings from Last 3 Encounters:   05/08/25 79.9 kg (176 lb 3.2 oz)   03/27/25 83.4 kg (183 lb 12.8 oz)   02/03/25 83 kg (183 lb)       Continue same med

## 2025-05-08 NOTE — ASSESSMENT & PLAN NOTE
Lab Results   Component Value Date    HGBA1C 6.6 (H) 03/24/2025   Continue same med  Orders:  •  Tirzepatide (Mounjaro) 2.5 MG/0.5ML SOAJ; Inject 2.5 mg under the skin once a week

## 2025-05-08 NOTE — PROGRESS NOTES
Name: Adelita Smith      : 1945      MRN: [id number]  Encounter Provider: Mundo Hubbard MD  Encounter Date: 2025   Encounter department: [facility] [address]  :  Assessment & Plan  Type 2 diabetes mellitus with hyperglycemia, without long-term current use of insulin (HCC)    Lab Results   Component Value Date    HGBA1C 6.6 (H) 2025   Continue same med  Orders:  •  Tirzepatide (Mounjaro) 2.5 MG/0.5ML SOAJ; Inject 2.5 mg under the skin once a week    Diabetic eye exam (HCC)    Orders:  •  Ambulatory Referral to Ophthalmology; Future    Essential hypertension, benign  Continue same med       Chronic combined systolic and diastolic congestive heart failure (HCC)  Wt Readings from Last 3 Encounters:   25 79.9 kg (176 lb 3.2 oz)   25 83.4 kg (183 lb 12.8 oz)   25 83 kg (183 lb)       Continue same med           Dysthymic disorder  Continue same med       Mixed hyperlipidemia  Continue same med       Class 1 obesity due to excess calories with serious comorbidity and body mass index (BMI) of 31.0 to 31.9 in adult    Continue same med              History of Present Illness   Follow-up on multiple medical problems with stable on current medications      Review of Systems   Constitutional:  Negative for chills and fever.   HENT:  Negative for congestion, ear pain and sore throat.    Eyes:  Negative for pain.   Respiratory:  Positive for cough and shortness of breath.    Cardiovascular:  Negative for chest pain and leg swelling.   Gastrointestinal:  Negative for abdominal pain, nausea and vomiting.   Endocrine: Negative for polyuria.   Genitourinary:  Negative for difficulty urinating, frequency and urgency.   Musculoskeletal:  Positive for arthralgias and back pain.   Skin:  Negative for rash.   Neurological:  Negative for weakness and headaches.   Psychiatric/Behavioral:  Negative for sleep disturbance. The patient is not nervous/anxious.        Objective   BP  "130/78 (BP Location: Left arm, Patient Position: Sitting, Cuff Size: Standard)   Pulse 82   Temp (!) 97.4 °F (36.3 °C) (Temporal)   Ht 5' 4\" (1.626 m)   Wt 79.9 kg (176 lb 3.2 oz)   SpO2 91%   BMI 30.24 kg/m²      Physical Exam  Vitals and nursing note reviewed.   Constitutional:       General: She is not in acute distress.     Appearance: She is well-developed.   HENT:      Head: Normocephalic and atraumatic.      Right Ear: External ear normal.      Left Ear: External ear normal.      Mouth/Throat:      Mouth: Mucous membranes are moist.      Pharynx: Oropharynx is clear.   Eyes:      Extraocular Movements: Extraocular movements intact.      Conjunctiva/sclera: Conjunctivae normal.   Cardiovascular:      Rate and Rhythm: Normal rate and regular rhythm.      Pulses: no weak pulses.           Dorsalis pedis pulses are 2+ on the right side and 2+ on the left side.        Posterior tibial pulses are 2+ on the right side and 2+ on the left side.      Heart sounds: Normal heart sounds. No murmur heard.  Pulmonary:      Effort: Pulmonary effort is normal. No respiratory distress.      Breath sounds: Normal breath sounds. No wheezing or rales.   Abdominal:      General: Abdomen is flat. There is no distension.      Palpations: Abdomen is soft.      Tenderness: There is no abdominal tenderness.   Musculoskeletal:         General: No swelling.      Cervical back: Neck supple.      Right lower leg: No edema.      Left lower leg: No edema.   Feet:      Right foot:      Skin integrity: No ulcer, skin breakdown, erythema, warmth, callus or dry skin.      Left foot:      Skin integrity: No ulcer, skin breakdown, erythema, warmth, callus or dry skin.   Skin:     General: Skin is warm and dry.      Capillary Refill: Capillary refill takes less than 2 seconds.   Neurological:      General: No focal deficit present.      Mental Status: She is alert and oriented to person, place, and time.   Psychiatric:         Mood and " Affect: Mood normal.     Diabetic Foot Exam    Patient's shoes and socks removed.    Right Foot/Ankle   Right Foot Inspection  Skin Exam: skin normal. Skin not intact, no dry skin, no warmth, no callus, no erythema, no maceration, no abnormal color, no pre-ulcer, no ulcer and no callus.     Toe Exam: ROM and strength within normal limits. No swelling, no tenderness, erythema and  no right toe deformity    Sensory   Monofilament testing: intact    Vascular  Capillary refills: < 3 seconds  The right DP pulse is 2+. The right PT pulse is 2+.     Left Foot/Ankle  Left Foot Inspection  Skin Exam: skin normal. Skin not intact, no dry skin, no warmth, no erythema, no maceration, normal color, no pre-ulcer, no ulcer and no callus.     Toe Exam: ROM and strength within normal limits. No swelling, no tenderness, no erythema and no left toe deformity.     Sensory   Monofilament testing: intact    Vascular  Capillary refills: < 3 seconds  The left DP pulse is 2+. The left PT pulse is 2+.     Assign Risk Category  No deformity present  No loss of protective sensation  No weak pulses  Risk: 0

## 2025-06-23 ENCOUNTER — OFFICE VISIT (OUTPATIENT)
Dept: PULMONOLOGY | Facility: CLINIC | Age: 80
End: 2025-06-23
Payer: MEDICARE

## 2025-06-23 VITALS
TEMPERATURE: 98.6 F | HEART RATE: 79 BPM | HEIGHT: 64 IN | OXYGEN SATURATION: 98 % | SYSTOLIC BLOOD PRESSURE: 126 MMHG | BODY MASS INDEX: 29.37 KG/M2 | WEIGHT: 172 LBS | DIASTOLIC BLOOD PRESSURE: 66 MMHG

## 2025-06-23 DIAGNOSIS — F17.211 CIGARETTE NICOTINE DEPENDENCE IN REMISSION: ICD-10-CM

## 2025-06-23 DIAGNOSIS — J96.11 CHRONIC RESPIRATORY FAILURE WITH HYPOXIA (HCC): ICD-10-CM

## 2025-06-23 DIAGNOSIS — I27.20 PULMONARY HYPERTENSION (HCC): ICD-10-CM

## 2025-06-23 DIAGNOSIS — J44.9 CHRONIC OBSTRUCTIVE PULMONARY DISEASE, UNSPECIFIED COPD TYPE (HCC): Primary | ICD-10-CM

## 2025-06-23 PROCEDURE — G2211 COMPLEX E/M VISIT ADD ON: HCPCS | Performed by: INTERNAL MEDICINE

## 2025-06-23 PROCEDURE — 99214 OFFICE O/P EST MOD 30 MIN: CPT | Performed by: INTERNAL MEDICINE

## 2025-06-23 NOTE — PROGRESS NOTES
Pulmonary Follow Up Note   Adelita Smith 79 y.o. female MRN: [id number]  6/23/2025      Assessment and Plan     Chronic hypoxic respiratory failure likely secondary to severe COPD (FEV1 36%) - not in acute exacerbation  - former smoker. CT scan in 2021 showed emphysema  - severe obstruction with FEV1 36% in 2023 2023 6MWT showing needing 4L when ambulating  - has been on 4 lpm since last hospitalization in 2/2023, inconsistant use at home in the past, now using 3L full time.     - previous meds including Trellegy 200, dc due to cost. was on Anoro 62.5-25 QD and albuterol PRN however was switched back to Trellegy that she preferred. Previously referred to RT management team but she did not come to the conclusion yet. She is willing to pay $600 for Trelegy 200.   - continue with Trelegy 200 QD and albuterol PRN. I gave her 2 samples today. Continue with pulmonary rehab.     - off window for LDCT screen. Patient completed pneumococcus vaccination  - due for flu shot and RSV shot this year. She will get it from pharmacy  - she is asking about the BLVR today in hope to cut down her oxygen needs. She had a previous formal PFT in 2021 and CT chest in 2023. She does not have hypercapnia at this point. If she decided to go with this route she has to repeat those test and probably this may not completely get away with oxygen requirement. 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 She will think about it and we will rediscuss at next visit. In the mean time she has decided to re-enroll with home PT first in hope to transition to pulmonary rehabitation in the future.      Chronic diastolic heart failure with mild pulmonary HTN likely Group 3 secondary to COPD   SVT  5/19/21 2D echo normal LVEF 50-55%, NRWMA,   Mild concentric hypertrophy, right ventricular systolic function moderately reduced, RA mildly dilated, mild MR, mild TR, mild PHTN.     5/2023 pHTN with RVSP of 51.    Continue to follow up as per primary care/cardiology. On lasix  Documented SVT upon recent visit at pulmonary rehab. Her cardiologist add on Metoprolol for her  Continue to follow up with cardiology  Again stress the importance of pulmonary rehab to build up stigma     Multiple Pulmonary nodules   - stable 3mm LLL, stable 2mm subpleural nodule, stable 6mm LLL nodule  - last CT scan in 2023. With stable nodules from 2021 and 2023. With two year stability     Tobacco Abuse   - quit May 2021   -  LDCT as above       No follow-ups on file.    History of Present Illness   HPI:  Adelita Smith is a 79 y.o. female who had hx of severe COPD on home O2 3-6L, DM2, anxiety, past COVID infection, biventricular congestive heart failure, mild pHTN, nicoteine dependence, presents today for  Follow up    Patient was admitted to our hospital in 2/2023 following COPD exacerbation, and was also found to have hypoxia needing more oxygen support. She was discharged with Trelegy 200, and 4L O2 support. She self discontinued trellegy because of the cost, and only using ventolin PRN which she also finished 4 days ago. She uses oxygen at night, not measuring her saturation, and felt sometimes she could not catch up her breath. No fever/cough, no worsening shortness of breath. She quit smoking in 2020. She did not participate in any pul rehab in the past.     Last seen in 8/2023. Medication was changed to Anoro. She was advised to continue using oxygen supplement. She was also referred to pulmonary rehab. She however canceled her appointment with us for the last couple of months.    Seen again in 4/2024 for follow up COPD. She is feeling better for her respiratory symptoms after pulmonary rehab. She was found to have SVT during rehab that her cardiologist add on metoprolol for her. She still needs O2 supplement and requires 2 L at home, stating her saturation was in low 90s. She is compliant with Anoro daily and only uses 2 times of albuterol  within last 6 months. She has no COPD exacerbation. She is wondering if she can DC her oxygen. She also mentioned that she is compilant with Anoro, once daily, but noticed last time she has to pay 200 dollars for the inhaler each month. A repeat 6MWT test was done and indicating she required 4L of oxygen when ambulating. She verbalized understanding. Pulm rehab referral sent.    Last follow up in 9/2024. Feeling awful that her panic attack is getting worse. She quit pulmonary rehab because of anxiety. She started to wear oxygen all the time, she uses 3 L now and oxygen level is about 95% at rest and 74% when ambulating.     Today she presented for follow up. She is still using 3L oxygen at rest/ambulating. She is otherwise feeling the same, still needs SOB when ambulating but she does not require any prednisone in between. She is asking about BLVR hoping to cut down her oxygen requirement. She otherwise is compliant with trelegy 200 QD and albuterol as needed. Denies any tightness, palpitation or cough.    She quit smoking in 2020. She smokes about 35 years 0.5pk/day. Previous LDCT scan showed stable multiple nodules in the lung.    6MWT 2021: desaturation to 83% at 6 mins  PFT 2021: severe obstructive airflow with FEV1 47%    ECHo 1/2023 E F 67%, nukd Trm RVSO 51 mm Hg    Review of Systems   All other systems reviewed and are negative.      Historical Information   Past Medical History:   Diagnosis Date    Allergic rhinitis     Anxiety     Arthritis     Diabetes mellitus (HCC)     History of COVID-19 01/30/2023    Oxygen decrease     Tobacco abuse      Past Surgical History:   Procedure Laterality Date    BREAST SURGERY Right     Cyst    OTHER SURGICAL HISTORY      Cataract implant    OTHER SURGICAL HISTORY      Fertilization     Family History   Problem Relation Name Age of Onset    Prostate cancer Father Pop     Diabetes Brother Edi     Heart disease Brother Edi     Lymphoma Brother Edi     No Known  "Problems Sister      Parkinsonism Sister      Hypotension Daughter          Takes \"salt pills\"    Proteinuria Daughter          Occuring during her 3 pregnancies; follows with nephrology     Social History     Tobacco Use   Smoking Status Former    Current packs/day: 0.00    Average packs/day: 0.5 packs/day for 30.0 years (15.0 ttl pk-yrs)    Types: Cigarettes    Start date: 1991    Quit date: 2021    Years since quittin.1    Passive exposure: Never   Smokeless Tobacco Never   Tobacco Comments    Began smoking in her late teens, averaging 1/2 pack daily. States she quit smoking a few weeks ago (Updated 2021).       Meds/Allergies     Current Outpatient Medications:     albuterol (PROVENTIL HFA,VENTOLIN HFA) 90 mcg/act inhaler, Inhale 2 puffs every 4 (four) hours as needed for wheezing, Disp: 6.7 g, Rfl: 1    Anoro Ellipta 62.5-25 MCG/ACT inhaler, inhale 1 puff by mouth and INTO THE LUNGS once daily, Disp: 180 blister, Rfl: 1    furosemide (LASIX) 20 mg tablet, TAKE 1 TABLET BY MOUTH DAILY FOR 2 TO 3 DAYS ONLY, THEN WHEN NEEDED FOR WORSENING SHORTNESS OF BREATH, ABDOMINAL BLOATING OR LE EDEMA., Disp: 60 tablet, Rfl: 3    hydrOXYzine HCL (ATARAX) 10 mg tablet, Take 1 tablet (10 mg total) by mouth 2 (two) times a day as needed for itching, Disp: 60 tablet, Rfl: 2    metoprolol succinate (TOPROL-XL) 25 mg 24 hr tablet, Take 1 tablet (25 mg total) by mouth daily, Disp: 90 tablet, Rfl: 3    rosuvastatin (CRESTOR) 5 mg tablet, Take 1 tablet (5 mg total) by mouth daily, Disp: 100 tablet, Rfl: 1    sertraline (ZOLOFT) 50 mg tablet, TAKE 1 AND 1/2 TABLET BY MOUTH DAILY, Disp: 135 tablet, Rfl: 1    Tirzepatide (Mounjaro) 2.5 MG/0.5ML SOAJ, Inject 2.5 mg under the skin once a week, Disp: 2 mL, Rfl: 3    triamcinolone (KENALOG) 0.1 % cream, Apply topically 2 (two) times a day, Disp: 30 g, Rfl: 0  No Known Allergies    Vitals: Blood pressure 126/66, pulse 79, temperature 98.6 °F (37 °C), temperature source " "Tympanic, height 5' 4\" (1.626 m), weight 78 kg (172 lb), SpO2 98%, unknown if currently breastfeeding. Body mass index is 29.52 kg/m². Oxygen Therapy  SpO2: 98 %  Oxygen Therapy: Supplemental oxygen  O2 Delivery Method: Nasal cannula  O2 Flow Rate (L/min): 3 L/min      Physical Exam  Physical Exam  Vitals reviewed.   Constitutional:       Appearance: Normal appearance.   HENT:      Head: Normocephalic.      Nose: Nose normal.      Mouth/Throat:      Mouth: Mucous membranes are moist.     Eyes:      Pupils: Pupils are equal, round, and reactive to light.       Cardiovascular:      Rate and Rhythm: Normal rate and regular rhythm.      Pulses: Normal pulses.      Heart sounds: Normal heart sounds.   Abdominal:      General: Abdomen is flat.      Palpations: Abdomen is soft.     Musculoskeletal:         General: Normal range of motion.     Skin:     General: Skin is warm and dry.     Neurological:      General: No focal deficit present.      Mental Status: She is alert and oriented to person, place, and time.         Labs: I have personally reviewed pertinent lab results.  Lab Results   Component Value Date    WBC 9.79 2025    HGB 14.2 2025    HCT 43.9 2025    MCV 92 2025     2025     Lab Results   Component Value Date    CALCIUM 10.1 2025    K 4.1 2025    CO2 29 2025     2025    BUN 21 2025    CREATININE 0.80 2025     No results found for: \"IGE\"  Lab Results   Component Value Date    ALT 10 2025    AST 14 2025    ALKPHOS 60 2025       maging and other studies: I have personally reviewed pertinent reports.   and I have personally reviewed pertinent films in PACS    Pulmonary function testin/2021: severe obstruction with FEV1 47%    2023 Spirometry showed severe obstruction with FEV1/FVC 50%. FVC 1.49L 57% and FEV1 0.71L 36%.    2023 6MWT: She finished 6 minute walk test with increase oxygen demand to 4L. "     4/10/2024 O2 titration study:  Patient completed the 6 minute walk test with initial HR of 104 per minute and Sat 94% under RA. Patient desaturated at minute 1 and needs oxygen support til 4L at minute 3. She had an episode of HR of 144 and resolved after increase oxygen support. She walked a total of 289meters with ending HR of 101 and Sat 92% on 4L oxygen support.      EKG, Pathology, and Other Studies: I have personally reviewed pertinent reports.   and I have personally reviewed pertinent films in PACS      Elizabeth Cox MD  Pulmonary and Critical Care Fellow  [facility]

## 2025-07-01 ENCOUNTER — RA CDI HCC (OUTPATIENT)
Dept: OTHER | Facility: HOSPITAL | Age: 80
End: 2025-07-01

## 2025-07-10 ENCOUNTER — OFFICE VISIT (OUTPATIENT)
Dept: INTERNAL MEDICINE CLINIC | Facility: CLINIC | Age: 80
End: 2025-07-10
Payer: MEDICARE

## 2025-07-10 VITALS
TEMPERATURE: 98.3 F | OXYGEN SATURATION: 93 % | DIASTOLIC BLOOD PRESSURE: 70 MMHG | HEART RATE: 89 BPM | WEIGHT: 170 LBS | HEIGHT: 64 IN | SYSTOLIC BLOOD PRESSURE: 132 MMHG | BODY MASS INDEX: 29.02 KG/M2

## 2025-07-10 DIAGNOSIS — F34.1 DYSTHYMIC DISORDER: ICD-10-CM

## 2025-07-10 DIAGNOSIS — E11.65 TYPE 2 DIABETES MELLITUS WITH HYPERGLYCEMIA, WITHOUT LONG-TERM CURRENT USE OF INSULIN (HCC): Primary | ICD-10-CM

## 2025-07-10 DIAGNOSIS — J43.8 OTHER EMPHYSEMA (HCC): ICD-10-CM

## 2025-07-10 DIAGNOSIS — Z01.00 DIABETIC EYE EXAM (HCC): ICD-10-CM

## 2025-07-10 DIAGNOSIS — E78.2 MIXED HYPERLIPIDEMIA: ICD-10-CM

## 2025-07-10 DIAGNOSIS — I10 ESSENTIAL HYPERTENSION, BENIGN: ICD-10-CM

## 2025-07-10 DIAGNOSIS — E11.9 DIABETIC EYE EXAM (HCC): ICD-10-CM

## 2025-07-10 DIAGNOSIS — I50.42 CHRONIC COMBINED SYSTOLIC AND DIASTOLIC CONGESTIVE HEART FAILURE (HCC): ICD-10-CM

## 2025-07-10 PROCEDURE — 99214 OFFICE O/P EST MOD 30 MIN: CPT | Performed by: INTERNAL MEDICINE

## 2025-07-10 PROCEDURE — G2211 COMPLEX E/M VISIT ADD ON: HCPCS | Performed by: INTERNAL MEDICINE

## 2025-07-10 NOTE — ASSESSMENT & PLAN NOTE
Continue same med  Orders:  •  CBC and differential  •  Comprehensive metabolic panel  •  Lipid Panel with Direct LDL reflex  •  TSH, 3rd generation  •  Hemoglobin A1C

## 2025-07-10 NOTE — ASSESSMENT & PLAN NOTE
Wt Readings from Last 3 Encounters:   07/10/25 77.1 kg (170 lb)   06/23/25 78 kg (172 lb)   05/08/25 79.9 kg (176 lb 3.2 oz)       Continue same med

## 2025-07-10 NOTE — ASSESSMENT & PLAN NOTE
Depression Screening Follow-up Plan: Patient's depression screening was positive with a PHQ-9 score of 5. Continue same med

## 2025-07-10 NOTE — ASSESSMENT & PLAN NOTE
Lab Results   Component Value Date    HGBA1C 6.6 (H) 03/24/2025   Continue same med  Orders:  •  CBC and differential  •  Comprehensive metabolic panel  •  Lipid Panel with Direct LDL reflex  •  TSH, 3rd generation  •  Hemoglobin A1C  •  Albumin / creatinine urine ratio

## 2025-07-10 NOTE — PROGRESS NOTES
Name: Adelita Smith      : 1945      MRN: [id number]  Encounter Provider: Mundo Hubbard MD  Encounter Date: 7/10/2025   Encounter department: [facility] [address]  :  Assessment & Plan  Type 2 diabetes mellitus with hyperglycemia, without long-term current use of insulin (HCC)    Lab Results   Component Value Date    HGBA1C 6.6 (H) 2025   Continue same med  Orders:  •  CBC and differential  •  Comprehensive metabolic panel  •  Lipid Panel with Direct LDL reflex  •  TSH, 3rd generation  •  Hemoglobin A1C  •  Albumin / creatinine urine ratio    Diabetic eye exam (HCC)    Orders:  •  Ambulatory Referral to Ophthalmology; Future    Essential hypertension, benign  Continue same med  Orders:  •  CBC and differential  •  Comprehensive metabolic panel  •  Lipid Panel with Direct LDL reflex  •  TSH, 3rd generation  •  Hemoglobin A1C    Chronic combined systolic and diastolic congestive heart failure (HCC)  Wt Readings from Last 3 Encounters:   07/10/25 77.1 kg (170 lb)   25 78 kg (172 lb)   25 79.9 kg (176 lb 3.2 oz)       Continue same med           Other emphysema (HCC)  Continue same med       Mixed hyperlipidemia  Continue same med  Orders:  •  Comprehensive metabolic panel  •  Lipid Panel with Direct LDL reflex  •  TSH, 3rd generation  •  Hemoglobin A1C    Dysthymic disorder  Depression Screening Follow-up Plan: Patient's depression screening was positive with a PHQ-9 score of 5. Continue same med             Depression Screening and Follow-up Plan: Patient's depression screening was positive with a PHQ-9 score of 5.   Patient with underlying depression and was advised to continue current medications as prescribed.       History of Present Illness   Follow-up on multiple medical problems to ensure they are stable on current medications      Review of Systems   Constitutional:  Negative for chills and fever.   HENT:  Negative for congestion, ear pain and sore throat.  "   Eyes:  Negative for pain.   Respiratory:  Positive for cough and shortness of breath.    Cardiovascular:  Negative for chest pain and leg swelling.   Gastrointestinal:  Negative for abdominal pain, nausea and vomiting.   Endocrine: Negative for polyuria.   Genitourinary:  Negative for difficulty urinating, frequency and urgency.   Musculoskeletal:  Positive for arthralgias and back pain.   Skin:  Negative for rash.   Neurological:  Negative for weakness and headaches.   Psychiatric/Behavioral:  Negative for sleep disturbance. The patient is not nervous/anxious.        Objective   /70 (BP Location: Right arm, Patient Position: Sitting, Cuff Size: Standard)   Pulse 89   Temp 98.3 °F (36.8 °C) (Temporal)   Ht 5' 4\" (1.626 m)   Wt 77.1 kg (170 lb)   SpO2 93% Comment: on portable oxygen  BMI 29.18 kg/m²      Physical Exam  Vitals and nursing note reviewed.   Constitutional:       General: She is not in acute distress.     Appearance: She is well-developed.   HENT:      Head: Normocephalic and atraumatic.      Right Ear: External ear normal.      Left Ear: External ear normal.      Mouth/Throat:      Mouth: Mucous membranes are moist.      Pharynx: Oropharynx is clear.     Eyes:      Extraocular Movements: Extraocular movements intact.      Conjunctiva/sclera: Conjunctivae normal.       Cardiovascular:      Rate and Rhythm: Normal rate and regular rhythm.      Heart sounds: Normal heart sounds. No murmur heard.  Pulmonary:      Effort: Pulmonary effort is normal. No respiratory distress.      Breath sounds: Normal breath sounds. No wheezing or rales.   Abdominal:      General: Abdomen is flat. There is no distension.      Palpations: Abdomen is soft.      Tenderness: There is no abdominal tenderness.     Musculoskeletal:         General: No swelling.      Cervical back: Neck supple.      Right lower leg: No edema.      Left lower leg: No edema.     Skin:     General: Skin is warm and dry.      Capillary " Refill: Capillary refill takes less than 2 seconds.     Neurological:      General: No focal deficit present.      Mental Status: She is alert and oriented to person, place, and time.     Psychiatric:         Mood and Affect: Mood normal.

## 2025-07-10 NOTE — ASSESSMENT & PLAN NOTE
Continue same med  Orders:  •  Comprehensive metabolic panel  •  Lipid Panel with Direct LDL reflex  •  TSH, 3rd generation  •  Hemoglobin A1C

## 2025-08-13 ENCOUNTER — TELEPHONE (OUTPATIENT)
Age: 80
End: 2025-08-13